# Patient Record
Sex: MALE | Race: WHITE | NOT HISPANIC OR LATINO | Employment: OTHER | URBAN - METROPOLITAN AREA
[De-identification: names, ages, dates, MRNs, and addresses within clinical notes are randomized per-mention and may not be internally consistent; named-entity substitution may affect disease eponyms.]

---

## 2017-01-03 ENCOUNTER — APPOINTMENT (OUTPATIENT)
Dept: OTHER | Facility: IMAGING CENTER | Age: 67
End: 2017-01-03

## 2017-01-03 PROCEDURE — 97530 THERAPEUTIC ACTIVITIES: CPT | Performed by: FAMILY MEDICINE

## 2017-01-06 ENCOUNTER — NON-PROVIDER VISIT (OUTPATIENT)
Dept: MEDICAL GROUP | Facility: MEDICAL CENTER | Age: 67
End: 2017-01-06
Payer: MEDICARE

## 2017-01-06 DIAGNOSIS — E53.8 VITAMIN B12 DEFICIENCY: ICD-10-CM

## 2017-01-06 PROCEDURE — 96372 THER/PROPH/DIAG INJ SC/IM: CPT | Performed by: FAMILY MEDICINE

## 2017-01-06 RX ADMIN — CYANOCOBALAMIN 1000 MCG: 1000 INJECTION, SOLUTION INTRAMUSCULAR; SUBCUTANEOUS at 14:09

## 2017-01-06 NOTE — PROGRESS NOTES
Prabhakar Sierra is a 66 y.o. male here for a non-provider visit for B-12 injection.    Reason for injection: B-12 deficiency  Order in MAR?: Yes  Patient supplied?:No  Minimum interval has been met for this injection (per MAR order): Yes    Patient tolerated injection and no adverse effects were observed or reported Yes.    # of Administrations remaining in MAR:1

## 2017-01-06 NOTE — MR AVS SNAPSHOT
Prabhakar Sierra   2017 2:00 PM   Non-Provider Visit   MRN: [id number]    Department:  [facility]   Dept Phone:  [phone]    Description:  Male : 1950   Provider:  [facility]           Reason for Visit     Injections B-12      Allergies as of 2017     No Known Allergies      You were diagnosed with     Vitamin B12 deficiency   [260738]         Vital Signs     Smoking Status                   Former Smoker           Basic Information     Date Of Birth Sex Race Ethnicity Preferred Language    1950 Male White Non- English      Your appointments     2017  1:00 PM   ACU GROUP with Jaylon Nobles D.O.   [facility] Acupuncture [facility] Integrative [facility] (--)    [address]  [place] [address] [phone]            2017  5:00 PM   MR [id number] with [facility] MRI 1   [facility]AG[facility] - MRI - [facility] ([place])    [address]  [place] [address] [phone]            2017  1:00 PM   Established Patient with Kelly Rivers M.D.   [facility] ([place]    [address]  [address]  [place] [address] [phone]           You will be receiving a confirmation call a few days before your appointment from our automated call confirmation system.            2017 11:20 AM   Follow Up Visit with Ady Triana M.D.   [facility] Neurology (--)    [address], [address]  [place] [address] [phone]           You will be receiving a confirmation call a few days before your appointment from our automated call confirmation system.              Problem List              ICD-10-CM Priority Class Noted - Resolved    BPPV (benign paroxysmal positional vertigo) H81.10   2016 - Present    Controlled type 2 diabetes mellitus with diabetic polyneuropathy, without long-term current use of insulin (HCC) E11.42   2016 - Present    GERD  (gastroesophageal reflux disease) K21.9   9/27/2016 - Present    Obesity (BMI 30-39.9) E66.9   9/27/2016 - Present    Essential hypertension I10   9/27/2016 - Present    BPH (benign prostatic hyperplasia) N40.0   9/27/2016 - Present    Pure hypercholesterolemia E78.00   9/27/2016 - Present    Tobacco use Z72.0   9/27/2016 - Present    Neck pain M54.2   9/27/2016 - Present    Health care maintenance Z00.00   9/27/2016 - Present    Alcohol consumption of more than four drinks per day Z78.9   9/27/2016 - Present    Tremor, coarse G25.2   10/25/2016 - Present    Post-traumatic stress F43.10   10/25/2016 - Present    B12 deficiency E53.8   11/1/2016 - Present    Moderate single current episode of major depressive disorder (HCC) F32.1   11/11/2016 - Present    Acute right hip pain M25.551   11/14/2016 - Present    Post traumatic stress disorder (PTSD) F43.10   12/9/2016 - Present      Health Maintenance        Date Due Completion Dates    RETINAL SCREENING 7/29/1967 ---    COLONOSCOPY 7/29/1999 ---    IMM ZOSTER VACCINE 7/29/2009 ---    A1C SCREENING 4/3/2017 10/3/2016, 1/18/2016, 5/18/2014    IMM PNEUMOCOCCAL 65+ (ADULT) LOW/MEDIUM RISK SERIES (2 of 2 - PPSV23) 9/27/2017 9/27/2016    FASTING LIPID PROFILE 10/3/2017 10/3/2016    URINE ACR / MICROALBUMIN 10/3/2017 10/3/2016    SERUM CREATININE 10/3/2017 10/3/2016, 1/25/2016, 1/18/2016, 5/18/2014    DIABETES MONOFILAMTENT / LE EXAM 12/9/2017 12/9/2016, 12/9/2016    IMM DTaP/Tdap/Td Vaccine (2 - Td) 9/27/2026 9/27/2016            Current Immunizations     13-VALENT PCV PREVNAR 9/27/2016  4:27 PM    Influenza TIV (IM) 10/26/2016    Tdap Vaccine 9/27/2016  4:25 PM      Below and/or attached are the medications your provider expects you to take. Review all of your home medications and newly ordered medications with your provider and/or pharmacist. Follow medication instructions as directed by your provider and/or pharmacist. Please keep your medication list with you and share  with your provider. Update the information when medications are discontinued, doses are changed, or new medications (including over-the-counter products) are added; and carry medication information at all times in the event of emergency situations     Allergies:  No Known Allergies          Medications  Valid as of: January 06, 2017 -  2:14 PM    Generic Name Brand Name Tablet Size Instructions for use    Aspirin (Chew Tab) ASA 81 MG Take 81 mg by mouth every day.        Atorvastatin Calcium (Tab) LIPITOR 20 MG Take 40 mg by mouth every evening.        Benazepril HCl (Tab) LOTENSIN 20 MG Take 2 Tabs by mouth every day.        Cholecalciferol (Tab) cholecalciferol 1000 UNIT Take 1,000 Units by mouth every day.        Cyanocobalamin (Tab) VITAMIN B12 1000 MCG Take 1 Tab by mouth every day.        Escitalopram Oxalate (Tab) LEXAPRO 10 MG Take 2 Tabs by mouth every day.        Gabapentin (Cap) NEURONTIN 300 MG Take 2 Caps by mouth 3 times a day.        GlipiZIDE (TABLET SR 24 HR) GLUCOTROL 5 MG Take 5 mg by mouth every day.        Ibuprofen (Tab) MOTRIN 800 MG Take 800 mg by mouth every 8 hours as needed.        MetFORMIN HCl (TABLET SR 24 HR) GLUCOPHAGE  MG Take 1,000 mg by mouth 2 times a day.        Multiple Vitamins-Minerals   Take  by mouth.        Omeprazole (CAPSULE DELAYED RELEASE) PRILOSEC 20 MG Take 20 mg by mouth every day.        OXcarbazepine (Tab) TRILEPTAL 300 MG Take 1 Tab by mouth 2 times a day.        Pravastatin Sodium (Tab) PRAVACHOL 20 MG Take 40 mg by mouth every evening.        Terazosin HCl (Cap) HYTRIN 5 MG Take 5 mg by mouth every evening.        .                 Medicines prescribed today were sent to:     [facility] #[id number] - [place], [place] - [address] [address]    [address] [facility] [place] [address]    Phone: [phone] Fax: [phone]    Open 24 Hours?: No      Medication refill instructions:       If your prescription bottle indicates you have medication refills left,  it is not necessary to call your provider’s office. Please contact your pharmacy and they will refill your medication.    If your prescription bottle indicates you do not have any refills left, you may request refills at any time through one of the following ways: The online ShowMe system (except Urgent Care), by calling your provider’s office, or by asking your pharmacy to contact your provider’s office with a refill request. Medication refills are processed only during regular business hours and may not be available until the next business day. Your provider may request additional information or to have a follow-up visit with you prior to refilling your medication.   *Please Note: Medication refills are assigned a new Rx number when refilled electronically. Your pharmacy may indicate that no refills were authorized even though a new prescription for the same medication is available at the pharmacy. Please request the medicine by name with the pharmacy before contacting your provider for a refill.           ShowMe Access Code: Activation code not generated  Current ShowMe Status: Active

## 2017-01-11 ENCOUNTER — NON-PROVIDER VISIT (OUTPATIENT)
Dept: MEDICAL GROUP | Facility: MEDICAL CENTER | Age: 67
End: 2017-01-11
Payer: MEDICARE

## 2017-01-11 PROCEDURE — 96372 THER/PROPH/DIAG INJ SC/IM: CPT | Performed by: FAMILY MEDICINE

## 2017-01-11 RX ORDER — CYANOCOBALAMIN 1000 UG/ML
1000 INJECTION, SOLUTION INTRAMUSCULAR; SUBCUTANEOUS ONCE
Status: COMPLETED | OUTPATIENT
Start: 2017-01-11 | End: 2017-01-11

## 2017-01-11 RX ADMIN — CYANOCOBALAMIN 1000 MCG: 1000 INJECTION, SOLUTION INTRAMUSCULAR; SUBCUTANEOUS at 12:07

## 2017-01-11 NOTE — PROGRESS NOTES
Prabhakar Sierra is a 66 y.o. male here for a non-provider visit for B-12 injection.    Reason for injection: Vitamin B 12 deficiency  Order in MAR?: Yes  Patient supplied?:No  Minimum interval has been met for this injection (per MAR order): Yes    Patient tolerated injection and no adverse effects were observed or reported.    # of Administrations remaining in MAR:

## 2017-01-13 ENCOUNTER — HOSPITAL ENCOUNTER (OUTPATIENT)
Dept: RADIOLOGY | Facility: MEDICAL CENTER | Age: 67
End: 2017-01-13
Attending: PSYCHIATRY & NEUROLOGY
Payer: MEDICARE

## 2017-01-13 DIAGNOSIS — G25.2 TREMOR, COARSE: ICD-10-CM

## 2017-01-13 DIAGNOSIS — R25.1 TREMOR: ICD-10-CM

## 2017-01-13 DIAGNOSIS — M54.2 NECK PAIN: ICD-10-CM

## 2017-01-13 DIAGNOSIS — F43.10 POST-TRAUMATIC STRESS: ICD-10-CM

## 2017-01-13 PROCEDURE — 70551 MRI BRAIN STEM W/O DYE: CPT

## 2017-01-19 ENCOUNTER — NON-PROVIDER VISIT (OUTPATIENT)
Dept: MEDICAL GROUP | Facility: MEDICAL CENTER | Age: 67
End: 2017-01-19
Payer: MEDICARE

## 2017-01-19 ENCOUNTER — TELEPHONE (OUTPATIENT)
Dept: NEUROLOGY | Facility: MEDICAL CENTER | Age: 67
End: 2017-01-19

## 2017-01-19 DIAGNOSIS — E53.8 B12 DEFICIENCY: ICD-10-CM

## 2017-01-19 PROCEDURE — 99999 PR NO CHARGE: CPT | Performed by: FAMILY MEDICINE

## 2017-01-19 RX ORDER — CYANOCOBALAMIN 1000 UG/ML
1000 INJECTION, SOLUTION INTRAMUSCULAR; SUBCUTANEOUS ONCE
Status: COMPLETED | OUTPATIENT
Start: 2017-01-19 | End: 2017-01-19

## 2017-01-19 RX ADMIN — CYANOCOBALAMIN 1000 MCG: 1000 INJECTION, SOLUTION INTRAMUSCULAR; SUBCUTANEOUS at 14:25

## 2017-01-19 NOTE — NON-PROVIDER
Prabhakar Sierra is a 66 y.o. male here for a non-provider visit for B12 injection.    Reason for injection: B12 deficiency   Order in MAR?: Yes  Patient supplied?:No  Minimum interval has been met for this injection (per MAR order): Yes    Patient tolerated injection and no adverse effects were observed or reported yes.    # of Administrations remaining in MAR: 0

## 2017-01-19 NOTE — TELEPHONE ENCOUNTER
Pt is calling and stating he forgot his appointment date and time. He needs a call back and clarification of this. Called and spoke with pt. Appointment date and time was given to the pt again. He is really concerned about his tremors. They are still not improving. He did have the MRI done and is looking forward to his appointment to go over the results with Dr. Triana. KA

## 2017-01-19 NOTE — MR AVS SNAPSHOT
Prabhakar Sierra   2017 1:30 PM   Non-Provider Visit   MRN: [id number]    Department:  [facility]   Dept Phone:  [phone]    Description:  Male : 1950   Provider:  [facility]           Reason for Visit     Immunizations B12      Allergies as of 2017     No Known Allergies      You were diagnosed with     B12 deficiency   [106603]         Vital Signs     Smoking Status                   Former Smoker           Basic Information     Date Of Birth Sex Race Ethnicity Preferred Language    1950 Male White Non- English      Your appointments     2017  1:00 PM   Established Patient with Kelly Rivers M.D.   [facility] ([place])    [address]  [address]  [place] [address] [phone]           You will be receiving a confirmation call a few days before your appointment from our automated call confirmation system.            2017 11:20 AM   Follow Up Visit with Ady Triana M.D.   [facility] Neurology (--)    [address], [address]  [place] [address] [phone]           You will be receiving a confirmation call a few days before your appointment from our automated call confirmation system.              Problem List              ICD-10-CM Priority Class Noted - Resolved    BPPV (benign paroxysmal positional vertigo) H81.10   2016 - Present    Controlled type 2 diabetes mellitus with diabetic polyneuropathy, without long-term current use of insulin (CMS-HCC) E11.42   2016 - Present    GERD (gastroesophageal reflux disease) K21.9   2016 - Present    Obesity (BMI 30-39.9) E66.9   2016 - Present    Essential hypertension I10   2016 - Present    BPH (benign prostatic hyperplasia) N40.0   2016 - Present    Pure hypercholesterolemia E78.00   2016 - Present    Tobacco use Z72.0   2016 - Present    Neck pain M54.2   2016 - Present    Health care maintenance Z00.00   9/27/2016 - Present    Alcohol consumption of more than four drinks per day Z78.9   9/27/2016 - Present    Tremor, coarse G25.2   10/25/2016 - Present    Post-traumatic stress F43.10   10/25/2016 - Present    B12 deficiency E53.8   11/1/2016 - Present    Moderate single current episode of major depressive disorder (CMS-HCC) F32.1   11/11/2016 - Present    Acute right hip pain M25.551   11/14/2016 - Present    Post traumatic stress disorder (PTSD) F43.10   12/9/2016 - Present      Health Maintenance        Date Due Completion Dates    RETINAL SCREENING 7/29/1967 ---    COLONOSCOPY 7/29/1999 ---    IMM ZOSTER VACCINE 7/29/2009 ---    A1C SCREENING 4/3/2017 10/3/2016, 1/18/2016, 5/18/2014    IMM PNEUMOCOCCAL 65+ (ADULT) LOW/MEDIUM RISK SERIES (2 of 2 - PPSV23) 9/27/2017 9/27/2016    FASTING LIPID PROFILE 10/3/2017 10/3/2016    URINE ACR / MICROALBUMIN 10/3/2017 10/3/2016    SERUM CREATININE 10/3/2017 10/3/2016, 1/25/2016, 1/18/2016, 5/18/2014    DIABETES MONOFILAMENT / LE EXAM 12/9/2017 12/9/2016, 12/9/2016    IMM DTaP/Tdap/Td Vaccine (2 - Td) 9/27/2026 9/27/2016            Current Immunizations     13-VALENT PCV PREVNAR 9/27/2016  4:27 PM    Influenza TIV (IM) 10/26/2016    Tdap Vaccine 9/27/2016  4:25 PM      Below and/or attached are the medications your provider expects you to take. Review all of your home medications and newly ordered medications with your provider and/or pharmacist. Follow medication instructions as directed by your provider and/or pharmacist. Please keep your medication list with you and share with your provider. Update the information when medications are discontinued, doses are changed, or new medications (including over-the-counter products) are added; and carry medication information at all times in the event of emergency situations     Allergies:  No Known Allergies          Medications  Valid as of: January 19, 2017 -  2:27 PM    Generic Name Brand Name  Tablet Size Instructions for use    Aspirin (Chew Tab) ASA 81 MG Take 81 mg by mouth every day.        Atorvastatin Calcium (Tab) LIPITOR 20 MG Take 40 mg by mouth every evening.        Benazepril HCl (Tab) LOTENSIN 20 MG Take 2 Tabs by mouth every day.        Cholecalciferol (Tab) cholecalciferol 1000 UNIT Take 1,000 Units by mouth every day.        Cyanocobalamin (Tab) VITAMIN B12 1000 MCG Take 1 Tab by mouth every day.        Escitalopram Oxalate (Tab) LEXAPRO 10 MG Take 2 Tabs by mouth every day.        Gabapentin (Cap) NEURONTIN 300 MG Take 2 Caps by mouth 3 times a day.        GlipiZIDE (TABLET SR 24 HR) GLUCOTROL 5 MG Take 5 mg by mouth every day.        Ibuprofen (Tab) MOTRIN 800 MG Take 800 mg by mouth every 8 hours as needed.        MetFORMIN HCl (TABLET SR 24 HR) GLUCOPHAGE  MG Take 1,000 mg by mouth 2 times a day.        Multiple Vitamins-Minerals   Take  by mouth.        Omeprazole (CAPSULE DELAYED RELEASE) PRILOSEC 20 MG Take 20 mg by mouth every day.        OXcarbazepine (Tab) TRILEPTAL 300 MG Take 1 Tab by mouth 2 times a day.        Pravastatin Sodium (Tab) PRAVACHOL 20 MG Take 40 mg by mouth every evening.        Terazosin HCl (Cap) HYTRIN 5 MG Take 5 mg by mouth every evening.        .                 Medicines prescribed today were sent to:     [facility] #[id number] [place], [place] - [address] [address]    Phone: [phone] Fax: [phone]    Open 24 Hours?: No      Medication refill instructions:       If your prescription bottle indicates you have medication refills left, it is not necessary to call your provider’s office. Please contact your pharmacy and they will refill your medication.    If your prescription bottle indicates you do not have any refills left, you may request refills at any time through one of the following ways: The online Logoworks system (except Urgent Care), by calling your provider’s office, or by asking your pharmacy  to contact your provider’s office with a refill request. Medication refills are processed only during regular business hours and may not be available until the next business day. Your provider may request additional information or to have a follow-up visit with you prior to refilling your medication.   *Please Note: Medication refills are assigned a new Rx number when refilled electronically. Your pharmacy may indicate that no refills were authorized even though a new prescription for the same medication is available at the pharmacy. Please request the medicine by name with the pharmacy before contacting your provider for a refill.           Pavlok Access Code: Activation code not generated  Current Pavlok Status: Active

## 2017-01-20 ENCOUNTER — APPOINTMENT (OUTPATIENT)
Dept: MEDICAL GROUP | Facility: MEDICAL CENTER | Age: 67
End: 2017-01-20
Payer: MEDICARE

## 2017-01-25 ENCOUNTER — OFFICE VISIT (OUTPATIENT)
Dept: MEDICAL GROUP | Facility: MEDICAL CENTER | Age: 67
End: 2017-01-25
Payer: MEDICARE

## 2017-01-25 VITALS
RESPIRATION RATE: 16 BRPM | HEART RATE: 93 BPM | HEIGHT: 70 IN | SYSTOLIC BLOOD PRESSURE: 136 MMHG | DIASTOLIC BLOOD PRESSURE: 70 MMHG | BODY MASS INDEX: 33.11 KG/M2 | OXYGEN SATURATION: 95 % | WEIGHT: 231.26 LBS | TEMPERATURE: 97.3 F

## 2017-01-25 DIAGNOSIS — G25.2 TREMOR, COARSE: ICD-10-CM

## 2017-01-25 DIAGNOSIS — E53.8 B12 DEFICIENCY: ICD-10-CM

## 2017-01-25 DIAGNOSIS — I10 ESSENTIAL HYPERTENSION: ICD-10-CM

## 2017-01-25 PROCEDURE — 99215 OFFICE O/P EST HI 40 MIN: CPT | Performed by: FAMILY MEDICINE

## 2017-01-25 RX ORDER — BENAZEPRIL HYDROCHLORIDE 20 MG/1
20 TABLET ORAL DAILY
Qty: 30 TAB | Refills: 3 | COMMUNITY
Start: 2017-01-25 | End: 2017-07-24 | Stop reason: SDUPTHER

## 2017-01-25 RX ORDER — CYANOCOBALAMIN 1000 UG/ML
1000 INJECTION, SOLUTION INTRAMUSCULAR; SUBCUTANEOUS ONCE
Status: COMPLETED | OUTPATIENT
Start: 2017-01-25 | End: 2017-01-25

## 2017-01-25 RX ADMIN — CYANOCOBALAMIN 1000 MCG: 1000 INJECTION, SOLUTION INTRAMUSCULAR; SUBCUTANEOUS at 17:03

## 2017-01-25 NOTE — MR AVS SNAPSHOT
"Prabhakar Sierra   2017 1:40 PM   Office Visit   MRN: [id number]    Department:  [place] [address]   Dept Phone:  [phone]    Description:  Male : 1950   Provider:  Kelly Rivers M.D.           Reason for Visit     Follow-Up     Injections B-12      Allergies as of 2017     No Known Allergies      You were diagnosed with     Essential hypertension   [0191877]         Vital Signs     Blood Pressure Pulse Temperature Respirations Height Weight    136/70 mmHg 93 36.3 °C (97.3 °F) 16 1.765 m (5' 9.5\") 104.9 kg (231 lb 4.2 oz)    Body Mass Index Oxygen Saturation Smoking Status             33.67 kg/m2 95% Former Smoker         Basic Information     Date Of Birth Sex Race Ethnicity Preferred Language    1950 Male White Non- English      Your appointments     2017 11:20 AM   Follow Up Visit with Ady Triana M.D.   [facility] Neurology (--)    [address], [address]  [facility] [address] [phone]           You will be receiving a confirmation call a few days before your appointment from our automated call confirmation system.            2017  3:30 PM   ACU GROUP with Jaylon Nobles D.O.   [facility]upuncture [facility] Integrative [facility] (--)    [address] [facility]  [place] [address] [phone]            2017  3:00 PM   Established Patient with Kelly Rivers M.D.   [facility] [place])    [address]  [address]  [facility] [address] [phone]           You will be receiving a confirmation call a few days before your appointment from our automated call confirmation system.              Problem List              ICD-10-CM Priority Class Noted - Resolved    BPPV (benign paroxysmal positional vertigo) H81.10   2016 - Present    Controlled type 2 diabetes mellitus with diabetic polyneuropathy, without long-term current use of insulin (CMS-HCC) E11.42   " 9/27/2016 - Present    GERD (gastroesophageal reflux disease) K21.9   9/27/2016 - Present    Obesity (BMI 30-39.9) E66.9   9/27/2016 - Present    Essential hypertension I10   9/27/2016 - Present    BPH (benign prostatic hyperplasia) N40.0   9/27/2016 - Present    Pure hypercholesterolemia E78.00   9/27/2016 - Present    Tobacco use Z72.0   9/27/2016 - Present    Neck pain M54.2   9/27/2016 - Present    Health care maintenance Z00.00   9/27/2016 - Present    Alcohol consumption of more than four drinks per day Z78.9   9/27/2016 - Present    Tremor, coarse G25.2   10/25/2016 - Present    Post-traumatic stress F43.10   10/25/2016 - Present    B12 deficiency E53.8   11/1/2016 - Present    Moderate single current episode of major depressive disorder (CMS-HCC) F32.1   11/11/2016 - Present    Acute right hip pain M25.551   11/14/2016 - Present    Post traumatic stress disorder (PTSD) F43.10   12/9/2016 - Present      Health Maintenance        Date Due Completion Dates    RETINAL SCREENING 7/29/1967 ---    COLONOSCOPY 7/29/1999 ---    IMM ZOSTER VACCINE 7/29/2009 ---    A1C SCREENING 4/3/2017 10/3/2016, 1/18/2016, 5/18/2014    IMM PNEUMOCOCCAL 65+ (ADULT) LOW/MEDIUM RISK SERIES (2 of 2 - PPSV23) 9/27/2017 9/27/2016    FASTING LIPID PROFILE 10/3/2017 10/3/2016    URINE ACR / MICROALBUMIN 10/3/2017 10/3/2016    SERUM CREATININE 10/3/2017 10/3/2016, 1/25/2016, 1/18/2016, 5/18/2014    DIABETES MONOFILAMENT / LE EXAM 12/9/2017 12/9/2016, 12/9/2016    IMM DTaP/Tdap/Td Vaccine (2 - Td) 9/27/2026 9/27/2016            Current Immunizations     13-VALENT PCV PREVNAR 9/27/2016  4:27 PM    Influenza TIV (IM) 10/26/2016    Tdap Vaccine 9/27/2016  4:25 PM      Below and/or attached are the medications your provider expects you to take. Review all of your home medications and newly ordered medications with your provider and/or pharmacist. Follow medication instructions as directed by your provider and/or pharmacist. Please keep your  medication list with you and share with your provider. Update the information when medications are discontinued, doses are changed, or new medications (including over-the-counter products) are added; and carry medication information at all times in the event of emergency situations     Allergies:  No Known Allergies          Medications  Valid as of: January 25, 2017 -  3:02 PM    Generic Name Brand Name Tablet Size Instructions for use    Aspirin (Chew Tab) ASA 81 MG Take 81 mg by mouth every day.        Atorvastatin Calcium (Tab) LIPITOR 20 MG Take 40 mg by mouth every evening.        Benazepril HCl (Tab) LOTENSIN 20 MG Take 1 Tab by mouth every day.        Cholecalciferol (Tab) cholecalciferol 1000 UNIT Take 1,000 Units by mouth every day.        Cyanocobalamin (Tab) VITAMIN B12 1000 MCG Take 1 Tab by mouth every day.        Escitalopram Oxalate (Tab) LEXAPRO 10 MG Take 2 Tabs by mouth every day.        Gabapentin (Cap) NEURONTIN 300 MG Take 2 Caps by mouth 3 times a day.        GlipiZIDE (TABLET SR 24 HR) GLUCOTROL 5 MG Take 5 mg by mouth every day.        Ibuprofen (Tab) MOTRIN 800 MG Take 800 mg by mouth every 8 hours as needed.        MetFORMIN HCl (Tab) GLUCOPHAGE 500 MG 2 tabs PO qAM, 1 tab in the afternoon, 2 tabs qPM        Multiple Vitamins-Minerals   Take  by mouth.        Omeprazole (CAPSULE DELAYED RELEASE) PRILOSEC 20 MG Take 20 mg by mouth every day.        Terazosin HCl (Cap) HYTRIN 5 MG Take 5 mg by mouth every evening.        .                 Medicines prescribed today were sent to:     [facility] #[id number] - [place], [place] - [address] [place] [address]    Phone: [phone] Fax: [phone]    Open 24 Hours?: No      Medication refill instructions:       If your prescription bottle indicates you have medication refills left, it is not necessary to call your provider’s office. Please contact your pharmacy and they will refill your medication.    If your  prescription bottle indicates you do not have any refills left, you may request refills at any time through one of the following ways: The online Meebler system (except Urgent Care), by calling your provider’s office, or by asking your pharmacy to contact your provider’s office with a refill request. Medication refills are processed only during regular business hours and may not be available until the next business day. Your provider may request additional information or to have a follow-up visit with you prior to refilling your medication.   *Please Note: Medication refills are assigned a new Rx number when refilled electronically. Your pharmacy may indicate that no refills were authorized even though a new prescription for the same medication is available at the pharmacy. Please request the medicine by name with the pharmacy before contacting your provider for a refill.           Meebler Access Code: Activation code not generated  Current Meebler Status: Active
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
## 2017-01-30 ENCOUNTER — APPOINTMENT (OUTPATIENT)
Dept: MEDICAL GROUP | Facility: MEDICAL CENTER | Age: 67
End: 2017-01-30
Payer: MEDICARE

## 2017-01-31 ENCOUNTER — OFFICE VISIT (OUTPATIENT)
Dept: NEUROLOGY | Facility: MEDICAL CENTER | Age: 67
End: 2017-01-31
Payer: MEDICARE

## 2017-01-31 VITALS
HEART RATE: 92 BPM | RESPIRATION RATE: 20 BRPM | BODY MASS INDEX: 34.56 KG/M2 | WEIGHT: 233.32 LBS | TEMPERATURE: 98.4 F | DIASTOLIC BLOOD PRESSURE: 84 MMHG | OXYGEN SATURATION: 96 % | HEIGHT: 69 IN | SYSTOLIC BLOOD PRESSURE: 150 MMHG

## 2017-01-31 DIAGNOSIS — E53.8 B12 DEFICIENCY: ICD-10-CM

## 2017-01-31 DIAGNOSIS — F43.10 POST-TRAUMATIC STRESS: ICD-10-CM

## 2017-01-31 DIAGNOSIS — M25.551 ACUTE RIGHT HIP PAIN: ICD-10-CM

## 2017-01-31 PROCEDURE — 4040F PNEUMOC VAC/ADMIN/RCVD: CPT | Performed by: PSYCHIATRY & NEUROLOGY

## 2017-01-31 PROCEDURE — G8432 DEP SCR NOT DOC, RNG: HCPCS | Performed by: PSYCHIATRY & NEUROLOGY

## 2017-01-31 PROCEDURE — G8419 CALC BMI OUT NRM PARAM NOF/U: HCPCS | Performed by: PSYCHIATRY & NEUROLOGY

## 2017-01-31 PROCEDURE — 99213 OFFICE O/P EST LOW 20 MIN: CPT | Performed by: PSYCHIATRY & NEUROLOGY

## 2017-01-31 PROCEDURE — G8482 FLU IMMUNIZE ORDER/ADMIN: HCPCS | Performed by: PSYCHIATRY & NEUROLOGY

## 2017-01-31 PROCEDURE — 1101F PT FALLS ASSESS-DOCD LE1/YR: CPT | Mod: 8P | Performed by: PSYCHIATRY & NEUROLOGY

## 2017-01-31 PROCEDURE — 1036F TOBACCO NON-USER: CPT | Performed by: PSYCHIATRY & NEUROLOGY

## 2017-01-31 PROCEDURE — 3017F COLORECTAL CA SCREEN DOC REV: CPT | Mod: 1P | Performed by: PSYCHIATRY & NEUROLOGY
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
## 2017-02-07 RX ORDER — ATORVASTATIN CALCIUM 40 MG/1
40 TABLET, FILM COATED ORAL DAILY
Qty: 90 TAB | Refills: 3 | Status: SHIPPED | OUTPATIENT
Start: 2017-02-07 | End: 2018-04-17 | Stop reason: SDUPTHER

## 2017-02-07 RX ORDER — TERAZOSIN 5 MG/1
5 CAPSULE ORAL DAILY
Qty: 90 CAP | Refills: 3 | Status: SHIPPED | OUTPATIENT
Start: 2017-02-07 | End: 2018-04-17 | Stop reason: SDUPTHER

## 2017-02-07 RX ORDER — GLIPIZIDE 5 MG/1
5 TABLET, FILM COATED, EXTENDED RELEASE ORAL DAILY
Qty: 90 TAB | Refills: 3 | Status: SHIPPED | OUTPATIENT
Start: 2017-02-07 | End: 2018-04-17 | Stop reason: SDUPTHER

## 2017-02-09 NOTE — TELEPHONE ENCOUNTER
Was the patient seen in the last year in this department? Yes     Does patient have an active prescription for medications requested? No     Received Request Via: Pharmacy     Last visit: 12/25/16    Last labs: 10/31/16

## 2017-02-10 ENCOUNTER — NON-PROVIDER VISIT (OUTPATIENT)
Dept: MEDICAL GROUP | Facility: MEDICAL CENTER | Age: 67
End: 2017-02-10
Payer: MEDICARE

## 2017-02-10 DIAGNOSIS — E53.8 B12 DEFICIENCY: ICD-10-CM

## 2017-02-10 PROCEDURE — 96372 THER/PROPH/DIAG INJ SC/IM: CPT | Performed by: FAMILY MEDICINE

## 2017-02-10 RX ORDER — GABAPENTIN 300 MG/1
600 CAPSULE ORAL 3 TIMES DAILY
Qty: 540 CAP | Refills: 3 | Status: SHIPPED | OUTPATIENT
Start: 2017-02-10 | End: 2018-04-11 | Stop reason: SDUPTHER

## 2017-02-10 RX ADMIN — CYANOCOBALAMIN 1000 MCG: 1000 INJECTION, SOLUTION INTRAMUSCULAR; SUBCUTANEOUS at 13:49

## 2017-02-10 NOTE — MR AVS SNAPSHOT
Prabhakar Sierra   2/10/2017 1:30 PM   Non-Provider Visit   MRN: [id number]    Department:  [facility]    Dept Phone:  [phone]    Description:  Male : 1950   Provider:  [facility]           Reason for Visit     Injections B-12      Allergies as of 2/10/2017     No Known Allergies      You were diagnosed with     B12 deficiency   [[id number]]         Vital Signs     Smoking Status                   Former Smoker           Basic Information     Date Of Birth Sex Race Ethnicity Preferred Language    1950 Male White Non- English      Your appointments     2017  3:30 PM   ACU GROUP with Jaylon Nobles D.O.   [facility] (--)    [address] [facility].  [place] [address] [phone]            2017  3:00 PM   Established Patient with Kelly Rivers M.D.   [facility] - [facility] (--)    [address] [facility] [address]  [place] [address] [phone]           You will be receiving a confirmation call a few days before your appointment from our automated call confirmation system.              Problem List              ICD-10-CM Priority Class Noted - Resolved    BPPV (benign paroxysmal positional vertigo) H81.10   2016 - Present    Controlled type 2 diabetes mellitus with diabetic polyneuropathy, without long-term current use of insulin (CMS-[facility]) E11.42   2016 - Present    GERD (gastroesophageal reflux disease) K21.9   2016 - Present    Obesity (BMI 30-39.9) E66.9   2016 - Present    Essential hypertension I10   2016 - Present    BPH (benign prostatic hyperplasia) N40.0   2016 - Present    Pure hypercholesterolemia E78.00   2016 - Present    Tobacco use Z72.0   2016 - Present    Neck pain M54.2   2016 - Present    Health care maintenance Z00.00   2016 - Present    Alcohol consumption of more than four drinks per day Z78.9   2016 - Present       Tremor, coarse G25.2   10/25/2016 - Present    Post-traumatic stress F43.10   10/25/2016 - Present    B12 deficiency E53.8   11/1/2016 - Present    Moderate single current episode of major depressive disorder (CMS-[facility]) F32.1   11/11/2016 - Present    Acute right hip pain M25.551   11/14/2016 - Present    Post traumatic stress disorder (PTSD) F43.10   12/9/2016 - Present      Health Maintenance        Date Due Completion Dates    RETINAL SCREENING 7/29/1967 ---    COLONOSCOPY 7/29/1999 ---    IMM ZOSTER VACCINE 7/29/2009 ---    A1C SCREENING 4/3/2017 10/3/2016, 1/18/2016, 5/18/2014    IMM PNEUMOCOCCAL 65+ (ADULT) LOW/MEDIUM RISK SERIES (2 of 2 - PPSV23) 9/27/2017 9/27/2016    FASTING LIPID PROFILE 10/3/2017 10/3/2016    URINE ACR / MICROALBUMIN 10/3/2017 10/3/2016    SERUM CREATININE 10/3/2017 10/3/2016, 1/25/2016, 1/18/2016, 5/18/2014    DIABETES MONOFILAMENT / LE EXAM 12/9/2017 12/9/2016, 12/9/2016    IMM DTaP/Tdap/Td Vaccine (2 - Td) 9/27/2026 9/27/2016            Current Immunizations     13-VALENT PCV PREVNAR 9/27/2016  4:27 PM    Influenza TIV (IM) 10/26/2016    Tdap Vaccine 9/27/2016  4:25 PM      Below and/or attached are the medications your provider expects you to take. Review all of your home medications and newly ordered medications with your provider and/or pharmacist. Follow medication instructions as directed by your provider and/or pharmacist. Please keep your medication list with you and share with your provider. Update the information when medications are discontinued, doses are changed, or new medications (including over-the-counter products) are added; and carry medication information at all times in the event of emergency situations     Allergies:  No Known Allergies          Medications  Valid as of: February 10, 2017 -  2:49 PM    Generic Name Brand Name Tablet Size Instructions for use    Aspirin (Chew Tab) ASA 81 MG Take 81 mg by mouth every day.        Atorvastatin Calcium (Tab) LIPITOR  40 MG Take 1 Tab by mouth every day.        Benazepril HCl (Tab) LOTENSIN 20 MG Take 1 Tab by mouth every day.        Cholecalciferol (Tab) cholecalciferol 1000 UNIT Take 1,000 Units by mouth every day.        Cyanocobalamin (Tab) VITAMIN B12 1000 MCG Take 1 Tab by mouth every day.        Gabapentin (Cap) NEURONTIN 300 MG Take 2 Caps by mouth 3 times a day.        GlipiZIDE (TABLET SR 24 HR) GLUCOTROL 5 MG Take 1 Tab by mouth every day.        Ibuprofen (Tab) MOTRIN 800 MG Take 800 mg by mouth every 8 hours as needed.        MetFORMIN HCl (Tab) GLUCOPHAGE 500 MG 2 tabs PO qAM, 1 tab in the afternoon, 2 tabs qPM        Multiple Vitamins-Minerals   Take  by mouth.        Omeprazole (CAPSULE DELAYED RELEASE) PRILOSEC 20 MG Take 20 mg by mouth every day.        Terazosin HCl (Cap) HYTRIN 5 MG Take 1 Cap by mouth every day.        .                 Medicines prescribed today were sent to:     [facility] #[id number] [facility] [address] [address]    Phone: [phone] Fax: [phone]    Open 24 Hours?: No      Medication refill instructions:       If your prescription bottle indicates you have medication refills left, it is not necessary to call your provider’s office. Please contact your pharmacy and they will refill your medication.    If your prescription bottle indicates you do not have any refills left, you may request refills at any time through one of the following ways: The online [employer] system (except Urgent Care), by calling your provider’s office, or by asking your pharmacy to contact your provider’s office with a refill request. Medication refills are processed only during regular business hours and may not be available until the next business day. Your provider may request additional information or to have a follow-up visit with you prior to refilling your medication.   *Please Note: Medication refills are assigned a new Rx number when refilled  electronically. Your pharmacy may indicate that no refills were authorized even though a new prescription for the same medication is available at the pharmacy. Please request the medicine by name with the pharmacy before contacting your provider for a refill.           Real Time Contentt Access Code: Activation code not generated  Current Mingleplay Status: Active

## 2017-02-10 NOTE — PROGRESS NOTES
Prabhakar Sierra is a 66 y.o. male here for a non-provider visit for B-12 injection.    Reason for injection: B-12 dEFICIENCY  Order in MAR?: Yes  Patient supplied?:No  Minimum interval has been met for this injection (per MAR order): Yes    Patient tolerated injection and no adverse effects were observed or reported YES.    # of Administrations remaining in MAR:UNKNOW

## 2017-02-14 ENCOUNTER — APPOINTMENT (OUTPATIENT)
Dept: OTHER | Facility: IMAGING CENTER | Age: 67
End: 2017-02-14

## 2017-02-14 PROCEDURE — 97530 THERAPEUTIC ACTIVITIES: CPT | Performed by: FAMILY MEDICINE

## 2017-02-16 ENCOUNTER — APPOINTMENT (OUTPATIENT)
Dept: MEDICAL GROUP | Facility: MEDICAL CENTER | Age: 67
End: 2017-02-16
Payer: MEDICARE

## 2017-02-23 ENCOUNTER — NON-PROVIDER VISIT (OUTPATIENT)
Dept: MEDICAL GROUP | Facility: MEDICAL CENTER | Age: 67
End: 2017-02-23
Payer: MEDICARE

## 2017-02-23 RX ADMIN — CYANOCOBALAMIN 1000 MCG: 1000 INJECTION, SOLUTION INTRAMUSCULAR; SUBCUTANEOUS at 14:27

## 2017-02-23 NOTE — MR AVS SNAPSHOT
Prabhakar Sierra   2017 1:40 PM   Non-Provider Visit   MRN: [id number]    Department:  [facility]    Dept Phone:  [phone]    Description:  Male : 1950   Provider:  [facility]           Reason for Visit     Injections B12      Allergies as of 2017     No Known Allergies      Vital Signs     Smoking Status                   Former Smoker           Basic Information     Date Of Birth Sex Race Ethnicity Preferred Language    1950 Male White Non- English      Your appointments     Mar 07, 2017  3:30 PM   ACU GROUP with Jaylon Nobles D.O.   [facility] (--)    [address] [facility].  [place] [address] [phone]            2017  3:00 PM   Established Patient with Kelly Rivers M.D.   [facility] [place] [facility] - [facility] (--)    [address] [facility] [address]  [place] [address] [phone]           You will be receiving a confirmation call a few days before your appointment from our automated call confirmation system.              Problem List              ICD-10-CM Priority Class Noted - Resolved    BPPV (benign paroxysmal positional vertigo) H81.10   2016 - Present    Controlled type 2 diabetes mellitus with diabetic polyneuropathy, without long-term current use of insulin (CMS-HCC) E11.42   2016 - Present    GERD (gastroesophageal reflux disease) K21.9   2016 - Present    Obesity (BMI 30-39.9) E66.9   2016 - Present    Essential hypertension I10   2016 - Present    BPH (benign prostatic hyperplasia) N40.0   2016 - Present    Pure hypercholesterolemia E78.00   2016 - Present    Tobacco use Z72.0   2016 - Present    Neck pain M54.2   2016 - Present    Health care maintenance Z00.00   2016 - Present    Alcohol consumption of more than four drinks per day Z78.9   2016 - Present    Tremor, coarse G25.2   10/25/2016 - Present    Post-traumatic  stress F43.10   10/25/2016 - Present    B12 deficiency E53.8   11/1/2016 - Present    Moderate single current episode of major depressive disorder (CMS-HCC) F32.1   11/11/2016 - Present    Acute right hip pain M25.551   11/14/2016 - Present    Post traumatic stress disorder (PTSD) F43.10   12/9/2016 - Present      Health Maintenance        Date Due Completion Dates    RETINAL SCREENING 7/29/1967 ---    COLONOSCOPY 7/29/1999 ---    IMM ZOSTER VACCINE 7/29/2009 ---    A1C SCREENING 4/3/2017 10/3/2016, 1/18/2016, 5/18/2014    IMM PNEUMOCOCCAL 65+ (ADULT) LOW/MEDIUM RISK SERIES (2 of 2 - PPSV23) 9/27/2017 9/27/2016    FASTING LIPID PROFILE 10/3/2017 10/3/2016    URINE ACR / MICROALBUMIN 10/3/2017 10/3/2016    SERUM CREATININE 10/3/2017 10/3/2016, 1/25/2016, 1/18/2016, 5/18/2014    DIABETES MONOFILAMENT / LE EXAM 12/9/2017 12/9/2016, 12/9/2016    IMM DTaP/Tdap/Td Vaccine (2 - Td) 9/27/2026 9/27/2016            Current Immunizations     13-VALENT PCV PREVNAR 9/27/2016  4:27 PM    Influenza TIV (IM) 10/26/2016    Tdap Vaccine 9/27/2016  4:25 PM      Below and/or attached are the medications your provider expects you to take. Review all of your home medications and newly ordered medications with your provider and/or pharmacist. Follow medication instructions as directed by your provider and/or pharmacist. Please keep your medication list with you and share with your provider. Update the information when medications are discontinued, doses are changed, or new medications (including over-the-counter products) are added; and carry medication information at all times in the event of emergency situations     Allergies:  No Known Allergies          Medications  Valid as of: February 23, 2017 -  3:00 PM    Generic Name Brand Name Tablet Size Instructions for use    Aspirin (Chew Tab) ASA 81 MG Take 81 mg by mouth every day.        Atorvastatin Calcium (Tab) LIPITOR 40 MG Take 1 Tab by mouth every day.        Benazepril HCl (Tab)  LOTENSIN 20 MG Take 1 Tab by mouth every day.        Cholecalciferol (Tab) cholecalciferol 1000 UNIT Take 1,000 Units by mouth every day.        Cyanocobalamin (Tab) VITAMIN B12 1000 MCG Take 1 Tab by mouth every day.        Gabapentin (Cap) NEURONTIN 300 MG Take 2 Caps by mouth 3 times a day.        GlipiZIDE (TABLET SR 24 HR) GLUCOTROL 5 MG Take 1 Tab by mouth every day.        Ibuprofen (Tab) MOTRIN 800 MG Take 800 mg by mouth every 8 hours as needed.        MetFORMIN HCl (Tab) GLUCOPHAGE 500 MG 2 tabs PO qAM, 1 tab in the afternoon, 2 tabs qPM        Multiple Vitamins-Minerals   Take  by mouth.        Omeprazole (CAPSULE DELAYED RELEASE) PRILOSEC 20 MG Take 20 mg by mouth every day.        Terazosin HCl (Cap) HYTRIN 5 MG Take 1 Cap by mouth every day.        .                 Medicines prescribed today were sent to:     \[facility]\"" PHARM[facility]Y #[id number] - [place], [place] - [address] [place] [address]    Phone: [phone] Fax: [phone]    Open 24 Hours?: No      Medication refill instructions:       If your prescription bottle indicates you have medication refills left, it is not necessary to call your provider’s office. Please contact your pharmacy and they will refill your medication.    If your prescription bottle indicates you do not have any refills left, you may request refills at any time through one of the following ways: The online [employer] system (except Urgent Care), by calling your provider’s office, or by asking your pharmacy to contact your provider’s office with a refill request. Medication refills are processed only during regular business hours and may not be available until the next business day. Your provider may request additional information or to have a follow-up visit with you prior to refilling your medication.   *Please Note: Medication refills are assigned a new Rx number when refilled electronically. Your pharmacy may indicate that no refills were authorized  even though a new prescription for the same medication is available at the pharmacy. Please request the medicine by name with the pharmacy before contacting your provider for a refill.           MyChart Access Code: Activation code not generated  Current Ge[employer]Got Status: Active

## 2017-03-01 ENCOUNTER — NON-PROVIDER VISIT (OUTPATIENT)
Dept: MEDICAL GROUP | Facility: LAB | Age: 67
End: 2017-03-01
Payer: MEDICARE

## 2017-03-01 PROCEDURE — 96372 THER/PROPH/DIAG INJ SC/IM: CPT | Performed by: FAMILY MEDICINE

## 2017-03-01 RX ORDER — CYANOCOBALAMIN 1000 UG/ML
1000 INJECTION, SOLUTION INTRAMUSCULAR; SUBCUTANEOUS ONCE
Status: COMPLETED | OUTPATIENT
Start: 2017-03-01 | End: 2017-03-01

## 2017-03-01 RX ADMIN — CYANOCOBALAMIN 1000 MCG: 1000 INJECTION, SOLUTION INTRAMUSCULAR; SUBCUTANEOUS at 16:51

## 2017-03-01 NOTE — PROGRESS NOTES
Prabhakar Sierra is a 66 y.o. male here for a non-provider visit for B-12 injection.    Reason for injection: B-12 DEFICIENCY  Order in MAR?: Yes  Patient supplied?:No  Minimum interval has been met for this injection (per MAR order): Yes    Patient tolerated injection and no adverse effects were observed or reported YES.    # of Administrations remaining in MAR:UNKNOWN

## 2017-03-01 NOTE — MR AVS SNAPSHOT
Prabhakar Jovel Mariela   3/1/2017 3:00 PM   Appointment   MRN: [id number]    Department:  [facility]   Dept Phone:  [phone]    Description:  Male : 1950   Provider:  [facility]           Allergies as of 3/1/2017     No Known Allergies      Vital Signs     Smoking Status                   Former Smoker           Basic Information     Date Of Birth Sex Race Ethnicity Preferred Language    1950 Male White Non- English      Your appointments     Mar 07, 2017  3:30 PM   ACU GROUP with Jaylon Nobles D.O.   [facility] (--)    [address] [facility].  [place] [address] [phone]            2017  3:00 PM   Established Patient with Kelly Rivers M.D.   [facility] - [facility] (--)    [address] [facility] [address]  [place] [address] [phone]           You will be receiving a confirmation call a few days before your appointment from our automated call confirmation system.              Problem List              ICD-10-CM Priority Class Noted - Resolved    BPPV (benign paroxysmal positional vertigo) H81.10   2016 - Present    Controlled type 2 diabetes mellitus with diabetic polyneuropathy, without long-term current use of insulin (CMS-HCC) E11.42   2016 - Present    GERD (gastroesophageal reflux disease) K21.9   2016 - Present    Obesity (BMI 30-39.9) E66.9   2016 - Present    Essential hypertension I10   2016 - Present    BPH (benign prostatic hyperplasia) N40.0   2016 - Present    Pure hypercholesterolemia E78.00   2016 - Present    Tobacco use Z72.0   2016 - Present    Neck pain M54.2   2016 - Present    Health care maintenance Z00.00   2016 - Present    Alcohol consumption of more than four drinks per day Z78.9   2016 - Present    Tremor, coarse G25.2   10/25/2016 - Present    Post-traumatic stress F43.10   10/25/2016 - Present    B12  deficiency E53.8   11/1/2016 - Present    Moderate single current episode of major depressive disorder (CMS-[facility]) F32.1   11/11/2016 - Present    Acute right hip pain M25.551   11/14/2016 - Present    Post traumatic stress disorder (PTSD) F43.10   12/9/2016 - Present      Health Maintenance        Date Due Completion Dates    RETINAL SCREENING 7/29/1967 ---    COLONOSCOPY 7/29/1999 ---    IMM ZOSTER VACCINE 7/29/2009 ---    A1C SCREENING 4/3/2017 10/3/2016, 1/18/2016, 5/18/2014    IMM PNEUMOCOCCAL 65+ (ADULT) LOW/MEDIUM RISK SERIES (2 of 2 - PPSV23) 9/27/2017 9/27/2016    FASTING LIPID PROFILE 10/3/2017 10/3/2016    URINE ACR / MICROALBUMIN 10/3/2017 10/3/2016    SERUM CREATININE 10/3/2017 10/3/2016, 1/25/2016, 1/18/2016, 5/18/2014    DIABETES MONOFILAMENT / LE EXAM 12/9/2017 12/9/2016, 12/9/2016    IMM DTaP/Tdap/Td Vaccine (2 - Td) 9/27/2026 9/27/2016            Current Immunizations     13-VALENT PCV PREVNAR 9/27/2016  4:27 PM    Influenza TIV (IM) 10/26/2016    Tdap Vaccine 9/27/2016  4:25 PM      Below and/or attached are the medications your provider expects you to take. Review all of your home medications and newly ordered medications with your provider and/or pharmacist. Follow medication instructions as directed by your provider and/or pharmacist. Please keep your medication list with you and share with your provider. Update the information when medications are discontinued, doses are changed, or new medications (including over-the-counter products) are added; and carry medication information at all times in the event of emergency situations     Allergies:  No Known Allergies          Medications  Valid as of: March 01, 2017 -  3:19 PM    Generic Name Brand Name Tablet Size Instructions for use    Aspirin (Chew Tab) ASA 81 MG Take 81 mg by mouth every day.        Atorvastatin Calcium (Tab) LIPITOR 40 MG Take 1 Tab by mouth every day.        Benazepril HCl (Tab) LOTENSIN 20 MG Take 1 Tab by mouth every day.         Cholecalciferol (Tab) cholecalciferol 1000 UNIT Take 1,000 Units by mouth every day.        Cyanocobalamin (Tab) VITAMIN B12 1000 MCG Take 1 Tab by mouth every day.        Gabapentin (Cap) NEURONTIN 300 MG Take 2 Caps by mouth 3 times a day.        GlipiZIDE (TABLET SR 24 HR) GLUCOTROL 5 MG Take 1 Tab by mouth every day.        Ibuprofen (Tab) MOTRIN 800 MG Take 800 mg by mouth every 8 hours as needed.        MetFORMIN HCl (Tab) GLUCOPHAGE 500 MG 2 tabs PO qAM, 1 tab in the afternoon, 2 tabs qPM        Multiple Vitamins-Minerals   Take  by mouth.        Omeprazole (CAPSULE DELAYED RELEASE) PRILOSEC 20 MG Take 20 mg by mouth every day.        Terazosin HCl (Cap) HYTRIN 5 MG Take 1 Cap by mouth every day.        .                 Medicines prescribed today were sent to:     [facility] #[id number] [facility] [place] - [address] [facility]    [address] [facility] [place] [address]    Phone: [phone] Fax: [phone]    Open 24 Hours?: No      Medication refill instructions:       If your prescription bottle indicates you have medication refills left, it is not necessary to call your provider’s office. Please contact your pharmacy and they will refill your medication.    If your prescription bottle indicates you do not have any refills left, you may request refills at any time through one of the following ways: The online [employer] system (except Urgent Care), by calling your provider’s office, or by asking your pharmacy to contact your provider’s office with a refill request. Medication refills are processed only during regular business hours and may not be available until the next business day. Your provider may request additional information or to have a follow-up visit with you prior to refilling your medication.   *Please Note: Medication refills are assigned a new Rx number when refilled electronically. Your pharmacy may indicate that no refills were authorized even though a new prescription for the same  medication is available at the pharmacy. Please request the medicine by name with the pharmacy before contacting your provider for a refill.           MyChart Access Code: Activation code not generated  Current MyChart Status: Active

## 2017-03-07 ENCOUNTER — APPOINTMENT (OUTPATIENT)
Dept: OTHER | Facility: IMAGING CENTER | Age: 67
End: 2017-03-07

## 2017-03-07 PROCEDURE — 97530 THERAPEUTIC ACTIVITIES: CPT | Performed by: FAMILY MEDICINE

## 2017-03-10 ENCOUNTER — NON-PROVIDER VISIT (OUTPATIENT)
Dept: MEDICAL GROUP | Facility: MEDICAL CENTER | Age: 67
End: 2017-03-10
Payer: MEDICARE

## 2017-03-10 DIAGNOSIS — E53.8 B12 DEFICIENCY: ICD-10-CM

## 2017-03-10 PROCEDURE — 96372 THER/PROPH/DIAG INJ SC/IM: CPT | Performed by: FAMILY MEDICINE

## 2017-03-10 RX ORDER — CYANOCOBALAMIN 1000 UG/ML
1000 INJECTION, SOLUTION INTRAMUSCULAR; SUBCUTANEOUS DAILY
Status: DISCONTINUED | OUTPATIENT
Start: 2017-03-10 | End: 2017-03-10 | Stop reason: HOSPADM

## 2017-03-10 RX ADMIN — CYANOCOBALAMIN 1000 MCG: 1000 INJECTION, SOLUTION INTRAMUSCULAR; SUBCUTANEOUS at 15:31

## 2017-03-10 NOTE — MR AVS SNAPSHOT
Prabhakar Sierra   3/10/2017 3:30 PM   Non-Provider Visit   MRN: [id number]    Department:  [facility]   Dept Phone:  [phone]    Description:  Male : 1950   Provider:  [facility]           Reason for Visit     Injections Vitamin B12      Allergies as of 3/10/2017     No Known Allergies      You were diagnosed with     B12 deficiency   [648832]         Vital Signs     Smoking Status                   Former Smoker           Basic Information     Date Of Birth Sex Race Ethnicity Preferred Language    1950 Male White Non- English      Your appointments     Mar 28, 2017  3:30 PM   ACU GROUP with Jaylon Nobles D.O.   [facility] (--)    [address] [facility].  [place] [address] [phone]            2017  3:00 PM   Established Patient with Kelly Rivers M.D.   [facility] (--)    [address] [facility] [address]  [place] [address] [phone]           You will be receiving a confirmation call a few days before your appointment from our automated call confirmation system.              Problem List              ICD-10-CM Priority Class Noted - Resolved    BPPV (benign paroxysmal positional vertigo) H81.10   2016 - Present    Controlled type 2 diabetes mellitus with diabetic polyneuropathy, without long-term current use of insulin (CMS-[facility]) E11.42   2016 - Present    GERD (gastroesophageal reflux disease) K21.9   2016 - Present    Obesity (BMI 30-39.9) E66.9   2016 - Present    Essential hypertension I10   2016 - Present    BPH (benign prostatic hyperplasia) N40.0   2016 - Present    Pure hypercholesterolemia E78.00   2016 - Present    Tobacco use Z72.0   2016 - Present    Neck pain M54.2   2016 - Present    Health care maintenance Z00.00   2016 - Present    Alcohol consumption of more than four drinks per day Z78.9   2016 -  Present    Tremor, coarse G25.2   10/25/2016 - Present    Post-traumatic stress F43.10   10/25/2016 - Present    B12 deficiency E53.8   11/1/2016 - Present    Moderate single current episode of major depressive disorder (CMS-[facility]) F32.1   11/11/2016 - Present    Acute right hip pain M25.551   11/14/2016 - Present    Post traumatic stress disorder (PTSD) F43.10   12/9/2016 - Present      Health Maintenance        Date Due Completion Dates    RETINAL SCREENING 7/29/1967 ---    COLONOSCOPY 7/29/1999 ---    IMM ZOSTER VACCINE 7/29/2009 ---    A1C SCREENING 4/3/2017 10/3/2016, 1/18/2016, 5/18/2014    IMM PNEUMOCOCCAL 65+ (ADULT) LOW/MEDIUM RISK SERIES (2 of 2 - PPSV23) 9/27/2017 9/27/2016    FASTING LIPID PROFILE 10/3/2017 10/3/2016    URINE ACR / MICROALBUMIN 10/3/2017 10/3/2016    SERUM CREATININE 10/3/2017 10/3/2016, 1/25/2016, 1/18/2016, 5/18/2014    DIABETES MONOFILAMENT / LE EXAM 12/9/2017 12/9/2016, 12/9/2016    IMM DTaP/Tdap/Td Vaccine (2 - Td) 9/27/2026 9/27/2016            Current Immunizations     13-VALENT PCV PREVNAR 9/27/2016  4:27 PM    Influenza TIV (IM) 10/26/2016    Tdap Vaccine 9/27/2016  4:25 PM      Below and/or attached are the medications your provider expects you to take. Review all of your home medications and newly ordered medications with your provider and/or pharmacist. Follow medication instructions as directed by your provider and/or pharmacist. Please keep your medication list with you and share with your provider. Update the information when medications are discontinued, doses are changed, or new medications (including over-the-counter products) are added; and carry medication information at all times in the event of emergency situations     Allergies:  No Known Allergies          Medications  Valid as of: March 10, 2017 -  3:44 PM    Generic Name Brand Name Tablet Size Instructions for use    Aspirin (Chew Tab) ASA 81 MG Take 81 mg by mouth every day.        Atorvastatin Calcium (Tab)  LIPITOR 40 MG Take 1 Tab by mouth every day.        Benazepril HCl (Tab) LOTENSIN 20 MG Take 1 Tab by mouth every day.        Cholecalciferol (Tab) cholecalciferol 1000 UNIT Take 1,000 Units by mouth every day.        Cyanocobalamin (Tab) VITAMIN B12 1000 MCG Take 1 Tab by mouth every day.        Gabapentin (Cap) NEURONTIN 300 MG Take 2 Caps by mouth 3 times a day.        GlipiZIDE (TABLET SR 24 HR) GLUCOTROL 5 MG Take 1 Tab by mouth every day.        Ibuprofen (Tab) MOTRIN 800 MG Take 800 mg by mouth every 8 hours as needed.        MetFORMIN HCl (Tab) GLUCOPHAGE 500 MG 2 tabs PO qAM, 1 tab in the afternoon, 2 tabs qPM        Multiple Vitamins-Minerals   Take  by mouth.        Omeprazole (CAPSULE DELAYED RELEASE) PRILOSEC 20 MG Take 20 mg by mouth every day.        Terazosin HCl (Cap) HYTRIN 5 MG Take 1 Cap by mouth every day.        .                 Medicines prescribed today were sent to:     [facility] #[id number] [facility] [address] [address]    Phone: [phone] Fax: [phone]    Open 24 Hours?: No      Medication refill instructions:       If your prescription bottle indicates you have medication refills left, it is not necessary to call your provider’s office. Please contact your pharmacy and they will refill your medication.    If your prescription bottle indicates you do not have any refills left, you may request refills at any time through one of the following ways: The online [employer] system (except Urgent Care), by calling your provider’s office, or by asking your pharmacy to contact your provider’s office with a refill request. Medication refills are processed only during regular business hours and may not be available until the next business day. Your provider may request additional information or to have a follow-up visit with you prior to refilling your medication.   *Please Note: Medication refills are assigned a new Rx number when refilled  electronically. Your pharmacy may indicate that no refills were authorized even though a new prescription for the same medication is available at the pharmacy. Please request the medicine by name with the pharmacy before contacting your provider for a refill.           [employer] Access Code: Activation code not generated  Current [employer] Status: Active

## 2017-03-14 ENCOUNTER — NON-PROVIDER VISIT (OUTPATIENT)
Dept: MEDICAL GROUP | Facility: MEDICAL CENTER | Age: 67
End: 2017-03-14
Payer: MEDICARE

## 2017-03-14 DIAGNOSIS — E53.8 B12 DEFICIENCY: ICD-10-CM

## 2017-03-14 RX ORDER — CYANOCOBALAMIN 1000 UG/ML
1000 INJECTION, SOLUTION INTRAMUSCULAR; SUBCUTANEOUS DAILY
Status: DISCONTINUED | OUTPATIENT
Start: 2017-03-14 | End: 2017-03-14 | Stop reason: HOSPADM

## 2017-03-15 RX ORDER — CYANOCOBALAMIN 1000 UG/ML
1000 INJECTION, SOLUTION INTRAMUSCULAR; SUBCUTANEOUS ONCE
Status: COMPLETED | OUTPATIENT
Start: 2017-03-15 | End: 2017-03-15

## 2017-03-15 RX ADMIN — CYANOCOBALAMIN 1000 MCG: 1000 INJECTION, SOLUTION INTRAMUSCULAR; SUBCUTANEOUS at 16:13

## 2017-03-16 RX ORDER — CYANOCOBALAMIN 1000 UG/ML
1000 INJECTION, SOLUTION INTRAMUSCULAR; SUBCUTANEOUS DAILY
Status: DISCONTINUED | OUTPATIENT
Start: 2017-03-16 | End: 2017-03-16 | Stop reason: HOSPADM

## 2017-03-17 ENCOUNTER — NON-PROVIDER VISIT (OUTPATIENT)
Dept: MEDICAL GROUP | Facility: LAB | Age: 67
End: 2017-03-17
Payer: MEDICARE

## 2017-03-17 ENCOUNTER — TELEPHONE (OUTPATIENT)
Dept: MEDICAL GROUP | Facility: LAB | Age: 67
End: 2017-03-17

## 2017-03-17 PROCEDURE — 96372 THER/PROPH/DIAG INJ SC/IM: CPT | Performed by: FAMILY MEDICINE

## 2017-03-17 RX ORDER — CYANOCOBALAMIN 1000 UG/ML
1000 INJECTION, SOLUTION INTRAMUSCULAR; SUBCUTANEOUS ONCE
Status: COMPLETED | OUTPATIENT
Start: 2017-03-17 | End: 2017-03-17

## 2017-03-17 RX ADMIN — CYANOCOBALAMIN 1000 MCG: 1000 INJECTION, SOLUTION INTRAMUSCULAR; SUBCUTANEOUS at 15:23

## 2017-03-17 NOTE — PROGRESS NOTES
Prabhakar Sierra is a 66 y.o. male here for a non-provider visit for B12 injection.    Reason for injection: B12 DEFICIENCY  Order in MAR?: Yes  Patient supplied?:No  Minimum interval has been met for this injection (per MAR order): Yes    Patient tolerated injection and no adverse effects were observed or reported YES.    # of Administrations remaining in MAR:UNKNOW

## 2017-03-17 NOTE — MR AVS SNAPSHOT
Prabhakar Sierra   3/17/2017 3:00 PM   Non-Provider Visit   MRN: [id number]    Department:  [facility]   Dept Phone:  [phone]    Description:  Male : 1950   Provider:  [facility]           Reason for Visit     Injections B12      Allergies as of 3/17/2017     No Known Allergies      Vital Signs     Smoking Status                   Former Smoker           Basic Information     Date Of Birth Sex Race Ethnicity Preferred Language    1950 Male White Non- English      Your appointments     Mar 28, 2017  3:30 PM   ACU GROUP with Jaylon Nobles D.O.   [facility] (--)    [address] [facility].  [place] [address] [phone]            2017  3:00 PM   Established Patient with Kelly Rivers M.D.   [facility] - [facility] (--)    [address] [facility] [facility] [address]  [place] [address] [phone]           You will be receiving a confirmation call a few days before your appointment from our automated call confirmation system.              Problem List              ICD-10-CM Priority Class Noted - Resolved    BPPV (benign paroxysmal positional vertigo) H81.10   2016 - Present    Controlled type 2 diabetes mellitus with diabetic polyneuropathy, without long-term current use of insulin (CMS-[facility]) E11.42   2016 - Present    GERD (gastroesophageal reflux disease) K21.9   2016 - Present    Obesity (BMI 30-39.9) E66.9   2016 - Present    Essential hypertension I10   2016 - Present    BPH (benign prostatic hyperplasia) N40.0   2016 - Present    Pure hypercholesterolemia E78.00   2016 - Present    Tobacco use Z72.0   2016 - Present    Neck pain M54.2   2016 - Present    Health care maintenance Z00.00   2016 - Present    Alcohol consumption of more than four drinks per day Z78.9   2016 - Present    Tremor, coarse G25.2   10/25/2016 - Present    Post-traumatic stress F43.10   10/25/2016 - Present    B12 deficiency E53.8   11/1/2016 - Present    Moderate single current episode of major depressive disorder (CMS-HCC) F32.1   11/11/2016 - Present    Acute right hip pain M25.551   11/14/2016 - Present    Post traumatic stress disorder (PTSD) F43.10   12/9/2016 - Present      Health Maintenance        Date Due Completion Dates    RETINAL SCREENING 7/29/1967 ---    COLONOSCOPY 7/29/1999 ---    IMM ZOSTER VACCINE 7/29/2009 ---    A1C SCREENING 4/3/2017 10/3/2016, 1/18/2016, 5/18/2014    IMM PNEUMOCOCCAL 65+ (ADULT) LOW/MEDIUM RISK SERIES (2 of 2 - PPSV23) 9/27/2017 9/27/2016    FASTING LIPID PROFILE 10/3/2017 10/3/2016    URINE ACR / MICROALBUMIN 10/3/2017 10/3/2016    SERUM CREATININE 10/3/2017 10/3/2016, 1/25/2016, 1/18/2016, 5/18/2014    DIABETES MONOFILAMENT / LE EXAM 12/9/2017 12/9/2016, 12/9/2016    IMM DTaP/Tdap/Td Vaccine (2 - Td) 9/27/2026 9/27/2016            Current Immunizations     13-VALENT PCV PREVNAR 9/27/2016  4:27 PM    Influenza TIV (IM) 10/26/2016    Tdap Vaccine 9/27/2016  4:25 PM      Below and/or attached are the medications your provider expects you to take. Review all of your home medications and newly ordered medications with your provider and/or pharmacist. Follow medication instructions as directed by your provider and/or pharmacist. Please keep your medication list with you and share with your provider. Update the information when medications are discontinued, doses are changed, or new medications (including over-the-counter products) are added; and carry medication information at all times in the event of emergency situations     Allergies:  No Known Allergies          Medications  Valid as of: March 17, 2017 -  3:16 PM    Generic Name Brand Name Tablet Size Instructions for use    Aspirin (Chew Tab) ASA 81 MG Take 81 mg by mouth every day.        Atorvastatin Calcium (Tab) LIPITOR 40 MG Take 1 Tab by mouth every day.        Benazepril  HCl (Tab) LOTENSIN 20 MG Take 1 Tab by mouth every day.        Cholecalciferol (Tab) cholecalciferol 1000 UNIT Take 1,000 Units by mouth every day.        Cyanocobalamin (Tab) VITAMIN B12 1000 MCG Take 1 Tab by mouth every day.        Gabapentin (Cap) NEURONTIN 300 MG Take 2 Caps by mouth 3 times a day.        GlipiZIDE (TABLET SR 24 HR) GLUCOTROL 5 MG Take 1 Tab by mouth every day.        Ibuprofen (Tab) MOTRIN 800 MG Take 800 mg by mouth every 8 hours as needed.        MetFORMIN HCl (Tab) GLUCOPHAGE 500 MG 2 tabs PO qAM, 1 tab in the afternoon, 2 tabs qPM        Multiple Vitamins-Minerals   Take  by mouth.        Omeprazole (CAPSULE DELAYED RELEASE) PRILOSEC 20 MG Take 20 mg by mouth every day.        Terazosin HCl (Cap) HYTRIN 5 MG Take 1 Cap by mouth every day.        .                 Medicines prescribed today were sent to:     [facility] #[id number] - [place], [place] - [address] [address]    Phone: [phone] Fax: [phone]    Open 24 Hours?: No      Medication refill instructions:       If your prescription bottle indicates you have medication refills left, it is not necessary to call your provider’s office. Please contact your pharmacy and they will refill your medication.    If your prescription bottle indicates you do not have any refills left, you may request refills at any time through one of the following ways: The online [employer] system (except Urgent Care), by calling your provider’s office, or by asking your pharmacy to contact your provider’s office with a refill request. Medication refills are processed only during regular business hours and may not be available until the next business day. Your provider may request additional information or to have a follow-up visit with you prior to refilling your medication.   *Please Note: Medication refills are assigned a new Rx number when refilled electronically. Your pharmacy may indicate that no refills were  authorized even though a new prescription for the same medication is available at the pharmacy. Please request the medicine by name with the pharmacy before contacting your provider for a refill.           [employer] Access Code: Activation code not generated  Current [employer] Status: Active

## 2017-03-17 NOTE — TELEPHONE ENCOUNTER
1. Caller Name: Don                                         Call Back Number: [phone]      Patient approves a detailed voicemail message: N\A    Patient says he has started smoking and does want this to become a habit.  He would like to know if there is a nicotine supplement that he can buy OTC or sent into his pharmacy.  Please advise.

## 2017-03-17 NOTE — TELEPHONE ENCOUNTER
Discussed with patient over the phone. Encouraged patient to chose a quit date and get Nicoderm CQ. He is in agreement.     Kelly Rivers M.D.

## 2017-03-21 ENCOUNTER — NON-PROVIDER VISIT (OUTPATIENT)
Dept: MEDICAL GROUP | Facility: LAB | Age: 67
End: 2017-03-21
Payer: MEDICARE

## 2017-03-21 DIAGNOSIS — E53.8 B12 DEFICIENCY: ICD-10-CM

## 2017-03-21 RX ORDER — CYANOCOBALAMIN 1000 UG/ML
1000 INJECTION, SOLUTION INTRAMUSCULAR; SUBCUTANEOUS ONCE
Status: COMPLETED | OUTPATIENT
Start: 2017-03-21 | End: 2017-03-21

## 2017-03-21 RX ADMIN — CYANOCOBALAMIN 1000 MCG: 1000 INJECTION, SOLUTION INTRAMUSCULAR; SUBCUTANEOUS at 16:41

## 2017-03-27 ENCOUNTER — NON-PROVIDER VISIT (OUTPATIENT)
Dept: MEDICAL GROUP | Facility: MEDICAL CENTER | Age: 67
End: 2017-03-27
Payer: MEDICARE

## 2017-03-27 DIAGNOSIS — E53.8 B12 DEFICIENCY: ICD-10-CM

## 2017-03-27 PROCEDURE — 96372 THER/PROPH/DIAG INJ SC/IM: CPT | Performed by: FAMILY MEDICINE

## 2017-03-27 RX ADMIN — CYANOCOBALAMIN 1000 MCG: 1000 INJECTION, SOLUTION INTRAMUSCULAR; SUBCUTANEOUS at 15:01

## 2017-03-27 NOTE — PROGRESS NOTES
Prabhakar Sierra is a 66 y.o. male here for a non-provider visit for B-12 injection.    Reason for injection: Vitamin B-12 deficiency  Order in MAR?: Yes  Patient supplied?:No  Minimum interval has been met for this injection (per MAR order): Yes    Patient tolerated injection and no adverse effects were observed or reported Yes.    # of Administrations remaining in MAR:2

## 2017-03-27 NOTE — MR AVS SNAPSHOT
Prabhakar Sierra   3/27/2017 3:30 PM   Non-Provider Visit   MRN: [id number]    Department:  [facility]   Dept Phone:  [phone]    Description:  Male : 1950   Provider:  [facility]           Reason for Visit     Injections Vitamin B12      Allergies as of 3/27/2017     No Known Allergies      You were diagnosed with     B12 deficiency   [867075]         Vital Signs     Smoking Status                   Former Smoker           Basic Information     Date Of Birth Sex Race Ethnicity Preferred Language    1950 Male White Non- English      Your appointments     Mar 28, 2017  3:30 PM   ACU GROUP with Jaylon Nobles D.O.   [facility] (--)    [address] [facility].  [place] [address] [phone]            2017  3:00 PM   Established Patient with Kelly Rivers M.D.   [facility] - [facility] (--)    [address] [facility] [address] [facility] [address]  [place] [address] [phone]           You will be receiving a confirmation call a few days before your appointment from our automated call confirmation system.              Problem List              ICD-10-CM Priority Class Noted - Resolved    BPPV (benign paroxysmal positional vertigo) H81.10   2016 - Present    Controlled type 2 diabetes mellitus with diabetic polyneuropathy, without long-term current use of insulin (CMS-[facility]) E11.42   2016 - Present    GERD (gastroesophageal reflux disease) K21.9   2016 - Present    Obesity (BMI 30-39.9) E66.9   2016 - Present    Essential hypertension I10   2016 - Present    BPH (benign prostatic hyperplasia) N40.0   2016 - Present    Pure hypercholesterolemia E78.00   2016 - Present    Tobacco use Z72.0   2016 - Present    Neck pain M54.2   2016 - Present    Health care maintenance Z00.00   2016 - Present    Alcohol consumption of more than four drinks per day Z78.9   2016 -  Present    Tremor, coarse G25.2   10/25/2016 - Present    Post-traumatic stress F43.10   10/25/2016 - Present    B12 deficiency E53.8   11/1/2016 - Present    Moderate single current episode of major depressive disorder (CMS-[facility]) F32.1   11/11/2016 - Present    Acute right hip pain M25.551   11/14/2016 - Present    Post traumatic stress disorder (PTSD) F43.10   12/9/2016 - Present      Health Maintenance        Date Due Completion Dates    RETINAL SCREENING 7/29/1967 ---    COLONOSCOPY 7/29/1999 ---    IMM ZOSTER VACCINE 7/29/2009 ---    A1C SCREENING 4/3/2017 10/3/2016, 1/18/2016, 5/18/2014    IMM PNEUMOCOCCAL 65+ (ADULT) LOW/MEDIUM RISK SERIES (2 of 2 - PPSV23) 9/27/2017 9/27/2016    FASTING LIPID PROFILE 10/3/2017 10/3/2016    URINE ACR / MICROALBUMIN 10/3/2017 10/3/2016    SERUM CREATININE 10/3/2017 10/3/2016, 1/25/2016, 1/18/2016, 5/18/2014    DIABETES MONOFILAMENT / LE EXAM 12/9/2017 12/9/2016, 12/9/2016    IMM DTaP/Tdap/Td Vaccine (2 - Td) 9/27/2026 9/27/2016            Current Immunizations     13-VALENT PCV PREVNAR 9/27/2016  4:27 PM    Influenza TIV (IM) 10/26/2016    Tdap Vaccine 9/27/2016  4:25 PM      Below and/or attached are the medications your provider expects you to take. Review all of your home medications and newly ordered medications with your provider and/or pharmacist. Follow medication instructions as directed by your provider and/or pharmacist. Please keep your medication list with you and share with your provider. Update the information when medications are discontinued, doses are changed, or new medications (including over-the-counter products) are added; and carry medication information at all times in the event of emergency situations     Allergies:  No Known Allergies          Medications  Valid as of: March 27, 2017 -  3:55 PM    Generic Name Brand Name Tablet Size Instructions for use    Aspirin (Chew Tab) ASA 81 MG Take 81 mg by mouth every day.        Atorvastatin Calcium (Tab)  LIPITOR 40 MG Take 1 Tab by mouth every day.        Benazepril HCl (Tab) LOTENSIN 20 MG Take 1 Tab by mouth every day.        Cholecalciferol (Tab) cholecalciferol 1000 UNIT Take 1,000 Units by mouth every day.        Cyanocobalamin (Tab) VITAMIN B12 1000 MCG Take 1 Tab by mouth every day.        Gabapentin (Cap) NEURONTIN 300 MG Take 2 Caps by mouth 3 times a day.        GlipiZIDE (TABLET SR 24 HR) GLUCOTROL 5 MG Take 1 Tab by mouth every day.        Ibuprofen (Tab) MOTRIN 800 MG Take 800 mg by mouth every 8 hours as needed.        MetFORMIN HCl (Tab) GLUCOPHAGE 500 MG 2 tabs PO qAM, 1 tab in the afternoon, 2 tabs qPM        Multiple Vitamins-Minerals   Take  by mouth.        Omeprazole (CAPSULE DELAYED RELEASE) PRILOSEC 20 MG Take 20 mg by mouth every day.        Terazosin HCl (Cap) HYTRIN 5 MG Take 1 Cap by mouth every day.        .                 Medicines prescribed today were sent to:     [facility] #[id number] [facility] [address] [address]    Phone: [phone] Fax: [phone]    Open 24 Hours?: No      Medication refill instructions:       If your prescription bottle indicates you have medication refills left, it is not necessary to call your provider’s office. Please contact your pharmacy and they will refill your medication.    If your prescription bottle indicates you do not have any refills left, you may request refills at any time through one of the following ways: The online [employer] system (except Urgent Care), by calling your provider’s office, or by asking your pharmacy to contact your provider’s office with a refill request. Medication refills are processed only during regular business hours and may not be available until the next business day. Your provider may request additional information or to have a follow-up visit with you prior to refilling your medication.   *Please Note: Medication refills are assigned a new Rx number when refilled  electronically. Your pharmacy may indicate that no refills were authorized even though a new prescription for the same medication is available at the pharmacy. Please request the medicine by name with the pharmacy before contacting your provider for a refill.           [employer] Access Code: Activation code not generated  Current [employer] Status: Active

## 2017-03-28 ENCOUNTER — APPOINTMENT (OUTPATIENT)
Dept: OTHER | Facility: IMAGING CENTER | Age: 67
End: 2017-03-28

## 2017-03-28 PROCEDURE — 97530 THERAPEUTIC ACTIVITIES: CPT | Performed by: FAMILY MEDICINE

## 2017-04-05 ENCOUNTER — NON-PROVIDER VISIT (OUTPATIENT)
Dept: MEDICAL GROUP | Facility: LAB | Age: 67
End: 2017-04-05
Payer: MEDICARE

## 2017-04-05 PROCEDURE — 99999 PR NO CHARGE: CPT | Performed by: FAMILY MEDICINE

## 2017-04-05 RX ADMIN — CYANOCOBALAMIN 1000 MCG: 1000 INJECTION, SOLUTION INTRAMUSCULAR; SUBCUTANEOUS at 09:37

## 2017-04-05 RX ADMIN — CYANOCOBALAMIN 1000 MCG: 1000 INJECTION, SOLUTION INTRAMUSCULAR; SUBCUTANEOUS at 15:11

## 2017-04-05 NOTE — PROGRESS NOTES
Prabhakar Sierra is a 66 y.o. male here for a non-provider visit for B-12 injection.    Reason for injection: B-12 DEFICIENCY  Order in MAR?: Yes  Patient supplied?:No  Minimum interval has been met for this injection (per MAR order): Yes    Order and dose verified by:  Patient tolerated injection and no adverse effects were observed or reported YES.    # of Administrations remaining in MAR:UNKNOW

## 2017-04-25 ENCOUNTER — APPOINTMENT (OUTPATIENT)
Dept: MEDICAL GROUP | Facility: LAB | Age: 67
End: 2017-04-25
Payer: MEDICARE

## 2017-05-05 ENCOUNTER — NON-PROVIDER VISIT (OUTPATIENT)
Dept: MEDICAL GROUP | Facility: LAB | Age: 67
End: 2017-05-05
Payer: MEDICARE

## 2017-05-05 DIAGNOSIS — E53.8 B12 DEFICIENCY: ICD-10-CM

## 2017-05-05 PROCEDURE — 96372 THER/PROPH/DIAG INJ SC/IM: CPT | Performed by: FAMILY MEDICINE

## 2017-05-05 RX ORDER — CYANOCOBALAMIN 1000 UG/ML
1000 INJECTION, SOLUTION INTRAMUSCULAR; SUBCUTANEOUS ONCE
Status: COMPLETED | OUTPATIENT
Start: 2017-05-05 | End: 2017-05-05

## 2017-05-05 RX ADMIN — CYANOCOBALAMIN 1000 MCG: 1000 INJECTION, SOLUTION INTRAMUSCULAR; SUBCUTANEOUS at 15:28

## 2017-05-05 NOTE — PROGRESS NOTES
Prabhakar Sierra is a 66 y.o. male here for a non-provider visit for B-12 injection.    Reason for injection: B12 deficiency  Order in MAR?: Yes  Patient supplied?:No  Minimum interval has been met for this injection (per MAR order): Yes    Patient tolerated injection and no adverse effects were observed or reported: Yes    # of Administrations remaining in MAR: 0

## 2017-05-12 ENCOUNTER — OFFICE VISIT (OUTPATIENT)
Dept: MEDICAL GROUP | Facility: LAB | Age: 67
End: 2017-05-12
Payer: MEDICARE

## 2017-05-12 VITALS
HEART RATE: 97 BPM | WEIGHT: 226.41 LBS | OXYGEN SATURATION: 97 % | TEMPERATURE: 97.1 F | HEIGHT: 69 IN | SYSTOLIC BLOOD PRESSURE: 140 MMHG | DIASTOLIC BLOOD PRESSURE: 70 MMHG | RESPIRATION RATE: 20 BRPM | BODY MASS INDEX: 33.53 KG/M2

## 2017-05-12 DIAGNOSIS — E53.8 B12 DEFICIENCY: ICD-10-CM

## 2017-05-12 DIAGNOSIS — E78.2 MIXED HYPERLIPIDEMIA: ICD-10-CM

## 2017-05-12 DIAGNOSIS — E66.9 OBESITY (BMI 30-39.9): ICD-10-CM

## 2017-05-12 DIAGNOSIS — E11.42 CONTROLLED TYPE 2 DIABETES MELLITUS WITH DIABETIC POLYNEUROPATHY, WITHOUT LONG-TERM CURRENT USE OF INSULIN (HCC): ICD-10-CM

## 2017-05-12 DIAGNOSIS — G25.2 TREMOR, COARSE: ICD-10-CM

## 2017-05-12 DIAGNOSIS — E55.9 VITAMIN D DEFICIENCY: ICD-10-CM
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
## 2017-05-12 DIAGNOSIS — F43.10 POST TRAUMATIC STRESS DISORDER (PTSD): ICD-10-CM

## 2017-05-12 LAB
HBA1C MFR BLD: NORMAL % (ref ?–5.8)
INT CON NEG: NEGATIVE
INT CON POS: POSITIVE

## 2017-05-12 PROCEDURE — G8417 CALC BMI ABV UP PARAM F/U: HCPCS | Performed by: FAMILY MEDICINE

## 2017-05-12 PROCEDURE — 4040F PNEUMOC VAC/ADMIN/RCVD: CPT | Performed by: FAMILY MEDICINE

## 2017-05-12 PROCEDURE — 83036 HEMOGLOBIN GLYCOSYLATED A1C: CPT | Performed by: FAMILY MEDICINE

## 2017-05-12 PROCEDURE — 1101F PT FALLS ASSESS-DOCD LE1/YR: CPT | Mod: 8P | Performed by: FAMILY MEDICINE

## 2017-05-12 PROCEDURE — 1036F TOBACCO NON-USER: CPT | Performed by: FAMILY MEDICINE

## 2017-05-12 PROCEDURE — G8432 DEP SCR NOT DOC, RNG: HCPCS | Performed by: FAMILY MEDICINE

## 2017-05-12 PROCEDURE — 3046F HEMOGLOBIN A1C LEVEL >9.0%: CPT | Mod: 8P | Performed by: FAMILY MEDICINE

## 2017-05-12 PROCEDURE — 99214 OFFICE O/P EST MOD 30 MIN: CPT | Performed by: FAMILY MEDICINE

## 2017-05-12 RX ORDER — CYANOCOBALAMIN 1000 UG/ML
1000 INJECTION, SOLUTION INTRAMUSCULAR; SUBCUTANEOUS ONCE
Status: COMPLETED | OUTPATIENT
Start: 2017-05-12 | End: 2017-05-12

## 2017-05-12 RX ADMIN — CYANOCOBALAMIN 1000 MCG: 1000 INJECTION, SOLUTION INTRAMUSCULAR; SUBCUTANEOUS at 15:23
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
## 2017-05-14 ENCOUNTER — HOSPITAL ENCOUNTER (OUTPATIENT)
Facility: MEDICAL CENTER | Age: 67
End: 2017-05-14
Attending: FAMILY MEDICINE
Payer: MEDICARE

## 2017-05-14 PROCEDURE — 82274 ASSAY TEST FOR BLOOD FECAL: CPT

## 2017-05-15 ENCOUNTER — TELEPHONE (OUTPATIENT)
Dept: MEDICAL GROUP | Facility: LAB | Age: 67
End: 2017-05-15
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
## 2017-05-16 ENCOUNTER — APPOINTMENT (OUTPATIENT)
Dept: PHYSICAL THERAPY | Facility: MEDICAL CENTER | Age: 67
End: 2017-05-16
Attending: FAMILY MEDICINE
Payer: MEDICARE

## 2017-05-17 ENCOUNTER — HOSPITAL ENCOUNTER (OUTPATIENT)
Dept: LAB | Facility: MEDICAL CENTER | Age: 67
End: 2017-05-17
Attending: FAMILY MEDICINE
Payer: MEDICARE

## 2017-05-17 ENCOUNTER — OFFICE VISIT (OUTPATIENT)
Dept: OTHER | Facility: IMAGING CENTER | Age: 67
End: 2017-05-17

## 2017-05-17 DIAGNOSIS — E78.2 MIXED HYPERLIPIDEMIA: ICD-10-CM

## 2017-05-17 DIAGNOSIS — E11.42 CONTROLLED TYPE 2 DIABETES MELLITUS WITH DIABETIC POLYNEUROPATHY, WITHOUT LONG-TERM CURRENT USE OF INSULIN (HCC): ICD-10-CM

## 2017-05-17 DIAGNOSIS — Z78.9 ALCOHOL CONSUMPTION OF MORE THAN FOUR DRINKS PER DAY: ICD-10-CM

## 2017-05-17 DIAGNOSIS — E55.9 VITAMIN D DEFICIENCY: ICD-10-CM

## 2017-05-17 DIAGNOSIS — M54.2 NECK PAIN: ICD-10-CM

## 2017-05-17 DIAGNOSIS — M25.551 RIGHT HIP PAIN: ICD-10-CM

## 2017-05-17 DIAGNOSIS — E53.8 B12 DEFICIENCY: ICD-10-CM

## 2017-05-17 LAB
25(OH)D3 SERPL-MCNC: 37 NG/ML (ref 30–100)
ALBUMIN SERPL BCP-MCNC: 4.7 G/DL (ref 3.2–4.9)
ALBUMIN/GLOB SERPL: 1.7 G/DL
ALP SERPL-CCNC: 28 U/L (ref 30–99)
ALT SERPL-CCNC: 13 U/L (ref 2–50)
ANION GAP SERPL CALC-SCNC: 10 MMOL/L (ref 0–11.9)
AST SERPL-CCNC: 15 U/L (ref 12–45)
BILIRUB SERPL-MCNC: 0.8 MG/DL (ref 0.1–1.5)
BUN SERPL-MCNC: 15 MG/DL (ref 8–22)
CALCIUM SERPL-MCNC: 9.7 MG/DL (ref 8.5–10.5)
CHLORIDE SERPL-SCNC: 95 MMOL/L (ref 96–112)
CHOLEST SERPL-MCNC: 85 MG/DL (ref 100–199)
CO2 SERPL-SCNC: 24 MMOL/L (ref 20–33)
CREAT SERPL-MCNC: 0.91 MG/DL (ref 0.5–1.4)
CREAT UR-MCNC: 101.2 MG/DL
EST. AVERAGE GLUCOSE BLD GHB EST-MCNC: 128 MG/DL
GFR SERPL CREATININE-BSD FRML MDRD: >60 ML/MIN/1.73 M 2
GLOBULIN SER CALC-MCNC: 2.8 G/DL (ref 1.9–3.5)
GLUCOSE SERPL-MCNC: 146 MG/DL (ref 65–99)
HBA1C MFR BLD: 6.1 % (ref 0–5.6)
HDLC SERPL-MCNC: 37 MG/DL
LDLC SERPL CALC-MCNC: 21 MG/DL
MICROALBUMIN UR-MCNC: 2.6 MG/DL
MICROALBUMIN/CREAT UR: 26 MG/G (ref 0–30)
POTASSIUM SERPL-SCNC: 5 MMOL/L (ref 3.6–5.5)
PROT SERPL-MCNC: 7.5 G/DL (ref 6–8.2)
SODIUM SERPL-SCNC: 129 MMOL/L (ref 135–145)
TRIGL SERPL-MCNC: 134 MG/DL (ref 0–149)
VIT B12 SERPL-MCNC: 1040 PG/ML (ref 211–911)

## 2017-05-17 PROCEDURE — 99213 OFFICE O/P EST LOW 20 MIN: CPT | Mod: 25 | Performed by: FAMILY MEDICINE

## 2017-05-17 PROCEDURE — 82607 VITAMIN B-12: CPT

## 2017-05-17 PROCEDURE — 36415 COLL VENOUS BLD VENIPUNCTURE: CPT

## 2017-05-17 PROCEDURE — 97811 ACUP 1/> W/O ESTIM EA ADD 15: CPT | Performed by: FAMILY MEDICINE

## 2017-05-17 PROCEDURE — 83036 HEMOGLOBIN GLYCOSYLATED A1C: CPT | Mod: GA

## 2017-05-17 PROCEDURE — 82306 VITAMIN D 25 HYDROXY: CPT

## 2017-05-17 PROCEDURE — 80053 COMPREHEN METABOLIC PANEL: CPT

## 2017-05-17 PROCEDURE — 82570 ASSAY OF URINE CREATININE: CPT

## 2017-05-17 PROCEDURE — 80061 LIPID PANEL: CPT

## 2017-05-17 PROCEDURE — 97813 ACUP 1/> W/ESTIM 1ST 15 MIN: CPT | Performed by: FAMILY MEDICINE

## 2017-05-17 PROCEDURE — 83930 ASSAY OF BLOOD OSMOLALITY: CPT

## 2017-05-17 PROCEDURE — 82043 UR ALBUMIN QUANTITATIVE: CPT
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
## 2017-05-17 NOTE — MR AVS SNAPSHOT
Prabhakar Sierra   2017 11:30 AM   Office Visit   MRN: [id number]    Department:  [facility]   Dept Phone:  [phone]    Description:  Male : 1950   Provider:  Jaylon Nobles D.O.           Allergies as of 2017     No Known Allergies      You were diagnosed with     Right hip pain   [591691]       Alcohol consumption of more than four drinks per day   [868666]       Neck pain   [055706]         Vital Signs     Smoking Status                   Former Smoker           Basic Information     Date Of Birth Sex Race Ethnicity Preferred Language    1950 Male White Non- English      Problem List              ICD-10-CM Priority Class Noted - Resolved    BPPV (benign paroxysmal positional vertigo) H81.10   2016 - Present    Controlled type 2 diabetes mellitus with diabetic polyneuropathy, without long-term current use of insulin (CMS-HCC) E11.42   2016 - Present    GERD (gastroesophageal reflux disease) K21.9   2016 - Present    Obesity (BMI 30-39.9) E66.9   2016 - Present    Essential hypertension I10   2016 - Present    BPH (benign prostatic hyperplasia) N40.0   2016 - Present    Mixed hyperlipidemia E78.2   2016 - Present    Tobacco use Z72.0   2016 - Present    Neck pain M54.2   2016 - Present    Health care maintenance Z00.00   2016 - Present    Alcohol consumption of more than four drinks per day Z78.9   2016 - Present    Tremor, coarse G25.2   10/25/2016 - Present    B12 deficiency E53.8   2016 - Present    Moderate single current episode of major depressive disorder (CMS-HCC) F32.1   2016 - Present    Right hip pain M25.551   2016 - Present    Post traumatic stress disorder (PTSD) F43.10   2016 - Present    Vitamin D deficiency E55.9   2017 - Present      Health Maintenance        Date Due Completion Dates    RETINAL SCREENING 1967 ---    COLONOSCOPY 1999 ---    IMM ZOSTER  VACCINE 7/29/2009 ---    IMM PNEUMOCOCCAL 65+ (ADULT) LOW/MEDIUM RISK SERIES (2 of 2 - PPSV23) 9/27/2017 9/27/2016    FASTING LIPID PROFILE 10/3/2017 10/3/2016    URINE ACR / MICROALBUMIN 10/3/2017 10/3/2016    SERUM CREATININE 10/3/2017 10/3/2016, 1/25/2016, 1/18/2016, 5/18/2014    A1C SCREENING 11/12/2017 5/12/2017, 10/3/2016, 1/18/2016, 5/18/2014    DIABETES MONOFILAMENT / LE EXAM 12/9/2017 12/9/2016, 12/9/2016    IMM DTaP/Tdap/Td Vaccine (2 - Td) 9/27/2026 9/27/2016            Current Immunizations     13-VALENT PCV PREVNAR 9/27/2016  4:27 PM    Influenza TIV (IM) 10/26/2016    Tdap Vaccine 9/27/2016  4:25 PM      Below and/or attached are the medications your provider expects you to take. Review all of your home medications and newly ordered medications with your provider and/or pharmacist. Follow medication instructions as directed by your provider and/or pharmacist. Please keep your medication list with you and share with your provider. Update the information when medications are discontinued, doses are changed, or new medications (including over-the-counter products) are added; and carry medication information at all times in the event of emergency situations     Allergies:  No Known Allergies          Medications  Valid as of: May 17, 2017 - 12:56 PM    Generic Name Brand Name Tablet Size Instructions for use    Aspirin (Chew Tab) ASA 81 MG Take 81 mg by mouth every day.        Atorvastatin Calcium (Tab) LIPITOR 40 MG Take 1 Tab by mouth every day.        Benazepril HCl (Tab) LOTENSIN 20 MG Take 1 Tab by mouth every day.        Cholecalciferol (Tab) cholecalciferol 1000 UNIT Take 1,000 Units by mouth every day.        Cyanocobalamin (Tab) VITAMIN B12 1000 MCG Take 1 Tab by mouth every day.        Gabapentin (Cap) NEURONTIN 300 MG Take 2 Caps by mouth 3 times a day.        GlipiZIDE (TABLET SR 24 HR) GLUCOTROL 5 MG Take 1 Tab by mouth every day.        Ibuprofen (Tab) MOTRIN 800 MG Take 800 mg by mouth every  8 hours as needed.        MetFORMIN HCl (Tab) GLUCOPHAGE 500 MG 2 tabs PO qAM, 1 tab in the afternoon, 2 tabs qPM        Multiple Vitamins-Minerals   Take  by mouth.        Omeprazole (CAPSULE DELAYED RELEASE) PRILOSEC 20 MG Take 20 mg by mouth every day.        Terazosin HCl (Cap) HYTRIN 5 MG Take 1 Cap by mouth every day.        .                 Medicines prescribed today were sent to:     [facility] #[id number] - [place], [place] - [address]    [address] [place] [address]    Phone: [phone] Fax: [phone]    Open 24 Hours?: No      Medication refill instructions:       If your prescription bottle indicates you have medication refills left, it is not necessary to call your provider’s office. Please contact your pharmacy and they will refill your medication.    If your prescription bottle indicates you do not have any refills left, you may request refills at any time through one of the following ways: The online [employer] system (except Urgent Care), by calling your provider’s office, or by asking your pharmacy to contact your provider’s office with a refill request. Medication refills are processed only during regular business hours and may not be available until the next business day. Your provider may request additional information or to have a follow-up visit with you prior to refilling your medication.   *Please Note: Medication refills are assigned a new Rx number when refilled electronically. Your pharmacy may indicate that no refills were authorized even though a new prescription for the same medication is available at the pharmacy. Please request the medicine by name with the pharmacy before contacting your provider for a refill.           [employer] Access Code: Activation code not generated  Current [employer] Status: Active

## 2017-05-17 NOTE — PATIENT INSTRUCTIONS
Have encouraged the patient to rest after the acupuncture session today - taking naps or going to sleep early as necessary.  Increase intake of water and refrain from strenuous activities.  Patient may expect to feel transient worsening of symptoms, but this should resolve to benefit in the next day or two after treatment.    The side effects of Acupuncture needle insertion include: minor bruising, bleeding, or pain at the site of needle insertion.  If more worrisome symptoms, such as continued bleeding, severe bruising, or continue pain or altered sensation persist, please contact [facility] office @ [phone]

## 2017-05-18 DIAGNOSIS — E87.1 HYPONATREMIA: ICD-10-CM

## 2017-05-18 LAB — OSMOLALITY SERPL: 281 MOSM/KG H2O (ref 278–298)

## 2017-05-19 ENCOUNTER — NON-PROVIDER VISIT (OUTPATIENT)
Dept: MEDICAL GROUP | Facility: LAB | Age: 67
End: 2017-05-19
Payer: MEDICARE

## 2017-05-19 DIAGNOSIS — Z12.11 SCREENING FOR MALIGNANT NEOPLASM OF COLON: ICD-10-CM

## 2017-05-19 LAB — HEMOCCULT STL QL IA: NEGATIVE

## 2017-05-19 RX ADMIN — CYANOCOBALAMIN 1000 MCG: 1000 INJECTION, SOLUTION INTRAMUSCULAR; SUBCUTANEOUS at 13:57

## 2017-05-19 NOTE — PROGRESS NOTES
Quick Note:    Key [employer] message sent to the patient    Kelly Rivers M.D.    -FIT negative    ______

## 2017-05-22 ENCOUNTER — TELEPHONE (OUTPATIENT)
Dept: MEDICAL GROUP | Facility: LAB | Age: 67
End: 2017-05-22

## 2017-05-22 NOTE — TELEPHONE ENCOUNTER
1. Caller Name: Dg Dion                                         Call Back Number: [phone]      Patient approves a detailed voicemail message: N\A    Dg Ford, Prabhakar's , was returning a phone call to Dr. Rivers.

## 2017-05-23 ENCOUNTER — APPOINTMENT (OUTPATIENT)
Dept: PHYSICAL THERAPY | Facility: MEDICAL CENTER | Age: 67
End: 2017-05-23
Payer: MEDICARE
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
## 2017-05-26 ENCOUNTER — NON-PROVIDER VISIT (OUTPATIENT)
Dept: MEDICAL GROUP | Facility: LAB | Age: 67
End: 2017-05-26
Payer: MEDICARE

## 2017-05-26 ENCOUNTER — HOSPITAL ENCOUNTER (OUTPATIENT)
Dept: LAB | Facility: MEDICAL CENTER | Age: 67
End: 2017-05-26
Attending: FAMILY MEDICINE
Payer: MEDICARE

## 2017-05-26 DIAGNOSIS — E53.8 B12 DEFICIENCY: ICD-10-CM

## 2017-05-26 DIAGNOSIS — E87.1 HYPONATREMIA: ICD-10-CM

## 2017-05-26 LAB — OSMOLALITY SERPL: 283 MOSM/KG H2O (ref 278–298)

## 2017-05-26 PROCEDURE — 96372 THER/PROPH/DIAG INJ SC/IM: CPT | Performed by: FAMILY MEDICINE

## 2017-05-26 PROCEDURE — 83930 ASSAY OF BLOOD OSMOLALITY: CPT

## 2017-05-26 PROCEDURE — 36415 COLL VENOUS BLD VENIPUNCTURE: CPT

## 2017-05-26 RX ADMIN — CYANOCOBALAMIN 1000 MCG: 1000 INJECTION, SOLUTION INTRAMUSCULAR; SUBCUTANEOUS at 15:29

## 2017-05-31 ENCOUNTER — APPOINTMENT (OUTPATIENT)
Dept: PHYSICAL THERAPY | Facility: MEDICAL CENTER | Age: 67
End: 2017-05-31
Payer: MEDICARE

## 2017-06-02 ENCOUNTER — HOSPITAL ENCOUNTER (OUTPATIENT)
Facility: MEDICAL CENTER | Age: 67
End: 2017-06-02
Attending: FAMILY MEDICINE
Payer: MEDICARE

## 2017-06-02 ENCOUNTER — NON-PROVIDER VISIT (OUTPATIENT)
Dept: MEDICAL GROUP | Facility: LAB | Age: 67
End: 2017-06-02
Payer: MEDICARE

## 2017-06-02 DIAGNOSIS — E87.1 HYPONATREMIA: ICD-10-CM

## 2017-06-02 DIAGNOSIS — E53.8 B12 DEFICIENCY: ICD-10-CM

## 2017-06-02 LAB — OSMOLALITY UR: 442 MOSM/KG H2O (ref 300–900)

## 2017-06-02 PROCEDURE — 99999 PR NO CHARGE: CPT | Performed by: FAMILY MEDICINE

## 2017-06-02 PROCEDURE — 83935 ASSAY OF URINE OSMOLALITY: CPT

## 2017-06-02 PROCEDURE — 96372 THER/PROPH/DIAG INJ SC/IM: CPT | Performed by: FAMILY MEDICINE

## 2017-06-02 RX ADMIN — CYANOCOBALAMIN 1000 MCG: 1000 INJECTION, SOLUTION INTRAMUSCULAR; SUBCUTANEOUS at 14:51

## 2017-06-02 NOTE — MR AVS SNAPSHOT
Prabhakar Birminghamkristen   2017 2:20 PM   Non-Provider Visit   MRN: [id number]    Department:  [facility]   Dept Phone:  [phone]    Description:  Male : 1950   Provider:  CLEMENT DSOUZA MA           Reason for Visit     Injections B12      Allergies as of 2017     No Known Allergies      You were diagnosed with     B12 deficiency   [[id number]]         Vital Signs     Smoking Status                   Former Smoker           Basic Information     Date Of Birth Sex Race Ethnicity Preferred Language    1950 Male White Non- English      Your appointments     2017  2:20 PM   Non Provider 1 with CLEMENT DSOUZA MA   [facility] (--)    [address] [address] [phone]           You will be receiving a confirmation call a few days before your appointment from our automated call confirmation system.              Problem List              ICD-10-CM Priority Class Noted - Resolved    BPPV (benign paroxysmal positional vertigo) H81.10   2016 - Present    Controlled type 2 diabetes mellitus with diabetic polyneuropathy, without long-term current use of insulin (CMS-HCC) E11.42   2016 - Present    GERD (gastroesophageal reflux disease) K21.9   2016 - Present    Obesity (BMI 30-39.9) E66.9   2016 - Present    Essential hypertension I10   2016 - Present    BPH (benign prostatic hyperplasia) N40.0   2016 - Present    Mixed hyperlipidemia E78.2   2016 - Present    Tobacco use Z72.0   2016 - Present    Neck pain M54.2   2016 - Present    Health care maintenance Z00.00   2016 - Present    Alcohol consumption of more than four drinks per day Z78.9   2016 - Present    Tremor, coarse G25.2   10/25/2016 - Present    B12 deficiency E53.8   2016 - Present    Moderate single current episode of major depressive disorder (CMS-HCC) F32.1   2016 - Present    Right hip pain  M25.551   11/14/2016 - Present    Post traumatic stress disorder (PTSD) F43.10   12/9/2016 - Present    Vitamin D deficiency E55.9   5/12/2017 - Present    Hyponatremia E87.1   5/18/2017 - Present      Health Maintenance        Date Due Completion Dates    RETINAL SCREENING 7/29/1967 ---    COLONOSCOPY 7/29/1999 ---    IMM ZOSTER VACCINE 7/29/2009 ---    IMM PNEUMOCOCCAL 65+ (ADULT) LOW/MEDIUM RISK SERIES (2 of 2 - PPSV23) 9/27/2017 9/27/2016    A1C SCREENING 11/17/2017 5/17/2017, 5/12/2017, 10/3/2016, 1/18/2016, 5/18/2014    DIABETES MONOFILAMENT / LE EXAM 12/9/2017 12/9/2016, 12/9/2016    FASTING LIPID PROFILE 5/17/2018 5/17/2017, 10/3/2016    URINE ACR / MICROALBUMIN 5/17/2018 5/17/2017, 10/3/2016    SERUM CREATININE 5/17/2018 5/17/2017, 10/3/2016, 1/25/2016, 1/18/2016, 5/18/2014    IMM DTaP/Tdap/Td Vaccine (2 - Td) 9/27/2026 9/27/2016            Current Immunizations     13-VALENT PCV PREVNAR 9/27/2016  4:27 PM    Influenza TIV (IM) 10/26/2016    Tdap Vaccine 9/27/2016  4:25 PM      Below and/or attached are the medications your provider expects you to take. Review all of your home medications and newly ordered medications with your provider and/or pharmacist. Follow medication instructions as directed by your provider and/or pharmacist. Please keep your medication list with you and share with your provider. Update the information when medications are discontinued, doses are changed, or new medications (including over-the-counter products) are added; and carry medication information at all times in the event of emergency situations     Allergies:  No Known Allergies          Medications  Valid as of: June 02, 2017 -  3:01 PM    Generic Name Brand Name Tablet Size Instructions for use    Aspirin (Chew Tab) ASA 81 MG Take 81 mg by mouth every day.        Atorvastatin Calcium (Tab) LIPITOR 40 MG Take 1 Tab by mouth every day.        Benazepril HCl (Tab) LOTENSIN 20 MG Take 1 Tab by mouth every day.         Cholecalciferol (Tab) cholecalciferol 1000 UNIT Take 1,000 Units by mouth every day.        Cyanocobalamin (Tab) VITAMIN B12 1000 MCG Take 1 Tab by mouth every day.        Gabapentin (Cap) NEURONTIN 300 MG Take 2 Caps by mouth 3 times a day.        GlipiZIDE (TABLET SR 24 HR) GLUCOTROL 5 MG Take 1 Tab by mouth every day.        Ibuprofen (Tab) MOTRIN 800 MG Take 800 mg by mouth every 8 hours as needed.        MetFORMIN HCl (Tab) GLUCOPHAGE 500 MG 2 tabs PO qAM, 1 tab in the afternoon, 2 tabs qPM        Multiple Vitamins-Minerals   Take  by mouth.        Omeprazole (CAPSULE DELAYED RELEASE) PRILOSEC 20 MG Take 20 mg by mouth every day.        Terazosin HCl (Cap) HYTRIN 5 MG Take 1 Cap by mouth every day.        .                 Medicines prescribed today were sent to:     [facility] #[id number] [facility], [place] - [address]    [address] [place] [address]    Phone: [phone] Fax: [phone]    Open 24 Hours?: No      Medication refill instructions:       If your prescription bottle indicates you have medication refills left, it is not necessary to call your provider’s office. Please contact your pharmacy and they will refill your medication.    If your prescription bottle indicates you do not have any refills left, you may request refills at any time through one of the following ways: The online [employer] system (except Urgent Care), by calling your provider’s office, or by asking your pharmacy to contact your provider’s office with a refill request. Medication refills are processed only during regular business hours and may not be available until the next business day. Your provider may request additional information or to have a follow-up visit with you prior to refilling your medication.   *Please Note: Medication refills are assigned a new Rx number when refilled electronically. Your pharmacy may indicate that no refills were authorized even though a new prescription for the same  medication is available at the pharmacy. Please request the medicine by name with the pharmacy before contacting your provider for a refill.           MyChart Access Code: Activation code not generated  Current MyChart Status: Active

## 2017-06-02 NOTE — PROGRESS NOTES
Prabhakar Sierra is a 66 y.o. male here for a non-provider visit for B12 injection.    Reason for injection: Vitamin B12 Deficiency   Order in MAR?: Yes  Patient supplied?:No  Minimum interval has been met for this injection (per MAR order): Yes    Order and dose verified by: TUCKER  Patient tolerated injection and no adverse effects were observed or reported: Yes    # of Administrations remaining in MAR: unknown

## 2017-06-05 ENCOUNTER — APPOINTMENT (OUTPATIENT)
Dept: MEDICAL GROUP | Facility: LAB | Age: 67
End: 2017-06-05
Payer: MEDICARE

## 2017-06-06 ENCOUNTER — APPOINTMENT (OUTPATIENT)
Dept: PHYSICAL THERAPY | Facility: MEDICAL CENTER | Age: 67
End: 2017-06-06
Attending: FAMILY MEDICINE
Payer: MEDICARE

## 2017-06-06 ENCOUNTER — TELEPHONE (OUTPATIENT)
Dept: MEDICAL GROUP | Facility: LAB | Age: 67
End: 2017-06-06

## 2017-06-06 RX ORDER — CYANOCOBALAMIN 1000 UG/ML
1000 INJECTION, SOLUTION INTRAMUSCULAR; SUBCUTANEOUS
Status: DISCONTINUED | OUTPATIENT
Start: 2017-06-06 | End: 2018-04-17

## 2017-06-06 NOTE — TELEPHONE ENCOUNTER
Called and left message regarding B12 injections. He should only be coming to this clinic and no more than one injection per week.     Kelly Rivers M.D.

## 2017-06-08 ENCOUNTER — APPOINTMENT (OUTPATIENT)
Dept: PHYSICAL THERAPY | Facility: MEDICAL CENTER | Age: 67
End: 2017-06-08
Payer: MEDICARE

## 2017-06-13 ENCOUNTER — NON-PROVIDER VISIT (OUTPATIENT)
Dept: MEDICAL GROUP | Facility: LAB | Age: 67
End: 2017-06-13
Payer: MEDICARE

## 2017-06-13 DIAGNOSIS — E53.8 B12 DEFICIENCY: ICD-10-CM

## 2017-06-13 PROCEDURE — 96372 THER/PROPH/DIAG INJ SC/IM: CPT | Performed by: FAMILY MEDICINE

## 2017-06-13 RX ADMIN — CYANOCOBALAMIN 1000 MCG: 1000 INJECTION, SOLUTION INTRAMUSCULAR; SUBCUTANEOUS at 14:50

## 2017-06-13 NOTE — MR AVS SNAPSHOT
Prabhakar Sierra   2017 2:40 PM   Non-Provider Visit   MRN: [id number]    Department:  [facility]   Dept Phone:  [phone]    Description:  Male : 1950   Provider:  CLEMENT DSOUZA MA           Reason for Visit     Injections B12 injection only      Allergies as of 2017     No Known Allergies      Vital Signs     Smoking Status                   Former Smoker           Basic Information     Date Of Birth Sex Race Ethnicity Preferred Language    1950 Male White Non- English      Problem List              ICD-10-CM Priority Class Noted - Resolved    BPPV (benign paroxysmal positional vertigo) H81.10   2016 - Present    Controlled type 2 diabetes mellitus with diabetic polyneuropathy, without long-term current use of insulin (CMS-HCC) E11.42   2016 - Present    GERD (gastroesophageal reflux disease) K21.9   2016 - Present    Obesity (BMI 30-39.9) E66.9   2016 - Present    Essential hypertension I10   2016 - Present    BPH (benign prostatic hyperplasia) N40.0   2016 - Present    Mixed hyperlipidemia E78.2   2016 - Present    Tobacco use Z72.0   2016 - Present    Neck pain M54.2   2016 - Present    Health care maintenance Z00.00   2016 - Present    Alcohol consumption of more than four drinks per day Z78.9   2016 - Present    Tremor, coarse G25.2   10/25/2016 - Present    B12 deficiency E53.8   2016 - Present    Moderate single current episode of major depressive disorder (CMS-HCC) F32.1   2016 - Present    Right hip pain M25.551   2016 - Present    Post traumatic stress disorder (PTSD) F43.10   2016 - Present    Vitamin D deficiency E55.9   2017 - Present    Hyponatremia E87.1   2017 - Present      Health Maintenance        Date Due Completion Dates    RETINAL SCREENING 1967 ---    COLONOSCOPY 1999 ---    IMM ZOSTER VACCINE 2009 ---    IMM PNEUMOCOCCAL 65+ (ADULT)  LOW/MEDIUM RISK SERIES (2 of 2 - PPSV23) 9/27/2017 9/27/2016    A1C SCREENING 11/17/2017 5/17/2017, 5/12/2017, 10/3/2016, 1/18/2016, 5/18/2014    DIABETES MONOFILAMENT / LE EXAM 12/9/2017 12/9/2016, 12/9/2016    FASTING LIPID PROFILE 5/17/2018 5/17/2017, 10/3/2016    URINE ACR / MICROALBUMIN 5/17/2018 5/17/2017, 10/3/2016    SERUM CREATININE 5/17/2018 5/17/2017, 10/3/2016, 1/25/2016, 1/18/2016, 5/18/2014    IMM DTaP/Tdap/Td Vaccine (2 - Td) 9/27/2026 9/27/2016            Current Immunizations     13-VALENT PCV PREVNAR 9/27/2016  4:27 PM    Influenza TIV (IM) 10/26/2016    Tdap Vaccine 9/27/2016  4:25 PM      Below and/or attached are the medications your provider expects you to take. Review all of your home medications and newly ordered medications with your provider and/or pharmacist. Follow medication instructions as directed by your provider and/or pharmacist. Please keep your medication list with you and share with your provider. Update the information when medications are discontinued, doses are changed, or new medications (including over-the-counter products) are added; and carry medication information at all times in the event of emergency situations     Allergies:  No Known Allergies          Medications  Valid as of: June 13, 2017 -  3:23 PM    Generic Name Brand Name Tablet Size Instructions for use    Aspirin (Chew Tab) ASA 81 MG Take 81 mg by mouth every day.        Atorvastatin Calcium (Tab) LIPITOR 40 MG Take 1 Tab by mouth every day.        Benazepril HCl (Tab) LOTENSIN 20 MG Take 1 Tab by mouth every day.        Cholecalciferol (Tab) cholecalciferol 1000 UNIT Take 1,000 Units by mouth every day.        Cyanocobalamin (Tab) VITAMIN B12 1000 MCG Take 1 Tab by mouth every day.        Gabapentin (Cap) NEURONTIN 300 MG Take 2 Caps by mouth 3 times a day.        GlipiZIDE (TABLET SR 24 HR) GLUCOTROL 5 MG Take 1 Tab by mouth every day.        Ibuprofen (Tab) MOTRIN 800 MG Take 800 mg by mouth every 8 hours  as needed.        MetFORMIN HCl (Tab) GLUCOPHAGE 500 MG 2 tabs PO qAM, 1 tab in the afternoon, 2 tabs qPM        Multiple Vitamins-Minerals   Take  by mouth.        Omeprazole (CAPSULE DELAYED RELEASE) PRILOSEC 20 MG Take 20 mg by mouth every day.        Terazosin HCl (Cap) HYTRIN 5 MG Take 1 Cap by mouth every day.        .                 Medicines prescribed today were sent to:     [facility] #[id number] - [place], [place] - [address] [place] [address]    Phone: [phone] Fax: [phone]    Open 24 Hours?: No      Medication refill instructions:       If your prescription bottle indicates you have medication refills left, it is not necessary to call your provider’s office. Please contact your pharmacy and they will refill your medication.    If your prescription bottle indicates you do not have any refills left, you may request refills at any time through one of the following ways: The online [employer] system (except Urgent Care), by calling your provider’s office, or by asking your pharmacy to contact your provider’s office with a refill request. Medication refills are processed only during regular business hours and may not be available until the next business day. Your provider may request additional information or to have a follow-up visit with you prior to refilling your medication.   *Please Note: Medication refills are assigned a new Rx number when refilled electronically. Your pharmacy may indicate that no refills were authorized even though a new prescription for the same medication is available at the pharmacy. Please request the medicine by name with the pharmacy before contacting your provider for a refill.           [employer] Access Code: Activation code not generated  Current [employer] Status: Active

## 2017-06-19 NOTE — NON-PROVIDER
Prabhakar Sierra is a 66 y.o. male here for a non-provider visit for B-12 injection.    Reason for injection: B-12 deficiancy  Order in MAR?: Yes  Patient supplied?:No  Minimum interval has been met for this injection (per MAR order): Yes    Order and dose verified by: LJ  Patient tolerated injection and no adverse effects were observed or reported: Yes    # of Administrations remaining in MAR: unknown (standing order)

## 2017-06-28 ENCOUNTER — NON-PROVIDER VISIT (OUTPATIENT)
Dept: MEDICAL GROUP | Facility: LAB | Age: 67
End: 2017-06-28
Payer: MEDICARE

## 2017-06-28 PROCEDURE — 96372 THER/PROPH/DIAG INJ SC/IM: CPT | Performed by: FAMILY MEDICINE

## 2017-06-28 RX ADMIN — CYANOCOBALAMIN 1000 MCG: 1000 INJECTION, SOLUTION INTRAMUSCULAR; SUBCUTANEOUS at 14:22

## 2017-06-30 ENCOUNTER — PATIENT OUTREACH (OUTPATIENT)
Dept: HEALTH INFORMATION MANAGEMENT | Facility: OTHER | Age: 67
End: 2017-06-30

## 2017-07-06 ENCOUNTER — NON-PROVIDER VISIT (OUTPATIENT)
Dept: MEDICAL GROUP | Facility: LAB | Age: 67
End: 2017-07-06
Payer: MEDICARE

## 2017-07-06 DIAGNOSIS — E53.8 B12 DEFICIENCY: ICD-10-CM

## 2017-07-06 PROCEDURE — 96372 THER/PROPH/DIAG INJ SC/IM: CPT | Performed by: FAMILY MEDICINE

## 2017-07-06 RX ORDER — CYANOCOBALAMIN 1000 UG/ML
1000 INJECTION, SOLUTION INTRAMUSCULAR; SUBCUTANEOUS ONCE
OUTPATIENT
Start: 2017-07-06 | End: 2017-07-07

## 2017-07-06 RX ADMIN — CYANOCOBALAMIN 1000 MCG: 1000 INJECTION, SOLUTION INTRAMUSCULAR; SUBCUTANEOUS at 10:16

## 2017-07-06 NOTE — MR AVS SNAPSHOT
Prabhakar Sierra   2017 10:20 AM   Non-Provider Visit   MRN: [id number]    Department:  [place] [facility]   Dept Phone:  [phone]    Description:  Male : 1950   Provider:  CLEMENT DSOUZA MA           Reason for Visit     Injections B-12 injection only. R deltoid      Allergies as of 2017     No Known Allergies      You were diagnosed with     B12 deficiency   [180407]         Vital Signs     Smoking Status                   Former Smoker           Basic Information     Date Of Birth Sex Race Ethnicity Preferred Language    1950 Male White Non- English      Problem List              ICD-10-CM Priority Class Noted - Resolved    BPPV (benign paroxysmal positional vertigo) H81.10   2016 - Present    Controlled type 2 diabetes mellitus with diabetic polyneuropathy, without long-term current use of insulin (CMS-HCC) E11.42   2016 - Present    GERD (gastroesophageal reflux disease) K21.9   2016 - Present    Obesity (BMI 30-39.9) E66.9   2016 - Present    Essential hypertension I10   2016 - Present    BPH (benign prostatic hyperplasia) N40.0   2016 - Present    Mixed hyperlipidemia E78.2   2016 - Present    Tobacco use Z72.0   2016 - Present    Neck pain M54.2   2016 - Present    Health care maintenance Z00.00   2016 - Present    Alcohol consumption of more than four drinks per day Z78.9   2016 - Present    Tremor, coarse G25.2   10/25/2016 - Present    B12 deficiency E53.8   2016 - Present    Moderate single current episode of major depressive disorder (CMS-HCC) F32.1   2016 - Present    Right hip pain M25.551   2016 - Present    Post traumatic stress disorder (PTSD) F43.10   2016 - Present    Vitamin D deficiency E55.9   2017 - Present    Hyponatremia E87.1   2017 - Present      Health Maintenance        Date Due Completion Dates    RETINAL SCREENING 1967 ---    COLONOSCOPY  7/29/1999 ---    IMM ZOSTER VACCINE 7/29/2009 ---    IMM INFLUENZA (1) 9/1/2017 10/26/2016    IMM PNEUMOCOCCAL 65+ (ADULT) LOW/MEDIUM RISK SERIES (2 of 2 - PPSV23) 9/27/2017 9/27/2016    A1C SCREENING 11/17/2017 5/17/2017, 5/12/2017, 10/3/2016, 1/18/2016, 5/18/2014    DIABETES MONOFILAMENT / LE EXAM 12/9/2017 12/9/2016, 12/9/2016    FASTING LIPID PROFILE 5/17/2018 5/17/2017, 10/3/2016    URINE ACR / MICROALBUMIN 5/17/2018 5/17/2017, 10/3/2016    SERUM CREATININE 5/17/2018 5/17/2017, 10/3/2016, 1/25/2016, 1/18/2016, 5/18/2014    IMM DTaP/Tdap/Td Vaccine (2 - Td) 9/27/2026 9/27/2016            Current Immunizations     13-VALENT PCV PREVNAR 9/27/2016  4:27 PM    Influenza TIV (IM) 10/26/2016    Tdap Vaccine 9/27/2016  4:25 PM      Below and/or attached are the medications your provider expects you to take. Review all of your home medications and newly ordered medications with your provider and/or pharmacist. Follow medication instructions as directed by your provider and/or pharmacist. Please keep your medication list with you and share with your provider. Update the information when medications are discontinued, doses are changed, or new medications (including over-the-counter products) are added; and carry medication information at all times in the event of emergency situations     Allergies:  No Known Allergies          Medications  Valid as of: July 06, 2017 - 11:36 AM    Generic Name Brand Name Tablet Size Instructions for use    Aspirin (Chew Tab) ASA 81 MG Take 81 mg by mouth every day.        Atorvastatin Calcium (Tab) LIPITOR 40 MG Take 1 Tab by mouth every day.        Benazepril HCl (Tab) LOTENSIN 20 MG Take 1 Tab by mouth every day.        Cholecalciferol (Tab) cholecalciferol 1000 UNIT Take 1,000 Units by mouth every day.        Cyanocobalamin (Tab) VITAMIN B12 1000 MCG Take 1 Tab by mouth every day.        Gabapentin (Cap) NEURONTIN 300 MG Take 2 Caps by mouth 3 times a day.        GlipiZIDE (TABLET SR 24  HR) GLUCOTROL 5 MG Take 1 Tab by mouth every day.        Ibuprofen (Tab) MOTRIN 800 MG Take 800 mg by mouth every 8 hours as needed.        MetFORMIN HCl (Tab) GLUCOPHAGE 500 MG 2 tabs PO qAM, 1 tab in the afternoon, 2 tabs qPM        Multiple Vitamins-Minerals   Take  by mouth.        Omeprazole (CAPSULE DELAYED RELEASE) PRILOSEC 20 MG Take 20 mg by mouth every day.        Terazosin HCl (Cap) HYTRIN 5 MG Take 1 Cap by mouth every day.        .                 Medicines prescribed today [facility] to:     [id number] [place], [place] - [address]    [address] [place] [address]    Phone: [phone] Fax: [phone]    Open 24 Hours?: No      Medication refill instructions:       If your prescription bottle indicates you have medication refills left, it is not necessary to call your provider’s office. Please contact your pharmacy and they will refill your medication.    If your prescription bottle indicates you do not have any refills left, you may request refills at any time through one of the following ways: The online [employer] system (except Urgent Care), by calling your provider’s office, or by asking your pharmacy to contact your provider’s office with a refill request. Medication refills are processed only during regular business hours and may not be available until the next business day. Your provider may request additional information or to have a follow-up visit with you prior to refilling your medication.   *Please Note: Medication refills are assigned a new Rx number when refilled electronically. Your pharmacy may indicate that no refills were authorized even though a new prescription for the same medication is available at the pharmacy. Please request the medicine by name with the pharmacy before contacting your provider for a refill.           Class Messenger Access Code: Activation code not generated  Current Class Messenger Status: Active

## 2017-07-17 RX ORDER — IBUPROFEN 800 MG/1
800 TABLET ORAL EVERY 8 HOURS PRN
Qty: 120 TAB | Refills: 2 | Status: SHIPPED | OUTPATIENT
Start: 2017-07-17 | End: 2018-11-12 | Stop reason: SDUPTHER

## 2017-07-19 ENCOUNTER — TELEPHONE (OUTPATIENT)
Dept: MEDICAL GROUP | Facility: LAB | Age: 67
End: 2017-07-19

## 2017-07-19 ENCOUNTER — NON-PROVIDER VISIT (OUTPATIENT)
Dept: MEDICAL GROUP | Facility: LAB | Age: 67
End: 2017-07-19
Payer: MEDICARE

## 2017-07-19 DIAGNOSIS — E53.8 B12 DEFICIENCY: ICD-10-CM

## 2017-07-19 RX ORDER — CYANOCOBALAMIN 1000 UG/ML
1000 INJECTION, SOLUTION INTRAMUSCULAR; SUBCUTANEOUS ONCE
Status: COMPLETED | OUTPATIENT
Start: 2017-07-19 | End: 2017-07-19

## 2017-07-19 RX ADMIN — CYANOCOBALAMIN 1000 MCG: 1000 INJECTION, SOLUTION INTRAMUSCULAR; SUBCUTANEOUS at 14:05

## 2017-07-19 NOTE — TELEPHONE ENCOUNTER
ABBYM concerning his question about his medication.  Ok'd to continue taking his gabapentin: 300mg in am, 600mg pm, and 900mg at hs.  Also informed him of appointment date and time..

## 2017-07-19 NOTE — TELEPHONE ENCOUNTER
Prabhakar was here today for his B-12.  Stating that he is taking his gabapentin with this schedule: 300 in the am, 600 in the pm, and 900 at hs.  He wanted to know if this is ok with you.  He thinks taking 600 in the am makes him have less energy.  Made appointment for Medicare Annual exam for September.

## 2017-07-21 NOTE — PROGRESS NOTES
Attempt #:3    WebIZ Checked & Epic Updated: yes  HealthConnect Verified: no  Verify PCP: yes    Communication Preference Obtained: yes     Review Care Team: yes    Annual Wellness Visit Scheduling  1. Scheduling Status:Scheduled        Care Gap Scheduling (Attempt to Schedule EACH Overdue Care Gap!)     Health Maintenance Due   Topic Date Due   • Annual Wellness Visit  scheduled   • RETINAL SCREENING  Pt will follow up   • COLONOSCOPY  Pt completed fit test   • IMM ZOSTER VACCINE  sched         MyChart Activation: already active  Yuepu Sifang Nohemy: no  Virtual Visits: no  Opt In to Text Messages: no

## 2017-07-24 DIAGNOSIS — I10 ESSENTIAL HYPERTENSION: ICD-10-CM

## 2017-07-24 RX ORDER — BENAZEPRIL HYDROCHLORIDE 20 MG/1
TABLET ORAL
Refills: 2 | OUTPATIENT
Start: 2017-07-24

## 2017-07-24 RX ORDER — BENAZEPRIL HYDROCHLORIDE 20 MG/1
20 TABLET ORAL DAILY
Qty: 30 TAB | Refills: 3 | Status: SHIPPED | OUTPATIENT
Start: 2017-07-24 | End: 2017-09-06 | Stop reason: SDUPTHER

## 2017-07-26 ENCOUNTER — NON-PROVIDER VISIT (OUTPATIENT)
Dept: MEDICAL GROUP | Facility: LAB | Age: 67
End: 2017-07-26
Payer: MEDICARE

## 2017-07-26 PROCEDURE — 96372 THER/PROPH/DIAG INJ SC/IM: CPT | Performed by: FAMILY MEDICINE

## 2017-07-26 RX ADMIN — CYANOCOBALAMIN 1000 MCG: 1000 INJECTION, SOLUTION INTRAMUSCULAR; SUBCUTANEOUS at 13:39

## 2017-08-03 ENCOUNTER — NON-PROVIDER VISIT (OUTPATIENT)
Dept: MEDICAL GROUP | Facility: LAB | Age: 67
End: 2017-08-03
Payer: MEDICARE

## 2017-08-03 PROCEDURE — 99999 PR NO CHARGE: CPT | Performed by: FAMILY MEDICINE

## 2017-08-03 RX ADMIN — CYANOCOBALAMIN 1000 MCG: 1000 INJECTION, SOLUTION INTRAMUSCULAR; SUBCUTANEOUS at 11:45

## 2017-08-09 ENCOUNTER — NON-PROVIDER VISIT (OUTPATIENT)
Dept: MEDICAL GROUP | Facility: LAB | Age: 67
End: 2017-08-09
Payer: MEDICARE

## 2017-08-09 DIAGNOSIS — E53.8 B12 DEFICIENCY: ICD-10-CM

## 2017-08-09 PROCEDURE — 99999 PR NO CHARGE: CPT | Performed by: FAMILY MEDICINE

## 2017-08-09 PROCEDURE — 96372 THER/PROPH/DIAG INJ SC/IM: CPT | Performed by: FAMILY MEDICINE

## 2017-08-09 RX ADMIN — CYANOCOBALAMIN 1000 MCG: 1000 INJECTION, SOLUTION INTRAMUSCULAR; SUBCUTANEOUS at 16:22

## 2017-08-09 NOTE — PROGRESS NOTES
Prabhakar Sierra is a 67 y.o. male here for a non-provider visit for B-12 injection.    Reason for injection: B-12 deficiency  Order in MAR?: Yes  Patient supplied?:No  Minimum interval has been met for this injection (per MAR order): Yes    Order and dose verified by: df  Patient tolerated injection and no adverse effects were observed or reported: Yes    # of Administrations remaining in MAR:

## 2017-08-16 ENCOUNTER — NON-PROVIDER VISIT (OUTPATIENT)
Dept: MEDICAL GROUP | Facility: LAB | Age: 67
End: 2017-08-16
Payer: MEDICARE

## 2017-08-16 RX ADMIN — CYANOCOBALAMIN 1000 MCG: 1000 INJECTION, SOLUTION INTRAMUSCULAR; SUBCUTANEOUS at 14:26

## 2017-08-16 NOTE — PROGRESS NOTES
Prabhakar Sierra is a 67 y.o. male here for a non-provider visit for B-12 injection.    Reason for injection: B-12 deficiency  Order in MAR?: Yes  Patient supplied?:No  Minimum interval has been met for this injection (per MAR order): Yes    Order and dose verified by: Tita  Patient tolerated injection and no adverse effects were observed or reported: Yes    # of Administrations remaining in MAR: 1 per week

## 2017-08-16 NOTE — MR AVS SNAPSHOT
Prabhakar Birminghamkristen   2017 2:20 PM   Non-Provider Visit   MRN: [id number]    Department:  [facility]   Dept Phone:  [phone]    Description:  Male : 1950   Provider:  CLEMENT DSOUZA MA           Reason for Visit     Other B-12 injection only      Allergies as of 2017     No Known Allergies      Vital Signs     Smoking Status                   Former Smoker           Basic Information     Date Of Birth Sex Race Ethnicity Preferred Language    1950 Male White Non- English      Your appointments     Aug 23, 2017  1:40 PM   ANNUAL EXAM PREVENTATIVE with SATYA Martinez   [facility] (--)    [address] [place] [address] [phone]            Sep 22, 2017  1:40 PM   ANNUAL EXAM PREVENTATIVE with Kelly Rivers M.D.   [facility] (--)    [address] [place] [id number] [phone]              Problem List              ICD-10-CM Priority Class Noted - Resolved    BPPV (benign paroxysmal positional vertigo) H81.10   2016 - Present    Controlled type 2 diabetes mellitus with diabetic polyneuropathy, without long-term current use of insulin (CMS-HCC) E11.42   2016 - Present    GERD (gastroesophageal reflux disease) K21.9   2016 - Present    Obesity (BMI 30-39.9) E66.9   2016 - Present    Essential hypertension I10   2016 - Present    BPH (benign prostatic hyperplasia) N40.0   2016 - Present    Mixed hyperlipidemia E78.2   2016 - Present    Tobacco use Z72.0   2016 - Present    Neck pain M54.2   2016 - Present    Health care maintenance Z00.00   2016 - Present    Alcohol consumption of more than four drinks per day Z78.9   2016 - Present    Tremor, coarse G25.2   10/25/2016 - Present    B12 deficiency E53.8   2016 - Present    Moderate single current episode of major depressive disorder (CMS-HCC) F32.1    11/11/2016 - Present    Right hip pain M25.551   11/14/2016 - Present    Post traumatic stress disorder (PTSD) F43.10   12/9/2016 - Present    Vitamin D deficiency E55.9   5/12/2017 - Present    Hyponatremia E87.1   5/18/2017 - Present      Health Maintenance        Date Due Completion Dates    RETINAL SCREENING 7/29/1967 ---    COLONOSCOPY 7/29/1999 ---    IMM ZOSTER VACCINE 7/29/2009 ---    IMM INFLUENZA (1) 9/1/2017 10/26/2016    IMM PNEUMOCOCCAL 65+ (ADULT) LOW/MEDIUM RISK SERIES (2 of 2 - PPSV23) 9/27/2017 9/27/2016    A1C SCREENING 11/17/2017 5/17/2017, 5/12/2017, 10/3/2016, 1/18/2016, 5/18/2014    DIABETES MONOFILAMENT / LE EXAM 12/9/2017 12/9/2016, 12/9/2016    FASTING LIPID PROFILE 5/17/2018 5/17/2017, 10/3/2016    URINE ACR / MICROALBUMIN 5/17/2018 5/17/2017, 10/3/2016    SERUM CREATININE 5/17/2018 5/17/2017, 10/3/2016, 1/25/2016, 1/18/2016, 5/18/2014    IMM DTaP/Tdap/Td Vaccine (2 - Td) 9/27/2026 9/27/2016            Current Immunizations     13-VALENT PCV PREVNAR 9/27/2016  4:27 PM    Influenza TIV (IM) 10/26/2016    Tdap Vaccine 9/27/2016  4:25 PM      Below and/or attached are the medications your provider expects you to take. Review all of your home medications and newly ordered medications with your provider and/or pharmacist. Follow medication instructions as directed by your provider and/or pharmacist. Please keep your medication list with you and share with your provider. Update the information when medications are discontinued, doses are changed, or new medications (including over-the-counter products) are added; and carry medication information at all times in the event of emergency situations     Allergies:  No Known Allergies          Medications  Valid as of: August 16, 2017 -  3:14 PM    Generic Name Brand Name Tablet Size Instructions for use    Aspirin (Chew Tab) ASA 81 MG Take 81 mg by mouth every day.        Atorvastatin Calcium (Tab) LIPITOR 40 MG Take 1 Tab by mouth every day.         Benazepril HCl (Tab) LOTENSIN 20 MG Take 1 Tab by mouth every day.        Cholecalciferol (Tab) cholecalciferol 1000 UNIT Take 1,000 Units by mouth every day.        Cyanocobalamin (Tab) VITAMIN B12 1000 MCG Take 1 Tab by mouth every day.        Gabapentin (Cap) NEURONTIN 300 MG Take 2 Caps by mouth 3 times a day.        GlipiZIDE (TABLET SR 24 HR) GLUCOTROL 5 MG Take 1 Tab by mouth every day.        Ibuprofen (Tab) MOTRIN 800 MG Take 1 Tab by mouth every 8 hours as needed.        MetFORMIN HCl (Tab) GLUCOPHAGE 500 MG 2 tabs PO qAM, 1 tab in the afternoon, 2 tabs qPM        Multiple Vitamins-Minerals   Take  by mouth.        Omeprazole (CAPSULE DELAYED RELEASE) PRILOSEC 20 MG Take 20 mg by mouth every day.        Terazosin HCl (Cap) HYTRIN 5 MG Take 1 Cap by mouth every day.        .                 Medicines prescribed today were sent to:     [id number] [place], [place] - [address] [address]    Phone: [phone] Fax: [phone]    Open 24 Hours?: No      Medication refill instructions:       If your prescription bottle indicates you have medication refills left, it is not necessary to call your provider’s office. Please contact your pharmacy and they will refill your medication.    If your prescription bottle indicates you do not have any refills left, you may request refills at any time through one of the following ways: The online [employer] system (except Urgent Care), by calling your provider’s office, or by asking your pharmacy to contact your provider’s office with a refill request. Medication refills are processed only during regular business hours and may not be available until the next business day. Your provider may request additional information or to have a follow-up visit with you prior to refilling your medication.   *Please Note: Medication refills are assigned a new Rx number when refilled electronically. Your pharmacy may indicate that no  refills were authorized even though a new prescription for the same medication is available at the pharmacy. Please request the medicine by name with the pharmacy before contacting your provider for a refill.           [email or web address] Access Code: Activation code not generated  Current D2[employer] Status: Active

## 2017-08-23 ENCOUNTER — NON-PROVIDER VISIT (OUTPATIENT)
Dept: MEDICAL GROUP | Facility: LAB | Age: 67
End: 2017-08-23
Payer: MEDICARE

## 2017-08-23 ENCOUNTER — APPOINTMENT (OUTPATIENT)
Dept: MEDICAL GROUP | Facility: LAB | Age: 67
End: 2017-08-23
Payer: MEDICARE

## 2017-08-23 DIAGNOSIS — E53.8 B12 DEFICIENCY: ICD-10-CM

## 2017-08-23 PROCEDURE — 99999 PR NO CHARGE: CPT | Performed by: FAMILY MEDICINE

## 2017-08-23 PROCEDURE — 96372 THER/PROPH/DIAG INJ SC/IM: CPT | Performed by: FAMILY MEDICINE

## 2017-08-23 RX ADMIN — CYANOCOBALAMIN 1000 MCG: 1000 INJECTION, SOLUTION INTRAMUSCULAR; SUBCUTANEOUS at 13:46

## 2017-08-23 NOTE — PROGRESS NOTES
Prabhakar Sierra is a 67 y.o. male here for a non-provider visit for B12 injection.    Reason for injection: Vit B Deficiency   Order in MAR?: Yes  Patient supplied?:No  Minimum interval has been met for this injection (per MAR order): Yes    Order and dose verified by: SR  Patient tolerated injection and no adverse effects were observed or reported: Yes    # of Administrations remaining in MAR: 2

## 2017-08-23 NOTE — MR AVS SNAPSHOT
Prabhakar Elizabethid   2017 1:40 PM   Non-Provider Visit   MRN: [id number]    Department:  [facility]   Dept Phone:  [phone]    Description:  Male : 1950   Provider:  CLEMENT DSOUZA MA           Reason for Visit     Injections B12      Allergies as of 2017     No Known Allergies      You were diagnosed with     B12 deficiency   [[id number]]         Vital Signs     Smoking Status                   Former Smoker           Basic Information     Date Of Birth Sex Race Ethnicity Preferred Language    1950 Male White Non- English      Your appointments     Sep 14, 2017  1:00 PM   ANNUAL EXAM PREVENTATIVE with SATYA Martinez   [facility] (--)    [address] [place] [id number] [phone]            Sep 22, 2017  1:40 PM   ANNUAL EXAM PREVENTATIVE with Kelly Rivers M.D.   [facility] (--)    [address] [place] [id number] [phone]              Problem List              ICD-10-CM Priority Class Noted - Resolved    BPPV (benign paroxysmal positional vertigo) H81.10   2016 - Present    Controlled type 2 diabetes mellitus with diabetic polyneuropathy, without long-term current use of insulin (CMS-[facility]) E11.42   2016 - Present    GERD (gastroesophageal reflux disease) K21.9   2016 - Present    Obesity (BMI 30-39.9) E66.9   2016 - Present    Essential hypertension I10   2016 - Present    BPH (benign prostatic hyperplasia) N40.0   2016 - Present    Mixed hyperlipidemia E78.2   2016 - Present    Tobacco use Z72.0   2016 - Present    Neck pain M54.2   2016 - Present    Health care maintenance Z00.00   2016 - Present    Alcohol consumption of more than four drinks per day Z78.9   2016 - Present    Tremor, coarse G25.2   10/25/2016 - Present    B12 deficiency E53.8   2016 - Present    Moderate single current  episode of major depressive disorder (CMS-HCC) F32.1   11/11/2016 - Present    Right hip pain M25.551   11/14/2016 - Present    Post traumatic stress disorder (PTSD) F43.10   12/9/2016 - Present    Vitamin D deficiency E55.9   5/12/2017 - Present    Hyponatremia E87.1   5/18/2017 - Present      Health Maintenance        Date Due Completion Dates    RETINAL SCREENING 7/29/1967 ---    COLONOSCOPY 7/29/1999 ---    IMM ZOSTER VACCINE 7/29/2009 ---    IMM INFLUENZA (1) 9/1/2017 10/26/2016    IMM PNEUMOCOCCAL 65+ (ADULT) LOW/MEDIUM RISK SERIES (2 of 2 - PPSV23) 9/27/2017 9/27/2016    A1C SCREENING 11/17/2017 5/17/2017, 5/12/2017, 10/3/2016, 1/18/2016, 5/18/2014    DIABETES MONOFILAMENT / LE EXAM 12/9/2017 12/9/2016, 12/9/2016    FASTING LIPID PROFILE 5/17/2018 5/17/2017, 10/3/2016    URINE ACR / MICROALBUMIN 5/17/2018 5/17/2017, 10/3/2016    SERUM CREATININE 5/17/2018 5/17/2017, 10/3/2016, 1/25/2016, 1/18/2016, 5/18/2014    IMM DTaP/Tdap/Td Vaccine (2 - Td) 9/27/2026 9/27/2016            Current Immunizations     13-VALENT PCV PREVNAR 9/27/2016  4:27 PM    Influenza TIV (IM) 10/26/2016    Tdap Vaccine 9/27/2016  4:25 PM      Below and/or attached are the medications your provider expects you to take. Review all of your home medications and newly ordered medications with your provider and/or pharmacist. Follow medication instructions as directed by your provider and/or pharmacist. Please keep your medication list with you and share with your provider. Update the information when medications are discontinued, doses are changed, or new medications (including over-the-counter products) are added; and carry medication information at all times in the event of emergency situations     Allergies:  No Known Allergies          Medications  Valid as of: August 23, 2017 -  1:50 PM    Generic Name Brand Name Tablet Size Instructions for use    Aspirin (Chew Tab) ASA 81 MG Take 81 mg by mouth every day.        Atorvastatin Calcium  (Tab) LIPITOR 40 MG Take 1 Tab by mouth every day.        Benazepril HCl (Tab) LOTENSIN 20 MG Take 1 Tab by mouth every day.        Cholecalciferol (Tab) cholecalciferol 1000 UNIT Take 1,000 Units by mouth every day.        Cyanocobalamin (Tab) VITAMIN B12 1000 MCG Take 1 Tab by mouth every day.        Gabapentin (Cap) NEURONTIN 300 MG Take 2 Caps by mouth 3 times a day.        GlipiZIDE (TABLET SR 24 HR) GLUCOTROL 5 MG Take 1 Tab by mouth every day.        Ibuprofen (Tab) MOTRIN 800 MG Take 1 Tab by mouth every 8 hours as needed.        MetFORMIN HCl (Tab) GLUCOPHAGE 500 MG 2 tabs PO qAM, 1 tab in the afternoon, 2 tabs qPM        Multiple Vitamins-Minerals   Take  by mouth.        Omeprazole (CAPSULE DELAYED RELEASE) PRILOSEC 20 MG Take 20 mg by mouth every day.        Terazosin HCl (Cap) HYTRIN 5 MG Take 1 Cap by mouth every day.        .                 Medicines prescribed today were sent to:     [facility] #[id number] [address] [address]    Phone: [phone] Fax: [phone]    Open 24 Hours?: No      Medication refill instructions:       If your prescription bottle indicates you have medication refills left, it is not necessary to call your provider’s office. Please contact your pharmacy and they will refill your medication.    If your prescription bottle indicates you do not have any refills left, you may request refills at any time through one of the following ways: The online [employer] system (except Urgent Care), by calling your provider’s office, or by asking your pharmacy to contact your provider’s office with a refill request. Medication refills are processed only during regular business hours and may not be available until the next business day. Your provider may request additional information or to have a follow-up visit with you prior to refilling your medication.   *Please Note: Medication refills are assigned a new Rx number when  refilled electronically. Your pharmacy may indicate that no refills were authorized even though a new prescription for the same medication is available at the pharmacy. Please request the medicine by name with the pharmacy before contacting your provider for a refill.           [employer] Access Code: Activation code not generated  Current [employer] Status: Active

## 2017-08-30 ENCOUNTER — NON-PROVIDER VISIT (OUTPATIENT)
Dept: MEDICAL GROUP | Facility: LAB | Age: 67
End: 2017-08-30
Payer: MEDICARE

## 2017-08-30 PROCEDURE — 99999 PR NO CHARGE: CPT | Performed by: FAMILY MEDICINE

## 2017-08-30 PROCEDURE — 96372 THER/PROPH/DIAG INJ SC/IM: CPT | Performed by: FAMILY MEDICINE

## 2017-08-30 RX ADMIN — CYANOCOBALAMIN 1000 MCG: 1000 INJECTION, SOLUTION INTRAMUSCULAR; SUBCUTANEOUS at 13:51

## 2017-08-30 NOTE — NON-PROVIDER
Prabhakar Sierra is a 67 y.o. male here for a non-provider visit for B12 injection.    Reason for injection: B12 deficiency  Order in MAR?: Yes  Patient supplied?:No  Minimum interval has been met for this injection (per MAR order): Yes    Order and dose verified by: SMD  Patient tolerated injection and no adverse effects were observed or reported: Yes    # of Administrations remaining in MAR: Standing order

## 2017-09-06 ENCOUNTER — NON-PROVIDER VISIT (OUTPATIENT)
Dept: MEDICAL GROUP | Facility: LAB | Age: 67
End: 2017-09-06
Payer: MEDICARE

## 2017-09-06 DIAGNOSIS — I10 ESSENTIAL HYPERTENSION: ICD-10-CM

## 2017-09-06 PROCEDURE — 96372 THER/PROPH/DIAG INJ SC/IM: CPT | Performed by: NURSE PRACTITIONER

## 2017-09-06 RX ORDER — BENAZEPRIL HYDROCHLORIDE 20 MG/1
TABLET ORAL
Qty: 90 TAB | Refills: 2 | Status: SHIPPED | OUTPATIENT
Start: 2017-09-06 | End: 2017-12-08 | Stop reason: SDUPTHER

## 2017-09-06 RX ADMIN — CYANOCOBALAMIN 1000 MCG: 1000 INJECTION, SOLUTION INTRAMUSCULAR; SUBCUTANEOUS at 14:19

## 2017-09-06 NOTE — NON-PROVIDER
Prabhakar Sierra is a 67 y.o. male here for a non-provider visit for B-12 injection.    Reason for injection: B12 deficiency [E53.8]  Order in MAR?: Yes  Patient supplied?:No  Minimum interval has been met for this injection (per MAR order): Yes    Order and dose verified by: STEFFANY  Patient tolerated injection and no adverse effects were observed or reported: Yes    # of Administrations remaining in MAR: unknown

## 2017-09-13 NOTE — ASSESSMENT & PLAN NOTE
Chronic condition managed with current medical regimen  Stable per review with intermit increase in tremor   Continue with surveillance  Followed by neuro

## 2017-09-13 NOTE — ASSESSMENT & PLAN NOTE
Chronic condition managed with current medical regimen  5/17/2017   25-Hydroxy   Vitamin D 25 37 30 - 100 ng/mL Final   Stable per review   Continue with current meds  Followed by Kelly Rivers M.D..

## 2017-09-13 NOTE — ASSESSMENT & PLAN NOTE
Chronic condition managed with current medical regimen  Stable per review with wakening variable due to intake   Continue with current meds  Followed by Kelly Rivers M.D..

## 2017-09-13 NOTE — ASSESSMENT & PLAN NOTE
Current infreq smoker, not interest in total smoking cessation at this time  Pt aware of adverse affects of smoking
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
## 2017-09-13 NOTE — ASSESSMENT & PLAN NOTE
Chronic condition managed with current medical regimen  Labs reviewed    Continue with current meds  Followed by Kelly Rivers M.D..

## 2017-09-13 NOTE — ASSESSMENT & PLAN NOTE
Chronic condition managed with current medical regimen  5/17/2017   Vitamin B12 -True Cobalamin 1,040  211 - 911 pg/mL Final    Continue with ?????????????????  Followed by Kelly Rivers M.D..

## 2017-09-13 NOTE — ASSESSMENT & PLAN NOTE
"Chronic condition managed with current medical regimen  States angry, \"mad\" unhappy   Continue with cymbalta  Followed by Kelly Rivers M.D..      "

## 2017-09-13 NOTE — ASSESSMENT & PLAN NOTE
Chronic condition managed with current medical regimen  Stable per review   Continue with current meds  Followed by Kelly Rivers M.D..

## 2017-09-13 NOTE — ASSESSMENT & PLAN NOTE
Patient aware of relationship between weight and health and working on diet  Followed by Kelly Rivers M.D..

## 2017-09-13 NOTE — ASSESSMENT & PLAN NOTE
5/17/2017   Sodium 129  135 - 145 mmol/L Final     6/2/2017   Osmolality Urine 442 300 - 900 mOsm/kg H2O Final     5/26/2017    Osmolality Serum 283 278 - 298 mOsm/kg H2O Final     Followed by Kelly Rivers M.D..

## 2017-09-13 NOTE — ASSESSMENT & PLAN NOTE
Chronic condition managed with current medical regimen  5/17/2017   Glycohemoglobin 6.1  0.0 - 5.6 % Final     Glucose 146  65 - 99 mg/dL Final    Continue with current meds, diet and exercise  Followed by Kelly Rivers M.D..

## 2017-09-13 NOTE — ASSESSMENT & PLAN NOTE
Chronic condition managed with current medical regimen  Stable per review   Continue with prn OTC med  Followed by Kelly Rivers M.D..

## 2017-09-14 ENCOUNTER — OFFICE VISIT (OUTPATIENT)
Dept: MEDICAL GROUP | Facility: LAB | Age: 67
End: 2017-09-14
Payer: MEDICARE

## 2017-09-14 ENCOUNTER — NON-PROVIDER VISIT (OUTPATIENT)
Dept: MEDICAL GROUP | Facility: LAB | Age: 67
End: 2017-09-14
Payer: MEDICARE

## 2017-09-14 VITALS
WEIGHT: 225 LBS | OXYGEN SATURATION: 94 % | SYSTOLIC BLOOD PRESSURE: 142 MMHG | HEART RATE: 101 BPM | BODY MASS INDEX: 32.21 KG/M2 | DIASTOLIC BLOOD PRESSURE: 80 MMHG | HEIGHT: 70 IN | TEMPERATURE: 98.1 F

## 2017-09-14 DIAGNOSIS — Z00.00 MEDICARE ANNUAL WELLNESS VISIT, INITIAL: Primary | ICD-10-CM

## 2017-09-14 DIAGNOSIS — E87.1 HYPONATREMIA: ICD-10-CM

## 2017-09-14 DIAGNOSIS — M25.551 RIGHT HIP PAIN: ICD-10-CM

## 2017-09-14 DIAGNOSIS — M54.2 NECK PAIN: ICD-10-CM

## 2017-09-14 DIAGNOSIS — E11.42 CONTROLLED TYPE 2 DIABETES MELLITUS WITH DIABETIC POLYNEUROPATHY, WITHOUT LONG-TERM CURRENT USE OF INSULIN (HCC): ICD-10-CM

## 2017-09-14 DIAGNOSIS — E55.9 VITAMIN D DEFICIENCY: ICD-10-CM

## 2017-09-14 DIAGNOSIS — G25.2 TREMOR, COARSE: ICD-10-CM

## 2017-09-14 DIAGNOSIS — I10 ESSENTIAL HYPERTENSION: ICD-10-CM

## 2017-09-14 DIAGNOSIS — Z72.0 TOBACCO USE: ICD-10-CM

## 2017-09-14 DIAGNOSIS — E53.8 B12 DEFICIENCY: ICD-10-CM

## 2017-09-14 DIAGNOSIS — Z00.00 HEALTH CARE MAINTENANCE: ICD-10-CM

## 2017-09-14 DIAGNOSIS — E66.9 OBESITY (BMI 30-39.9): ICD-10-CM

## 2017-09-14 DIAGNOSIS — K21.9 GASTROESOPHAGEAL REFLUX DISEASE, ESOPHAGITIS PRESENCE NOT SPECIFIED: ICD-10-CM

## 2017-09-14 DIAGNOSIS — F43.10 POST TRAUMATIC STRESS DISORDER (PTSD): ICD-10-CM

## 2017-09-14 DIAGNOSIS — Z78.9 ALCOHOL CONSUMPTION OF MORE THAN FOUR DRINKS PER DAY: ICD-10-CM

## 2017-09-14 DIAGNOSIS — E78.2 MIXED HYPERLIPIDEMIA: ICD-10-CM

## 2017-09-14 DIAGNOSIS — H81.10 BPPV (BENIGN PAROXYSMAL POSITIONAL VERTIGO), UNSPECIFIED LATERALITY: ICD-10-CM

## 2017-09-14 DIAGNOSIS — F32.1 MODERATE SINGLE CURRENT EPISODE OF MAJOR DEPRESSIVE DISORDER (HCC): ICD-10-CM

## 2017-09-14 DIAGNOSIS — N40.1 BENIGN PROSTATIC HYPERPLASIA WITH LOWER URINARY TRACT SYMPTOMS, UNSPECIFIED MORPHOLOGY: ICD-10-CM

## 2017-09-14 PROCEDURE — G0438 PPPS, INITIAL VISIT: HCPCS | Performed by: NURSE PRACTITIONER

## 2017-09-14 PROCEDURE — 99999 PR NO CHARGE: CPT | Performed by: NURSE PRACTITIONER

## 2017-09-14 PROCEDURE — 96372 THER/PROPH/DIAG INJ SC/IM: CPT | Performed by: NURSE PRACTITIONER

## 2017-09-14 RX ORDER — DULOXETIN HYDROCHLORIDE 30 MG/1
30 CAPSULE, DELAYED RELEASE ORAL DAILY
COMMUNITY
End: 2018-04-11 | Stop reason: SDUPTHER

## 2017-09-14 RX ADMIN — CYANOCOBALAMIN 1000 MCG: 1000 INJECTION, SOLUTION INTRAMUSCULAR; SUBCUTANEOUS at 14:00

## 2017-09-14 ASSESSMENT — PATIENT HEALTH QUESTIONNAIRE - PHQ9
CLINICAL INTERPRETATION OF PHQ2 SCORE: 2
SUM OF ALL RESPONSES TO PHQ QUESTIONS 1-9: 7
5. POOR APPETITE OR OVEREATING: 0 - NOT AT ALL

## 2017-09-14 NOTE — PATIENT INSTRUCTIONS
Continue with care through Kelly Rivers M.D..  Next Medicare Annual Wellness Visit is due in one year.    Continue care with specialists you are seeing for your chronic problems.    Attached is some preventative information for you to read.          Fall Prevention and Home Safety  Falls cause injuries and can affect all age groups. It is possible to prevent falls.   HOW TO PREVENT FALLS  · Wear shoes with rubber soles that do not have an opening for your toes.   · Keep the inside and outside of your house well lit.   · Use night lights throughout your home.   · Remove clutter from floors.   · Clean up floor spills.   · Remove throw rugs or fasten them to the floor with carpet tape.   · Do not place electrical cords across pathways.   · Put grab bars by your tub, shower, and toilet. Do not use towel bars as grab bars.   · Put handrails on both sides of the stairway. Fix loose handrails.   · Do not climb on stools or stepladders, if possible.   · Do not wax your floors.   · Repair uneven or unsafe sidewalks, walkways, or stairs.   · Keep items you use a lot within reach.   · Be aware of pets.   · Keep emergency numbers next to the telephone.   · Put smoke detectors in your home and near bedrooms.   Ask your doctor what other things you can do to prevent falls.  Document Released: 10/14/2010 Document Revised: 06/18/2013 Document Reviewed: 03/19/2013  ExitCare® Patient Information ©2013 [employer].    Exercise to Stay Healthy      Exercise helps you become and stay healthy.  EXERCISE IDEAS AND TIPS  Choose exercises that:  · You enjoy.   · Fit into your day.   You do not need to exercise really hard to be healthy. You can do exercises at a slow or medium level and stay healthy. You can:  · Stretch before and after working out.   · Try yoga, Pilates, or bonilla chi.   · Lift weights.   · Walk fast, swim, jog, run, climb stairs, bicycle, dance, or rollerskate.   · Take aerobic classes.   Exercises that burn  about 150 calories:  · Running 1 ½ miles in 15 minutes.   · Playing volleyball for 45 to 60 minutes.   · Washing and waxing a car for 45 to 60 minutes.   · Playing touch football for 45 minutes.   · Walking 1 ¾ miles in 35 minutes.   · Pushing a stroller 1 ½ miles in 30 minutes.   · Playing basketball for 30 minutes.   · Raking leaves for 30 minutes.   · Bicycling 5 miles in 30 minutes.   · Walking 2 miles in 30 minutes.   · Dancing for 30 minutes.   · Shoveling snow for 15 minutes.   · Swimming laps for 20 minutes.   · Walking up stairs for 15 minutes.   · Bicycling 4 miles in 15 minutes.   · Gardening for 30 to 45 minutes.   · Jumping rope for 15 minutes.   · Washing windows or floors for 45 to 60 minutes.     One suggestion is to start walking for 10 minutes after each meal.  This will help with digestion and help you to metabolize your meal.       For Weight Loss -   Recommend portion sizes with more fruits/vegetables/high fiber foods.  Stay away from white bread/pastas/white rice/white potatoes.  Increase Fluid intake to 6-8 glasses of water daily.   Eliminate soda's (diet and regular) from your fluid intake.      Document Released: 01/20/2012 Document Revised: 03/11/2013 Document Reviewed: 01/20/2012  ExitCare® Patient Information ©2013 [email or web address].    Fat and Cholesterol Control Diet  Cholesterol is a wax-like substance. It comes from your liver and is found in certain foods. There is good (HDL) and bad (LDL) cholesterol. Too much cholesterol in your blood can affect your heart. Certain foods can lower or raise your cholesterol. Eat foods that are low in cholesterol.  Saturated and trans fats are bad fats found in foods that will raise your cholesterol. Do not eat foods that are high in saturated and trans fats.  FOODS HIGHER IN SATURATED AND TRANS FATS  · Dairy products, such as whole milk, eggs, cheese, cream, and butter.   · Fatty meats, such as hot dogs, sausage, and salami.   · Fried foods.   · Trans  fats which are found in margarine and pre-made cookies, crackers, and baked goods.   · Tropical oils, such as coconut and palm oils.   Read package labels at the store. Do not buy products that use saturated or trans fats or hydrogenated oils. Find foods labeled:  · Low-fat.   · Low-saturated fat.   · Trans-fat-free.   · Low-cholesterol.   FOODS LOWER IN CHOLESTEROL  ·  Fruit.   · Vegetables.   · Beans, peas, and lentils.   · Fish.   · Lean meat, such as chicken (without skin) or ground turkey.   · Grains, such as barley, rice, couscous, bulgur wheat, and pasta.   · Heart-healthy tub margarine.   PREPARING YOUR FOOD  · Broil, bake, steam, or roast foods. Do not mena food.   · Use non-stick cooking sprays.   · Use lemon or herbs to flavor food instead of using butter or stick margarine.   · Use nonfat yogurt, salsa, or low-fat dressings for salads.   Document Released: 06/18/2013 Document Reviewed: 06/18/2013  ExitCare® Patient Information ©2013 [employer].    Recommend annual flu vaccine.  Next due in Fall, 2017.  If you decide not to have the flu vaccine, recommend good handwashing and staying out of crowds during flu season.       Basic Carbohydrate Counting for Diabetes Mellitus  Carbohydrate counting is a method for keeping track of the amount of carbohydrates you eat. Eating carbohydrates naturally increases the level of sugar (glucose) in your blood, so it is important for you to know the amount that is okay for you to have in every meal. Carbohydrate counting helps keep the level of glucose in your blood within normal limits. The amount of carbohydrates allowed is different for every person. A dietitian can help you calculate the amount that is right for you. Once you know the amount of carbohydrates you can have, you can count the carbohydrates in the foods you want to eat.  Carbohydrates are found in the following foods:  · Grains, such as breads and cereals.  · Dried beans and soy products.  · Starchy  "vegetables, such as potatoes, peas, and corn.  · Fruit and fruit juices.  · Milk and yogurt.  · Sweets and snack foods, such as cake, cookies, candy, chips, soft drinks, and fruit drinks.  CARBOHYDRATE COUNTING  There are two ways to count the carbohydrates in your food. You can use either of the methods or a combination of both.  Reading the \"Nutrition Facts\" on Packaged Food  The \"Nutrition Facts\" is an area that is included on the labels of almost all packaged food and beverages in the [place]. It includes the serving size of that food or beverage and information about the nutrients in each serving of the food, including the grams (g) of carbohydrate per serving.   Decide the number of servings of this food or beverage that you will be able to eat or drink. Multiply that number of servings by the number of grams of carbohydrate that is listed on the label for that serving. The total will be the amount of carbohydrates you will be having when you eat or drink this food or beverage.  Learning Standard Serving Sizes of Food  When you eat food that is not packaged or does not include \"Nutrition Facts\" on the label, you need to measure the servings in order to count the amount of carbohydrates. A serving of most carbohydrate-rich foods contains about 15 g of carbohydrates. The following list includes serving sizes of carbohydrate-rich foods that provide 15 g of carbohydrate per serving:    · 1 slice of bread (1 oz) or 1 six-inch tortilla.    · ½ of a hamburger bun or English muffin.  · 4-6 crackers.  · ¾ cup unsweetened dry cereal.    · ½ cup hot cereal.  ·  cup rice or pasta.    · ½ cup mashed potatoes or ¼ of a large baked potato.  · 1 cup fresh fruit or one small piece of fruit.    · ½ cup canned or frozen fruit or fruit juice.  · 1 cup milk.  ·  cup plain fat-free yogurt or yogurt sweetened with artificial sweeteners.  · ½ cup cooked dried beans or starchy vegetable, such as peas, corn, or potatoes. "    Decide the number of standard-size servings that you will eat. Multiply that number of servings by 15 (the grams of carbohydrates in that serving). For example, if you eat 2 cups of strawberries, you will have eaten 2 servings and 30 g of carbohydrates (2 servings x 15 g = 30 g). For foods such as soups and casseroles, in which more than one food is mixed in, you will need to count the carbohydrates in each food that is included.  EXAMPLE OF CARBOHYDRATE COUNTING  Sample Dinner   · 3 oz chicken breast.  ·  cup of brown rice.  · ½ cup of corn.  · 1 cup milk.    · 1 cup strawberries with sugar-free whipped topping.    Carbohydrate Calculation   Step 1: Identify the foods that contain carbohydrates:   · Rice.    · Corn.    · Milk.    · Strawberries.  Step 2: Calculate the number of servings eaten of each:   · 2 servings of rice.    · 1 serving of corn.    · 1 serving of milk.    · 1 serving of strawberries.  Step 3:  Multiply each of those number of servings by 15 g:   · 2 servings of rice x 15 g = 30 g.    · 1 serving of corn x 15 g = 15 g.    · 1 serving of milk x 15 g = 15 g.    · 1 serving of strawberries x 15 g = 15 g.  Step 4: Add together all of the amounts to find the total grams of carbohydrates eaten: 30 g + 15 g + 15 g + 15 g = 75 g.     This information is not intended to replace advice given to you by your health care provider. Make sure you discuss any questions you have with your health care provider.     Document Released: 12/18/2006 Document Revised: 01/08/2016 Document Reviewed: 11/14/2014  [employer] Patient Education ©2016 [employer]

## 2017-09-14 NOTE — PROGRESS NOTES
Prabhakar Sierra is a 67 y.o. male here for a non-provider visit for B-12 injection.     Reason for injection: Anemia  Order in MAR?: Yes  Patient supplied?:No  Minimum interval has been met for this injection (per MAR order): Yes     Order and dose verified by: [facility]  Patient tolerated injection and no adverse effects were observed or reported: Yes     # of Administrations remaining in MAR: Unknown

## 2017-09-14 NOTE — NON-PROVIDER
Prabhakar Sierra is a 67 y.o. male here for a non-provider visit for B-12 injection.    Reason for injection: Anemia  Order in MAR?: Yes  Patient supplied?:No  Minimum interval has been met for this injection (per MAR order): Yes    Order and dose verified by: [facility]  Patient tolerated injection and no adverse effects were observed or reported: Yes    # of Administrations remaining in MAR: Unknown
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
## 2017-09-18 ENCOUNTER — TELEPHONE (OUTPATIENT)
Dept: MEDICAL GROUP | Facility: LAB | Age: 67
End: 2017-09-18

## 2017-09-18 NOTE — TELEPHONE ENCOUNTER
Don wanted me to let you know that he went to his Annual Wellness visit she told him his sodium level was low and instead of him getting his B12 injection on Wednesday to have it done with his appointment on Friday.    I told him he can get his b12 injection during his appointment and I will let you know to talk about his sodium level.
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
## 2017-09-22 ENCOUNTER — OFFICE VISIT (OUTPATIENT)
Dept: MEDICAL GROUP | Facility: LAB | Age: 67
End: 2017-09-22
Payer: MEDICARE

## 2017-09-22 VITALS
RESPIRATION RATE: 16 BRPM | DIASTOLIC BLOOD PRESSURE: 84 MMHG | OXYGEN SATURATION: 94 % | BODY MASS INDEX: 32.5 KG/M2 | TEMPERATURE: 97.6 F | WEIGHT: 227 LBS | HEIGHT: 70 IN | HEART RATE: 92 BPM | SYSTOLIC BLOOD PRESSURE: 142 MMHG

## 2017-09-22 DIAGNOSIS — E66.9 OBESITY (BMI 30-39.9): ICD-10-CM

## 2017-09-22 DIAGNOSIS — E87.1 HYPONATREMIA: ICD-10-CM
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
## 2017-09-22 DIAGNOSIS — E11.42 CONTROLLED TYPE 2 DIABETES MELLITUS WITH DIABETIC POLYNEUROPATHY, WITHOUT LONG-TERM CURRENT USE OF INSULIN (HCC): ICD-10-CM

## 2017-09-22 DIAGNOSIS — E53.8 B12 DEFICIENCY: ICD-10-CM
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
## 2017-09-22 LAB
HBA1C MFR BLD: 5.6 % (ref ?–5.8)
INT CON NEG: NEGATIVE
INT CON POS: POSITIVE

## 2017-09-22 PROCEDURE — 99215 OFFICE O/P EST HI 40 MIN: CPT | Performed by: FAMILY MEDICINE

## 2017-09-22 PROCEDURE — 83036 HEMOGLOBIN GLYCOSYLATED A1C: CPT | Performed by: FAMILY MEDICINE

## 2017-09-22 RX ADMIN — CYANOCOBALAMIN 1000 MCG: 1000 INJECTION, SOLUTION INTRAMUSCULAR; SUBCUTANEOUS at 14:22

## 2017-09-26 ENCOUNTER — TELEPHONE (OUTPATIENT)
Dept: MEDICAL GROUP | Facility: LAB | Age: 67
End: 2017-09-26

## 2017-09-26 NOTE — TELEPHONE ENCOUNTER
Patient is notified that he can have his B12 injection tomorrow and next Tuesday 10/03/2017. A copy of his medical records from his last office is up front - he just needs to sign an LEONOR.

## 2017-09-26 NOTE — TELEPHONE ENCOUNTER
He can get his B12 injection anytime this week that works for him. His note is completed in his chart.     Thanks!  Kelly Rivers M.D.

## 2017-09-27 ENCOUNTER — HOSPITAL ENCOUNTER (OUTPATIENT)
Dept: LAB | Facility: MEDICAL CENTER | Age: 67
End: 2017-09-27
Attending: FAMILY MEDICINE
Payer: MEDICARE

## 2017-09-27 ENCOUNTER — NON-PROVIDER VISIT (OUTPATIENT)
Dept: MEDICAL GROUP | Facility: LAB | Age: 67
End: 2017-09-27
Payer: MEDICARE

## 2017-09-27 DIAGNOSIS — E53.8 B12 DEFICIENCY: ICD-10-CM

## 2017-09-27 DIAGNOSIS — E87.1 HYPONATREMIA: ICD-10-CM

## 2017-09-27 LAB
ANION GAP SERPL CALC-SCNC: 10 MMOL/L (ref 0–11.9)
BUN SERPL-MCNC: 13 MG/DL (ref 8–22)
CALCIUM SERPL-MCNC: 9.3 MG/DL (ref 8.5–10.5)
CHLORIDE SERPL-SCNC: 96 MMOL/L (ref 96–112)
CO2 SERPL-SCNC: 22 MMOL/L (ref 20–33)
CREAT SERPL-MCNC: 0.86 MG/DL (ref 0.5–1.4)
GFR SERPL CREATININE-BSD FRML MDRD: >60 ML/MIN/1.73 M 2
GLUCOSE SERPL-MCNC: 123 MG/DL (ref 65–99)
POTASSIUM SERPL-SCNC: 4.6 MMOL/L (ref 3.6–5.5)
SODIUM SERPL-SCNC: 128 MMOL/L (ref 135–145)

## 2017-09-27 PROCEDURE — 83935 ASSAY OF URINE OSMOLALITY: CPT

## 2017-09-27 PROCEDURE — 99999 PR NO CHARGE: CPT | Performed by: FAMILY MEDICINE

## 2017-09-27 PROCEDURE — 80048 BASIC METABOLIC PNL TOTAL CA: CPT

## 2017-09-27 PROCEDURE — 36415 COLL VENOUS BLD VENIPUNCTURE: CPT

## 2017-09-27 PROCEDURE — 84300 ASSAY OF URINE SODIUM: CPT

## 2017-09-27 PROCEDURE — 96372 THER/PROPH/DIAG INJ SC/IM: CPT | Performed by: FAMILY MEDICINE

## 2017-09-27 RX ADMIN — CYANOCOBALAMIN 1000 MCG: 1000 INJECTION, SOLUTION INTRAMUSCULAR; SUBCUTANEOUS at 14:23

## 2017-09-27 NOTE — PROGRESS NOTES
Prabhakar Sierra is a 67 y.o. male here for a non-provider visit for B12 injection.    Reason for injection: B12 DEFIENCY  Order in MAR?: Yes  Patient supplied?:No  Minimum interval has been met for this injection (per MAR order): Yes    Order and dose verified by: SR  Patient tolerated injection and no adverse effects were observed or reported: Yes    # of Administrations remaining in MAR:

## 2017-09-28 LAB
OSMOLALITY UR: 620 MOSM/KG H2O (ref 300–900)
SODIUM UR-SCNC: 56 MMOL/L

## 2017-09-29 ENCOUNTER — TELEPHONE (OUTPATIENT)
Dept: MEDICAL GROUP | Facility: LAB | Age: 67
End: 2017-09-29
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
## 2017-09-29 NOTE — TELEPHONE ENCOUNTER
Please let Don know that he may have an old record. We can print out the current note which does not mention anything regarding alcohol intake. This would be the note that his  has.     Thanks!  Kelly Rivers M.D.

## 2017-10-03 ENCOUNTER — NON-PROVIDER VISIT (OUTPATIENT)
Dept: MEDICAL GROUP | Facility: LAB | Age: 67
End: 2017-10-03
Payer: MEDICARE

## 2017-10-03 DIAGNOSIS — E53.8 B12 DEFICIENCY: ICD-10-CM

## 2017-10-03 PROCEDURE — 99999 PR NO CHARGE: CPT | Performed by: FAMILY MEDICINE

## 2017-10-03 PROCEDURE — 96372 THER/PROPH/DIAG INJ SC/IM: CPT | Performed by: FAMILY MEDICINE

## 2017-10-03 RX ADMIN — CYANOCOBALAMIN 1000 MCG: 1000 INJECTION, SOLUTION INTRAMUSCULAR; SUBCUTANEOUS at 13:04

## 2017-10-03 NOTE — PROGRESS NOTES
Prabhakar Sierra is a 67 y.o. male here for a non-provider visit for B12 injection.    Reason for injection:B12 Deficiency   Order in MAR?: Yes  Patient supplied?:No  Minimum interval has been met for this injection (per MAR order): Yes    Order and dose verified by: SMD  Patient tolerated injection and no adverse effects were observed or reported: Yes    # of Administrations remaining in MAR: unknown

## 2017-10-16 ENCOUNTER — TELEPHONE (OUTPATIENT)
Dept: MEDICAL GROUP | Facility: LAB | Age: 67
End: 2017-10-16

## 2017-10-17 RX ORDER — DULOXETIN HYDROCHLORIDE 20 MG/1
20 CAPSULE, DELAYED RELEASE ORAL DAILY
Qty: 30 CAP | Refills: 5 | Status: SHIPPED | OUTPATIENT
Start: 2017-10-17 | End: 2018-04-11

## 2017-10-18 ENCOUNTER — NON-PROVIDER VISIT (OUTPATIENT)
Dept: MEDICAL GROUP | Facility: LAB | Age: 67
End: 2017-10-18
Payer: MEDICARE

## 2017-10-18 DIAGNOSIS — E55.9 VITAMIN D DEFICIENCY: ICD-10-CM

## 2017-10-18 PROCEDURE — 96372 THER/PROPH/DIAG INJ SC/IM: CPT | Performed by: FAMILY MEDICINE

## 2017-10-18 RX ADMIN — CYANOCOBALAMIN 1000 MCG: 1000 INJECTION, SOLUTION INTRAMUSCULAR; SUBCUTANEOUS at 15:09

## 2017-10-25 ENCOUNTER — NON-PROVIDER VISIT (OUTPATIENT)
Dept: MEDICAL GROUP | Facility: LAB | Age: 67
End: 2017-10-25
Payer: MEDICARE

## 2017-10-25 DIAGNOSIS — E53.8 B12 DEFICIENCY: ICD-10-CM

## 2017-10-25 PROCEDURE — 96372 THER/PROPH/DIAG INJ SC/IM: CPT | Performed by: FAMILY MEDICINE

## 2017-10-25 RX ADMIN — CYANOCOBALAMIN 1000 MCG: 1000 INJECTION, SOLUTION INTRAMUSCULAR; SUBCUTANEOUS at 15:01

## 2017-10-25 NOTE — PROGRESS NOTES
Prabhakar Sierra is a 67 y.o. male here for a non-provider visit for B12 injection.    Reason for injection: B12 DEFIENCY  Order in MAR?: Yes  Patient supplied?:No  Minimum interval has been met for this injection (per MAR order): Yes    Order and dose verified by: SR  Patient tolerated injection and no adverse effects were observed or reported: Yes    # of Administrations remaining in MAR: 5

## 2017-10-31 ENCOUNTER — TELEPHONE (OUTPATIENT)
Dept: MEDICAL GROUP | Facility: LAB | Age: 67
End: 2017-10-31
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
## 2017-11-03 ENCOUNTER — NON-PROVIDER VISIT (OUTPATIENT)
Dept: MEDICAL GROUP | Facility: LAB | Age: 67
End: 2017-11-03
Payer: MEDICARE

## 2017-11-03 DIAGNOSIS — E53.8 B12 DEFICIENCY: ICD-10-CM

## 2017-11-03 PROCEDURE — 96372 THER/PROPH/DIAG INJ SC/IM: CPT | Performed by: FAMILY MEDICINE

## 2017-11-03 RX ADMIN — CYANOCOBALAMIN 1000 MCG: 1000 INJECTION, SOLUTION INTRAMUSCULAR; SUBCUTANEOUS at 16:13

## 2017-11-03 NOTE — PROGRESS NOTES
Prabhakar Sierra is a 67 y.o. male here for a non-provider visit for Vitamin B-12 injection.    Reason for injection: Vitamin b 12 deficiency  Order in MAR?: Yes  Patient supplied?:No  Minimum interval has been met for this injection (per MAR order): Yes    Order and dose verified by: alc  Patient tolerated injection and no adverse effects were observed or reported: No    # of Administrations remaining in MAR: 1

## 2017-11-08 ENCOUNTER — TELEPHONE (OUTPATIENT)
Dept: MEDICAL GROUP | Facility: LAB | Age: 67
End: 2017-11-08

## 2017-11-08 NOTE — TELEPHONE ENCOUNTER
1. Caller Name: Prabhakar Sierra                                         Call Back Number: [phone] (home)       Patient approves a detailed voicemail message: yes    Does patient still to be getting his Vitamin B12 shots? And does he need to still take the B complex.

## 2017-11-08 NOTE — TELEPHONE ENCOUNTER
Phone Number Called: [phone] (home)     Message: Relayed message to patient and was understood by patient.    Left Message for patient to call back: N\A
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
## 2017-11-08 NOTE — TELEPHONE ENCOUNTER
The B12 shots are merely for comfort. His B12 levels are now very high. If he is getting bruises from the B12 it is safe for him to stop his injections and just take the oral supplement for now.     Kelly Rivers M.D.

## 2017-11-09 ENCOUNTER — TELEPHONE (OUTPATIENT)
Dept: MEDICAL GROUP | Facility: LAB | Age: 67
End: 2017-11-09

## 2017-11-09 NOTE — TELEPHONE ENCOUNTER
1. Caller Name: Prabhakar Sierra                                         Call Back Number: [phone] (home)       Patient approves a detailed voicemail message: yes    Patient wanted a call back review his medication list.   Patient stated he is smoking again and would like know if there is something to help him to stop smoking.

## 2017-11-10 NOTE — TELEPHONE ENCOUNTER
Phone Number Called: [phone] (home)     Message: left message for patient .    Left Message for patient to call back: yes

## 2017-12-06 ENCOUNTER — TELEPHONE (OUTPATIENT)
Dept: MEDICAL GROUP | Facility: LAB | Age: 67
End: 2017-12-06

## 2017-12-06 NOTE — TELEPHONE ENCOUNTER
Phone Number Called: [phone] (home)     Message: Spoke with patient and he thought that Dr. Rivers had increased the dosage for medication. He will take medication however he need to take. He also needs to know what can he take for the congestion he has had for 10 days.    Left Message for patient to call back: yes

## 2017-12-06 NOTE — TELEPHONE ENCOUNTER
Phone Number Called: [phone] (home)     Message: Spoke with patient and relayed message to patient and was understood by patient.    Left Message for patient to call back: yes

## 2017-12-06 NOTE — TELEPHONE ENCOUNTER
Please let Don know that I have him taking 20mg daily. If he notices his blood pressure going up then we can increase, but for now continue with 20mg.     For congestion, I recommend over the counter mucinex. It is the best over the counter treatment.    Kelly Rivers M.D.

## 2017-12-08 DIAGNOSIS — I10 ESSENTIAL HYPERTENSION: ICD-10-CM

## 2017-12-08 RX ORDER — BENAZEPRIL HYDROCHLORIDE 20 MG/1
TABLET ORAL
Qty: 90 TAB | Refills: 0 | Status: SHIPPED | OUTPATIENT
Start: 2017-12-08 | End: 2018-03-15 | Stop reason: SDUPTHER

## 2017-12-09 NOTE — TELEPHONE ENCOUNTER
Phone Number Called: [phone] (home)     Message: called patient and informed rx was approved.    Left Message for patient to call back: yes

## 2017-12-11 ENCOUNTER — TELEPHONE (OUTPATIENT)
Dept: MEDICAL GROUP | Facility: LAB | Age: 67
End: 2017-12-11

## 2017-12-12 NOTE — TELEPHONE ENCOUNTER
Phone Number Called: [phone]     Message: called pharmacy and confirmed that patient script was written correctly    Left Message for patient to call back: yes

## 2018-02-21 ENCOUNTER — TELEPHONE (OUTPATIENT)
Dept: MEDICAL GROUP | Facility: LAB | Age: 68
End: 2018-02-21

## 2018-02-21 NOTE — TELEPHONE ENCOUNTER
1. Caller Name: Prabhakar Sierra                                         Call Back Number: [phone] (home)       Patient approves a detailed voicemail message: yes    Called and scheduled a appointment. And was asking questions about labs that may be need. patient requested that i call his pharmacy.   Spoke with patient's pharmacy and [facility] stated that he was not told by him that he needed labs.

## 2018-03-15 DIAGNOSIS — I10 ESSENTIAL HYPERTENSION: ICD-10-CM

## 2018-03-15 RX ORDER — BENAZEPRIL HYDROCHLORIDE 20 MG/1
TABLET ORAL
Qty: 90 TAB | Refills: 1 | Status: SHIPPED | OUTPATIENT
Start: 2018-03-15 | End: 2018-04-11 | Stop reason: SDUPTHER

## 2018-04-11 ENCOUNTER — OFFICE VISIT (OUTPATIENT)
Dept: MEDICAL GROUP | Facility: MEDICAL CENTER | Age: 68
End: 2018-04-11
Payer: MEDICARE

## 2018-04-11 VITALS
HEART RATE: 104 BPM | OXYGEN SATURATION: 94 % | DIASTOLIC BLOOD PRESSURE: 90 MMHG | WEIGHT: 225 LBS | HEIGHT: 69 IN | TEMPERATURE: 97.7 F | RESPIRATION RATE: 20 BRPM | SYSTOLIC BLOOD PRESSURE: 162 MMHG | BODY MASS INDEX: 33.33 KG/M2

## 2018-04-11 DIAGNOSIS — Z13.0 SCREENING FOR DEFICIENCY ANEMIA: ICD-10-CM

## 2018-04-11 DIAGNOSIS — F32.1 MODERATE SINGLE CURRENT EPISODE OF MAJOR DEPRESSIVE DISORDER (HCC): ICD-10-CM

## 2018-04-11 DIAGNOSIS — E11.42 CONTROLLED TYPE 2 DIABETES MELLITUS WITH DIABETIC POLYNEUROPATHY, WITHOUT LONG-TERM CURRENT USE OF INSULIN (HCC): ICD-10-CM

## 2018-04-11 DIAGNOSIS — Z87.828 HISTORY OF MOTOR VEHICLE ACCIDENT: ICD-10-CM

## 2018-04-11 DIAGNOSIS — E78.2 MIXED HYPERLIPIDEMIA: ICD-10-CM

## 2018-04-11 DIAGNOSIS — M54.2 NECK PAIN: ICD-10-CM

## 2018-04-11 DIAGNOSIS — I10 ESSENTIAL HYPERTENSION: ICD-10-CM

## 2018-04-11 DIAGNOSIS — F43.10 POST TRAUMATIC STRESS DISORDER (PTSD): ICD-10-CM

## 2018-04-11 DIAGNOSIS — Z13.29 SCREENING FOR THYROID DISORDER: ICD-10-CM

## 2018-04-11 DIAGNOSIS — M79.7 FIBROMYALGIA: ICD-10-CM

## 2018-04-11 DIAGNOSIS — N52.8 OTHER MALE ERECTILE DYSFUNCTION: ICD-10-CM

## 2018-04-11 PROCEDURE — 99214 OFFICE O/P EST MOD 30 MIN: CPT | Performed by: FAMILY MEDICINE

## 2018-04-11 RX ORDER — SILDENAFIL CITRATE 20 MG/1
TABLET ORAL
Qty: 20 TAB | Refills: 3 | Status: SHIPPED | OUTPATIENT
Start: 2018-04-11 | End: 2018-10-29

## 2018-04-11 RX ORDER — GABAPENTIN 300 MG/1
300 CAPSULE ORAL 2 TIMES DAILY
Qty: 60 CAP | Refills: 3 | Status: SHIPPED | OUTPATIENT
Start: 2018-04-11 | End: 2018-10-15 | Stop reason: SDUPTHER

## 2018-04-11 RX ORDER — DULOXETIN HYDROCHLORIDE 30 MG/1
30 CAPSULE, DELAYED RELEASE ORAL DAILY
Qty: 90 CAP | Refills: 1 | Status: SHIPPED | OUTPATIENT
Start: 2018-04-11 | End: 2018-10-29 | Stop reason: SDUPTHER

## 2018-04-11 RX ORDER — BENAZEPRIL HYDROCHLORIDE 20 MG/1
TABLET ORAL
Qty: 90 TAB | Refills: 3 | Status: SHIPPED | OUTPATIENT
Start: 2018-04-11 | End: 2018-11-12 | Stop reason: SDUPTHER

## 2018-04-11 RX ORDER — DULOXETIN HYDROCHLORIDE 30 MG/1
30 CAPSULE, DELAYED RELEASE ORAL DAILY
Qty: 30 CAP | Refills: 6 | Status: SHIPPED | OUTPATIENT
Start: 2018-04-11 | End: 2018-04-11 | Stop reason: SDUPTHER

## 2018-04-11 ASSESSMENT — PAIN SCALES - GENERAL: PAINLEVEL: 7=MODERATE-SEVERE PAIN
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
## 2018-04-11 NOTE — ASSESSMENT & PLAN NOTE
Stable. Currently taking Metformin 1000 mg BID as directed.  Denies side effects or hypoglycemic events.  He is not monitoring blood sugar regularly at home.  Reports diet is stable  He is not exercising regularly.  Last eye exam: in the last 3 months at [facility]  Denies polyuria, polydipsia, blurred vision, or neuropathies.

## 2018-04-11 NOTE — PATIENT INSTRUCTIONS
Cervical Strain and Sprain Rehab  Ask your health care provider which exercises are safe for you. Do exercises exactly as told by your health care provider and adjust them as directed. It is normal to feel mild stretching, pulling, tightness, or discomfort as you do these exercises, but you should stop right away if you feel sudden pain or your pain gets worse. Do not begin these exercises until told by your health care provider.  Stretching and range of motion exercises  These exercises warm up your muscles and joints and improve the movement and flexibility of your neck. These exercises also help to relieve pain, numbness, and tingling.  Exercise A: Cervical side bend  1. Using good posture, sit on a stable chair or stand up.  2. Without moving your shoulders, slowly tilt your left / right ear to your shoulder until you feel a stretch in your neck muscles. You should be looking straight ahead.  3. Hold for __________ seconds.  4. Repeat with the other side of your neck.  Repeat __________ times. Complete this exercise __________ times a day.  Exercise B: Cervical rotation  1. Using good posture, sit on a stable chair or stand up.  2. Slowly turn your head to the side as if you are looking over your left / right shoulder.  ¨ Keep your eyes level with the ground.  ¨ Stop when you feel a stretch along the side and the back of your neck.  3. Hold for __________ seconds.  4. Repeat this by turning to your other side.  Repeat __________ times. Complete this exercise __________ times a day.  Exercise C: Thoracic extension and pectoral stretch  1. Roll a towel or a small blanket so it is about 4 inches (10 cm) in diameter.  2. Lie down on your back on a firm surface.  3. Put the towel lengthwise, under your spine in the middle of your back. It should not be not under your shoulder blades. The towel should line up with your spine from your middle back to your lower back.  4. Put your hands behind your head and let your  elbows fall out to your sides.  5. Hold for __________ seconds.  Repeat __________ times. Complete this exercise __________ times a day.  Strengthening exercises  These exercises build strength and endurance in your neck. Endurance is the ability to use your muscles for a long time, even after your muscles get tired.  Exercise D: Upper cervical flexion, isometric  1. Lie on your back with a thin pillow behind your head and a small rolled-up towel under your neck.  2. Gently tuck your chin toward your chest and nod your head down to look toward your feet. Do not lift your head off the pillow.  3. Hold for __________ seconds.  4. Release the tension slowly. Relax your neck muscles completely before you repeat this exercise.  Repeat __________ times. Complete this exercise __________ times a day.  Exercise E: Cervical extension, isometric  1. Stand about 6 inches (15 cm) away from a wall, with your back facing the wall.  2. Place a soft object, about 6-8 inches (15-20 cm) in diameter, between the back of your head and the wall. A soft object could be a small pillow, a ball, or a folded towel.  3. Gently tilt your head back and press into the soft object. Keep your jaw and forehead relaxed.  4. Hold for __________ seconds.  5. Release the tension slowly. Relax your neck muscles completely before you repeat this exercise.  Repeat __________ times. Complete this exercise __________ times a day.  Posture and body mechanics     Body mechanics refers to the movements and positions of your body while you do your daily activities. Posture is part of body mechanics. Good posture and healthy body mechanics can help to relieve stress in your body's tissues and joints. Good posture means that your spine is in its natural S-curve position (your spine is neutral), your shoulders are pulled back slightly, and your head is not tipped forward. The following are general guidelines for applying improved posture and body mechanics to your  everyday activities.  Standing  · When standing, keep your spine neutral and keep your feet about hip-width apart. Keep a slight bend in your knees. Your ears, shoulders, and hips should line up.  · When you do a task in which you  one place for a long time, place one foot up on a stable object that is 2-4 inches (5-10 cm) high, such as a footstool. This helps keep your spine neutral.  Sitting  · When sitting, keep your spine neutral and your keep feet flat on the floor. Use a footrest, if necessary, and keep your thighs parallel to the floor. Avoid rounding your shoulders, and avoid tilting your head forward.  · When working at a desk or a computer, keep your desk at a height where your hands are slightly lower than your elbows. Slide your chair under your desk so you are close enough to maintain good posture.  · When working at a computer, place your monitor at a height where you are looking straight ahead and you do not have to tilt your head forward or downward to look at the screen.  Resting  When lying down and resting, avoid positions that are most painful for you. Try to support your neck in a neutral position. You can use a contour pillow or a small rolled-up towel. Your pillow should support your neck but not push on it.  This information is not intended to replace advice given to you by your health care provider. Make sure you discuss any questions you have with your health care provider.  Document Released: 12/18/2006 Document Revised: 08/24/2017 Document Reviewed: 11/23/2016  [employer] Interactive Patient Education © 2017 [employer]

## 2018-04-11 NOTE — ASSESSMENT & PLAN NOTE
Patient has been out of his meds for almost 2 weeks. Currently taking Benazapril as directed.   He is taking baby aspirin daily.   He is not monitoring BP at home.   Denies symptoms low BP: light-headed, tunnel-vision, unusual fatigue.   Denies symptoms high BP:pounding headache, visual changes, palpitations, flushed face.   Denies   medicine side effects: unusual fatigue, slow heartbeat, foot/leg swelling, cough.

## 2018-04-11 NOTE — ASSESSMENT & PLAN NOTE
Dyslipidemia Chronic condition. Current treatments: diet/exercise/medicines. He is taking atorvastatin 40 mg as directed. Current side effects: none.

## 2018-04-11 NOTE — ASSESSMENT & PLAN NOTE
Improved. Currently taking Cymbalta 30 mg as prescribed. That is helping - about 10% improvement overall but he doesn't what to go up on the dose.  Denies side effects and is tolerating well.  Mood is improved with current medication and therapy.    Patient denies SI/HI.  Depression Screen (PHQ-2/PHQ-9) 9/14/2017   PHQ-2 Total Score 2   PHQ-9 Total Score 7

## 2018-04-12 ENCOUNTER — HOSPITAL ENCOUNTER (OUTPATIENT)
Dept: LAB | Facility: MEDICAL CENTER | Age: 68
End: 2018-04-12
Attending: FAMILY MEDICINE
Payer: MEDICARE

## 2018-04-12 DIAGNOSIS — M79.7 FIBROMYALGIA: ICD-10-CM

## 2018-04-12 DIAGNOSIS — F43.10 POST TRAUMATIC STRESS DISORDER (PTSD): ICD-10-CM

## 2018-04-12 DIAGNOSIS — E11.42 CONTROLLED TYPE 2 DIABETES MELLITUS WITH DIABETIC POLYNEUROPATHY, WITHOUT LONG-TERM CURRENT USE OF INSULIN (HCC): ICD-10-CM

## 2018-04-12 DIAGNOSIS — I10 ESSENTIAL HYPERTENSION: ICD-10-CM

## 2018-04-12 DIAGNOSIS — Z13.0 SCREENING FOR DEFICIENCY ANEMIA: ICD-10-CM

## 2018-04-12 DIAGNOSIS — E78.2 MIXED HYPERLIPIDEMIA: ICD-10-CM

## 2018-04-12 DIAGNOSIS — Z13.29 SCREENING FOR THYROID DISORDER: ICD-10-CM

## 2018-04-12 DIAGNOSIS — F32.1 MODERATE SINGLE CURRENT EPISODE OF MAJOR DEPRESSIVE DISORDER (HCC): ICD-10-CM

## 2018-04-12 LAB
ALBUMIN SERPL BCP-MCNC: 4.4 G/DL (ref 3.2–4.9)
ALBUMIN/GLOB SERPL: 1.6 G/DL
ALP SERPL-CCNC: 27 U/L (ref 30–99)
ALT SERPL-CCNC: 15 U/L (ref 2–50)
ANION GAP SERPL CALC-SCNC: 10 MMOL/L (ref 0–11.9)
AST SERPL-CCNC: 17 U/L (ref 12–45)
BASOPHILS # BLD AUTO: 0.8 % (ref 0–1.8)
BASOPHILS # BLD: 0.06 K/UL (ref 0–0.12)
BILIRUB SERPL-MCNC: 0.7 MG/DL (ref 0.1–1.5)
BUN SERPL-MCNC: 13 MG/DL (ref 8–22)
CALCIUM SERPL-MCNC: 9.3 MG/DL (ref 8.5–10.5)
CHLORIDE SERPL-SCNC: 97 MMOL/L (ref 96–112)
CHOLEST SERPL-MCNC: 150 MG/DL (ref 100–199)
CO2 SERPL-SCNC: 24 MMOL/L (ref 20–33)
CREAT SERPL-MCNC: 0.87 MG/DL (ref 0.5–1.4)
CREAT UR-MCNC: 182.6 MG/DL
EOSINOPHIL # BLD AUTO: 0.11 K/UL (ref 0–0.51)
EOSINOPHIL NFR BLD: 1.5 % (ref 0–6.9)
ERYTHROCYTE [DISTWIDTH] IN BLOOD BY AUTOMATED COUNT: 41.4 FL (ref 35.9–50)
EST. AVERAGE GLUCOSE BLD GHB EST-MCNC: 134 MG/DL
GLOBULIN SER CALC-MCNC: 2.8 G/DL (ref 1.9–3.5)
GLUCOSE SERPL-MCNC: 197 MG/DL (ref 65–99)
HBA1C MFR BLD: 6.3 % (ref 0–5.6)
HCT VFR BLD AUTO: 42.7 % (ref 42–52)
HDLC SERPL-MCNC: 48 MG/DL
HGB BLD-MCNC: 14.6 G/DL (ref 14–18)
IMM GRANULOCYTES # BLD AUTO: 0.02 K/UL (ref 0–0.11)
IMM GRANULOCYTES NFR BLD AUTO: 0.3 % (ref 0–0.9)
LDLC SERPL CALC-MCNC: 78 MG/DL
LYMPHOCYTES # BLD AUTO: 1.53 K/UL (ref 1–4.8)
LYMPHOCYTES NFR BLD: 20.4 % (ref 22–41)
MCH RBC QN AUTO: 30.2 PG (ref 27–33)
MCHC RBC AUTO-ENTMCNC: 34.2 G/DL (ref 33.7–35.3)
MCV RBC AUTO: 88.2 FL (ref 81.4–97.8)
MICROALBUMIN UR-MCNC: 10.3 MG/DL
MICROALBUMIN/CREAT UR: 56 MG/G (ref 0–30)
MONOCYTES # BLD AUTO: 0.49 K/UL (ref 0–0.85)
MONOCYTES NFR BLD AUTO: 6.5 % (ref 0–13.4)
NEUTROPHILS # BLD AUTO: 5.3 K/UL (ref 1.82–7.42)
NEUTROPHILS NFR BLD: 70.5 % (ref 44–72)
NRBC # BLD AUTO: 0 K/UL
NRBC BLD-RTO: 0 /100 WBC
PLATELET # BLD AUTO: 176 K/UL (ref 164–446)
PMV BLD AUTO: 10.5 FL (ref 9–12.9)
POTASSIUM SERPL-SCNC: 4.6 MMOL/L (ref 3.6–5.5)
PROT SERPL-MCNC: 7.2 G/DL (ref 6–8.2)
RBC # BLD AUTO: 4.84 M/UL (ref 4.7–6.1)
SODIUM SERPL-SCNC: 131 MMOL/L (ref 135–145)
TRIGL SERPL-MCNC: 120 MG/DL (ref 0–149)
TSH SERPL DL<=0.005 MIU/L-ACNC: 0.61 UIU/ML (ref 0.38–5.33)
WBC # BLD AUTO: 7.5 K/UL (ref 4.8–10.8)

## 2018-04-12 PROCEDURE — 80061 LIPID PANEL: CPT

## 2018-04-12 PROCEDURE — 84443 ASSAY THYROID STIM HORMONE: CPT

## 2018-04-12 PROCEDURE — 85025 COMPLETE CBC W/AUTO DIFF WBC: CPT

## 2018-04-12 PROCEDURE — 82570 ASSAY OF URINE CREATININE: CPT

## 2018-04-12 PROCEDURE — 82043 UR ALBUMIN QUANTITATIVE: CPT

## 2018-04-12 PROCEDURE — 36415 COLL VENOUS BLD VENIPUNCTURE: CPT

## 2018-04-12 PROCEDURE — 83036 HEMOGLOBIN GLYCOSYLATED A1C: CPT

## 2018-04-12 PROCEDURE — 80053 COMPREHEN METABOLIC PANEL: CPT

## 2018-04-13 ENCOUNTER — TELEPHONE (OUTPATIENT)
Dept: MEDICAL GROUP | Facility: MEDICAL CENTER | Age: 68
End: 2018-04-13

## 2018-04-13 NOTE — TELEPHONE ENCOUNTER
VOICEMAIL  1. Caller Name: Prabhakar Sierra     Call Back Number: [phone] (home)     2. Message: PT left voicemail requesting results of lab work and also wants to know if his medication should be adjusted accordingly . Please advise.     3. Patient approves office to leave a detailed voicemail/MyChart message: yes
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
## 2018-04-17 ENCOUNTER — OFFICE VISIT (OUTPATIENT)
Dept: MEDICAL GROUP | Facility: MEDICAL CENTER | Age: 68
End: 2018-04-17
Payer: MEDICARE

## 2018-04-17 ENCOUNTER — HOSPITAL ENCOUNTER (OUTPATIENT)
Dept: LAB | Facility: MEDICAL CENTER | Age: 68
End: 2018-04-17
Attending: FAMILY MEDICINE
Payer: MEDICARE

## 2018-04-17 VITALS
OXYGEN SATURATION: 94 % | SYSTOLIC BLOOD PRESSURE: 144 MMHG | WEIGHT: 225 LBS | TEMPERATURE: 97.2 F | RESPIRATION RATE: 20 BRPM | BODY MASS INDEX: 33.33 KG/M2 | HEIGHT: 69 IN | HEART RATE: 107 BPM | DIASTOLIC BLOOD PRESSURE: 78 MMHG

## 2018-04-17 DIAGNOSIS — N40.1 BENIGN PROSTATIC HYPERPLASIA (BPH) WITH STRAINING ON URINATION: ICD-10-CM

## 2018-04-17 DIAGNOSIS — R39.16 BENIGN PROSTATIC HYPERPLASIA (BPH) WITH STRAINING ON URINATION: ICD-10-CM

## 2018-04-17 DIAGNOSIS — M79.7 FIBROMYALGIA: ICD-10-CM

## 2018-04-17 DIAGNOSIS — E53.8 B12 DEFICIENCY: ICD-10-CM

## 2018-04-17 DIAGNOSIS — Z12.5 SCREENING FOR PROSTATE CANCER: ICD-10-CM

## 2018-04-17 DIAGNOSIS — E78.2 MIXED HYPERLIPIDEMIA: ICD-10-CM

## 2018-04-17 DIAGNOSIS — F32.1 MODERATE SINGLE CURRENT EPISODE OF MAJOR DEPRESSIVE DISORDER (HCC): ICD-10-CM

## 2018-04-17 DIAGNOSIS — E11.42 CONTROLLED TYPE 2 DIABETES MELLITUS WITH DIABETIC POLYNEUROPATHY, WITHOUT LONG-TERM CURRENT USE OF INSULIN (HCC): ICD-10-CM

## 2018-04-17 LAB — PSA SERPL-MCNC: 2.48 NG/ML (ref 0–4)

## 2018-04-17 PROCEDURE — 99214 OFFICE O/P EST MOD 30 MIN: CPT | Performed by: FAMILY MEDICINE

## 2018-04-17 PROCEDURE — 84153 ASSAY OF PSA TOTAL: CPT

## 2018-04-17 PROCEDURE — 36415 COLL VENOUS BLD VENIPUNCTURE: CPT

## 2018-04-17 RX ORDER — GLIPIZIDE 5 MG/1
5 TABLET, FILM COATED, EXTENDED RELEASE ORAL DAILY
Qty: 90 TAB | Refills: 3 | Status: SHIPPED | OUTPATIENT
Start: 2018-04-17 | End: 2019-01-09

## 2018-04-17 RX ORDER — TERAZOSIN 5 MG/1
5 CAPSULE ORAL DAILY
Qty: 90 CAP | Refills: 3 | Status: SHIPPED | OUTPATIENT
Start: 2018-04-17 | End: 2019-06-12

## 2018-04-17 RX ORDER — ATORVASTATIN CALCIUM 40 MG/1
40 TABLET, FILM COATED ORAL DAILY
Qty: 90 TAB | Refills: 3 | Status: SHIPPED | OUTPATIENT
Start: 2018-04-17 | End: 2018-11-12 | Stop reason: SDUPTHER

## 2018-04-17 RX ORDER — CYANOCOBALAMIN 1000 UG/ML
1000 INJECTION, SOLUTION INTRAMUSCULAR; SUBCUTANEOUS ONCE
Status: DISCONTINUED | OUTPATIENT
Start: 2018-04-17 | End: 2018-04-22

## 2018-04-17 NOTE — ASSESSMENT & PLAN NOTE
Stable. Currently taking Cymbalta 30 mg as prescribed.   Denies side effects and is tolerating well.  Mood is improved with current medication and therapy.    Patient denies SI/HI.  Depression Screen (PHQ-2/PHQ-9) 9/14/2017   PHQ-2 Total Score 2   PHQ-9 Total Score 7

## 2018-04-17 NOTE — ASSESSMENT & PLAN NOTE
Stable. Currently taking Metformin 1000 mg BID as directed.  Denies side effects or hypoglycemic events.  He is monitoring blood sugar regularly at home.  Reports diet is stable  He is not exercising regularly.  Last eye exam: one year  Denies polyuria, polydipsia, blurred vision, or neuropathies.

## 2018-04-18 ENCOUNTER — TELEPHONE (OUTPATIENT)
Dept: MEDICAL GROUP | Facility: MEDICAL CENTER | Age: 68
End: 2018-04-18

## 2018-04-18 NOTE — TELEPHONE ENCOUNTER
Harpal, who was originally seeing Dr. Rivers for his B-12 injections and is now seeing you as his PCP, is wondering how often he should be getting his b-12 injections. He stated he missed quite a few back in the month of march and wants to catch up.

## 2018-04-19 ENCOUNTER — TELEPHONE (OUTPATIENT)
Dept: MEDICAL GROUP | Facility: MEDICAL CENTER | Age: 68
End: 2018-04-19

## 2018-04-19 NOTE — TELEPHONE ENCOUNTER
1. Caller Name: Prabhakar Sierra                                         Call Back Number: [phone]      Patient approves a detailed voicemail message: yes    2. Patient is requesting  PSA lab results dated: 04/17/18    Pt wanting to know if you should continue the medication after reviewing the PSA Results.
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
## 2018-04-22 NOTE — ASSESSMENT & PLAN NOTE
Stable. Currently taking terazosin as prescribed.  Denies side effects from medication.  Reports the following obstructive symptoms: weak stream  And irritative symptoms: nocturia  Denies perineal discomfort, dysuria, hematuria, abdominal pain, flank pain, testicular pain  Denies prior urinary retention requiring treatment.   He would like to check his PSA

## 2018-05-01 ENCOUNTER — PHYSICAL THERAPY (OUTPATIENT)
Dept: PHYSICAL THERAPY | Facility: MEDICAL CENTER | Age: 68
End: 2018-05-01
Attending: FAMILY MEDICINE
Payer: MEDICARE

## 2018-05-01 DIAGNOSIS — M79.7 FIBROMYALGIA: ICD-10-CM

## 2018-05-01 DIAGNOSIS — M54.2 NECK PAIN: ICD-10-CM

## 2018-05-01 DIAGNOSIS — Z87.828 HISTORY OF MOTOR VEHICLE ACCIDENT: ICD-10-CM

## 2018-05-01 PROCEDURE — 97162 PT EVAL MOD COMPLEX 30 MIN: CPT

## 2018-05-01 PROCEDURE — 97110 THERAPEUTIC EXERCISES: CPT

## 2018-05-01 ASSESSMENT — ACTIVITIES OF DAILY LIVING (ADL): POOR_BALANCE: 1
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
## 2018-05-23 ENCOUNTER — PHYSICAL THERAPY (OUTPATIENT)
Dept: PHYSICAL THERAPY | Facility: MEDICAL CENTER | Age: 68
End: 2018-05-23
Attending: FAMILY MEDICINE
Payer: MEDICARE

## 2018-05-23 DIAGNOSIS — M54.2 NECK PAIN: ICD-10-CM

## 2018-05-23 DIAGNOSIS — Z87.828 HISTORY OF MOTOR VEHICLE ACCIDENT: ICD-10-CM

## 2018-05-23 DIAGNOSIS — M79.7 FIBROMYALGIA: ICD-10-CM

## 2018-05-23 PROCEDURE — 97014 ELECTRIC STIMULATION THERAPY: CPT

## 2018-05-23 PROCEDURE — 97140 MANUAL THERAPY 1/> REGIONS: CPT

## 2018-05-23 PROCEDURE — 97110 THERAPEUTIC EXERCISES: CPT

## 2018-05-23 NOTE — OP THERAPY DAILY TREATMENT
Outpatient Physical Therapy  DAILY TREATMENT     [facility] Outpatient Physical Therapy  [address]  [place] [address]  Phone:  [phone]  Fax:  [phone]    Date: 05/23/2018    Patient: Prabhakar Sierra  YOB: 1950  MRN: [id number]     Time Calculation  Start time: 1146  Stop time: 1235 Time Calculation (min): 49 minutes     Chief Complaint: Neck Pain    Visit #: 2    SUBJECTIVE:  I was able to walk a little on flat ground in housing development last week.  I continue to have pain and limitations in endurance, but I think the supplements that I am taking are helpful.      OBJECTIVE:  Current objective measures: cervical ROM 45 degrees L and R          Therapeutic Exercises (CPT 53610):     1. Prone c/s retraction, 10    2. Prone scap retraction with shoulder ext, 10    3. Bridge , 10    4. Gait in clinic , 4 laps (240ft)    Therapeutic Treatments and Modalities:     1. Manual Therapy (CPT 79182), 15 min, suboccipital release, 1st rib inf gr II, UPA right C/S C7, T 1-3    2. E Stim Unattended (CPT 07339), 15 min, IFC and MHP to lower cervical and upper thoracic    Time-based treatments/modalities:  Manual therapy minutes (CPT 64997): 15 minutes  Therapeutic exercise minutes (CPT 65008): 15 minutes       Pain rating before treatment: 5  Pain rating after treatment: 5    ASSESSMENT:   Response to treatment: some improvement with right shoulder mobility after MT, cervical spine ROM with less restrictions    PLAN/RECOMMENDATIONS:   Plan for treatment: therapy treatment to continue next visit.  Planned interventions for next visit: E-stim unattended (CPT 83625), hot/cold pack therapy (CPT 97457), manual therapy (CPT 51052), neuromuscular re-education (CPT 11535) and therapeutic exercise (CPT 09132).

## 2018-05-30 ENCOUNTER — PHYSICAL THERAPY (OUTPATIENT)
Dept: PHYSICAL THERAPY | Facility: MEDICAL CENTER | Age: 68
End: 2018-05-30
Attending: FAMILY MEDICINE
Payer: MEDICARE

## 2018-05-30 DIAGNOSIS — Z87.828 HISTORY OF MOTOR VEHICLE ACCIDENT: ICD-10-CM

## 2018-05-30 DIAGNOSIS — M54.2 NECK PAIN: ICD-10-CM

## 2018-05-30 DIAGNOSIS — M79.7 FIBROMYALGIA: ICD-10-CM

## 2018-05-30 PROCEDURE — 97014 ELECTRIC STIMULATION THERAPY: CPT

## 2018-05-30 PROCEDURE — 97110 THERAPEUTIC EXERCISES: CPT

## 2018-05-30 PROCEDURE — 97140 MANUAL THERAPY 1/> REGIONS: CPT

## 2018-05-30 NOTE — OP THERAPY DAILY TREATMENT
Outpatient Physical Therapy  DAILY TREATMENT     [facility] Outpatient Physical Therapy  [address]  [place] [address]  Phone:  [phone]  Fax:  [phone]    Date: 05/30/2018    Patient: Prabhakar Sierra  YOB: 1950  MRN: [id number]     Time Calculation  Start time: 1317  Stop time: 1405 Time Calculation (min): 48 minutes     Chief Complaint: Neck Problem and Neck Pain    Visit #: 3    SUBJECTIVE:  I hurt after those new exercises so I stopped.  I am still wanting lots of exercises and I want to get better.      OBJECTIVE:  Current objective measures: UBE 2 min caused fatigue and soreness to mid back          Therapeutic Exercises (CPT 76110):     1. UBE, 1' fwd, 1' back    2. Rows, 10/2, L1    3. Lat pull, 10/2, L1    4. Quadriped cat camel, 10    5. Quadriped alt LE, 10    6. Quadriped bird dog, 10    Therapeutic Treatments and Modalities:     1. Manual Therapy (CPT 92116), 10 min, suboccipital release, 1st rib mobs inf gr IV-, UPA C7-T3 gr IV-    2. E Stim Unattended (CPT 26472), 15 min IFC and MHP to C/S and upper T/S    Time-based treatments/modalities:  Manual therapy minutes (CPT 23392): 10 minutes  Therapeutic exercise minutes (CPT 87252): 20 minutes       Pain rating before treatment: 5-6  Pain rating after treatment: 5    ASSESSMENT:   Response to treatment: pt tolerated standing and quadriped exercises better than prone cervical/thoracic exercises.  Pt benefits from redirection during treatment to focus on exercise and form/ speed of exercises.    PLAN/RECOMMENDATIONS:   Plan for treatment: therapy treatment to continue next visit.  Planned interventions for next visit: continue with current treatment.

## 2018-05-31 NOTE — PROGRESS NOTES
Outcome: Left Message    Please transfer to Patient Outreach Team at [phone] when patient returns call.      [facility] Verified: yes    Attempt # 1

## 2018-06-04 ENCOUNTER — PHYSICAL THERAPY (OUTPATIENT)
Dept: PHYSICAL THERAPY | Facility: MEDICAL CENTER | Age: 68
End: 2018-06-04
Attending: FAMILY MEDICINE
Payer: MEDICARE

## 2018-06-04 DIAGNOSIS — Z87.828 HISTORY OF MOTOR VEHICLE ACCIDENT: ICD-10-CM

## 2018-06-04 DIAGNOSIS — M79.7 FIBROMYALGIA: ICD-10-CM

## 2018-06-04 DIAGNOSIS — M54.2 NECK PAIN: ICD-10-CM

## 2018-06-04 PROCEDURE — 97110 THERAPEUTIC EXERCISES: CPT

## 2018-06-04 PROCEDURE — 97014 ELECTRIC STIMULATION THERAPY: CPT

## 2018-06-04 NOTE — OP THERAPY DAILY TREATMENT
Outpatient Physical Therapy  DAILY TREATMENT     [facility] Outpatient Physical Therapy  [address]  [place] [address]  Phone:  [phone]  Fax:  [phone]    Date: 06/04/2018    Patient: Prabhakar Sierra  YOB: 1950  MRN: [id number]     Time Calculation  Start time: 1350  Stop time: 1437 Time Calculation (min): 47 minutes     Chief Complaint: Back Problem; Neck Pain; and Neck Problem    Visit #: 4    SUBJECTIVE:  I am not doing my exercises too much at home.  I am working at my [employer]. I am feeling like my endurance is a little better, I think that is due to the [employer].    OBJECTIVE:  Current objective measures: pt with difficulty with TA recruitment, improved with treatment.          Therapeutic Exercises (CPT 69043):     1. Nu step , 5 min, S13 A12 R5    2. Bridge on ball, 10    3. Bridge on mat table, 10    4. TA with alt March, 10    5. TA with alt LE and alt UE, 10    6. Quad alt UE and LE, 10    7. Cat and camel, 10    Therapeutic Treatments and Modalities:     1. E Stim Unattended (CPT 64188), 15 min, IFC to C/S with P    Time-based treatments/modalities:  Therapeutic exercise minutes (CPT 87337): 28 minutes       Pain rating before treatment: 7  Pain rating after treatment: 4    ASSESSMENT:   Response to treatment: exercise in clinic helped reduce pain.    PLAN/RECOMMENDATIONS:   Plan for treatment: therapy treatment to continue next visit.  Planned interventions for next visit: continue with current treatment.

## 2018-06-07 ENCOUNTER — PHYSICAL THERAPY (OUTPATIENT)
Dept: PHYSICAL THERAPY | Facility: MEDICAL CENTER | Age: 68
End: 2018-06-07
Attending: FAMILY MEDICINE
Payer: MEDICARE

## 2018-06-07 DIAGNOSIS — M54.2 NECK PAIN: ICD-10-CM

## 2018-06-07 DIAGNOSIS — M79.7 FIBROMYALGIA: ICD-10-CM

## 2018-06-07 DIAGNOSIS — Z87.828 HISTORY OF MOTOR VEHICLE ACCIDENT: ICD-10-CM

## 2018-06-07 PROCEDURE — 97110 THERAPEUTIC EXERCISES: CPT

## 2018-06-07 PROCEDURE — 97014 ELECTRIC STIMULATION THERAPY: CPT

## 2018-06-07 NOTE — OP THERAPY DAILY TREATMENT
Outpatient Physical Therapy  DAILY TREATMENT     [facility] Outpatient Physical Therapy  [address]  [place] [address]  Phone:  [phone]  Fax:  [phone]    Date: 06/07/2018    Patient: Prabhakar Sierra  YOB: 1950  MRN: [id number]     Time Calculation  Start time: 1318  Stop time: 1408 Time Calculation (min): 50 minutes     Chief Complaint: Back Problem and Neck Problem    Visit #: 5    SUBJECTIVE:  I am doing ok.  My pain is 6-8/10.  I have trouble with focus/concentration with exercises.  Overall I have more energy, I think that is due to the supplement I am taking.  Pt reports not consistently performing HEP.      OBJECTIVE:  Current objective measures: Pt able to maintain a stable pelvis for much of hooklying exercises.            Therapeutic Exercises (CPT 81966):     1. Nu step , 5 min, S13 A12 R4    2. Bridge on ball, 10    3. Bridge on mat table, 10    4. TA with alt March, 10    5. TA with alt LE and alt UE, 10    6. Quad alt UE and LE, 10    7. Cat and camel, 10    8. Standing step forward with opp UE resistance push fwd (both sides), 10, L1    9. Standing step back with opp UE resist pull back (both sides), 10, L1    Therapeutic Treatments and Modalities:     1. E Stim Unattended (CPT 77095), 15 min, IFC to C/S with ice    Time-based treatments/modalities:  Therapeutic exercise minutes (CPT 24344): 30 minutes       Pain rating before treatment: 6-8  Pain rating after treatment: 6-8    ASSESSMENT:   Response to treatment: pt continues to build endurance and strength.  Continue to progress exercise and challenges.      PLAN/RECOMMENDATIONS:   Plan for treatment: therapy treatment to continue next visit.  Planned interventions for next visit: continue with current treatment.

## 2018-06-11 ENCOUNTER — PHYSICAL THERAPY (OUTPATIENT)
Dept: PHYSICAL THERAPY | Facility: MEDICAL CENTER | Age: 68
End: 2018-06-11
Attending: FAMILY MEDICINE
Payer: MEDICARE

## 2018-06-11 DIAGNOSIS — M79.7 FIBROMYALGIA: ICD-10-CM

## 2018-06-11 DIAGNOSIS — Z87.828 HISTORY OF MOTOR VEHICLE ACCIDENT: ICD-10-CM

## 2018-06-11 DIAGNOSIS — M54.2 NECK PAIN: ICD-10-CM

## 2018-06-11 PROCEDURE — 97110 THERAPEUTIC EXERCISES: CPT

## 2018-06-11 PROCEDURE — 97014 ELECTRIC STIMULATION THERAPY: CPT

## 2018-06-11 NOTE — OP THERAPY DAILY TREATMENT
Outpatient Physical Therapy  DAILY TREATMENT     [facility] Outpatient Physical Therapy  [address]  [place] [address]  Phone:  [phone]  Fax:  [phone]    Date: 06/11/2018    Patient: Prabhakar Sierra  YOB: 1950  MRN: [id number]     Time Calculation  Start time: 1346  Stop time: 1434 Time Calculation (min): 48 minutes     Chief Complaint: Neck Pain; Neck Problem; and Back Problem    Visit #: 6    SUBJECTIVE:  I am able to get up at 5:30 am pretty consistently now.  I think it is the supplements I am taking.  My energy feels better.  Overall my pain in my mid back is still present, I think that pain is worse than my neck.      OBJECTIVE:  Current objective measures: habitual forward lean          Therapeutic Exercises (CPT 47713):     1. Nu step , 7 min 30 sec, S13 A12 R4    2. TG leg press, 10/3, L8    3. Bridge on mat table, bridge on ball, 15/1 each    4. TA with alt March, 10    5. TA with alt LE and alt UE, 10    6. Quad alt UE and LE, 10    7. Cat and camel, 10    8. Doc and uni alt rows, 10 each, L3    9. Doc and uni chest press, 10 each, L3    Therapeutic Treatments and Modalities:     1. E Stim Unattended (CPT 69945), 15 min, IFC to T/S with ice    Time-based treatments/modalities:  Therapeutic exercise minutes (CPT 63042): 30 minutes       Pain rating before treatment: 3-4 energy, not pain  Pain rating after treatment: 5 for energy, not pain (energy increased)    ASSESSMENT:   Response to treatment: Pt responds well to exercise with energy feeling stronger.  Pt continues to benefit from skilled PT, he moves quickly and continues to benefit from cues for speed and sequencing for exercises, pt can get distracted and exercise form declines.    PLAN/RECOMMENDATIONS:   Plan for treatment: therapy treatment to continue next visit.  Planned interventions for next visit: continue with current treatment.

## 2018-06-14 ENCOUNTER — APPOINTMENT (OUTPATIENT)
Dept: PHYSICAL THERAPY | Facility: MEDICAL CENTER | Age: 68
End: 2018-06-14
Attending: FAMILY MEDICINE
Payer: MEDICARE

## 2018-06-26 ENCOUNTER — PHYSICAL THERAPY (OUTPATIENT)
Dept: PHYSICAL THERAPY | Facility: MEDICAL CENTER | Age: 68
End: 2018-06-26
Attending: FAMILY MEDICINE
Payer: MEDICARE

## 2018-06-26 DIAGNOSIS — Z87.828 HISTORY OF MOTOR VEHICLE ACCIDENT: ICD-10-CM

## 2018-06-26 DIAGNOSIS — M79.7 FIBROMYALGIA: ICD-10-CM

## 2018-06-26 DIAGNOSIS — M54.2 NECK PAIN: ICD-10-CM

## 2018-06-26 PROCEDURE — 97014 ELECTRIC STIMULATION THERAPY: CPT

## 2018-06-26 PROCEDURE — 97110 THERAPEUTIC EXERCISES: CPT

## 2018-06-26 NOTE — OP THERAPY DAILY TREATMENT
Outpatient Physical Therapy  DAILY TREATMENT     [facility] Outpatient Physical Therapy  [address]  [place] [address]  Phone:  [phone]  Fax:  [phone]    Date: 06/26/2018    Patient: Prabhakar Sierra  YOB: 1950  MRN: [id number]     Time Calculation  Start time: 0920  Stop time: 1014 Time Calculation (min): 54 minutes     Chief Complaint: Back Problem and Neck Pain    Visit #: 7    SUBJECTIVE:  My back pain is still there.  I am not sure it is better.  I have used a chiropractor in the past and this luis works me over.      OBJECTIVE:  Current objective measures: unable to coordinate getting on foam roll vertical head to tail          Therapeutic Exercises (CPT 33955):     1. Nu step , 7 min , S13 A12 R4    2. Leg press, 40# 10x3    3. Bridge on floor, 15/1 each    4. TA with alt March, 10    5. TA with alt LE and alt UE, 10    6. Quad alt UE and LE, 10    7. Cat and camel, 10    8. Mid back stretch, 20 sec    9. Piriformis stretch, 20 sec    Therapeutic Treatments and Modalities:     1. E Stim Unattended (CPT 64292), 15 min, IFC to T/S with ice    Time-based treatments/modalities:  Therapeutic exercise minutes (CPT 00144): 35 minutes       Pain rating before treatment: 4-5  Pain rating after treatment: 2    ASSESSMENT:   Response to treatment: pt had reduction of pain with exercise and stretching.  Recommended pt to hold off on the chiropractor til at least the end of the week so he can gain perspective on PT vs chiropractic treatment.    PLAN/RECOMMENDATIONS:   Plan for treatment: therapy treatment to continue next visit.  Planned interventions for next visit: continue with current treatment.

## 2018-06-28 ENCOUNTER — PHYSICAL THERAPY (OUTPATIENT)
Dept: PHYSICAL THERAPY | Facility: MEDICAL CENTER | Age: 68
End: 2018-06-28
Attending: FAMILY MEDICINE
Payer: MEDICARE

## 2018-06-28 DIAGNOSIS — M79.7 FIBROMYALGIA: ICD-10-CM

## 2018-06-28 DIAGNOSIS — Z87.828 HISTORY OF MOTOR VEHICLE ACCIDENT: ICD-10-CM

## 2018-06-28 DIAGNOSIS — M54.2 NECK PAIN: ICD-10-CM

## 2018-06-28 PROCEDURE — 97014 ELECTRIC STIMULATION THERAPY: CPT

## 2018-06-28 PROCEDURE — 97110 THERAPEUTIC EXERCISES: CPT
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
## 2018-06-28 NOTE — OP THERAPY DAILY TREATMENT
Outpatient Physical Therapy  DAILY TREATMENT     [facility]  [address]  [place] [address]  Phone:  [phone]  Fax:  [phone]    Date: 06/28/2018    Patient: Prabhakar Sierra  YOB: 1950  MRN: [id number]     Time Calculation  Start time: 0919  Stop time: 1005 Time Calculation (min): 46 minutes     Chief Complaint: Back Problem and Neck Problem    Visit #: 8    SUBJECTIVE:  I was very tired after last visit.  I am not walking as much as I can.  But I am walking much more.  I did get out of bed without pain today.  I am not doing my exercises daily.    OBJECTIVE:  Current objective measures:   LE strength 4/5  UE strength 4/5    C/S ROM rot 70 korin  Flex 45  Ext 35  SB 10 korin    L/S ROM   Flex touch top of ankles  Ext slight limitation  Rot 70% kroin  SB 70% korin    Neck Disability Total: 14       Therapeutic Exercises (CPT 66798):     1. Nu step , 7 min , S13 A12 R4    2. Leg press, 40# 15x3    3. Bridge on floor, 15/1 each    4. TA with alt March, 10    5. TA with alt LE and alt UE, 10    6. Quad alt UE and LE, 10    7. Cat and camel, 10    8. Mid back stretch, 20 sec    9. Crawling fwd and retro, 5    Therapeutic Treatments and Modalities:     1. E Stim Unattended (CPT 39775), 15 min, IFC to T/S with ice    Time-based treatments/modalities:  Therapeutic exercise minutes (CPT 35050): 26 minutes       Pain rating before treatment: 2  Pain rating after treatment: 2    ASSESSMENT:   Response to treatment: pt continues to be fatigued with PT, but ROM, strength, and endurance to exercise within PT is improved.  Continued talks with pt regarding HEP still occurring as pt not consistent with HEP at home.    PLAN/RECOMMENDATIONS:   Plan for treatment: therapy treatment to continue next visit.  Planned interventions for next visit: continue with current treatment.

## 2018-07-02 ENCOUNTER — PHYSICAL THERAPY (OUTPATIENT)
Dept: PHYSICAL THERAPY | Facility: MEDICAL CENTER | Age: 68
End: 2018-07-02
Attending: FAMILY MEDICINE
Payer: MEDICARE

## 2018-07-02 DIAGNOSIS — M79.7 FIBROMYALGIA: ICD-10-CM

## 2018-07-02 DIAGNOSIS — Z87.828 HISTORY OF MOTOR VEHICLE ACCIDENT: ICD-10-CM

## 2018-07-02 DIAGNOSIS — M54.2 NECK PAIN: ICD-10-CM

## 2018-07-02 PROCEDURE — 97014 ELECTRIC STIMULATION THERAPY: CPT

## 2018-07-02 PROCEDURE — 97110 THERAPEUTIC EXERCISES: CPT
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
## 2018-07-11 ENCOUNTER — PHYSICAL THERAPY (OUTPATIENT)
Dept: PHYSICAL THERAPY | Facility: MEDICAL CENTER | Age: 68
End: 2018-07-11
Attending: FAMILY MEDICINE
Payer: MEDICARE

## 2018-07-11 DIAGNOSIS — M54.2 NECK PAIN: ICD-10-CM

## 2018-07-11 DIAGNOSIS — M79.7 FIBROMYALGIA: ICD-10-CM

## 2018-07-11 DIAGNOSIS — Z87.828 HISTORY OF MOTOR VEHICLE ACCIDENT: ICD-10-CM

## 2018-07-11 PROCEDURE — 97014 ELECTRIC STIMULATION THERAPY: CPT

## 2018-07-11 PROCEDURE — 97110 THERAPEUTIC EXERCISES: CPT

## 2018-07-11 NOTE — OP THERAPY DAILY TREATMENT
Outpatient Physical Therapy  DAILY TREATMENT     [facility]  [address]  [place] [address]  Phone:  [phone]  Fax:  [phone]    Date: 07/11/2018    Patient: Prabhakar Sierra  YOB: 1950  MRN: [id number]     Time Calculation  Start time: 1500  Stop time: 1545 Time Calculation (min): 45 minutes     Chief Complaint: Neck Pain and Back Pain    Visit #: 10    SUBJECTIVE:  Pt reports that he is working very hard in therapy. He states that he did take his wife out to dinner and it was amazing.     OBJECTIVE:  Current objective measures: Knee crepitus/popping on the right          Therapeutic Exercises (CPT 04001):     1. Nu step , 6 min , S11 A12 R5    2. Leg press, 60# DL neutral/add/abd x20 each    3. Bridge on ball, 10/2    4. Wall squats, 2x10    5. Wall bridges, 2x10    6. Ball on wall, against T/S, GHJ flexion bilateral and ALT, horizontal abduction, T/S extension all x20 each    Therapeutic Treatments and Modalities:     1. E Stim Unattended (CPT 39964), 15 min, IFC to T/S with MHP    Time-based treatments/modalities:  Therapeutic exercise minutes (CPT 60461): 30 minutes       Pain rating before treatment: 2  Pain rating after treatment: 2    ASSESSMENT:   Response to treatment: Pt adequately challenged by all exercises and had no report of increased pain or symptoms with T/S mobility against wall. Pt will continue to benefit from exercises to improve overall mobility and endurance.     PLAN/RECOMMENDATIONS:   Plan for treatment: therapy treatment to continue next visit.  Planned interventions for next visit: continue with current treatment.

## 2018-07-23 ENCOUNTER — PHYSICAL THERAPY (OUTPATIENT)
Dept: PHYSICAL THERAPY | Facility: MEDICAL CENTER | Age: 68
End: 2018-07-23
Attending: FAMILY MEDICINE
Payer: MEDICARE

## 2018-07-23 DIAGNOSIS — M54.2 NECK PAIN: ICD-10-CM

## 2018-07-23 DIAGNOSIS — Z87.828 HISTORY OF MOTOR VEHICLE ACCIDENT: ICD-10-CM

## 2018-07-23 DIAGNOSIS — M79.7 FIBROMYALGIA: ICD-10-CM

## 2018-07-23 PROCEDURE — 97014 ELECTRIC STIMULATION THERAPY: CPT

## 2018-07-23 PROCEDURE — 97110 THERAPEUTIC EXERCISES: CPT

## 2018-07-23 NOTE — OP THERAPY DAILY TREATMENT
Outpatient Physical Therapy  DAILY TREATMENT     [facility]  [address]  [place] [address]  Phone:  [phone]  Fax:  [phone]    Date: 07/23/2018    Patient: Prabhakar Sierra  YOB: 1950  MRN: [id number]     Time Calculation  Start time: 1343  Stop time: 1430 Time Calculation (min): 47 minutes     Chief Complaint: Neck Problem and Back Problem    Visit #: 11    SUBJECTIVE:  I have been very tired.  The heat drains me and wipes me out.  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    OBJECTIVE:  Current objective measures: pt with kyphotic T/S with cervical forward head and sidebent to R head.          Therapeutic Exercises (CPT 48981):     1. Nu step , 6 min , S11 A12 R5    2. Leg press, 65# DL neutral/add/abd x20 each    3. Bridge on ball, 10/2    4. Wall squats, 2x10    5. Wall push ups, x10    6. Ball on wall, against T/S, GHJ flexion bilateral and ALT, horizontal abduction, T/S extension all x20 each    7. Rows, 2x10, L1 band    8. Shoulder ext korin, 2/10, L1 band    9. Cervical retraction, 10    10. Bike upright, 3 min    Therapeutic Treatments and Modalities:     1. E Stim Unattended (CPT 47470), 15 min, IFC to T/S with MHP    Time-based treatments/modalities:  Therapeutic exercise minutes (CPT 84420): 32 minutes       Pain rating before treatment: 1  Pain rating after treatment: 1    ASSESSMENT:   Response to treatment: pt with no increased pain with exercises, but with some overall fatigue.  Continue to build endurance and strength as well as continue to address cervical pain with exercise.    PLAN/RECOMMENDATIONS:   Plan for treatment: therapy treatment to continue next visit.  Planned interventions for next visit: continue with current treatment.

## 2018-07-25 ENCOUNTER — PHYSICAL THERAPY (OUTPATIENT)
Dept: PHYSICAL THERAPY | Facility: MEDICAL CENTER | Age: 68
End: 2018-07-25
Attending: FAMILY MEDICINE
Payer: MEDICARE

## 2018-07-25 DIAGNOSIS — Z87.828 HISTORY OF MOTOR VEHICLE ACCIDENT: ICD-10-CM

## 2018-07-25 DIAGNOSIS — M54.2 NECK PAIN: ICD-10-CM

## 2018-07-25 DIAGNOSIS — M79.7 FIBROMYALGIA: ICD-10-CM

## 2018-07-25 PROCEDURE — 97014 ELECTRIC STIMULATION THERAPY: CPT

## 2018-07-25 PROCEDURE — 97110 THERAPEUTIC EXERCISES: CPT
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
## 2018-07-25 NOTE — OP THERAPY DAILY TREATMENT
Outpatient Physical Therapy  DAILY TREATMENT     [facility] Out[facility]  [address]  [place] [address]  Phone:  [phone]  Fax:  [phone]    Date: 07/25/2018    Patient: Prabhakar Sierra  YOB: 1950  MRN: [id number]     Time Calculation  Start time: 1344  Stop time: 1448 Time Calculation (min): 64 minutes     Chief Complaint: Back Injury; Back Problem; and Neck Problem    Visit #: 12    SUBJECTIVE:  I am better.  I do have more energy.  Today I feel zapped of energy.  I wanted to call and cancel.      OBJECTIVE:  Current objective measures:   L/S flex fingers to top of feet  Ext 50%  SB to mid thigh korin    Strength  Hip flex 4/5 korin  Hip abd 4/5 korin  Hip add 4/5 korin  Knee ext 4/5 korin  Knee flex L 4-/5 R 4/5  Ankle PF 4/5 korin  Ankle DF 4/5 korin    Upper trap 4/5 korin  Ant deltoid L 4-/5 pain R 4/5  Middle deltoid L 4-/5 pain R 4/5  Bicep 4/5 korin  Tricep 4/5 korin  IR 4/5 korin  ER 4/5 korin    C/S  AROM all pain free  flex 35  Ext 50  SB L 25 R 25  Rot L 65 R 65    Shoulder   Flex L 160 with pain R 170  Abd L 160 with pain R 170            Therapeutic Exercises (CPT 26298):     1. Stationary bike, 5 min    2. Leg press, 65#  10 x3    3. Prone c/s retraction, x10    4. Prone scap retraction with sh ext, x10    5. Prone U, x10    6. BEP chest pull, 10  x 2, L    7. BEP canoe, 10 x2    8. Prone hip ext , x 5 each    9. Cat and camel, x10    Therapeutic Treatments and Modalities:     1. E Stim Unattended (CPT 90572), 15 min IFC at T/S with P    Time-based treatments/modalities:  Therapeutic exercise minutes (CPT 45798): 45 minutes       Pain rating before treatment: 2  Pain rating after treatment: 2    ASSESSMENT:   Response to treatment: pt continues to build endurance to activity and is improving strength and ROM.  Plan to continue with PT.  May consider in next few weeks to decrease frequency of PT apt to 1x per week.      PLAN/RECOMMENDATIONS:    Plan for treatment: therapy treatment to continue next visit.  Planned interventions for next visit: continue with current treatment.

## 2018-08-01 ENCOUNTER — PHYSICAL THERAPY (OUTPATIENT)
Dept: PHYSICAL THERAPY | Facility: MEDICAL CENTER | Age: 68
End: 2018-08-01
Attending: FAMILY MEDICINE
Payer: MEDICARE

## 2018-08-01 DIAGNOSIS — Z87.828 HISTORY OF MOTOR VEHICLE ACCIDENT: ICD-10-CM

## 2018-08-01 DIAGNOSIS — M54.2 NECK PAIN: ICD-10-CM

## 2018-08-01 DIAGNOSIS — M79.7 FIBROMYALGIA: ICD-10-CM

## 2018-08-01 PROCEDURE — 97110 THERAPEUTIC EXERCISES: CPT

## 2018-08-01 PROCEDURE — 97112 NEUROMUSCULAR REEDUCATION: CPT

## 2018-08-01 PROCEDURE — 97014 ELECTRIC STIMULATION THERAPY: CPT
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
## 2018-08-08 ENCOUNTER — PHYSICAL THERAPY (OUTPATIENT)
Dept: PHYSICAL THERAPY | Facility: MEDICAL CENTER | Age: 68
End: 2018-08-08
Attending: FAMILY MEDICINE
Payer: MEDICARE

## 2018-08-08 DIAGNOSIS — M54.2 NECK PAIN: ICD-10-CM

## 2018-08-08 DIAGNOSIS — M79.7 FIBROMYALGIA: ICD-10-CM

## 2018-08-08 DIAGNOSIS — Z87.828 HISTORY OF MOTOR VEHICLE ACCIDENT: ICD-10-CM

## 2018-08-08 PROCEDURE — 97014 ELECTRIC STIMULATION THERAPY: CPT

## 2018-08-08 PROCEDURE — 97110 THERAPEUTIC EXERCISES: CPT

## 2018-08-08 NOTE — OP THERAPY DAILY TREATMENT
Outpatient Physical Therapy  DAILY TREATMENT     [facility] Outpatient Physical Therapy  [address]  [place] [address]  Phone:  [phone]  Fax:  [phone]    Date: 08/08/2018    Patient: Prabhakar Sierra  YOB: 1950  MRN: [id number]     Time Calculation  Start time: 1242  Stop time: 1353 Time Calculation (min): 71 minutes     Chief Complaint: Back Problem    Visit #: 14    SUBJECTIVE:  Yesterday I was bending forward and felt pain in my low back.  I took some acetaminaphin and rested.  Its better today.    OBJECTIVE:  Current objective measures: L/S flex to knees (pain)  L/S ext 25% (pain)  L/S sidebend L to high thigh with pain R to midthigh with pain           Therapeutic Exercises (CPT 52656):     1. Prone press up, 10 reps x 7, with each session break with walking    2. Prone glute squeeze, 10 reps x 7    3. Calf stretches, slant board x 10 (2 sets) 30 sec    4. Piriformis stretches, 30 sec x 3 each    5. Single knee to stretch, 30 sec x 3 each    6. TA hooklying, 10 reps of 5 sec    7. Log rolling    8. Cat and camel, 10 reps x 3    Therapeutic Treatments and Modalities:     1. E Stim Unattended (CPT 03331), 15 min, IFC and ice to L/S     Time-based treatments/modalities:  Therapeutic exercise minutes (CPT 97083): 55 minutes       Pain rating before treatment: 8  Pain rating after treatment: 0    ASSESSMENT:   Response to treatment: Pt had a decrease in pain after exercise, able to walk with better stride.  Pt to ice 3x per day and perform exercises from this visit.      PLAN/RECOMMENDATIONS:   Plan for treatment: therapy treatment to continue next visit.  Planned interventions for next visit: continue with current treatment.

## 2018-08-13 ENCOUNTER — PHYSICAL THERAPY (OUTPATIENT)
Dept: PHYSICAL THERAPY | Facility: MEDICAL CENTER | Age: 68
End: 2018-08-13
Attending: FAMILY MEDICINE
Payer: MEDICARE

## 2018-08-13 DIAGNOSIS — Z87.828 HISTORY OF MOTOR VEHICLE ACCIDENT: ICD-10-CM

## 2018-08-13 DIAGNOSIS — M79.7 FIBROMYALGIA: ICD-10-CM

## 2018-08-13 DIAGNOSIS — M54.2 NECK PAIN: ICD-10-CM

## 2018-08-13 PROCEDURE — 97014 ELECTRIC STIMULATION THERAPY: CPT

## 2018-08-13 PROCEDURE — 97110 THERAPEUTIC EXERCISES: CPT

## 2018-08-13 NOTE — OP THERAPY DAILY TREATMENT
Outpatient Physical Therapy  DAILY TREATMENT     [facility] Outpatient Physical Therapy  [address]  [place] [address]  Phone:  [phone]  Fax:  [phone]    Date: 08/13/2018    Patient: Prabhakar Sierra  YOB: 1950  MRN: [id number]     Time Calculation  Start time: 1246  Stop time: 1333 Time Calculation (min): 47 minutes     Chief Complaint: Back Problem    Visit #: 15    SUBJECTIVE:  I am better from the other day.  Pain at 2/10, not 8/10 like the other day.  I went to see Dr Mead and he rolfed him.      OBJECTIVE:  Current objective measures: difficulty going from quadriped to prone position           Therapeutic Exercises (CPT 66119):     1. Prone press up, 10 reps x 3, with each session break with walking    2. Prone glute squeeze, 10 reps x 3    3. Calf stretches, slant board x 10 (2 sets) 30 sec    4. Piriformis stretches, 30 sec x 3 each    5. Single knee to stretch, 30 sec x 3 each    6. TA hooklying, 10 reps of 5 sec    7. Dying bug, 10 x 3     8. Cat and camel, 10 reps x 3    9. Nustep, 5 min, S11 A10 R3    10. Bridges, 20 x 2    11. BEP chest pull, 10    12. BEP canoe, 10    Therapeutic Treatments and Modalities:     1. E Stim Unattended (CPT 47619), 15 min, IFC and ice to L/S     Time-based treatments/modalities:  Therapeutic exercise minutes (CPT 28717): 31 minutes       Pain rating before treatment: 2  Pain rating after treatment: 2    ASSESSMENT:   Response to treatment: improving with core stabilization exercises and with ability to move better with less pain.    PLAN/RECOMMENDATIONS:   Plan for treatment: therapy treatment to continue next visit.  Planned interventions for next visit: continue with current treatment.

## 2018-08-15 ENCOUNTER — PHYSICAL THERAPY (OUTPATIENT)
Dept: PHYSICAL THERAPY | Facility: MEDICAL CENTER | Age: 68
End: 2018-08-15
Attending: FAMILY MEDICINE
Payer: MEDICARE

## 2018-08-15 DIAGNOSIS — Z87.828 HISTORY OF MOTOR VEHICLE ACCIDENT: ICD-10-CM

## 2018-08-15 DIAGNOSIS — M54.2 NECK PAIN: ICD-10-CM

## 2018-08-15 DIAGNOSIS — M79.7 FIBROMYALGIA: ICD-10-CM

## 2018-08-15 PROCEDURE — 97110 THERAPEUTIC EXERCISES: CPT

## 2018-08-15 PROCEDURE — 97014 ELECTRIC STIMULATION THERAPY: CPT

## 2018-08-15 NOTE — OP THERAPY DAILY TREATMENT
Outpatient Physical Therapy  DAILY TREATMENT     [facility] Outpatient Physical Therapy  [address]  [place] [address]  Phone:  [phone]  Fax:  [phone]    Date: 08/15/2018    Patient: Prabhakar Sierra  YOB: 1950  MRN: [id number]     Time Calculation  Start time: 1246  Stop time: 1335 Time Calculation (min): 49 minutes     Chief Complaint: Back Problem    Visit #: 16    SUBJECTIVE:  I don't know what happened.  I was so constipated, then I got diarhhea.  My low back is hurting, but I think it is from the constipated.    OBJECTIVE:  Current objective measures: difficulty with any lumbar flex and ext today due to low back pain  Pain with sit to/from stand          Therapeutic Exercises (CPT 00609):     1. Prone press up, 10 reps x 3, with each session break with walking    2. Prone glute squeeze, 10 reps x 3    3. Calf stretches, slant board x 10 (2 sets) 30 sec    4. Piriformis stretches, 30 sec x 2 each    5. Single knee to stretch, 30 sec  x1 each    6. TA hooklying, 10 reps of 5 sec    7. Dying bug, 10 x 3     8. Cat and camel, 10 reps x 3    9. Nustep, 5 min, S11 A10 R3    10. Bridges, 10 x 2    11. Hamstring stretches, 30 sec x 1 each    Therapeutic Treatments and Modalities:     1. E Stim Unattended (CPT 69074), 15 min, IFC and ice to L/S     Time-based treatments/modalities:  Therapeutic exercise minutes (CPT 61593): 30 minutes       Pain rating before treatment: 9   Pain rating after treatment: 5    ASSESSMENT:   Response to treatment: pain significantly reduced with exercise.  Pt with muscle tightness in L/S paraspinal muscles likely from feeling poorly yesterday.  Continue to address pain and dysfunction with exercise.      PLAN/RECOMMENDATIONS:   Plan for treatment: therapy treatment to continue next visit.  Planned interventions for next visit: continue with current treatment.

## 2018-08-20 ENCOUNTER — PHYSICAL THERAPY (OUTPATIENT)
Dept: PHYSICAL THERAPY | Facility: MEDICAL CENTER | Age: 68
End: 2018-08-20
Attending: FAMILY MEDICINE
Payer: MEDICARE

## 2018-08-20 DIAGNOSIS — M54.2 NECK PAIN: ICD-10-CM

## 2018-08-20 DIAGNOSIS — Z87.828 HISTORY OF MOTOR VEHICLE ACCIDENT: ICD-10-CM

## 2018-08-20 DIAGNOSIS — M79.7 FIBROMYALGIA: ICD-10-CM

## 2018-08-20 PROCEDURE — 97014 ELECTRIC STIMULATION THERAPY: CPT

## 2018-08-20 PROCEDURE — 97110 THERAPEUTIC EXERCISES: CPT
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
## 2018-08-20 NOTE — OP THERAPY DAILY TREATMENT
Outpatient Physical Therapy  DAILY TREATMENT     [facility] Outpatient Physical Therapy  [address]  [place] [address]  Phone:  [phone]  Fax:  [phone]    Date: 08/20/2018    Patient: Prabhakar Sierra  YOB: 1950  MRN: [id number]     Time Calculation  Start time: 1247  Stop time: 1337 Time Calculation (min): 50 minutes     Chief Complaint: Back Problem    Visit #: 17    SUBJECTIVE:  I am doing better since last week.  I sure had a set back, but it seems better lately.  I am working too hard around the town home so my pain is 3-4/10.    OBJECTIVE:  Current objective measures:   L/S flex to ankles  Ext 50%  SB mid thigh  Rot 50% korin    Strength LE strength 4+/5  Able to toe and heel walk    Cervical ROM  Flex 40  Ext 30  Rot 60 korin  SB 10 korin    Pedro Naseem Low Back Pain and Disability Score: 66.67       Therapeutic Exercises (CPT 28793):     1. Prone press up, 10 reps x 3, with each session break with walking    2. Prone glute squeeze, 10 reps x 3    3. Calf stretches, slant board x 10 (2 sets) 30 sec    4. Piriformis stretches, 30 sec x 2 each    5. Single knee to stretch, 30 sec  x1 each    6. TA hooklying, 10 reps of 5 sec    7. Dying bug, 10 x 3     8. Cat and camel, 10 reps x 3    9. Nustep, 5 min, S11 A10 R4    10. Bridges, 10 x 2    11. Hamstring stretches, 30 sec x 1 each    Therapeutic Treatments and Modalities:     1. E Stim Unattended (CPT 62459), 15 min, IFC and ice to L/S     Time-based treatments/modalities:  Therapeutic exercise minutes (CPT 34796): 30 minutes       Pain rating before treatment: 3-4  Pain rating after treatment: 3    ASSESSMENT:   Response to treatment: pt has back relief with exercise and is slowly improving in his tolerance to activities.    PLAN/RECOMMENDATIONS:   Plan for treatment: therapy treatment to continue next visit.  Planned interventions for next visit: continue with current treatment.

## 2018-08-22 ENCOUNTER — PHYSICAL THERAPY (OUTPATIENT)
Dept: PHYSICAL THERAPY | Facility: MEDICAL CENTER | Age: 68
End: 2018-08-22
Attending: FAMILY MEDICINE
Payer: MEDICARE

## 2018-08-22 DIAGNOSIS — Z87.828 HISTORY OF MOTOR VEHICLE ACCIDENT: ICD-10-CM

## 2018-08-22 DIAGNOSIS — M79.7 FIBROMYALGIA: ICD-10-CM

## 2018-08-22 DIAGNOSIS — M54.2 NECK PAIN: ICD-10-CM

## 2018-08-22 PROCEDURE — 97014 ELECTRIC STIMULATION THERAPY: CPT

## 2018-08-22 PROCEDURE — 97110 THERAPEUTIC EXERCISES: CPT

## 2018-08-22 NOTE — OP THERAPY DAILY TREATMENT
Outpatient Physical Therapy  DAILY TREATMENT     [facility] Outpatient Physical Therapy  [address]  [place] [address]  Phone:  [phone]  Fax:  [phone]    Date: 08/22/2018    Patient: Prabhakar Sierra  YOB: 1950  MRN: [id number]     Time Calculation  Start time: 1251  Stop time: 1332 Time Calculation (min): 41 minutes     Chief Complaint: Back Problem    Visit #: 18    SUBJECTIVE:  I felt tired after the other day.    OBJECTIVE:  Current objective measures: able to activate TA in sitting and standing for exercise          Therapeutic Exercises (CPT 38028):     1. Prone press up, 10 reps x 3    2. BEP chest pull, 10/2    3. BEP canoe, 10/2    4. Standing scap pinch with shoulder ext, 10/2    5. Standing uni pull with TA activation, 10/2    6. Nu step, 5 min, S11 A10 R5    7. Pull back step back with opp UE, 10/2, L1    8. Step fwd with opp UE push, 10/2, L1    9. Ham curls on ball, 10/2    10. Bridges, 10 x 2, ob ball    11. Hamstring stretches, 30 sec x 1 each    12. Piriformis stretches, 30 sec each    Therapeutic Treatments and Modalities:     1. E Stim Unattended (CPT 89481), 15 min, IFC and ice to L/S     Time-based treatments/modalities:  Therapeutic exercise minutes (CPT 66586): 25 minutes       Pain rating before treatment: 0  Pain rating after treatment: 0    ASSESSMENT:   Response to treatment: pt able to progress exercises today without an increase in pain or discomfort.    PLAN/RECOMMENDATIONS:   Plan for treatment: therapy treatment to continue next visit.  Planned interventions for next visit: continue with current treatment.

## 2018-08-27 ENCOUNTER — PHYSICAL THERAPY (OUTPATIENT)
Dept: PHYSICAL THERAPY | Facility: MEDICAL CENTER | Age: 68
End: 2018-08-27
Attending: FAMILY MEDICINE
Payer: MEDICARE

## 2018-08-27 DIAGNOSIS — M54.2 NECK PAIN: ICD-10-CM

## 2018-08-27 DIAGNOSIS — Z87.828 HISTORY OF MOTOR VEHICLE ACCIDENT: ICD-10-CM

## 2018-08-27 DIAGNOSIS — M79.7 FIBROMYALGIA: ICD-10-CM

## 2018-08-27 PROCEDURE — 97110 THERAPEUTIC EXERCISES: CPT

## 2018-08-27 PROCEDURE — 97014 ELECTRIC STIMULATION THERAPY: CPT

## 2018-08-27 NOTE — OP THERAPY DAILY TREATMENT
Outpatient Physical Therapy  DAILY TREATMENT     [facility] Outpatient Physical Therapy  [address]  [place] [address]  Phone:  [phone]  Fax:  [phone]    Date: 08/27/2018    Patient: Prabhakar Sierra  YOB: 1950  MRN: [id number]     Time Calculation  Start time: 1250  Stop time: 1335 Time Calculation (min): 45 minutes     Chief Complaint: Back Problem    Visit #: 19    SUBJECTIVE:  I continue to push myself hard with work and I feel the isogenics and PT have been the biggest help so far.  I want to continue with PT.  Have you heard if my MD approved more apts?    OBJECTIVE:  Current objective measures: Pt with posture of forward head and rounded shoulders when not focusing on posture.          Therapeutic Exercises (CPT 78955):     1. Prone press up, review    2. BEP chest pull, 10/2    3. BEP canoe, 10/2    4. Standing scap pinch with shoulder ext, 10/2    5. Standing uni pull with TA activation, 10/2    6. Nu step, 5 min, S11 A10 R5    7. Pull back step back with opp UE, review, L1    8. Step fwd with opp UE push, review, L1    9. Ham curls on ball, 10/1    10. Bridges, 10 x 1 on mat, 10 x1 on ball    11. Hamstring stretches, 30 sec x 1 each    12. Piriformis stretches, 30 sec each    13. Sk to chest, 30 sec each    Therapeutic Treatments and Modalities:     1. E Stim Unattended (CPT 99475), 15 min, IFC and ice to L/S     Time-based treatments/modalities:  Therapeutic exercise minutes (CPT 28460): 25 minutes       Pain rating before treatment: 5  Pain rating after treatment: 4    ASSESSMENT:   Response to treatment: exercise helps with pain reduction. Pt continues to benefit from PT to guide in exercise and help with exercise progression.      PLAN/RECOMMENDATIONS:   Plan for treatment: therapy treatment to continue next visit.  Planned interventions for next visit: continue with current treatment.

## 2018-08-28 ENCOUNTER — OFFICE VISIT (OUTPATIENT)
Dept: MEDICAL GROUP | Facility: MEDICAL CENTER | Age: 68
End: 2018-08-28
Payer: MEDICARE

## 2018-08-28 VITALS
WEIGHT: 211 LBS | BODY MASS INDEX: 31.25 KG/M2 | HEIGHT: 69 IN | DIASTOLIC BLOOD PRESSURE: 80 MMHG | OXYGEN SATURATION: 97 % | HEART RATE: 98 BPM | SYSTOLIC BLOOD PRESSURE: 142 MMHG | RESPIRATION RATE: 20 BRPM | TEMPERATURE: 97.2 F

## 2018-08-28 DIAGNOSIS — M79.7 FIBROMYALGIA: ICD-10-CM

## 2018-08-28 DIAGNOSIS — E87.1 HYPONATREMIA: ICD-10-CM

## 2018-08-28 DIAGNOSIS — E53.8 B12 DEFICIENCY: ICD-10-CM

## 2018-08-28 DIAGNOSIS — Z87.828 HISTORY OF MOTOR VEHICLE ACCIDENT: ICD-10-CM

## 2018-08-28 DIAGNOSIS — G89.29 CHRONIC NECK PAIN: ICD-10-CM

## 2018-08-28 DIAGNOSIS — M54.2 CHRONIC NECK PAIN: ICD-10-CM

## 2018-08-28 DIAGNOSIS — E11.42 CONTROLLED TYPE 2 DIABETES MELLITUS WITH DIABETIC POLYNEUROPATHY, WITHOUT LONG-TERM CURRENT USE OF INSULIN (HCC): ICD-10-CM

## 2018-08-28 PROCEDURE — 99214 OFFICE O/P EST MOD 30 MIN: CPT | Performed by: FAMILY MEDICINE

## 2018-08-28 RX ORDER — CYANOCOBALAMIN 1000 UG/ML
1000 INJECTION, SOLUTION INTRAMUSCULAR; SUBCUTANEOUS ONCE
OUTPATIENT
Start: 2018-08-28 | End: 2018-08-29

## 2018-08-28 ASSESSMENT — PATIENT HEALTH QUESTIONNAIRE - PHQ9: CLINICAL INTERPRETATION OF PHQ2 SCORE: 0

## 2018-08-28 NOTE — ASSESSMENT & PLAN NOTE
Patient reports that the physical therapy is helping with his pain.  He feels like he is about 50% improved but would like to continue PT.  He has been taking Isagenix products since May and he feels this is helping.
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
## 2018-08-29 ENCOUNTER — PHYSICAL THERAPY (OUTPATIENT)
Dept: PHYSICAL THERAPY | Facility: MEDICAL CENTER | Age: 68
End: 2018-08-29
Attending: FAMILY MEDICINE
Payer: MEDICARE

## 2018-08-29 DIAGNOSIS — Z87.828 HISTORY OF MOTOR VEHICLE ACCIDENT: ICD-10-CM

## 2018-08-29 DIAGNOSIS — M54.2 NECK PAIN: ICD-10-CM

## 2018-08-29 DIAGNOSIS — M79.7 FIBROMYALGIA: ICD-10-CM

## 2018-08-29 PROCEDURE — 97110 THERAPEUTIC EXERCISES: CPT

## 2018-08-29 PROCEDURE — 97014 ELECTRIC STIMULATION THERAPY: CPT

## 2018-08-29 NOTE — OP THERAPY DAILY TREATMENT
Outpatient Physical Therapy  DAILY TREATMENT     [facility] Outpatient Physical Therapy  [address]  [place] [address]  Phone:  [phone]  Fax:  [phone]    Date: 08/29/2018    Patient: Prabhakar Sierra  YOB: 1950  MRN: [id number]     Time Calculation  Start time: 1238  Stop time: 1335 Time Calculation (min): 57 minutes     Chief Complaint: Back Problem and Neck Problem    Visit #: 20    SUBJECTIVE:  My pain is 3-4/10, but I feel that I am tired.  I am working too hard.  I am able to do many chores in my home without increasing pain.  I am able to walk a mile on some days without increased pain and not needing to rest.  I am able to go on 2 errands without increased pain or fatigue.    OBJECTIVE:  Current objective measures:   L/S flex to toes  Ext 50%  SB to mid thigh   Rot 50%     Cervical flex 50 degrees  Ext 35   SB L 21  R21  Rot L 65 R 75    UE strength 4/5  LE strength 4/5  Neck Disability Total: 33  Pedro Naseem Low Back Pain and Disability Score: 79.17       Therapeutic Exercises (CPT 54779):     1. Prone press up, review    2. BEP chest pull, 10/2    3. BEP canoe, 10/2    4. Standing scap pinch with shoulder ext, 10/2    5. Standing uni pull with TA activation, 10/2    6. Nu step, 5 min, S11 A10 R5    7. Pull back step back with opp UE, review, L1    8. Step fwd with opp UE push, review, L1    9. Ham curls on ball, 10/1    10. Bridges, 10 x 1 on mat, 10 x1 on ball    11. Hamstring stretches, 30 sec x 1 each    12. Piriformis stretches, 30 sec each    13. Sk to chest, 30 sec each    Therapeutic Treatments and Modalities:     1. E Stim Unattended (CPT 06862), 15 min, IFC and ice to L/S     Time-based treatments/modalities:  Therapeutic exercise minutes (CPT 51421): 40 minutes       Pain rating before treatment: 3-4 and tired  Pain rating after treatment: 3    ASSESSMENT:   Response to treatment: pt continues to build endurance to activity and  is overall much less painful, recommend pt to be discharged due to met 20 visits.  Recommend pt to continue with PT and seeking an additional 12 visits to work toward MD referral dated 8/28/18.    PLAN/RECOMMENDATIONS:   Plan for treatment: discharge pt.  Planned interventions for next visit: discharge pt.
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
## 2018-10-08 ENCOUNTER — TELEPHONE (OUTPATIENT)
Dept: MEDICAL GROUP | Facility: MEDICAL CENTER | Age: 68
End: 2018-10-08
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
## 2018-10-08 NOTE — TELEPHONE ENCOUNTER
Patient just had full labs in April.  Please have him make an appointment so that I can order labs that may be more appropriate given his current symptoms.  Manjula Elmore M.D.

## 2018-10-15 DIAGNOSIS — E11.42 CONTROLLED TYPE 2 DIABETES MELLITUS WITH DIABETIC POLYNEUROPATHY, WITHOUT LONG-TERM CURRENT USE OF INSULIN (HCC): ICD-10-CM

## 2018-10-18 RX ORDER — GABAPENTIN 300 MG/1
CAPSULE ORAL
Qty: 60 CAP | Refills: 3 | Status: SHIPPED | OUTPATIENT
Start: 2018-10-18 | End: 2018-11-12 | Stop reason: SDUPTHER

## 2018-10-29 ENCOUNTER — OFFICE VISIT (OUTPATIENT)
Dept: MEDICAL GROUP | Facility: LAB | Age: 68
End: 2018-10-29
Payer: MEDICARE

## 2018-10-29 VITALS
HEIGHT: 69 IN | WEIGHT: 218 LBS | HEART RATE: 102 BPM | RESPIRATION RATE: 20 BRPM | DIASTOLIC BLOOD PRESSURE: 62 MMHG | OXYGEN SATURATION: 93 % | SYSTOLIC BLOOD PRESSURE: 112 MMHG | BODY MASS INDEX: 32.29 KG/M2 | TEMPERATURE: 98.3 F

## 2018-10-29 DIAGNOSIS — F32.1 MODERATE SINGLE CURRENT EPISODE OF MAJOR DEPRESSIVE DISORDER (HCC): ICD-10-CM

## 2018-10-29 DIAGNOSIS — E11.42 CONTROLLED TYPE 2 DIABETES MELLITUS WITH DIABETIC POLYNEUROPATHY, WITHOUT LONG-TERM CURRENT USE OF INSULIN (HCC): ICD-10-CM

## 2018-10-29 DIAGNOSIS — Z72.0 TOBACCO USE: ICD-10-CM

## 2018-10-29 DIAGNOSIS — Z87.828 HISTORY OF MOTOR VEHICLE ACCIDENT: ICD-10-CM

## 2018-10-29 DIAGNOSIS — E66.9 OBESITY (BMI 30-39.9): ICD-10-CM

## 2018-10-29 PROCEDURE — 99214 OFFICE O/P EST MOD 30 MIN: CPT | Performed by: FAMILY MEDICINE

## 2018-10-29 RX ORDER — DULOXETIN HYDROCHLORIDE 30 MG/1
60 CAPSULE, DELAYED RELEASE ORAL DAILY
Qty: 90 CAP | Refills: 1 | Status: SHIPPED | OUTPATIENT
Start: 2018-10-29 | End: 2018-10-29 | Stop reason: SDUPTHER

## 2018-10-29 RX ORDER — DULOXETIN HYDROCHLORIDE 30 MG/1
60 CAPSULE, DELAYED RELEASE ORAL DAILY
Qty: 90 CAP | Refills: 1 | Status: SHIPPED | OUTPATIENT
Start: 2018-10-29 | End: 2019-07-11 | Stop reason: SDUPTHER

## 2018-10-29 ASSESSMENT — PATIENT HEALTH QUESTIONNAIRE - PHQ9
CLINICAL INTERPRETATION OF PHQ2 SCORE: 5
5. POOR APPETITE OR OVEREATING: 0 - NOT AT ALL
SUM OF ALL RESPONSES TO PHQ QUESTIONS 1-9: 11
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
## 2018-11-01 ENCOUNTER — TELEPHONE (OUTPATIENT)
Dept: MEDICAL GROUP | Facility: LAB | Age: 68
End: 2018-11-01

## 2018-11-01 NOTE — TELEPHONE ENCOUNTER
Pt HARRY asking to have labs faxed [phone] since he misplaced his from his appt. I also informed him they are fasting.

## 2018-11-05 ENCOUNTER — HOSPITAL ENCOUNTER (OUTPATIENT)
Dept: LAB | Facility: MEDICAL CENTER | Age: 68
End: 2018-11-05
Attending: FAMILY MEDICINE
Payer: MEDICARE

## 2018-11-05 ENCOUNTER — TELEPHONE (OUTPATIENT)
Dept: MEDICAL GROUP | Facility: LAB | Age: 68
End: 2018-11-05

## 2018-11-05 DIAGNOSIS — E66.9 OBESITY (BMI 30-39.9): ICD-10-CM

## 2018-11-05 DIAGNOSIS — E11.42 CONTROLLED TYPE 2 DIABETES MELLITUS WITH DIABETIC POLYNEUROPATHY, WITHOUT LONG-TERM CURRENT USE OF INSULIN (HCC): ICD-10-CM

## 2018-11-05 LAB
BASOPHILS # BLD AUTO: 0.7 % (ref 0–1.8)
BASOPHILS # BLD: 0.05 K/UL (ref 0–0.12)
CREAT UR-MCNC: 112.8 MG/DL
EOSINOPHIL # BLD AUTO: 0.2 K/UL (ref 0–0.51)
EOSINOPHIL NFR BLD: 2.8 % (ref 0–6.9)
ERYTHROCYTE [DISTWIDTH] IN BLOOD BY AUTOMATED COUNT: 43.3 FL (ref 35.9–50)
EST. AVERAGE GLUCOSE BLD GHB EST-MCNC: 146 MG/DL
HBA1C MFR BLD: 6.7 % (ref 0–5.6)
HCT VFR BLD AUTO: 43.1 % (ref 42–52)
HGB BLD-MCNC: 14.3 G/DL (ref 14–18)
IMM GRANULOCYTES # BLD AUTO: 0.02 K/UL (ref 0–0.11)
IMM GRANULOCYTES NFR BLD AUTO: 0.3 % (ref 0–0.9)
LYMPHOCYTES # BLD AUTO: 1.65 K/UL (ref 1–4.8)
LYMPHOCYTES NFR BLD: 23.2 % (ref 22–41)
MCH RBC QN AUTO: 29.2 PG (ref 27–33)
MCHC RBC AUTO-ENTMCNC: 33.2 G/DL (ref 33.7–35.3)
MCV RBC AUTO: 88 FL (ref 81.4–97.8)
MICROALBUMIN UR-MCNC: 3.6 MG/DL
MICROALBUMIN/CREAT UR: 32 MG/G (ref 0–30)
MONOCYTES # BLD AUTO: 0.43 K/UL (ref 0–0.85)
MONOCYTES NFR BLD AUTO: 6.1 % (ref 0–13.4)
NEUTROPHILS # BLD AUTO: 4.75 K/UL (ref 1.82–7.42)
NEUTROPHILS NFR BLD: 66.9 % (ref 44–72)
NRBC # BLD AUTO: 0 K/UL
NRBC BLD-RTO: 0 /100 WBC
PLATELET # BLD AUTO: 177 K/UL (ref 164–446)
PMV BLD AUTO: 10.6 FL (ref 9–12.9)
RBC # BLD AUTO: 4.9 M/UL (ref 4.7–6.1)
TSH SERPL DL<=0.005 MIU/L-ACNC: 0.49 UIU/ML (ref 0.38–5.33)
WBC # BLD AUTO: 7.1 K/UL (ref 4.8–10.8)

## 2018-11-05 PROCEDURE — 80053 COMPREHEN METABOLIC PANEL: CPT

## 2018-11-05 PROCEDURE — 85025 COMPLETE CBC W/AUTO DIFF WBC: CPT

## 2018-11-05 PROCEDURE — 80061 LIPID PANEL: CPT

## 2018-11-05 PROCEDURE — 82043 UR ALBUMIN QUANTITATIVE: CPT

## 2018-11-05 PROCEDURE — 83036 HEMOGLOBIN GLYCOSYLATED A1C: CPT

## 2018-11-05 PROCEDURE — 82570 ASSAY OF URINE CREATININE: CPT

## 2018-11-05 PROCEDURE — 36415 COLL VENOUS BLD VENIPUNCTURE: CPT

## 2018-11-05 PROCEDURE — 84443 ASSAY THYROID STIM HORMONE: CPT

## 2018-11-06 LAB
ALBUMIN SERPL BCP-MCNC: 4.6 G/DL (ref 3.2–4.9)
ALBUMIN/GLOB SERPL: 1.8 G/DL
ALP SERPL-CCNC: 27 U/L (ref 30–99)
ALT SERPL-CCNC: 12 U/L (ref 2–50)
ANION GAP SERPL CALC-SCNC: 8 MMOL/L (ref 0–11.9)
AST SERPL-CCNC: 14 U/L (ref 12–45)
BILIRUB SERPL-MCNC: 0.8 MG/DL (ref 0.1–1.5)
BUN SERPL-MCNC: 13 MG/DL (ref 8–22)
CALCIUM SERPL-MCNC: 9.7 MG/DL (ref 8.5–10.5)
CHLORIDE SERPL-SCNC: 98 MMOL/L (ref 96–112)
CHOLEST SERPL-MCNC: 117 MG/DL (ref 100–199)
CO2 SERPL-SCNC: 27 MMOL/L (ref 20–33)
CREAT SERPL-MCNC: 0.88 MG/DL (ref 0.5–1.4)
FASTING STATUS PATIENT QL REPORTED: NORMAL
GLOBULIN SER CALC-MCNC: 2.6 G/DL (ref 1.9–3.5)
GLUCOSE SERPL-MCNC: 158 MG/DL (ref 65–99)
HDLC SERPL-MCNC: 41 MG/DL
LDLC SERPL CALC-MCNC: 34 MG/DL
POTASSIUM SERPL-SCNC: 4.8 MMOL/L (ref 3.6–5.5)
PROT SERPL-MCNC: 7.2 G/DL (ref 6–8.2)
SODIUM SERPL-SCNC: 133 MMOL/L (ref 135–145)
TRIGL SERPL-MCNC: 209 MG/DL (ref 0–149)

## 2018-11-06 NOTE — TELEPHONE ENCOUNTER
Pt called left voicemail that he did his blood work. He will be in next week to go over the results. Please call if there is anything to discuss before, if not he will see you at his appt.

## 2018-11-07 ENCOUNTER — TELEPHONE (OUTPATIENT)
Dept: MEDICAL GROUP | Facility: LAB | Age: 68
End: 2018-11-07

## 2018-11-09 ENCOUNTER — TELEPHONE (OUTPATIENT)
Dept: MEDICAL GROUP | Facility: LAB | Age: 68
End: 2018-11-09

## 2018-11-09 NOTE — TELEPHONE ENCOUNTER
ESTABLISHED PATIENT PRE-VISIT PLANNING     Patient was NOT contacted to complete PVP.     Note: Patient will not be contacted if there is no indication to call.     1.  Reviewed notes from the last few office visits within the medical group: Yes    2.  If any orders were placed at last visit or intended to be done for this visit (i.e. 6 mos follow-up), do we have Results/Consult Notes?        •  Labs - Labs ordered, completed on 11/05/18 and results are in chart.       •  Imaging - Imaging was not ordered at last office visit.       •  Referrals - No referrals were ordered at last office visit.    3. Is this appointment scheduled as a Hospital Follow-Up? No    4.  Immunizations were updated in Epic using WebIZ?: Epic matches WebIZ       •  Web Iz Recommendations: FLU, PNEUMOVAX (PPSV23) and ZOSTAVAX (Shingles)    5.  Patient is due for the following Health Maintenance Topics:   Health Maintenance Due   Topic Date Due   • RETINAL SCREENING  07/29/1968   • IMM HEP B VACCINE (1 of 3 - Risk 3-dose series) 07/29/1968   • IMM ZOSTER VACCINES (1 of 2) 07/29/1999   • IMM PNEUMOCOCCAL 65+ (ADULT) LOW/MEDIUM RISK SERIES (2 of 2 - PPSV23) 09/27/2017   • DIABETES MONOFILAMENT / LE EXAM  12/09/2017   • COLON CANCER SCREENING ANNUAL FIT  05/14/2018   • Annual Wellness Visit  09/15/2018       - Patient has completed FLU, PREVNAR (PCV13)  and TDAP Immunization(s) per WebIZ. Chart has been updated.    6.  MDX printed for Provider? YES/ MDX printed.    7.  Patient was NOT informed to arrive 15 min prior to their scheduled appointment and bring in their medication bottles.

## 2018-11-12 ENCOUNTER — OFFICE VISIT (OUTPATIENT)
Dept: MEDICAL GROUP | Facility: LAB | Age: 68
End: 2018-11-12
Payer: MEDICARE

## 2018-11-12 VITALS
DIASTOLIC BLOOD PRESSURE: 60 MMHG | SYSTOLIC BLOOD PRESSURE: 124 MMHG | RESPIRATION RATE: 20 BRPM | WEIGHT: 216 LBS | HEIGHT: 69 IN | TEMPERATURE: 98.1 F | OXYGEN SATURATION: 96 % | HEART RATE: 106 BPM | BODY MASS INDEX: 31.99 KG/M2

## 2018-11-12 DIAGNOSIS — M25.552 CHRONIC PAIN OF BOTH HIPS: ICD-10-CM

## 2018-11-12 DIAGNOSIS — I10 ESSENTIAL HYPERTENSION: ICD-10-CM

## 2018-11-12 DIAGNOSIS — E11.42 CONTROLLED TYPE 2 DIABETES MELLITUS WITH DIABETIC POLYNEUROPATHY, WITHOUT LONG-TERM CURRENT USE OF INSULIN (HCC): ICD-10-CM

## 2018-11-12 DIAGNOSIS — G89.29 CHRONIC PAIN OF BOTH HIPS: ICD-10-CM

## 2018-11-12 DIAGNOSIS — M25.551 CHRONIC PAIN OF BOTH HIPS: ICD-10-CM

## 2018-11-12 DIAGNOSIS — F32.1 MODERATE SINGLE CURRENT EPISODE OF MAJOR DEPRESSIVE DISORDER (HCC): ICD-10-CM

## 2018-11-12 PROCEDURE — 99214 OFFICE O/P EST MOD 30 MIN: CPT | Performed by: FAMILY MEDICINE

## 2018-11-12 RX ORDER — GABAPENTIN 300 MG/1
300 CAPSULE ORAL 3 TIMES DAILY
Qty: 60 CAP | Refills: 3 | Status: SHIPPED | OUTPATIENT
Start: 2018-11-12 | End: 2019-01-02

## 2018-11-12 RX ORDER — BENAZEPRIL HYDROCHLORIDE 20 MG/1
TABLET ORAL
Qty: 90 TAB | Refills: 3 | Status: SHIPPED | OUTPATIENT
Start: 2018-11-12 | End: 2019-06-12 | Stop reason: SDUPTHER

## 2018-11-12 RX ORDER — IBUPROFEN 800 MG/1
800 TABLET ORAL EVERY 8 HOURS PRN
Qty: 120 TAB | Refills: 2 | Status: SHIPPED | OUTPATIENT
Start: 2018-11-12 | End: 2019-01-02

## 2018-11-12 RX ORDER — ATORVASTATIN CALCIUM 40 MG/1
40 TABLET, FILM COATED ORAL DAILY
Qty: 90 TAB | Refills: 3 | Status: SHIPPED | OUTPATIENT
Start: 2018-11-12 | End: 2020-07-06 | Stop reason: SDUPTHER

## 2018-11-12 NOTE — PROGRESS NOTES
Subjective:     CC: F/u Depression and Diabetes     HPI:   Prabhakar presents today with follow-up depression medication.  He was started on 60 mg of Cymbalta approximately 2 weeks ago.  He understands full effectivity will not be until 6 weeks.  Today we are here to follow-up side effects, he states he is currently not feeling any side effects.  He denies any suicidal or hot homicidal ideations.  He feels as if there is no change in his depression at this point.  He declines seeking counseling as he does transcendental mediation and life escapes.  He understands the crisis hotline and denies having any anxiety.  He is also here to follow-up his diabetes.  His A1c today is 6.7 up from 6.4, still in the range for his age group.  His diet is questionable as as he is on an [employer]'s shake plan.  He states they are high in protein however they do have sugar.  We discussed maintaining a good diabetic diet however he does not want to waver from the [employer]'s plan and it is to note that he has lost weight from this.  His microalbumin creatinine ratio has improved however it is still abnormal we discussed sugar control in this aspect.  He is here today for his retinal screening and diabetic foot exam.  He denies any worsening of his numbness and tingling and finds good control with his gabapentin.      Past Medical History:   Diagnosis Date   • BPH (benign prostatic hyperplasia)    • GERD (gastroesophageal reflux disease)    • Hypertension    • Neuropathic pain, leg    • Type II or unspecified type diabetes mellitus without mention of complication, not stated as uncontrolled        Social History   Substance Use Topics   • Smoking status: Former Smoker     Packs/day: 0.50     Years: 50.00     Types: Cigarettes   • Smokeless tobacco: Never Used   • Alcohol use 4.8 - 6.0 oz/week     8 - 10 Glasses of wine per week      Comment: 1 bottle of wine 2-3 days a week       Current Outpatient Prescriptions Ordered in Epic   Medication  Sig Dispense Refill   • DULoxetine (CYMBALTA) 30 MG Cap DR Particles Take 2 Caps by mouth every day. 90 Cap 1   • gabapentin (NEURONTIN) 300 MG Cap TAKE ONE CAPSULE BY MOUTH TWICE A DAY 60 Cap 3   • metFORMIN (GLUCOPHAGE) 500 MG Tab TAKE TWO TABLETS BY MOUTH EVERY MORNING THEN TAKE ONE TABLET BY MOUTH EVERY AFTERNOON AND THEN TAKE TWO TABLETS BY MOUTH EVERY EVENING 450 Tab 1   • terazosin (HYTRIN) 5 MG Cap Take 1 Cap by mouth every day. 90 Cap 3   • glipiZIDE SR (GLUCOTROL) 5 MG TABLET SR 24 HR Take 1 Tab by mouth every day. 90 Tab 3   • atorvastatin (LIPITOR) 40 MG Tab Take 1 Tab by mouth every day. 90 Tab 3   • benazepril (LOTENSIN) 20 MG Tab TAKE ONE TABLET BY MOUTH DAILY 90 Tab 3   • MELATONIN PO Take 5 mg by mouth.     • B Complex Vitamins (VITAMIN B COMPLEX PO) Take  by mouth.     • cyanocobalamin (CVS VITAMIN B12) 1000 MCG Tab Take 1 Tab by mouth every day. 90 Tab 3   • Dextrose, Diabetic Use, (GLUCOSE) 1 g Chew Tab Take  by mouth.     • ibuprofen (MOTRIN) 800 MG Tab Take 1 Tab by mouth every 8 hours as needed. 120 Tab 2   • vitamin D (CHOLECALCIFEROL) 1000 UNIT Tab Take 1,000 Units by mouth every day.     • Multiple Vitamins-Minerals (HM COMPLETE 50+ MENS ULTIMATE PO) Take  by mouth.     • omeprazole (PRILOSEC) 20 MG delayed-release capsule Take 20 mg by mouth every day.     • aspirin (ASA) 81 MG Chew Tab chewable tablet Take 81 mg by mouth every day.       No current [facility]-ordered facility-administered medications on file.        Allergies:  Patient has no known allergies.    Health Maintenance: Postponed    ROS:  Gen: no fevers/chill, no changes in weight  Eyes: no changes in vision  ENT: no sore throat, no hearing loss, no bloody nose  Pulm: no sob, no cough  CV: no chest pain, no palpitations  GI: no nausea/vomiting, no diarrhea  : no dysuria  MSk: no myalgias  Skin: no rash  Neuro: no headaches, no numbness/tingling  Heme/Lymph: no easy bruising      Objective:       Exam:  /60   Pulse (!) 106   " Temp 36.7 °C (98.1 °F)   Resp 20   Ht 1.753 m (5' 9.02\")   Wt 98 kg (216 lb)   SpO2 96%   BMI 31.88 kg/m²  Body mass index is 31.88 kg/m².    Gen: Alert and oriented, No apparent distress.  Neck: Neck is supple without lymphadenopathy.  Lungs: Normal effort, CTA bilaterally, no wheezes, rhonchi, or rales  CV: Regular rate and rhythm. No murmurs, rubs, or gallops.               Ext: No clubbing, cyanosis, edema.  Diabetic foot exam: No lesions or calluses noted. 2+ pedal pulses. Sensation intact with 5 out of 10 on monofilament test.    Labs: 11/5/18    Assessment & Plan:     68 y.o. male with the following -     1. Controlled type 2 diabetes mellitus with diabetic polyneuropathy, without long-term current use of insulin (HCC)  Patient to continue current medication regimen. We did discuss how he is in the range of normal A1c for his age group however he did increase in the last 7 months.  We discussed his [employer] shake plan and I would like to look over the sugar and carbohydrate count and that as well as the diabetic counselor.  He denies any numbness or tingling while on the gabapentin however his diabetic foot exam was concerning for significant numbness.  He does have an abnormal microalbumin creatinine ratio, however this is improved from last.  Retinal exam done today.  I would like him to follow-up with a diabetic counselor on his next visit.  I would also like him to see the diabetic educator to discuss the contents of the shakes.  Continue surveillance    2. Moderate single current episode of major depressive disorder (HCC)  This is a chronic issue for this patient.  He is currently not experiencing any negative side effects from the increase on his Cymbalta dose.  He declines counseling at this time as he finds relief with transcendentalism, life escapes, and eastern medicine.  I feel this is appropriate as he finds relief however he is aware that counseling is available to him if he ever needs.  " No suicidal ideation today, does have crisis hotline number.  We will follow-up 4 weeks for medication management.    Please note that this dictation was created using voice recognition software. I have made every reasonable attempt to correct obvious errors, but I expect that there are errors of grammar and possibly content that I did not discover before finalizing the note.

## 2018-11-19 ENCOUNTER — TELEPHONE (OUTPATIENT)
Dept: MEDICAL GROUP | Facility: LAB | Age: 68
End: 2018-11-19

## 2018-11-19 NOTE — TELEPHONE ENCOUNTER
----- Message from Brenda Monroy M.D. sent at 11/19/2018  1:03 PM PST -----  Jitendra Gao  Please call him and let him know he is trying to be reached    Dr TORRES  ----- Message -----  From: Vinh Liu  Sent: 11/19/2018  12:53 PM  To: Brenda Monroy M.D.    Hi, we have reached out to schedule this patient on three different occasions for their IMAGING EXAM (Lung Cancer screening and Ultrasound) and were unable to make contact. Please follow up with the patient.    Thank you

## 2018-12-04 ENCOUNTER — TELEPHONE (OUTPATIENT)
Dept: MEDICAL GROUP | Facility: LAB | Age: 68
End: 2018-12-04

## 2018-12-04 NOTE — TELEPHONE ENCOUNTER
PVP WITH OUTREACH  Future Appointments       Provider Department Center    12/10/2018 10:20 AM Brenda Monroy M.D.; [facility]  [facility]     1/2/2019 1:45 PM Peggy Arndt, PT, MSPT [facility] Outpatient Physical Therapy [facility]          ANNUAL WELLNESS VISIT PRE-VISIT PLANNING     1.  Immunizations were updated in Epic using WebIZ?: Epic matches WebIZ       •  WebIZ Recommendations: PNEUMOVAX (PPSV23) and SHINGRIX (Shingles)       •  Is patient due for Tdap? NO       •  Is patient due for Shingles? NO /vaccine on back order    2.  Specialty Comments was updated with diagnosis information provided by [facility]: NO MDX completed on 11/12/18

## 2018-12-10 ENCOUNTER — OFFICE VISIT (OUTPATIENT)
Dept: MEDICAL GROUP | Facility: LAB | Age: 68
End: 2018-12-10
Payer: MEDICARE

## 2018-12-10 VITALS
BODY MASS INDEX: 32.11 KG/M2 | SYSTOLIC BLOOD PRESSURE: 128 MMHG | WEIGHT: 216.8 LBS | RESPIRATION RATE: 14 BRPM | OXYGEN SATURATION: 95 % | HEIGHT: 69 IN | DIASTOLIC BLOOD PRESSURE: 70 MMHG | TEMPERATURE: 97.8 F | HEART RATE: 95 BPM

## 2018-12-10 DIAGNOSIS — Z12.11 SCREENING FOR COLORECTAL CANCER: Primary | ICD-10-CM

## 2018-12-10 DIAGNOSIS — Z23 NEED FOR PNEUMOCOCCAL VACCINE: ICD-10-CM

## 2018-12-10 DIAGNOSIS — E11.42 CONTROLLED TYPE 2 DIABETES MELLITUS WITH DIABETIC POLYNEUROPATHY, WITHOUT LONG-TERM CURRENT USE OF INSULIN (HCC): ICD-10-CM

## 2018-12-10 DIAGNOSIS — R41.3 MEMORY CHANGE: ICD-10-CM

## 2018-12-10 DIAGNOSIS — K21.9 GASTROESOPHAGEAL REFLUX DISEASE, ESOPHAGITIS PRESENCE NOT SPECIFIED: ICD-10-CM

## 2018-12-10 DIAGNOSIS — F32.1 MODERATE SINGLE CURRENT EPISODE OF MAJOR DEPRESSIVE DISORDER (HCC): ICD-10-CM

## 2018-12-10 DIAGNOSIS — Z12.12 SCREENING FOR COLORECTAL CANCER: Primary | ICD-10-CM

## 2018-12-10 PROCEDURE — G0439 PPPS, SUBSEQ VISIT: HCPCS | Mod: 25 | Performed by: FAMILY MEDICINE

## 2018-12-10 PROCEDURE — 90732 PPSV23 VACC 2 YRS+ SUBQ/IM: CPT | Performed by: FAMILY MEDICINE

## 2018-12-10 PROCEDURE — G0009 ADMIN PNEUMOCOCCAL VACCINE: HCPCS | Performed by: FAMILY MEDICINE

## 2018-12-10 RX ORDER — POLYETHYLENE GLYCOL 3350 17 G/17G
17 POWDER, FOR SOLUTION ORAL DAILY
Qty: 30 EACH | Refills: 3 | Status: SHIPPED | OUTPATIENT
Start: 2018-12-10 | End: 2019-01-02

## 2018-12-10 ASSESSMENT — PATIENT HEALTH QUESTIONNAIRE - PHQ9
5. POOR APPETITE OR OVEREATING: 0 - NOT AT ALL
CLINICAL INTERPRETATION OF PHQ2 SCORE: 2
SUM OF ALL RESPONSES TO PHQ QUESTIONS 1-9: 10

## 2018-12-10 ASSESSMENT — ENCOUNTER SYMPTOMS: GENERAL WELL-BEING: FAIR

## 2018-12-10 ASSESSMENT — ACTIVITIES OF DAILY LIVING (ADL): BATHING_REQUIRES_ASSISTANCE: 0
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
## 2018-12-11 ENCOUNTER — TELEPHONE (OUTPATIENT)
Dept: MEDICAL GROUP | Facility: LAB | Age: 68
End: 2018-12-11

## 2018-12-11 NOTE — TELEPHONE ENCOUNTER
He does not have a follow up. Your appts are not open yet. He will not be seen til March or April and he is sluggish and has no energy. Pt is asking if he can take  supplement? Please advise

## 2018-12-11 NOTE — TELEPHONE ENCOUNTER
1. Caller Name:                                         Call Back Number: [phone]       Patient approves a detailed voicemail message: yes    Pt is asking to have labs for testosterone. He did not mention at his appt yesterday. Can you place lab order. Please call pt once placed.

## 2018-12-12 DIAGNOSIS — R53.83 FATIGUE, UNSPECIFIED TYPE: ICD-10-CM

## 2018-12-13 ENCOUNTER — HOSPITAL ENCOUNTER (OUTPATIENT)
Dept: LAB | Facility: MEDICAL CENTER | Age: 68
End: 2018-12-13
Attending: FAMILY MEDICINE
Payer: MEDICARE

## 2018-12-13 ENCOUNTER — TELEPHONE (OUTPATIENT)
Dept: MEDICAL GROUP | Facility: LAB | Age: 68
End: 2018-12-13

## 2018-12-13 DIAGNOSIS — R53.83 FATIGUE, UNSPECIFIED TYPE: ICD-10-CM

## 2018-12-13 PROCEDURE — 84403 ASSAY OF TOTAL TESTOSTERONE: CPT

## 2018-12-13 PROCEDURE — 36415 COLL VENOUS BLD VENIPUNCTURE: CPT

## 2018-12-13 NOTE — TELEPHONE ENCOUNTER
Patient called, states is lab results should be in by tomorrow and also would like a very inexpensive, generic brand testosterone med, but not in shot form

## 2018-12-14 LAB — TESTOST SERPL-MCNC: 325 NG/DL (ref 175–781)

## 2018-12-17 NOTE — TELEPHONE ENCOUNTER
Patient called today asking about his testosterone results. He would like an inexpensive, generic brand. He stated he is feeling very fatigued and would like to feel better before the holidays as he next appointment is not until 1/2/19. Please advise. He would like a call back as soon as possible from the doctor.

## 2018-12-19 ENCOUNTER — HOSPITAL ENCOUNTER (OUTPATIENT)
Facility: MEDICAL CENTER | Age: 68
End: 2018-12-19
Payer: MEDICARE

## 2018-12-19 PROCEDURE — 82274 ASSAY TEST FOR BLOOD FECAL: CPT

## 2018-12-21 LAB — AMBIGUOUS DTTM AMBI4: NORMAL

## 2018-12-24 LAB — HEMOCCULT STL QL IA: NEGATIVE

## 2018-12-31 ENCOUNTER — TELEPHONE (OUTPATIENT)
Dept: MEDICAL GROUP | Facility: LAB | Age: 68
End: 2018-12-31

## 2018-12-31 NOTE — TELEPHONE ENCOUNTER
ESTABLISHED PATIENT PRE-VISIT PLANNING     Patient was NOT contacted to complete PVP.     Note: Patient will not be contacted if there is no indication to call.     1.  Reviewed notes from the last few office visits within the medical group: Yes    2.  If any orders were placed at last visit or intended to be done for this visit (i.e. 6 mos follow-up), do we have Results/Consult Notes?        •  Labs - Labs were not ordered at last office visit.       •  Imaging - Imaging was not ordered at last office visit.       •  Referrals - Referral ordered, patient has NOT been seen.    3. Is this appointment scheduled as a Hospital Follow-Up? No    4.  Immunizations were updated in Epic using WebIZ?: Epic matches WebIZ       •  Web Iz Recommendations: HEPATITIS B and SHINGRIX (Shingles)    5.  Patient is due for the following Health Maintenance Topics:   Health Maintenance Due   Topic Date Due   • IMM HEP B VACCINE (1 of 3 - Risk 3-dose series) 07/29/1968   • IMM ZOSTER VACCINES (1 of 2) 07/29/1999       - Patient has completed FLU, PNEUMOVAX (PPSV23), PREVNAR (PCV13)  and TDAP Immunization(s) per WebIZ. Chart has been updated.    6.  MDX printed for Provider? YES    7.  Patient was NOT informed to arrive 15 min prior to their scheduled appointment and bring in their medication bottles.

## 2019-01-02 ENCOUNTER — HOSPITAL ENCOUNTER (OUTPATIENT)
Facility: MEDICAL CENTER | Age: 69
End: 2019-01-04
Attending: EMERGENCY MEDICINE | Admitting: HOSPITALIST
Payer: MEDICARE

## 2019-01-02 ENCOUNTER — APPOINTMENT (OUTPATIENT)
Dept: RADIOLOGY | Facility: MEDICAL CENTER | Age: 69
End: 2019-01-02
Attending: EMERGENCY MEDICINE
Payer: MEDICARE

## 2019-01-02 ENCOUNTER — APPOINTMENT (OUTPATIENT)
Dept: PHYSICAL THERAPY | Facility: MEDICAL CENTER | Age: 69
End: 2019-01-02
Payer: MEDICARE

## 2019-01-02 DIAGNOSIS — R55 SYNCOPE, UNSPECIFIED SYNCOPE TYPE: ICD-10-CM

## 2019-01-02 DIAGNOSIS — E11.42 CONTROLLED TYPE 2 DIABETES MELLITUS WITH DIABETIC POLYNEUROPATHY, WITHOUT LONG-TERM CURRENT USE OF INSULIN (HCC): Primary | ICD-10-CM

## 2019-01-02 DIAGNOSIS — E87.1 HYPONATREMIA: ICD-10-CM

## 2019-01-02 DIAGNOSIS — N52.8 OTHER MALE ERECTILE DYSFUNCTION: ICD-10-CM

## 2019-01-02 DIAGNOSIS — M25.551 RIGHT HIP PAIN: ICD-10-CM

## 2019-01-02 DIAGNOSIS — M79.7 FIBROMYALGIA: ICD-10-CM

## 2019-01-02 DIAGNOSIS — F43.10 POST TRAUMATIC STRESS DISORDER (PTSD): ICD-10-CM

## 2019-01-02 DIAGNOSIS — E55.9 VITAMIN D DEFICIENCY: ICD-10-CM

## 2019-01-02 DIAGNOSIS — Z87.828 HISTORY OF MOTOR VEHICLE ACCIDENT: ICD-10-CM

## 2019-01-02 LAB
ALBUMIN SERPL BCP-MCNC: 4.1 G/DL (ref 3.2–4.9)
ALBUMIN/GLOB SERPL: 1.6 G/DL
ALP SERPL-CCNC: 25 U/L (ref 30–99)
ALT SERPL-CCNC: 10 U/L (ref 2–50)
ANION GAP SERPL CALC-SCNC: 20 MMOL/L (ref 0–11.9)
APPEARANCE UR: CLEAR
APTT PPP: 25.5 SEC (ref 24.7–36)
AST SERPL-CCNC: 12 U/L (ref 12–45)
BASOPHILS # BLD AUTO: 0.4 % (ref 0–1.8)
BASOPHILS # BLD: 0.03 K/UL (ref 0–0.12)
BILIRUB SERPL-MCNC: 0.6 MG/DL (ref 0.1–1.5)
BILIRUB UR QL STRIP.AUTO: NEGATIVE
BNP SERPL-MCNC: 17 PG/ML (ref 0–100)
BUN SERPL-MCNC: 21 MG/DL (ref 8–22)
CALCIUM SERPL-MCNC: 9.7 MG/DL (ref 8.5–10.5)
CHLORIDE SERPL-SCNC: 99 MMOL/L (ref 96–112)
CO2 SERPL-SCNC: 16 MMOL/L (ref 20–33)
COLOR UR: YELLOW
CREAT SERPL-MCNC: 1.14 MG/DL (ref 0.5–1.4)
EKG IMPRESSION: NORMAL
EKG IMPRESSION: NORMAL
EOSINOPHIL # BLD AUTO: 0.22 K/UL (ref 0–0.51)
EOSINOPHIL NFR BLD: 2.6 % (ref 0–6.9)
ERYTHROCYTE [DISTWIDTH] IN BLOOD BY AUTOMATED COUNT: 44.6 FL (ref 35.9–50)
ETHANOL BLD-MCNC: 0.04 G/DL
GLOBULIN SER CALC-MCNC: 2.5 G/DL (ref 1.9–3.5)
GLUCOSE SERPL-MCNC: 147 MG/DL (ref 65–99)
GLUCOSE UR STRIP.AUTO-MCNC: NEGATIVE MG/DL
HCT VFR BLD AUTO: 39.7 % (ref 42–52)
HGB BLD-MCNC: 13.2 G/DL (ref 14–18)
IMM GRANULOCYTES # BLD AUTO: 0.03 K/UL (ref 0–0.11)
IMM GRANULOCYTES NFR BLD AUTO: 0.4 % (ref 0–0.9)
INR PPP: 1.11 (ref 0.87–1.13)
KETONES UR STRIP.AUTO-MCNC: 15 MG/DL
LEUKOCYTE ESTERASE UR QL STRIP.AUTO: NEGATIVE
LIPASE SERPL-CCNC: 10 U/L (ref 11–82)
LYMPHOCYTES # BLD AUTO: 1.98 K/UL (ref 1–4.8)
LYMPHOCYTES NFR BLD: 23.7 % (ref 22–41)
MCH RBC QN AUTO: 29.7 PG (ref 27–33)
MCHC RBC AUTO-ENTMCNC: 33.2 G/DL (ref 33.7–35.3)
MCV RBC AUTO: 89.2 FL (ref 81.4–97.8)
MICRO URNS: ABNORMAL
MONOCYTES # BLD AUTO: 0.74 K/UL (ref 0–0.85)
MONOCYTES NFR BLD AUTO: 8.9 % (ref 0–13.4)
NEUTROPHILS # BLD AUTO: 5.35 K/UL (ref 1.82–7.42)
NEUTROPHILS NFR BLD: 64 % (ref 44–72)
NITRITE UR QL STRIP.AUTO: NEGATIVE
NRBC # BLD AUTO: 0.04 K/UL
NRBC BLD-RTO: 0.5 /100 WBC
PH UR STRIP.AUTO: 5.5 [PH]
PLATELET # BLD AUTO: 158 K/UL (ref 164–446)
PMV BLD AUTO: 10.5 FL (ref 9–12.9)
POTASSIUM SERPL-SCNC: 3.9 MMOL/L (ref 3.6–5.5)
PROT SERPL-MCNC: 6.6 G/DL (ref 6–8.2)
PROT UR QL STRIP: NEGATIVE MG/DL
PROTHROMBIN TIME: 14.4 SEC (ref 12–14.6)
RBC # BLD AUTO: 4.45 M/UL (ref 4.7–6.1)
RBC UR QL AUTO: NEGATIVE
SODIUM SERPL-SCNC: 135 MMOL/L (ref 135–145)
SP GR UR STRIP.AUTO: 1.02
TROPONIN I SERPL-MCNC: <0.01 NG/ML (ref 0–0.04)
UROBILINOGEN UR STRIP.AUTO-MCNC: 0.2 MG/DL
WBC # BLD AUTO: 8.4 K/UL (ref 4.8–10.8)

## 2019-01-02 PROCEDURE — 700105 HCHG RX REV CODE 258: Performed by: EMERGENCY MEDICINE

## 2019-01-02 PROCEDURE — 83605 ASSAY OF LACTIC ACID: CPT

## 2019-01-02 PROCEDURE — 99285 EMERGENCY DEPT VISIT HI MDM: CPT

## 2019-01-02 PROCEDURE — 36415 COLL VENOUS BLD VENIPUNCTURE: CPT

## 2019-01-02 PROCEDURE — 83880 ASSAY OF NATRIURETIC PEPTIDE: CPT

## 2019-01-02 PROCEDURE — 85025 COMPLETE CBC W/AUTO DIFF WBC: CPT

## 2019-01-02 PROCEDURE — 80053 COMPREHEN METABOLIC PANEL: CPT

## 2019-01-02 PROCEDURE — 94760 N-INVAS EAR/PLS OXIMETRY 1: CPT

## 2019-01-02 PROCEDURE — 85610 PROTHROMBIN TIME: CPT

## 2019-01-02 PROCEDURE — 83690 ASSAY OF LIPASE: CPT

## 2019-01-02 PROCEDURE — 700102 HCHG RX REV CODE 250 W/ 637 OVERRIDE(OP): Performed by: EMERGENCY MEDICINE

## 2019-01-02 PROCEDURE — 80061 LIPID PANEL: CPT

## 2019-01-02 PROCEDURE — G0378 HOSPITAL OBSERVATION PER HR: HCPCS

## 2019-01-02 PROCEDURE — 93005 ELECTROCARDIOGRAM TRACING: CPT

## 2019-01-02 PROCEDURE — 99220 PR INITIAL OBSERVATION CARE,LEVL III: CPT | Performed by: HOSPITALIST

## 2019-01-02 PROCEDURE — 70450 CT HEAD/BRAIN W/O DYE: CPT

## 2019-01-02 PROCEDURE — 81003 URINALYSIS AUTO W/O SCOPE: CPT | Mod: XU

## 2019-01-02 PROCEDURE — 82533 TOTAL CORTISOL: CPT

## 2019-01-02 PROCEDURE — A9270 NON-COVERED ITEM OR SERVICE: HCPCS | Performed by: EMERGENCY MEDICINE

## 2019-01-02 PROCEDURE — 80307 DRUG TEST PRSMV CHEM ANLYZR: CPT

## 2019-01-02 PROCEDURE — 93005 ELECTROCARDIOGRAM TRACING: CPT | Performed by: EMERGENCY MEDICINE

## 2019-01-02 PROCEDURE — 83036 HEMOGLOBIN GLYCOSYLATED A1C: CPT

## 2019-01-02 PROCEDURE — 71045 X-RAY EXAM CHEST 1 VIEW: CPT

## 2019-01-02 PROCEDURE — 84484 ASSAY OF TROPONIN QUANT: CPT

## 2019-01-02 PROCEDURE — 84443 ASSAY THYROID STIM HORMONE: CPT

## 2019-01-02 PROCEDURE — 85730 THROMBOPLASTIN TIME PARTIAL: CPT

## 2019-01-02 RX ORDER — SODIUM CHLORIDE 9 MG/ML
INJECTION, SOLUTION INTRAVENOUS CONTINUOUS
Status: DISCONTINUED | OUTPATIENT
Start: 2019-01-02 | End: 2019-01-04

## 2019-01-02 RX ORDER — POLYETHYLENE GLYCOL 3350 17 G/17G
1 POWDER, FOR SOLUTION ORAL
Status: DISCONTINUED | OUTPATIENT
Start: 2019-01-02 | End: 2019-01-04 | Stop reason: HOSPADM

## 2019-01-02 RX ORDER — BISACODYL 10 MG
10 SUPPOSITORY, RECTAL RECTAL
Status: DISCONTINUED | OUTPATIENT
Start: 2019-01-02 | End: 2019-01-04 | Stop reason: HOSPADM

## 2019-01-02 RX ORDER — OMEPRAZOLE 20 MG/1
20 CAPSULE, DELAYED RELEASE ORAL DAILY
Status: DISCONTINUED | OUTPATIENT
Start: 2019-01-03 | End: 2019-01-04 | Stop reason: HOSPADM

## 2019-01-02 RX ORDER — ONDANSETRON 2 MG/ML
4 INJECTION INTRAMUSCULAR; INTRAVENOUS EVERY 4 HOURS PRN
Status: DISCONTINUED | OUTPATIENT
Start: 2019-01-02 | End: 2019-01-04 | Stop reason: HOSPADM

## 2019-01-02 RX ORDER — DULOXETIN HYDROCHLORIDE 60 MG/1
60 CAPSULE, DELAYED RELEASE ORAL DAILY
Status: DISCONTINUED | OUTPATIENT
Start: 2019-01-03 | End: 2019-01-04 | Stop reason: HOSPADM

## 2019-01-02 RX ORDER — SODIUM CHLORIDE 9 MG/ML
500 INJECTION, SOLUTION INTRAVENOUS ONCE
Status: COMPLETED | OUTPATIENT
Start: 2019-01-02 | End: 2019-01-02

## 2019-01-02 RX ORDER — ONDANSETRON 4 MG/1
4 TABLET, ORALLY DISINTEGRATING ORAL EVERY 4 HOURS PRN
Status: DISCONTINUED | OUTPATIENT
Start: 2019-01-02 | End: 2019-01-04 | Stop reason: HOSPADM

## 2019-01-02 RX ORDER — AMOXICILLIN 250 MG
2 CAPSULE ORAL 2 TIMES DAILY
Status: DISCONTINUED | OUTPATIENT
Start: 2019-01-03 | End: 2019-01-04 | Stop reason: HOSPADM

## 2019-01-02 RX ORDER — DEXTROSE MONOHYDRATE 25 G/50ML
25 INJECTION, SOLUTION INTRAVENOUS
Status: DISCONTINUED | OUTPATIENT
Start: 2019-01-02 | End: 2019-01-04 | Stop reason: HOSPADM

## 2019-01-02 RX ORDER — ALUMINA, MAGNESIA, AND SIMETHICONE 2400; 2400; 240 MG/30ML; MG/30ML; MG/30ML
10 SUSPENSION ORAL ONCE
Status: COMPLETED | OUTPATIENT
Start: 2019-01-02 | End: 2019-01-02

## 2019-01-02 RX ORDER — ATORVASTATIN CALCIUM 40 MG/1
40 TABLET, FILM COATED ORAL DAILY
Status: DISCONTINUED | OUTPATIENT
Start: 2019-01-03 | End: 2019-01-04 | Stop reason: HOSPADM

## 2019-01-02 RX ORDER — HEPARIN SODIUM 5000 [USP'U]/ML
5000 INJECTION, SOLUTION INTRAVENOUS; SUBCUTANEOUS EVERY 8 HOURS
Status: DISCONTINUED | OUTPATIENT
Start: 2019-01-02 | End: 2019-01-04 | Stop reason: HOSPADM

## 2019-01-02 RX ADMIN — SODIUM CHLORIDE 500 ML: 9 INJECTION, SOLUTION INTRAVENOUS at 19:30

## 2019-01-02 RX ADMIN — ALUMINUM HYDROXIDE, MAGNESIUM HYDROXIDE,SIMETHICONE 10 ML: 400; 400; 40 LIQUID ORAL at 21:19

## 2019-01-02 ASSESSMENT — LIFESTYLE VARIABLES
HOW MANY TIMES IN THE PAST YEAR HAVE YOU HAD 5 OR MORE DRINKS IN A DAY: 0
CONSUMPTION TOTAL: NEGATIVE
TOTAL SCORE: 0
ON A TYPICAL DAY WHEN YOU DRINK ALCOHOL HOW MANY DRINKS DO YOU HAVE: 2
EVER FELT BAD OR GUILTY ABOUT YOUR DRINKING: NO
TOTAL SCORE: 0
HAVE YOU EVER FELT YOU SHOULD CUT DOWN ON YOUR DRINKING: NO
AVERAGE NUMBER OF DAYS PER WEEK YOU HAVE A DRINK CONTAINING ALCOHOL: 3
DO YOU DRINK ALCOHOL: YES
EVER HAD A DRINK FIRST THING IN THE MORNING TO STEADY YOUR NERVES TO GET RID OF A HANGOVER: NO
HAVE PEOPLE ANNOYED YOU BY CRITICIZING YOUR DRINKING: NO
TOTAL SCORE: 0

## 2019-01-03 ENCOUNTER — APPOINTMENT (OUTPATIENT)
Dept: RADIOLOGY | Facility: MEDICAL CENTER | Age: 69
End: 2019-01-03
Attending: HOSPITALIST
Payer: MEDICARE

## 2019-01-03 PROBLEM — J34.9 SINUS DISEASE: Status: ACTIVE | Noted: 2019-01-03

## 2019-01-03 PROBLEM — E86.0 DEHYDRATION: Status: ACTIVE | Noted: 2019-01-03

## 2019-01-03 PROBLEM — E87.20 LACTIC ACIDOSIS: Status: ACTIVE | Noted: 2019-01-03

## 2019-01-03 PROBLEM — F10.10 ALCOHOL ABUSE: Status: ACTIVE | Noted: 2019-01-03

## 2019-01-03 PROBLEM — I95.9 HYPOTENSION: Status: ACTIVE | Noted: 2019-01-03

## 2019-01-03 PROBLEM — R55 SYNCOPE: Status: ACTIVE | Noted: 2019-01-03

## 2019-01-03 LAB
ALBUMIN SERPL BCP-MCNC: 3.6 G/DL (ref 3.2–4.9)
ALBUMIN SERPL BCP-MCNC: 4 G/DL (ref 3.2–4.9)
ALBUMIN/GLOB SERPL: 1.6 G/DL
ALBUMIN/GLOB SERPL: 1.8 G/DL
ALP SERPL-CCNC: 22 U/L (ref 30–99)
ALP SERPL-CCNC: 31 U/L (ref 30–99)
ALT SERPL-CCNC: 7 U/L (ref 2–50)
ALT SERPL-CCNC: 8 U/L (ref 2–50)
ANION GAP SERPL CALC-SCNC: 10 MMOL/L (ref 0–11.9)
ANION GAP SERPL CALC-SCNC: 10 MMOL/L (ref 0–11.9)
AST SERPL-CCNC: 8 U/L (ref 12–45)
AST SERPL-CCNC: 9 U/L (ref 12–45)
BASOPHILS # BLD AUTO: 0.5 % (ref 0–1.8)
BASOPHILS # BLD AUTO: 0.5 % (ref 0–1.8)
BASOPHILS # BLD: 0.04 K/UL (ref 0–0.12)
BASOPHILS # BLD: 0.04 K/UL (ref 0–0.12)
BILIRUB SERPL-MCNC: 0.5 MG/DL (ref 0.1–1.5)
BILIRUB SERPL-MCNC: 0.6 MG/DL (ref 0.1–1.5)
BUN SERPL-MCNC: 19 MG/DL (ref 8–22)
BUN SERPL-MCNC: 21 MG/DL (ref 8–22)
CALCIUM SERPL-MCNC: 8.6 MG/DL (ref 8.5–10.5)
CALCIUM SERPL-MCNC: 9.1 MG/DL (ref 8.5–10.5)
CHLORIDE SERPL-SCNC: 103 MMOL/L (ref 96–112)
CHLORIDE SERPL-SCNC: 106 MMOL/L (ref 96–112)
CHOLEST SERPL-MCNC: 160 MG/DL (ref 100–199)
CHOLEST SERPL-MCNC: 172 MG/DL (ref 100–199)
CO2 SERPL-SCNC: 20 MMOL/L (ref 20–33)
CO2 SERPL-SCNC: 25 MMOL/L (ref 20–33)
CORTIS SERPL-MCNC: 7.1 UG/DL (ref 0–23)
CREAT SERPL-MCNC: 0.84 MG/DL (ref 0.5–1.4)
CREAT SERPL-MCNC: 0.94 MG/DL (ref 0.5–1.4)
EOSINOPHIL # BLD AUTO: 0.06 K/UL (ref 0–0.51)
EOSINOPHIL # BLD AUTO: 0.17 K/UL (ref 0–0.51)
EOSINOPHIL NFR BLD: 0.7 % (ref 0–6.9)
EOSINOPHIL NFR BLD: 2 % (ref 0–6.9)
ERYTHROCYTE [DISTWIDTH] IN BLOOD BY AUTOMATED COUNT: 42.9 FL (ref 35.9–50)
ERYTHROCYTE [DISTWIDTH] IN BLOOD BY AUTOMATED COUNT: 43.8 FL (ref 35.9–50)
EST. AVERAGE GLUCOSE BLD GHB EST-MCNC: 137 MG/DL
GLOBULIN SER CALC-MCNC: 2.2 G/DL (ref 1.9–3.5)
GLOBULIN SER CALC-MCNC: 2.2 G/DL (ref 1.9–3.5)
GLUCOSE BLD-MCNC: 150 MG/DL (ref 65–99)
GLUCOSE BLD-MCNC: 165 MG/DL (ref 65–99)
GLUCOSE BLD-MCNC: 176 MG/DL (ref 65–99)
GLUCOSE BLD-MCNC: 178 MG/DL (ref 65–99)
GLUCOSE SERPL-MCNC: 160 MG/DL (ref 65–99)
GLUCOSE SERPL-MCNC: 177 MG/DL (ref 65–99)
HBA1C MFR BLD: 6.4 % (ref 0–5.6)
HCT VFR BLD AUTO: 36.7 % (ref 42–52)
HCT VFR BLD AUTO: 37.9 % (ref 42–52)
HDLC SERPL-MCNC: 37 MG/DL
HDLC SERPL-MCNC: 39 MG/DL
HGB BLD-MCNC: 12.5 G/DL (ref 14–18)
HGB BLD-MCNC: 12.8 G/DL (ref 14–18)
IMM GRANULOCYTES # BLD AUTO: 0.02 K/UL (ref 0–0.11)
IMM GRANULOCYTES # BLD AUTO: 0.04 K/UL (ref 0–0.11)
IMM GRANULOCYTES NFR BLD AUTO: 0.2 % (ref 0–0.9)
IMM GRANULOCYTES NFR BLD AUTO: 0.5 % (ref 0–0.9)
LACTATE BLD-SCNC: 2.1 MMOL/L (ref 0.5–2)
LDLC SERPL CALC-MCNC: 70 MG/DL
LDLC SERPL CALC-MCNC: 70 MG/DL
LYMPHOCYTES # BLD AUTO: 1.4 K/UL (ref 1–4.8)
LYMPHOCYTES # BLD AUTO: 2.08 K/UL (ref 1–4.8)
LYMPHOCYTES NFR BLD: 17.2 % (ref 22–41)
LYMPHOCYTES NFR BLD: 24 % (ref 22–41)
MCH RBC QN AUTO: 28.7 PG (ref 27–33)
MCH RBC QN AUTO: 30.2 PG (ref 27–33)
MCHC RBC AUTO-ENTMCNC: 33 G/DL (ref 33.7–35.3)
MCHC RBC AUTO-ENTMCNC: 34.9 G/DL (ref 33.7–35.3)
MCV RBC AUTO: 86.6 FL (ref 81.4–97.8)
MCV RBC AUTO: 87.1 FL (ref 81.4–97.8)
MONOCYTES # BLD AUTO: 0.52 K/UL (ref 0–0.85)
MONOCYTES # BLD AUTO: 0.67 K/UL (ref 0–0.85)
MONOCYTES NFR BLD AUTO: 6.4 % (ref 0–13.4)
MONOCYTES NFR BLD AUTO: 7.7 % (ref 0–13.4)
NEUTROPHILS # BLD AUTO: 5.65 K/UL (ref 1.82–7.42)
NEUTROPHILS # BLD AUTO: 6.1 K/UL (ref 1.82–7.42)
NEUTROPHILS NFR BLD: 65.3 % (ref 44–72)
NEUTROPHILS NFR BLD: 75 % (ref 44–72)
NRBC # BLD AUTO: 0 K/UL
NRBC # BLD AUTO: 0 K/UL
NRBC BLD-RTO: 0 /100 WBC
NRBC BLD-RTO: 0 /100 WBC
PLATELET # BLD AUTO: 165 K/UL (ref 164–446)
PLATELET # BLD AUTO: 174 K/UL (ref 164–446)
PMV BLD AUTO: 10.1 FL (ref 9–12.9)
PMV BLD AUTO: 10.7 FL (ref 9–12.9)
POTASSIUM SERPL-SCNC: 3.9 MMOL/L (ref 3.6–5.5)
POTASSIUM SERPL-SCNC: 3.9 MMOL/L (ref 3.6–5.5)
PROT SERPL-MCNC: 5.8 G/DL (ref 6–8.2)
PROT SERPL-MCNC: 6.2 G/DL (ref 6–8.2)
RBC # BLD AUTO: 4.24 M/UL (ref 4.7–6.1)
RBC # BLD AUTO: 4.35 M/UL (ref 4.7–6.1)
SODIUM SERPL-SCNC: 136 MMOL/L (ref 135–145)
SODIUM SERPL-SCNC: 138 MMOL/L (ref 135–145)
TRIGL SERPL-MCNC: 263 MG/DL (ref 0–149)
TRIGL SERPL-MCNC: 315 MG/DL (ref 0–149)
TROPONIN I SERPL-MCNC: <0.01 NG/ML (ref 0–0.04)
TROPONIN I SERPL-MCNC: <0.01 NG/ML (ref 0–0.04)
TSH SERPL DL<=0.005 MIU/L-ACNC: 0.66 UIU/ML (ref 0.38–5.33)
WBC # BLD AUTO: 8.1 K/UL (ref 4.8–10.8)
WBC # BLD AUTO: 8.7 K/UL (ref 4.8–10.8)

## 2019-01-03 PROCEDURE — 700105 HCHG RX REV CODE 258: Performed by: HOSPITALIST

## 2019-01-03 PROCEDURE — 700102 HCHG RX REV CODE 250 W/ 637 OVERRIDE(OP): Performed by: HOSPITALIST

## 2019-01-03 PROCEDURE — 84484 ASSAY OF TROPONIN QUANT: CPT

## 2019-01-03 PROCEDURE — 96372 THER/PROPH/DIAG INJ SC/IM: CPT | Mod: XU

## 2019-01-03 PROCEDURE — 80061 LIPID PANEL: CPT

## 2019-01-03 PROCEDURE — 700117 HCHG RX CONTRAST REV CODE 255: Performed by: HOSPITALIST

## 2019-01-03 PROCEDURE — 95951 EEG: CPT | Mod: 26,52 | Performed by: PSYCHIATRY & NEUROLOGY

## 2019-01-03 PROCEDURE — A9585 GADOBUTROL INJECTION: HCPCS | Performed by: HOSPITALIST

## 2019-01-03 PROCEDURE — A9270 NON-COVERED ITEM OR SERVICE: HCPCS | Performed by: HOSPITALIST

## 2019-01-03 PROCEDURE — 82962 GLUCOSE BLOOD TEST: CPT | Mod: 91

## 2019-01-03 PROCEDURE — 70553 MRI BRAIN STEM W/O & W/DYE: CPT

## 2019-01-03 PROCEDURE — 97165 OT EVAL LOW COMPLEX 30 MIN: CPT

## 2019-01-03 PROCEDURE — 700111 HCHG RX REV CODE 636 W/ 250 OVERRIDE (IP): Performed by: HOSPITALIST

## 2019-01-03 PROCEDURE — G0378 HOSPITAL OBSERVATION PER HR: HCPCS

## 2019-01-03 PROCEDURE — 93880 EXTRACRANIAL BILAT STUDY: CPT

## 2019-01-03 PROCEDURE — 93880 EXTRACRANIAL BILAT STUDY: CPT | Mod: 26 | Performed by: SURGERY

## 2019-01-03 PROCEDURE — 95951 EEG: CPT | Mod: 52 | Performed by: PSYCHIATRY & NEUROLOGY

## 2019-01-03 PROCEDURE — 85025 COMPLETE CBC W/AUTO DIFF WBC: CPT

## 2019-01-03 PROCEDURE — 80053 COMPREHEN METABOLIC PANEL: CPT

## 2019-01-03 PROCEDURE — 97161 PT EVAL LOW COMPLEX 20 MIN: CPT

## 2019-01-03 PROCEDURE — 36415 COLL VENOUS BLD VENIPUNCTURE: CPT

## 2019-01-03 RX ORDER — LORAZEPAM 2 MG/ML
1 INJECTION INTRAMUSCULAR ONCE
Status: ACTIVE | OUTPATIENT
Start: 2019-01-03 | End: 2019-01-04

## 2019-01-03 RX ORDER — GADOBUTROL 604.72 MG/ML
10 INJECTION INTRAVENOUS ONCE
Status: COMPLETED | OUTPATIENT
Start: 2019-01-03 | End: 2019-01-03

## 2019-01-03 RX ADMIN — OMEPRAZOLE 20 MG: 20 CAPSULE, DELAYED RELEASE ORAL at 04:54

## 2019-01-03 RX ADMIN — INSULIN HUMAN 1 UNITS: 100 INJECTION, SOLUTION PARENTERAL at 11:38

## 2019-01-03 RX ADMIN — INSULIN HUMAN 1 UNITS: 100 INJECTION, SOLUTION PARENTERAL at 17:16

## 2019-01-03 RX ADMIN — SODIUM CHLORIDE: 9 INJECTION, SOLUTION INTRAVENOUS at 01:25

## 2019-01-03 RX ADMIN — GADOBUTROL 10 ML: 604.72 INJECTION INTRAVENOUS at 20:00

## 2019-01-03 RX ADMIN — HEPARIN SODIUM 5000 UNITS: 5000 INJECTION, SOLUTION INTRAVENOUS; SUBCUTANEOUS at 04:53

## 2019-01-03 RX ADMIN — SODIUM CHLORIDE: 9 INJECTION, SOLUTION INTRAVENOUS at 17:04

## 2019-01-03 RX ADMIN — DULOXETINE HYDROCHLORIDE 60 MG: 60 CAPSULE, DELAYED RELEASE ORAL at 04:52

## 2019-01-03 RX ADMIN — HEPARIN SODIUM 5000 UNITS: 5000 INJECTION, SOLUTION INTRAVENOUS; SUBCUTANEOUS at 15:49

## 2019-01-03 RX ADMIN — ATORVASTATIN CALCIUM 40 MG: 40 TABLET, FILM COATED ORAL at 04:53

## 2019-01-03 RX ADMIN — INSULIN HUMAN 1 UNITS: 100 INJECTION, SOLUTION PARENTERAL at 05:04

## 2019-01-03 RX ADMIN — STANDARDIZED SENNA CONCENTRATE AND DOCUSATE SODIUM 2 TABLET: 8.6; 5 TABLET, FILM COATED ORAL at 06:00

## 2019-01-03 RX ADMIN — HEPARIN SODIUM 5000 UNITS: 5000 INJECTION, SOLUTION INTRAVENOUS; SUBCUTANEOUS at 20:42

## 2019-01-03 RX ADMIN — SODIUM CHLORIDE: 9 INJECTION, SOLUTION INTRAVENOUS at 11:24

## 2019-01-03 RX ADMIN — STANDARDIZED SENNA CONCENTRATE AND DOCUSATE SODIUM 2 TABLET: 8.6; 5 TABLET, FILM COATED ORAL at 17:04

## 2019-01-03 ASSESSMENT — COGNITIVE AND FUNCTIONAL STATUS - GENERAL
MOBILITY SCORE: 23
DAILY ACTIVITIY SCORE: 24
DAILY ACTIVITIY SCORE: 24
WALKING IN HOSPITAL ROOM: A LITTLE
SUGGESTED CMS G CODE MODIFIER MOBILITY: CI
CLIMB 3 TO 5 STEPS WITH RAILING: A LITTLE
SUGGESTED CMS G CODE MODIFIER MOBILITY: CJ
SUGGESTED CMS G CODE MODIFIER DAILY ACTIVITY: CH
SUGGESTED CMS G CODE MODIFIER DAILY ACTIVITY: CH
MOBILITY SCORE: 22
CLIMB 3 TO 5 STEPS WITH RAILING: A LITTLE

## 2019-01-03 ASSESSMENT — PATIENT HEALTH QUESTIONNAIRE - PHQ9
1. LITTLE INTEREST OR PLEASURE IN DOING THINGS: NOT AT ALL
2. FEELING DOWN, DEPRESSED, IRRITABLE, OR HOPELESS: NOT AT ALL
SUM OF ALL RESPONSES TO PHQ9 QUESTIONS 1 AND 2: 0

## 2019-01-03 ASSESSMENT — LIFESTYLE VARIABLES
TOTAL SCORE: 1
SUBSTANCE_ABUSE: 0
ON A TYPICAL DAY WHEN YOU DRINK ALCOHOL HOW MANY DRINKS DO YOU HAVE: 2
EVER_SMOKED: YES
CONSUMPTION TOTAL: NEGATIVE
HOW MANY TIMES IN THE PAST YEAR HAVE YOU HAD 5 OR MORE DRINKS IN A DAY: 0
ALCOHOL_USE: YES
AVERAGE NUMBER OF DAYS PER WEEK YOU HAVE A DRINK CONTAINING ALCOHOL: 2
EVER FELT BAD OR GUILTY ABOUT YOUR DRINKING: YES
EVER HAD A DRINK FIRST THING IN THE MORNING TO STEADY YOUR NERVES TO GET RID OF A HANGOVER: NO
TOTAL SCORE: 1
TOTAL SCORE: 1
HAVE PEOPLE ANNOYED YOU BY CRITICIZING YOUR DRINKING: NO
HAVE YOU EVER FELT YOU SHOULD CUT DOWN ON YOUR DRINKING: NO

## 2019-01-03 ASSESSMENT — GAIT ASSESSMENTS
DEVIATION: OTHER (COMMENT)
GAIT LEVEL OF ASSIST: STAND BY ASSIST
DISTANCE (FEET): 500

## 2019-01-03 ASSESSMENT — PAIN SCALES - GENERAL
PAINLEVEL_OUTOF10: 0

## 2019-01-03 ASSESSMENT — ENCOUNTER SYMPTOMS
DOUBLE VISION: 0
HALLUCINATIONS: 0
DIZZINESS: 0
WEAKNESS: 0
NAUSEA: 0
FLANK PAIN: 0
EYE DISCHARGE: 0
FOCAL WEAKNESS: 0
LOSS OF CONSCIOUSNESS: 1
SHORTNESS OF BREATH: 0
DEPRESSION: 0
SPEECH CHANGE: 0
VOMITING: 0
MYALGIAS: 0
BRUISES/BLEEDS EASILY: 0
HEARTBURN: 0
ABDOMINAL PAIN: 0
CHILLS: 0
HEMOPTYSIS: 0
BLURRED VISION: 0
FEVER: 0
PALPITATIONS: 0
COUGH: 0
SENSORY CHANGE: 0

## 2019-01-03 ASSESSMENT — ACTIVITIES OF DAILY LIVING (ADL): TOILETING: INDEPENDENT

## 2019-01-03 NOTE — ED NOTES
Bedside report to Tele RN, all questions answered, belongings with pt and his wife, chart with receiving RN. Pt transported off unit monitored by receiving RN.
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
## 2019-01-03 NOTE — PROGRESS NOTES
Received report from CARLITOS Burrell (ER). Pt is A&O x 4. Pt transported via [facility] with ACLS RN and zoll monitor. Pt arrived to unit, tele box on patient, confirmed with monitor room. [facility], tolerated well. Pt oriented to unit and call light, verbalized understanding. Fall precautions in place. Wife at bedside.Call light and bedside table within reach, bed locked in lowest position with controls on. Assessment completed. Will continue to monitor.

## 2019-01-03 NOTE — ED NOTES
Assumed care of patient. Pt assessed, AAO x 4 . Patient's concerns addressed.  Fall precautions in place.  Pt repositioned and comfortable.  Call light within reach. Will continue to monitor. Notified of tele bed.

## 2019-01-03 NOTE — THERAPY
"Occupational Therapy Evaluation completed.   Functional Status:  Mod I w/bed mobility, spv w/sit>Stand walking to room no AD no LOB, spv w/toileting, grooming standing at sink and LB dressing. No c/o fatigue or dizziness w/activity. RN at bedside. Spouse at bedside reports both pt and spouse report pt is at/near baseline concerned about change in BP meds. BTB   Plan of Care: Patient with no further skilled OT needs in the acute care setting at this time  Discharge Recommendations:  Equipment: No Equipment Needed. Post-acute therapy Anticipate that the patient will have no further occupational therapy needs after discharge from the hospital.       See \"Rehab Therapy-Acute\" Patient Summary Report for complete documentation.      68 yr old male admitted for syncopal episode, PMHx of HTN, DM and acid reflux. Pt does report alcohol consumption prior to events. At this time pt demonstrated ability to complete ADL's and had no c/o symptoms. Anticipate continued progress w/medical intervention and anticipate no OT needs.  "

## 2019-01-03 NOTE — ED PROVIDER NOTES
ED Provider Note    Scribed for Eliza Dean D.O. by Yamil Paulson. 1/2/2019, 7:36 PM.    Primary care provider: Brenda Monroy M.D.  Means of arrival: Ambulance  History obtained from: Patient  History limited by: None    CHIEF COMPLAINT  Chief Complaint   Patient presents with   • Syncope       HPI  Prabhakar Sierra is a 68 y.o. male who presents to the Emergency Department for evaluation following a witnessed syncopal event that occurred while he was sitting at dinner prior to arrival. The patient's wife states he lost consciousness for approximately 10 minutes total, but states he kept drifting in and out of consciousness. No alleviating or exacerbating factors are identified. The patient did not hit his head during the incident, but does not remember the event. Upon EMS arrival the patient was found to be hypotensive at 78 systolic. The patient's wife reports to have noticed the patient also had mild slurred speech en route to the ED tonight. Prior to the event, the patient had 3 alcoholic beverages. He had a similar syncopal event 8 days ago. The patient has no known history of cardiovascular disease, but he does have diabetes and hypertension. He denies associated chest pain, shortness of breath, abdomina pain, nausea, or vomiting. The patient additionally reports he has not had any recent infectious symptoms of congestion, runny nose, or dysuria.    REVIEW OF SYSTEMS  See HPI for further details. All other systems are negative.     PAST MEDICAL HISTORY   has a past medical history of BPH (benign prostatic hyperplasia); GERD (gastroesophageal reflux disease); Hypertension; Neuropathic pain, leg; and Type II or unspecified type diabetes mellitus without mention of complication, not stated as uncontrolled.    SURGICAL HISTORY  patient denies any surgical history    SOCIAL HISTORY  Social History   Substance Use Topics   • Smoking status: Former Smoker     Packs/day: 0.50     Years: 50.00     Types:  "Cigarettes     Quit date: 1/10/2007   • Smokeless tobacco: Never Used   • Alcohol use 4.8 - 6.0 oz/week     8 - 10 Glasses of wine per week      Comment: 1 bottle of wine 2-3 days a week      History   Drug Use No       FAMILY HISTORY  Family History   Problem Relation Age of Onset   • Heart Disease Mother    • Diabetes Father        CURRENT MEDICATIONS  Reviewed.  See Encounter Summary.     ALLERGIES  No Known Allergies    PHYSICAL EXAM  VITAL SIGNS: BP (!) 80/45   Pulse 95   Temp 36.1 °C (97 °F) (Temporal)   Resp 15   Ht 1.753 m (5' 9\")   Wt 98.4 kg (217 lb)   SpO2 95%   BMI 32.05 kg/m²   Constitutional: Alert and in no apparent distress.  HENT: Normocephalic atraumatic. Bilateral external ears normal. Nose normal. Mucous membranes are moist.  Eyes: Pupils are equal and reactive. Conjunctiva normal. Non-icteric sclera.   Neck: Normal range of motion without tenderness. Supple. No meningeal signs.  Cardiovascular: Regular rate and rhythm. No murmurs, gallops or rubs.  Thorax & Lungs: Breath sounds are clear to auscultation bilaterally. No wheezing, rhonchi or rales.  Abdomen: Soft, nontender and nondistended. No peritoneal signs noted.  Skin: Warm and dry. No rashes are noted.  Back: No bony tenderness, No CVA tenderness.   Extremities: 2+ peripheral pulses. Cap refill is less than 2 seconds. No edema, cyanosis, or clubbing.  Musculoskeletal: Good range of motion in all major joints. No tenderness to palpation or major deformities noted.   Neurologic: Alert and oriented ×3. The patient moves all 4 extremities and follows commands. CN II-XII grossly intact, sensation grossly intact. 5/5 muscle strength. No pronator drift. Negative Romberg's.   Psychiatric: Affect is normal. Judgment appears to be intact.    DIAGNOSTIC STUDIES / PROCEDURES     LABS  Results for orders placed or performed during the hospital encounter of 01/02/19   CBC WITH DIFFERENTIAL   Result Value Ref Range    WBC 8.4 4.8 - 10.8 K/uL    RBC " 4.45 (L) 4.70 - 6.10 M/uL    Hemoglobin 13.2 (L) 14.0 - 18.0 g/dL    Hematocrit 39.7 (L) 42.0 - 52.0 %    MCV 89.2 81.4 - 97.8 fL    MCH 29.7 27.0 - 33.0 pg    MCHC 33.2 (L) 33.7 - 35.3 g/dL    RDW 44.6 35.9 - 50.0 fL    Platelet Count 158 (L) 164 - 446 K/uL    MPV 10.5 9.0 - 12.9 fL    Neutrophils-Polys 64.00 44.00 - 72.00 %    Lymphocytes 23.70 22.00 - 41.00 %    Monocytes 8.90 0.00 - 13.40 %    Eosinophils 2.60 0.00 - 6.90 %    Basophils 0.40 0.00 - 1.80 %    Immature Granulocytes 0.40 0.00 - 0.90 %    Nucleated RBC 0.50 /100 WBC    Neutrophils (Absolute) 5.35 1.82 - 7.42 K/uL    Lymphs (Absolute) 1.98 1.00 - 4.80 K/uL    Monos (Absolute) 0.74 0.00 - 0.85 K/uL    Eos (Absolute) 0.22 0.00 - 0.51 K/uL    Baso (Absolute) 0.03 0.00 - 0.12 K/uL    Immature Granulocytes (abs) 0.03 0.00 - 0.11 K/uL    NRBC (Absolute) 0.04 K/uL   COMP METABOLIC PANEL   Result Value Ref Range    Sodium 135 135 - 145 mmol/L    Potassium 3.9 3.6 - 5.5 mmol/L    Chloride 99 96 - 112 mmol/L    Co2 16 (L) 20 - 33 mmol/L    Anion Gap 20.0 (H) 0.0 - 11.9    Glucose 147 (H) 65 - 99 mg/dL    Bun 21 8 - 22 mg/dL    Creatinine 1.14 0.50 - 1.40 mg/dL    Calcium 9.7 8.5 - 10.5 mg/dL    AST(SGOT) 12 12 - 45 U/L    ALT(SGPT) 10 2 - 50 U/L    Alkaline Phosphatase 25 (L) 30 - 99 U/L    Total Bilirubin 0.6 0.1 - 1.5 mg/dL    Albumin 4.1 3.2 - 4.9 g/dL    Total Protein 6.6 6.0 - 8.2 g/dL    Globulin 2.5 1.9 - 3.5 g/dL    A-G Ratio 1.6 g/dL   LIPASE   Result Value Ref Range    Lipase 10 (L) 11 - 82 U/L   TROPONIN   Result Value Ref Range    Troponin I <0.01 0.00 - 0.04 ng/mL   BTYPE NATRIURETIC PEPTIDE   Result Value Ref Range    B Natriuretic Peptide 17 0 - 100 pg/mL   URINALYSIS CULTURE, IF INDICATED   Result Value Ref Range    Color Yellow     Character Clear     Specific Gravity 1.023 <1.035    Ph 5.5 5.0 - 8.0    Glucose Negative Negative mg/dL    Ketones 15 (A) Negative mg/dL    Protein Negative Negative mg/dL    Bilirubin Negative Negative     Urobilinogen, Urine 0.2 Negative    Nitrite Negative Negative    Leukocyte Esterase Negative Negative    Occult Blood Negative Negative    Micro Urine Req see below    PROTHROMBIN TIME (INR)   Result Value Ref Range    PT 14.4 12.0 - 14.6 sec    INR 1.11 0.87 - 1.13   APTT   Result Value Ref Range    APTT 25.5 24.7 - 36.0 sec   DIAGNOSTIC ALCOHOL   Result Value Ref Range    Diagnostic Alcohol 0.04 (H) 0.00 g/dL   ESTIMATED GFR   Result Value Ref Range    GFR If African American >60 >60 mL/min/1.73 m 2    GFR If Non African American >60 >60 mL/min/1.73 m 2   EKG   Result Value Ref Range    Report       [facility] Emergency Dept.    Test Date:  2019  Pt Name:    ROLANDO GREEN          Department: ER  MRN:        [id number]                      Room:        03  Gender:     Male                         Technician: [id number]  :        1950                   Requested By:ER TRIAGE PROTOCOL  Order #:    [id number]                    Reading MD: Eliza Beltre    Measurements  Intervals                                Axis  Rate:       96                           P:          23  MN:         136                          QRS:        80  QRSD:       88                           T:          40  QT:         364  QTc:        460    Interpretive Statements  SINUS RHYTHM  Compared to ECG 2014 16:01:59  No significant changes    Electronically Signed On 2019 20:04:02 PST by Eliza Beltre     EKG   Result Value Ref Range    Report       [facility] Emergency Dept.    Test Date:  2019  Pt Name:    ROLANDO GREEN          Department: ER  MRN:        [id number]                      Room:        03  Gender:     Male                         Technician: [id number]  :        1950                   Requested By:ELIZA BELTRE  Order #:    [id number]                    Reading MD: Eliza Beltre    Measurements  Intervals                                Axis  Rate:       93                            P:          49  WI:         148                          QRS:        21  QRSD:       84                           T:          42  QT:         344  QTc:        428    Interpretive Statements  SINUS RHYTHM  Compared to ECG 01/02/2019 19:05:51  No significant changes    Electronically Signed On 1-2-2019 21:13:16 PST by Eliza Dean       All labs were reviewed by me.    EKG  EKG was performed at 19:05 shows normal sinus rhythm with heart rate of 96. WI interval is 136. QT/QTc are 364/460. Axis appears normal. No acute ST elevations or depressions are noted. Previous EKG from May 2014 reviewed that is unchanged. Impression: Unchanged EKG.     REPEAT EKG  EKG was performed at 21:08 after he began complaining of chest burning that shows normal sinus rhythm with heart rate of 93. WI interval is 148. QT/QTc are 344/428. Axis appears normal. No acute ST elevations or depressions are noted. Impression: Unchanged from prior EKG.     RADIOLOGY  CT-HEAD W/O   Final Result      No acute intracranial hemorrhage is identified.      Moderate atrophy      Mild white matter hypodensity is present.  This is a nonspecific finding which usually is found to represent chronic microvascular disease in patient's of this demographic.  Demyelination, age indeterminant ischemia and gliosis are also common    possibilities.      Nonobstructive maxillary sinus inflammatory disease      DX-CHEST-PORTABLE (1 VIEW)   Final Result      Hypoventilatory chest with retrocardiac atelectasis favored over consolidation        The radiologist's interpretation of all radiological studies have been reviewed by me.    COURSE & MEDICAL DECISION MAKING  Pertinent Labs & Imaging studies reviewed. (See chart for details)    7:36 PM - Patient seen and examined at bedside. Patient will be treated with 500 mL IV fluid for hypotension. Ordered CT-Head, DX-Chest, Urinalysis, PTT, APTT, Diagnostic Alcohol, CBC with differential, CMP, Lipase, Troponin, BNP,  and EKG to evaluate his symptoms.     9:05 PM - Patient reports to have developed burning sensation to chest. Repeat EKG ordered.     9:48 PM  - Paged Hospitalist.     10:44 PM - Patient reevaluated at bedside. He is resting comfortably in bed and hypotension has resolved after IV fluids. The patient and his wife were updated with lab and radiology results. He was informed he will be admitted for further cardiac work up. The patient is understanding and agreeable to admit.     10:51 PM - Repaged Hospitalist.     11:08 PM - Consult with [facility], who agrees to admit the patient. He is stable for transport.     Decision Making:  This is a 68 y.o. year old male who presents for evaluation after syncopal event.  On initial evaluation, the patient was noted to be hypotensive and placed in Trendelenburg.  His blood pressure did improve after this point.  The remainder of his vital signs were normal.  His physical exam was reassuring and his neuro exam was nonfocal.  Overall his blood work was reassuring.  His EKG and troponin were normal.  Chest x-ray did reveal some atelectasis but I have low clinical suspicion for pneumonia given his normal white blood cell count and lack of fever.  The patient was treated with a 500 cc bolus of normal saline and his blood pressures remained stable while in the emergency department thereafter.  However, I am concerned given his age, history of diabetes and hypertension, and his history of having 2 episodes of unexplained syncope over the last couple of weeks.  The plan was made to admit the patient for close observation and further evaluation.    DISPOSITION:  Patient will be admitted to [facility], in guarded condition.    FINAL IMPRESSION  1. Syncope, unspecified syncope type          I, Yamil Paulson (Sarbjit), am scribing for, and in the presence of, Eliza Dean D.O..    Electronically signed by: Yamil Caballero), 1/2/2019    Eliza CHAHAL D.O.  personally performed the services described in this documentation, as scribed by Yamil Paulson in my presence, and it is both accurate and complete.    C.    The note accurately reflects work and decisions made by me.  Eliza Dean  1/3/2019  4:00 AM

## 2019-01-03 NOTE — PROGRESS NOTES
2 RN skin check done with Jose RN    Bilateral ears pink and blanching.  Sacrum intact.   Bilateral feet dry & cracked. Heels being floated.

## 2019-01-03 NOTE — ASSESSMENT & PLAN NOTE
This patient presents with syncopal episode in the setting of alcoholism and likely dehydration  We will continue with IV fluids for now  Check orthostatic blood pressures  Hold home antihypertensives  Cardiac monitoring  Also check a cortisol level as well as serial troponin  Check echocardiogram and carotids for completeness

## 2019-01-03 NOTE — THERAPY
"Physical Therapy Evaluation completed.   Bed Mobility:  Supine to Sit: Modified Independent  Transfers: Sit to Stand: Modified Independent  Gait: Level Of Assist: Stand by Assist with no equipment used but would benefit from a cane.       Plan of Care: Will benefit from Physical Therapy 3 times per week  Discharge Recommendations: Equipment: Single Point Cane.     See \"Rehab Therapy-Acute\" Patient Summary Report for complete documentation.   Mr. Sierra presents with slight lateral instability especially with head turns and with manual perturbations. He was trained on usage of a cane, however is likely to require reinforcement to ensure proper usage. He would also benefit from stair training to improve independence and decrease fall risk on stairs. He has been working with an outpatient physical therapist for strengthening and would benefit from continuation of that therapy. He would benefit from a single point cane upon discharge.   "

## 2019-01-03 NOTE — H&P
Hospital Medicine History & Physical Note    Date of Service  1/2/2019    Primary Care Physician  Brenda Monroy M.D.    Consultants  none    Code Status  full    Chief Complaint  syncope    History of Presenting Illness  68 y.o. male who presented 1/2/2019 with syncopal episode.  He has a past medical history of hypertension diabetes and acid reflux.  This patient had a syncopal episode that was witnessed while he was sitting at dinner just prior to arrival.  Patient was drinking significant amount of alcohol over the holidays.  He was drinking earlier today as he was at dinner with his wife further anniversary.  He drank several glasses of vodka.  He does not remember the incident by his wife is at bedside who gives most of the history.  She states that he collapsed while sitting.  And his eyes rolled back into his head.  No shaking no head trauma.  He had no complaints prior to our after the event.  He was confused slightly after the event.  He was found to be hypotensive in the emergency department he responded to 500 mL bolus.  He has previously passed out similarly on [place].      Review of Systems  Review of Systems   Constitutional: Negative for chills and fever.   HENT: Negative for congestion, hearing loss and tinnitus.    Eyes: Negative for blurred vision, double vision and discharge.   Respiratory: Negative for cough, hemoptysis and shortness of breath.    Cardiovascular: Negative for chest pain, palpitations and leg swelling.   Gastrointestinal: Negative for abdominal pain, heartburn, nausea and vomiting.   Genitourinary: Negative for dysuria and flank pain.   Musculoskeletal: Negative for joint pain and myalgias.   Skin: Negative for rash.   Neurological: Positive for loss of consciousness. Negative for dizziness, sensory change, speech change, focal weakness and weakness.   Endo/Heme/Allergies: Negative for environmental allergies. Does not bruise/bleed easily.   Psychiatric/Behavioral: Negative  for depression, hallucinations and substance abuse.       Past Medical History   has a past medical history of BPH (benign prostatic hyperplasia); GERD (gastroesophageal reflux disease); Hypertension; Neuropathic pain, leg; and Type II or unspecified type diabetes mellitus without mention of complication, not stated as uncontrolled. He also has no past medical history of Encounter for long-term (current) use of other medications.    Surgical History  Reviewed and noncontributory     Family History  family history includes Diabetes in his father; Heart Disease in his mother.     Social History   reports that he quit smoking about 11 years ago. His smoking use included Cigarettes. He has a 25.00 pack-year smoking history. He has never used smokeless tobacco. He reports that he drinks about 4.8 - 6.0 oz of alcohol per week . He reports that he does not use drugs.    Allergies  No Known Allergies    Medications  Prior to Admission Medications   Prescriptions Last Dose Informant Patient Reported? Taking?   B Complex Vitamins (VITAMIN B COMPLEX PO) 1/2/2019 at 1530 Patient Yes No   Sig: Take 1 Tab by mouth every day.   DULoxetine (CYMBALTA) 30 MG Cap DR Particles 1/2/2019 at 1530 Patient No No   Sig: Take 2 Caps by mouth every day.   Multiple Vitamins-Minerals (HM COMPLETE 50+ MENS ULTIMATE PO) 1/2/2019 at 1530 Patient Yes No   Sig: Take 1 Tab by mouth every day.   aspirin (ASA) 81 MG Chew Tab chewable tablet 1/2/2019 at 1530 Patient Yes No   Sig: Take 81 mg by mouth every day.   atorvastatin (LIPITOR) 40 MG Tab 1/2/2019 at 1530 Patient No No   Sig: Take 1 Tab by mouth every day.   benazepril (LOTENSIN) 20 MG Tab 1/2/2019 at 1530 Patient No No   Sig: TAKE ONE TABLET BY MOUTH DAILY   glipiZIDE SR (GLUCOTROL) 5 MG TABLET SR 24 HR 1/2/2019 at 1530 Patient No No   Sig: Take 1 Tab by mouth every day.   metFORMIN (GLUCOPHAGE) 500 MG Tab 1/2/2019 at 1530 Patient Yes Yes   Sig: Take 1,000 mg by mouth 2 times a day.   omeprazole  (PRILOSEC) 20 MG delayed-release capsule 1/2/2019 at 1530 Patient Yes No   Sig: Take 20 mg by mouth every day.   terazosin (HYTRIN) 5 MG Cap 1/2/2019 at 1530 Patient No No   Sig: Take 1 Cap by mouth every day.   vitamin D (CHOLECALCIFEROL) 1000 UNIT Tab 1/2/2019 at 1530 Patient Yes No   Sig: Take 1,000 Units by mouth every day.      Facility-Administered Medications: None       Physical Exam  Temp:  [36.1 °C (97 °F)] 36.1 °C (97 °F)  Pulse:  [] 95  Resp:  [15-27] 20  BP: (80)/(45) 80/45    Physical Exam   Constitutional: He is oriented to person, place, and time. He appears well-developed and well-nourished.   HENT:   Head: Normocephalic and atraumatic.   Mid face erythema, dry oral mucosa   Eyes: Pupils are equal, round, and reactive to light. Conjunctivae and EOM are normal.   Neck: Normal range of motion. Neck supple. No JVD present.   Cardiovascular: Normal rate, regular rhythm, normal heart sounds and intact distal pulses.    No murmur heard.  Pulmonary/Chest: Effort normal and breath sounds normal. No respiratory distress. He exhibits no tenderness.   Abdominal: Soft. Bowel sounds are normal. He exhibits no distension. There is no tenderness.   Musculoskeletal: Normal range of motion. He exhibits no edema.   Neurological: He is alert and oriented to person, place, and time. No cranial nerve deficit. He exhibits normal muscle tone.   Skin: Skin is warm and dry. No erythema.   Psychiatric:   anxious   Nursing note and vitals reviewed.      Laboratory:  Recent Labs      01/02/19 1904   WBC  8.4   RBC  4.45*   HEMOGLOBIN  13.2*   HEMATOCRIT  39.7*   MCV  89.2   MCH  29.7   MCHC  33.2*   RDW  44.6   PLATELETCT  158*   MPV  10.5     Recent Labs      01/02/19 1904   SODIUM  135   POTASSIUM  3.9   CHLORIDE  99   CO2  16*   GLUCOSE  147*   BUN  21   CREATININE  1.14   CALCIUM  9.7     Recent Labs      01/02/19 1904   ALTSGPT  10   ASTSGOT  12   ALKPHOSPHAT  25*   TBILIRUBIN  0.6   LIPASE  10*   GLUCOSE   147*     Recent Labs      01/02/19   1904   APTT  25.5   INR  1.11     Recent Labs      01/02/19   1904   BNPBTYPENAT  17         Recent Labs      01/02/19   1904   TROPONINI  <0.01       Urinalysis:    Recent Labs      01/02/19   2219   SPECGRAVITY  1.023   GLUCOSEUR  Negative   KETONES  15*   NITRITE  Negative   LEUKESTERAS  Negative        Imaging:  CT-HEAD W/O   Final Result      No acute intracranial hemorrhage is identified.      Moderate atrophy      Mild white matter hypodensity is present.  This is a nonspecific finding which usually is found to represent chronic microvascular disease in patient's of this demographic.  Demyelination, age indeterminant ischemia and gliosis are also common    possibilities.      Nonobstructive maxillary sinus inflammatory disease      DX-CHEST-PORTABLE (1 VIEW)   Final Result      Hypoventilatory chest with retrocardiac atelectasis favored over consolidation      EC-ECHOCARDIOGRAM COMPLETE W/O CONT    (Results Pending)   US-CAROTID DOPPLER BILAT    (Results Pending)         Assessment/Plan:  I anticipate this patient is appropriate for observation status at this time.    * Syncope   Assessment & Plan    This patient presents with syncopal episode in the setting of alcoholism and likely dehydration  We will continue with IV fluids for now  Check orthostatic blood pressures  Hold home antihypertensives  Cardiac monitoring  Also check a cortisol level as well as serial troponin  Check echocardiogram and carotids for completeness     Sinus disease   Assessment & Plan    Noted on CT, however asymptomatic will no treat     Alcohol abuse   Assessment & Plan    Encouraged cessation   Monitor for alcohol withdrawals   no ciwa ordered at this time     Dehydration   Assessment & Plan    Continue with IVF for now      Hypotension   Assessment & Plan    Responded to IVF, hold home BP medications for now suspect dehydration      Lactic acidosis   Assessment & Plan    Mild, acidosis seem to  have improved with fluids continue to monitor     Mixed hyperlipidemia- (present on admission)   Assessment & Plan    Resume statin     Essential hypertension- (present on admission)   Assessment & Plan    Hold home BP medication as patient slightly hypotensive     Obesity (BMI 30-39.9)- (present on admission)   Assessment & Plan    Encouraged weight loss     GERD (gastroesophageal reflux disease)- (present on admission)   Assessment & Plan    Resume home PPI      Controlled type 2 diabetes mellitus with diabetic polyneuropathy, without long-term current use of insulin (HCC)- (present on admission)   Assessment & Plan    SSI ordered  Check a1c          VTE prophylaxis: heparin

## 2019-01-03 NOTE — PROCEDURES
EEG 01/03/19 6:46 PM    VIDEO ELECTROENCEPHALOGRAM / EPILEPSY MONITORING UNIT REPORT      Referring provider: Dr Arcos    DOS:   1/3/2019      INDICATION:  Prabhakar Sierra 68 y.o. male presenting with 69 yo male admitted for a syncopal event. Wife reports pt LOC x 10 minutes, but wife stated he kept drifing in and out of consciousness. pt does not remember event and had some slurred speech en route to ED. Prior to event pt had 3 alcoholic drinks. Pt had a similar event 8 days ago  pmh- bph, htn, dm, depression  eeg- syncope    CURRENT ANTIEPILEPTIC REGIMEN: NONE     DURATION: 25 minutes      TECHNIQUE: A 30-channel video electroencephalogram (VEEG) was performed in accordance with the international 10-20 system. This digital study was reviewed in bipolar and referential montages. The recording examined the patient during wakeful, drowsy and sleep states.         DESCRIPTION OF THE RECORD:  During the awake state, background shows symmetrical 10-11 Hz alpha activity posteriorly with amplitude of 70 mV.  There was reactivity with eye opening/closure.  Normal anterior-posterior gradient was noted with faster beta frequencies seen anteriorly.  During drowsiness, high-amplitude delta frequencies were seen.    During the sleep state, background shows diffuse high-amplitude 4-5 Hz delta activity.  Symmetrical high-amplitude sleep spindles and vertex sharp activities were seen in the central leads.    ACTIVATION PROCEDURES:   Hyperventilation was not performed by the patient.    Intermittent Photic stimulation was performed in a stepwise fashion from 1 to 30 Hz and elicited a normal response (photic driving), most noticeable in the posterior leads.      ICTAL AND/OR INTERICTAL FINDINGS:   No focal or generalized epileptiform activity was noted. No regional slowing was seen during this study.  No seizures were recorded during the study.     EVENT(S):  NONE    EKG: sampling review of EKG recording demonstrated regular  rhythm      INTERPRETATION:       ________________________________________________________________________    This is normal routine video EEG recording in the awake and drowsy/sleep state(s).    This scalp video EEG remains not remarkable    Of note, unremarkable EEG does not completely exclude the diagnosis  of seizures since seizure is an episodic phenomena and frontal lobe seizures could have normal scalp EEG. Clinical correlation may help     If clinical suspicion of seizure remains high.  Prolonged outpatient EEG   monitoring may be of help.    EEG 01/03/19 6:48 PM  ________________________________________________________________________

## 2019-01-03 NOTE — PROGRESS NOTES
Bedside report received from previous nurse regarding prior 12 hours.  Pt denies pain.  White board updated. Call light within reach.

## 2019-01-04 ENCOUNTER — PATIENT OUTREACH (OUTPATIENT)
Dept: HEALTH INFORMATION MANAGEMENT | Facility: OTHER | Age: 69
End: 2019-01-04

## 2019-01-04 ENCOUNTER — APPOINTMENT (OUTPATIENT)
Dept: CARDIOLOGY | Facility: MEDICAL CENTER | Age: 69
End: 2019-01-04
Attending: HOSPITALIST
Payer: MEDICARE

## 2019-01-04 VITALS
WEIGHT: 218.92 LBS | HEIGHT: 69 IN | OXYGEN SATURATION: 95 % | RESPIRATION RATE: 16 BRPM | BODY MASS INDEX: 32.42 KG/M2 | SYSTOLIC BLOOD PRESSURE: 157 MMHG | TEMPERATURE: 98.6 F | DIASTOLIC BLOOD PRESSURE: 80 MMHG | HEART RATE: 73 BPM

## 2019-01-04 LAB
GLUCOSE BLD-MCNC: 170 MG/DL (ref 65–99)
GLUCOSE BLD-MCNC: 186 MG/DL (ref 65–99)
GLUCOSE BLD-MCNC: 215 MG/DL (ref 65–99)
LV EJECT FRACT  99904: 55
LV EJECT FRACT MOD 2C 99903: 59.72
LV EJECT FRACT MOD 4C 99902: 47.82
LV EJECT FRACT MOD BP 99901: 53.1

## 2019-01-04 PROCEDURE — 93306 TTE W/DOPPLER COMPLETE: CPT | Mod: 26 | Performed by: INTERNAL MEDICINE

## 2019-01-04 PROCEDURE — 700102 HCHG RX REV CODE 250 W/ 637 OVERRIDE(OP): Performed by: HOSPITALIST

## 2019-01-04 PROCEDURE — G0378 HOSPITAL OBSERVATION PER HR: HCPCS

## 2019-01-04 PROCEDURE — 99217 PR OBSERVATION CARE DISCHARGE: CPT | Performed by: HOSPITALIST

## 2019-01-04 PROCEDURE — 700111 HCHG RX REV CODE 636 W/ 250 OVERRIDE (IP): Performed by: HOSPITALIST

## 2019-01-04 PROCEDURE — 96372 THER/PROPH/DIAG INJ SC/IM: CPT

## 2019-01-04 PROCEDURE — A9270 NON-COVERED ITEM OR SERVICE: HCPCS | Performed by: HOSPITALIST

## 2019-01-04 PROCEDURE — 93306 TTE W/DOPPLER COMPLETE: CPT

## 2019-01-04 PROCEDURE — 82962 GLUCOSE BLOOD TEST: CPT | Mod: 91

## 2019-01-04 RX ORDER — ACETAMINOPHEN 500 MG
500 TABLET ORAL EVERY 6 HOURS PRN
Status: DISCONTINUED | OUTPATIENT
Start: 2019-01-04 | End: 2019-01-04 | Stop reason: HOSPADM

## 2019-01-04 RX ORDER — BENAZEPRIL HYDROCHLORIDE 20 MG/1
20 TABLET ORAL
Status: DISCONTINUED | OUTPATIENT
Start: 2019-01-04 | End: 2019-01-04 | Stop reason: HOSPADM

## 2019-01-04 RX ADMIN — DULOXETINE HYDROCHLORIDE 60 MG: 60 CAPSULE, DELAYED RELEASE ORAL at 06:38

## 2019-01-04 RX ADMIN — STANDARDIZED SENNA CONCENTRATE AND DOCUSATE SODIUM 2 TABLET: 8.6; 5 TABLET, FILM COATED ORAL at 17:16

## 2019-01-04 RX ADMIN — BENAZEPRIL HYDROCHLORIDE 20 MG: 20 TABLET, COATED ORAL at 17:15

## 2019-01-04 RX ADMIN — OMEPRAZOLE 20 MG: 20 CAPSULE, DELAYED RELEASE ORAL at 06:38

## 2019-01-04 RX ADMIN — INSULIN HUMAN 1 UNITS: 100 INJECTION, SOLUTION PARENTERAL at 06:42

## 2019-01-04 RX ADMIN — BENAZEPRIL HYDROCHLORIDE 20 MG: 20 TABLET, COATED ORAL at 00:37

## 2019-01-04 RX ADMIN — STANDARDIZED SENNA CONCENTRATE AND DOCUSATE SODIUM 2 TABLET: 8.6; 5 TABLET, FILM COATED ORAL at 06:39

## 2019-01-04 RX ADMIN — ACETAMINOPHEN 500 MG: 500 TABLET ORAL at 09:09

## 2019-01-04 RX ADMIN — INSULIN HUMAN 2 UNITS: 100 INJECTION, SOLUTION PARENTERAL at 11:39

## 2019-01-04 RX ADMIN — HEPARIN SODIUM 5000 UNITS: 5000 INJECTION, SOLUTION INTRAVENOUS; SUBCUTANEOUS at 06:42

## 2019-01-04 RX ADMIN — ATORVASTATIN CALCIUM 40 MG: 40 TABLET, FILM COATED ORAL at 06:38

## 2019-01-04 ASSESSMENT — PAIN SCALES - GENERAL
PAINLEVEL_OUTOF10: 4
PAINLEVEL_OUTOF10: 0

## 2019-01-04 NOTE — FACE TO FACE
Face to Face Note  -  Durable Medical Equipment    Colin Arcos M.D. - NPI: [id number]  I certify that this patient is under my care and that they had a durable medical equipment(DME)face to face encounter by myself that meets the physician DME face-to-face encounter requirements with this patient on:    Date of encounter:   Patient:                    MRN:                       YOB: 2020  Prabhakar Elizabethid  [id number]  1950     The encounter with the patient was in whole, or in part, for the following medical condition, which is the primary reason for durable medical equipment:  Other - near syncope    I certify that, based on my findings, the following durable medical equipment is medically necessary:  Cane.    HOME O2 Saturation Measurements:(Values must be present for Home Oxygen orders)         ,     ,         My Clinical findings support the need for the above equipment due to:  Abnormal Gait    Supporting Symptoms: near syncope

## 2019-01-04 NOTE — PROGRESS NOTES
Bedside report received from CARLITOS Wynne. POC discussed with pt; all questions answered at this time. White board updated. Appropriate functioning of tele monitor confirmed. All needs met at this time.

## 2019-01-04 NOTE — DISCHARGE INSTRUCTIONS
Discharge Instructions    Discharged to home by car with relative. Discharged via wheelchair, hospital escort: Yes.  Special equipment needed: Cane    Be sure to schedule a follow-up appointment with your primary care doctor or any specialists as instructed.     Discharge Plan:   Diet Plan: Discussed  Activity Level: Discussed  Confirmed Follow up Appointment: Patient to Call and Schedule Appointment  Confirmed Symptoms Management: Discussed  Medication Reconciliation Updated: Yes  Influenza Vaccine Indication: Not indicated: Previously immunized this influenza season and > 8 years of age    I understand that a diet low in cholesterol, fat, and sodium is recommended for good health. Unless I have been given specific instructions below for another diet, I accept this instruction as my diet prescription.   Other diet:     Special Instructions: None    · Is patient discharged on Warfarin / Coumadin?   No     Depression / Suicide Risk    As you are discharged from this [facility] facility, it is important to learn how to keep safe from harming yourself.    Recognize the warning signs:  · Abrupt changes in personality, positive or negative- including increase in energy   · Giving away possessions  · Change in eating patterns- significant weight changes-  positive or negative  · Change in sleeping patterns- unable to sleep or sleeping all the time   · Unwillingness or inability to communicate  · Depression  · Unusual sadness, discouragement and loneliness  · Talk of wanting to die  · Neglect of personal appearance   · Rebelliousness- reckless behavior  · Withdrawal from people/activities they love  · Confusion- inability to concentrate     If you or a loved one observes any of these behaviors or has concerns about self-harm, here's what you can do:  · Talk about it- your feelings and reasons for harming yourself  · Remove any means that you might use to hurt yourself (examples: pills, rope, extension cords,  firearm)  · Get professional help from the community (Mental Health, Substance Abuse, psychological counseling)  · Do not be alone:Call your Safe Contact- someone whom you trust who will be there for you.  · Call your local CRISIS HOTLINE 968-0760 or 602-119-7982  · Call your local Children's Mobile Crisis Response Team [place] (863) 944-6907 or www.[employer]  · Call the toll free National Suicide Prevention Hotlines   · National Suicide Prevention Lifeline 751-150-RYXN (1794)  · [employer] Line Network 800-SUICIDE (935-3017)        Syncope  Introduction  Syncope is when you lose temporarily pass out (faint). Signs that you may be about to pass out include:  · Feeling dizzy or light-headed.  · Feeling sick to your stomach (nauseous).  · Seeing all white or all black.  · Having cold, clammy skin.  If you passed out, get help right away. Call your local emergency services (911 in the [place] Do not drive yourself to the hospital.  Follow these instructions at home:  Pay attention to any changes in your symptoms. Take these actions to help with your condition:  · Have someone stay with you until you feel stable.  · Do not drive, use machinery, or play sports until your doctor says it is okay.  · Keep all follow-up visits as told by your doctor. This is important.  · If you start to feel like you might pass out, lie down right away and raise (elevate) your feet above the level of your heart. Breathe deeply and steadily. Wait until all of the symptoms are gone.  · Drink enough fluid to keep your pee (urine) clear or pale yellow.  · If you are taking blood pressure or heart medicine, get up slowly and spend many minutes getting ready to sit and then stand. This can help with dizziness.  · Take over-the-counter and prescription medicines only as told by your doctor.  Get help right away if:  · You have a very bad headache.  · You have unusual pain in your chest, tummy, or back.  · You are bleeding from your  mouth or rectum.  · You have black or tarry poop (stool).  · You have a very fast or uneven heartbeat (palpitations).  · It hurts to breathe.  · You pass out once or more than once.  · You have jerky movements that you cannot control (seizure).  · You are confused.  · You have trouble walking.  · You are very weak.  · You have vision problems.  These symptoms may be an emergency. Do not wait to see if the symptoms will go away. Get medical help right away. Call your local emergency services (911 in the [place] Do not drive yourself to the hospital.   This information is not intended to replace advice given to you by your health care provider. Make sure you discuss any questions you have with your health care provider.  Document Released: 06/05/2009 Document Revised: 05/25/2017 Document Reviewed: 08/31/2016  © 2017 Elsevier        Hypotension  As your heart beats, it forces blood through your body. This force is called blood pressure. If you have hypotension, you have low blood pressure. When your blood pressure is too low, you may not get enough blood to your brain. You may feel weak, feel light-headed, have a fast heartbeat, or even pass out (faint).  Follow these instructions at home:  Eating and drinking  · Drink enough fluids to keep your pee (urine) clear or pale yellow.  · Eat a healthy diet, and follow instructions from your doctor about eating or drinking restrictions. A healthy diet includes:  ¨ Fresh fruits and vegetables.  ¨ Whole grains.  ¨ Low-fat (lean) meats.  ¨ Low-fat dairy products.  · Eat extra salt only as told. Do not add extra salt to your diet unless your doctor tells you to.  · Eat small meals often.  · Avoid standing up quickly after you eat.  Medicines  · Take over-the-counter and prescription medicines only as told by your doctor.  ¨ Follow instructions from your doctor about changing how much you take (the dosage) of your medicines, if this applies.  ¨ Do not stop or change your medicine  on your own.  General instructions  · Wear compression stockings as told by your doctor.  · Get up slowly from lying down or sitting.  · Avoid hot showers and a lot of heat as told by your doctor.  · Return to your normal activities as told by your doctor. Ask what activities are safe for you.  · Do not use any products that contain nicotine or tobacco, such as cigarettes and e-cigarettes. If you need help quitting, ask your doctor.  · Keep all follow-up visits as told by your doctor. This is important.  Contact a doctor if:  · You throw up (vomit).  · You have watery poop (diarrhea).  · You have a fever for more than 2-3 days.  · You feel more thirsty than normal.  · You feel weak and tired.  Get help right away if:  · You have chest pain.  · You have a fast or irregular heartbeat.  · You lose feeling (get numbness) in any part of your body.  · You cannot move your arms or your legs.  · You have trouble talking.  · You get sweaty or feel light-headed.  · You faint.  · You have trouble breathing.  · You have trouble staying awake.  · You feel confused.  This information is not intended to replace advice given to you by your health care provider. Make sure you discuss any questions you have with your health care provider.  Document Released: 03/14/2011 Document Revised: 09/05/2017 Document Reviewed: 09/05/2017  [employer] Interactive Patient Education © 2017 [employer]        Metabolic Acidosis  Metabolic acidosis is a condition in which there is too much acid in the blood. It happens because of a chemical imbalance in your cells. Metabolic acidosis can happen at any age, and there are many different causes. It may be a symptom of a sudden, short-lived (acute) condition, or of a lifelong (chronic) condition.  Metabolic acidosis can be mild, or it can be severe and life-threatening, depending on the cause. Metabolic acidosis can be corrected if the cause is identified and properly treated.  What are the  causes?  There are many possible causes of metabolic acidosis. The most common causes are:  · The body producing too much acid.  · Losing too much of a chemical that balances acid levels (bicarbonate). This can result from diarrhea or from certain surgeries on the stomach and intestines.  · Excessive buildup of acidic substances (ketone bodies) in the blood due to untreated type 1 diabetes.  · Excessive buildup of a chemical (lactic acid) that forms when the body is low on oxygen. Lactic acid buildup can result from:  ¨ Decreased flow of blood, oxygen, and nutrients to vital organs (shock).  ¨ Intense physical activity.  ¨ Disease.  ¨ Infection.  · Exposure to a poisonous substance (toxin), such as:  ¨ Carbon monoxide.  ¨ Cyanide.  ¨ Antifreeze (ethylene glycol).  ¨ Methanol.  · Certain medicines, such as acetazolamide or large doses of aspirin.  · Kidney disease or kidney infection.  · Too much iron in the body.  · Excessive alcohol use.  · Lack of healthy red blood cells (anemia).  What are the signs or symptoms?  Symptoms of this condition vary depending on the cause and severity. Common symptoms include:  · Rapid breathing.  · Difficulty breathing.  · Nausea or vomiting.  · Headache.  · Confusion.  · Weakness.  · Feeling restless, drowsy, and emotionless (lethargy).  Mild acidosis may not cause any symptoms. Very severe acidosis can result in shock, coma, or death.  How is this diagnosed?  This condition is diagnosed based on a physical exam and your medical history. You may have tests, such as:  · Blood tests. These may include:  ¨ An arterial blood gas (ABG) test or a venous blood gas (VBG) test. These tests measure the acidity levels (pH balance) of your blood.  ¨ A serum electrolytes test. This test measures the amounts of minerals in your blood.  · Urine tests.  How is this treated?  Treatment for metabolic acidosis depends on the underlying condition and the severity of symptoms. The goal of treatment is  to balance the amount of acid in the body and to treat the underlying cause of metabolic acidosis. Mild metabolic acidosis can be treated by fluids given through an IV tube. Severe forms of metabolic acidosis require hospitalization and medicines to help treat the underlying problem. Bicarbonates may be needed if your condition is very severe.  Follow these instructions at home:  · Take over-the-counter and prescription medicines only as told by your health care provider.  · If you were prescribed an antibiotic medicine, take it as told by your health care provider. Do not stop taking the antibiotic even if you start to feel better.  · Drink enough fluid to keep your urine clear or pale yellow.  · Follow instructions from your health care provider about eating or drinking restrictions.  · Pay attention to your symptoms and any changes in your symptoms.  · Keep all follow-up visits as told by your health care provider. This is important.  How is this prevented?  Take these actions to prevent metabolic acidosis:  · Manage any chronic conditions you have.  · Avoid exposure to toxins.  · Avoid drinking excessive amounts of alcohol.  · Understand your medicines and any supplements you take, and their possible side effects.  · Watch for any symptoms of metabolic acidosis to develop.  Contact a health care provider if:  · You have any new symptoms.  · You have side effects from medicines you are taking.  · You have nausea, vomiting, or diarrhea that does not get better with medicine or that gets worse.  · You have body aches or lethargy that gets worse.  · You have dark urine or you urinate less often than you usually do.  · You have a decreased appetite.  · You have a fever.  Get help right away if:  · You have shortness of breath, chest pain, or a fast heartbeat (palpitations).  · You feel like you are losing consciousness, or you faint.  · You feel drowsy or confused.  · You have severe pain in your joints and  limbs.  · You have severe abdominal pain.  These symptoms may represent a serious problem that is an emergency. Do not wait to see if the symptoms will go away. Get medical help right away. Call your local emergency services (911 in the [place] Do not drive yourself to the hospital.   This information is not intended to replace advice given to you by your health care provider. Make sure you discuss any questions you have with your health care provider.  Document Released: 04/03/2012 Document Revised: 05/22/2017 Document Reviewed: 05/04/2016  Elsevier Interactive Patient Education © 2017 Elsevier Inc.

## 2019-01-04 NOTE — PROGRESS NOTES
Admitted today for possible syncope workup  After discussion with the patient, it did not appear that he had true syncope but rather near syncope/dazed state  Carotid ultrasound and CT head negative  Awaiting echo  Also ordered MRI as well as EEG

## 2019-01-05 NOTE — DISCHARGE PLANNING
Anticipated Discharge Disposition: Home    Action: Met with pt at bedside to obtain choice for DME. Choice form completed and faxed to [facility]    Barriers to Discharge: None    Plan: D/c home

## 2019-01-05 NOTE — DISCHARGE SUMMARY
Hospital Medicine Discharge Note     Admit Date:  1/2/2019       Discharge Date:   1/4/2018    Attending Physician:  Colin Arcos     Diagnoses (includes active and resolved):   Principal Problem:    Near syncope POA: Unknown  Active Problems:    Controlled type 2 diabetes mellitus with diabetic polyneuropathy, without long-term current use of insulin (HCC) POA: Yes    GERD (gastroesophageal reflux disease) POA: Yes    Obesity (BMI 30-39.9) POA: Yes    Essential hypertension POA: Yes    Mixed hyperlipidemia POA: Yes    Lactic acidosis POA: Unknown    Hypotension POA: Unknown    Dehydration POA: Unknown    Alcohol abuse POA: Unknown    Sinus disease POA: Unknown  Resolved Problems:    * No resolved hospital problems. *      Hospital Summary (Brief Narrative):       68 y.o. male who presented 1/2/2019 with syncopal episode. He has a past medical history of hypertension diabetes and acid reflux. This patient had a nearsyncopal episode that was witnessed while he was sitting at dinner just prior to arrival.  He had about 3 drinks of alcohol.  He went into a dazed state and was not fully responsive but did not platelet lose consciousness or hit his head on the table.  He was admitted for workup.  He had negative testing including CT head, carotid ultrasound, echo, MRI brain, EEG.  He did have brain atrophy as well as subthreshold carotid stenosis.  It was discussed with him the need to stop drinking alcohol as well as improve his diet and exercise.  Patient is agreeable to all these.         Consultants:      None    Procedures:        This is normal routine video EEG recording in the awake and drowsy/sleep state(s).  This scalp video EEG remains not remarkable    Discharge Medications:           Medication List      CONTINUE taking these medications      Instructions   aspirin 81 MG Chew chewable tablet  Commonly known as:  ASA   Take 81 mg by mouth every day.  Dose:  81 mg     atorvastatin 40 MG Tabs  Commonly known as:   LIPITOR   Take 1 Tab by mouth every day.  Dose:  40 mg     benazepril 20 MG Tabs  Commonly known as:  LOTENSIN   TAKE ONE TABLET BY MOUTH DAILY     DULoxetine 30 MG Cpep  Commonly known as:  CYMBALTA   Take 2 Caps by mouth every day.  Dose:  60 mg     glipiZIDE SR 5 MG Tb24  Commonly known as:  GLUCOTROL   Take 1 Tab by mouth every day.  Dose:  5 mg     HM COMPLETE 50+ MENS ULTIMATE PO   Take 1 Tab by mouth every day.  Dose:  1 Tab     metFORMIN 500 MG Tabs  Commonly known as:  GLUCOPHAGE   Take 1,000 mg by mouth 2 times a day.  Dose:  1000 mg     omeprazole 20 MG delayed-release capsule  Commonly known as:  PRILOSEC   Take 20 mg by mouth every day.  Dose:  20 mg     terazosin 5 MG Caps  Commonly known as:  HYTRIN   Take 1 Cap by mouth every day.  Dose:  5 mg     VITAMIN B COMPLEX PO   Take 1 Tab by mouth every day.  Dose:  1 Tab     vitamin D 1000 UNIT Tabs  Commonly known as:  cholecalciferol   Take 1,000 Units by mouth every day.  Dose:  1000 Units          Disposition:   Discharge home    Activity:   As tolerated    Code status:   Full code    Primary Care Provider:    Brenda Monroy M.D.    Follow up appointment details :      Brenda Monroy M.D.  [address] [address]  [phone]    Schedule an appointment as soon as possible for a visit in 1 week      Future Appointments  Date Time Provider Department Center   1/8/2019 4:20 PM Brenda Monroy M.D. [facility] None       Pending Studies:        None    Time spent on discharge day patient visit: 42 minutes    #################################################    Interval history/exam for day of discharge:    Vitals:    01/04/19 0426 01/04/19 0800 01/04/19 1157 01/04/19 1600   BP: 151/78 154/87 151/80 157/80   Pulse: 77 78 88 73   Resp: 18 18 16 16   Temp: 37.3 °C (99.1 °F) 36.4 °C (97.6 °F) 36.4 °C (97.6 °F) 37 °C (98.6 °F)   TempSrc: Temporal Temporal Temporal Temporal   SpO2:   94% 95%   Weight:       Height:         Weight/BMI: Body mass  index is 32.33 kg/m².  Pulse Oximetry: 95 %, O2 (LPM): 0, O2 Delivery: None (Room Air)    General:  NAD  CVS:  RRR  PULM:  CTAB, no respiratory distress    Most Recent Labs:    Lab Results   Component Value Date/Time    WBC 8.7 01/03/2019 04:59 AM    RBC 4.35 (L) 01/03/2019 04:59 AM    HEMOGLOBIN 12.5 (L) 01/03/2019 04:59 AM    HEMATOCRIT 37.9 (L) 01/03/2019 04:59 AM    MCV 87.1 01/03/2019 04:59 AM    MCH 28.7 01/03/2019 04:59 AM    MCHC 33.0 (L) 01/03/2019 04:59 AM    MPV 10.1 01/03/2019 04:59 AM    NEUTSPOLYS 65.30 01/03/2019 04:59 AM    LYMPHOCYTES 24.00 01/03/2019 04:59 AM    MONOCYTES 7.70 01/03/2019 04:59 AM    EOSINOPHILS 2.00 01/03/2019 04:59 AM    BASOPHILS 0.50 01/03/2019 04:59 AM      Lab Results   Component Value Date/Time    SODIUM 138 01/03/2019 04:59 AM    POTASSIUM 3.9 01/03/2019 04:59 AM    CHLORIDE 103 01/03/2019 04:59 AM    CO2 25 01/03/2019 04:59 AM    GLUCOSE 177 (H) 01/03/2019 04:59 AM    BUN 21 01/03/2019 04:59 AM    CREATININE 0.94 01/03/2019 04:59 AM      Lab Results   Component Value Date/Time    ALTSGPT 8 01/03/2019 04:59 AM    ASTSGOT 9 (L) 01/03/2019 04:59 AM    ALKPHOSPHAT 31 01/03/2019 04:59 AM    TBILIRUBIN 0.5 01/03/2019 04:59 AM    LIPASE 10 (L) 01/02/2019 07:04 PM    ALBUMIN 4.0 01/03/2019 04:59 AM    GLOBULIN 2.2 01/03/2019 04:59 AM    INR 1.11 01/02/2019 07:04 PM     Lab Results   Component Value Date/Time    PROTHROMBTM 14.4 01/02/2019 07:04 PM    INR 1.11 01/02/2019 07:04 PM        Imaging/ Testing:      EC-ECHOCARDIOGRAM COMPLETE W/O CONT   Final Result      MR-BRAIN-WITH & W/O   Final Result      1.  Diffuse predominantly central atrophy.   2.  No acute stroke or evidence for hemorrhage.      US-CAROTID DOPPLER BILAT   Final Result      CT-HEAD W/O   Final Result      No acute intracranial hemorrhage is identified.      Moderate atrophy      Mild white matter hypodensity is present.  This is a nonspecific finding which usually is found to represent chronic microvascular  disease in patient's of this demographic.  Demyelination, age indeterminant ischemia and gliosis are also common    possibilities.      Nonobstructive maxillary sinus inflammatory disease      DX-CHEST-PORTABLE (1 VIEW)   Final Result      Hypoventilatory chest with retrocardiac atelectasis favored over consolidation          Instructions:      The patient was instructed to return to the ER in the event of worsening symptoms. I have counseled the patient on the importance of compliance and the patient has agreed to proceed with all medical recommendations and follow up plan indicated above.   The patient understands that all medications come with benefits and risks. Risks may include permanent injury or death and these risks can be minimized with close reassessment and monitoring.

## 2019-01-05 NOTE — DISCHARGE PLANNING
Received Choice form at 0820  Agency/Facility Name: [facility]   Referral sent per Choice form @ 0820

## 2019-01-07 ENCOUNTER — TELEPHONE (OUTPATIENT)
Dept: MEDICAL GROUP | Facility: LAB | Age: 69
End: 2019-01-07

## 2019-01-07 NOTE — TELEPHONE ENCOUNTER
ESTABLISHED PATIENT PRE-VISIT PLANNING     Patient was NOT contacted to complete PVP.     Note: Patient will not be contacted if there is no indication to call.     1.  Reviewed notes from the last few office visits within the medical group: Yes    2.  If any orders were placed at last visit or intended to be done for this visit (i.e. 6 mos follow-up), do we have Results/Consult Notes?        •  Labs - Labs were not ordered at last office visit.        •  Imaging - Imaging was not ordered at last office visit.       •  Referrals - Referral ordered, patient has NOT been seen.    3. Is this appointment scheduled as a Hospital Follow-Up? Yes, visit was at [facility].     4.  Immunizations were updated in Epic using WebIZ?: Epic matches WebIZ       •  Web Iz Recommendations: MMR  and SHINGRIX (Shingles)    5.  Patient is due for the following Health Maintenance Topics:   Health Maintenance Due   Topic Date Due   • IMM HEP B VACCINE (1 of 3 - Risk 3-dose series) 07/29/1968   • IMM ZOSTER VACCINES (1 of 2) 07/29/1999       - Patient has completed FLU, PNEUMOVAX (PPSV23), PREVNAR (PCV13)  and TDAP Immunization(s) per WebIZ. Chart has been updated.    6. Orders for overdue Health Maintenance topics pended in Pre-Charting? YES    7.  AHA (MDX) form printed for Provider? YES    8.  Patient was NOT informed to arrive 15 min prior to their scheduled appointment and bring in their medication bottles.

## 2019-01-08 ENCOUNTER — APPOINTMENT (OUTPATIENT)
Dept: MEDICAL GROUP | Facility: LAB | Age: 69
End: 2019-01-08
Payer: MEDICARE

## 2019-01-09 ENCOUNTER — OFFICE VISIT (OUTPATIENT)
Dept: MEDICAL GROUP | Facility: LAB | Age: 69
End: 2019-01-09
Payer: MEDICARE

## 2019-01-09 VITALS
BODY MASS INDEX: 32.44 KG/M2 | HEIGHT: 69 IN | RESPIRATION RATE: 14 BRPM | WEIGHT: 219 LBS | DIASTOLIC BLOOD PRESSURE: 78 MMHG | OXYGEN SATURATION: 94 % | HEART RATE: 104 BPM | SYSTOLIC BLOOD PRESSURE: 100 MMHG | TEMPERATURE: 98.5 F

## 2019-01-09 DIAGNOSIS — F10.10 ALCOHOL ABUSE: ICD-10-CM

## 2019-01-09 DIAGNOSIS — E11.42 CONTROLLED TYPE 2 DIABETES MELLITUS WITH DIABETIC POLYNEUROPATHY, WITHOUT LONG-TERM CURRENT USE OF INSULIN (HCC): ICD-10-CM

## 2019-01-09 DIAGNOSIS — I95.89 HYPOTENSION DUE TO HYPOVOLEMIA: ICD-10-CM

## 2019-01-09 DIAGNOSIS — E86.1 HYPOTENSION DUE TO HYPOVOLEMIA: ICD-10-CM

## 2019-01-09 PROCEDURE — 99214 OFFICE O/P EST MOD 30 MIN: CPT | Performed by: FAMILY MEDICINE

## 2019-01-09 NOTE — PROGRESS NOTES
Subjective:     CC: F/u hospital visit     HPI:   Prabhakar presents today with follow-up syncope.  He was recently hospitalized for a syncopal event secondary to possible dehydration and alcohol use.  He had a full white workup for his altered mental status and it was negative for stroke, MI, and seizure.  Today he denies any more events however he states that his blood pressure has been on the lower side and he feels fatigued.  He also states that when he has glucose tablets he feels like he has more energy.  He denies any fevers chills night sweats and has increased his water consumption dramatically.  He is here for further evaluation        Past Medical History:   Diagnosis Date   • BPH (benign prostatic hyperplasia)    • GERD (gastroesophageal reflux disease)    • Hypertension    • Neuropathic pain, leg    • Type II or unspecified type diabetes mellitus without mention of complication, not stated as uncontrolled        Social History   Substance Use Topics   • Smoking status: Former Smoker     Packs/day: 0.50     Years: 50.00     Types: Cigarettes     Quit date: 1/10/2007   • Smokeless tobacco: Never Used   • Alcohol use 4.8 - 6.0 oz/week     8 - 10 Glasses of wine per week      Comment: 1 bottle of wine 2-3 days a week       Current Outpatient Prescriptions Ordered in [facility]   Medication Sig Dispense Refill   • metFORMIN (GLUCOPHAGE) 500 MG Tab Take 1,000 mg by mouth 2 times a day.     • benazepril (LOTENSIN) 20 MG Tab TAKE ONE TABLET BY MOUTH DAILY 90 Tab 3   • atorvastatin (LIPITOR) 40 MG Tab Take 1 Tab by mouth every day. 90 Tab 3   • DULoxetine (CYMBALTA) 30 MG Cap DR Particles Take 2 Caps by mouth every day. 90 Cap 1   • terazosin (HYTRIN) 5 MG Cap Take 1 Cap by mouth every day. 90 Cap 3   • glipiZIDE SR (GLUCOTROL) 5 MG TABLET SR 24 HR Take 1 Tab by mouth every day. 90 Tab 3   • B Complex Vitamins (VITAMIN B COMPLEX PO) Take 1 Tab by mouth every day.     • vitamin D (CHOLECALCIFEROL) 1000 UNIT Tab Take 1,000  "Units by mouth every day.     • Multiple Vitamins-Minerals (HM COMPLETE 50+ MENS ULTIMATE PO) Take 1 Tab by mouth every day.     • omeprazole (PRILOSEC) 20 MG delayed-release capsule Take 20 mg by mouth every day.     • aspirin (ASA) 81 MG Chew Tab chewable tablet Take 81 mg by mouth every day.       No current Epic-ordered facility-administered medications on file.        Allergies:  Patient has no known allergies.    Health Maintenance: Completed    ROS:  Gen: no fevers/chill, no changes in weight, positive fatigue  Eyes: no changes in vision  ENT: no sore throat, no hearing loss, no bloody nose  Pulm: no sob, no cough  CV: no chest pain, no palpitations  GI: no nausea/vomiting, no diarrhea  : no dysuria  MSk: no myalgias  Skin: no rash  Neuro: no headaches, no numbness/tingling  Heme/Lymph: no easy bruising      Objective:       Exam:  /78 (BP Location: Left arm, Patient Position: Sitting)   Pulse (!) 104   Temp 36.9 °C (98.5 °F) (Temporal)   Resp 14   Ht 1.753 m (5' 9\")   Wt 99.3 kg (219 lb)   SpO2 94%   BMI 32.34 kg/m²  Body mass index is 32.34 kg/m².    Gen: Alert and oriented, No apparent distress.  Neck: Neck is supple without lymphadenopathy.  Lungs: Normal effort, CTA bilaterally, no wheezes, rhonchi, or rales  CV: Regular rate and rhythm. No murmurs, rubs, or gallops.               Ext: No clubbing, cyanosis, edema.    Assessment & Plan:     68 y.o. male with the following -     1. Hypotension due to hypovolemia  His blood pressure in office was below his baseline and he was hypotensive while inpatient.  We are going to hold his benazepril at this time.  He will log his blood pressures and continue to monitor for symptoms of hypotension.  Follow-up 3 months    2. Alcohol abuse  He has no longer using alcohol and has replaced his alcohol use with sparkling water and cranberry juice.  We discussed the dangers of alcohol and its short-term and long-term side effects.  We will continue to " monitor his success.    3. Controlled type 2 diabetes mellitus with diabetic polyneuropathy, without long-term current use of insulin (HCC)  Is a chronic issue for the patient.  His last A1c was 6.4 and he has very good control.  He is currently on metformin and glipizide and would like to cut back.  We discussed finger sticking when he feels as if he has a low blood sugar and then supplementing with either a glass of orange juice or glucose tablet.  Given his A1c, I think it is appropriate to trial off the glipizide and reduce his metformin to once daily.  We will continue to monitor his blood sugars.     Please note that this dictation was created using voice recognition software. I have made every reasonable attempt to correct obvious errors, but I expect that there are errors of grammar and possibly content that I did not discover before finalizing the note.

## 2019-01-10 ENCOUNTER — TELEPHONE (OUTPATIENT)
Dept: MEDICAL GROUP | Facility: LAB | Age: 69
End: 2019-01-10

## 2019-01-10 NOTE — TELEPHONE ENCOUNTER
1. Caller Name: Prabhakar Sierra                                      Call Back Number:[phone]      Patient approves a detailed voicemail message: yes    Pt states he does not see his gabapentin (NEURONTIN) 300 on his AVS he received yesterday. He states he takes one AM and one PM . Would like to know if he is taking this correctly.

## 2019-01-23 ENCOUNTER — PHYSICAL THERAPY (OUTPATIENT)
Dept: PHYSICAL THERAPY | Facility: MEDICAL CENTER | Age: 69
End: 2019-01-23
Attending: FAMILY MEDICINE
Payer: MEDICARE

## 2019-01-23 ENCOUNTER — TELEPHONE (OUTPATIENT)
Dept: MEDICAL GROUP | Facility: LAB | Age: 69
End: 2019-01-23

## 2019-01-23 DIAGNOSIS — M25.552 CHRONIC PAIN OF BOTH HIPS: ICD-10-CM

## 2019-01-23 DIAGNOSIS — G89.29 CHRONIC PAIN OF BOTH HIPS: ICD-10-CM

## 2019-01-23 DIAGNOSIS — M25.551 CHRONIC PAIN OF BOTH HIPS: ICD-10-CM

## 2019-01-23 PROCEDURE — 97110 THERAPEUTIC EXERCISES: CPT

## 2019-01-23 PROCEDURE — 97163 PT EVAL HIGH COMPLEX 45 MIN: CPT

## 2019-01-23 ASSESSMENT — BALANCE ASSESSMENTS
SITTING TO STANDING: 4
STANDING ON ONE LEG: 1
TRANSFERS: 4
PLACE ALTERNATE FOOT ON STEP OR STOOL WHILE STANDING UNSUPPORTED: 0
LOOK OVER LEFT AND RIGHT SHOULDERS WHILE STANDING: 3
PICK UP OBJECT FROM THE FLOOR FROM A STANDING POSITION: 3
TURN 360 DEGREES: 3
STANDING UNSUPPORTED: 4
STANDING UNSUPPORTED WITH EYES CLOSED: 2
STANDING TO SITTING: 4
LONG VERSION TOTAL SCORE (MAX 56): 37
SITTING UNSUPPORTED: 4
STANDING UNSUPPORTED ONE FOOT IN FRONT: 0
REACHING FORWARD WITH OUTSTRETCHED ARM WHILE STANDING: 3
LONG VERSION TOTAL SCORE (MAX 56): 37
STANDING UNSUPPORTED WITH FEET TOGETHER: 2

## 2019-01-23 ASSESSMENT — ACTIVITIES OF DAILY LIVING (ADL): POOR_BALANCE: 1

## 2019-01-23 NOTE — TELEPHONE ENCOUNTER
Ashanti from physical therapy calling asking for medical clearance for pt to start therapy since her reported he has had a TIA in the past. A verbal will be fine or fax clearance to [phone] to Peggy Govea

## 2019-01-23 NOTE — OP THERAPY EVALUATION
Outpatient Physical Therapy  INITIAL EVALUATION    [facility] Outpatient Physical Therapy  [address]  [place] [address]  Phone:  [phone]  Fax:  [phone]    Date of Evaluation: 01/23/2019    Patient: Prabhakar Sierra  YOB: 1950  MRN: [id number]     Referring Provider: Brenda Monroy M.D.  [address] [address]  [place] [address]   Referring Diagnosis Chronic pain of both hips [M25.551, M25.552, G89.29]     Time Calculation  Start time: 1350  Stop time: 1452 Time Calculation (min): 62 minutes     Physical Therapy Occurrence Codes    Date of onset of impairment:  11/12/18   Date physical therapy care plan established or reviewed:  1/23/19   Date physical therapy treatment started:  1/23/19          Chief Complaint: Hip Problem and Loss Of Balance    Visit Diagnoses     ICD-10-CM   1. Chronic pain of both hips M25.551    M25.552    G89.29         Adolfo Rowan is a 69 yo male with chief complaints of balance problems, weakness and hip pain bilaterally.  He reports he had 2 recent TIAs that took him to ER with hospital admit in early January 2019.  He ended up spending 2 nights in the hospital, was released with greater balance difficulties.  He started using a SPC since the hospitilization.  He is now using a SPC for balance and feels that does help his balance.  He stated the MDs did testing and did not find problems.  Pt reports he is wanting to get stronger, not be at risk for falls, have better endurance and be unlimited for activity.  He reports his pain 8/10, intermittently in korin hips and the pain seems worse since TIAs.  His pain can be aggravated with sitting and walking.  Pt reports he took a fall a few days ago in his home, landed funny on his left side and his shoulder is more painful.  He plans to follow up with the MD or chiropractor if the pain persists.  He states although the cane does help, he is still not comfortable with  it.  He states he will switch it side to side with walking in hopes he exercises both sides.  This was demonstrated by pt and PT recommended pt not switch sides while walking, he does not have declines in balance when he uses the cane on either side, but he is not to switch the cane from L to R UE during gait and keep it on one side while walking.  Pt reports he understands and was able to demonstrate in the PT evaluation.  He reports he has a home gym at [facility] and would like to use the gym regularly.  He has not been there in some time and stated he will wait to use the equipment until uses such equipment in PT.  Pt reports numbness from calves to toes bilaterally, this is not new and is related to DM and neuropathy, but does impact his balance.  Pt reports his DM is in line and he reported while he was in the hospital, the staff stated he was overmedicated his DM and the medications were changed.  At this time, he reports not taking his isogenesis supplements, but wants to go back to it when his MD states he can.  He is taking 2 gabapentins and 1 metformins per day.  He is taking a [place] vacation in Feb 2019 to go with his family and wants to be able to be active, have fun and be safe.   Spoke with PCP while pt was in clinic with MDs verbal clearance of pt for PT after hospitalization for TIAs in early January 2019.     Past Medical History:   Diagnosis Date   • BPH (benign prostatic hyperplasia)    • GERD (gastroesophageal reflux disease)    • Hypertension    • Neuropathic pain, leg    • Type II or unspecified type diabetes mellitus without mention of complication, not stated as uncontrolled      History reviewed. No pertinent surgical history.  Social History   Substance Use Topics   • Smoking status: Former Smoker     Packs/day: 0.50     Years: 50.00     Types: Cigarettes     Quit date: 1/10/2007   • Smokeless tobacco: Never Used   • Alcohol use 4.8 - 6.0 oz/week     8 - 10 Glasses of wine per week       Comment: 1 bottle of wine 2-3 days a week     Family and Occupational History     Social History   • Marital status:      Spouse name: N/A   • Number of children: N/A   • Years of education: N/A       Objective     Static Posture     Head  Forward.    Shoulders  Rounded.    Scapulae  Left protracted and right protracted.    Thoracic Spine  Hyperkyphosis.    Postural Observations  Seated posture: fair  Standing posture: fair  Correction of posture: has no consistent effect        Cervical Screen    Cervical range of motion within normal limits  Thoracic Screen    Thoracic range of motion within normal limits    Neurological Testing     Sensation     Hip   Left Hip   Intact: light touch    Right Hip   Intact: light touch    Dermatome testing   Lumbar (left)   All left lumbar dermatomes intact    Lumbar (right)   All right lumbar dermatomes intact    Active Range of Motion     Lumbar   Flexion: decreased  Extension: decreased  Left lateral flexion: decreased  Right lateral flexion: decreased  Left rotation: decreased  Right rotation: decreased  Left Hip   Normal active range of motion    Right Hip   Normal active range of motion    Additional Active Range of Motion Details  Pt with some pain with left shoulder movement.  Pt will have LOB with L/S AROM testing    Passive Range of Motion   Left Hip   Normal passive range of motion    Right Hip   Normal passive range of motion    Strength:      Left Hip   Planes of Motion   Flexion: 4  Extension: 4  Abduction: 4  Adduction: 4  External rotation: 3+  Internal rotation: 3+    Right Hip   Planes of Motion   Flexion: 4  Extension: 4  Abduction: 4  Adduction: 4  External rotation: 3+  Internal rotation: 3+    Left Knee   Flexion: 4  Extension: 4    Right Knee   Flexion: 4  Extension: 4    Left Ankle/Foot   Dorsiflexion: 4  Plantar flexion: 4  Inversion: 4  Eversion: 4    Right Ankle/Foot   Dorsiflexion: 4  Plantar flexion: 4  Inversion: 4  Eversion: 4    Tests      Left Hip   Negative TIMA.   SLR: Negative.     Right Hip   Negative TIMA.   SLR: Negative.   Ambulation     Quality of Movement During Gait     Additional Quality of Movement During Gait Details  Pt uses a cane that initially he would switch from L UE to R UE multiple times while walking, this was corrected and pt could demonstrate keeping cane on either the L or  R UE without switching and maintain better balance.    Functional Assessment     Comments  Able to sit to stand 12x in 30 sec with UE help          Therapeutic Exercises (CPT 80229):     1. Education and formulation of HEP    2. Sit to stand from chair 10x    3. In corner balance exercises: feet together, feet tandem and weight shifting fwd, retro and sideways      Time-based treatments/modalities:  Therapeutic exercise minutes (CPT 96027): 15 minutes       Assessment, Response and Plan:   Impairments: abnormal gait, activity intolerance, difficulty performing job, impaired balance, limited ADL's and pain with function    Assessment details:  Pt presents with balance and gait deficits that have worsened consistent with his recent TIAs, he presents with limitations in strength and endurance to activity and exercise as well as pain in his bilateral hips.  His Maria balance score is 37/56, he can have LOB with turning L or R or bending for a task.  Use of his SPC does improve his balance with gait.  Prognosis: good    Goals:   Short Term Goals:   1.  Independent HEP 5-6 days per week without increase in symptoms.  2.  Pt able to walk 6 min walk test in clinic without SPC.  Short term goal time span:  2-4 weeks      Long Term Goals:    1.  Little need for SPC for daily activities.  2.  Pain 4/10 or less intermittently.  3.  Maria score to increase by 8 points or more.  4.  Pt able to switch directions or turn L or R without LOB.  5.  Pt able to bend and  a light item without LOB.  6.  Pt able to sit to stand 14x in 30 sec without UE help.  7.   Strength 4+ to 5/5 korin LE  Long term goal time span:  6-8 weeks    Plan:   Therapy options:  Physical therapy treatment to continue  Planned therapy interventions:  Manual Therapy (CPT 36564), Neuromuscular Re-education (CPT 03165), Therapeutic Exercise (CPT 72880), Therapeutic Activities (CPT 39031), E Stim Unattended (CPT 28331) and Hot or Cold Pack Therapy (CPT 19757)  Frequency:  2x week  Duration in weeks:  6  Discussed with:  Patient  Plan details:  Pt to be seen in PT 2x per week x 6 weeks for ther ex, NMR, MT and ther act with estim for pain control as needed for improving balance, gait and safety as well as addressing weakness and hip pain.      Functional Limitations and Severity Modifiers  Maira Balance Total Score (0-56): 37     Referring provider co-signature:  I have reviewed this plan of care and my co-signature certifies the need for services.  Certification Dates:   From 1/23/19    To 3/10/19    Physician Signature: ________________________________ Date: ______________

## 2019-01-29 ENCOUNTER — PHYSICAL THERAPY (OUTPATIENT)
Dept: PHYSICAL THERAPY | Facility: MEDICAL CENTER | Age: 69
End: 2019-01-29
Attending: FAMILY MEDICINE
Payer: MEDICARE

## 2019-01-29 DIAGNOSIS — M25.551 CHRONIC PAIN OF BOTH HIPS: ICD-10-CM

## 2019-01-29 DIAGNOSIS — G89.29 CHRONIC PAIN OF BOTH HIPS: ICD-10-CM

## 2019-01-29 DIAGNOSIS — M25.552 CHRONIC PAIN OF BOTH HIPS: ICD-10-CM

## 2019-01-29 PROCEDURE — 97110 THERAPEUTIC EXERCISES: CPT

## 2019-01-29 PROCEDURE — 97014 ELECTRIC STIMULATION THERAPY: CPT

## 2019-01-29 NOTE — OP THERAPY DAILY TREATMENT
Outpatient Physical Therapy  DAILY TREATMENT     [facility] Outpatient Physical Therapy  [address]  [place] [address]  Phone:  [phone]  Fax:  [phone]    Date: 01/29/2019    Patient: Prabhakar Sierra  YOB: 1950  MRN: [id number]     Time Calculation  Start time: 1355  Stop time: 1440 Time Calculation (min): 45 minutes     Chief Complaint: Loss Of Balance and Hip Problem    Visit #: 2    SUBJECTIVE:  I am doing ok, I was tired with the exercises.    OBJECTIVE:  Current objective measures: able to walk with better balance          Therapeutic Exercises (CPT 40353):     1. Nu step , 5 min, S12 A 14 R 5    2. TG leg press L8, 10/3    3. Bridge , 10/3    4. TA in supine, 10    5. TA marches in supine, 10    Therapeutic Treatments and Modalities:     1. E Stim Unattended (CPT 71491), 15 min, IFC and MHP to L/S    Time-based treatments/modalities:  Therapeutic exercise minutes (CPT 97496): 30 minutes       Pain rating before treatment: 5-6  Pain rating after treatment: 5-6    ASSESSMENT:   Response to treatment: improvement noted    PLAN/RECOMMENDATIONS:   Plan for treatment: therapy treatment to continue next visit.  Planned interventions for next visit: continue with current treatment.

## 2019-01-31 ENCOUNTER — PHYSICAL THERAPY (OUTPATIENT)
Dept: PHYSICAL THERAPY | Facility: MEDICAL CENTER | Age: 69
End: 2019-01-31
Attending: FAMILY MEDICINE
Payer: MEDICARE

## 2019-01-31 DIAGNOSIS — M25.552 CHRONIC PAIN OF BOTH HIPS: ICD-10-CM

## 2019-01-31 DIAGNOSIS — G89.29 CHRONIC PAIN OF BOTH HIPS: ICD-10-CM

## 2019-01-31 DIAGNOSIS — M54.2 NECK PAIN: ICD-10-CM

## 2019-01-31 DIAGNOSIS — M25.551 CHRONIC PAIN OF BOTH HIPS: ICD-10-CM

## 2019-01-31 DIAGNOSIS — Z87.828 HISTORY OF MOTOR VEHICLE ACCIDENT: ICD-10-CM

## 2019-01-31 DIAGNOSIS — M79.7 FIBROMYALGIA: ICD-10-CM

## 2019-01-31 PROCEDURE — 97110 THERAPEUTIC EXERCISES: CPT

## 2019-01-31 PROCEDURE — 97014 ELECTRIC STIMULATION THERAPY: CPT

## 2019-01-31 PROCEDURE — 97112 NEUROMUSCULAR REEDUCATION: CPT

## 2019-01-31 NOTE — OP THERAPY DAILY TREATMENT
Outpatient Physical Therapy  DAILY TREATMENT     [facility] [facility] Outpatient Physical Therapy  [address]  [place] [address]  Phone:  [phone]  Fax:  [phone]    Date: 01/31/2019    Patient: Prabhakar Sierra  YOB: 1950  MRN: [id number]     Time Calculation  Start time: 1315  Stop time: 1405 Time Calculation (min): 50 minutes     Chief Complaint: Back Problem; Hip Problem; and Loss Of Balance    Visit #: 3    SUBJECTIVE:  I am doing well and felt really tired after last visit.    OBJECTIVE:  Current objective measures: continues to fatigue quickly          Therapeutic Exercises (CPT 94183):     1. Nu step , 5 min, S12 A 14 R 5    2. TG leg press L10, 15/3    3. Stool scooting double leg fwd and retro, 10ft x 2 each    Therapeutic Treatments and Modalities:     1. E Stim Unattended (CPT 70294), 15 min, IFC and MHP to L/S    2. Neuromuscular Re-education (CPT 39993), 18 min, tandem standing in II bars, alt tap on 6 inch step, in wong stepping over 1/2 foam roll consecutively, to side, standing on foam pad, stepping on/off foam pad    Time-based treatments/modalities:  Therapeutic exercise minutes (CPT 83323): 15 minutes  Neuromusc re-ed, balance, coor, post minutes (CPT 91411): 18 minutes       Pain rating before treatment: 0  Pain rating after treatment: 0    ASSESSMENT:   Response to treatment: improvement noted in session with endurance    PLAN/RECOMMENDATIONS:   Plan for treatment: therapy treatment to continue next visit.  Planned interventions for next visit: continue with current treatment.

## 2019-02-05 ENCOUNTER — APPOINTMENT (OUTPATIENT)
Dept: PHYSICAL THERAPY | Facility: MEDICAL CENTER | Age: 69
End: 2019-02-05
Attending: FAMILY MEDICINE
Payer: MEDICARE

## 2019-02-07 ENCOUNTER — PHYSICAL THERAPY (OUTPATIENT)
Dept: PHYSICAL THERAPY | Facility: MEDICAL CENTER | Age: 69
End: 2019-02-07
Attending: FAMILY MEDICINE
Payer: MEDICARE

## 2019-02-07 DIAGNOSIS — G89.29 CHRONIC PAIN OF BOTH HIPS: ICD-10-CM

## 2019-02-07 DIAGNOSIS — M25.551 CHRONIC PAIN OF BOTH HIPS: ICD-10-CM

## 2019-02-07 DIAGNOSIS — Z87.828 HISTORY OF MOTOR VEHICLE ACCIDENT: ICD-10-CM

## 2019-02-07 DIAGNOSIS — M54.2 NECK PAIN: ICD-10-CM

## 2019-02-07 DIAGNOSIS — M25.552 CHRONIC PAIN OF BOTH HIPS: ICD-10-CM

## 2019-02-07 DIAGNOSIS — M79.7 FIBROMYALGIA: ICD-10-CM

## 2019-02-07 PROCEDURE — 97014 ELECTRIC STIMULATION THERAPY: CPT

## 2019-02-07 PROCEDURE — 97112 NEUROMUSCULAR REEDUCATION: CPT

## 2019-02-07 PROCEDURE — 97110 THERAPEUTIC EXERCISES: CPT

## 2019-02-07 NOTE — OP THERAPY DAILY TREATMENT
Outpatient Physical Therapy  DAILY TREATMENT     [facility] Outpatient Physical Therapy  [address]  [place] [address]  Phone:  [phone]  Fax:  [phone]    Date: 02/07/2019    Patient: Prabhakar Sierra  YOB: 1950  MRN: [id number]     Time Calculation  Start time: 1017  Stop time: 1127 Time Calculation (min): 70 minutes     Chief Complaint: Loss Of Balance    Visit #: 4    SUBJECTIVE:  I am doing ok.      OBJECTIVE:  Current objective measures: able to alt tap on 8 inch step without LOB today          Therapeutic Exercises (CPT 12303):     1. Nu step , 5 min, S12 A 14 R 5    2. Leg press, 10/3, 50#    3. Stool scooting double leg fwd and retro, 10ft x 2 each    4. Bridge, 10 holding 5 sec    5. Dying bug, 10/2    6. Bridge on ball, 10/3    Therapeutic Treatments and Modalities:     1. E Stim Unattended (CPT 89360), 15 min, IFC and MHP to L/S    2. Neuromuscular Re-education (CPT 98378), 30 min, tandem standing in II bars, alt tap on 6 inch step, in wong stepping over 1/2 foam roll consecutively, to side, standing on foam pad, stepping on/off foam pad, push 30# ball with alt LEs with dowels,     Time-based treatments/modalities:  Therapeutic exercise minutes (CPT 68845): 25 minutes  Neuromusc re-ed, balance, coor, post minutes (CPT 01001): 30 minutes       Pain rating before treatment: 0  Pain rating after treatment: 0    ASSESSMENT:   Response to treatment: improving with balance    PLAN/RECOMMENDATIONS:   Plan for treatment: therapy treatment to continue next visit.  Planned interventions for next visit: continue with current treatment.

## 2019-02-11 ENCOUNTER — TELEPHONE (OUTPATIENT)
Dept: MEDICAL GROUP | Facility: LAB | Age: 69
End: 2019-02-11

## 2019-02-11 NOTE — TELEPHONE ENCOUNTER
Phone Number Called: [phone] (home)       Message: lvm for pt to give us a call back to encourage cologaurd.    Left Message for patient to call back: yes

## 2019-02-11 NOTE — TELEPHONE ENCOUNTER
PT states he did a FIT  Test test that JAYSON gave him. He was not sure what the box was (COLO GUARD) and took it to the lab they were not sure and through it out.

## 2019-02-11 NOTE — TELEPHONE ENCOUNTER
1. Caller Name: [employer]                                         Call Back Number: [phone]      Patient approves a detailed voicemail message: N\A    Recieved fax from [employer] pt has not completed Cologaurd test that was ordered.

## 2019-02-12 ENCOUNTER — APPOINTMENT (OUTPATIENT)
Dept: PHYSICAL THERAPY | Facility: MEDICAL CENTER | Age: 69
End: 2019-02-12
Attending: FAMILY MEDICINE
Payer: MEDICARE

## 2019-02-12 DIAGNOSIS — F32.1 MODERATE SINGLE CURRENT EPISODE OF MAJOR DEPRESSIVE DISORDER (HCC): ICD-10-CM

## 2019-02-12 DIAGNOSIS — M25.552 CHRONIC PAIN OF BOTH HIPS: ICD-10-CM

## 2019-02-12 DIAGNOSIS — G89.29 CHRONIC PAIN OF BOTH HIPS: ICD-10-CM

## 2019-02-12 DIAGNOSIS — M25.551 CHRONIC PAIN OF BOTH HIPS: ICD-10-CM

## 2019-02-12 PROCEDURE — 97112 NEUROMUSCULAR REEDUCATION: CPT

## 2019-02-12 PROCEDURE — 97110 THERAPEUTIC EXERCISES: CPT

## 2019-02-12 PROCEDURE — 97014 ELECTRIC STIMULATION THERAPY: CPT

## 2019-02-12 RX ORDER — DULOXETIN HYDROCHLORIDE 30 MG/1
CAPSULE, DELAYED RELEASE ORAL
Qty: 90 CAP | Refills: 4 | Status: SHIPPED | OUTPATIENT
Start: 2019-02-12 | End: 2019-06-12

## 2019-02-12 NOTE — TELEPHONE ENCOUNTER
Was the patient seen in the last year in this department? Yes 1-9-2019    Does patient have an active prescription for medications requested? No     Received Request Via: Pharmacy

## 2019-02-12 NOTE — OP THERAPY DAILY TREATMENT
Outpatient Physical Therapy  DAILY TREATMENT     [facility] Outpatient Physical Therapy  [address]  [place] [address]  Phone:  [phone]  Fax:  [phone]    Date: 02/12/2019    Patient: Prabhakar Sierra  YOB: 1950  MRN: [id number]     Time Calculation  Start time: 1316  Stop time: 1428 Time Calculation (min): 72 minutes     Chief Complaint: Hip Problem and Back Problem    Visit #: 5    SUBJECTIVE:  Very tired today, not sure why, I sleep well and eat well.      OBJECTIVE:  Current objective measures: narrow SANDRA will challenge balance          Therapeutic Exercises (CPT 51336):     1. Nu step , 5 min, S12 A 14 R 5    2. Leg press, 20/2, 50#    3. Stool scooting double leg fwd and retro, 10ft x 2 each    4. Bridge, 10 holding 5 sec    5. Dying bug, 10/2    6. Bridge on ball, 10/3    7. Resisted gait all directions x 6, 10 ft    8. Resisted gait fwd, 40ft x 6    Therapeutic Treatments and Modalities:     1. E Stim Unattended (CPT 38348), 15 min, IFC and MHP to L/S    2. Neuromuscular Re-education (CPT 61690), 25 min, alt tap on 8 inch step, stepping over a 1/2 foam roll with varied pause time, obstacle challenges, push 30# ball with alt LEs with dowels,     Time-based treatments/modalities:  Therapeutic exercise minutes (CPT 30081): 30 minutes  Neuromusc re-ed, balance, coor, post minutes (CPT 45662): 25 minutes       Pain rating before treatment: 0  Pain rating after treatment: 0    ASSESSMENT:   Response to treatment: continues to improve    PLAN/RECOMMENDATIONS:   Plan for treatment: therapy treatment to continue next visit.  Planned interventions for next visit: continue with current treatment.

## 2019-02-14 DIAGNOSIS — E11.42 CONTROLLED TYPE 2 DIABETES MELLITUS WITH DIABETIC POLYNEUROPATHY, WITHOUT LONG-TERM CURRENT USE OF INSULIN (HCC): ICD-10-CM

## 2019-02-14 RX ORDER — GABAPENTIN 300 MG/1
CAPSULE ORAL
Qty: 90 CAP | Refills: 2 | Status: SHIPPED | OUTPATIENT
Start: 2019-02-14 | End: 2019-06-12 | Stop reason: SDUPTHER

## 2019-02-14 NOTE — TELEPHONE ENCOUNTER
Was the patient seen in the last year in this department? Yes LOV 1/9/19    Does patient have an active prescription for medications requested? np    Received Request Via: Pharmacy

## 2019-02-19 ENCOUNTER — APPOINTMENT (OUTPATIENT)
Dept: PHYSICAL THERAPY | Facility: MEDICAL CENTER | Age: 69
End: 2019-02-19
Attending: FAMILY MEDICINE
Payer: MEDICARE

## 2019-03-04 ENCOUNTER — TELEPHONE (OUTPATIENT)
Dept: SCHEDULING | Facility: IMAGING CENTER | Age: 69
End: 2019-03-04

## 2019-03-04 ENCOUNTER — HOSPITAL ENCOUNTER (EMERGENCY)
Facility: MEDICAL CENTER | Age: 69
End: 2019-03-04
Attending: EMERGENCY MEDICINE
Payer: MEDICARE

## 2019-03-04 ENCOUNTER — TELEPHONE (OUTPATIENT)
Dept: MEDICAL GROUP | Facility: LAB | Age: 69
End: 2019-03-04

## 2019-03-04 VITALS
OXYGEN SATURATION: 94 % | RESPIRATION RATE: 18 BRPM | HEIGHT: 70 IN | BODY MASS INDEX: 32.41 KG/M2 | DIASTOLIC BLOOD PRESSURE: 81 MMHG | TEMPERATURE: 97.7 F | HEART RATE: 86 BPM | WEIGHT: 226.41 LBS | SYSTOLIC BLOOD PRESSURE: 185 MMHG

## 2019-03-04 DIAGNOSIS — R40.20 LOSS OF CONSCIOUSNESS (HCC): ICD-10-CM

## 2019-03-04 LAB
ALBUMIN SERPL BCP-MCNC: 4.1 G/DL (ref 3.2–4.9)
ALBUMIN/GLOB SERPL: 1.3 G/DL
ALP SERPL-CCNC: 46 U/L (ref 30–99)
ALT SERPL-CCNC: 24 U/L (ref 2–50)
ANION GAP SERPL CALC-SCNC: 9 MMOL/L (ref 0–11.9)
AST SERPL-CCNC: 25 U/L (ref 12–45)
BASOPHILS # BLD AUTO: 0.4 % (ref 0–1.8)
BASOPHILS # BLD: 0.03 K/UL (ref 0–0.12)
BILIRUB SERPL-MCNC: 0.5 MG/DL (ref 0.1–1.5)
BUN SERPL-MCNC: 15 MG/DL (ref 8–22)
CALCIUM SERPL-MCNC: 8.8 MG/DL (ref 8.4–10.2)
CHLORIDE SERPL-SCNC: 99 MMOL/L (ref 96–112)
CO2 SERPL-SCNC: 25 MMOL/L (ref 20–33)
CREAT SERPL-MCNC: 0.93 MG/DL (ref 0.5–1.4)
EKG IMPRESSION: NORMAL
EOSINOPHIL # BLD AUTO: 0.17 K/UL (ref 0–0.51)
EOSINOPHIL NFR BLD: 2.4 % (ref 0–6.9)
ERYTHROCYTE [DISTWIDTH] IN BLOOD BY AUTOMATED COUNT: 38.8 FL (ref 35.9–50)
GLOBULIN SER CALC-MCNC: 3.1 G/DL (ref 1.9–3.5)
GLUCOSE SERPL-MCNC: 293 MG/DL (ref 65–99)
HCT VFR BLD AUTO: 33.7 % (ref 42–52)
HGB BLD-MCNC: 11.5 G/DL (ref 14–18)
IMM GRANULOCYTES # BLD AUTO: 0.03 K/UL (ref 0–0.11)
IMM GRANULOCYTES NFR BLD AUTO: 0.4 % (ref 0–0.9)
LYMPHOCYTES # BLD AUTO: 2.07 K/UL (ref 1–4.8)
LYMPHOCYTES NFR BLD: 29.2 % (ref 22–41)
MCH RBC QN AUTO: 29.3 PG (ref 27–33)
MCHC RBC AUTO-ENTMCNC: 34.1 G/DL (ref 33.7–35.3)
MCV RBC AUTO: 85.8 FL (ref 81.4–97.8)
MONOCYTES # BLD AUTO: 0.57 K/UL (ref 0–0.85)
MONOCYTES NFR BLD AUTO: 8.1 % (ref 0–13.4)
NEUTROPHILS # BLD AUTO: 4.21 K/UL (ref 1.82–7.42)
NEUTROPHILS NFR BLD: 59.5 % (ref 44–72)
NRBC # BLD AUTO: 0 K/UL
NRBC BLD-RTO: 0 /100 WBC
PLATELET # BLD AUTO: 155 K/UL (ref 164–446)
PMV BLD AUTO: 10.1 FL (ref 9–12.9)
POTASSIUM SERPL-SCNC: 4.1 MMOL/L (ref 3.6–5.5)
PROT SERPL-MCNC: 7.2 G/DL (ref 6–8.2)
RBC # BLD AUTO: 3.93 M/UL (ref 4.7–6.1)
SODIUM SERPL-SCNC: 133 MMOL/L (ref 135–145)
TROPONIN I SERPL-MCNC: <0.02 NG/ML (ref 0–0.04)
TSH SERPL DL<=0.005 MIU/L-ACNC: 0.75 UIU/ML (ref 0.38–5.33)
WBC # BLD AUTO: 7.1 K/UL (ref 4.8–10.8)

## 2019-03-04 PROCEDURE — 85025 COMPLETE CBC W/AUTO DIFF WBC: CPT

## 2019-03-04 PROCEDURE — 99284 EMERGENCY DEPT VISIT MOD MDM: CPT

## 2019-03-04 PROCEDURE — 84484 ASSAY OF TROPONIN QUANT: CPT

## 2019-03-04 PROCEDURE — 84443 ASSAY THYROID STIM HORMONE: CPT

## 2019-03-04 PROCEDURE — 80053 COMPREHEN METABOLIC PANEL: CPT

## 2019-03-04 PROCEDURE — 36415 COLL VENOUS BLD VENIPUNCTURE: CPT

## 2019-03-04 PROCEDURE — 93005 ELECTROCARDIOGRAM TRACING: CPT | Performed by: EMERGENCY MEDICINE

## 2019-03-04 ASSESSMENT — ENCOUNTER SYMPTOMS
CHILLS: 0
NAUSEA: 0
LOSS OF CONSCIOUSNESS: 1
VOMITING: 0
FEVER: 0

## 2019-03-04 NOTE — TELEPHONE ENCOUNTER
Spoke to pt and he said TIA happened last week Thursday not today. He states he is feeling good right now and through the whole weekend was advised when this happens pt is to go to ER.

## 2019-03-04 NOTE — TELEPHONE ENCOUNTER
Phone Number Called: [phone] (home)     Message: LVM for pt and advised to go to ER     Left Message for patient to call back: yes

## 2019-03-05 ENCOUNTER — TELEPHONE (OUTPATIENT)
Dept: MEDICAL GROUP | Facility: LAB | Age: 69
End: 2019-03-05

## 2019-03-05 DIAGNOSIS — R55 NEAR SYNCOPE: ICD-10-CM

## 2019-03-05 NOTE — ED PROVIDER NOTES
ED Provider Note    CHIEF COMPLAINT  Chief Complaint   Patient presents with   • Sent by MD KIMBLE  Prabhakar Sierra is a 68 y.o. male who returns to the emergency department with a transient episode of loss of consciousness.  Patient reports he has had 2 other episodes in the past.  Each episode has been in the context of heavy drinking.  The first was over Christmas, the next was on his anniversary and this most recent was after a luau while in [place].  He reports that she had drank a considerable amount of my ties that night and at 4 AM woke up briefly and then per his wife he was told that he had a loss of consciousness, was incredibly difficult to arouse.  This resolved over the course of 5-10 minutes and patient returned to normal baseline.  He denies any current complaints.  He denies any associated chest pain or shortness of breath.  He denies any associated chest pain or palpitations.  Patient has had considerable workup into this including MRI, echo, and hospitalization with telemetry.  However he is never been symptomatic while in the emergency department or hospital.    REVIEW OF SYSTEMS  Review of Systems   Constitutional: Negative for chills and fever.   Cardiovascular: Negative for chest pain.   Gastrointestinal: Negative for nausea and vomiting.   Genitourinary: Negative for dysuria and urgency.   Neurological: Positive for loss of consciousness.   All other systems reviewed and are negative.      See HPI for further details. All other systems are negative.     PAST MEDICAL HISTORY   has a past medical history of BPH (benign prostatic hyperplasia); GERD (gastroesophageal reflux disease); Hypertension; Neuropathic pain, leg; and Type II or unspecified type diabetes mellitus without mention of complication, not stated as uncontrolled.    SOCIAL HISTORY  Social History     Social History Main Topics   • Smoking status: Former Smoker     Packs/day: 0.50     Years: 50.00     Types: Cigarettes      Quit date: 1/10/2007   • Smokeless tobacco: Never Used   • Alcohol use 4.8 - 6.0 oz/week     8 - 10 Glasses of wine per week      Comment: 1 bottle of wine 2-3 days a week   • Drug use: No   • Sexual activity: No       SURGICAL HISTORY  patient denies any surgical history    CURRENT MEDICATIONS  Home Medications    **Home medications have not yet been reviewed for this encounter**         ALLERGIES  No Known Allergies    PHYSICAL EXAM  Physical Exam   Constitutional: He is oriented to person, place, and time. He appears well-developed and well-nourished.   HENT:   Head: Normocephalic and atraumatic.   Eyes: Pupils are equal, round, and reactive to light. Conjunctivae are normal.   Neck: Normal range of motion. Neck supple.   Cardiovascular: Normal rate and regular rhythm.  Exam reveals no gallop and no friction rub.    No murmur heard.  Pulmonary/Chest: Effort normal and breath sounds normal. No respiratory distress. He has no wheezes.   Abdominal: Soft. Bowel sounds are normal. He exhibits no distension. There is no tenderness. There is no rebound.   Neurological: He is alert and oriented to person, place, and time.   Distal and proximal strength 5/5 in upper and lower extremities. Normal gait. No dysmetria. No sensation deficits. No visual field deficits. Cranial nerves intact.      Skin: Skin is warm and dry.   Psychiatric: He has a normal mood and affect. His behavior is normal.         DIAGNOSTIC STUDIES / PROCEDURES    EKG  EKG is sinus rhythm normal axis normal intervals no ST changes consistent  with  acute regional ischemia    LABS  Results for orders placed or performed during the hospital encounter of 03/04/19   CBC WITH DIFFERENTIAL   Result Value Ref Range    WBC 7.1 4.8 - 10.8 K/uL    RBC 3.93 (L) 4.70 - 6.10 M/uL    Hemoglobin 11.5 (L) 14.0 - 18.0 g/dL    Hematocrit 33.7 (L) 42.0 - 52.0 %    MCV 85.8 81.4 - 97.8 fL    MCH 29.3 27.0 - 33.0 pg    MCHC 34.1 33.7 - 35.3 g/dL    RDW 38.8 35.9 - 50.0 fL     Platelet Count 155 (L) 164 - 446 K/uL    MPV 10.1 9.0 - 12.9 fL    Neutrophils-Polys 59.50 44.00 - 72.00 %    Lymphocytes 29.20 22.00 - 41.00 %    Monocytes 8.10 0.00 - 13.40 %    Eosinophils 2.40 0.00 - 6.90 %    Basophils 0.40 0.00 - 1.80 %    Immature Granulocytes 0.40 0.00 - 0.90 %    Nucleated RBC 0.00 /100 WBC    Neutrophils (Absolute) 4.21 1.82 - 7.42 K/uL    Lymphs (Absolute) 2.07 1.00 - 4.80 K/uL    Monos (Absolute) 0.57 0.00 - 0.85 K/uL    Eos (Absolute) 0.17 0.00 - 0.51 K/uL    Baso (Absolute) 0.03 0.00 - 0.12 K/uL    Immature Granulocytes (abs) 0.03 0.00 - 0.11 K/uL    NRBC (Absolute) 0.00 K/uL   CMP   Result Value Ref Range    Sodium 133 (L) 135 - 145 mmol/L    Potassium 4.1 3.6 - 5.5 mmol/L    Chloride 99 96 - 112 mmol/L    Co2 25 20 - 33 mmol/L    Anion Gap 9.0 0.0 - 11.9    Glucose 293 (H) 65 - 99 mg/dL    Bun 15 8 - 22 mg/dL    Creatinine 0.93 0.50 - 1.40 mg/dL    Calcium 8.8 8.4 - 10.2 mg/dL    AST(SGOT) 25 12 - 45 U/L    ALT(SGPT) 24 2 - 50 U/L    Alkaline Phosphatase 46 30 - 99 U/L    Total Bilirubin 0.5 0.1 - 1.5 mg/dL    Albumin 4.1 3.2 - 4.9 g/dL    Total Protein 7.2 6.0 - 8.2 g/dL    Globulin 3.1 1.9 - 3.5 g/dL    A-G Ratio 1.3 g/dL   TROPONIN   Result Value Ref Range    Troponin I <0.02 0.00 - 0.04 ng/mL   TSH   Result Value Ref Range    TSH 0.750 0.380 - 5.330 uIU/mL   ESTIMATED GFR   Result Value Ref Range    GFR If African American >60 >60 mL/min/1.73 m 2    GFR If Non African American >60 >60 mL/min/1.73 m 2   EKG   Result Value Ref Range    Report       [facility] Emergency Dept.    Test Date:  2019  Pt Name:    ROLANDO GREEN          Department: [facility]  MRN:        [id number]                      [facility]  Gender:     Male                         Technician:   :        1950                   Requested By:KASANDRA PERERA  Order #:    [id number]                    Reading MD: Maureen Love MD    Measurements  Intervals                                 Axis  Rate:       86                           P:          10  RI:         143                          QRS:        7  QRSD:       89                           T:          47  QT:         364  QTc:        436    Interpretive Statements  EKG is sinus rhythm normal axis normal intervals no ST changes consistent  with  acute regional ischemia    Electronically Signed On 3-4-2019 23:23:49 PST by Maureen Love MD           COURSE & MEDICAL DECISION MAKING  Pertinent Labs & Imaging studies reviewed. (See chart for details)  Very well-appearing patient here with recurrent episodes of questionable loss of consciousness or altered level of consciousness that is always in the context of heavy drinking during a celebration.  Patient exam is unremarkable, his neurologic exam is unremarkable, patient has had incredibly thorough workup including MRI and echo which have all been normal.  Patient currently has no symptoms and has not had any symptoms for the last few days.  I believe that CVA or TIA is unlikely to be the cause of etiology of patient's symptoms as he has loss of consciousness, a posterior stroke is unlikely especially given patient's normal MRIs, more likely, in my opinion is that patient is experiencing holiday heart or transient arrhythmia otherwise.  Patient has been evaluated multiple times and admitted for telemetry monitoring, he has never had any symptoms while in the emergency department or hospital and therefore I believe this is been missed.  I have discussed the case with patient's primary care physician who will see patient tomorrow morning and set him up with a Holter monitor.  I have also scheduled patient for an outpatient cardiology visit, expedited in the next few days.  Of offered admission to patient but together especially given that multiple admissions have failed to yield any actionable results, and the patient only has symptoms after heavy drinking, we have decided to  defer this.  I have also asked patient to stop drinking or decrease his drinking considerably as this is likely causing or exacerbating his symptoms.   The patient will return for worsening symptoms and is stable at the time of discharge. The patient verbalizes understanding and will comply.    FINAL IMPRESSION  1. Loss of consciousness (HCC)               Electronically signed by: Ivan Reddy, 3/4/2019 10:28 PM

## 2019-03-05 NOTE — TELEPHONE ENCOUNTER
After hours phone call  Call on 3/5/2019 at 8:44 AM from Dr. Reddy, [facility] regarding patient Prabhakar Sierra [phone] (home)  who reports PCP: Brenda Monroy M.D..  Pt has been seen 3 times in the ER for palpitations and syncope (usually after heavy drinking).  Patient has had a negative including MRI, echo and admissions  Plan: Dr Reddy would like the patient seen in the next 2 days by his PCP and [facility]  Manjula Elmore M.D.

## 2019-03-05 NOTE — ED TRIAGE NOTES
"Patient BiB family after being sent by PCP for evaluation of \"TIA\" that happened last week while he was in [place] after drinking \"a ton of Viktoriya Ana\". Patient currently has no symptoms or complaints at this time. Patient has had 2 other episodes like this and has been evaluated at [facility] without any diagnoses.   "

## 2019-03-05 NOTE — ED NOTES
Discharge information provided. Pt verbalized understanding of discharge instructions to follow up with PCP and to return to ER if condition worsens. Pt expressed the awareness of not driving or operating heavy machinery, has ride home with Wife. Pt ambulated out of ER in a steady gait, no additional questions or concerns. Educated on f/u.

## 2019-03-11 ENCOUNTER — TELEPHONE (OUTPATIENT)
Dept: MEDICAL GROUP | Facility: LAB | Age: 69
End: 2019-03-11

## 2019-03-11 NOTE — TELEPHONE ENCOUNTER
ESTABLISHED PATIENT PRE-VISIT PLANNING     Patient was NOT contacted to complete PVP.     Note: Patient will not be contacted if there is no indication to call.     1.  Reviewed notes from the last few office visits within the medical group: Yes    2.  If any orders were placed at last visit or intended to be done for this visit (i.e. 6 mos follow-up), do we have Results/Consult Notes?        •  Labs - Labs were not ordered at last office visit.       •  Imaging - Imaging was not ordered at last office visit.       •  Referrals - No referrals were ordered at last office visit.    3. Is this appointment scheduled as a Hospital Follow-Up? No    4.  Immunizations were updated in Epic using WebIZ?: Epic matches WebIZ       •  Web Iz Recommendations: SHINGRIX (Shingles)    5.  Patient is due for the following Health Maintenance Topics:   Health Maintenance Due   Topic Date Due   • IMM HEP B VACCINE (1 of 3 - Risk 3-dose series) 07/29/1968   • IMM ZOSTER VACCINES (1 of 2) 07/29/1999       - Patient has completed FLU, PNEUMOVAX (PPSV23), PREVNAR (PCV13)  and TDAP Immunization(s) per WebIZ. Chart has been updated.    6. Orders for overdue Health Maintenance topics pended in Pre-Charting? N\A    7.  AHA (MDX) form printed for Provider? No, already completed    8.  Patient was NOT informed to arrive 15 min prior to their scheduled appointment and bring in their medication bottles.

## 2019-03-12 ENCOUNTER — APPOINTMENT (OUTPATIENT)
Dept: PHYSICAL THERAPY | Facility: MEDICAL CENTER | Age: 69
End: 2019-03-12
Attending: FAMILY MEDICINE
Payer: MEDICARE

## 2019-03-12 ENCOUNTER — TELEPHONE (OUTPATIENT)
Dept: MEDICAL GROUP | Facility: LAB | Age: 69
End: 2019-03-12

## 2019-03-12 ENCOUNTER — OFFICE VISIT (OUTPATIENT)
Dept: MEDICAL GROUP | Facility: LAB | Age: 69
End: 2019-03-12
Payer: MEDICARE

## 2019-03-12 VITALS
BODY MASS INDEX: 32.18 KG/M2 | HEART RATE: 96 BPM | DIASTOLIC BLOOD PRESSURE: 68 MMHG | HEIGHT: 70 IN | RESPIRATION RATE: 18 BRPM | WEIGHT: 224.8 LBS | TEMPERATURE: 97 F | SYSTOLIC BLOOD PRESSURE: 132 MMHG | OXYGEN SATURATION: 97 %

## 2019-03-12 DIAGNOSIS — I49.49 OTHER PREMATURE DEPOLARIZATION: ICD-10-CM

## 2019-03-12 DIAGNOSIS — M54.5 LOW BACK PAIN, UNSPECIFIED BACK PAIN LATERALITY, UNSPECIFIED CHRONICITY, WITH SCIATICA PRESENCE UNSPECIFIED: ICD-10-CM

## 2019-03-12 PROBLEM — I49.9 ARRHYTHMIA: Status: ACTIVE | Noted: 2019-03-12

## 2019-03-12 PROCEDURE — 99213 OFFICE O/P EST LOW 20 MIN: CPT | Performed by: FAMILY MEDICINE

## 2019-03-12 NOTE — OP THERAPY DAILY TREATMENT
Outpatient Physical Therapy  DAILY TREATMENT     [facility] Outpatient Physical Therapy  [address]  [place] [address]  Phone:  [phone]  Fax:  [phone]    Date: 03/12/2019    Patient: Prabhakar Sierra  YOB: 1950  MRN: [id number]     Time Calculation             Chief Complaint: No chief complaint on file.    Visit #: 6    SUBJECTIVE:  ***    OBJECTIVE:  Current objective measures: ***          Therapeutic Exercises (CPT 26525):     1. Nu step , 5 min, S12 A 14 R 5    2. Leg press, 20/2, 50#    3. Stool scooting double leg fwd and retro, 10ft x 2 each    4. Bridge, 10 holding 5 sec    5. Dying bug, 10/2    6. Bridge on ball, 10/3    7. Resisted gait all directions x 6, 10 ft    8. Resisted gait fwd, 40ft x 6    Therapeutic Treatments and Modalities:     1. E Stim Unattended (CPT 09482), 15 min, IFC and MHP to L/S    2. Neuromuscular Re-education (CPT 64838), 25 min, alt tap on 8 inch step, stepping over a 1/2 foam roll with varied pause time, obstacle challenges, push 30# ball with alt LEs with dowels,     Time-based treatments/modalities:          Pain rating before treatment: {PAIN NUMBERS_1-10:51819}  Pain rating after treatment: {PAIN NUMBERS_1-10:86785}    ASSESSMENT:   Response to treatment: ***    PLAN/RECOMMENDATIONS:   Plan for treatment: therapy treatment to continue next visit.  Planned interventions for next visit: continue with current treatment.

## 2019-03-12 NOTE — TELEPHONE ENCOUNTER
Phone Number Called: [phone] (home)     Message: patient was called about the Holter monitor. He stated he had not received it yet. Patient would still like to be seen.  Called patient and relayed message that he will need to call and schedule his appointment.    Left Message for patient to call back: yes   Called cardiology to see if patient goes there to  monitor.

## 2019-03-12 NOTE — OP THERAPY PROGRESS SUMMARY
Outpatient Physical Therapy  PROGRESS SUMMARY NOTE      [facility] Outpatient Physical Therapy  [address]  [place] [address]  Phone:  [phone]  Fax:  [phone]    Date of Visit: 03/12/2019    Patient: Prabhakar Sierra  YOB: 1950  MRN: [id number]     Referring Provider: Brenda Monroy M.D.  [address]  [place] [address]   Referring Diagnosis Pain in right hip [M25.551];Pain in left hip [M25.552];Other chronic pain [G89.29]     {No diagnosis found. (Refresh or delete this SmartLink)}    Rehab Potential: good    Physical Therapy Occurrence Codes    Date of onset of impairment:  11/12/18   Date physical therapy care plan established or reviewed:  1/23/19   Date physical therapy treatment started:  1/23/19          Cert Period: 3/12/19  to 4/30/19  Progress Report Period: 1/23/19-3/12/19    Functional Limitations and Severity Modifiers      Progress Summary Details      Goals:   Short Term Goals:   1.  Independent HEP 5-6 days per week without increase in symptoms.  2.  Pt able to walk 6 min walk test in clinic without SPC.  Short term goal time span:  2-4 weeks      Long Term Goals:    1.  Little need for SPC for daily activities.  2.  Pain 4/10 or less intermittently.  3.  Maria score to increase by 8 points or more.  4.  Pt able to switch directions or turn L or R without LOB.  5.  Pt able to bend and  a light item without LOB.  6.  Pt able to sit to stand 14x in 30 sec without UE help.  7.  Strength 4+ to 5/5 korin LE  Long term goal time span:  6-8 weeks    Recommend pt to continue with PT 2x per week x 4 weeks to continue to address improving balance, strength and endurance with ther ex, NMR, MT, estim and HEP.    Referring provider co-signature:  I have reviewed this plan of care and my co-signature certifies the need for services.    Physician Signature: ________________________________ Date: ______________
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
## 2019-03-12 NOTE — TELEPHONE ENCOUNTER
1. Caller Name: Prabhakar Birminghamkristen                                     Call Back Number: [phone] (home)   Pt is asking about his appt today. He does not have a Holter monitor and we do not carry them here. Please advise

## 2019-03-13 NOTE — PROGRESS NOTES
Subjective:     CC: FU    HPI:   Prabhakar presents today with follow-up ER visit.  Patient was recently seen in the ER for difficulty to arouse and loss of consciousness.  Earlier that night he had heavily drink 2-3 glasses of wine and 1-2 cans of beers.  He has been previously worked up at the MI, seizure, stroke, and telemetry monitoring and the workup was negative.  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  He was sent to cardiology and has an appointment pending and has Holter monitoring pending.  He denies any further symptoms except he states he has some mild tingling in his skin above his heart.  He denies a history of shingles and is adequately treated for his neuropathy.    Past Medical History:   Diagnosis Date   • BPH (benign prostatic hyperplasia)    • GERD (gastroesophageal reflux disease)    • Hypertension    • Neuropathic pain, leg    • Type II or unspecified type diabetes mellitus without mention of complication, not stated as uncontrolled        Social History   Substance Use Topics   • Smoking status: Former Smoker     Packs/day: 0.50     Years: 50.00     Types: Cigarettes     Quit date: 1/10/2007   • Smokeless tobacco: Never Used   • Alcohol use 4.8 - 6.0 oz/week     8 - 10 Glasses of wine per week      Comment: 1 bottle of wine 2-3 days a week       Current Outpatient Prescriptions Ordered in [facility]   Medication Sig Dispense Refill   • Acetaminophen 500 MG Cap Take  by mouth.     • gabapentin (NEURONTIN) 300 MG Cap TAKE ONE CAPSULE BY MOUTH THREE TIMES A DAY 90 Cap 2   • metFORMIN (GLUCOPHAGE) 500 MG Tab Take 1 Tab by mouth every day. 90 Tab 3   • atorvastatin (LIPITOR) 40 MG Tab Take 1 Tab by mouth every day. 90 Tab 3   • DULoxetine (CYMBALTA) 30 MG Cap DR Particles Take 2 Caps by mouth every day. 90 Cap 1   • terazosin (HYTRIN) 5 MG Cap Take 1 Cap by mouth every day. 90 Cap 3   • vitamin D (CHOLECALCIFEROL) 1000 UNIT Tab Take 1,000 Units  "by mouth every day.     • Multiple Vitamins-Minerals (HM COMPLETE 50+ MENS ULTIMATE PO) Take 1 Tab by mouth every day.     • omeprazole (PRILOSEC) 20 MG delayed-release capsule Take 20 mg by mouth every day.     • aspirin (ASA) 81 MG Chew Tab chewable tablet Take 81 mg by mouth every day.     • DULoxetine (CYMBALTA) 30 MG Cap DR Particles TAKE TWO CAPSULES BY MOUTH DAILY 90 Cap 4   • benazepril (LOTENSIN) 20 MG Tab TAKE ONE TABLET BY MOUTH DAILY 90 Tab 3   • B Complex Vitamins (VITAMIN B COMPLEX PO) Take 1 Tab by mouth every day.       No current [facility]-ordered facility-administered medications on file.        Allergies:  Patient has no active allergies.    Health Maintenance: Postponed  ROS:  Gen: no fevers/chill, no changes in weight  Eyes: no changes in vision  ENT: no sore throat, no hearing loss, no bloody nose  Pulm: no sob, no cough  CV: no chest pain, no palpitations  GI: no nausea/vomiting, no diarrhea  : no dysuria  MSk: no myalgias  Skin: no rash  Neuro: no headaches, no numbness/tingling  Heme/Lymph: no easy bruising      Objective:       Exam:  /68 (BP Location: Left arm, Patient Position: Sitting, BP Cuff Size: Adult)   Pulse 96   Temp 36.1 °C (97 °F) (Temporal)   Resp 18   Ht 1.778 m (5' 10\")   Wt 102 kg (224 lb 12.8 oz)   SpO2 97%   BMI 32.26 kg/m²  Body mass index is 32.26 kg/m².    Gen: Alert and oriented, No apparent distress.  Neck: Neck is supple without lymphadenopathy.  Lungs: Normal effort, CTA bilaterally, no wheezes, rhonchi, or rales  CV: Regular rate and rhythm. No murmurs, rubs, or gallops.               Ext: No clubbing, cyanosis, edema.    Assessment & Plan:     68 y.o. male with the following -     1. Other premature depolarization  Pending Holter and cardiology referral.  Did thorough counseling of restricting himself from alcohol products as he has had repeated loss of consciousness secondary to his alcohol.  Concern for arrhythmia/holiday heart and will continue to " workup.  - REFERRAL TO CARDIOLOGY        Please note that this dictation was created using voice recognition software. I have made every reasonable attempt to correct obvious errors, but I expect that there are errors of grammar and possibly content that I did not discover before finalizing the note.

## 2019-03-13 NOTE — TELEPHONE ENCOUNTER
1. Caller Name: Prabhakar                                         Call Back Number: [phone] (home)       Patient approves a detailed voicemail message: yes    patient came back in office asking if he was cleared to drive. He would like a call. Please advise

## 2019-03-14 ENCOUNTER — TELEPHONE (OUTPATIENT)
Dept: PHYSICAL THERAPY | Facility: MEDICAL CENTER | Age: 69
End: 2019-03-14

## 2019-03-14 NOTE — OP THERAPY DISCHARGE SUMMARY
Outpatient Physical Therapy  DISCHARGE SUMMARY NOTE      [facility] Outpatient Physical Therapy  [address]  [place] [address]  Phone:  [phone]  Fax:  [phone]    Date of Visit: 03/14/2019    Patient: Prabhakar Sierra  YOB: 1950  MRN: [id number]     Referring Provider: Brenda Monroy M.D.   Referring Diagnosis Chronic pain of both hips [M25.551, M25.552, G89.29]     Physical Therapy Occurrence Codes    Date of onset of impairment:  11/12/18   Date physical therapy care plan established or reviewed:  1/23/19   Date physical therapy treatment started:  1/23/19          Functional Limitations and Severity Modifiers      Your patient is being discharged from Physical Therapy with the following comments:   · Patient has failed to schedule or reschedule follow-up visits    Comments:  Pt's PT chart is being discharged at this time, it has been greater than 30 days since his last visit.  He reported he is working with his MD regarding a halter monitor and further testing prior to PT.     Recommendations:  Discharge pt's PT chart at this time.    Peggy Arndt, PT, MSPT    Date: 3/14/2019

## 2019-03-18 ENCOUNTER — TELEPHONE (OUTPATIENT)
Dept: MEDICAL GROUP | Facility: LAB | Age: 69
End: 2019-03-18

## 2019-03-18 NOTE — TELEPHONE ENCOUNTER
Phone Number Called: [phone] (home)       Message: recieved vm from pt asking if you think that he should go to PT after he gets his heart monitor to get his heart beat going.    Left Message for patient to call back: N\A

## 2019-03-20 ENCOUNTER — TELEPHONE (OUTPATIENT)
Dept: MEDICAL GROUP | Facility: LAB | Age: 69
End: 2019-03-20

## 2019-03-20 ENCOUNTER — NON-PROVIDER VISIT (OUTPATIENT)
Dept: CARDIOLOGY | Facility: MEDICAL CENTER | Age: 69
End: 2019-03-20
Payer: MEDICARE

## 2019-03-20 DIAGNOSIS — R55 NEAR SYNCOPE: ICD-10-CM

## 2019-03-20 PROCEDURE — 93224 XTRNL ECG REC UP TO 48 HRS: CPT | Performed by: INTERNAL MEDICINE

## 2019-03-20 NOTE — TELEPHONE ENCOUNTER
Phone Number Called: [phone] (home)        Message: pt left vm and is asking if you'd like heart rate to go up to X amount of beats per minute at his PT appointment tomorrow?    Left Message for patient to call back: no

## 2019-03-21 ENCOUNTER — PHYSICAL THERAPY (OUTPATIENT)
Dept: PHYSICAL THERAPY | Facility: MEDICAL CENTER | Age: 69
End: 2019-03-21
Attending: FAMILY MEDICINE
Payer: MEDICARE

## 2019-03-21 DIAGNOSIS — M54.5 LOW BACK PAIN, UNSPECIFIED BACK PAIN LATERALITY, UNSPECIFIED CHRONICITY, WITH SCIATICA PRESENCE UNSPECIFIED: ICD-10-CM

## 2019-03-21 PROCEDURE — 97163 PT EVAL HIGH COMPLEX 45 MIN: CPT

## 2019-03-21 PROCEDURE — 97110 THERAPEUTIC EXERCISES: CPT

## 2019-03-21 ASSESSMENT — BALANCE ASSESSMENTS
PICK UP OBJECT FROM THE FLOOR FROM A STANDING POSITION: 3
STANDING TO SITTING: 4
LONG VERSION TOTAL SCORE (MAX 56): 31
STANDING UNSUPPORTED: 4
STANDING ON ONE LEG: 0
SITTING TO STANDING: 4
STANDING UNSUPPORTED WITH FEET TOGETHER: 0
PLACE ALTERNATE FOOT ON STEP OR STOOL WHILE STANDING UNSUPPORTED: 1
STANDING UNSUPPORTED ONE FOOT IN FRONT: 0
LONG VERSION TOTAL SCORE (MAX 56): 31
REACHING FORWARD WITH OUTSTRETCHED ARM WHILE STANDING: 2
TRANSFERS: 3
LOOK OVER LEFT AND RIGHT SHOULDERS WHILE STANDING: 1
STANDING UNSUPPORTED WITH EYES CLOSED: 3
SITTING UNSUPPORTED: 4
TURN 360 DEGREES: 2

## 2019-03-21 ASSESSMENT — ACTIVITIES OF DAILY LIVING (ADL): POOR_BALANCE: 1
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
## 2019-03-26 ENCOUNTER — TELEPHONE (OUTPATIENT)
Dept: MEDICAL GROUP | Facility: LAB | Age: 69
End: 2019-03-26

## 2019-03-26 NOTE — TELEPHONE ENCOUNTER
1. Caller Name: Prabhakar Sierra                                         Call Back Number: [phone] (home)       Patient approves a detailed voicemail message: N\A    2. SPECIFIC Action To Be Taken: Pt requests referal    3. Diagnosis/Clinical Reason for Request:    4. Specialty & Provider Name/Lab/Imaging Location: Cardiologist, prefers [facility]     5. Is appointment scheduled for requested order/referral: yes - no    Patient was informed they will receive a return phone call from the office ONLY if there are any questions before processing their request. Advised to call back if they haven't received a call from the referral department in 5 days.    Pt would like a referral for a Cardiologist, preferably in [facility]. Pt has an appt for 27 Mar 19 @ 4:20 pm to go over Heart Monitor Results.

## 2019-03-27 ENCOUNTER — PHYSICAL THERAPY (OUTPATIENT)
Dept: PHYSICAL THERAPY | Facility: MEDICAL CENTER | Age: 69
End: 2019-03-27
Attending: FAMILY MEDICINE
Payer: MEDICARE

## 2019-03-27 ENCOUNTER — APPOINTMENT (OUTPATIENT)
Dept: MEDICAL GROUP | Facility: LAB | Age: 69
End: 2019-03-27

## 2019-03-27 DIAGNOSIS — M54.5 LOW BACK PAIN, UNSPECIFIED BACK PAIN LATERALITY, UNSPECIFIED CHRONICITY, WITH SCIATICA PRESENCE UNSPECIFIED: ICD-10-CM

## 2019-03-27 PROCEDURE — 97110 THERAPEUTIC EXERCISES: CPT

## 2019-03-27 PROCEDURE — 97112 NEUROMUSCULAR REEDUCATION: CPT

## 2019-03-27 PROCEDURE — 97014 ELECTRIC STIMULATION THERAPY: CPT

## 2019-03-27 NOTE — OP THERAPY DAILY TREATMENT
Outpatient Physical Therapy  DAILY TREATMENT     [facility] Outpatient Physical Therapy  [address]  [place] [address]  Phone:  [phone]  Fax:  [phone]    Date: 03/27/2019    Patient: Prabhakar Sierra  YOB: 1950  MRN: [id number]     Time Calculation  Start time: 1415  Stop time: 1504 Time Calculation (min): 49 minutes     Chief Complaint: Back Problem; Back Injury; and Loss Of Balance    Visit #: 2    SUBJECTIVE:  Some low back pain    OBJECTIVE:  Current objective measures: ambulates without a cane          Therapeutic Exercises (CPT 61372):     1. Nu step, 5 min, S13 A12 R2    2. Leg press, 40#, 3 sets of 10 reps    3. Bridge on ball, 10/3    4. Ham curls on ball, 10/3    5. Alt step ups on 4 inch step, 10    6. Alt side step ups on 4 inch step, 10    Therapeutic Treatments and Modalities:     1. Neuromuscular Re-education (CPT 26331), 10 min, balance work, 1/2 foam roll, tandem, alt tap on 4 inch step all with minimal UE help    2. E Stim Unattended (CPT 76591), 15 min, IFC and MHP to L/S    Time-based treatments/modalities:  Therapeutic exercise minutes (CPT 52369): 20 minutes  Neuromusc re-ed, balance, coor, post minutes (CPT 10210): 10 minutes       Pain rating before treatment: 4  Pain rating after treatment: 4    ASSESSMENT:   Response to treatment: improving in control and endurance    PLAN/RECOMMENDATIONS:   Plan for treatment: therapy treatment to continue next visit.  Planned interventions for next visit: continue with current treatment.

## 2019-03-29 LAB — EKG IMPRESSION: NORMAL

## 2019-04-01 ENCOUNTER — APPOINTMENT (OUTPATIENT)
Dept: PHYSICAL THERAPY | Facility: MEDICAL CENTER | Age: 69
End: 2019-04-01
Payer: MEDICARE

## 2019-04-15 DIAGNOSIS — F32.1 MODERATE SINGLE CURRENT EPISODE OF MAJOR DEPRESSIVE DISORDER (HCC): ICD-10-CM

## 2019-04-15 RX ORDER — DULOXETIN HYDROCHLORIDE 30 MG/1
CAPSULE, DELAYED RELEASE ORAL
Qty: 30 CAP | Refills: 5 | Status: SHIPPED | OUTPATIENT
Start: 2019-04-15 | End: 2019-06-12

## 2019-04-15 NOTE — TELEPHONE ENCOUNTER
Was the patient seen in the last year in this department? Yes lov 3/12/19    Does patient have an active prescription for medications requested? No     Received Request Via: Pharmacy

## 2019-04-16 ENCOUNTER — PHYSICAL THERAPY (OUTPATIENT)
Dept: PHYSICAL THERAPY | Facility: MEDICAL CENTER | Age: 69
End: 2019-04-16
Attending: FAMILY MEDICINE
Payer: MEDICARE

## 2019-04-16 DIAGNOSIS — M54.5 LOW BACK PAIN, UNSPECIFIED BACK PAIN LATERALITY, UNSPECIFIED CHRONICITY, WITH SCIATICA PRESENCE UNSPECIFIED: ICD-10-CM

## 2019-04-16 PROCEDURE — 97112 NEUROMUSCULAR REEDUCATION: CPT

## 2019-04-16 PROCEDURE — 97110 THERAPEUTIC EXERCISES: CPT

## 2019-04-16 PROCEDURE — 97014 ELECTRIC STIMULATION THERAPY: CPT

## 2019-04-16 NOTE — OP THERAPY DAILY TREATMENT
Outpatient Physical Therapy  DAILY TREATMENT     [facility] Outpatient Physical Therapy  [address]  [place] [address]  Phone:  [phone]  Fax:  [phone]    Date: 04/16/2019    Patient: Prabhakar Sierra  YOB: 1950  MRN: [id number]     Time Calculation  Start time: 1248  Stop time: 1333 Time Calculation (min): 45 minutes     Chief Complaint: Loss Of Balance and Weakness    Visit #: 3    SUBJECTIVE:  I did go to the gym at my place, I did some machines and was really tired after I went to the gym.    OBJECTIVE:  Current objective measures: using the SPC for gait in clinic          Therapeutic Exercises (CPT 41839):     1. Nu step, 5 min, S13 A12 R2    2. Leg press, 40#, 3 sets of 10 reps    3. Stool scooting, 20 ft x 2    4. Calf stretches, 10 reps, then 30 sec hold    Therapeutic Treatments and Modalities:     1. Neuromuscular Re-education (CPT 41617), 15 min, balance work, resisted gait all directions x 6 with CGA    2. E Stim Unattended (CPT 38543), 15 min, IFC and MHP to L/S    Time-based treatments/modalities:  Therapeutic exercise minutes (CPT 31955): 15 minutes  Neuromusc re-ed, balance, coor, post minutes (CPT 40214): 15 minutes     ASSESSMENT:   Response to treatment: some noted fatigue with exercise, but no increased pain    PLAN/RECOMMENDATIONS:   Plan for treatment: therapy treatment to continue next visit.  Planned interventions for next visit: continue with current treatment.

## 2019-04-22 ENCOUNTER — APPOINTMENT (OUTPATIENT)
Dept: PHYSICAL THERAPY | Facility: MEDICAL CENTER | Age: 69
End: 2019-04-22
Attending: FAMILY MEDICINE
Payer: MEDICARE

## 2019-04-24 ENCOUNTER — APPOINTMENT (OUTPATIENT)
Dept: PHYSICAL THERAPY | Facility: MEDICAL CENTER | Age: 69
End: 2019-04-24
Attending: FAMILY MEDICINE
Payer: MEDICARE

## 2019-04-25 ENCOUNTER — OFFICE VISIT (OUTPATIENT)
Dept: CARDIOLOGY | Facility: MEDICAL CENTER | Age: 69
End: 2019-04-25
Payer: MEDICARE

## 2019-04-25 VITALS
OXYGEN SATURATION: 97 % | DIASTOLIC BLOOD PRESSURE: 80 MMHG | BODY MASS INDEX: 31.21 KG/M2 | WEIGHT: 218 LBS | SYSTOLIC BLOOD PRESSURE: 136 MMHG | HEIGHT: 70 IN | HEART RATE: 118 BPM

## 2019-04-25 DIAGNOSIS — I10 ESSENTIAL HYPERTENSION: ICD-10-CM

## 2019-04-25 DIAGNOSIS — R55 NEAR SYNCOPE: ICD-10-CM

## 2019-04-25 DIAGNOSIS — F10.10 ALCOHOL ABUSE: ICD-10-CM

## 2019-04-25 DIAGNOSIS — I95.1 ORTHOSTATIC HYPOTENSION: ICD-10-CM

## 2019-04-25 DIAGNOSIS — Z78.9 ALCOHOL CONSUMPTION OF MORE THAN FOUR DRINKS PER DAY: ICD-10-CM

## 2019-04-25 DIAGNOSIS — R55 SYNCOPE AND COLLAPSE: ICD-10-CM

## 2019-04-25 PROCEDURE — 99204 OFFICE O/P NEW MOD 45 MIN: CPT | Performed by: INTERNAL MEDICINE

## 2019-04-25 ASSESSMENT — ENCOUNTER SYMPTOMS
PALPITATIONS: 0
MUSCULOSKELETAL NEGATIVE: 1
WEIGHT LOSS: 0
RESPIRATORY NEGATIVE: 1
GASTROINTESTINAL NEGATIVE: 1
SEIZURES: 0
SPUTUM PRODUCTION: 0
LOSS OF CONSCIOUSNESS: 1
CONSTITUTIONAL NEGATIVE: 1
CARDIOVASCULAR NEGATIVE: 1

## 2019-04-25 NOTE — PROGRESS NOTES
"Chief Complaint   Patient presents with   • Syncope       Subjective:   Prabhakar Sierra is a 68 y.o. who was seen in consultation at the request of Dr. Carreon for evaluation of episodes of altered consciousness.  Patient recalls 3 distinct episodes: , , and early March.  He recalls that each time he had been having a few drinks and suddenly became unresponsive.  The first episode happened at [facility] he was having dinner and had a few drinks and then became poorly responsive the next thing he knew paramedics were in attendance.  He was evaluated in the ER after the  episode in the episode in March.  There is a question of TIA.  There is no history of known myocardial infarction, exertional chest pain or dyspnea.  The patient has a history of diabetes mellitus with peripheral neuropathy.  He had a Holter monitor placed which ran for 48 hours in late March.  This revealed only occasional PACs and PVCs as well as a brief episode of nonsustained SVT that occurred during sleep.  The actual images have been personally reviewed.    Family history: Both parents  of \"old age in their 80s.    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  Past Medical History:   Diagnosis Date   • BPH (benign prostatic hyperplasia)    • GERD (gastroesophageal reflux disease)    • Hypertension    • Neuropathic pain, leg    • Type II or unspecified type diabetes mellitus without mention of complication, not stated as uncontrolled      History reviewed. No pertinent surgical history.  Family History   Problem Relation Age of Onset   • Heart Disease Mother    • Diabetes Father      Social History     Social History   • Marital status:      Spouse name: N/A   • Number of children: N/A   • Years of education: N/A     Occupational History   • Not on file.     Social History Main Topics   • Smoking status: Former Smoker     " Packs/day: 0.50     Years: 50.00     Types: Cigarettes     Quit date: 1/10/2007   • Smokeless tobacco: Never Used   • Alcohol use 4.8 - 6.0 oz/week     8 - 10 Glasses of wine per week      Comment: 1 bottle of wine 2-3 days a week   • Drug use: No   • Sexual activity: No     Other Topics Concern   • Not on file     Social History Narrative   • No narrative on file     No Known Allergies  Outpatient Encounter Prescriptions as of 4/25/2019   Medication Sig Dispense Refill   • Acetaminophen 500 MG Cap Take  by mouth.     • gabapentin (NEURONTIN) 300 MG Cap TAKE ONE CAPSULE BY MOUTH THREE TIMES A DAY 90 Cap 2   • metFORMIN (GLUCOPHAGE) 500 MG Tab Take 1 Tab by mouth every day. 90 Tab 3   • benazepril (LOTENSIN) 20 MG Tab TAKE ONE TABLET BY MOUTH DAILY 90 Tab 3   • atorvastatin (LIPITOR) 40 MG Tab Take 1 Tab by mouth every day. 90 Tab 3   • DULoxetine (CYMBALTA) 30 MG Cap DR Particles Take 2 Caps by mouth every day. 90 Cap 1   • terazosin (HYTRIN) 5 MG Cap Take 1 Cap by mouth every day. 90 Cap 3   • B Complex Vitamins (VITAMIN B COMPLEX PO) Take 1 Tab by mouth every day.     • vitamin D (CHOLECALCIFEROL) 1000 UNIT Tab Take 1,000 Units by mouth every day.     • Multiple Vitamins-Minerals (HM COMPLETE 50+ MENS ULTIMATE PO) Take 1 Tab by mouth every day.     • omeprazole (PRILOSEC) 20 MG delayed-release capsule Take 20 mg by mouth every day.     • aspirin (ASA) 81 MG Chew Tab chewable tablet Take 81 mg by mouth every day.     • DULoxetine (CYMBALTA) 30 MG Cap DR Particles TAKE ONE CAPSULE BY MOUTH DAILY (Patient not taking: Reported on 4/25/2019) 30 Cap 5   • DULoxetine (CYMBALTA) 30 MG Cap DR Particles TAKE TWO CAPSULES BY MOUTH DAILY (Patient not taking: Reported on 4/25/2019) 90 Cap 4     No facility-administered encounter medications on file as of 4/25/2019.      Review of Systems   Constitutional: Negative.  Negative for malaise/fatigue and weight loss.        Orthostatic blood pressure was performed in the office left  "arm supine blood pressure 118 systolic with standing blood pressure 88 systolic with heart rate over 100/min and associate with feeling of dizziness.   HENT: Negative.    Respiratory: Negative.  Negative for sputum production.    Cardiovascular: Negative.  Negative for chest pain, palpitations and leg swelling.   Gastrointestinal: Negative.    Musculoskeletal: Negative.    Skin: Negative.    Neurological: Positive for loss of consciousness. Negative for seizures.   Endo/Heme/Allergies: Negative.    Psychiatric/Behavioral:        Garrulous        Objective:   /80 (BP Location: Left arm, Patient Position: Sitting, BP Cuff Size: Adult)   Pulse (!) 118   Ht 1.778 m (5' 10\")   Wt 98.9 kg (218 lb)   SpO2 97%   BMI 31.28 kg/m²     Physical Exam   Constitutional: He is oriented to person, place, and time. He appears well-nourished. No distress.   HENT:   Head: Normocephalic and atraumatic.   Eyes: Pupils are equal, round, and reactive to light. No scleral icterus.   Neck: No JVD present. No tracheal deviation present.   Cardiovascular: Normal rate and regular rhythm.    No murmur heard.  Pulmonary/Chest: Breath sounds normal. No respiratory distress. He has no wheezes.   Abdominal: Soft. Bowel sounds are normal. He exhibits no distension. There is no tenderness.   Musculoskeletal: He exhibits no edema.   Neurological: He is alert and oriented to person, place, and time.   Skin: Skin is warm and dry.   Psychiatric: He has a normal mood and affect. He is hyperactive.       Assessment:     1. Syncope and collapse     2. Orthostatic hypotension     3. Near syncope     4. Essential hypertension     5. Alcohol abuse         Medical Decision Making:  Today's Assessment / Status / Plan:   Episodes of near syncope/syncope: The patient had 3 distinct episodes that by his description were not heralded by any warning of dizziness or palpitations.  He has no recall for the event and he states his wife describes him as " suddenly losing consciousness.  On exam today the patient is striking orthostatic hypotension with blood pressure drop as above in the physical exam.  I will stand his pressure dropped to 88 and he became lightheaded.    I suspect his episodes of reduced consciousness  are secondary to orthostatic hypotension, and this is due to a combination of excess alcohol intake, antihypertensive medication, terazosin, and diabetic autonomic neuropathy.  I explained my suspicions to the patient and he was given written instructions to discontinue the Hytrin and reduce his benazepril to 10 mg a day.  He should also avoid alcohol completely.    The patient should have follow-up orthostatic blood pressures performed.  If he has further bouts of syncope, we will consider placing a implantable loop recorder to ensure he is not having episodes of heart block or asymptomatic SVT.

## 2019-04-25 NOTE — LETTER
"     [facility]   [address]   [address]  [place] [address]  Phone: [phone]  Fax: [phone]              Prabhakar Sierra  1950    Encounter Date: 2019    Freedom Yanes M.D.          PROGRESS NOTE:  Chief Complaint   Patient presents with   • Syncope       Subjective:   Prabhakar Sierra is a 68 y.o. who was seen in consultation at the request of Dr. Carreon for evaluation of episodes of altered consciousness.  Patient recalls 3 distinct episodes: , , and early March.  He recalls that each time he had been having a few drinks and suddenly became unresponsive.  The first episode happened at [facility] he was having dinner and had a few drinks and then became poorly responsive the next thing he knew paramedics were in attendance.  He was evaluated in the ER after the  episode in the episode in March.  There is a question of TIA.  There is no history of known myocardial infarction, exertional chest pain or dyspnea.  The patient has a history of diabetes mellitus with peripheral neuropathy.  He had a Holter monitor placed which ran for 48 hours in late March.  This revealed only occasional PACs and PVCs as well as a brief episode of nonsustained SVT that occurred during sleep.  The actual images have been personally reviewed.    Family history: Both parents  of \"old age in their 80s.    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  Past Medical History:   Diagnosis Date   • BPH (benign prostatic hyperplasia)    • GERD (gastroesophageal reflux disease)    • Hypertension    • Neuropathic pain, leg    • Type II or unspecified type diabetes mellitus without mention of complication, not stated as uncontrolled      History reviewed. No pertinent surgical history.  Family History   Problem Relation Age of Onset   • Heart " Disease Mother    • Diabetes Father      Social History     Social History   • Marital status:      Spouse name: N/A   • Number of children: N/A   • Years of education: N/A     Occupational History   • Not on file.     Social History Main Topics   • Smoking status: Former Smoker     Packs/day: 0.50     Years: 50.00     Types: Cigarettes     Quit date: 1/10/2007   • Smokeless tobacco: Never Used   • Alcohol use 4.8 - 6.0 oz/week     8 - 10 Glasses of wine per week      Comment: 1 bottle of wine 2-3 days a week   • Drug use: No   • Sexual activity: No     Other Topics Concern   • Not on file     Social History Narrative   • No narrative on file     No Known Allergies  Outpatient Encounter Prescriptions as of 4/25/2019   Medication Sig Dispense Refill   • Acetaminophen 500 MG Cap Take  by mouth.     • gabapentin (NEURONTIN) 300 MG Cap TAKE ONE CAPSULE BY MOUTH THREE TIMES A DAY 90 Cap 2   • metFORMIN (GLUCOPHAGE) 500 MG Tab Take 1 Tab by mouth every day. 90 Tab 3   • benazepril (LOTENSIN) 20 MG Tab TAKE ONE TABLET BY MOUTH DAILY 90 Tab 3   • atorvastatin (LIPITOR) 40 MG Tab Take 1 Tab by mouth every day. 90 Tab 3   • DULoxetine (CYMBALTA) 30 MG Cap DR Particles Take 2 Caps by mouth every day. 90 Cap 1   • terazosin (HYTRIN) 5 MG Cap Take 1 Cap by mouth every day. 90 Cap 3   • B Complex Vitamins (VITAMIN B COMPLEX PO) Take 1 Tab by mouth every day.     • vitamin D (CHOLECALCIFEROL) 1000 UNIT Tab Take 1,000 Units by mouth every day.     • Multiple Vitamins-Minerals (HM COMPLETE 50+ MENS ULTIMATE PO) Take 1 Tab by mouth every day.     • omeprazole (PRILOSEC) 20 MG delayed-release capsule Take 20 mg by mouth every day.     • aspirin (ASA) 81 MG Chew Tab chewable tablet Take 81 mg by mouth every day.     • DULoxetine (CYMBALTA) 30 MG Cap DR Particles TAKE ONE CAPSULE BY MOUTH DAILY (Patient not taking: Reported on 4/25/2019) 30 Cap 5   • DULoxetine (CYMBALTA) 30 MG Cap DR Particles TAKE TWO CAPSULES BY MOUTH DAILY  "(Patient not taking: Reported on 4/25/2019) 90 Cap 4     No facility-administered encounter medications on file as of 4/25/2019.      Review of Systems   Constitutional: Negative.  Negative for malaise/fatigue and weight loss.        Orthostatic blood pressure was performed in the office left arm supine blood pressure 118 systolic with standing blood pressure 88 systolic with heart rate over 100/min and associate with feeling of dizziness.   HENT: Negative.    Respiratory: Negative.  Negative for sputum production.    Cardiovascular: Negative.  Negative for chest pain, palpitations and leg swelling.   Gastrointestinal: Negative.    Musculoskeletal: Negative.    Skin: Negative.    Neurological: Positive for loss of consciousness. Negative for seizures.   Endo/Heme/Allergies: Negative.    Psychiatric/Behavioral:        Garrulous        Objective:   /80 (BP Location: Left arm, Patient Position: Sitting, BP Cuff Size: Adult)   Pulse (!) 118   Ht 1.778 m (5' 10\")   Wt 98.9 kg (218 lb)   SpO2 97%   BMI 31.28 kg/m²      Physical Exam   Constitutional: He is oriented to person, place, and time. He appears well-nourished. No distress.   HENT:   Head: Normocephalic and atraumatic.   Eyes: Pupils are equal, round, and reactive to light. No scleral icterus.   Neck: No JVD present. No tracheal deviation present.   Cardiovascular: Normal rate and regular rhythm.    No murmur heard.  Pulmonary/Chest: Breath sounds normal. No respiratory distress. He has no wheezes.   Abdominal: Soft. Bowel sounds are normal. He exhibits no distension. There is no tenderness.   Musculoskeletal: He exhibits no edema.   Neurological: He is alert and oriented to person, place, and time.   Skin: Skin is warm and dry.   Psychiatric: He has a normal mood and affect. He is hyperactive.       Assessment:     1. Syncope and collapse     2. Orthostatic hypotension     3. Near syncope     4. Essential hypertension     5. Alcohol abuse         "       Medical Decision Making:  Today's Assessment / Status / Plan:   Episodes of near syncope/syncope: The patient had 3 distinct episodes that by his description were not heralded by any warning of dizziness or palpitations.  He has no recall for the event and he states his wife describes him as suddenly losing consciousness.  On exam today the patient is striking orthostatic hypotension with blood pressure drop as above in the physical exam.  I will stand his pressure dropped to 88 and he became lightheaded.    I suspect his episodes of reduced consciousness  are secondary to orthostatic hypotension, and this is due to a combination of excess alcohol intake, antihypertensive medication, terazosin, and diabetic autonomic neuropathy.  I explained my suspicions to the patient and he was given written instructions to discontinue the Hytrin and reduce his benazepril to 10 mg a day.  He should also avoid alcohol completely.    The patient should have follow-up orthostatic blood pressures performed.  If he has further bouts of syncope, we will consider placing a implantable loop recorder to ensure he is not having episodes of heart block or asymptomatic SVT.      Brenda Monroy M.D.  [address] [address]  VIA In Basket

## 2019-04-29 ENCOUNTER — PHYSICAL THERAPY (OUTPATIENT)
Dept: PHYSICAL THERAPY | Facility: MEDICAL CENTER | Age: 69
End: 2019-04-29
Attending: FAMILY MEDICINE
Payer: MEDICARE

## 2019-04-29 DIAGNOSIS — G89.29 CHRONIC PAIN OF BOTH HIPS: ICD-10-CM

## 2019-04-29 DIAGNOSIS — M25.552 CHRONIC PAIN OF BOTH HIPS: ICD-10-CM

## 2019-04-29 DIAGNOSIS — M54.5 LOW BACK PAIN, UNSPECIFIED BACK PAIN LATERALITY, UNSPECIFIED CHRONICITY, WITH SCIATICA PRESENCE UNSPECIFIED: ICD-10-CM

## 2019-04-29 DIAGNOSIS — M25.551 CHRONIC PAIN OF BOTH HIPS: ICD-10-CM

## 2019-04-29 PROCEDURE — 97110 THERAPEUTIC EXERCISES: CPT

## 2019-04-29 PROCEDURE — 97014 ELECTRIC STIMULATION THERAPY: CPT

## 2019-04-29 NOTE — OP THERAPY DAILY TREATMENT
Outpatient Physical Therapy  DAILY TREATMENT     [facility] Outpatient Physical Therapy  [address]  [place] [address]  Phone:  [phone]  Fax:  [phone]    Date: 04/29/2019    Patient: Prabhakar Sierra  YOB: 1950  MRN: [id number]     Time Calculation  Start time: 1321  Stop time: 1402 Time Calculation (min): 41 minutes     Chief Complaint: Back Problem and Loss Of Balance    Visit #: 4    SUBJECTIVE:  Doing well.  Cardiologist gave him good bill of health, wants him to walk on treadmill.    OBJECTIVE:  Current objective measures: slight SOB with cardio, recovered within 1 min of cool down, brought walking sticks, got them sized for pt          Therapeutic Exercises (CPT 25246):     1. Nu step, 5 min, S13 A12 R5    2. Leg press, 50#, 3 sets of 10 reps    3. Stool scooting, 20 ft x 2    4. Calf stretches, 10 reps, then 30 sec hold    5. TM walking, 5 min at 2.0 mph    6. Wall push ups, 10/2    7. Bridges on ball, 10/2    Therapeutic Treatments and Modalities:     1. E Stim Unattended (CPT 56591), 15 min, IFC and MHP    Time-based treatments/modalities:  Therapeutic exercise minutes (CPT 01187): 25 minutes       Pain rating before treatment: 5  Pain rating after treatment: 5    ASSESSMENT:   Response to treatment: building endurance    PLAN/RECOMMENDATIONS:   Plan for treatment: therapy treatment to continue next visit.  Planned interventions for next visit: continue with current treatment.

## 2019-05-01 ENCOUNTER — PHYSICAL THERAPY (OUTPATIENT)
Dept: PHYSICAL THERAPY | Facility: MEDICAL CENTER | Age: 69
End: 2019-05-01
Attending: FAMILY MEDICINE
Payer: MEDICARE

## 2019-05-01 DIAGNOSIS — M25.552 CHRONIC PAIN OF BOTH HIPS: ICD-10-CM

## 2019-05-01 DIAGNOSIS — M25.551 CHRONIC PAIN OF BOTH HIPS: ICD-10-CM

## 2019-05-01 DIAGNOSIS — G89.29 CHRONIC PAIN OF BOTH HIPS: ICD-10-CM

## 2019-05-01 DIAGNOSIS — M54.5 LOW BACK PAIN, UNSPECIFIED BACK PAIN LATERALITY, UNSPECIFIED CHRONICITY, WITH SCIATICA PRESENCE UNSPECIFIED: ICD-10-CM

## 2019-05-01 PROCEDURE — 97014 ELECTRIC STIMULATION THERAPY: CPT

## 2019-05-01 PROCEDURE — 97110 THERAPEUTIC EXERCISES: CPT

## 2019-05-01 NOTE — OP THERAPY DAILY TREATMENT
Outpatient Physical Therapy  DAILY TREATMENT     [facility] Outpatient Physical Therapy  [address]  [place] [address]  Phone:  [phone]  Fax:  [phone]    Date: 05/01/2019    Patient: Prabhakar Sierra  YOB: 1950  MRN: [id number]     Time Calculation  Start time: 1305  Stop time: 1405 Time Calculation (min): 60 minutes     Chief Complaint: Loss Of Balance and Back Problem    Visit #: 5    SUBJECTIVE:  Doing well.    OBJECTIVE:          Therapeutic Exercises (CPT 47669):     1. Nu step, 5 min/3 min, S13 A12 R5/ R7    2. Leg press, 55#, 3 sets of 10 reps    3. Stool scooting, 20 ft x 2    4. Calf stretches, 10 reps, then 30 sec hold    5. Pushing a box, 30 ft x 2    6. Wall push ups, 10/2    7. Bridges on ball, 10/2    8. Resisted gait, all directions x 7    Therapeutic Treatments and Modalities:     1. E Stim Unattended (CPT 57648), 15 min, IFC and P    Time-based treatments/modalities:  Therapeutic exercise minutes (CPT 38408): 40 minutes       ASSESSMENT:   Response to treatment: fatigue, but not increased pain.    PLAN/RECOMMENDATIONS:   Plan for treatment: therapy treatment to continue next visit.  Planned interventions for next visit: continue with current treatment.

## 2019-05-06 ENCOUNTER — PHYSICAL THERAPY (OUTPATIENT)
Dept: PHYSICAL THERAPY | Facility: MEDICAL CENTER | Age: 69
End: 2019-05-06
Attending: FAMILY MEDICINE
Payer: MEDICARE

## 2019-05-06 DIAGNOSIS — G89.29 CHRONIC PAIN OF BOTH HIPS: ICD-10-CM

## 2019-05-06 DIAGNOSIS — M25.551 CHRONIC PAIN OF BOTH HIPS: ICD-10-CM

## 2019-05-06 DIAGNOSIS — M54.5 LOW BACK PAIN, UNSPECIFIED BACK PAIN LATERALITY, UNSPECIFIED CHRONICITY, WITH SCIATICA PRESENCE UNSPECIFIED: ICD-10-CM

## 2019-05-06 DIAGNOSIS — M25.552 CHRONIC PAIN OF BOTH HIPS: ICD-10-CM

## 2019-05-06 PROCEDURE — 97110 THERAPEUTIC EXERCISES: CPT

## 2019-05-06 PROCEDURE — 97014 ELECTRIC STIMULATION THERAPY: CPT

## 2019-05-06 ASSESSMENT — BALANCE ASSESSMENTS
STANDING TO SITTING: 4
REACHING FORWARD WITH OUTSTRETCHED ARM WHILE STANDING: 3
LONG VERSION TOTAL SCORE (MAX 56): 46
STANDING ON ONE LEG: 0
STANDING UNSUPPORTED WITH EYES CLOSED: 4
TURN 360 DEGREES: 4
PICK UP OBJECT FROM THE FLOOR FROM A STANDING POSITION: 4
SITTING TO STANDING: 4
SITTING UNSUPPORTED: 4
LONG VERSION TOTAL SCORE (MAX 56): 46
LOOK OVER LEFT AND RIGHT SHOULDERS WHILE STANDING: 3
STANDING UNSUPPORTED: 4
TRANSFERS: 4
PLACE ALTERNATE FOOT ON STEP OR STOOL WHILE STANDING UNSUPPORTED: 4
STANDING UNSUPPORTED ONE FOOT IN FRONT: 0
STANDING UNSUPPORTED WITH FEET TOGETHER: 4
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
## 2019-05-09 ENCOUNTER — APPOINTMENT (OUTPATIENT)
Dept: PHYSICAL THERAPY | Facility: MEDICAL CENTER | Age: 69
End: 2019-05-09
Attending: FAMILY MEDICINE
Payer: MEDICARE

## 2019-05-14 ENCOUNTER — PHYSICAL THERAPY (OUTPATIENT)
Dept: PHYSICAL THERAPY | Facility: MEDICAL CENTER | Age: 69
End: 2019-05-14
Attending: FAMILY MEDICINE
Payer: MEDICARE

## 2019-05-14 DIAGNOSIS — M25.552 CHRONIC PAIN OF BOTH HIPS: ICD-10-CM

## 2019-05-14 DIAGNOSIS — M25.551 CHRONIC PAIN OF BOTH HIPS: ICD-10-CM

## 2019-05-14 DIAGNOSIS — M54.5 LOW BACK PAIN, UNSPECIFIED BACK PAIN LATERALITY, UNSPECIFIED CHRONICITY, WITH SCIATICA PRESENCE UNSPECIFIED: ICD-10-CM

## 2019-05-14 DIAGNOSIS — G89.29 CHRONIC PAIN OF BOTH HIPS: ICD-10-CM

## 2019-05-14 PROCEDURE — 97014 ELECTRIC STIMULATION THERAPY: CPT

## 2019-05-14 PROCEDURE — 97112 NEUROMUSCULAR REEDUCATION: CPT

## 2019-05-14 PROCEDURE — 97110 THERAPEUTIC EXERCISES: CPT

## 2019-05-14 NOTE — OP THERAPY DAILY TREATMENT
Outpatient Physical Therapy  DAILY TREATMENT     [facility] Outpatient Physical Therapy  [address]  [place] [address]  Phone:  [phone]  Fax:  [phone]    Date: 05/14/2019    Patient: Prabhakar Sierra  YOB: 1950  MRN: [id number]     Time Calculation  Start time: 1417  Stop time: 1504 Time Calculation (min): 47 minutes     Chief Complaint: Loss Of Balance    Visit #: 7    SUBJECTIVE:  Doing well.      OBJECTIVE:          Therapeutic Exercises (CPT 97424):     1. Nu step, 5 min/3 min, S13 A12 R5/ R7    2. Leg press, 60#, 2 sets of 20 reps    3. Stool scooting, 50 ft x 2    4. Calf stretches, 10 reps, then 30 sec hold    5. Bridges on ball, 10/2    Therapeutic Treatments and Modalities:     1. E Stim Unattended (CPT 70383), 15 min, IFC and MHP    2. Neuromuscular Re-education (CPT 11794), balance work:stepping over objects, around objects, gait with head turning, step with pause into single leg standing drills    Time-based treatments/modalities:  Therapeutic exercise minutes (CPT 99205): 15 minutes  Neuromusc re-ed, balance, coor, post minutes (CPT 45651): 15 minutes       Pain rating before treatment: 4  Pain rating after treatment: 4    ASSESSMENT:   Response to treatment: fatigues quickly, balance work improving, one LOB with exercise, but pt self corrected     PLAN/RECOMMENDATIONS:   Plan for treatment: therapy treatment to continue next visit.  Planned interventions for next visit: continue with current treatment.

## 2019-05-16 ENCOUNTER — APPOINTMENT (OUTPATIENT)
Dept: PHYSICAL THERAPY | Facility: MEDICAL CENTER | Age: 69
End: 2019-05-16
Attending: FAMILY MEDICINE
Payer: MEDICARE

## 2019-05-16 NOTE — OP THERAPY DAILY TREATMENT
Outpatient Physical Therapy  DAILY TREATMENT     [facility] Outpatient Physical Therapy  [address]  [place] [address]  Phone:  [phone]  Fax:  [phone]    Date: 05/16/2019    Patient: Prabhakar Sierra  YOB: 1950  MRN: [id number]     Time Calculation             Chief Complaint: No chief complaint on file.    Visit #: 8    SUBJECTIVE:  ***    OBJECTIVE:  Current objective measures: ***          Therapeutic Exercises (CPT 78085):     1. Nu step, 5 min/3 min, S13 A12 R5/ R7    2. Leg press, 60#, 2 sets of 20 reps    3. Stool scooting, 50 ft x 2    4. Calf stretches, 10 reps, then 30 sec hold    5. Bridges on ball, 10/2    Therapeutic Treatments and Modalities:     1. E Stim Unattended (CPT 15919), 15 min, IFC and MHP    2. Neuromuscular Re-education (CPT 73121), balance work:stepping over objects, around objects, gait with head turning, step with pause into single leg standing drills    Time-based treatments/modalities:          Pain rating before treatment: {PAIN NUMBERS_1-10:38008}  Pain rating after treatment: {PAIN NUMBERS_1-10:26657}    ASSESSMENT:   Response to treatment: ***    PLAN/RECOMMENDATIONS:   Plan for treatment: therapy treatment to continue next visit.  Planned interventions for next visit: continue with current treatment.

## 2019-06-05 ENCOUNTER — TELEPHONE (OUTPATIENT)
Dept: MEDICAL GROUP | Facility: LAB | Age: 69
End: 2019-06-05
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
## 2019-06-05 NOTE — TELEPHONE ENCOUNTER
Pt called back and will be coming in to sign a release since he does not have the information to where he would like this sent.

## 2019-06-06 ENCOUNTER — TELEPHONE (OUTPATIENT)
Dept: MEDICAL GROUP | Facility: LAB | Age: 69
End: 2019-06-06

## 2019-06-06 DIAGNOSIS — M54.9 BACK PAIN, UNSPECIFIED BACK LOCATION, UNSPECIFIED BACK PAIN LATERALITY, UNSPECIFIED CHRONICITY: ICD-10-CM

## 2019-06-06 NOTE — TELEPHONE ENCOUNTER
1. Caller Name: Prabhakar                                           Call Back Number: [phone] (home)         Patient approves a detailed voicemail message: yes    2. SPECIFIC Action To Be Taken: Orders pending, please sign.    3. Diagnosis/Clinical Reason for Request: back and neck pain     4. Specialty & Provider Name/Lab/Imaging Location: PT     5. Is appointment scheduled for requested order/referral: no    Patient was informed they will receive a return phone call from the office ONLY if there are any questions before processing their request. Advised to call back if they haven't received a call from the referral department in 5 days.

## 2019-06-10 ENCOUNTER — TELEPHONE (OUTPATIENT)
Dept: MEDICAL GROUP | Facility: LAB | Age: 69
End: 2019-06-10

## 2019-06-10 NOTE — TELEPHONE ENCOUNTER
Pt LVM and would like you to review medications. He does not have an appt until September. Please review and advise

## 2019-06-12 ENCOUNTER — OFFICE VISIT (OUTPATIENT)
Dept: MEDICAL GROUP | Facility: LAB | Age: 69
End: 2019-06-12
Payer: MEDICARE

## 2019-06-12 ENCOUNTER — TELEPHONE (OUTPATIENT)
Dept: MEDICAL GROUP | Facility: LAB | Age: 69
End: 2019-06-12

## 2019-06-12 ENCOUNTER — HOSPITAL ENCOUNTER (OUTPATIENT)
Dept: LAB | Facility: MEDICAL CENTER | Age: 69
End: 2019-06-12
Attending: FAMILY MEDICINE
Payer: MEDICARE

## 2019-06-12 VITALS
WEIGHT: 215.8 LBS | OXYGEN SATURATION: 96 % | TEMPERATURE: 97.8 F | HEIGHT: 70 IN | DIASTOLIC BLOOD PRESSURE: 82 MMHG | HEART RATE: 91 BPM | SYSTOLIC BLOOD PRESSURE: 136 MMHG | BODY MASS INDEX: 30.9 KG/M2

## 2019-06-12 DIAGNOSIS — R06.02 SOB (SHORTNESS OF BREATH): ICD-10-CM

## 2019-06-12 DIAGNOSIS — E11.42 CONTROLLED TYPE 2 DIABETES MELLITUS WITH DIABETIC POLYNEUROPATHY, WITHOUT LONG-TERM CURRENT USE OF INSULIN (HCC): ICD-10-CM

## 2019-06-12 DIAGNOSIS — I10 ESSENTIAL HYPERTENSION: ICD-10-CM

## 2019-06-12 DIAGNOSIS — Z72.0 TOBACCO USE: ICD-10-CM

## 2019-06-12 DIAGNOSIS — R35.0 URINARY FREQUENCY: ICD-10-CM

## 2019-06-12 DIAGNOSIS — Z23 NEED FOR VACCINATION: ICD-10-CM

## 2019-06-12 PROBLEM — R55 SYNCOPE AND COLLAPSE: Status: RESOLVED | Noted: 2019-04-25 | Resolved: 2019-06-12

## 2019-06-12 LAB — PSA SERPL-MCNC: 5.07 NG/ML (ref 0–4)

## 2019-06-12 PROCEDURE — 90746 HEPB VACCINE 3 DOSE ADULT IM: CPT | Performed by: FAMILY MEDICINE

## 2019-06-12 PROCEDURE — 36415 COLL VENOUS BLD VENIPUNCTURE: CPT

## 2019-06-12 PROCEDURE — G0010 ADMIN HEPATITIS B VACCINE: HCPCS | Performed by: FAMILY MEDICINE

## 2019-06-12 PROCEDURE — 84153 ASSAY OF PSA TOTAL: CPT

## 2019-06-12 PROCEDURE — 99214 OFFICE O/P EST MOD 30 MIN: CPT | Mod: 25 | Performed by: FAMILY MEDICINE

## 2019-06-12 RX ORDER — ALBUTEROL SULFATE 90 UG/1
2 AEROSOL, METERED RESPIRATORY (INHALATION) EVERY 6 HOURS PRN
Qty: 8.5 G | Refills: 3 | Status: SHIPPED | OUTPATIENT
Start: 2019-06-12 | End: 2020-09-21 | Stop reason: SDUPTHER

## 2019-06-12 RX ORDER — GABAPENTIN 300 MG/1
300 CAPSULE ORAL DAILY
Qty: 30 CAP | Refills: 3 | Status: SHIPPED | OUTPATIENT
Start: 2019-06-12 | End: 2019-11-23 | Stop reason: SDUPTHER

## 2019-06-12 RX ORDER — BENAZEPRIL HYDROCHLORIDE 20 MG/1
10 TABLET ORAL DAILY
Qty: 90 TAB | Refills: 3 | Status: SHIPPED
Start: 2019-06-12 | End: 2020-02-27

## 2019-06-12 RX ORDER — BUPROPION HYDROCHLORIDE 150 MG/1
150 TABLET ORAL EVERY MORNING
Qty: 30 TAB | Refills: 3 | Status: SHIPPED | OUTPATIENT
Start: 2019-06-12 | End: 2019-07-02

## 2019-06-12 NOTE — TELEPHONE ENCOUNTER
2. SPECIFIC Action To Be Taken: Orders pending, please sign.    3. Diagnosis/Clinical Reason for Request: immunization    4. Specialty & Provider Name/Lab/Imaging Location: [facility]    5. Is appointment scheduled for requested order/referral: yes - 6-    Patient was informed they will receive a return phone call from the office ONLY if there are any questions before processing their request. Advised to call back if they haven't received a call from the referral department in 5 days.

## 2019-06-12 NOTE — PROGRESS NOTES
Subjective:     CC:      HPI:   Prabhakar presents today with     Shortness of breath:  This is a new issue.  Patient has shortness of breath continuously and finds that he is holding his breath after completing long sentences and then having to take a deep breath.  He does have a history of smoking.  He often feels like he is breathing through a straw.  This has been going on for over a year now and has been happening daily.    Urinary frequency:  This is a chronic stable issue.  Patient has a history of urinary frequency as well as a history of BPH.  He was on the terra Zosyn for his BPH however does not have a recent PSA.  He states that his urinary frequency is not an issue and that being off the terazosin from the cardiologist has been okay.  He states that his stream is fine he just has frequency at night.    Tobacco use:  This is a chronic issue.  Patient picked up smoking again as he is having trouble with dealing with his age and stressors.  He felt as if he needed to help with his anxiety.  He is currently on the Cymbalta 60 mg to deal with his depression which he states has helped but he is ready to quit smoking again.    Past Medical History:   Diagnosis Date   • BPH (benign prostatic hyperplasia)    • GERD (gastroesophageal reflux disease)    • Hypertension    • Neuropathic pain, leg    • Type II or unspecified type diabetes mellitus without mention of complication, not stated as uncontrolled        Social History   Substance Use Topics   • Smoking status: Former Smoker     Packs/day: 0.50     Years: 50.00     Types: Cigarettes     Quit date: 1/10/2007   • Smokeless tobacco: Never Used   • Alcohol use 4.8 - 6.0 oz/week     8 - 10 Glasses of wine per week      Comment: 1 bottle of wine 2-3 days a week       Current Outpatient Prescriptions Ordered in [facility]   Medication Sig Dispense Refill   • gabapentin (NEURONTIN) 300 MG Cap Take 1 Cap by mouth every day. 30 Cap 3   • benazepril (LOTENSIN) 20 MG Tab Take  "0.5 Tabs by mouth every day. TAKE ONE TABLET BY MOUTH DAILY 90 Tab 3   • buPROPion (WELLBUTRIN XL) 150 MG XL tablet Take 1 Tab by mouth every morning. 30 Tab 3   • albuterol 108 (90 Base) MCG/ACT Aero Soln inhalation aerosol Inhale 2 Puffs by mouth every 6 hours as needed for Shortness of Breath. 8.5 g 3   • Acetaminophen 500 MG Cap Take  by mouth.     • metFORMIN (GLUCOPHAGE) 500 MG Tab Take 1 Tab by mouth every day. 90 Tab 3   • atorvastatin (LIPITOR) 40 MG Tab Take 1 Tab by mouth every day. 90 Tab 3   • DULoxetine (CYMBALTA) 30 MG Cap DR Particles Take 2 Caps by mouth every day. 90 Cap 1   • B Complex Vitamins (VITAMIN B COMPLEX PO) Take 1 Tab by mouth every day.     • vitamin D (CHOLECALCIFEROL) 1000 UNIT Tab Take 1,000 Units by mouth every day.     • Multiple Vitamins-Minerals (HM COMPLETE 50+ MENS ULTIMATE PO) Take 1 Tab by mouth every day.     • omeprazole (PRILOSEC) 20 MG delayed-release capsule Take 20 mg by mouth every day.     • aspirin (ASA) 81 MG Chew Tab chewable tablet Take 81 mg by mouth every day.       No current [facility]-ordered facility-administered medications on file.        Allergies:  Patient has no known allergies.    ROS:  Gen: no fevers/chill, no changes in weight  Eyes: no changes in vision  ENT: no sore throat, no hearing loss, no bloody nose  Pulm: +sob, no cough  CV: no chest pain, no palpitations  GI: no nausea/vomiting, no diarrhea  : no dysuria  MSk: no myalgias  Skin: no rash  Neuro: no headaches, no numbness/tingling  Heme/Lymph: no easy bruising      Objective:       Exam:  /82 (BP Location: Left arm, Patient Position: Sitting)   Pulse 91   Temp 36.6 °C (97.8 °F) (Temporal)   Ht 1.778 m (5' 10\")   Wt 97.9 kg (215 lb 12.8 oz)   SpO2 96%   BMI 30.96 kg/m²  Body mass index is 30.96 kg/m².    Gen: Alert and oriented, No apparent distress.  Neck: Neck is supple without lymphadenopathy.  Lungs: Normal effort, CTA bilaterally, no wheezes, rhonchi, or rales, typical " emphysematous stance, quiet breath sounds  CV: Regular rate and rhythm. No murmurs, rubs, or gallops.               Ext: No clubbing, cyanosis, edema.    Assessment & Plan:     68 y.o. male with the following -     1. Urinary frequency  Patient denies requiring medication at this time for urinary frequency.  Patient recently has come off of Hytrin attention by cardiology.  Follow-up PSA.    2. SOB (shortness of breath)  CT chest at this time to evaluate for inflammation and diaphragm appearance.  Will trial albuterol to try to mitigate the shortness of breath at this time.  Patient also work on smoking cessation.  May need formal PFTs in the future.  Follow-up.  - CT-CHEST (THORAX) WITH; Future    5. Tobacco use  Patient has trialed Wellbutrin in the past and would like to retry this.  Patient currently on duloxetine and this may also improve on his chronic depression.  Discussed the benefits, risks, and contraindications of this.  Hopefully he will be successful in tobacco cessation.  Continue monitor.        Please note that this dictation was created using voice recognition software. I have made every reasonable attempt to correct obvious errors, but I expect that there are errors of grammar and possibly content that I did not discover before finalizing the note.

## 2019-06-17 ENCOUNTER — TELEPHONE (OUTPATIENT)
Dept: MEDICAL GROUP | Facility: LAB | Age: 69
End: 2019-06-17

## 2019-06-17 DIAGNOSIS — M25.551 RIGHT HIP PAIN: ICD-10-CM

## 2019-06-17 NOTE — TELEPHONE ENCOUNTER
Pt informed   He stated he already is going to PT. He will contact his insurance to locate a place that is excepted and call back

## 2019-06-19 ENCOUNTER — TELEPHONE (OUTPATIENT)
Dept: MEDICAL GROUP | Facility: LAB | Age: 69
End: 2019-06-19

## 2019-06-19 ENCOUNTER — HOSPITAL ENCOUNTER (OUTPATIENT)
Dept: LAB | Facility: MEDICAL CENTER | Age: 69
End: 2019-06-19
Attending: FAMILY MEDICINE
Payer: MEDICARE

## 2019-06-19 DIAGNOSIS — M54.40 LOW BACK PAIN WITH SCIATICA, SCIATICA LATERALITY UNSPECIFIED, UNSPECIFIED BACK PAIN LATERALITY, UNSPECIFIED CHRONICITY: ICD-10-CM

## 2019-06-19 DIAGNOSIS — R06.02 SOB (SHORTNESS OF BREATH): ICD-10-CM

## 2019-06-19 LAB
ALBUMIN SERPL BCP-MCNC: 4 G/DL (ref 3.2–4.9)
ALBUMIN/GLOB SERPL: 1.6 G/DL
ALP SERPL-CCNC: 44 U/L (ref 30–99)
ALT SERPL-CCNC: 13 U/L (ref 2–50)
ANION GAP SERPL CALC-SCNC: 10 MMOL/L (ref 0–11.9)
AST SERPL-CCNC: 12 U/L (ref 12–45)
BASOPHILS # BLD AUTO: 0.8 % (ref 0–1.8)
BASOPHILS # BLD: 0.05 K/UL (ref 0–0.12)
BILIRUB SERPL-MCNC: 0.4 MG/DL (ref 0.1–1.5)
BUN SERPL-MCNC: 17 MG/DL (ref 8–22)
CALCIUM SERPL-MCNC: 9.5 MG/DL (ref 8.5–10.5)
CHLORIDE SERPL-SCNC: 98 MMOL/L (ref 96–112)
CO2 SERPL-SCNC: 26 MMOL/L (ref 20–33)
CREAT SERPL-MCNC: 0.95 MG/DL (ref 0.5–1.4)
EOSINOPHIL # BLD AUTO: 0.14 K/UL (ref 0–0.51)
EOSINOPHIL NFR BLD: 2.2 % (ref 0–6.9)
ERYTHROCYTE [DISTWIDTH] IN BLOOD BY AUTOMATED COUNT: 44.9 FL (ref 35.9–50)
FASTING STATUS PATIENT QL REPORTED: NORMAL
GLOBULIN SER CALC-MCNC: 2.5 G/DL (ref 1.9–3.5)
GLUCOSE SERPL-MCNC: 304 MG/DL (ref 65–99)
HCT VFR BLD AUTO: 38.7 % (ref 42–52)
HGB BLD-MCNC: 11.8 G/DL (ref 14–18)
IMM GRANULOCYTES # BLD AUTO: 0.01 K/UL (ref 0–0.11)
IMM GRANULOCYTES NFR BLD AUTO: 0.2 % (ref 0–0.9)
LYMPHOCYTES # BLD AUTO: 2.01 K/UL (ref 1–4.8)
LYMPHOCYTES NFR BLD: 31.6 % (ref 22–41)
MCH RBC QN AUTO: 24.3 PG (ref 27–33)
MCHC RBC AUTO-ENTMCNC: 30.5 G/DL (ref 33.7–35.3)
MCV RBC AUTO: 79.8 FL (ref 81.4–97.8)
MONOCYTES # BLD AUTO: 0.52 K/UL (ref 0–0.85)
MONOCYTES NFR BLD AUTO: 8.2 % (ref 0–13.4)
NEUTROPHILS # BLD AUTO: 3.64 K/UL (ref 1.82–7.42)
NEUTROPHILS NFR BLD: 57 % (ref 44–72)
NRBC # BLD AUTO: 0 K/UL
NRBC BLD-RTO: 0 /100 WBC
PLATELET # BLD AUTO: 154 K/UL (ref 164–446)
PMV BLD AUTO: 12 FL (ref 9–12.9)
POTASSIUM SERPL-SCNC: 4.2 MMOL/L (ref 3.6–5.5)
PROT SERPL-MCNC: 6.5 G/DL (ref 6–8.2)
RBC # BLD AUTO: 4.85 M/UL (ref 4.7–6.1)
SODIUM SERPL-SCNC: 134 MMOL/L (ref 135–145)
WBC # BLD AUTO: 6.4 K/UL (ref 4.8–10.8)

## 2019-06-19 PROCEDURE — 36415 COLL VENOUS BLD VENIPUNCTURE: CPT

## 2019-06-19 PROCEDURE — 85025 COMPLETE CBC W/AUTO DIFF WBC: CPT

## 2019-06-19 PROCEDURE — 80053 COMPREHEN METABOLIC PANEL: CPT
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
## 2019-06-20 ENCOUNTER — TELEPHONE (OUTPATIENT)
Dept: MEDICAL GROUP | Facility: LAB | Age: 69
End: 2019-06-20
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
## 2019-06-23 ENCOUNTER — HOSPITAL ENCOUNTER (OUTPATIENT)
Dept: RADIOLOGY | Facility: MEDICAL CENTER | Age: 69
End: 2019-06-23
Attending: FAMILY MEDICINE
Payer: MEDICARE

## 2019-06-23 DIAGNOSIS — R06.02 SOB (SHORTNESS OF BREATH): ICD-10-CM

## 2019-06-23 PROCEDURE — 71260 CT THORAX DX C+: CPT

## 2019-06-23 PROCEDURE — 700117 HCHG RX CONTRAST REV CODE 255: Performed by: FAMILY MEDICINE

## 2019-06-23 RX ADMIN — IOHEXOL 75 ML: 350 INJECTION, SOLUTION INTRAVENOUS at 14:43
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
## 2019-06-25 ENCOUNTER — TELEPHONE (OUTPATIENT)
Dept: MEDICAL GROUP | Facility: LAB | Age: 69
End: 2019-06-25

## 2019-06-25 NOTE — TELEPHONE ENCOUNTER
Pt called and is asking for his CT results he has on 6/23/19. I did state you would go over them at his up coming appt. He stated how hard is it to just tell me. Also stated how hard is it to write a letter. I explained there were several referrals placed and the office will be contacting him.   We were disconnected.

## 2019-06-26 ENCOUNTER — TELEPHONE (OUTPATIENT)
Dept: MEDICAL GROUP | Facility: LAB | Age: 69
End: 2019-06-26

## 2019-06-26 NOTE — TELEPHONE ENCOUNTER
ESTABLISHED PATIENT PRE-VISIT PLANNING     Patient was NOT contacted to complete PVP.     Note: Patient will not be contacted if there is no indication to call.     1.  Reviewed notes from the last few office visits within the medical group: Yes    2.  If any orders were placed at last visit or intended to be done for this visit (i.e. 6 mos follow-up), do we have Results/Consult Notes?        •  Labs - Labs ordered, completed on 6/19/19 and results are in chart.   Note: If patient appointment is for lab review and patient did not complete labs, check with provider if OK to reschedule patient until labs completed.       •  Imaging - Imaging ordered, completed and results are in chart.       •  Referrals - Referral ordered, patient has NOT been seen.    3. Is this appointment scheduled as a Hospital Follow-Up? No    4.  Immunizations were updated in Epic using WebIZ?: Epic matches WebIZ       •  Web Iz Recommendations: HEPATITIS A , HEPATITIS B, MMR  and SHINGRIX (Shingles)    5.  Patient is due for the following Health Maintenance Topics:   Health Maintenance Due   Topic Date Due   • HEPATITIS C SCREENING  1950   • IMM ZOSTER VACCINES (1 of 2) 07/29/1999       - Patient has completed FLU, HEPATITIS B, PNEUMOVAX (PPSV23), PREVNAR (PCV13)  and TDAP Immunization(s) per WebIZ. Chart has been updated.    6. Orders for overdue Health Maintenance topics pended in Pre-Charting? N\A    7.  AHA (MDX) form printed for Provider? No, already completed    8.  Patient was NOT informed to arrive 15 min prior to their scheduled appointment and bring in their medication bottles.

## 2019-07-02 ENCOUNTER — OFFICE VISIT (OUTPATIENT)
Dept: MEDICAL GROUP | Facility: LAB | Age: 69
End: 2019-07-02
Payer: MEDICARE

## 2019-07-02 VITALS
DIASTOLIC BLOOD PRESSURE: 82 MMHG | WEIGHT: 222 LBS | TEMPERATURE: 98.3 F | OXYGEN SATURATION: 95 % | BODY MASS INDEX: 31.78 KG/M2 | SYSTOLIC BLOOD PRESSURE: 132 MMHG | HEART RATE: 111 BPM | HEIGHT: 70 IN

## 2019-07-02 DIAGNOSIS — Z72.0 TOBACCO USE: ICD-10-CM

## 2019-07-02 DIAGNOSIS — R97.20 ELEVATED PSA: ICD-10-CM

## 2019-07-02 PROCEDURE — 99214 OFFICE O/P EST MOD 30 MIN: CPT | Performed by: FAMILY MEDICINE

## 2019-07-02 RX ORDER — TERAZOSIN 5 MG/1
5 CAPSULE ORAL NIGHTLY
COMMUNITY
End: 2020-02-27 | Stop reason: SDUPTHER

## 2019-07-02 NOTE — PROGRESS NOTES
Refill encounter per protocol for invokana   Last office visit  8/1/17   Last refill 10/26/17   Last lab 11/8/17    Subjective:     CC: Follow-up    HPI:   Prabhakar presents today with     Elevated PSA:  This is a chronic unstable issue, patient has a history of BPH.  He was formerly on Kim and however he was having significant hypotension and was taken off of this by cardiology.  He has increasing nocturia.  He also has some issues with stream and frequency.  His PSA has increased from 2.48 to 5.07.  He has never been seen by urology.    Tobacco cessation:  This is a chronic unstable issue.  Patient has a history of tobacco use and we have trialed Wellbutrin as well as Chantix in the past.  He states he could not tolerate the Wellbutrin and it made him very aggressive and angry.  He stopped the medication and he normalized.  Would like to try the patch at this time.    Past Medical History:   Diagnosis Date   • BPH (benign prostatic hyperplasia)    • GERD (gastroesophageal reflux disease)    • Hypertension    • Neuropathic pain, leg    • Type II or unspecified type diabetes mellitus without mention of complication, not stated as uncontrolled        Social History   Substance Use Topics   • Smoking status: Former Smoker     Packs/day: 0.50     Years: 50.00     Types: Cigarettes     Quit date: 1/10/2007   • Smokeless tobacco: Never Used   • Alcohol use 4.8 - 6.0 oz/week     8 - 10 Glasses of wine per week      Comment: 1 bottle of wine 2-3 days a week       Current Outpatient Prescriptions Ordered in [facility]   Medication Sig Dispense Refill   • terazosin (HYTRIN) 5 MG Cap Take 5 mg by mouth every evening.     • nicotine (NICODERM) 7 MG/24HR PATCH 24 HR Apply 1 Patch to skin as directed every 24 hours. 30 Patch 3   • gabapentin (NEURONTIN) 300 MG Cap Take 1 Cap by mouth every day. 30 Cap 3   • benazepril (LOTENSIN) 20 MG Tab Take 0.5 Tabs by mouth every day. TAKE ONE TABLET BY MOUTH DAILY 90 Tab 3   • Acetaminophen 500 MG Cap Take  by mouth.     • metFORMIN (GLUCOPHAGE) 500 MG Tab Take 1 Tab by mouth every day. 90 Tab 3   •  "atorvastatin (LIPITOR) 40 MG Tab Take 1 Tab by mouth every day. 90 Tab 3   • omeprazole (PRILOSEC) 20 MG delayed-release capsule Take 20 mg by mouth every day.     • aspirin (ASA) 81 MG Chew Tab chewable tablet Take 81 mg by mouth every day.     • albuterol 108 (90 Base) MCG/ACT Aero Soln inhalation aerosol Inhale 2 Puffs by mouth every 6 hours as needed for Shortness of Breath. (Patient not taking: Reported on 7/2/2019) 8.5 g 3   • DULoxetine (CYMBALTA) 30 MG Cap DR Particles Take 2 Caps by mouth every day. (Patient not taking: Reported on 7/2/2019) 90 Cap 1   • B Complex Vitamins (VITAMIN B COMPLEX PO) Take 1 Tab by mouth every day.     • vitamin D (CHOLECALCIFEROL) 1000 UNIT Tab Take 1,000 Units by mouth every day.     • Multiple Vitamins-Minerals (HM COMPLETE 50+ MENS ULTIMATE PO) Take 1 Tab by mouth every day.       No current [facility]-ordered facility-administered medications on file.        Allergies:  Patient has no known allergies.    ROS:  Gen: no fevers/chill, no changes in weight  Eyes: no changes in vision  ENT: no sore throat, no hearing loss, no bloody nose  Pulm: no sob, no cough  CV: no chest pain, no palpitations  GI: no nausea/vomiting, no diarrhea  : no dysuria  MSk: no myalgias  Skin: no rash  Neuro: no headaches, no numbness/tingling  Heme/Lymph: no easy bruising      Objective:       Exam:  /82 (BP Location: Left arm, Patient Position: Sitting, BP Cuff Size: Adult)   Pulse (!) 111   Temp 36.8 °C (98.3 °F) (Temporal)   Ht 1.778 m (5' 10\")   Wt 100.7 kg (222 lb)   SpO2 95%   BMI 31.85 kg/m²  Body mass index is 31.85 kg/m².    Gen: Alert and oriented, No apparent distress.  Neck: Neck is supple without lymphadenopathy.  Lungs: Normal effort, CTA bilaterally, no wheezes, rhonchi, or rales  CV: Regular rate and rhythm. No murmurs, rubs, or gallops.               Ext: No clubbing, cyanosis, edema.      Assessment & Plan:     68 y.o. male with the following -     1. Elevated PSA  Referral " to urology placed.  Urology to continue surveillance.  - REFERRAL TO UROLOGY    2.  Tobacco cessation  We will try a nicotine patch at this time.  Continue monitor.    Please note that this dictation was created using voice recognition software. I have made every reasonable attempt to correct obvious errors, but I expect that there are errors of grammar and possibly content that I did not discover before finalizing the note.
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
## 2019-07-07 NOTE — TELEPHONE ENCOUNTER
Was the patient seen in the last year in this department? Yes  lov 7/2/19  Does patient have an active prescription for medications requested? No     Received Request Via: Pharmacy

## 2019-07-08 RX ORDER — ATORVASTATIN CALCIUM 40 MG/1
TABLET, FILM COATED ORAL
Qty: 90 TAB | Refills: 2 | Status: SHIPPED | OUTPATIENT
Start: 2019-07-08 | End: 2020-01-28 | Stop reason: SDUPTHER

## 2019-07-09 ENCOUNTER — PHYSICAL THERAPY (OUTPATIENT)
Dept: PHYSICAL THERAPY | Facility: MEDICAL CENTER | Age: 69
End: 2019-07-09
Attending: FAMILY MEDICINE
Payer: MEDICARE

## 2019-07-09 DIAGNOSIS — M25.551 RIGHT HIP PAIN: ICD-10-CM

## 2019-07-09 PROCEDURE — 97110 THERAPEUTIC EXERCISES: CPT

## 2019-07-09 PROCEDURE — 97161 PT EVAL LOW COMPLEX 20 MIN: CPT

## 2019-07-09 PROCEDURE — 97014 ELECTRIC STIMULATION THERAPY: CPT

## 2019-07-09 ASSESSMENT — ENCOUNTER SYMPTOMS
QUALITY: ACHING
PAIN SCALE AT HIGHEST: 10
EXACERBATED BY: ACTIVITY
PAIN TIMING: CONSTANT
PAIN SCALE: 8
PAIN SCALE AT LOWEST: 2

## 2019-07-09 ASSESSMENT — ACTIVITIES OF DAILY LIVING (ADL): POOR_BALANCE: 1
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
## 2019-07-11 DIAGNOSIS — F32.1 MODERATE SINGLE CURRENT EPISODE OF MAJOR DEPRESSIVE DISORDER (HCC): ICD-10-CM

## 2019-07-11 RX ORDER — DULOXETIN HYDROCHLORIDE 30 MG/1
60 CAPSULE, DELAYED RELEASE ORAL DAILY
Qty: 160 CAP | Refills: 0 | Status: SHIPPED | OUTPATIENT
Start: 2019-07-11 | End: 2019-07-11 | Stop reason: SDUPTHER

## 2019-07-11 RX ORDER — DULOXETIN HYDROCHLORIDE 60 MG/1
60 CAPSULE, DELAYED RELEASE ORAL DAILY
Qty: 160 CAP | Refills: 1 | Status: SHIPPED | OUTPATIENT
Start: 2019-07-11 | End: 2020-02-27 | Stop reason: SDUPTHER

## 2019-07-11 NOTE — TELEPHONE ENCOUNTER
Was the patient seen in the last year in this department? Yes LOV 7/2/19    Does patient have an active prescription for medications requested? No     Received Request Via: Patient

## 2019-07-11 NOTE — TELEPHONE ENCOUNTER
Previous request was wrong, patient wanted to take one 60 mg capsule a day rather than two 30 mg capsules.

## 2019-07-16 ENCOUNTER — PHYSICAL THERAPY (OUTPATIENT)
Dept: PHYSICAL THERAPY | Facility: MEDICAL CENTER | Age: 69
End: 2019-07-16
Attending: FAMILY MEDICINE
Payer: MEDICARE

## 2019-07-16 DIAGNOSIS — M25.551 RIGHT HIP PAIN: ICD-10-CM

## 2019-07-16 PROCEDURE — 97112 NEUROMUSCULAR REEDUCATION: CPT

## 2019-07-16 NOTE — OP THERAPY DAILY TREATMENT
"  Outpatient Physical Therapy  DAILY TREATMENT     [facility] [facility] Outpatient Physical Therapy  [address]  [place] [address]  Phone:  [phone]  Fax:  [phone]    Date: 07/16/2019    Patient: Prabhakar Sierra  YOB: 1950  MRN: [id number]     Time Calculation  Start time: 1300  Stop time: 1330 Time Calculation (min): 30 minutes     Chief Complaint: Hip Problem and Loss Of Balance    Visit #: 8    SUBJECTIVE:  Pt 15 minutes late to appointment. \"I haven't done any of the exercises you gave me.\"    OBJECTIVE:  PT Functional Assessment Tool Used: DGI  PT Functional Assessment Score: 21/24       Therapeutic Treatments and Modalities:     1. Neuromuscular Re-education (CPT 94294), DGI completion. In ll bars: feet apart: EO, EC. EO with head turns (up/down, R/L). HR/TR with UE assist. Romberg: EO and EC. Amb in hallway: tandem walking x2, sidestepping x2, cone tap with amb x3. , SBA to CGA for balance exercises.     Time-based treatments/modalities:  Neuromusc re-ed, balance, coor, post minutes (CPT 10574): 30 minutes       Pain rating before treatment: 6  Pain rating after treatment: 6    ASSESSMENT:   Response to treatment: Pt at mild risk for falls per DGI score today. Difficulty with R SLS for L cone taping demo'd today- pt with significant R trunk lean and CGA occasionally required. Working on increasing pt compliance with HEP to help with pain control and improved flexibility.      PLAN/RECOMMENDATIONS:   Plan for treatment: therapy treatment to continue next visit.  Planned interventions for next visit: continue with current treatment. Continue with flexibility, strength, balance activities to improve function. Try Nu step next session.       "

## 2019-07-18 ENCOUNTER — PHYSICAL THERAPY (OUTPATIENT)
Dept: PHYSICAL THERAPY | Facility: MEDICAL CENTER | Age: 69
End: 2019-07-18
Attending: FAMILY MEDICINE
Payer: MEDICARE

## 2019-07-18 DIAGNOSIS — M25.551 RIGHT HIP PAIN: ICD-10-CM

## 2019-07-18 PROCEDURE — 97110 THERAPEUTIC EXERCISES: CPT

## 2019-07-18 PROCEDURE — 97014 ELECTRIC STIMULATION THERAPY: CPT

## 2019-07-18 NOTE — OP THERAPY DAILY TREATMENT
"  Outpatient Physical Therapy  DAILY TREATMENT     [facility] Outpatient Physical Therapy  [address]  [place] [address]  Phone:  [phone]  Fax:  [phone]    Date: 07/18/2019    Patient: Prabhakar Sierra  YOB: 1950  MRN: [id number]     Time Calculation  Start time: 1515  Stop time: 1600 Time Calculation (min): 45 minutes     Chief Complaint: Hip Problem and Loss Of Balance    Visit #: 9    SUBJECTIVE:  Have just a little pain in R hip/back today, more feel tired. Was a little tired after last session, but not bad.     OBJECTIVE:          Therapeutic Exercises (CPT 22251):     1. Nu step , Seat 12, Arm 12, Resistance 3 x 8 min     2. SKTC, 15\"4 B    3. Piriformis , 30\" x2 B    4. LTR, 10x    5. TrA, 5\" x5    6. TrA + march, x10 B    7. TrA + SAQ, x10 B    Therapeutic Treatments and Modalities:     1. E Stim Unattended (CPT 32061), IFC to L/S with MHP x15 min     Time-based treatments/modalities:  Therapeutic exercise minutes (CPT 11203): 30 minutes       Pain rating before treatment: 2  Pain rating after treatment: 2    ASSESSMENT:   Response to treatment: Dizziness again reported with supine to sit- pt rested in sitting until dizziness subsided. Pt reports he is compliant with BP medication use. No increase in pain reported with increased exercise today, though pt will repeatedly state that he is tired throughout session.    PLAN/RECOMMENDATIONS:   Plan for treatment: therapy treatment to continue next visit.  Planned interventions for next visit: continue with current treatment. Pt to improve ROM, flexibility, strength, balance and function.       "

## 2019-07-23 ENCOUNTER — APPOINTMENT (OUTPATIENT)
Dept: PHYSICAL THERAPY | Facility: MEDICAL CENTER | Age: 69
End: 2019-07-23
Attending: FAMILY MEDICINE
Payer: MEDICARE

## 2019-07-23 NOTE — OP THERAPY DAILY TREATMENT
"  Outpatient Physical Therapy  DAILY TREATMENT     [facility] [facility] Outpatient Physical Therapy  [address]  [place] [address]  Phone:  [phone]  Fax:  [phone]    Date: 07/23/2019    Patient: Prabhakar Sierra  YOB: 1950  MRN: [id number]     Time Calculation             Chief Complaint: No chief complaint on file.    Visit #: 10    SUBJECTIVE:  ***    OBJECTIVE:  Current objective measures: ***          Therapeutic Exercises (CPT 85772):     1. Nu step , Seat 12, Arm 12, Resistance 3 x 8 min     2. SKTC, 15\"4 B    3. Piriformis , 30\" x2 B    4. LTR, 10x    5. TrA, 5\" x5    6. TrA + march, x10 B    7. TrA + SAQ, x10 B    Therapeutic Treatments and Modalities:     1. E Stim Unattended (CPT 16437), IFC to L/S with MHP x15 min     Time-based treatments/modalities:          Pain rating before treatment: {PAIN NUMBERS_1-10:54017}  Pain rating after treatment: {PAIN NUMBERS_1-10:92261}    ASSESSMENT:   Response to treatment: ***    PLAN/RECOMMENDATIONS:   Plan for treatment: {AMB OP THERAPY - THERAPY PLAN:[id number]::\"therapy treatment to continue next visit\"}.  Planned interventions for next visit: {PT PLANNED THERAPY INTERVENTIONS:[id number]}.      "

## 2019-07-25 ENCOUNTER — APPOINTMENT (OUTPATIENT)
Dept: PHYSICAL THERAPY | Facility: MEDICAL CENTER | Age: 69
End: 2019-07-25
Attending: FAMILY MEDICINE
Payer: MEDICARE

## 2019-07-30 ENCOUNTER — PHYSICAL THERAPY (OUTPATIENT)
Dept: PHYSICAL THERAPY | Facility: MEDICAL CENTER | Age: 69
End: 2019-07-30
Attending: FAMILY MEDICINE
Payer: MEDICARE

## 2019-07-30 DIAGNOSIS — M54.5 LOW BACK PAIN, UNSPECIFIED BACK PAIN LATERALITY, UNSPECIFIED CHRONICITY, WITH SCIATICA PRESENCE UNSPECIFIED: ICD-10-CM

## 2019-07-30 DIAGNOSIS — M25.551 RIGHT HIP PAIN: ICD-10-CM

## 2019-07-30 PROCEDURE — 97014 ELECTRIC STIMULATION THERAPY: CPT

## 2019-07-30 PROCEDURE — 97112 NEUROMUSCULAR REEDUCATION: CPT

## 2019-07-30 PROCEDURE — 97110 THERAPEUTIC EXERCISES: CPT

## 2019-07-30 NOTE — OP THERAPY DAILY TREATMENT
"  Outpatient Physical Therapy  DAILY TREATMENT     [facility] Outpatient Physical Therapy  [address]  [place] [address]  Phone:  [phone]  Fax:  [phone]    Date: 07/30/2019    Patient: Prabhakar Sierra  YOB: 1950  MRN: [id number]     Time Calculation  Start time: 1318  Stop time: 1402 Time Calculation (min): 44 minutes     Chief Complaint: Back Problem    Visit #: 4    SUBJECTIVE:  Have not had too much luck with HEP, balance really bad, trying to be better    OBJECTIVE:      Therapeutic Exercises (CPT 30098):     1. Nu step , Seat 12, Arm 12, Resistance 4 x 9 min     2. SKTC, 15\"4 B    3. Piriformis , 30\" x2 B    4. LTR, 10x    5. TrA, 5\" x5    6. TrA + march, x10 B    7. TrA + SAQ, x10 B    8. TG leg press L10, 10/3    9. Bridge, 10/2    Therapeutic Treatments and Modalities:     1. E Stim Unattended (CPT 75443), IFC to L/S with MHP x15 min     2. Neuromuscular Re-education (CPT 25702), balance exercises, feet narrow, staggered, tandem, alt tap on 8 inch step    Time-based treatments/modalities:  Therapeutic exercise minutes (CPT 97174): 20 minutes  Neuromusc re-ed, balance, coor, post minutes (CPT 24848): 10 minutes       Pain rating before treatment: 1  Pain rating after treatment: 1    ASSESSMENT:   Response to treatment: balance deficits with narrow SANDRA     PLAN/RECOMMENDATIONS:   Plan for treatment: therapy treatment to continue next visit.  Planned interventions for next visit: continue with current treatment.      "

## 2019-08-01 ENCOUNTER — PHYSICAL THERAPY (OUTPATIENT)
Dept: PHYSICAL THERAPY | Facility: MEDICAL CENTER | Age: 69
End: 2019-08-01
Attending: FAMILY MEDICINE
Payer: MEDICARE

## 2019-08-01 DIAGNOSIS — M25.551 RIGHT HIP PAIN: ICD-10-CM

## 2019-08-01 DIAGNOSIS — M25.551 CHRONIC PAIN OF BOTH HIPS: ICD-10-CM

## 2019-08-01 DIAGNOSIS — M54.5 LOW BACK PAIN, UNSPECIFIED BACK PAIN LATERALITY, UNSPECIFIED CHRONICITY, WITH SCIATICA PRESENCE UNSPECIFIED: ICD-10-CM

## 2019-08-01 DIAGNOSIS — M25.552 CHRONIC PAIN OF BOTH HIPS: ICD-10-CM

## 2019-08-01 DIAGNOSIS — G89.29 CHRONIC PAIN OF BOTH HIPS: ICD-10-CM

## 2019-08-01 PROCEDURE — 97014 ELECTRIC STIMULATION THERAPY: CPT

## 2019-08-01 PROCEDURE — 97110 THERAPEUTIC EXERCISES: CPT

## 2019-08-01 NOTE — OP THERAPY DAILY TREATMENT
"  Outpatient Physical Therapy  DAILY TREATMENT     [facility] Outpatient Physical Therapy  [address]  [place] [address]  Phone:  [phone]  Fax:  [phone]    Date: 08/01/2019    Patient: Harpal Sierra  YOB: 1950  MRN: [id number]     Time Calculation  Start time: 1247  Stop time: 1335 Time Calculation (min): 48 minutes       Chief Complaint: Back Problem and Loss Of Balance    Visit #: 5    SUBJECTIVE:  I was so sore after the last visit.  Pain at 10/10 last time in the calf muscles, did get in the pool for exercise and liked that.    OBJECTIVE:          Therapeutic Exercises (CPT 30322):     1. Nu step , Seat 12, Arm 12, Resistance 4 x 9 min     2. SKTC, 15\"4 B    3. Piriformis , 30\" x2 B    4. Hamstring stretch, 30 sec x 2 B    5. TrA, 5\" x5    6. TrA + march, x10 B    7. TrA + SAQ, x10 B    8. TG leg press L8, 10/3    9. Bridge on ball, 10/2    10. Pushing box, 20ft x 6    11. Ham curls on ball , 10/2    Therapeutic Treatments and Modalities:     1. E Stim Unattended (CPT 69765), IFC to L/S with MHP x15 min     2. Neuromuscular Re-education (CPT 15025), balance exercises, feet narrow, staggered, tandem, alt tap on 8 inch step    Time-based treatments/modalities:  Therapeutic exercise minutes (CPT 95311): 30 minutes       Pain rating before treatment: 10 muscle soreness  Pain rating after treatment: 9    ASSESSMENT:   Response to treatment: pt fatigues easily, reduced intensity of exercise to mitigate the soreness of exercise.  Recommended pt to continue with water exercise.    PLAN/RECOMMENDATIONS:   Plan for treatment: therapy treatment to continue next visit.  Planned interventions for next visit: continue with current treatment.       "

## 2019-08-06 ENCOUNTER — PHYSICAL THERAPY (OUTPATIENT)
Dept: PHYSICAL THERAPY | Facility: MEDICAL CENTER | Age: 69
End: 2019-08-06
Attending: FAMILY MEDICINE
Payer: MEDICARE

## 2019-08-06 DIAGNOSIS — M25.551 RIGHT HIP PAIN: ICD-10-CM

## 2019-08-06 PROCEDURE — 97014 ELECTRIC STIMULATION THERAPY: CPT

## 2019-08-06 PROCEDURE — 97110 THERAPEUTIC EXERCISES: CPT

## 2019-08-06 NOTE — OP THERAPY DAILY TREATMENT
Outpatient Physical Therapy  DAILY TREATMENT     [facility] Outpatient Physical Therapy  [address]  [place] [address]  Phone:  [phone]  Fax:  [phone]    Date: 08/06/2019    Patient: Harpal Sierra  YOB: 1950  MRN: [id number]     Time Calculation  Start time: 1347  Stop time: 1430 Time Calculation (min): 43 minutes       Chief Complaint: Loss Of Balance and Hip Problem    Visit #: 6    SUBJECTIVE:  Not as sore since the last visit.    OBJECTIVE:          Therapeutic Exercises (CPT 68484):     1. Nu step , Seat 12, Arm 12, Resistance 4 x 5 min     2. TG leg press L8, 10/3    3. Stool scooting, 30 ft x 2    4. Bridge on ball, 10/2, 5 sec hold    5. Side step L and R, 6 ft x 2 each , L1    6. TrA + march, x10 B    7. TrA + SAQ, x10 B    Therapeutic Treatments and Modalities:     1. E Stim Unattended (CPT 67634), IFC to L/S with MHP x15 min     Time-based treatments/modalities:  Therapeutic exercise minutes (CPT 17973): 28 minutes         ASSESSMENT:   Response to treatment: some fatigue with new exercises, no additional soreness    PLAN/RECOMMENDATIONS:   Plan for treatment: therapy treatment to continue next visit.  Planned interventions for next visit: continue with current treatment.

## 2019-08-08 ENCOUNTER — PHYSICAL THERAPY (OUTPATIENT)
Dept: PHYSICAL THERAPY | Facility: MEDICAL CENTER | Age: 69
End: 2019-08-08
Attending: FAMILY MEDICINE
Payer: MEDICARE

## 2019-08-08 DIAGNOSIS — M25.551 RIGHT HIP PAIN: ICD-10-CM

## 2019-08-08 PROCEDURE — 97110 THERAPEUTIC EXERCISES: CPT

## 2019-08-08 PROCEDURE — 97014 ELECTRIC STIMULATION THERAPY: CPT

## 2019-08-08 NOTE — OP THERAPY DAILY TREATMENT
Outpatient Physical Therapy  DAILY TREATMENT     [facility] Outpatient Physical Therapy  [address]  [place] [address]  Phone:  [phone]  Fax:  [phone]    Date: 08/08/2019    Patient: Harpal Sierra  YOB: 1950  MRN: [id number]     Time Calculation  Start time: 1049  Stop time: 1136 Time Calculation (min): 47 minutes       Chief Complaint: Hip Problem; Back Problem; and Loss Of Balance    Visit #: 7    SUBJECTIVE:  Ankle still hurts, thinking will take a break for a few weeks to rest ankle and do HEP    OBJECTIVE:  Current objective measures: bruising to L ankle still present  Pain and weakness with ankle eversion          Therapeutic Exercises (CPT 68052):     1. Nu step , Seat 12, Arm 12, Resistance 4 x 5 min     2. Wall squats with ball, 10/2    3. Lunges with UE hold on bars, 10 each    4. Hip abd standing holding on, 10 each    5. Heel raises, 10    6. Side step L and R, 6 ft x 3    7. Rocker board AP and lateral, 60 sec    8. Ankle tubing 4 directions, 10 each, L1    9. Lat pull down, 10/2, L2    Therapeutic Treatments and Modalities:     1. E Stim Unattended (CPT 76380), IFC to L/S with MHP x15 min     Time-based treatments/modalities:  Ther ex 25 min  Estim 15 min            ASSESSMENT:   Response to treatment: pt going to hold on PT x 2-3 weeks, then return for follow up visit.  Pts ankle continues to hurt and be bruised, discussed with pt the thoughts he may have rolled his ankle.  Pt to ice and trial ankle tubing exercises pain free.    PLAN/RECOMMENDATIONS:   Plan for treatment: therapy treatment to continue next visit.  Planned interventions for next visit: continue with current treatment.

## 2019-08-12 ENCOUNTER — APPOINTMENT (OUTPATIENT)
Dept: PHYSICAL THERAPY | Facility: MEDICAL CENTER | Age: 69
End: 2019-08-12
Attending: FAMILY MEDICINE
Payer: MEDICARE

## 2019-08-27 DIAGNOSIS — N40.1 BENIGN PROSTATIC HYPERPLASIA (BPH) WITH STRAINING ON URINATION: ICD-10-CM

## 2019-08-27 DIAGNOSIS — R39.16 BENIGN PROSTATIC HYPERPLASIA (BPH) WITH STRAINING ON URINATION: ICD-10-CM

## 2019-08-27 RX ORDER — TERAZOSIN 5 MG/1
CAPSULE ORAL
Qty: 90 CAP | Refills: 2 | Status: SHIPPED
Start: 2019-08-27 | End: 2020-02-27

## 2019-08-27 NOTE — TELEPHONE ENCOUNTER
Was the patient seen in the last year in this department? Yes  7/2/19  Does patient have an active prescription for medications requested? No     Received Request Via: Pharmacy

## 2019-09-17 ENCOUNTER — TELEPHONE (OUTPATIENT)
Dept: MEDICAL GROUP | Facility: LAB | Age: 69
End: 2019-09-17

## 2019-09-17 NOTE — TELEPHONE ENCOUNTER
Pt was asked to reschedule his appt for 9/18. He did not need to come in tomorrow anyway. He is asking when you would like him back in the office and also his wife.

## 2019-09-27 DIAGNOSIS — E11.42 CONTROLLED TYPE 2 DIABETES MELLITUS WITH DIABETIC POLYNEUROPATHY, WITHOUT LONG-TERM CURRENT USE OF INSULIN (HCC): ICD-10-CM

## 2019-09-27 RX ORDER — GLIPIZIDE 5 MG/1
TABLET, FILM COATED, EXTENDED RELEASE ORAL
Qty: 90 TAB | Refills: 2 | Status: SHIPPED | OUTPATIENT
Start: 2019-09-27 | End: 2020-02-27 | Stop reason: SDUPTHER

## 2019-11-18 ENCOUNTER — HOSPITAL ENCOUNTER (OUTPATIENT)
Dept: RADIOLOGY | Facility: MEDICAL CENTER | Age: 69
End: 2019-11-18
Attending: PHYSICIAN ASSISTANT
Payer: MEDICARE

## 2019-11-18 DIAGNOSIS — S92.321A CLOSED DISPLACED FRACTURE OF SECOND METATARSAL BONE OF RIGHT FOOT, INITIAL ENCOUNTER: ICD-10-CM

## 2019-11-18 PROCEDURE — 73700 CT LOWER EXTREMITY W/O DYE: CPT | Mod: RT

## 2019-11-21 ENCOUNTER — HOSPITAL ENCOUNTER (OUTPATIENT)
Facility: MEDICAL CENTER | Age: 69
End: 2019-11-21
Attending: ORTHOPAEDIC SURGERY | Admitting: ORTHOPAEDIC SURGERY
Payer: MEDICARE

## 2019-11-22 DIAGNOSIS — I10 ESSENTIAL HYPERTENSION: ICD-10-CM

## 2019-11-22 NOTE — TELEPHONE ENCOUNTER
Was the patient seen in the last year in this department? Yes7/2/19    Does patient have an active prescription for medications requested? No     Received Request Via: Pharmacy

## 2019-11-23 DIAGNOSIS — E11.42 CONTROLLED TYPE 2 DIABETES MELLITUS WITH DIABETIC POLYNEUROPATHY, WITHOUT LONG-TERM CURRENT USE OF INSULIN (HCC): ICD-10-CM

## 2019-11-25 ENCOUNTER — TELEPHONE (OUTPATIENT)
Dept: MEDICAL GROUP | Facility: LAB | Age: 69
End: 2019-11-25

## 2019-11-25 RX ORDER — BENAZEPRIL HYDROCHLORIDE 20 MG/1
TABLET ORAL
Qty: 90 TAB | Refills: 2 | Status: SHIPPED | OUTPATIENT
Start: 2019-11-25 | End: 2020-02-27 | Stop reason: SDUPTHER

## 2019-11-25 RX ORDER — GABAPENTIN 300 MG/1
CAPSULE ORAL
Qty: 30 CAP | Refills: 2 | Status: SHIPPED | OUTPATIENT
Start: 2019-11-25 | End: 2020-02-27 | Stop reason: SDUPTHER

## 2019-11-25 NOTE — TELEPHONE ENCOUNTER
I called Don to verify exactly what he needs, he wants physical therapy to come to his home , he asked if you know any places that do that?

## 2019-11-25 NOTE — TELEPHONE ENCOUNTER
Pt requesting OT,PT and a nurse to go out to the home. Due to his broken foot. He stated his insurance will approve this.

## 2019-12-17 ENCOUNTER — TELEPHONE (OUTPATIENT)
Dept: PHYSICAL THERAPY | Facility: MEDICAL CENTER | Age: 69
End: 2019-12-17

## 2019-12-17 NOTE — OP THERAPY DISCHARGE SUMMARY
Outpatient Physical Therapy  DISCHARGE SUMMARY NOTE      [facility] Outpatient Physical Therapy  [address]  [place] [address]  Phone:  [phone]  Fax:  [phone]    Date of Visit: 12/17/2019    Patient: Harpal Sierra  YOB: 1950  MRN: [id number]     Referring Provider: Brenda Monroy M.D.   Referring Diagnosis Back pain, unspecified back location, unspecified back pain laterality, unspecified chronicity [M54.9]     Physical Therapy Occurrence Codes    Date physical therapy care plan established or reviewed:  7/9/19   Date physical therapy treatment started:  7/9/19          Functional Assessment Used        Your patient is being discharged from Physical Therapy with the following comments:   · Patient has failed to schedule or reschedule follow-up visits    Comments:  Late PT discharge.  Pt is being discharged from PT after not being seen longer than 30 days since his last PT visit.       Recommendations:  Discharge PT chart.    Peggy Arndt PT, MSPT    Date: 12/17/2019

## 2019-12-18 ENCOUNTER — OFFICE VISIT (OUTPATIENT)
Dept: MEDICAL GROUP | Facility: LAB | Age: 69
End: 2019-12-18
Payer: MEDICARE

## 2019-12-18 VITALS
TEMPERATURE: 97.3 F | RESPIRATION RATE: 16 BRPM | BODY MASS INDEX: 33.5 KG/M2 | OXYGEN SATURATION: 99 % | SYSTOLIC BLOOD PRESSURE: 132 MMHG | HEIGHT: 70 IN | HEART RATE: 100 BPM | WEIGHT: 234 LBS | DIASTOLIC BLOOD PRESSURE: 68 MMHG

## 2019-12-18 DIAGNOSIS — S92.901A CLOSED FRACTURE OF RIGHT FOOT, INITIAL ENCOUNTER: ICD-10-CM

## 2019-12-18 DIAGNOSIS — Z23 ENCOUNTER FOR IMMUNIZATION: ICD-10-CM

## 2019-12-18 DIAGNOSIS — E11.42 CONTROLLED TYPE 2 DIABETES MELLITUS WITH DIABETIC POLYNEUROPATHY, WITHOUT LONG-TERM CURRENT USE OF INSULIN (HCC): ICD-10-CM

## 2019-12-18 PROCEDURE — G0010 ADMIN HEPATITIS B VACCINE: HCPCS | Performed by: FAMILY MEDICINE

## 2019-12-18 PROCEDURE — 90746 HEPB VACCINE 3 DOSE ADULT IM: CPT | Performed by: FAMILY MEDICINE

## 2019-12-18 PROCEDURE — 99214 OFFICE O/P EST MOD 30 MIN: CPT | Mod: 25 | Performed by: FAMILY MEDICINE

## 2019-12-18 NOTE — PROGRESS NOTES
Subjective:     CC: Foot Pain     HPI:   Prabhakar presents today with     Foot Pain:  This is an acute issue, new to me.  Patient had a fall recently and fractured his right foot.  He was seen by [facility] and then changed over to Dr. Guevara of orthopedics.  He was told that either they could have surgical intervention had or allow for complete healing of the foot with some minor anatomical changes.  He is doing physical therapy however he was told by his orthopedic doctor that he cannot drive so he is finding it very difficult to have physical therapy outside of his home.  He is homebound at this point and would require home health.  Since being unable to drive has had a decline in his condition.    Past Medical History:   Diagnosis Date   • BPH (benign prostatic hyperplasia)    • GERD (gastroesophageal reflux disease)    • Hypertension    • Neuropathic pain, leg    • Type II or unspecified type diabetes mellitus without mention of complication, not stated as uncontrolled        Social History     Tobacco Use   • Smoking status: Former Smoker     Packs/day: 0.50     Years: 50.00     Pack years: 25.00     Types: Cigarettes     Last attempt to quit: 1/10/2007     Years since quittin.9   • Smokeless tobacco: Never Used   Substance Use Topics   • Alcohol use: Yes     Alcohol/week: 4.8 - 6.0 oz     Types: 8 - 10 Glasses of wine per week     Comment: 1 bottle of wine 2-3 days a week   • Drug use: No       Current Outpatient Medications Ordered in Epic   Medication Sig Dispense Refill   • benazepril (LOTENSIN) 20 MG Tab TAKE ONE TABLET BY MOUTH DAILY 90 Tab 2   • gabapentin (NEURONTIN) 300 MG Cap TAKE ONE CAPSULE BY MOUTH DAILY 30 Cap 2   • glipiZIDE SR (GLUCOTROL) 5 MG TABLET SR 24 HR TAKE ONE TABLET BY MOUTH DAILY 90 Tab 2   • terazosin (HYTRIN) 5 MG Cap TAKE ONE CAPSULE BY MOUTH DAILY 90 Cap 2   • DULoxetine (CYMBALTA) 60 MG Cap DR Particles delayed-release capsule Take 1 Cap by mouth every day. 160 Cap 1   •  "atorvastatin (LIPITOR) 40 MG Tab TAKE ONE TABLET BY MOUTH DAILY 90 Tab 2   • terazosin (HYTRIN) 5 MG Cap Take 5 mg by mouth every evening.     • nicotine (NICODERM) 7 MG/24HR PATCH 24 HR Apply 1 Patch to skin as directed every 24 hours. 30 Patch 3   • benazepril (LOTENSIN) 20 MG Tab Take 0.5 Tabs by mouth every day. TAKE ONE TABLET BY MOUTH DAILY 90 Tab 3   • albuterol 108 (90 Base) MCG/ACT Aero Soln inhalation aerosol Inhale 2 Puffs by mouth every 6 hours as needed for Shortness of Breath. (Patient not taking: Reported on 7/2/2019) 8.5 g 3   • Acetaminophen 500 MG Cap Take  by mouth.     • metFORMIN (GLUCOPHAGE) 500 MG Tab Take 1 Tab by mouth every day. 90 Tab 3   • atorvastatin (LIPITOR) 40 MG Tab Take 1 Tab by mouth every day. 90 Tab 3   • B Complex Vitamins (VITAMIN B COMPLEX PO) Take 1 Tab by mouth every day.     • vitamin D (CHOLECALCIFEROL) 1000 UNIT Tab Take 1,000 Units by mouth every day.     • Multiple Vitamins-Minerals (HM COMPLETE 50+ MENS ULTIMATE PO) Take 1 Tab by mouth every day.     • omeprazole (PRILOSEC) 20 MG delayed-release capsule Take 20 mg by mouth every day.     • aspirin (ASA) 81 MG Chew Tab chewable tablet Take 81 mg by mouth every day.       No current [facility]-ordered facility-administered medications on file.        Allergies:  Patient has no known allergies.    Health Maintenance: Completed    ROS:  Gen: no fevers/chill, no changes in weight  Eyes: no changes in vision  ENT: no sore throat, no hearing loss, no bloody nose  Pulm: no sob, no cough  CV: no chest pain, no palpitations  GI: no nausea/vomiting, no diarrhea  : no dysuria  MSk: no myalgias foot pain  Skin: no rash  Neuro: no headaches, no numbness/tingling  Heme/Lymph: no easy bruising      Objective:       Exam:  /68   Pulse 100   Temp 36.3 °C (97.3 °F)   Resp 16   Ht 1.778 m (5' 10\")   Wt 106.1 kg (234 lb)   SpO2 99%   BMI 33.58 kg/m²  Body mass index is 33.58 kg/m².    Gen: Alert and oriented, No apparent " distress.  Neck: Neck is supple without lymphadenopathy.  Lungs: Normal effort, CTA bilaterally, no wheezes, rhonchi, or rales  CV: Regular rate and rhythm. No murmurs, rubs, or gallops.               Ext: No clubbing, cyanosis, right foot edema present, unable to assess range of motion    Assessment & Plan:     69 y.o. male with the following -     1.  Foot fracture, closed  Will have patient apply for home health at this point given he is unable to drive and have physical therapy on the outpatient side.  We will continue to monitor with orthopedics.      Face to Face Supporting Documentation - Home Health    The encounter with this patient was in whole or in part the primary reason for home health admission.    Date of encounter:   Patient:                    MRN:                       YOB: 2020  Prabhakar Sierra  [id number]  1950     Home health to see patient for:  Skilled Nursing care for assessment, interventions & education, Physical Therapy evaluation and treatment and Occupational therapy evaluation and treatment    Skilled need for:  Deterioration in status    Skilled nursing interventions to include:  Wound Care    Homebound status evidenced by:  Need the aid of supportive devices such as crutches, canes, wheelchairs or walkers. Leaving home requires a considerable and taxing effort. There is a normal inability to leave the home.    Community Physician to provide follow up care: Brenda Monroy M.D.     Optional Interventions? No      I certify the face to face encounter for this home health care referral meets the CMS requirements and the encounter/clinical assessment with the patient was, in whole, or in part, for the medical condition(s) listed above, which is the primary reason for home health care. Based on my clinical findings: the service(s) are medically necessary, support the need for home health care, and the homebound criteria are met.  I certify that this  patient has had a face to face encounter by myself.  Brenda Monroy M.D. - NPI: [id number]      Please note that this dictation was created using voice recognition software. I have made every reasonable attempt to correct obvious errors, but I expect that there are errors of grammar and possibly content that I did not discover before finalizing the note.

## 2019-12-23 ENCOUNTER — TELEPHONE (OUTPATIENT)
Dept: MEDICAL GROUP | Facility: LAB | Age: 69
End: 2019-12-23

## 2019-12-26 ENCOUNTER — TELEPHONE (OUTPATIENT)
Dept: MEDICAL GROUP | Facility: LAB | Age: 69
End: 2019-12-26

## 2019-12-26 NOTE — TELEPHONE ENCOUNTER
"· Home Health paperwork received from [facility] requiring provider signature.     · All appropriate fields completed by Medical Assistant: No    · Paperwork placed in \"MA to Provider\" folder/basket. Awaiting provider completion/signature.  "

## 2019-12-27 NOTE — TELEPHONE ENCOUNTER
1. Caller Name: Prabhakar Sierra    Call Back Number: [phone] (home)         Patient approves a detailed voicemail message: N\A    Patient called and LVM asking if his shower chair has been sent, patient would like a call back.

## 2019-12-27 NOTE — TELEPHONE ENCOUNTER
1. Caller Name: Prabhakar Sierra    Call Back Number: [phone] (home)         Patient approves a detailed voicemail message: N\A    Called and spoke to patient informed him it has already been sent, the DME company will reach out in regards to delivery.

## 2020-01-07 ENCOUNTER — TELEPHONE (OUTPATIENT)
Dept: MEDICAL GROUP | Facility: LAB | Age: 70
End: 2020-01-07

## 2020-01-07 NOTE — TELEPHONE ENCOUNTER
Brittney from [facility] was requesting an order for a bar in pts shower.

## 2020-01-09 ENCOUNTER — TELEPHONE (OUTPATIENT)
Dept: MEDICAL GROUP | Facility: LAB | Age: 70
End: 2020-01-09

## 2020-01-21 ENCOUNTER — TELEPHONE (OUTPATIENT)
Dept: MEDICAL GROUP | Facility: LAB | Age: 70
End: 2020-01-21

## 2020-01-28 ENCOUNTER — TELEPHONE (OUTPATIENT)
Dept: MEDICAL GROUP | Facility: LAB | Age: 70
End: 2020-01-28

## 2020-01-28 RX ORDER — ATORVASTATIN CALCIUM 40 MG/1
40 TABLET, FILM COATED ORAL DAILY
Qty: 100 TAB | Refills: 2 | Status: SHIPPED | OUTPATIENT
Start: 2020-01-28 | End: 2020-02-27 | Stop reason: SDUPTHER

## 2020-01-28 NOTE — TELEPHONE ENCOUNTER
Was the patient seen in the last year in this department? Yes  12/18/19  Does patient have an active prescription for medications requested? No     Received Request Via: Pharmacy 100 day

## 2020-01-28 NOTE — TELEPHONE ENCOUNTER
"· Home Health paperwork received from [facility] requiring provider signature.     · All appropriate fields completed by Medical Assistant: No    · Paperwork placed in \"MA to Provider\" folder/basket. Awaiting provider completion/signature.  "

## 2020-02-03 ENCOUNTER — TELEPHONE (OUTPATIENT)
Dept: MEDICAL GROUP | Facility: LAB | Age: 70
End: 2020-02-03

## 2020-02-03 NOTE — TELEPHONE ENCOUNTER
"· Home Health paperwork received from [facility] requiring provider signature.     · All appropriate fields completed by Medical Assistant: No    · Paperwork placed in \"MA to Provider\" folder/basket. Awaiting provider completion/signature.  "

## 2020-02-10 ENCOUNTER — TELEPHONE (OUTPATIENT)
Dept: MEDICAL GROUP | Facility: LAB | Age: 70
End: 2020-02-10

## 2020-02-11 NOTE — TELEPHONE ENCOUNTER
1. Caller Name: ALISHA   PT                     Call Back Number:[phone]      How would the patient prefer to be contacted with a response: Phone call do NOT leave a detailed message    Pt has been discharged from Home care. They feel he is independent. Pt still wearing a boot. Pt stated he feels like he still needs Home care. Alisha pt stated pt had poor follow through.

## 2020-02-14 ENCOUNTER — PATIENT OUTREACH (OUTPATIENT)
Dept: HEALTH INFORMATION MANAGEMENT | Facility: OTHER | Age: 70
End: 2020-02-14

## 2020-02-14 ENCOUNTER — TELEPHONE (OUTPATIENT)
Dept: MEDICAL GROUP | Facility: LAB | Age: 70
End: 2020-02-14

## 2020-02-14 NOTE — PROGRESS NOTES
I reached out to the patient to introduce myself as his SCP .   He addressed several concerns that he has. Once being that he needs shower rails.  He is also requesting PT but to be done in his home gym.    I also sent him up a mail order pharmacy account so that when  gives him his refills he can get them through the mail.

## 2020-02-14 NOTE — PROGRESS NOTES
After reading his notes between 's medical assistant and  patient has finished physical therapy and should have also gotten some shower equipment from when he had Home Health. I am a little confused about why he is asking me about PT if he has been released and that home health states he has met his goals and can be on his own.    I called the MA today to see if she can talk with Dr. TORRES about how we should handle Don's requests.

## 2020-02-14 NOTE — PROGRESS NOTES
I called over to [facility] and placed a referral for someone to reach out to him about some different grants that may be able to help him with his social determinates.  I also had [employer] send him an application so he can get some shower equipment.

## 2020-02-14 NOTE — TELEPHONE ENCOUNTER
1. Caller Name: Manjula [facility]                            Call Back Number: [phone]      How would the patient prefer to be contacted with a response: Phone call OK to leave a detailed message     Manjula from [facility] called in regard to patient care, she is confused from the note as he was safely discharged to be independent, but was requesting help from Manjula on the order to re-continue, If you can please advise with Manjula and  on what is best/needed for Home health and PT.

## 2020-02-17 ENCOUNTER — PATIENT OUTREACH (OUTPATIENT)
Dept: HEALTH INFORMATION MANAGEMENT | Facility: OTHER | Age: 70
End: 2020-02-17

## 2020-02-18 NOTE — PROGRESS NOTES
Patient called to see if he had lab orders on file. I let him know he does. I set up a lab appointment for the patient and I also set up a an UBER to the lab from [address].     We also discussed his shower rails and he said he will call [facility].

## 2020-02-21 ENCOUNTER — HOSPITAL ENCOUNTER (OUTPATIENT)
Dept: LAB | Facility: MEDICAL CENTER | Age: 70
End: 2020-02-21
Attending: FAMILY MEDICINE
Payer: MEDICARE

## 2020-02-21 DIAGNOSIS — E11.42 CONTROLLED TYPE 2 DIABETES MELLITUS WITH DIABETIC POLYNEUROPATHY, WITHOUT LONG-TERM CURRENT USE OF INSULIN (HCC): ICD-10-CM

## 2020-02-21 LAB
ALBUMIN SERPL BCP-MCNC: 4.4 G/DL (ref 3.2–4.9)
ALBUMIN/GLOB SERPL: 1.4 G/DL
ALP SERPL-CCNC: 39 U/L (ref 30–99)
ALT SERPL-CCNC: 12 U/L (ref 2–50)
ANION GAP SERPL CALC-SCNC: 13 MMOL/L (ref 0–11.9)
AST SERPL-CCNC: 14 U/L (ref 12–45)
BASOPHILS # BLD AUTO: 0.4 % (ref 0–1.8)
BASOPHILS # BLD: 0.03 K/UL (ref 0–0.12)
BILIRUB SERPL-MCNC: 0.7 MG/DL (ref 0.1–1.5)
BUN SERPL-MCNC: 18 MG/DL (ref 8–22)
CALCIUM SERPL-MCNC: 9.8 MG/DL (ref 8.5–10.5)
CHLORIDE SERPL-SCNC: 96 MMOL/L (ref 96–112)
CHOLEST SERPL-MCNC: 158 MG/DL (ref 100–199)
CO2 SERPL-SCNC: 24 MMOL/L (ref 20–33)
CREAT SERPL-MCNC: 1.29 MG/DL (ref 0.5–1.4)
CREAT UR-MCNC: 218.7 MG/DL
EOSINOPHIL # BLD AUTO: 0.09 K/UL (ref 0–0.51)
EOSINOPHIL NFR BLD: 1.3 % (ref 0–6.9)
ERYTHROCYTE [DISTWIDTH] IN BLOOD BY AUTOMATED COUNT: 46 FL (ref 35.9–50)
EST. AVERAGE GLUCOSE BLD GHB EST-MCNC: 232 MG/DL
FASTING STATUS PATIENT QL REPORTED: NORMAL
GLOBULIN SER CALC-MCNC: 3.2 G/DL (ref 1.9–3.5)
GLUCOSE SERPL-MCNC: 307 MG/DL (ref 65–99)
HBA1C MFR BLD: 9.7 % (ref 0–5.6)
HCT VFR BLD AUTO: 39.6 % (ref 42–52)
HCV AB SER QL: NEGATIVE
HDLC SERPL-MCNC: 34 MG/DL
HGB BLD-MCNC: 12.8 G/DL (ref 14–18)
IMM GRANULOCYTES # BLD AUTO: 0.02 K/UL (ref 0–0.11)
IMM GRANULOCYTES NFR BLD AUTO: 0.3 % (ref 0–0.9)
LDLC SERPL CALC-MCNC: 53 MG/DL
LYMPHOCYTES # BLD AUTO: 1.63 K/UL (ref 1–4.8)
LYMPHOCYTES NFR BLD: 23.3 % (ref 22–41)
MCH RBC QN AUTO: 25.8 PG (ref 27–33)
MCHC RBC AUTO-ENTMCNC: 32.3 G/DL (ref 33.7–35.3)
MCV RBC AUTO: 79.8 FL (ref 81.4–97.8)
MICROALBUMIN UR-MCNC: 2.4 MG/DL
MICROALBUMIN/CREAT UR: 11 MG/G (ref 0–30)
MONOCYTES # BLD AUTO: 0.52 K/UL (ref 0–0.85)
MONOCYTES NFR BLD AUTO: 7.4 % (ref 0–13.4)
NEUTROPHILS # BLD AUTO: 4.72 K/UL (ref 1.82–7.42)
NEUTROPHILS NFR BLD: 67.3 % (ref 44–72)
NRBC # BLD AUTO: 0 K/UL
NRBC BLD-RTO: 0 /100 WBC
PLATELET # BLD AUTO: 173 K/UL (ref 164–446)
PMV BLD AUTO: 11.4 FL (ref 9–12.9)
POTASSIUM SERPL-SCNC: 4.4 MMOL/L (ref 3.6–5.5)
PROT SERPL-MCNC: 7.6 G/DL (ref 6–8.2)
RBC # BLD AUTO: 4.96 M/UL (ref 4.7–6.1)
SODIUM SERPL-SCNC: 133 MMOL/L (ref 135–145)
TRIGL SERPL-MCNC: 357 MG/DL (ref 0–149)
TSH SERPL DL<=0.005 MIU/L-ACNC: 0.6 UIU/ML (ref 0.38–5.33)
WBC # BLD AUTO: 7 K/UL (ref 4.8–10.8)

## 2020-02-21 PROCEDURE — 86803 HEPATITIS C AB TEST: CPT

## 2020-02-21 PROCEDURE — 80053 COMPREHEN METABOLIC PANEL: CPT

## 2020-02-21 PROCEDURE — 83036 HEMOGLOBIN GLYCOSYLATED A1C: CPT

## 2020-02-21 PROCEDURE — 84443 ASSAY THYROID STIM HORMONE: CPT

## 2020-02-21 PROCEDURE — 82043 UR ALBUMIN QUANTITATIVE: CPT

## 2020-02-21 PROCEDURE — 85025 COMPLETE CBC W/AUTO DIFF WBC: CPT

## 2020-02-21 PROCEDURE — 36415 COLL VENOUS BLD VENIPUNCTURE: CPT

## 2020-02-21 PROCEDURE — 82570 ASSAY OF URINE CREATININE: CPT

## 2020-02-21 PROCEDURE — 80061 LIPID PANEL: CPT

## 2020-02-24 ENCOUNTER — PATIENT OUTREACH (OUTPATIENT)
Dept: HEALTH INFORMATION MANAGEMENT | Facility: OTHER | Age: 70
End: 2020-02-24

## 2020-02-24 ENCOUNTER — PATIENT MESSAGE (OUTPATIENT)
Dept: HEALTH INFORMATION MANAGEMENT | Facility: OTHER | Age: 70
End: 2020-02-24

## 2020-02-24 NOTE — PROGRESS NOTES
Patient called to request an UBER from his home to [facility] orthopedic [address] [facility] today @1pm.

## 2020-02-25 NOTE — PROGRESS NOTES
Patient called and is wanting to pay his premium with me. I let him know that he needs to wait for a call from Ileana or Cympel at [facility] to call. I can not take payments over the phone. He was understanding. We also talked about setting up auto pay for his $45 premium. I also asked if he is aware of his GYM benefit and he said he is.

## 2020-02-26 ENCOUNTER — PATIENT OUTREACH (OUTPATIENT)
Dept: HEALTH INFORMATION MANAGEMENT | Facility: OTHER | Age: 70
End: 2020-02-26

## 2020-02-26 NOTE — PROGRESS NOTES
Called patient and we went through his medications and figured out which ones need to be 100 day supply VS 90 days. I added that to his appt notes. I would like them sent to the mail order pharmacy [facility].     I also transferred him over to [facility] to make his $90 premium payment.

## 2020-02-27 ENCOUNTER — OFFICE VISIT (OUTPATIENT)
Dept: MEDICAL GROUP | Facility: LAB | Age: 70
End: 2020-02-27
Payer: MEDICARE

## 2020-02-27 VITALS
TEMPERATURE: 97.6 F | WEIGHT: 230.6 LBS | BODY MASS INDEX: 34.16 KG/M2 | HEIGHT: 69 IN | HEART RATE: 95 BPM | RESPIRATION RATE: 14 BRPM | OXYGEN SATURATION: 97 % | DIASTOLIC BLOOD PRESSURE: 70 MMHG | SYSTOLIC BLOOD PRESSURE: 144 MMHG

## 2020-02-27 DIAGNOSIS — Z87.828 HISTORY OF MOTOR VEHICLE ACCIDENT: ICD-10-CM

## 2020-02-27 DIAGNOSIS — M79.7 FIBROMYALGIA: ICD-10-CM

## 2020-02-27 DIAGNOSIS — I10 ESSENTIAL HYPERTENSION: ICD-10-CM

## 2020-02-27 DIAGNOSIS — R39.16 BENIGN PROSTATIC HYPERPLASIA (BPH) WITH STRAINING ON URINATION: ICD-10-CM

## 2020-02-27 DIAGNOSIS — E66.9 OBESITY (BMI 30-39.9): ICD-10-CM

## 2020-02-27 DIAGNOSIS — F10.10 ALCOHOL ABUSE: ICD-10-CM

## 2020-02-27 DIAGNOSIS — N52.8 OTHER MALE ERECTILE DYSFUNCTION: ICD-10-CM

## 2020-02-27 DIAGNOSIS — D50.9 IRON DEFICIENCY ANEMIA, UNSPECIFIED IRON DEFICIENCY ANEMIA TYPE: ICD-10-CM

## 2020-02-27 DIAGNOSIS — Z72.0 TOBACCO USE: ICD-10-CM

## 2020-02-27 DIAGNOSIS — E53.8 B12 DEFICIENCY: ICD-10-CM

## 2020-02-27 DIAGNOSIS — Z78.9 ALCOHOL CONSUMPTION OF MORE THAN FOUR DRINKS PER DAY: ICD-10-CM

## 2020-02-27 DIAGNOSIS — E11.42 CONTROLLED TYPE 2 DIABETES MELLITUS WITH DIABETIC POLYNEUROPATHY, WITHOUT LONG-TERM CURRENT USE OF INSULIN (HCC): ICD-10-CM

## 2020-02-27 DIAGNOSIS — N40.1 BENIGN PROSTATIC HYPERPLASIA (BPH) WITH STRAINING ON URINATION: ICD-10-CM

## 2020-02-27 DIAGNOSIS — K21.9 GASTROESOPHAGEAL REFLUX DISEASE, ESOPHAGITIS PRESENCE NOT SPECIFIED: ICD-10-CM

## 2020-02-27 DIAGNOSIS — E55.9 VITAMIN D DEFICIENCY: ICD-10-CM

## 2020-02-27 DIAGNOSIS — F32.1 MODERATE SINGLE CURRENT EPISODE OF MAJOR DEPRESSIVE DISORDER (HCC): ICD-10-CM

## 2020-02-27 DIAGNOSIS — Z00.00 HEALTH CARE MAINTENANCE: ICD-10-CM

## 2020-02-27 DIAGNOSIS — E78.2 MIXED HYPERLIPIDEMIA: ICD-10-CM

## 2020-02-27 DIAGNOSIS — Z12.11 COLON CANCER SCREENING: ICD-10-CM

## 2020-02-27 DIAGNOSIS — Z23 NEED FOR VACCINATION: ICD-10-CM

## 2020-02-27 DIAGNOSIS — G25.2 TREMOR, COARSE: ICD-10-CM

## 2020-02-27 DIAGNOSIS — E87.1 HYPONATREMIA: ICD-10-CM

## 2020-02-27 PROBLEM — E86.0 DEHYDRATION: Status: RESOLVED | Noted: 2019-01-03 | Resolved: 2020-02-27

## 2020-02-27 PROBLEM — I49.9 ARRHYTHMIA: Status: RESOLVED | Noted: 2019-03-12 | Resolved: 2020-02-27

## 2020-02-27 PROBLEM — R55 NEAR SYNCOPE: Status: RESOLVED | Noted: 2019-01-03 | Resolved: 2020-02-27

## 2020-02-27 PROBLEM — J34.9 SINUS DISEASE: Status: RESOLVED | Noted: 2019-01-03 | Resolved: 2020-02-27

## 2020-02-27 PROBLEM — E87.20 LACTIC ACIDOSIS: Status: RESOLVED | Noted: 2019-01-03 | Resolved: 2020-02-27

## 2020-02-27 PROBLEM — I95.1 ORTHOSTATIC HYPOTENSION: Status: RESOLVED | Noted: 2019-01-03 | Resolved: 2020-02-27

## 2020-02-27 PROCEDURE — G0439 PPPS, SUBSEQ VISIT: HCPCS | Performed by: FAMILY MEDICINE

## 2020-02-27 PROCEDURE — 90750 HZV VACC RECOMBINANT IM: CPT | Performed by: FAMILY MEDICINE

## 2020-02-27 PROCEDURE — 8041 PR SCP AHA: Performed by: FAMILY MEDICINE

## 2020-02-27 PROCEDURE — 90471 IMMUNIZATION ADMIN: CPT | Performed by: FAMILY MEDICINE

## 2020-02-27 RX ORDER — GABAPENTIN 300 MG/1
300 CAPSULE ORAL DAILY
Qty: 90 CAP | Refills: 0 | Status: SHIPPED | OUTPATIENT
Start: 2020-02-27 | End: 2020-06-01 | Stop reason: SDUPTHER

## 2020-02-27 RX ORDER — GLIPIZIDE 10 MG/1
10 TABLET, FILM COATED, EXTENDED RELEASE ORAL DAILY
Qty: 100 TAB | Refills: 3 | Status: SHIPPED | OUTPATIENT
Start: 2020-02-27 | End: 2020-03-02 | Stop reason: SDUPTHER

## 2020-02-27 RX ORDER — BENAZEPRIL HYDROCHLORIDE 20 MG/1
TABLET ORAL
Qty: 100 TAB | Refills: 0 | Status: SHIPPED
Start: 2020-02-27 | End: 2020-02-27

## 2020-02-27 RX ORDER — TERAZOSIN 5 MG/1
5 CAPSULE ORAL DAILY
Qty: 90 CAP | Refills: 0 | Status: SHIPPED | OUTPATIENT
Start: 2020-02-27 | End: 2020-06-01 | Stop reason: SDUPTHER

## 2020-02-27 RX ORDER — DULOXETIN HYDROCHLORIDE 60 MG/1
60 CAPSULE, DELAYED RELEASE ORAL DAILY
Qty: 90 CAP | Refills: 0 | Status: SHIPPED | OUTPATIENT
Start: 2020-02-27 | End: 2020-06-01 | Stop reason: SDUPTHER

## 2020-02-27 RX ORDER — ATORVASTATIN CALCIUM 40 MG/1
40 TABLET, FILM COATED ORAL DAILY
Qty: 100 TAB | Refills: 0 | Status: SHIPPED
Start: 2020-02-27 | End: 2020-02-27

## 2020-02-27 RX ORDER — GLIPIZIDE 5 MG/1
TABLET, FILM COATED, EXTENDED RELEASE ORAL
Qty: 100 TAB | Refills: 0 | Status: SHIPPED
Start: 2020-02-27 | End: 2020-02-27

## 2020-02-27 RX ORDER — BENAZEPRIL HYDROCHLORIDE 40 MG/1
40 TABLET ORAL DAILY
Qty: 100 TAB | Refills: 3 | Status: SHIPPED | OUTPATIENT
Start: 2020-02-27 | End: 2020-02-28

## 2020-02-27 ASSESSMENT — ACTIVITIES OF DAILY LIVING (ADL): BATHING_REQUIRES_ASSISTANCE: 0

## 2020-02-27 ASSESSMENT — PATIENT HEALTH QUESTIONNAIRE - PHQ9: CLINICAL INTERPRETATION OF PHQ2 SCORE: 0

## 2020-02-27 ASSESSMENT — ENCOUNTER SYMPTOMS: GENERAL WELL-BEING: GOOD

## 2020-02-27 NOTE — TELEPHONE ENCOUNTER
Received request via: Patient    Was the patient seen in the last year in this department? Yes LOV 2/27/2020    Does the patient have an active prescription (recently filled or refills available) for medication(s) requested? No
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
## 2020-02-28 NOTE — PROGRESS NOTES
Patient called me back and is wanting me to go over his medications.   I will send a note to Eliana to have her send his new prescription doses to [facility].

## 2020-02-28 NOTE — PROGRESS NOTES
Called to check in with patient about his AWV. I see that all of his refills were done exactly how I requested them. Patient should be calling me back sometime today.

## 2020-02-28 NOTE — TELEPHONE ENCOUNTER
Heath Monroy,  I see that you sent his prescriptions into [facility]. Can you please send these to the mail order postal prescription services?   He just needs these two sent there. All others are at [facility] already.    Thank you!  Leona

## 2020-03-02 ENCOUNTER — HOSPITAL ENCOUNTER (OUTPATIENT)
Dept: LAB | Facility: MEDICAL CENTER | Age: 70
End: 2020-03-02
Attending: FAMILY MEDICINE
Payer: MEDICARE

## 2020-03-02 DIAGNOSIS — D50.9 IRON DEFICIENCY ANEMIA, UNSPECIFIED IRON DEFICIENCY ANEMIA TYPE: ICD-10-CM

## 2020-03-02 DIAGNOSIS — N40.1 BENIGN PROSTATIC HYPERPLASIA (BPH) WITH STRAINING ON URINATION: ICD-10-CM

## 2020-03-02 DIAGNOSIS — R39.16 BENIGN PROSTATIC HYPERPLASIA (BPH) WITH STRAINING ON URINATION: ICD-10-CM

## 2020-03-02 LAB
FERRITIN SERPL-MCNC: 7.2 NG/ML (ref 22–322)
IRON SATN MFR SERPL: 13 % (ref 15–55)
IRON SERPL-MCNC: 63 UG/DL (ref 50–180)
PSA SERPL-MCNC: 6.68 NG/ML (ref 0–4)
TIBC SERPL-MCNC: 493 UG/DL (ref 250–450)
TRANSFERRIN SERPL-MCNC: 352 MG/DL (ref 200–370)

## 2020-03-02 PROCEDURE — 36415 COLL VENOUS BLD VENIPUNCTURE: CPT

## 2020-03-02 PROCEDURE — 84153 ASSAY OF PSA TOTAL: CPT

## 2020-03-02 PROCEDURE — 82728 ASSAY OF FERRITIN: CPT

## 2020-03-02 PROCEDURE — 83540 ASSAY OF IRON: CPT

## 2020-03-02 PROCEDURE — 84466 ASSAY OF TRANSFERRIN: CPT

## 2020-03-02 PROCEDURE — 83550 IRON BINDING TEST: CPT

## 2020-03-02 RX ORDER — BENAZEPRIL HYDROCHLORIDE 40 MG/1
40 TABLET ORAL DAILY
Qty: 100 TAB | Refills: 3 | Status: SHIPPED | OUTPATIENT
Start: 2020-03-02 | End: 2020-09-21 | Stop reason: SDUPTHER

## 2020-03-02 RX ORDER — GLIPIZIDE 10 MG/1
10 TABLET, FILM COATED, EXTENDED RELEASE ORAL DAILY
Qty: 100 TAB | Refills: 3 | Status: SHIPPED | OUTPATIENT
Start: 2020-03-02 | End: 2020-09-21 | Stop reason: SDUPTHER

## 2020-03-03 ENCOUNTER — TELEPHONE (OUTPATIENT)
Dept: MEDICAL GROUP | Facility: LAB | Age: 70
End: 2020-03-03

## 2020-03-04 ENCOUNTER — TELEPHONE (OUTPATIENT)
Dept: MEDICAL GROUP | Facility: LAB | Age: 70
End: 2020-03-04

## 2020-03-04 RX ORDER — LANOLIN ALCOHOL/MO/W.PET/CERES
325 CREAM (GRAM) TOPICAL
Qty: 90 TAB | Refills: 3 | Status: SHIPPED | OUTPATIENT
Start: 2020-03-04 | End: 2020-03-24 | Stop reason: SDUPTHER

## 2020-03-04 NOTE — TELEPHONE ENCOUNTER
1. Caller Name:   DON                        Call Back Number:   Prabhakar Jovel Claraid    How would the patient prefer to be contacted with a response: Phone call OK to leave a detailed message    Pt asking if he is taking enough iron?

## 2020-03-05 ENCOUNTER — TELEPHONE (OUTPATIENT)
Dept: HEALTH INFORMATION MANAGEMENT | Facility: OTHER | Age: 70
End: 2020-03-05

## 2020-03-05 NOTE — TELEPHONE ENCOUNTER
Jitendra Monroy,  Can you please confirm if Don should be taking Atorvastatin?  You did re-order on 2/27/20 but it was canceled at his appt on 3/3/2020.

## 2020-03-09 ENCOUNTER — PATIENT OUTREACH (OUTPATIENT)
Dept: HEALTH INFORMATION MANAGEMENT | Facility: OTHER | Age: 70
End: 2020-03-09

## 2020-03-09 ENCOUNTER — TELEPHONE (OUTPATIENT)
Dept: MEDICAL GROUP | Facility: LAB | Age: 70
End: 2020-03-09

## 2020-03-09 NOTE — PROGRESS NOTES
See Notes - DULoxetine (CYMBALTA) 60 MG  Pt wants to know if he should be taking the medication twice a day like his wife or just once a day. I did advise him to contuinte to take the correct amount that is on the lable

## 2020-03-09 NOTE — TELEPHONE ENCOUNTER
Good Afternoon,   Don called in and stated his wife is taking DULoxetine (CYMBALTA) 60 MG as well the same milligrams but she takes her twice a day and she wants her  to take it twice a day he called in and asked should he do that ? Please advise thank  You       Fany

## 2020-03-24 RX ORDER — LANOLIN ALCOHOL/MO/W.PET/CERES
325 CREAM (GRAM) TOPICAL
Qty: 270 TAB | Refills: 1 | Status: SHIPPED | OUTPATIENT
Start: 2020-03-24 | End: 2020-09-21 | Stop reason: SDUPTHER

## 2020-03-24 RX ORDER — OMEPRAZOLE 20 MG/1
20 CAPSULE, DELAYED RELEASE ORAL DAILY
Qty: 90 CAP | Refills: 2 | Status: SHIPPED | OUTPATIENT
Start: 2020-03-24 | End: 2020-09-21 | Stop reason: SDUPTHER

## 2020-03-24 NOTE — TELEPHONE ENCOUNTER
Received request via: Patient    Was the patient seen in the last year in this department? Yes    Does the patient have an active prescription (recently filled or refills available) for medication(s) requested? No     Patient called and would like to get this through mail order because its much less than buying it over the counter.     Thank you!  Leona

## 2020-03-24 NOTE — TELEPHONE ENCOUNTER
I let Don know about the Omeprazole and now he wants the Ferrous Sulfate through the mail.  Sorry about the multiple requests =)    He is also reporting that since he started this medication he is having some constipation and thinks its a side effect from the Ferrous Sulfate. Can you please suggested something for him to take?    Thanks Again!

## 2020-03-26 NOTE — TELEPHONE ENCOUNTER
Called Don and let him know what Dr. TORRES said. He agrees to continue to take this dose of iron. I let him know that [facility] does have refills for him and when he is ready he can call and order it. He agrees.

## 2020-03-26 NOTE — TELEPHONE ENCOUNTER
Don is wondering how long the Iron will take to be in his system? He states that he is still experiencing fatigue. We discussed eating a balanced diet,  getting at least 30 min's of exercise l and at least 8 hours of rest. Do you have any other suggestions for him?

## 2020-05-12 ENCOUNTER — OFFICE VISIT (OUTPATIENT)
Dept: MEDICAL GROUP | Facility: LAB | Age: 70
End: 2020-05-12
Payer: MEDICARE

## 2020-05-12 DIAGNOSIS — N52.9 ERECTILE DYSFUNCTION, UNSPECIFIED ERECTILE DYSFUNCTION TYPE: ICD-10-CM

## 2020-05-12 DIAGNOSIS — D50.9 IRON DEFICIENCY ANEMIA, UNSPECIFIED IRON DEFICIENCY ANEMIA TYPE: ICD-10-CM

## 2020-05-12 PROCEDURE — 99441 PR PHYSICIAN TELEPHONE EVALUATION 5-10 MIN: CPT | Mod: CR | Performed by: FAMILY MEDICINE

## 2020-05-12 RX ORDER — SILDENAFIL 25 MG/1
25 TABLET, FILM COATED ORAL PRN
Qty: 10 TAB | Refills: 3 | Status: SHIPPED
Start: 2020-05-12 | End: 2020-06-18

## 2020-05-12 NOTE — PROGRESS NOTES
Telephone Appointment Visit   As a means of avoiding spread of COVID-19, this visit is being conducted by telephone. This telephone visit was initiated by the patient and they verbally consented.    Time at start of call: 2:20    Reason for Call:  ED    Patient Comments / History:   Patient called today to discuss erectile dysfunction.  He is recently status post prostate biopsy for increasing prostate enzyme.  Biopsy was negative per urology.  Patient has always been on Kim and for urinary issues and this is helped him significantly for the last few years.  He does complain of erectile dysfunction likely related to prostate in etiology.  He was told Viagra was a good for choice for him and he would like to trial this.  Of note he is on antihypertensives.    Labs / Images Reviewed        Assessment and Plan:     1. Erectile dysfunction, unspecified erectile dysfunction type  Discussed with patient the benefits and risks of being on sildenafil.  He is not currently on any vasodilators however he is on antihypertensives.  Discussed strict precautions and follow-up with medication.  Patient to trial Viagra and we will continue to monitor.    Follow-up: No follow-ups on file.    Time at end of call: 2:30  Total Time Spent: 5-10 minutes    Brenda Monroy M.D.

## 2020-06-01 DIAGNOSIS — F32.1 MODERATE SINGLE CURRENT EPISODE OF MAJOR DEPRESSIVE DISORDER (HCC): ICD-10-CM

## 2020-06-01 DIAGNOSIS — E11.42 CONTROLLED TYPE 2 DIABETES MELLITUS WITH DIABETIC POLYNEUROPATHY, WITHOUT LONG-TERM CURRENT USE OF INSULIN (HCC): ICD-10-CM

## 2020-06-01 RX ORDER — TERAZOSIN 5 MG/1
5 CAPSULE ORAL DAILY
Qty: 90 CAP | Refills: 1 | Status: SHIPPED | OUTPATIENT
Start: 2020-06-01 | End: 2020-09-21 | Stop reason: SDUPTHER

## 2020-06-01 RX ORDER — GABAPENTIN 300 MG/1
300 CAPSULE ORAL DAILY
Qty: 90 CAP | Refills: 1 | Status: SHIPPED | OUTPATIENT
Start: 2020-06-01 | End: 2020-09-21 | Stop reason: SDUPTHER

## 2020-06-01 RX ORDER — DULOXETIN HYDROCHLORIDE 60 MG/1
60 CAPSULE, DELAYED RELEASE ORAL DAILY
Qty: 90 CAP | Refills: 1 | Status: SHIPPED | OUTPATIENT
Start: 2020-06-01 | End: 2020-09-21 | Stop reason: SDUPTHER

## 2020-06-11 NOTE — TELEPHONE ENCOUNTER
Pt requesting refill, but the refill protocol failed, If ok please send.    Received vm from Harpal he is wanting me to confirm his dose of iron that you sent is correct he believes it is too much for him to take.

## 2020-06-18 ENCOUNTER — TELEPHONE (OUTPATIENT)
Dept: MEDICAL GROUP | Facility: LAB | Age: 70
End: 2020-06-18

## 2020-06-18 RX ORDER — VARDENAFIL HYDROCHLORIDE 2.5 MG/1
2.5 TABLET ORAL PRN
Qty: 10 TAB | Refills: 3 | Status: SHIPPED
Start: 2020-06-18 | End: 2020-06-19

## 2020-06-18 RX ORDER — TADALAFIL 10 MG/1
10 TABLET ORAL PRN
Qty: 10 TAB | Refills: 3 | Status: SHIPPED | OUTPATIENT
Start: 2020-06-18 | End: 2020-06-19

## 2020-06-18 NOTE — TELEPHONE ENCOUNTER
1. Caller Name:Prabhakar Sierra   Call Back Number:[phone] (home)     How would the patient prefer to be contacted with a response:     Pt asking for a new RX would like to ret daily Levitra instead of his Viagra.   [facility]   Thanks

## 2020-06-18 NOTE — PROGRESS NOTES
The levitra is over $100 for #10. He is wondering if you can prescribe the generic Cialis?  My price quote is around $6.

## 2020-06-19 RX ORDER — TADALAFIL 10 MG/1
10 TABLET ORAL PRN
Qty: 10 TAB | Refills: 3 | Status: SHIPPED
Start: 2020-06-19 | End: 2020-06-29

## 2020-06-19 NOTE — PROGRESS NOTES
The pharmacy needs to have the max daily dose included in the Sig.  Could you please edit the sig and resend?    Thank you!  Leona

## 2020-06-24 ENCOUNTER — PATIENT OUTREACH (OUTPATIENT)
Dept: HEALTH INFORMATION MANAGEMENT | Facility: OTHER | Age: 70
End: 2020-06-24

## 2020-06-24 RX ORDER — SILDENAFIL 25 MG/1
25 TABLET, FILM COATED ORAL PRN
Qty: 90 TAB | Refills: 0 | Status: SHIPPED
Start: 2020-06-24 | End: 2020-06-29

## 2020-06-24 NOTE — PROGRESS NOTES
Patient called to find out co pay of Sildenafil 100 mg for 90 days. I called med [employer] to verify the co pay amount. I advised that the co pay would be $43.47 per med [employer]. He had no further questions.

## 2020-06-24 NOTE — TELEPHONE ENCOUNTER
Received request via: Patient    Was the patient seen in the last year in this department? Yes  5/12/2020  Does the patient have an active prescription (recently filled or refills available) for medication(s) requested? No

## 2020-06-29 ENCOUNTER — TELEPHONE (OUTPATIENT)
Dept: HEALTH INFORMATION MANAGEMENT | Facility: OTHER | Age: 70
End: 2020-06-29

## 2020-06-29 RX ORDER — SILDENAFIL 100 MG/1
100 TABLET, FILM COATED ORAL PRN
Qty: 90 TAB | Refills: 3 | Status: SHIPPED | OUTPATIENT
Start: 2020-06-29 | End: 2020-06-29 | Stop reason: SDUPTHER

## 2020-06-29 RX ORDER — SILDENAFIL 100 MG/1
100 TABLET, FILM COATED ORAL PRN
Qty: 90 TAB | Refills: 3 | Status: SHIPPED | OUTPATIENT
Start: 2020-06-29 | End: 2020-10-13 | Stop reason: SDUPTHER

## 2020-06-29 NOTE — TELEPHONE ENCOUNTER
I am so sorry to bother you for this again! On Don's prescription you ordered today, [facility] needs you to place a max daily dose on the prescription sig.   Thank you!

## 2020-06-29 NOTE — TELEPHONE ENCOUNTER
Don's pharmacist at [facility] advised him that the appropriate dose of Viagra (SILDENAFIL CITRATE)  is the 100 mg. He is wondering if you are ok with this dose and if you can send in a 90/90 ds of this new MG.

## 2020-06-29 NOTE — PROGRESS NOTES
Member called as he went to  his medication refill for Sildenafil and was told the cost was almost $600. Advised member we would give him a call back to follow up.

## 2020-07-02 ENCOUNTER — TELEPHONE (OUTPATIENT)
Dept: MEDICAL GROUP | Facility: LAB | Age: 70
End: 2020-07-02

## 2020-07-02 DIAGNOSIS — M25.519 SHOULDER PAIN, UNSPECIFIED CHRONICITY, UNSPECIFIED LATERALITY: ICD-10-CM

## 2020-07-06 ENCOUNTER — TELEPHONE (OUTPATIENT)
Dept: MEDICAL GROUP | Facility: LAB | Age: 70
End: 2020-07-06

## 2020-07-06 DIAGNOSIS — S99.921S INJURY OF RIGHT FOOT, SEQUELA: ICD-10-CM

## 2020-07-06 RX ORDER — ATORVASTATIN CALCIUM 40 MG/1
40 TABLET, FILM COATED ORAL DAILY
Qty: 100 TAB | Refills: 3 | Status: SHIPPED | OUTPATIENT
Start: 2020-07-06 | End: 2020-09-21 | Stop reason: SDUPTHER

## 2020-07-12 NOTE — ASSESSMENT & PLAN NOTE
"Chronic condition managed with current medical regimen  States angry, \"mad\" unhappy   Continue with cymbalta  Followed by Kelly Rivers M.D..        " symmetrical

## 2020-07-27 ENCOUNTER — TELEPHONE (OUTPATIENT)
Dept: HEALTH INFORMATION MANAGEMENT | Facility: OTHER | Age: 70
End: 2020-07-27

## 2020-07-27 NOTE — TELEPHONE ENCOUNTER
Don is having terrible acid reflux and says he has been eating bad. He knows he needs to eat better but is wondering if the omeprazole is not working anymore and if you can prescribe an alternative?

## 2020-07-29 ENCOUNTER — PHYSICAL THERAPY (OUTPATIENT)
Dept: PHYSICAL THERAPY | Facility: MEDICAL CENTER | Age: 70
End: 2020-07-29
Attending: FAMILY MEDICINE
Payer: MEDICARE

## 2020-07-29 DIAGNOSIS — S97.81XS CRUSH INJURY OF RIGHT FOOT, SEQUELA: ICD-10-CM

## 2020-07-29 PROCEDURE — 97162 PT EVAL MOD COMPLEX 30 MIN: CPT

## 2020-07-29 ASSESSMENT — BALANCE ASSESSMENTS
STANDING UNSUPPORTED WITH FEET TOGETHER: 3
STANDING UNSUPPORTED WITH EYES CLOSED: 3
STANDING TO SITTING: 4
STANDING UNSUPPORTED ONE FOOT IN FRONT: 3
TURN 360 DEGREES: 4
LONG VERSION TOTAL SCORE (MAX 56): 50
SITTING UNSUPPORTED: 4
STANDING ON ONE LEG: 2
LOOK OVER LEFT AND RIGHT SHOULDERS WHILE STANDING: 4
LONG VERSION TOTAL SCORE (MAX 56): 50
STANDING UNSUPPORTED: 4
TRANSFERS: 4
REACHING FORWARD WITH OUTSTRETCHED ARM WHILE STANDING: 4
PLACE ALTERNATE FOOT ON STEP OR STOOL WHILE STANDING UNSUPPORTED: 3
SITTING TO STANDING: 4
PICK UP OBJECT FROM THE FLOOR FROM A STANDING POSITION: 4

## 2020-07-29 NOTE — OP THERAPY DAILY TREATMENT
"  Outpatient Physical Therapy  DAILY TREATMENT     [facility]  [address]  [place] [address]  Phone:  [phone]  Fax:  [phone]    Date: 07/29/2020    Patient: Harpal Sierra  YOB: 1950  MRN: [id number]     Time Calculation    Start time: 0300  Stop time: 0400 Time Calculation (min): 60 minutes         Chief Complaint: Difficulty Walking    Visit #: 1    SUBJECTIVE:  ***    OBJECTIVE:  Current objective measures: ***        Exercises/Treatment  Time-based treatments/modalities:           Pain rating (1-10) before treatment:  {PAIN NUMBERS_1-10:59925}  Pain rating (1-10) after treatment:  {PAIN NUMBERS_1-10:82592}    ASSESSMENT:   Response to treatment: ***    PLAN/RECOMMENDATIONS:   Plan for treatment: {AMB OP THERAPY - THERAPY PLAN:958153807::\"therapy treatment to continue next visit\"}.  Planned interventions for next visit: {PT PLANNED THERAPY INTERVENTIONS:726025052}.       "

## 2020-07-29 NOTE — OP THERAPY EVALUATION
Outpatient Physical Therapy  INITIAL EVALUATION    [facility] Outpatient Physical Therapy  [address]  [place] [address]  Phone:  [phone]  Fax:  [phone]    Date of Evaluation: 2020    Patient: Harpal Sierra  YOB: 1950  MRN: [id number]     Referring Provider: Brenda Monroy M.D.  [address] [facility] [address]  [place] [address]   Referring Diagnosis Injury of right foot, sequela [S99.921S]     Time Calculation    Start time: 0300  Stop time: 0400 Time Calculation (min): 60 minutes         Chief Complaint: Difficulty Walking    Visit Diagnoses     ICD-10-CM   1. Crush injury of right foot, sequela  S97.81XS         Subjective  70 year old male s/p R foot Fx back in 2019 Pt has a walking boot for 2 months  He denies pain in his foot but feels like he is not walking normal and his balance and exc tolerance is poor  Pt lives at home with wife has 14 stairs in the apartment  Pt reports 2 falls over the last year  He has a history of diabetes and neuropathy bi lat feet  Pt does not have and exc program and describes himself as lazy  Pts goals to prevent falls to be able to ambulate 1 mile and to ride his bike  Past Medical History:   Diagnosis Date   • BPH (benign prostatic hyperplasia)    • GERD (gastroesophageal reflux disease)    • Hypertension    • Neuropathic pain, leg    • Type II or unspecified type diabetes mellitus without mention of complication, not stated as uncontrolled      No past surgical history on file.  Social History     Tobacco Use   • Smoking status: Former Smoker     Packs/day: 0.50     Years: 50.00     Pack years: 25.00     Types: Cigarettes     Last attempt to quit: 1/10/2007     Years since quittin.5   • Smokeless tobacco: Never Used   Substance Use Topics   • Alcohol use: Yes     Alcohol/week: 4.8 - 6.0 oz     Types: 8 - 10 Glasses of wine per week     Comment: 1 bottle of wine 2-3 days a week     Family and  Occupational History     Socioeconomic History   • Marital status:      Spouse name: Not on file   • Number of children: Not on file   • Years of education: Not on file   • Highest education level: Not on file   Occupational History   • Not on file       Objective  Increased BMI Pt ambulates in with 1 hiking cane, normal gait pattern  He has normal flexability and ROM bi lat lower extremeties  5/5  strength in all muscle groups  Sensation is decreased bi lat lower extremities   with ambulation 02 sat 95%  Pt felt a little dizzy with lying to sitting    Exercises/Treatment  Time-based treatments/modalities:  Gait training on TM 5 mins 1.4 mph    Physical Therapy Timed Treatment Charges  Therapeutic exercise minutes (CPT 28036): 15 minutes      Assessment, Response and Plan:   Prognosis: fair    Goals:   Short Term Goals:   Pt to be I with HEP and able to demonstrate  Pt to be able comply and participate with HEP  Short term goal time span:  1-2 weeks      Long Term Goals:    Pt to be able to ambulate 1 mile  Pt to have no falls  Pt to be able to ride bike    Plan:   Planned therapy interventions:  Therapeutic Activities (CPT 63460), Therapeutic Exercise (CPT 03348), Gait Training (CPT 15337) and Neuromuscular Re-education (CPT 21112)  Frequency:  2x week  Duration in weeks:  6  Duration in visits:  12      Functional Assessment Used  Maria Balance Total Score (0-56): 50 Maria     Referring provider co-signature:  I have reviewed this plan of care and my co-signature certifies the need for services.    Certification Period: 07/29/2020 to 09/30/2020    Physician Signature: ________________________________ Date: ______________

## 2020-07-31 ENCOUNTER — PHYSICAL THERAPY (OUTPATIENT)
Dept: PHYSICAL THERAPY | Facility: MEDICAL CENTER | Age: 70
End: 2020-07-31
Attending: FAMILY MEDICINE
Payer: MEDICARE

## 2020-07-31 DIAGNOSIS — S97.81XS CRUSH INJURY OF RIGHT FOOT, SEQUELA: ICD-10-CM

## 2020-07-31 PROCEDURE — 97116 GAIT TRAINING THERAPY: CPT

## 2020-07-31 PROCEDURE — 97110 THERAPEUTIC EXERCISES: CPT

## 2020-07-31 NOTE — OP THERAPY DAILY TREATMENT
Outpatient Physical Therapy  DAILY TREATMENT     [facility] Outpatient Physical Therapy  [address]  [place] [address]  Phone:  [phone]  Fax:  [phone]    Date: 07/31/2020    Patient: Harpal Sierra  YOB: 1950  MRN: [id number]     Time Calculation    Start time: 0300  Stop time: 0330 Time Calculation (min): 30 minutes         Chief Complaint: No chief complaint on file.    Visit #: 2    SUBJECTIVE:  No new c/o    OBJECTIVE:  Current objective measures:         Exercises/Treatment  Time-based treatments/modalities:  Gait training with hiking pole  Dynamic balance on board  Total gym #8 x 20  Transfers sit to stand    Physical Therapy Timed Treatment Charges  Gait training minutes (CPT 82630): 15 minutes  Therapeutic exercise minutes (CPT 09095): 15 minutes        ASSESSMENT:   Response to treatment:   Pt tolerated excs better today    PLAN/RECOMMENDATIONS:   Plan for treatment: therapy treatment to continue next visit.  Planned interventions for next visit: continue with current treatment.

## 2020-08-05 ENCOUNTER — PHYSICAL THERAPY (OUTPATIENT)
Dept: PHYSICAL THERAPY | Facility: MEDICAL CENTER | Age: 70
End: 2020-08-05
Attending: FAMILY MEDICINE
Payer: MEDICARE

## 2020-08-05 DIAGNOSIS — S97.81XS CRUSH INJURY OF RIGHT FOOT, SEQUELA: ICD-10-CM

## 2020-08-05 PROCEDURE — 97110 THERAPEUTIC EXERCISES: CPT

## 2020-08-05 PROCEDURE — 97112 NEUROMUSCULAR REEDUCATION: CPT

## 2020-08-05 NOTE — OP THERAPY DAILY TREATMENT
Outpatient Physical Therapy  DAILY TREATMENT     [facility] Outpatient Physical Therapy  [address]  [place] [address]  Phone:  [phone]  Fax:  [phone]    Date: 08/05/2020    Patient: Harpal Sierra  YOB: 1950  MRN: [id number]     Time Calculation    Start time: 0130  Stop time: 0200 Time Calculation (min): 30 minutes         Chief Complaint: No chief complaint on file.    Visit #: 3    SUBJECTIVE:  No new C/O    OBJECTIVE:  Current objective measures:         Exercises/Treatment  Time-based treatments/modalities:  Gait training with resistance  Dynamic balance and balance excs,  Sit to stand transfers    Physical Therapy Timed Treatment Charges  Neuromusc re-ed, balance, coor, post minutes (CPT 93537): 15 minutes  Therapeutic exercise minutes (CPT 77812): 15 minutes    ASSESSMENT:   Response to treatment:   Exercise tolerance improving good gait pattern with hiking cane  PLAN/RECOMMENDATIONS:   Plan for treatment: therapy treatment to continue next visit.  Planned interventions for next visit: continue with current treatment.

## 2020-08-07 ENCOUNTER — PHYSICAL THERAPY (OUTPATIENT)
Dept: PHYSICAL THERAPY | Facility: MEDICAL CENTER | Age: 70
End: 2020-08-07
Attending: FAMILY MEDICINE
Payer: MEDICARE

## 2020-08-07 DIAGNOSIS — S97.81XS CRUSH INJURY OF RIGHT FOOT, SEQUELA: ICD-10-CM

## 2020-08-07 PROCEDURE — 97112 NEUROMUSCULAR REEDUCATION: CPT

## 2020-08-07 PROCEDURE — 97110 THERAPEUTIC EXERCISES: CPT

## 2020-08-07 NOTE — OP THERAPY DAILY TREATMENT
Outpatient Physical Therapy  DAILY TREATMENT     [facility] Outpatient Physical Therapy  [address]  [place] [address]  Phone:  [phone]  Fax:  [phone]    Date: 08/07/2020    Patient: Harpal Sierra  YOB: 1950  MRN: [id number]     Time Calculation    Start time: 0200  Stop time: 0230 Time Calculation (min): 30 minutes         Chief Complaint: No chief complaint on file.    Visit #: 4    SUBJECTIVE:  No new c/o    OBJECTIVE:  Current objective measures:         Exercises/Treatment  Time-based treatments/modalities:  Sit to stand transfers  Balance on trampoline  Gait training  Dynamic balance excs  Nustep level 5 x 5       ASSESSMENT:   Response to treatment:   Dynamic balance improving 02 sat 95  with exc    PLAN/RECOMMENDATIONS:   Plan for treatment: therapy treatment to continue next visit.  Planned interventions for next visit: continue with current treatment.

## 2020-08-12 ENCOUNTER — PHYSICAL THERAPY (OUTPATIENT)
Dept: PHYSICAL THERAPY | Facility: MEDICAL CENTER | Age: 70
End: 2020-08-12
Attending: FAMILY MEDICINE
Payer: MEDICARE

## 2020-08-12 DIAGNOSIS — S97.81XS CRUSH INJURY OF RIGHT FOOT, SEQUELA: ICD-10-CM

## 2020-08-12 PROCEDURE — 97110 THERAPEUTIC EXERCISES: CPT

## 2020-08-12 PROCEDURE — 97112 NEUROMUSCULAR REEDUCATION: CPT

## 2020-08-12 NOTE — OP THERAPY DAILY TREATMENT
Outpatient Physical Therapy  DAILY TREATMENT     [facility] Outpatient Physical Therapy  [address]  [place] [address]  Phone:  [phone]  Fax:  [phone]    Date: 08/12/2020    Patient: Harpal Sierra  YOB: 1950  MRN: [id number]     Time Calculation    Start time: 0130  Stop time: 0200 Time Calculation (min): 30 minutes         Chief Complaint: No chief complaint on file.    Visit #: 5    SUBJECTIVE:  Feels tired today    OBJECTIVE:  Current objective measures:         Exercises/Treatment  Time-based treatments/modalities:  Gait training with resistance band  Dynamic balance on board  Transfers sit to stand  nustep 5 mins level5    Physical Therapy Timed Treatment Charges  Neuromusc re-ed, balance, coor, post minutes (CPT 43881): 15 minutes  Therapeutic exercise minutes (CPT 76907): 15 minutes      ASSESSMENT:   Response to treatment:   Tolerated treatment well    PLAN/RECOMMENDATIONS:   Plan for treatment: therapy treatment to continue next visit.  Planned interventions for next visit: continue with current treatment.

## 2020-08-19 ENCOUNTER — APPOINTMENT (OUTPATIENT)
Dept: PHYSICAL THERAPY | Facility: MEDICAL CENTER | Age: 70
End: 2020-08-19
Attending: FAMILY MEDICINE
Payer: MEDICARE

## 2020-08-21 ENCOUNTER — APPOINTMENT (OUTPATIENT)
Dept: PHYSICAL THERAPY | Facility: MEDICAL CENTER | Age: 70
End: 2020-08-21
Attending: FAMILY MEDICINE
Payer: MEDICARE

## 2020-08-26 ENCOUNTER — APPOINTMENT (OUTPATIENT)
Dept: PHYSICAL THERAPY | Facility: MEDICAL CENTER | Age: 70
End: 2020-08-26
Attending: FAMILY MEDICINE
Payer: MEDICARE

## 2020-09-21 DIAGNOSIS — E11.42 CONTROLLED TYPE 2 DIABETES MELLITUS WITH DIABETIC POLYNEUROPATHY, WITHOUT LONG-TERM CURRENT USE OF INSULIN (HCC): ICD-10-CM

## 2020-09-21 DIAGNOSIS — F32.1 MODERATE SINGLE CURRENT EPISODE OF MAJOR DEPRESSIVE DISORDER (HCC): ICD-10-CM

## 2020-09-22 RX ORDER — GLIPIZIDE 10 MG/1
10 TABLET, FILM COATED, EXTENDED RELEASE ORAL DAILY
Qty: 100 TAB | Refills: 1 | Status: SHIPPED | OUTPATIENT
Start: 2020-09-22 | End: 2021-05-26 | Stop reason: SDUPTHER

## 2020-09-22 RX ORDER — DULOXETIN HYDROCHLORIDE 60 MG/1
60 CAPSULE, DELAYED RELEASE ORAL DAILY
Qty: 90 CAP | Refills: 1 | Status: SHIPPED | OUTPATIENT
Start: 2020-09-22 | End: 2021-05-26 | Stop reason: SDUPTHER

## 2020-09-22 RX ORDER — ATORVASTATIN CALCIUM 40 MG/1
40 TABLET, FILM COATED ORAL DAILY
Qty: 100 TAB | Refills: 1 | Status: SHIPPED | OUTPATIENT
Start: 2020-09-22 | End: 2021-05-26 | Stop reason: SDUPTHER

## 2020-09-22 RX ORDER — LANOLIN ALCOHOL/MO/W.PET/CERES
325 CREAM (GRAM) TOPICAL
Qty: 270 TAB | Refills: 1 | Status: ON HOLD | OUTPATIENT
Start: 2020-09-22 | End: 2023-01-05

## 2020-09-22 RX ORDER — BENAZEPRIL HYDROCHLORIDE 40 MG/1
40 TABLET ORAL DAILY
Qty: 100 TAB | Refills: 1 | Status: SHIPPED | OUTPATIENT
Start: 2020-09-22 | End: 2021-05-26 | Stop reason: SDUPTHER

## 2020-09-22 RX ORDER — OMEPRAZOLE 20 MG/1
20 CAPSULE, DELAYED RELEASE ORAL DAILY
Qty: 90 CAP | Refills: 1 | Status: SHIPPED | OUTPATIENT
Start: 2020-09-22 | End: 2021-05-26 | Stop reason: SDUPTHER

## 2020-09-22 RX ORDER — TERAZOSIN 5 MG/1
5 CAPSULE ORAL DAILY
Qty: 90 CAP | Refills: 1 | Status: SHIPPED | OUTPATIENT
Start: 2020-09-22 | End: 2021-05-26 | Stop reason: SDUPTHER

## 2020-09-22 RX ORDER — GABAPENTIN 300 MG/1
300 CAPSULE ORAL DAILY
Qty: 90 CAP | Refills: 1 | Status: SHIPPED | OUTPATIENT
Start: 2020-09-22 | End: 2021-05-26 | Stop reason: SDUPTHER

## 2020-09-22 RX ORDER — ALBUTEROL SULFATE 90 UG/1
2 AEROSOL, METERED RESPIRATORY (INHALATION) EVERY 6 HOURS PRN
Qty: 8.5 G | Refills: 3 | Status: SHIPPED | OUTPATIENT
Start: 2020-09-22 | End: 2022-01-21 | Stop reason: SDUPTHER

## 2020-10-13 NOTE — TELEPHONE ENCOUNTER
Received request via: Patient    Was the patient seen in the last year in this department? Yes    Does the patient have an active prescription (recently filled or refills available) for medication(s) requested? No       Patient is also requesting Metformin 500 mg BID. I do not see this on his current med list. Please see PCP office visit about increased dose 2/27/20.

## 2020-10-15 RX ORDER — SILDENAFIL 100 MG/1
100 TABLET, FILM COATED ORAL PRN
Qty: 90 TAB | Refills: 0 | Status: SHIPPED | OUTPATIENT
Start: 2020-10-15 | End: 2021-01-19

## 2020-10-15 NOTE — TELEPHONE ENCOUNTER
Jitendra Gallardo,  I am so sorry, I should have told you this on the first request. Can you please edit the metformin to a 100 day supply?  [facility] has certain medications that require a 100 day supply.    Thank you,  Leona

## 2021-01-16 DIAGNOSIS — Z23 NEED FOR VACCINATION: ICD-10-CM

## 2021-01-19 RX ORDER — SILDENAFIL 100 MG/1
TABLET, FILM COATED ORAL
Qty: 90 TAB | Refills: 0 | Status: SHIPPED | OUTPATIENT
Start: 2021-01-19 | End: 2021-05-26 | Stop reason: SDUPTHER

## 2021-04-14 PROBLEM — Z87.828 HISTORY OF MOTOR VEHICLE ACCIDENT: Status: RESOLVED | Noted: 2017-09-22 | Resolved: 2021-04-14

## 2021-04-14 ASSESSMENT — ENCOUNTER SYMPTOMS
BLURRED VISION: 0
PALPITATIONS: 0
CHILLS: 0
DIARRHEA: 0
VOMITING: 0
FEVER: 0
WEAKNESS: 0
SHORTNESS OF BREATH: 0
CONSTIPATION: 0
DEPRESSION: 0
NAUSEA: 0

## 2021-04-14 NOTE — PROGRESS NOTES
History of Present Illness  70 year old male presents to clinic to establish care.  He has a history of hypertension for which he is taking benazepril.  He does not check his blood pressures regularly at home.  He is using atorvastatin for dyslipidemia.  He denies any side effects, no myalgias.    He has diabetes type 2 for which he is using Metformin 500 mg 2 times daily, and glipizide.  He does not check his glucose.  He denies any signs or symptoms of hypo or hyperglycemia. He has pain and neuropathy in his feet bilaterally with for which he is using Cymbalta and gabapentin.    He has a history of depression, which is currently in remission.  He is not using any medication to treat this.  Mood, motivation, and concentration are all doing well.  No panic attacks. He denies any thoughts of self-harm, suicide, or harm to others.    He is using Terazosin for BPH.  He does also follow with urology.  He has increased urgency and frequency.  Stream is strong and he does feel as though he can fully empty his bladder.  He wakes an average of 2-3 times a night to urinate.  He denies any hematuria or dysuria.    He denies any other questions or concerns at this time.    ROS  Review of Systems   Constitutional: Negative for chills and fever.   HENT: Negative for hearing loss.    Eyes: Negative for blurred vision.   Respiratory: Negative for shortness of breath.    Cardiovascular: Negative for chest pain and palpitations.   Gastrointestinal: Negative for constipation, diarrhea, nausea and vomiting.   Genitourinary: Negative for dysuria and hematuria.   Skin: Negative for rash.   Neurological: Negative for weakness.   Psychiatric/Behavioral: Negative for depression.     Medications  Current Outpatient Medications   Medication Sig Dispense Refill   • metFORMIN (GLUCOPHAGE) 1000 MG tablet Take 1 tablet by mouth 2 times a day with meals. 90 tablet 1   • sildenafil citrate (VIAGRA) 100 MG tablet TAKE ONE TABLET BY MOUTH AS NEEDED  FOR ERECTILE DYSFUNCTION (MAX ONE TABLET DAILY) 90 Tab 0   • atorvastatin (LIPITOR) 40 MG Tab Take 1 Tab by mouth every day. 100 Tab 1   • gabapentin (NEURONTIN) 300 MG Cap Take 1 Cap by mouth every day. 90 Cap 1   • DULoxetine (CYMBALTA) 60 MG Cap DR Particles delayed-release capsule Take 1 Cap by mouth every day. 90 Cap 1   • terazosin (HYTRIN) 5 MG Cap Take 1 Cap by mouth every day. 90 Cap 1   • omeprazole (PRILOSEC) 20 MG delayed-release capsule Take 1 Cap by mouth every day. 90 Cap 1   • ferrous sulfate 325 (65 Fe) MG EC tablet Take 1 Tab by mouth 3 times a day, with meals. 270 Tab 1   • benazepril (LOTENSIN) 40 MG tablet Take 1 Tab by mouth every day. 100 Tab 1   • glipiZIDE SR (GLIPIZIDE XL) 10 MG TABLET SR 24 HR Take 1 Tab by mouth every day. 100 Tab 1   • albuterol 108 (90 Base) MCG/ACT Aero Soln inhalation aerosol Inhale 2 Puffs by mouth every 6 hours as needed for Shortness of Breath. 8.5 g 3   • Acetaminophen 500 MG Cap Take  by mouth.     • B Complex Vitamins (VITAMIN B COMPLEX PO) Take 1 Tab by mouth every day.     • vitamin D (CHOLECALCIFEROL) 1000 UNIT Tab Take 1,000 Units by mouth every day.     • Multiple Vitamins-Minerals (HM COMPLETE 50+ MENS ULTIMATE PO) Take 1 Tab by mouth every day.     • aspirin (ASA) 81 MG Chew Tab chewable tablet Take 81 mg by mouth every day.       No current facility-administered medications for this visit.     Allergies  No Known Allergies    Problem List  Patient Active Problem List   Diagnosis   • Controlled type 2 diabetes mellitus with diabetic polyneuropathy, without long-term current use of insulin ([facility])   • GERD (gastroesophageal reflux disease)   • Obesity (BMI 30-39.9)   • Essential hypertension   • BPH (benign prostatic hyperplasia)   • Mixed hyperlipidemia   • Tremor, coarse   • B12 deficiency   • Moderate single current episode of major depressive disorder ([facility])   • Right hip pain   • Vitamin D deficiency   • Hyponatremia   • Other male erectile dysfunction  "  • Alcohol abuse     Past Medical History  Past Medical History:   Diagnosis Date   • BPH (benign prostatic hyperplasia)    • GERD (gastroesophageal reflux disease)    • Hypertension    • Neuropathic pain, leg    • Type II or unspecified type diabetes mellitus without mention of complication, not stated as uncontrolled      Past Surgical History  History reviewed. No pertinent surgical history.     Past Family History  Family History   Problem Relation Age of Onset   • Heart Disease Mother    • Diabetes Father    • Other Sister         Mental retardation   • Heart Disease Brother    • Cancer Brother         Prostate   • No Known Problems Daughter    • No Known Problems Daughter      Social History  He reports eating a healthy and balanced diet, but does not get regular exercise. 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 He drinks an average of 14-21 alcoholic beverages per week. He smokes an average of 5 cigarettes per day, he has been smoking intermittently since the age of 12. He denies illicit drug use. He is sexually active with one, female partner and they do not use any form of contraception.  He does have difficulties obtaining erections, but states he can ejaculate.  He has tried sildenafil, but it provides only minimal relief.    Physical Exam  /58 (BP Location: Left arm, Patient Position: Sitting, BP Cuff Size: Adult)   Pulse (!) 110   Temp 36.9 °C (98.4 °F) (Temporal)   Resp 18   Ht 1.778 m (5' 10\")   Wt 107 kg (236 lb 12.4 oz)   SpO2 94%   BMI 33.97 kg/m²   Physical Exam   Constitutional: He is well-developed, well-nourished, and in no distress. No distress.   HENT:   Head: Normocephalic and atraumatic.   Right Ear: Tympanic membrane, external ear and ear canal normal.   Left Ear: Tympanic membrane, external ear and ear canal normal.   Eyes: Pupils are equal, round, and reactive to light. Right eye exhibits no discharge. Left eye exhibits no discharge. No scleral " icterus.   Neck: No thyromegaly present.   Cardiovascular: Normal rate, regular rhythm and normal heart sounds.   Pulmonary/Chest: Effort normal and breath sounds normal. No respiratory distress.   Abdominal: Soft. Bowel sounds are normal. He exhibits no distension. There is no abdominal tenderness.   Musculoskeletal:         General: No edema.   Neurological: He is alert.   Skin: Skin is warm and dry. He is not diaphoretic.   Psychiatric: Affect and judgment normal.   Monofilament testing with a 10 gram force: sensation intact: decreased bilaterally  Visual Inspection: Feet without maceration, ulcers, fissures.  Fungal growth to medial aspect of right great toe.  Pedal pulses: intact bilaterally    Assessment & Plan  1. Essential hypertension  Chronic and stable.  Continue with benazepril.  - Comp Metabolic Panel; Future    2. Mixed hyperlipidemia  Chronic and stable.  Continue with atorvastatin.  - Lipid Profile; Future    3. Controlled type 2 diabetes mellitus with diabetic polyneuropathy, without long-term current use of insulin (HCC)  Chronic and worsening problem.  I have increased Metformin from 500 mg twice daily to 1000 mg twice daily.  We will continue with glipizide 10 mg daily.  - HEMOGLOBIN A1C; Future  - MICROALBUMIN CREAT RATIO URINE; Future  - REFERRAL TO OPHTHALMOLOGY  - Diabetic Monofilament Lower Extremity Exam    4. Moderate single current episode of major depressive disorder (HCC)  Chronic and stable.  Well controlled without medications.    5. Benign prostatic hyperplasia (BPH) with straining on urination  Chronic and stable.  Continue with Terazosin.    6. Other male erectile dysfunction  Chronic and worsening problem.  We discussed that there are other options such as injections and pumps.  The patient was encouraged to check with his urologist    7. Obesity (BMI 30-39.9)  We reviewed the recommended amount of weekly exercise, as well as a healthy and balanced diet.    Return in about 3  months (around 7/15/2021) for Chronic disease management.    Sue Willis M.D.

## 2021-04-15 ENCOUNTER — OFFICE VISIT (OUTPATIENT)
Dept: MEDICAL GROUP | Facility: MEDICAL CENTER | Age: 71
End: 2021-04-15
Payer: MEDICARE

## 2021-04-15 VITALS
BODY MASS INDEX: 33.9 KG/M2 | HEIGHT: 70 IN | TEMPERATURE: 98.4 F | SYSTOLIC BLOOD PRESSURE: 118 MMHG | RESPIRATION RATE: 18 BRPM | WEIGHT: 236.77 LBS | DIASTOLIC BLOOD PRESSURE: 58 MMHG | HEART RATE: 110 BPM | OXYGEN SATURATION: 94 %

## 2021-04-15 DIAGNOSIS — E11.42 CONTROLLED TYPE 2 DIABETES MELLITUS WITH DIABETIC POLYNEUROPATHY, WITHOUT LONG-TERM CURRENT USE OF INSULIN (HCC): ICD-10-CM

## 2021-04-15 DIAGNOSIS — R39.16 BENIGN PROSTATIC HYPERPLASIA (BPH) WITH STRAINING ON URINATION: ICD-10-CM

## 2021-04-15 DIAGNOSIS — F32.1 MODERATE SINGLE CURRENT EPISODE OF MAJOR DEPRESSIVE DISORDER (HCC): ICD-10-CM

## 2021-04-15 DIAGNOSIS — N40.1 BENIGN PROSTATIC HYPERPLASIA (BPH) WITH STRAINING ON URINATION: ICD-10-CM

## 2021-04-15 DIAGNOSIS — I10 ESSENTIAL HYPERTENSION: ICD-10-CM

## 2021-04-15 DIAGNOSIS — E66.9 OBESITY (BMI 30-39.9): ICD-10-CM

## 2021-04-15 DIAGNOSIS — N52.8 OTHER MALE ERECTILE DYSFUNCTION: ICD-10-CM

## 2021-04-15 DIAGNOSIS — E78.2 MIXED HYPERLIPIDEMIA: ICD-10-CM

## 2021-04-15 PROCEDURE — 99214 OFFICE O/P EST MOD 30 MIN: CPT | Performed by: FAMILY MEDICINE

## 2021-04-15 ASSESSMENT — ANXIETY QUESTIONNAIRES
4. TROUBLE RELAXING: SEVERAL DAYS
6. BECOMING EASILY ANNOYED OR IRRITABLE: NOT AT ALL
5. BEING SO RESTLESS THAT IT IS HARD TO SIT STILL: NOT AT ALL
1. FEELING NERVOUS, ANXIOUS, OR ON EDGE: SEVERAL DAYS
7. FEELING AFRAID AS IF SOMETHING AWFUL MIGHT HAPPEN: NOT AT ALL
2. NOT BEING ABLE TO STOP OR CONTROL WORRYING: NOT AT ALL
3. WORRYING TOO MUCH ABOUT DIFFERENT THINGS: NOT AT ALL
GAD7 TOTAL SCORE: 2

## 2021-04-15 ASSESSMENT — PATIENT HEALTH QUESTIONNAIRE - PHQ9
5. POOR APPETITE OR OVEREATING: 0 - NOT AT ALL
CLINICAL INTERPRETATION OF PHQ2 SCORE: 2
SUM OF ALL RESPONSES TO PHQ QUESTIONS 1-9: 5

## 2021-04-15 ASSESSMENT — FIBROSIS 4 INDEX: FIB4 SCORE: 1.66

## 2021-04-15 NOTE — PROGRESS NOTES
Annual Health Assessment Questions:    1.  Are you currently engaging in any exercise or physical activity? No    2.  How would you describe your mood or emotional well-being today? good    3.  Have you had any falls in the last year? No    4.  Have you noticed any problems with your balance or had difficulty walking? Yes    5.  In the last six months have you experienced any leakage of urine? Yes    6. DPA/Advanced Directive: Patient has Living Will on file.

## 2021-04-16 ENCOUNTER — HOSPITAL ENCOUNTER (OUTPATIENT)
Dept: LAB | Facility: MEDICAL CENTER | Age: 71
End: 2021-04-16
Attending: FAMILY MEDICINE
Payer: MEDICARE

## 2021-04-16 DIAGNOSIS — I10 ESSENTIAL HYPERTENSION: ICD-10-CM

## 2021-04-16 DIAGNOSIS — E78.2 MIXED HYPERLIPIDEMIA: ICD-10-CM

## 2021-04-16 DIAGNOSIS — E11.42 CONTROLLED TYPE 2 DIABETES MELLITUS WITH DIABETIC POLYNEUROPATHY, WITHOUT LONG-TERM CURRENT USE OF INSULIN (HCC): ICD-10-CM

## 2021-04-16 LAB
ALBUMIN SERPL BCP-MCNC: 4.6 G/DL (ref 3.2–4.9)
ALBUMIN/GLOB SERPL: 1.7 G/DL
ALP SERPL-CCNC: 48 U/L (ref 30–99)
ALT SERPL-CCNC: 19 U/L (ref 2–50)
ANION GAP SERPL CALC-SCNC: 8 MMOL/L (ref 7–16)
AST SERPL-CCNC: 21 U/L (ref 12–45)
BILIRUB SERPL-MCNC: 0.6 MG/DL (ref 0.1–1.5)
BUN SERPL-MCNC: 16 MG/DL (ref 8–22)
CALCIUM SERPL-MCNC: 9.3 MG/DL (ref 8.5–10.5)
CHLORIDE SERPL-SCNC: 99 MMOL/L (ref 96–112)
CHOLEST SERPL-MCNC: 141 MG/DL (ref 100–199)
CO2 SERPL-SCNC: 26 MMOL/L (ref 20–33)
CREAT SERPL-MCNC: 0.85 MG/DL (ref 0.5–1.4)
FASTING STATUS PATIENT QL REPORTED: NORMAL
GLOBULIN SER CALC-MCNC: 2.7 G/DL (ref 1.9–3.5)
GLUCOSE SERPL-MCNC: 295 MG/DL (ref 65–99)
HDLC SERPL-MCNC: 36 MG/DL
LDLC SERPL CALC-MCNC: 70 MG/DL
POTASSIUM SERPL-SCNC: 4.4 MMOL/L (ref 3.6–5.5)
PROT SERPL-MCNC: 7.3 G/DL (ref 6–8.2)
SODIUM SERPL-SCNC: 133 MMOL/L (ref 135–145)
TRIGL SERPL-MCNC: 177 MG/DL (ref 0–149)

## 2021-04-16 PROCEDURE — 80053 COMPREHEN METABOLIC PANEL: CPT

## 2021-04-16 PROCEDURE — 80061 LIPID PANEL: CPT

## 2021-04-16 PROCEDURE — 83036 HEMOGLOBIN GLYCOSYLATED A1C: CPT

## 2021-04-16 PROCEDURE — 36415 COLL VENOUS BLD VENIPUNCTURE: CPT

## 2021-04-17 ENCOUNTER — HOSPITAL ENCOUNTER (OUTPATIENT)
Facility: MEDICAL CENTER | Age: 71
End: 2021-04-17
Attending: FAMILY MEDICINE
Payer: MEDICARE

## 2021-04-17 LAB
CREAT UR-MCNC: 44.03 MG/DL
EST. AVERAGE GLUCOSE BLD GHB EST-MCNC: 223 MG/DL
HBA1C MFR BLD: 9.4 % (ref 4–5.6)
MICROALBUMIN UR-MCNC: 1.5 MG/DL
MICROALBUMIN/CREAT UR: 34 MG/G (ref 0–30)

## 2021-04-17 PROCEDURE — 82043 UR ALBUMIN QUANTITATIVE: CPT

## 2021-04-17 PROCEDURE — 82570 ASSAY OF URINE CREATININE: CPT

## 2021-04-18 DIAGNOSIS — E11.65 UNCONTROLLED TYPE 2 DIABETES MELLITUS WITH HYPERGLYCEMIA (HCC): ICD-10-CM

## 2021-04-19 ENCOUNTER — TELEPHONE (OUTPATIENT)
Dept: MEDICAL GROUP | Facility: MEDICAL CENTER | Age: 71
End: 2021-04-19

## 2021-04-19 DIAGNOSIS — N40.1 BENIGN PROSTATIC HYPERPLASIA WITH INCOMPLETE BLADDER EMPTYING: ICD-10-CM

## 2021-04-19 DIAGNOSIS — R39.14 BENIGN PROSTATIC HYPERPLASIA WITH INCOMPLETE BLADDER EMPTYING: ICD-10-CM

## 2021-04-19 RX ORDER — TAMSULOSIN HYDROCHLORIDE 0.4 MG/1
0.4 CAPSULE ORAL
Qty: 90 CAPSULE | Refills: 1 | Status: SHIPPED | OUTPATIENT
Start: 2021-04-19 | End: 2021-05-26 | Stop reason: SDUPTHER

## 2021-04-19 NOTE — TELEPHONE ENCOUNTER
I called the patient to go over his recent labs. We discussed his elevated A1C, and subsequent elevated microalbuminuria creatinine ratio.  I have placed a referral to endocrinology, which he is in agreements with.  Otherwise, electrolytes, kidney, and liver function are all within normal limits.

## 2021-05-25 ENCOUNTER — TELEPHONE (OUTPATIENT)
Dept: MEDICAL GROUP | Age: 71
End: 2021-05-25

## 2021-05-25 NOTE — TELEPHONE ENCOUNTER
ESTABLISHED PATIENT PRE-VISIT PLANNING     Patient was NOT contacted to complete PVP.     Note: Patient will not be contacted if there is no indication to call.     1.  Reviewed notes from the last few office visits within the medical group: Yes    2.  If any orders were placed at last visit or intended to be done for this visit (i.e. 6 mos follow-up), do we have Results/Consult Notes?         •  Labs - Labs ordered, completed on 4/17/2021 and results are in chart.  Note: If patient appointment is for lab review and patient did not complete labs, check with provider if OK to reschedule patient until labs completed.       •  Imaging - Imaging was not ordered at last office visit.       •  Referrals - Referral ordered, patient has NOT been seen.    3. Is this appointment scheduled as a Hospital Follow-Up? No    4.  Immunizations were updated in Epic using Reconcile Outside Information activity? Yes    5.  Patient is due for the following Health Maintenance Topics:   Health Maintenance Due   Topic Date Due   • COVID-19 Vaccine (1) Never done   • IMM ZOSTER VACCINES (2 of 2) 04/23/2020   • RETINAL SCREENING  02/27/2021   • Annual Wellness Visit  02/27/2021       - Patient plans to schedule appointment for Annual Wellness Visit (AWV), Immunizations: SHINGRIX (Shingles) and COVID-19 and Retinal Eye Exam.    6.  AHA (Pulse8) form printed for Provider? No, already completed

## 2021-05-26 ENCOUNTER — OFFICE VISIT (OUTPATIENT)
Dept: MEDICAL GROUP | Age: 71
End: 2021-05-26
Payer: MEDICARE

## 2021-05-26 VITALS
HEART RATE: 89 BPM | TEMPERATURE: 99 F | SYSTOLIC BLOOD PRESSURE: 102 MMHG | DIASTOLIC BLOOD PRESSURE: 66 MMHG | WEIGHT: 235 LBS | HEIGHT: 70 IN | BODY MASS INDEX: 33.64 KG/M2

## 2021-05-26 DIAGNOSIS — Z12.11 SCREEN FOR COLON CANCER: ICD-10-CM

## 2021-05-26 DIAGNOSIS — E78.2 MIXED HYPERLIPIDEMIA: ICD-10-CM

## 2021-05-26 DIAGNOSIS — D50.8 IRON DEFICIENCY ANEMIA SECONDARY TO INADEQUATE DIETARY IRON INTAKE: ICD-10-CM

## 2021-05-26 DIAGNOSIS — K21.00 GASTROESOPHAGEAL REFLUX DISEASE WITH ESOPHAGITIS WITHOUT HEMORRHAGE: ICD-10-CM

## 2021-05-26 DIAGNOSIS — E66.8 HYPOGONADAL OBESITY: ICD-10-CM

## 2021-05-26 DIAGNOSIS — E11.65 UNCONTROLLED TYPE 2 DIABETES MELLITUS WITH HYPERGLYCEMIA (HCC): ICD-10-CM

## 2021-05-26 DIAGNOSIS — R39.16 BENIGN PROSTATIC HYPERPLASIA (BPH) WITH STRAINING ON URINATION: ICD-10-CM

## 2021-05-26 DIAGNOSIS — N40.1 BENIGN PROSTATIC HYPERPLASIA (BPH) WITH STRAINING ON URINATION: ICD-10-CM

## 2021-05-26 DIAGNOSIS — I10 ESSENTIAL HYPERTENSION: ICD-10-CM

## 2021-05-26 DIAGNOSIS — E55.9 VITAMIN D DEFICIENCY: ICD-10-CM

## 2021-05-26 DIAGNOSIS — N40.1 BENIGN PROSTATIC HYPERPLASIA WITH INCOMPLETE BLADDER EMPTYING: ICD-10-CM

## 2021-05-26 DIAGNOSIS — F32.1 MODERATE SINGLE CURRENT EPISODE OF MAJOR DEPRESSIVE DISORDER (HCC): ICD-10-CM

## 2021-05-26 DIAGNOSIS — E11.42 CONTROLLED TYPE 2 DIABETES MELLITUS WITH DIABETIC POLYNEUROPATHY, WITHOUT LONG-TERM CURRENT USE OF INSULIN (HCC): ICD-10-CM

## 2021-05-26 DIAGNOSIS — N52.1 ERECTILE DYSFUNCTION DUE TO DISEASES CLASSIFIED ELSEWHERE: ICD-10-CM

## 2021-05-26 DIAGNOSIS — E66.9 OBESITY (BMI 35.0-39.9 WITHOUT COMORBIDITY): ICD-10-CM

## 2021-05-26 DIAGNOSIS — R39.14 BENIGN PROSTATIC HYPERPLASIA WITH INCOMPLETE BLADDER EMPTYING: ICD-10-CM

## 2021-05-26 PROBLEM — E87.1 HYPONATREMIA: Status: RESOLVED | Noted: 2017-05-18 | Resolved: 2021-05-26

## 2021-05-26 PROCEDURE — 99214 OFFICE O/P EST MOD 30 MIN: CPT | Performed by: INTERNAL MEDICINE

## 2021-05-26 PROCEDURE — RXMED WILLOW AMBULATORY MEDICATION CHARGE: Performed by: INTERNAL MEDICINE

## 2021-05-26 RX ORDER — DULOXETIN HYDROCHLORIDE 60 MG/1
60 CAPSULE, DELAYED RELEASE ORAL DAILY
Qty: 90 CAPSULE | Refills: 4 | Status: SHIPPED | OUTPATIENT
Start: 2021-05-26 | End: 2021-09-08 | Stop reason: SDUPTHER

## 2021-05-26 RX ORDER — GABAPENTIN 300 MG/1
300 CAPSULE ORAL DAILY
Qty: 90 CAPSULE | Refills: 1 | Status: SHIPPED | OUTPATIENT
Start: 2021-05-26 | End: 2021-12-20 | Stop reason: SDUPTHER

## 2021-05-26 RX ORDER — BENAZEPRIL HYDROCHLORIDE 40 MG/1
40 TABLET ORAL DAILY
Qty: 90 TABLET | Refills: 4 | Status: SHIPPED | OUTPATIENT
Start: 2021-05-26 | End: 2021-12-10

## 2021-05-26 RX ORDER — SILDENAFIL 100 MG/1
100 TABLET, FILM COATED ORAL
Qty: 90 TABLET | Refills: 4 | Status: SHIPPED | OUTPATIENT
Start: 2021-05-26 | End: 2021-11-01

## 2021-05-26 RX ORDER — TAMSULOSIN HYDROCHLORIDE 0.4 MG/1
0.4 CAPSULE ORAL
Qty: 90 CAPSULE | Refills: 1 | Status: SHIPPED | OUTPATIENT
Start: 2021-05-26 | End: 2022-01-21 | Stop reason: SDUPTHER

## 2021-05-26 RX ORDER — OMEPRAZOLE 20 MG/1
20 CAPSULE, DELAYED RELEASE ORAL DAILY
Qty: 90 CAPSULE | Refills: 4 | Status: SHIPPED | OUTPATIENT
Start: 2021-05-26 | End: 2022-01-11

## 2021-05-26 RX ORDER — GLIPIZIDE 10 MG/1
10 TABLET, FILM COATED, EXTENDED RELEASE ORAL DAILY
Qty: 100 TABLET | Refills: 1 | Status: SHIPPED | OUTPATIENT
Start: 2021-05-26 | End: 2022-01-21 | Stop reason: SDUPTHER

## 2021-05-26 RX ORDER — ATORVASTATIN CALCIUM 40 MG/1
40 TABLET, FILM COATED ORAL DAILY
Qty: 100 TABLET | Refills: 1 | Status: SHIPPED | OUTPATIENT
Start: 2021-05-26 | End: 2021-08-02

## 2021-05-26 RX ORDER — EMPAGLIFLOZIN 10 MG/1
10 TABLET, FILM COATED ORAL DAILY
Qty: 90 TABLET | Refills: 4 | Status: SHIPPED | OUTPATIENT
Start: 2021-05-26 | End: 2021-09-16

## 2021-05-26 RX ORDER — TERAZOSIN 5 MG/1
5 CAPSULE ORAL DAILY
Qty: 90 CAPSULE | Refills: 4 | Status: SHIPPED | OUTPATIENT
Start: 2021-05-26 | End: 2021-11-01

## 2021-05-26 ASSESSMENT — ENCOUNTER SYMPTOMS
PSYCHIATRIC NEGATIVE: 1
EYES NEGATIVE: 1
MUSCULOSKELETAL NEGATIVE: 1
CONSTITUTIONAL NEGATIVE: 1
NEUROLOGICAL NEGATIVE: 1
GASTROINTESTINAL NEGATIVE: 1
CARDIOVASCULAR NEGATIVE: 1
RESPIRATORY NEGATIVE: 1

## 2021-05-26 ASSESSMENT — FIBROSIS 4 INDEX: FIB4 SCORE: 1.98

## 2021-05-26 NOTE — PROGRESS NOTES
Subjective:      Harpal Sierra is a 70 y.o. male who presents with Establish Care  This is a new patient here to establish.  Transferring care from another provider, family practitioner, within the [facility].  The patient is here for followup of chronic medical problems listed below. The patient is compliant with medications and having no side effects from them. Denies chest pain, abdominal pain, dyspnea, myalgias, or cough.   Patient Active Problem List    Diagnosis Date Noted   • Iron deficiency anemia secondary to inadequate dietary iron intake 05/26/2021   • Obesity (BMI 35.0-39.9 without comorbidity) ([facility]) 05/26/2021   • Alcohol abuse 01/03/2019   • Other male erectile dysfunction 04/11/2018   • Vitamin D deficiency 05/12/2017   • Right hip pain 11/14/2016   • Moderate single current episode of major depressive disorder ([facility]) 11/11/2016   • B12 deficiency 11/01/2016   • Tremor, coarse 10/25/2016   • Diabetes type 2, uncontrolled ([facility]) 09/27/2016   • GERD (gastroesophageal reflux disease) 09/27/2016   • Obesity (BMI 30-39.9) 09/27/2016   • Essential hypertension 09/27/2016   • BPH (benign prostatic hyperplasia) 09/27/2016   • Mixed hyperlipidemia 09/27/2016       Outpatient Medications Prior to Visit   Medication Sig Dispense Refill   • ferrous sulfate 325 (65 Fe) MG EC tablet Take 1 Tab by mouth 3 times a day, with meals. 270 Tab 1   • albuterol 108 (90 Base) MCG/ACT Aero Soln inhalation aerosol Inhale 2 Puffs by mouth every 6 hours as needed for Shortness of Breath. 8.5 g 3   • Acetaminophen 500 MG Cap Take  by mouth.     • B Complex Vitamins (VITAMIN B COMPLEX PO) Take 1 Tab by mouth every day.     • vitamin D (CHOLECALCIFEROL) 1000 UNIT Tab Take 1,000 Units by mouth every day.     • Multiple Vitamins-Minerals (HM COMPLETE 50+ MENS ULTIMATE PO) Take 1 Tab by mouth every day.     • aspirin (ASA) 81 MG Chew Tab chewable tablet Take 81 mg by mouth every day.     • tamsulosin (FLOMAX) 0.4 MG  "capsule Take 1 capsule by mouth 1/2 hour after breakfast. 90 capsule 1   • metFORMIN (GLUCOPHAGE) 1000 MG tablet Take 1 tablet by mouth 2 times a day with meals. 90 tablet 1   • sildenafil citrate (VIAGRA) 100 MG tablet TAKE ONE TABLET BY MOUTH AS NEEDED FOR ERECTILE DYSFUNCTION (MAX ONE TABLET DAILY) 90 Tab 0   • atorvastatin (LIPITOR) 40 MG Tab Take 1 Tab by mouth every day. 100 Tab 1   • gabapentin (NEURONTIN) 300 MG Cap Take 1 Cap by mouth every day. 90 Cap 1   • DULoxetine (CYMBALTA) 60 MG Cap DR Particles delayed-release capsule Take 1 Cap by mouth every day. 90 Cap 1   • terazosin (HYTRIN) 5 MG Cap Take 1 Cap by mouth every day. 90 Cap 1   • omeprazole (PRILOSEC) 20 MG delayed-release capsule Take 1 Cap by mouth every day. 90 Cap 1   • benazepril (LOTENSIN) 40 MG tablet Take 1 Tab by mouth every day. 100 Tab 1   • glipiZIDE SR (GLIPIZIDE XL) 10 MG TABLET SR 24 HR Take 1 Tab by mouth every day. 100 Tab 1     No facility-administered medications prior to visit.     No Known Allergies             HPI    Review of Systems   Constitutional: Negative.    HENT: Negative.    Eyes: Negative.    Respiratory: Negative.    Cardiovascular: Negative.    Gastrointestinal: Negative.    Genitourinary: Negative.    Musculoskeletal: Negative.    Skin: Negative.    Neurological: Negative.    Endo/Heme/Allergies: Negative.    Psychiatric/Behavioral: Negative.           Objective:     /66 (BP Location: Left arm, Patient Position: Sitting, BP Cuff Size: Adult)   Pulse 89   Temp 37.2 °C (99 °F) (Temporal)   Ht 1.778 m (5' 10\")   Wt 107 kg (235 lb)   BMI 33.72 kg/m²      Physical Exam  Constitutional:       General: He is not in acute distress.     Appearance: He is well-developed. He is not diaphoretic.   HENT:      Head: Normocephalic and atraumatic.      Right Ear: External ear normal.      Left Ear: External ear normal.      Nose: Nose normal.      Mouth/Throat:      Pharynx: No oropharyngeal exudate.   Eyes:      " General: No scleral icterus.        Right eye: No discharge.         Left eye: No discharge.      Conjunctiva/sclera: Conjunctivae normal.      Pupils: Pupils are equal, round, and reactive to light.   Neck:      Thyroid: No thyromegaly.      Vascular: No JVD.      Trachea: No tracheal deviation.   Cardiovascular:      Rate and Rhythm: Normal rate and regular rhythm.      Heart sounds: Normal heart sounds. No murmur heard.   No friction rub. No gallop.    Pulmonary:      Effort: Pulmonary effort is normal. No respiratory distress.      Breath sounds: Normal breath sounds. No stridor. No wheezing or rales.   Chest:      Chest wall: No tenderness.   Abdominal:      General: Bowel sounds are normal. There is no distension.      Palpations: Abdomen is soft. There is no mass.      Tenderness: There is no abdominal tenderness. There is no guarding or rebound.   Musculoskeletal:         General: No tenderness. Normal range of motion.      Cervical back: Normal range of motion and neck supple.   Lymphadenopathy:      Cervical: No cervical adenopathy.   Skin:     General: Skin is warm and dry.      Coloration: Skin is not pale.      Findings: No erythema or rash.   Neurological:      Mental Status: He is alert and oriented to person, place, and time.      Motor: No abnormal muscle tone.      Coordination: Coordination normal.      Deep Tendon Reflexes: Reflexes are normal and symmetric. Reflexes normal.   Psychiatric:         Behavior: Behavior normal.         Thought Content: Thought content normal.         Judgment: Judgment normal.                 No visits with results within 1 Month(s) from this visit.   Latest known visit with results is:   Hospital Outpatient Visit on 04/17/2021   Component Date Value   • Creatinine, Urine 04/17/2021 44.03    • Microalbumin, Urine Rand* 04/17/2021 1.5    • Micro Alb Creat Ratio 04/17/2021 34*      ]  Lab Results   Component Value Date/Time    HBA1C 9.4 (H) 04/16/2021 11:05 AM    HBA1C  9.7 (H) 02/21/2020 10:15 AM     Lab Results   Component Value Date/Time    SODIUM 133 (L) 04/16/2021 11:05 AM    POTASSIUM 4.4 04/16/2021 11:05 AM    CHLORIDE 99 04/16/2021 11:05 AM    CO2 26 04/16/2021 11:05 AM    GLUCOSE 295 (H) 04/16/2021 11:05 AM    BUN 16 04/16/2021 11:05 AM    CREATININE 0.85 04/16/2021 11:05 AM    ALKPHOSPHAT 48 04/16/2021 11:05 AM    ASTSGOT 21 04/16/2021 11:05 AM    ALTSGPT 19 04/16/2021 11:05 AM    TBILIRUBIN 0.6 04/16/2021 11:05 AM     Lab Results   Component Value Date/Time    INR 1.11 01/02/2019 07:04 PM    INR 0.95 01/18/2016 12:00 AM     Lab Results   Component Value Date/Time    CHOLSTRLTOT 141 04/16/2021 11:05 AM    LDL 70 04/16/2021 11:05 AM    HDL 36 (A) 04/16/2021 11:05 AM    TRIGLYCERIDE 177 (H) 04/16/2021 11:05 AM       Lab Results   Component Value Date/Time    TESTOSTERONE 325 12/13/2018 02:19 PM     No results found for: TSH  No results found for: FREET4  No results found for: URICACID  No components found for: VITB12  Lab Results   Component Value Date/Time    25HYDROXY 37 05/17/2017 09:16 AM    25HYDROXY 25 (L) 10/03/2016 09:29 AM          Assessment/Plan:        1. Uncontrolled type 2 diabetes mellitus with hyperglycemia (HCC)-this is not at goal.  His A1c is increased from 6-9 over the last year probably due to weight gain of 15 pounds or so.  He has been compliant with his medication.    Start Jardiance.  Patient declines Trulicity because he is afraid of injections shots.  I explained the Trulicity might be a better option for him since he is markedly overweight.  He will think about discuss it on follow-up visit.     - Empagliflozin (JARDIANCE) 10 MG Tab; Take 1 tablet by mouth every day.  Dispense: 90 tablet; Refill: 4  - metformin (GLUCOPHAGE) 1000 MG tablet; Take 1 tablet by mouth 2 times a day with meals.  Dispense: 90 tablet; Refill: 1  - glipiZIDE SR (GLIPIZIDE XL) 10 MG TABLET SR 24 HR; Take 1 tablet by mouth every day.  Dispense: 100 tablet; Refill: 1  - Comp  Metabolic Panel; Future  - Lipid Profile; Future  - CBC WITH DIFFERENTIAL; Future  - HEMOGLOBIN A1C; Future  - MICROALBUMIN CREAT RATIO URINE; Future    2. Controlled type 2 diabetes mellitus with diabetic polyneuropathy, without long-term current use of insulin (HCC)  Good control neuropathy on current regimen.  Continue unchanged.  - gabapentin (NEURONTIN) 300 MG Cap; Take 1 capsule by mouth every day.  Dispense: 90 capsule; Refill: 1    3. Moderate single current episode of major depressive disorder (HCC)  Good control continue same regimen  - DULoxetine (CYMBALTA) 60 MG Cap DR Particles delayed-release capsule; Take 1 capsule by mouth every day.  Dispense: 90 capsule; Refill: 4    4. Benign prostatic hyperplasia (BPH) with straining on urination  Good control continue same regimen  - terazosin (HYTRIN) 5 MG Cap; Take 1 capsule by mouth every day.  Dispense: 90 capsule; Refill: 4  - sildenafil citrate (VIAGRA) 100 MG tablet; Take 1 tablet by mouth 1 time a day as needed for Erectile Dysfunction.  Dispense: 90 tablet; Refill: 4  - PROSTATE SPECIFIC AG DIAGNOSTIC; Future    5. Gastroesophageal reflux disease with esophagitis without hemorrhage  Good control continue same regimen  - omeprazole (PRILOSEC) 20 MG delayed-release capsule; Take 1 capsule by mouth every day.  Dispense: 90 capsule; Refill: 4    6. Benign prostatic hyperplasia with incomplete bladder emptying        Good control continue same regimen  - tamsulosin (FLOMAX) 0.4 MG capsule; Take 1 capsule by mouth half an hour after breakfast.  Dispense: 90 capsule; Refill: 1    7. Essential hypertension   Good control continue same regimen  - benazepril (LOTENSIN) 40 MG tablet; Take 1 tablet by mouth every day.  Dispense: 90 tablet; Refill: 4    8. Erectile dysfunction due to diseases classified elsewhere    Good control continue same regimen- sildenafil citrate (VIAGRA) 100 MG tablet; Take 1 tablet by mouth 1 time a day as needed for Erectile Dysfunction.   Dispense: 90 tablet; Refill: 4    9. Iron deficiency anemia secondary to inadequate dietary iron intake  -Needs follow-up.  Still taking iron.  May need GI work-up if continues to show iron deficiency.  Has yet to do Cologuard or colonoscopy.  - IRON/TOTAL IRON BIND; Future  - FERRITIN; Future    10. 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 Vitamin D deficiency   Good control continue same regimen  - VITAMIN D,25 HYDROXY; Future    13. Obesity (BMI 35.0-39.9 without comorbidity)     - Patient identified as having weight management issue.  Appropriate orders and counseling given.    14. Screen for colon cancer  Declines colonoscopy.  - COLOGUARD (FIT DNA)

## 2021-06-01 ENCOUNTER — PHARMACY VISIT (OUTPATIENT)
Dept: PHARMACY | Facility: MEDICAL CENTER | Age: 71
End: 2021-06-01
Payer: COMMERCIAL

## 2021-06-21 PROCEDURE — RXMED WILLOW AMBULATORY MEDICATION CHARGE: Performed by: INTERNAL MEDICINE

## 2021-06-25 PROCEDURE — RXMED WILLOW AMBULATORY MEDICATION CHARGE: Performed by: INTERNAL MEDICINE

## 2021-07-01 ENCOUNTER — PATIENT OUTREACH (OUTPATIENT)
Dept: HEALTH INFORMATION MANAGEMENT | Facility: OTHER | Age: 71
End: 2021-07-01

## 2021-07-06 PROCEDURE — RXMED WILLOW AMBULATORY MEDICATION CHARGE: Performed by: INTERNAL MEDICINE

## 2021-07-24 PROCEDURE — RXMED WILLOW AMBULATORY MEDICATION CHARGE: Performed by: INTERNAL MEDICINE

## 2021-07-25 PROCEDURE — RXMED WILLOW AMBULATORY MEDICATION CHARGE: Performed by: INTERNAL MEDICINE

## 2021-07-26 ENCOUNTER — HOSPITAL ENCOUNTER (OUTPATIENT)
Dept: LAB | Facility: MEDICAL CENTER | Age: 71
End: 2021-07-26
Attending: INTERNAL MEDICINE
Payer: MEDICARE

## 2021-07-26 ENCOUNTER — PHARMACY VISIT (OUTPATIENT)
Dept: PHARMACY | Facility: MEDICAL CENTER | Age: 71
End: 2021-07-26
Payer: COMMERCIAL

## 2021-07-26 DIAGNOSIS — E78.2 MIXED HYPERLIPIDEMIA: ICD-10-CM

## 2021-07-26 DIAGNOSIS — E55.9 VITAMIN D DEFICIENCY: ICD-10-CM

## 2021-07-26 DIAGNOSIS — N40.1 BENIGN PROSTATIC HYPERPLASIA (BPH) WITH STRAINING ON URINATION: ICD-10-CM

## 2021-07-26 DIAGNOSIS — R39.16 BENIGN PROSTATIC HYPERPLASIA (BPH) WITH STRAINING ON URINATION: ICD-10-CM

## 2021-07-26 DIAGNOSIS — E66.8 HYPOGONADAL OBESITY: ICD-10-CM

## 2021-07-26 DIAGNOSIS — E11.65 UNCONTROLLED TYPE 2 DIABETES MELLITUS WITH HYPERGLYCEMIA (HCC): ICD-10-CM

## 2021-07-26 DIAGNOSIS — D50.8 IRON DEFICIENCY ANEMIA SECONDARY TO INADEQUATE DIETARY IRON INTAKE: ICD-10-CM

## 2021-07-26 LAB
25(OH)D3 SERPL-MCNC: 34 NG/ML (ref 30–100)
ALBUMIN SERPL BCP-MCNC: 4.7 G/DL (ref 3.2–4.9)
ALBUMIN/GLOB SERPL: 1.6 G/DL
ALP SERPL-CCNC: 53 U/L (ref 30–99)
ALT SERPL-CCNC: 13 U/L (ref 2–50)
ANION GAP SERPL CALC-SCNC: 12 MMOL/L (ref 7–16)
AST SERPL-CCNC: 10 U/L (ref 12–45)
BASOPHILS # BLD AUTO: 0.7 % (ref 0–1.8)
BASOPHILS # BLD: 0.05 K/UL (ref 0–0.12)
BILIRUB SERPL-MCNC: 0.8 MG/DL (ref 0.1–1.5)
BUN SERPL-MCNC: 18 MG/DL (ref 8–22)
CALCIUM SERPL-MCNC: 10.3 MG/DL (ref 8.5–10.5)
CHLORIDE SERPL-SCNC: 98 MMOL/L (ref 96–112)
CHOLEST SERPL-MCNC: 219 MG/DL (ref 100–199)
CO2 SERPL-SCNC: 25 MMOL/L (ref 20–33)
CREAT SERPL-MCNC: 0.94 MG/DL (ref 0.5–1.4)
CREAT UR-MCNC: 115.82 MG/DL
EOSINOPHIL # BLD AUTO: 0.1 K/UL (ref 0–0.51)
EOSINOPHIL NFR BLD: 1.4 % (ref 0–6.9)
ERYTHROCYTE [DISTWIDTH] IN BLOOD BY AUTOMATED COUNT: 43.1 FL (ref 35.9–50)
EST. AVERAGE GLUCOSE BLD GHB EST-MCNC: 200 MG/DL
FASTING STATUS PATIENT QL REPORTED: NORMAL
FERRITIN SERPL-MCNC: 42.7 NG/ML (ref 22–322)
GLOBULIN SER CALC-MCNC: 3 G/DL (ref 1.9–3.5)
GLUCOSE SERPL-MCNC: 323 MG/DL (ref 65–99)
HBA1C MFR BLD: 8.6 % (ref 4–5.6)
HCT VFR BLD AUTO: 51.6 % (ref 42–52)
HDLC SERPL-MCNC: 33 MG/DL
HGB BLD-MCNC: 17.4 G/DL (ref 14–18)
IMM GRANULOCYTES # BLD AUTO: 0.02 K/UL (ref 0–0.11)
IMM GRANULOCYTES NFR BLD AUTO: 0.3 % (ref 0–0.9)
IRON SATN MFR SERPL: 43 % (ref 15–55)
IRON SERPL-MCNC: 138 UG/DL (ref 50–180)
LDLC SERPL CALC-MCNC: 118 MG/DL
LYMPHOCYTES # BLD AUTO: 1.88 K/UL (ref 1–4.8)
LYMPHOCYTES NFR BLD: 26.6 % (ref 22–41)
MCH RBC QN AUTO: 30.3 PG (ref 27–33)
MCHC RBC AUTO-ENTMCNC: 33.7 G/DL (ref 33.7–35.3)
MCV RBC AUTO: 89.7 FL (ref 81.4–97.8)
MICROALBUMIN UR-MCNC: 7 MG/DL
MICROALBUMIN/CREAT UR: 60 MG/G (ref 0–30)
MONOCYTES # BLD AUTO: 0.53 K/UL (ref 0–0.85)
MONOCYTES NFR BLD AUTO: 7.5 % (ref 0–13.4)
NEUTROPHILS # BLD AUTO: 4.49 K/UL (ref 1.82–7.42)
NEUTROPHILS NFR BLD: 63.5 % (ref 44–72)
NRBC # BLD AUTO: 0 K/UL
NRBC BLD-RTO: 0 /100 WBC
PLATELET # BLD AUTO: 156 K/UL (ref 164–446)
PMV BLD AUTO: 11.4 FL (ref 9–12.9)
POTASSIUM SERPL-SCNC: 4.3 MMOL/L (ref 3.6–5.5)
PROT SERPL-MCNC: 7.7 G/DL (ref 6–8.2)
PSA SERPL-MCNC: 10.5 NG/ML (ref 0–4)
RBC # BLD AUTO: 5.75 M/UL (ref 4.7–6.1)
SODIUM SERPL-SCNC: 135 MMOL/L (ref 135–145)
TIBC SERPL-MCNC: 321 UG/DL (ref 250–450)
TRIGL SERPL-MCNC: 342 MG/DL (ref 0–149)
TSH SERPL DL<=0.005 MIU/L-ACNC: 0.84 UIU/ML (ref 0.38–5.33)
UIBC SERPL-MCNC: 183 UG/DL (ref 110–370)
WBC # BLD AUTO: 7.1 K/UL (ref 4.8–10.8)

## 2021-07-26 PROCEDURE — 84270 ASSAY OF SEX HORMONE GLOBUL: CPT

## 2021-07-26 PROCEDURE — 83036 HEMOGLOBIN GLYCOSYLATED A1C: CPT

## 2021-07-26 PROCEDURE — 80053 COMPREHEN METABOLIC PANEL: CPT

## 2021-07-26 PROCEDURE — 83540 ASSAY OF IRON: CPT

## 2021-07-26 PROCEDURE — 84443 ASSAY THYROID STIM HORMONE: CPT

## 2021-07-26 PROCEDURE — 82728 ASSAY OF FERRITIN: CPT

## 2021-07-26 PROCEDURE — 82043 UR ALBUMIN QUANTITATIVE: CPT

## 2021-07-26 PROCEDURE — 85025 COMPLETE CBC W/AUTO DIFF WBC: CPT

## 2021-07-26 PROCEDURE — 84153 ASSAY OF PSA TOTAL: CPT

## 2021-07-26 PROCEDURE — 84403 ASSAY OF TOTAL TESTOSTERONE: CPT

## 2021-07-26 PROCEDURE — 83550 IRON BINDING TEST: CPT

## 2021-07-26 PROCEDURE — 36415 COLL VENOUS BLD VENIPUNCTURE: CPT

## 2021-07-26 PROCEDURE — 82570 ASSAY OF URINE CREATININE: CPT

## 2021-07-26 PROCEDURE — 80061 LIPID PANEL: CPT

## 2021-07-26 PROCEDURE — 82306 VITAMIN D 25 HYDROXY: CPT

## 2021-07-26 PROCEDURE — 84402 ASSAY OF FREE TESTOSTERONE: CPT

## 2021-07-27 ENCOUNTER — TELEPHONE (OUTPATIENT)
Dept: MEDICAL GROUP | Age: 71
End: 2021-07-27

## 2021-07-27 LAB
SHBG SERPL-SCNC: 25 NMOL/L (ref 11–80)
TESTOST FREE MFR SERPL: 2.1 % (ref 1.6–2.9)
TESTOST FREE SERPL-MCNC: 66 PG/ML (ref 47–244)
TESTOST SERPL-MCNC: 319 NG/DL (ref 300–720)

## 2021-07-27 NOTE — TELEPHONE ENCOUNTER
ESTABLISHED PATIENT PRE-VISIT PLANNING     Patient was NOT contacted to complete PVP.     Note: Patient will not be contacted if there is no indication to call.     1.  Reviewed notes from the last few office visits within the medical group: Yes    2.  If any orders were placed at last visit or intended to be done for this visit (i.e. 6 mos follow-up), do we have Results/Consult Notes?         •  Labs - Labs ordered, completed on 7/26/2021 and results are in chart.  Note: If patient appointment is for lab review and patient did not complete labs, check with provider if OK to reschedule patient until labs completed.       •  Imaging - Imaging was not ordered at last office visit.       •  Referrals - Referral ordered, patient was seen and consult notes are in chart. Care Teams updated  YES.    3. Is this appointment scheduled as a Hospital Follow-Up? No    4.  Immunizations were updated in Epic using Reconcile Outside Information activity? Yes    5.  Patient is due for the following Health Maintenance Topics:   Health Maintenance Due   Topic Date Due   • COVID-19 Vaccine (1) Never done   • IMM ZOSTER VACCINES (2 of 2) 04/23/2020   • RETINAL SCREENING  02/27/2021       - Patient plans to schedule appointment for Immunizations: SHINGRIX (Shingles) and COVID 19.    6.  AHA (Pulse8) form printed for Provider? No, already completed

## 2021-07-28 ENCOUNTER — OFFICE VISIT (OUTPATIENT)
Dept: MEDICAL GROUP | Age: 71
End: 2021-07-28
Payer: MEDICARE

## 2021-07-28 VITALS
OXYGEN SATURATION: 93 % | HEIGHT: 70 IN | WEIGHT: 223.6 LBS | TEMPERATURE: 97.3 F | BODY MASS INDEX: 32.01 KG/M2 | HEART RATE: 104 BPM | DIASTOLIC BLOOD PRESSURE: 54 MMHG | SYSTOLIC BLOOD PRESSURE: 100 MMHG

## 2021-07-28 DIAGNOSIS — E61.1 IRON DEFICIENCY: ICD-10-CM

## 2021-07-28 DIAGNOSIS — Z23 NEED FOR VACCINATION: ICD-10-CM

## 2021-07-28 DIAGNOSIS — E66.01 CLASS 2 SEVERE OBESITY DUE TO EXCESS CALORIES WITH SERIOUS COMORBIDITY AND BODY MASS INDEX (BMI) OF 36.0 TO 36.9 IN ADULT (HCC): ICD-10-CM

## 2021-07-28 DIAGNOSIS — R97.20 ELEVATED PSA: ICD-10-CM

## 2021-07-28 DIAGNOSIS — I10 ESSENTIAL HYPERTENSION: ICD-10-CM

## 2021-07-28 DIAGNOSIS — R53.82 CHRONIC FATIGUE: ICD-10-CM

## 2021-07-28 DIAGNOSIS — E78.2 MIXED HYPERLIPIDEMIA: ICD-10-CM

## 2021-07-28 DIAGNOSIS — E11.65 UNCONTROLLED TYPE 2 DIABETES MELLITUS WITH HYPERGLYCEMIA (HCC): ICD-10-CM

## 2021-07-28 DIAGNOSIS — R53.81 PHYSICAL DECONDITIONING: ICD-10-CM

## 2021-07-28 DIAGNOSIS — E55.9 VITAMIN D DEFICIENCY: ICD-10-CM

## 2021-07-28 DIAGNOSIS — R97.20 ELEVATED PSA, BETWEEN 10 AND LESS THAN 20 NG/ML: ICD-10-CM

## 2021-07-28 DIAGNOSIS — E66.9 OBESITY (BMI 30-39.9): ICD-10-CM

## 2021-07-28 DIAGNOSIS — E29.1 HYPOGONADISM MALE: ICD-10-CM

## 2021-07-28 PROCEDURE — 90750 HZV VACC RECOMBINANT IM: CPT | Performed by: INTERNAL MEDICINE

## 2021-07-28 PROCEDURE — 99214 OFFICE O/P EST MOD 30 MIN: CPT | Mod: 25 | Performed by: INTERNAL MEDICINE

## 2021-07-28 PROCEDURE — 90471 IMMUNIZATION ADMIN: CPT | Performed by: INTERNAL MEDICINE

## 2021-07-28 RX ORDER — ATORVASTATIN CALCIUM 80 MG/1
80 TABLET, FILM COATED ORAL EVERY EVENING
Qty: 90 TABLET | Refills: 4 | Status: SHIPPED | OUTPATIENT
Start: 2021-07-28 | End: 2022-01-21 | Stop reason: SDUPTHER

## 2021-07-28 RX ORDER — PHENTERMINE HYDROCHLORIDE 30 MG/1
30 CAPSULE ORAL EVERY MORNING
Qty: 90 CAPSULE | Refills: 0 | Status: SHIPPED | OUTPATIENT
Start: 2021-07-28 | End: 2021-11-23 | Stop reason: SDUPTHER

## 2021-07-28 RX ORDER — OXYMETAZOLINE HCL 0.05 %
2 SPRAY, NON-AEROSOL (ML) NASAL 2 TIMES DAILY
Qty: 15 ML | Refills: 0 | Status: SHIPPED
Start: 2021-07-28 | End: 2021-07-28

## 2021-07-28 ASSESSMENT — ENCOUNTER SYMPTOMS
PSYCHIATRIC NEGATIVE: 1
MUSCULOSKELETAL NEGATIVE: 1
NEUROLOGICAL NEGATIVE: 1
RESPIRATORY NEGATIVE: 1
CARDIOVASCULAR NEGATIVE: 1
EYES NEGATIVE: 1
GASTROINTESTINAL NEGATIVE: 1
CONSTITUTIONAL NEGATIVE: 1

## 2021-07-28 ASSESSMENT — FIBROSIS 4 INDEX: FIB4 SCORE: 1.26

## 2021-07-29 NOTE — PROGRESS NOTES
Subjective:      Harpal Sierra is a 70 y.o. male who presents with Urinary Frequency (since may ) and GI Problem  The patient is here for followup of chronic medical problems listed below. The patient is compliant with medications and having no side effects from them. Denies chest pain, abdominal pain, dyspnea, myalgias, or cough.   Patient Active Problem List    Diagnosis Date Noted   • Elevated PSA 07/28/2021   • Iron deficiency anemia secondary to inadequate dietary iron intake 05/26/2021   • Obesity (BMI 35.0-39.9 without comorbidity) ([facility]) 05/26/2021   • Alcohol abuse 01/03/2019   • Other male erectile dysfunction 04/11/2018   • Vitamin D deficiency 05/12/2017   • Right hip pain 11/14/2016   • Moderate single current episode of major depressive disorder ([facility]) 11/11/2016   • B12 deficiency 11/01/2016   • Tremor, coarse 10/25/2016   • Diabetes type 2, uncontrolled ([facility]) 09/27/2016   • GERD (gastroesophageal reflux disease) 09/27/2016   • Obesity (BMI 30-39.9) 09/27/2016   • Essential hypertension 09/27/2016   • BPH (benign prostatic hyperplasia) 09/27/2016   • Mixed hyperlipidemia 09/27/2016     Patient has no known allergies.  Outpatient Medications Prior to Visit   Medication Sig Dispense Refill   • Empagliflozin (JARDIANCE) 10 MG Tab Take 1 tablet by mouth every day. 90 tablet 4   • atorvastatin (LIPITOR) 40 MG Tab Take 1 tablet by mouth every day. 100 tablet 1   • gabapentin (NEURONTIN) 300 MG Cap Take 1 capsule by mouth every day. 90 capsule 1   • DULoxetine (CYMBALTA) 60 MG Cap DR Particles delayed-release capsule Take 1 capsule by mouth every day. 90 capsule 4   • terazosin (HYTRIN) 5 MG Cap Take 1 capsule by mouth every day. 90 capsule 4   • omeprazole (PRILOSEC) 20 MG delayed-release capsule Take 1 capsule by mouth every day. 90 capsule 4   • tamsulosin (FLOMAX) 0.4 MG capsule Take 1 capsule by mouth half an hour after breakfast. 90 capsule 1   • metformin (GLUCOPHAGE) 1000 MG tablet Take 1  tablet by mouth 2 times a day with meals. 90 tablet 1   • benazepril (LOTENSIN) 40 MG tablet Take 1 tablet by mouth every day. 90 tablet 4   • glipiZIDE SR (GLIPIZIDE XL) 10 MG TABLET SR 24 HR Take 1 tablet by mouth every day. 100 tablet 1   • sildenafil citrate (VIAGRA) 100 MG tablet Take 1 tablet by mouth 1 time a day as needed for Erectile Dysfunction. 90 tablet 4   • ferrous sulfate 325 (65 Fe) MG EC tablet Take 1 Tab by mouth 3 times a day, with meals. 270 Tab 1   • Acetaminophen 500 MG Cap Take  by mouth.     • vitamin D (CHOLECALCIFEROL) 1000 UNIT Tab Take 1,000 Units by mouth every day.     • aspirin (ASA) 81 MG Chew Tab chewable tablet Take 81 mg by mouth every day.     • albuterol 108 (90 Base) MCG/ACT Aero Soln inhalation aerosol Inhale 2 Puffs by mouth every 6 hours as needed for Shortness of Breath. 8.5 g 3   • B Complex Vitamins (VITAMIN B COMPLEX PO) Take 1 Tab by mouth every day.     • Multiple Vitamins-Minerals (HM COMPLETE 50+ MENS ULTIMATE PO) Take 1 Tab by mouth every day.       No facility-administered medications prior to visit.     Hospital Outpatient Visit on 07/26/2021   Component Date Value   • Creatinine, Urine 07/26/2021 115.82    • Microalbumin, Urine Rand* 07/26/2021 7.0    • Micro Alb Creat Ratio 07/26/2021 60*   • 25-Hydroxy   Vitamin D 25 07/26/2021 34    • Ferritin 07/26/2021 42.7    • Iron 07/26/2021 138    • Total Iron Binding 07/26/2021 321    • Unsat Iron Binding 07/26/2021 183    • % Saturation 07/26/2021 43    • Prostatic Specific Antig* 07/26/2021 10.50*   • Glycohemoglobin 07/26/2021 8.6*   • Est Avg Glucose 07/26/2021 200    • WBC 07/26/2021 7.1    • RBC 07/26/2021 5.75    • Hemoglobin 07/26/2021 17.4    • Hematocrit 07/26/2021 51.6    • MCV 07/26/2021 89.7    • MCH 07/26/2021 30.3    • MCHC 07/26/2021 33.7    • RDW 07/26/2021 43.1    • Platelet Count 07/26/2021 156*   • MPV 07/26/2021 11.4    • Neutrophils-Polys 07/26/2021 63.50    • Lymphocytes 07/26/2021 26.60    •  Monocytes 07/26/2021 7.50    • Eosinophils 07/26/2021 1.40    • Basophils 07/26/2021 0.70    • Immature Granulocytes 07/26/2021 0.30    • Nucleated RBC 07/26/2021 0.00    • Neutrophils (Absolute) 07/26/2021 4.49    • Lymphs (Absolute) 07/26/2021 1.88    • Monos (Absolute) 07/26/2021 0.53    • Eos (Absolute) 07/26/2021 0.10    • Baso (Absolute) 07/26/2021 0.05    • Immature Granulocytes (a* 07/26/2021 0.02    • NRBC (Absolute) 07/26/2021 0.00    • Sodium 07/26/2021 135    • Potassium 07/26/2021 4.3    • Chloride 07/26/2021 98    • Co2 07/26/2021 25    • Anion Gap 07/26/2021 12.0    • Glucose 07/26/2021 323*   • Bun 07/26/2021 18    • Creatinine 07/26/2021 0.94    • Calcium 07/26/2021 10.3    • AST(SGOT) 07/26/2021 10*   • ALT(SGPT) 07/26/2021 13    • Alkaline Phosphatase 07/26/2021 53    • Total Bilirubin 07/26/2021 0.8    • Albumin 07/26/2021 4.7    • Total Protein 07/26/2021 7.7    • Globulin 07/26/2021 3.0    • A-G Ratio 07/26/2021 1.6    • TSH 07/26/2021 0.840    • Cholesterol,Tot 07/26/2021 219*   • Triglycerides 07/26/2021 342*   • HDL 07/26/2021 33*   • LDL 07/26/2021 118*   • Testosterone,Total 07/26/2021 319    • Sex Hormone Bind Globulin 07/26/2021 25    • Free Testosterone 07/26/2021 66    • Testosterone % Free 07/26/2021 2.1    • Fasting Status 07/26/2021 Fasting    • GFR If  07/26/2021 >60    • GFR If Non  Ameri* 07/26/2021 >60       Lab Results   Component Value Date/Time    HBA1C 8.6 (H) 07/26/2021 08:32 AM    HBA1C 9.4 (H) 04/16/2021 11:05 AM     Lab Results   Component Value Date/Time    SODIUM 135 07/26/2021 08:32 AM    POTASSIUM 4.3 07/26/2021 08:32 AM    CHLORIDE 98 07/26/2021 08:32 AM    CO2 25 07/26/2021 08:32 AM    GLUCOSE 323 (H) 07/26/2021 08:32 AM    BUN 18 07/26/2021 08:32 AM    CREATININE 0.94 07/26/2021 08:32 AM    ALKPHOSPHAT 53 07/26/2021 08:32 AM    ASTSGOT 10 (L) 07/26/2021 08:32 AM    ALTSGPT 13 07/26/2021 08:32 AM    TBILIRUBIN 0.8 07/26/2021 08:32 AM     Lab  "Results   Component Value Date/Time    INR 1.11 01/02/2019 07:04 PM    INR 0.95 01/18/2016 12:00 AM     Lab Results   Component Value Date/Time    CHOLSTRLTOT 219 (H) 07/26/2021 08:32 AM     (H) 07/26/2021 08:32 AM    HDL 33 (A) 07/26/2021 08:32 AM    TRIGLYCERIDE 342 (H) 07/26/2021 08:32 AM       Lab Results   Component Value Date/Time    TESTOSTERONE 319 07/26/2021 08:32 AM     No results found for: TSH  No results found for: FREET4  No results found for: URICACID  No components found for: VITB12  Lab Results   Component Value Date/Time    25HYDROXY 34 07/26/2021 08:32 AM    25HYDROXY 37 05/17/2017 09:16 AM               HPI    Review of Systems   Constitutional: Negative.    HENT: Negative.    Eyes: Negative.    Respiratory: Negative.    Cardiovascular: Negative.    Gastrointestinal: Negative.    Genitourinary: Negative.    Musculoskeletal: Negative.    Skin: Negative.    Neurological: Negative.    Endo/Heme/Allergies: Negative.    Psychiatric/Behavioral: Negative.           Objective:     /54 (BP Location: Left arm, Patient Position: Sitting, BP Cuff Size: Adult)   Pulse (!) 104   Temp 36.3 °C (97.3 °F) (Temporal)   Ht 1.778 m (5' 10\")   Wt 101 kg (223 lb 9.6 oz)   SpO2 93%   BMI 32.08 kg/m²      Physical Exam  Constitutional:       General: He is not in acute distress.     Appearance: He is well-developed. He is not diaphoretic.   HENT:      Head: Normocephalic and atraumatic.      Right Ear: External ear normal.      Left Ear: External ear normal.      Nose: Nose normal.      Mouth/Throat:      Pharynx: No oropharyngeal exudate.   Eyes:      General: No scleral icterus.        Right eye: No discharge.         Left eye: No discharge.      Conjunctiva/sclera: Conjunctivae normal.      Pupils: Pupils are equal, round, and reactive to light.   Neck:      Thyroid: No thyromegaly.      Vascular: No JVD.      Trachea: No tracheal deviation.   Cardiovascular:      Rate and Rhythm: Normal rate and " regular rhythm.      Heart sounds: Normal heart sounds. No murmur heard.   No friction rub. No gallop.    Pulmonary:      Effort: Pulmonary effort is normal. No respiratory distress.      Breath sounds: Normal breath sounds. No stridor. No wheezing or rales.   Chest:      Chest wall: No tenderness.   Abdominal:      General: Bowel sounds are normal. There is no distension.      Palpations: Abdomen is soft. There is no mass.      Tenderness: There is no abdominal tenderness. There is no guarding or rebound.   Musculoskeletal:         General: No tenderness. Normal range of motion.      Cervical back: Normal range of motion and neck supple.   Lymphadenopathy:      Cervical: No cervical adenopathy.   Skin:     General: Skin is warm and dry.      Coloration: Skin is not pale.      Findings: No erythema or rash.   Neurological:      Mental Status: He is alert and oriented to person, place, and time.      Motor: No abnormal muscle tone.      Coordination: Coordination normal.      Deep Tendon Reflexes: Reflexes are normal and symmetric. Reflexes normal.   Psychiatric:         Behavior: Behavior normal.         Thought Content: Thought content normal.         Judgment: Judgment normal.                        Assessment/Plan:        1. Elevated PSA       2. Uncontrolled type 2 diabetes mellitus with hyperglycemia (HCC)  Not well controlled.  A1c proved from 9.4 -8.6.  Continue with current regimen but follow-up in 1 week with diabetic nurse Kamla to discuss either increasing Jardiance or perhaps adding Trulicity.  Or perhaps adding basal insulin.  Patient reluctant to injections however.  - Diabetic Monofilament Lower Extremity Exam  - COLOGUARD (FIT DNA)  - HEMOGLOBIN A1C; Future    3. Vitamin D deficiency       4. Mixed hyperlipidemia     - atorvastatin (LIPITOR) 80 MG tablet; Take 1 tablet by mouth every evening.  Dispense: 90 tablet; Refill: 4  - Comp Metabolic Panel; Future  - Lipid Profile; Future  - CBC WITH  DIFFERENTIAL; Future    5. Essential hypertension  Good control continue same regimen    6. Obesity (BMI 30-39.9)  High-protein low-carb diet exercise 15 pound weight reduction over the next 3 months    7. Class 2 severe obesity due to excess calories with serious comorbidity and body mass index (BMI) of 36.0 to 36.9 in adult (HCC)  Patient wants to consider appetite suppressant and also some that might help give him more energy.  Suggested short trial of phentermine.  Will review in 3 months to see how he does with this.  - phentermine 30 MG capsule; Take 1 capsule by mouth every morning for 90 days.  Dispense: 90 capsule; Refill: 0    8. Chronic fatigue  As above  - phentermine 30 MG capsule; Take 1 capsule by mouth every morning for 90 days.  Dispense: 90 capsule; Refill: 0    9. Elevated PSA, between 10 and less than 20 ng/ml  Needs urology referral.  Further evaluation by them.  - REFERRAL TO UROLOGY  - PROSTATE SPECIFIC AG DIAGNOSTIC; Future  - TESTOSTERONE, FREE AND TOTAL; Future    10. Physical deconditioning  Referral to physical therapy for possible reconditioning  - REFERRAL TO PHYSICAL THERAPY    11. Iron deficiency  Needs recheck.  Ferritin was normal but did not check iron TIBC.  - IRON/TOTAL IRON BIND; Future    12. 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    13. Need for vaccination     - Shingrix Vaccine

## 2021-08-01 PROCEDURE — RXMED WILLOW AMBULATORY MEDICATION CHARGE: Performed by: INTERNAL MEDICINE

## 2021-08-02 ENCOUNTER — NON-PROVIDER VISIT (OUTPATIENT)
Dept: MEDICAL GROUP | Age: 71
End: 2021-08-02
Payer: MEDICARE

## 2021-08-02 VITALS
SYSTOLIC BLOOD PRESSURE: 128 MMHG | HEIGHT: 70 IN | OXYGEN SATURATION: 95 % | DIASTOLIC BLOOD PRESSURE: 66 MMHG | BODY MASS INDEX: 32.35 KG/M2 | HEART RATE: 113 BPM | WEIGHT: 226 LBS

## 2021-08-02 DIAGNOSIS — E11.65 UNCONTROLLED TYPE 2 DIABETES MELLITUS WITH HYPERGLYCEMIA (HCC): ICD-10-CM

## 2021-08-02 PROCEDURE — 99211 OFF/OP EST MAY X REQ PHY/QHP: CPT | Performed by: INTERNAL MEDICINE

## 2021-08-02 RX ORDER — EMPAGLIFLOZIN 25 MG/1
1 TABLET, FILM COATED ORAL DAILY
Qty: 100 TABLET | Refills: 1 | Status: SHIPPED | OUTPATIENT
Start: 2021-08-02 | End: 2021-09-16 | Stop reason: SDUPTHER

## 2021-08-02 RX ORDER — LANCETS 30 GAUGE
EACH MISCELLANEOUS
Qty: 100 EACH | Refills: 2 | Status: SHIPPED | OUTPATIENT
Start: 2021-08-02 | End: 2023-05-11

## 2021-08-02 ASSESSMENT — FIBROSIS 4 INDEX: FIB4 SCORE: 1.280077375904374897

## 2021-08-02 NOTE — PROGRESS NOTES
"RN-CDE Note    Subjective:     HPI:  Prabhakar Sierra is a 71 y.o. old patient who is seen today for review of his uncontrolled type 2 diabetes.  This is the first time I have seen this patient    Changes in Health: complains of decrease energy for several months.      Diabetes Medications:   Metformin 1000 mg bid  (has only been taking one time per day)  Glipizide 10 mg daily  Jardiance 10 mg daily (started in May)  Taking above medications as prescribed: yes  Taking daily ASA: Yes    Exercise: not exercising at this time, but admits he knows he needs to start.   Diet: \"healthy\" diet  in general  Does admit to having a donut 2-3 times a week, eats 6 small meals/snacks per day  Patient's body mass index is 32.43 kg/m². Exercise and nutrition counseling were performed at this visit.      Health Maintenance:   Health Maintenance Due   Topic Date Due   • RETINAL SCREENING  02/27/2021         DM:   Last A1c:   Lab Results   Component Value Date/Time    HBA1C 8.6 (H) 07/26/2021 08:32 AM      Previous A1c was 9.4 on 4/16/2021  A1C GOAL: < 7    Glucose monitoring frequency: not testing blood sugars.     Hypoglycemic episodes: no    Last Retinal Exam: [facility] completed [facility] [place] [phone]  will attempt to obtain records  Daily Foot Exam: Yes     Lab Results   Component Value Date/Time    MALBCRT 60 (H) 07/26/2021 08:32 AM    MICROALBUR 7.0 07/26/2021 08:32 AM        ACR Albumin/Creatinine Ratio goal <30     HTN:   Blood pressure goal <140/<80 at toal.   Currently Rx ACE/ARB: Yes  Benazepril 40 mg daily    Dyslipidemia:    Lab Results   Component Value Date/Time    CHOLSTRLTOT 219 (H) 07/26/2021 08:32 AM     (H) 07/26/2021 08:32 AM    HDL 33 (A) 07/26/2021 08:32 AM    TRIGLYCERIDE 342 (H) 07/26/2021 08:32 AM         Currently Rx Statin: Yes  Atorvastatin 80 mg daily    He  reports that he quit smoking about 14 years ago. His smoking use included cigarettes. He has a 25.00 pack-year smoking history. He has " never used smokeless tobacco.    Objective:     Exam:  Monofilament: not done    Plan:     Discussed and educated on:   - All medications, side effects and compliance (discussed carefully)  - Annual eye examinations at Ophthalmology  - Diabetic Meal Plan: appropriate CHO value for meals, foods that contain carbs, looking up CHO values and reading food labels  - Foot Care: what to look for when checking feet every day and when to contact HCP  - Home glucose monitoring emphasized  - Weight control and daily exercise    Recommended medication changes:  Start taking Metformin twice a day as prescribed, increase Jardiance to 25 mg daily (when 10 mg runs out) and start testing blood sugars one time per day at alternating meals, record and bring results with you to next appointment.

## 2021-08-03 ENCOUNTER — PHARMACY VISIT (OUTPATIENT)
Dept: PHARMACY | Facility: MEDICAL CENTER | Age: 71
End: 2021-08-03
Payer: COMMERCIAL

## 2021-09-08 ENCOUNTER — TELEPHONE (OUTPATIENT)
Dept: MEDICAL GROUP | Age: 71
End: 2021-09-08

## 2021-09-08 DIAGNOSIS — F32.1 MODERATE SINGLE CURRENT EPISODE OF MAJOR DEPRESSIVE DISORDER (HCC): ICD-10-CM

## 2021-09-08 RX ORDER — DULOXETIN HYDROCHLORIDE 60 MG/1
60 CAPSULE, DELAYED RELEASE ORAL 2 TIMES DAILY
Qty: 180 CAPSULE | Refills: 3 | Status: SHIPPED | OUTPATIENT
Start: 2021-09-08 | End: 2021-11-11 | Stop reason: SDUPTHER

## 2021-09-08 NOTE — TELEPHONE ENCOUNTER
Phone Number Called: [phone] (home)       Call outcome: Spoke to patient regarding message below.     Message: Pt. Would like to take 60mg of the DULoxetine (CYMBALTA) in the morning and one at night. Pt. Stated because it calms him down.

## 2021-09-09 NOTE — TELEPHONE ENCOUNTER
Phone Number Called: [phone] (home)       Call outcome: Did not leave a detailed message. Requested patient to call back.    Message: 1st attempt

## 2021-09-15 ENCOUNTER — TELEPHONE (OUTPATIENT)
Dept: MEDICAL GROUP | Age: 71
End: 2021-09-15

## 2021-09-15 NOTE — TELEPHONE ENCOUNTER
ESTABLISHED PATIENT PRE-VISIT PLANNING     Patient was NOT contacted to complete PVP.     Note: Patient will not be contacted if there is no indication to call.     1.  Reviewed notes from the last few office visits within the medical group: Yes    2.  If any orders were placed at last visit or intended to be done for this visit (i.e. 6 mos follow-up), do we have Results/Consult Notes?         •  Labs - Labs ordered, but not to be completed until 1/2022.  Note: If patient appointment is for lab review and patient did not complete labs, check with provider if OK to reschedule patient until labs completed.       •  Imaging - Imaging was not ordered at last office visit.       •  Referrals - Referral ordered, patient was seen and consult notes are in chart. Care Teams updated  YES.    3. Is this appointment scheduled as a Hospital Follow-Up? No    4.  Immunizations were updated in Epic using Reconcile Outside Information activity? Yes    5.  Patient is due for the following Health Maintenance Topics:   Health Maintenance Due   Topic Date Due   • COLORECTAL CANCER SCREENING  12/19/2019   • RETINAL SCREENING  02/27/2021   • IMM INFLUENZA (1) 09/01/2021       - Patient plans to schedule appointment for Colonoscopy/FIT and Immunizations: FLU.  - Patient already has appointment scheduled for Retinal Eye Exam.    6.  AHA (Pulse8) form printed for Provider? No, already completed

## 2021-09-16 ENCOUNTER — OFFICE VISIT (OUTPATIENT)
Dept: MEDICAL GROUP | Age: 71
End: 2021-09-16
Payer: MEDICARE

## 2021-09-16 VITALS
TEMPERATURE: 97.6 F | SYSTOLIC BLOOD PRESSURE: 110 MMHG | OXYGEN SATURATION: 94 % | DIASTOLIC BLOOD PRESSURE: 56 MMHG | HEIGHT: 70 IN | BODY MASS INDEX: 31.38 KG/M2 | WEIGHT: 219.2 LBS | HEART RATE: 104 BPM

## 2021-09-16 DIAGNOSIS — R97.20 ELEVATED PSA: ICD-10-CM

## 2021-09-16 DIAGNOSIS — E66.9 OBESITY (BMI 30.0-34.9): ICD-10-CM

## 2021-09-16 DIAGNOSIS — I10 ESSENTIAL HYPERTENSION: ICD-10-CM

## 2021-09-16 DIAGNOSIS — E11.65 UNCONTROLLED TYPE 2 DIABETES MELLITUS WITH HYPERGLYCEMIA (HCC): ICD-10-CM

## 2021-09-16 DIAGNOSIS — E78.2 MIXED HYPERLIPIDEMIA: ICD-10-CM

## 2021-09-16 DIAGNOSIS — Z78.9 ALCOHOL USE: ICD-10-CM

## 2021-09-16 DIAGNOSIS — E55.9 VITAMIN D DEFICIENCY: ICD-10-CM

## 2021-09-16 PROBLEM — D50.8 IRON DEFICIENCY ANEMIA SECONDARY TO INADEQUATE DIETARY IRON INTAKE: Status: RESOLVED | Noted: 2021-05-26 | Resolved: 2021-09-16

## 2021-09-16 LAB — GLUCOSE BLD-MCNC: 253 MG/DL (ref 70–100)

## 2021-09-16 PROCEDURE — 82962 GLUCOSE BLOOD TEST: CPT | Performed by: INTERNAL MEDICINE

## 2021-09-16 PROCEDURE — 99214 OFFICE O/P EST MOD 30 MIN: CPT | Performed by: INTERNAL MEDICINE

## 2021-09-16 RX ORDER — EMPAGLIFLOZIN 25 MG/1
1 TABLET, FILM COATED ORAL DAILY
Qty: 100 TABLET | Refills: 1 | Status: SHIPPED | OUTPATIENT
Start: 2021-09-16 | End: 2022-01-21 | Stop reason: SDUPTHER

## 2021-09-16 ASSESSMENT — ENCOUNTER SYMPTOMS
MUSCULOSKELETAL NEGATIVE: 1
EYES NEGATIVE: 1
RESPIRATORY NEGATIVE: 1
PSYCHIATRIC NEGATIVE: 1
CARDIOVASCULAR NEGATIVE: 1
CONSTITUTIONAL NEGATIVE: 1
NEUROLOGICAL NEGATIVE: 1
GASTROINTESTINAL NEGATIVE: 1

## 2021-09-16 ASSESSMENT — FIBROSIS 4 INDEX: FIB4 SCORE: 1.280077375904374897

## 2021-09-16 NOTE — PROGRESS NOTES
Subjective     Harpal Sierra is a 71 y.o. male who presents with Diabetes (pt stopped taking medications and has been back on them in 8 days ), Sweats, and Loss Of Balance (pt has fallen two times in the last week )  The patient is here for followup of chronic medical problems listed below. The patient is compliant with medications and having no side effects from them. Denies chest pain, abdominal pain, dyspnea, myalgias, or cough.   Patient Active Problem List    Diagnosis Date Noted   • Obesity (BMI 30.0-34.9) 09/16/2021   • Elevated PSA 07/28/2021   • Alcohol use 01/03/2019   • Other male erectile dysfunction 04/11/2018   • Vitamin D deficiency 05/12/2017   • Right hip pain 11/14/2016   • Current moderate episode of major depressive disorder without prior episode ([facility]) 11/11/2016   • B12 deficiency 11/01/2016   • Tremor, coarse 10/25/2016   • Diabetes type 2, uncontrolled ([facility]) 09/27/2016   • GERD (gastroesophageal reflux disease) 09/27/2016   • Essential hypertension 09/27/2016   • BPH (benign prostatic hyperplasia) 09/27/2016   • Mixed hyperlipidemia 09/27/2016     No Known Allergies  Outpatient Encounter Medications as of 9/16/2021   Medication Sig Dispense Refill   • Empagliflozin (JARDIANCE) 25 MG Tab Take 1 Each by mouth every day. 100 Tablet 1   • metformin (GLUCOPHAGE) 1000 MG tablet Take 1 Tablet by mouth 2 times a day with meals. 90 Tablet 1   • DULoxetine (CYMBALTA) 60 MG Cap DR Particles delayed-release capsule Take 1 Capsule by mouth 2 times a day. 180 Capsule 3   • Blood Glucose Monitoring Suppl Device Meter: Dispense Device of Insurance Preference. Sig. Use as directed for blood sugar monitoring. #1. NR. 1 Each 0   • Blood Glucose Test Strips Test strips for meter dispensed, testing one time per day E11.65 100 Strip 2   • Lancets Lancets for meter dispensed, to test one time per day E11.65 100 Each 2   • atorvastatin (LIPITOR) 80 MG tablet Take 1 tablet by mouth every evening. 90 tablet 4    • phentermine 30 MG capsule Take 1 capsule by mouth every morning for 90 days. 90 capsule 0   • gabapentin (NEURONTIN) 300 MG Cap Take 1 capsule by mouth every day. 90 capsule 1   • terazosin (HYTRIN) 5 MG Cap Take 1 capsule by mouth every day. 90 capsule 4   • omeprazole (PRILOSEC) 20 MG delayed-release capsule Take 1 capsule by mouth every day. 90 capsule 4   • tamsulosin (FLOMAX) 0.4 MG capsule Take 1 capsule by mouth half an hour after breakfast. 90 capsule 1   • benazepril (LOTENSIN) 40 MG tablet Take 1 tablet by mouth every day. 90 tablet 4   • glipiZIDE SR (GLIPIZIDE XL) 10 MG TABLET SR 24 HR Take 1 tablet by mouth every day. 100 tablet 1   • sildenafil citrate (VIAGRA) 100 MG tablet Take 1 tablet by mouth 1 time a day as needed for Erectile Dysfunction. 90 tablet 4   • ferrous sulfate 325 (65 Fe) MG EC tablet Take 1 Tab by mouth 3 times a day, with meals. 270 Tab 1   • albuterol 108 (90 Base) MCG/ACT Aero Soln inhalation aerosol Inhale 2 Puffs by mouth every 6 hours as needed for Shortness of Breath. 8.5 g 3   • Acetaminophen 500 MG Cap Take  by mouth.     • vitamin D (CHOLECALCIFEROL) 1000 UNIT Tab Take 1,000 Units by mouth every day.     • Multiple Vitamins-Minerals (HM COMPLETE 50+ MENS ULTIMATE PO) Take 1 Tab by mouth every day.     • aspirin (ASA) 81 MG Chew Tab chewable tablet Take 81 mg by mouth every day.     • [DISCONTINUED] Empagliflozin (JARDIANCE) 25 MG Tab Take 1 Each by mouth every day. 100 tablet 1   • [DISCONTINUED] Empagliflozin (JARDIANCE) 10 MG Tab Take 1 tablet by mouth every day. 90 tablet 4   • [DISCONTINUED] metformin (GLUCOPHAGE) 1000 MG tablet Take 1 tablet by mouth 2 times a day with meals. (Patient taking differently: Take 1,000 mg by mouth every day.) 90 tablet 1   • [DISCONTINUED] B Complex Vitamins (VITAMIN B COMPLEX PO) Take 1 Tab by mouth every day.       No facility-administered encounter medications on file as of 9/16/2021.               HPI    Review of Systems  "  Constitutional: Negative.    HENT: Negative.    Eyes: Negative.    Respiratory: Negative.    Cardiovascular: Negative.    Gastrointestinal: Negative.    Genitourinary: Negative.    Musculoskeletal: Negative.    Skin: Negative.    Neurological: Negative.    Endo/Heme/Allergies: Negative.    Psychiatric/Behavioral: Negative.               Objective     /56 (BP Location: Left arm, Patient Position: Sitting, BP Cuff Size: Adult)   Pulse (!) 104   Temp 36.4 °C (97.6 °F) (Temporal)   Ht 1.778 m (5' 10\")   Wt 99.4 kg (219 lb 3.2 oz)   SpO2 94%   BMI 31.45 kg/m²      Physical Exam  Constitutional:       General: He is not in acute distress.     Appearance: He is well-developed. He is not diaphoretic.   HENT:      Head: Normocephalic and atraumatic.      Right Ear: External ear normal.      Left Ear: External ear normal.      Nose: Nose normal.      Mouth/Throat:      Pharynx: No oropharyngeal exudate.   Eyes:      General: No scleral icterus.        Right eye: No discharge.         Left eye: No discharge.      Conjunctiva/sclera: Conjunctivae normal.      Pupils: Pupils are equal, round, and reactive to light.   Neck:      Thyroid: No thyromegaly.      Vascular: No JVD.      Trachea: No tracheal deviation.   Cardiovascular:      Rate and Rhythm: Normal rate and regular rhythm.      Heart sounds: Normal heart sounds. No murmur heard.   No friction rub. No gallop.    Pulmonary:      Effort: Pulmonary effort is normal. No respiratory distress.      Breath sounds: Normal breath sounds. No stridor. No wheezing or rales.   Chest:      Chest wall: No tenderness.   Abdominal:      General: Bowel sounds are normal. There is no distension.      Palpations: Abdomen is soft. There is no mass.      Tenderness: There is no abdominal tenderness. There is no guarding or rebound.   Musculoskeletal:         General: No tenderness. Normal range of motion.      Cervical back: Normal range of motion and neck supple. "   Lymphadenopathy:      Cervical: No cervical adenopathy.   Skin:     General: Skin is warm and dry.      Coloration: Skin is not pale.      Findings: No erythema or rash.   Neurological:      Mental Status: He is alert and oriented to person, place, and time.      Motor: No abnormal muscle tone.      Coordination: Coordination normal.      Deep Tendon Reflexes: Reflexes are normal and symmetric. Reflexes normal.   Psychiatric:         Behavior: Behavior normal.         Thought Content: Thought content normal.         Judgment: Judgment normal.       No visits with results within 1 Month(s) from this visit.   Latest known visit with results is:   Hospital Outpatient Visit on 07/26/2021   Component Date Value   • Creatinine, Urine 07/26/2021 115.82    • Microalbumin, Urine Rand* 07/26/2021 7.0    • Micro Alb Creat Ratio 07/26/2021 60*   • 25-Hydroxy   Vitamin D 25 07/26/2021 34    • Ferritin 07/26/2021 42.7    • Iron 07/26/2021 138    • Total Iron Binding 07/26/2021 321    • Unsat Iron Binding 07/26/2021 183    • % Saturation 07/26/2021 43    • Prostatic Specific Antig* 07/26/2021 10.50*   • Glycohemoglobin 07/26/2021 8.6*   • Est Avg Glucose 07/26/2021 200    • WBC 07/26/2021 7.1    • RBC 07/26/2021 5.75    • Hemoglobin 07/26/2021 17.4    • Hematocrit 07/26/2021 51.6    • MCV 07/26/2021 89.7    • MCH 07/26/2021 30.3    • MCHC 07/26/2021 33.7    • RDW 07/26/2021 43.1    • Platelet Count 07/26/2021 156*   • MPV 07/26/2021 11.4    • Neutrophils-Polys 07/26/2021 63.50    • Lymphocytes 07/26/2021 26.60    • Monocytes 07/26/2021 7.50    • Eosinophils 07/26/2021 1.40    • Basophils 07/26/2021 0.70    • Immature Granulocytes 07/26/2021 0.30    • Nucleated RBC 07/26/2021 0.00    • Neutrophils (Absolute) 07/26/2021 4.49    • Lymphs (Absolute) 07/26/2021 1.88    • Monos (Absolute) 07/26/2021 0.53    • Eos (Absolute) 07/26/2021 0.10    • Baso (Absolute) 07/26/2021 0.05    • Immature Granulocytes (a* 07/26/2021 0.02    • NRBC  (Absolute) 07/26/2021 0.00    • Sodium 07/26/2021 135    • Potassium 07/26/2021 4.3    • Chloride 07/26/2021 98    • Co2 07/26/2021 25    • Anion Gap 07/26/2021 12.0    • Glucose 07/26/2021 323*   • Bun 07/26/2021 18    • Creatinine 07/26/2021 0.94    • Calcium 07/26/2021 10.3    • AST(SGOT) 07/26/2021 10*   • ALT(SGPT) 07/26/2021 13    • Alkaline Phosphatase 07/26/2021 53    • Total Bilirubin 07/26/2021 0.8    • Albumin 07/26/2021 4.7    • Total Protein 07/26/2021 7.7    • Globulin 07/26/2021 3.0    • A-G Ratio 07/26/2021 1.6    • TSH 07/26/2021 0.840    • Cholesterol,Tot 07/26/2021 219*   • Triglycerides 07/26/2021 342*   • HDL 07/26/2021 33*   • LDL 07/26/2021 118*   • Testosterone,Total 07/26/2021 319    • Sex Hormone Bind Globulin 07/26/2021 25    • Free Testosterone 07/26/2021 66    • Testosterone % Free 07/26/2021 2.1    • Fasting Status 07/26/2021 Fasting    • GFR If  07/26/2021 >60    • GFR If Non  Ameri* 07/26/2021 >60       Lab Results   Component Value Date/Time    HBA1C 8.6 (H) 07/26/2021 08:32 AM    HBA1C 9.4 (H) 04/16/2021 11:05 AM     Lab Results   Component Value Date/Time    SODIUM 135 07/26/2021 08:32 AM    POTASSIUM 4.3 07/26/2021 08:32 AM    CHLORIDE 98 07/26/2021 08:32 AM    CO2 25 07/26/2021 08:32 AM    GLUCOSE 323 (H) 07/26/2021 08:32 AM    BUN 18 07/26/2021 08:32 AM    CREATININE 0.94 07/26/2021 08:32 AM    ALKPHOSPHAT 53 07/26/2021 08:32 AM    ASTSGOT 10 (L) 07/26/2021 08:32 AM    ALTSGPT 13 07/26/2021 08:32 AM    TBILIRUBIN 0.8 07/26/2021 08:32 AM     Lab Results   Component Value Date/Time    INR 1.11 01/02/2019 07:04 PM    INR 0.95 01/18/2016 12:00 AM     Lab Results   Component Value Date/Time    CHOLSTRLTOT 219 (H) 07/26/2021 08:32 AM     (H) 07/26/2021 08:32 AM    HDL 33 (A) 07/26/2021 08:32 AM    TRIGLYCERIDE 342 (H) 07/26/2021 08:32 AM       Lab Results   Component Value Date/Time    TESTOSTERONE 319 07/26/2021 08:32 AM     No results found for: TSH  No  results found for: FREET4  No results found for: URICACID  No components found for: VITB12  Lab Results   Component Value Date/Time    25HYDROXY 34 07/26/2021 08:32 AM    25HYDROXY 37 05/17/2017 09:16 AM   '                      Assessment & Plan        1. Uncontrolled type 2 diabetes mellitus with hyperglycemia (HCC)  Not at goal. Random glucose 257 in office today.  Patient instructed on how to do fingerstick for glucose which I recommend doing 3 times a week.    Increase Jardiance from 10 to 25 mg daily.  He will use up his 10 mg pills by taking 2 a day of these until he gets his new prescription for 25 mg.  He feels that he did not get that new prescription filled.    Increase Metformin to 1000 mg twice daily with food.    Continue glipizide 10 mg daily.    If still at not at goal on this regimen, consider adding Lantus insulin or Trulicity.  Will discuss with myself and DM RN and follow-up appointment 11/1/2021.        - Empagliflozin (JARDIANCE) 25 MG Tab; Take 1 Each by mouth every day.  Dispense: 100 Tablet; Refill: 1  - metformin (GLUCOPHAGE) 1000 MG tablet; Take 1 Tablet by mouth 2 times a day with meals.  Dispense: 90 Tablet; Refill: 1  - POCT glucose    2. Elevated PSA     Referral to urology.    3. Vitamin D deficiency  Continue replacement therapy with 2000 vitamin D3 daily.    4. Mixed hyperlipidemia  Not at goal.  Recheck prior to next visit and continue on atorvastatin 80 mg daily.    5. Essential hypertension  Good control continue same regimen    6. Obesity (BMI 30.0-34.9)    diet/exercise/lose 15 lbs.; patient counseled      7. Alcohol use      Limit alcohol consumption to no more than 1 standard drink per day.

## 2021-09-20 ENCOUNTER — TELEPHONE (OUTPATIENT)
Dept: MEDICAL GROUP | Age: 71
End: 2021-09-20

## 2021-09-21 DIAGNOSIS — E11.65 UNCONTROLLED TYPE 2 DIABETES MELLITUS WITH HYPERGLYCEMIA (HCC): ICD-10-CM

## 2021-09-22 NOTE — TELEPHONE ENCOUNTER
Phone Number Called: [phone] (home)       Call outcome: Did not leave a detailed message. Requested patient to call back.    Message: Attempt #1 to discuss insurance guidelines regarding request for continuous glucose monitoring

## 2021-09-23 DIAGNOSIS — E11.65 UNCONTROLLED TYPE 2 DIABETES MELLITUS WITH HYPERGLYCEMIA (HCC): ICD-10-CM

## 2021-09-23 RX ORDER — PROCHLORPERAZINE 25 MG/1
1 SUPPOSITORY RECTAL
Qty: 3 EACH | Refills: 6 | Status: SHIPPED | OUTPATIENT
Start: 2021-09-23 | End: 2021-11-01

## 2021-09-23 RX ORDER — PROCHLORPERAZINE 25 MG/1
1 SUPPOSITORY RECTAL CONTINUOUS
Qty: 1 EACH | Refills: 0 | Status: SHIPPED | OUTPATIENT
Start: 2021-09-23 | End: 2021-11-01

## 2021-09-23 NOTE — TELEPHONE ENCOUNTER
Called and spoke to pt and his wife, Heidy on speaker phone. Let them know the continuous glucose monitoring devices is only covered by insurance if pt is taking insulin, which pt is not. Pt stated he has talked to [facility] and was told they would cover 80% of the cost and pt would be responsible for 20%. Pt stated he is agreeable to pay 20%.  Pt's wife stated she talked with the pharmacist, Yoni, at [facility] on [address] and they can provide this to the pt with a prescription. Order for transmitter, sensor, and  will be sent to [facility]. Once received, can set up appt with diabetic RN to help set up and provide education.     Pt stated he is feeling fatigued. Encouraged pt to continue working to get blood sugar under control with medication management, diet changes and exercise. Pt stated he has been compliant with his meds, his wife is cooking healthy meals and he is also walking for exercise. Let pt know this will be a work in progress and not to get discouraged if it take a couple months to notice a consistent decline in his blood sugar and an increase in his energy. Pt has not checked his fingerstick since being shown in PCP office, but wife stated they will check it today and feel comfortable doing it. Pt and his wife verbalized understanding to all.

## 2021-10-26 ENCOUNTER — HOSPITAL ENCOUNTER (OUTPATIENT)
Dept: LAB | Facility: MEDICAL CENTER | Age: 71
End: 2021-10-26
Attending: INTERNAL MEDICINE
Payer: MEDICARE

## 2021-10-26 DIAGNOSIS — E61.1 IRON DEFICIENCY: ICD-10-CM

## 2021-10-26 DIAGNOSIS — E11.65 UNCONTROLLED TYPE 2 DIABETES MELLITUS WITH HYPERGLYCEMIA (HCC): ICD-10-CM

## 2021-10-26 DIAGNOSIS — R97.20 ELEVATED PSA, BETWEEN 10 AND LESS THAN 20 NG/ML: ICD-10-CM

## 2021-10-26 DIAGNOSIS — E78.2 MIXED HYPERLIPIDEMIA: ICD-10-CM

## 2021-10-26 LAB
ALBUMIN SERPL BCP-MCNC: 4.9 G/DL (ref 3.2–4.9)
ALBUMIN/GLOB SERPL: 1.8 G/DL
ALP SERPL-CCNC: 47 U/L (ref 30–99)
ALT SERPL-CCNC: 15 U/L (ref 2–50)
ANION GAP SERPL CALC-SCNC: 15 MMOL/L (ref 7–16)
AST SERPL-CCNC: 16 U/L (ref 12–45)
BASOPHILS # BLD AUTO: 0.6 % (ref 0–1.8)
BASOPHILS # BLD: 0.04 K/UL (ref 0–0.12)
BILIRUB SERPL-MCNC: 0.8 MG/DL (ref 0.1–1.5)
BUN SERPL-MCNC: 17 MG/DL (ref 8–22)
CALCIUM SERPL-MCNC: 9.5 MG/DL (ref 8.5–10.5)
CHLORIDE SERPL-SCNC: 96 MMOL/L (ref 96–112)
CHOLEST SERPL-MCNC: 121 MG/DL (ref 100–199)
CO2 SERPL-SCNC: 22 MMOL/L (ref 20–33)
CREAT SERPL-MCNC: 0.84 MG/DL (ref 0.5–1.4)
EOSINOPHIL # BLD AUTO: 0.07 K/UL (ref 0–0.51)
EOSINOPHIL NFR BLD: 1 % (ref 0–6.9)
ERYTHROCYTE [DISTWIDTH] IN BLOOD BY AUTOMATED COUNT: 45.5 FL (ref 35.9–50)
EST. AVERAGE GLUCOSE BLD GHB EST-MCNC: 174 MG/DL
GLOBULIN SER CALC-MCNC: 2.8 G/DL (ref 1.9–3.5)
GLUCOSE SERPL-MCNC: 159 MG/DL (ref 65–99)
HBA1C MFR BLD: 7.7 % (ref 4–5.6)
HCT VFR BLD AUTO: 49.6 % (ref 42–52)
HDLC SERPL-MCNC: 40 MG/DL
HGB BLD-MCNC: 16.6 G/DL (ref 14–18)
IMM GRANULOCYTES # BLD AUTO: 0.02 K/UL (ref 0–0.11)
IMM GRANULOCYTES NFR BLD AUTO: 0.3 % (ref 0–0.9)
IRON SATN MFR SERPL: 26 % (ref 15–55)
IRON SERPL-MCNC: 82 UG/DL (ref 50–180)
LDLC SERPL CALC-MCNC: 53 MG/DL
LYMPHOCYTES # BLD AUTO: 1.79 K/UL (ref 1–4.8)
LYMPHOCYTES NFR BLD: 24.6 % (ref 22–41)
MCH RBC QN AUTO: 29.6 PG (ref 27–33)
MCHC RBC AUTO-ENTMCNC: 33.5 G/DL (ref 33.7–35.3)
MCV RBC AUTO: 88.4 FL (ref 81.4–97.8)
MONOCYTES # BLD AUTO: 0.42 K/UL (ref 0–0.85)
MONOCYTES NFR BLD AUTO: 5.8 % (ref 0–13.4)
NEUTROPHILS # BLD AUTO: 4.93 K/UL (ref 1.82–7.42)
NEUTROPHILS NFR BLD: 67.7 % (ref 44–72)
NRBC # BLD AUTO: 0 K/UL
NRBC BLD-RTO: 0 /100 WBC
PLATELET # BLD AUTO: 143 K/UL (ref 164–446)
PMV BLD AUTO: 11.6 FL (ref 9–12.9)
POTASSIUM SERPL-SCNC: 4.8 MMOL/L (ref 3.6–5.5)
PROT SERPL-MCNC: 7.7 G/DL (ref 6–8.2)
RBC # BLD AUTO: 5.61 M/UL (ref 4.7–6.1)
SODIUM SERPL-SCNC: 133 MMOL/L (ref 135–145)
TIBC SERPL-MCNC: 310 UG/DL (ref 250–450)
TRIGL SERPL-MCNC: 142 MG/DL (ref 0–149)
UIBC SERPL-MCNC: 228 UG/DL (ref 110–370)
WBC # BLD AUTO: 7.3 K/UL (ref 4.8–10.8)

## 2021-10-26 PROCEDURE — 80061 LIPID PANEL: CPT

## 2021-10-26 PROCEDURE — 83550 IRON BINDING TEST: CPT

## 2021-10-26 PROCEDURE — 85025 COMPLETE CBC W/AUTO DIFF WBC: CPT

## 2021-10-26 PROCEDURE — 84270 ASSAY OF SEX HORMONE GLOBUL: CPT

## 2021-10-26 PROCEDURE — 84153 ASSAY OF PSA TOTAL: CPT

## 2021-10-26 PROCEDURE — 84403 ASSAY OF TOTAL TESTOSTERONE: CPT

## 2021-10-26 PROCEDURE — 80053 COMPREHEN METABOLIC PANEL: CPT

## 2021-10-26 PROCEDURE — 84402 ASSAY OF FREE TESTOSTERONE: CPT

## 2021-10-26 PROCEDURE — 83036 HEMOGLOBIN GLYCOSYLATED A1C: CPT

## 2021-10-26 PROCEDURE — 83540 ASSAY OF IRON: CPT

## 2021-10-26 PROCEDURE — 36415 COLL VENOUS BLD VENIPUNCTURE: CPT

## 2021-10-27 ENCOUNTER — TELEPHONE (OUTPATIENT)
Dept: MEDICAL GROUP | Age: 71
End: 2021-10-27

## 2021-10-27 LAB — PSA SERPL-MCNC: 13.2 NG/ML (ref 0–4)

## 2021-10-27 NOTE — TELEPHONE ENCOUNTER
ESTABLISHED PATIENT PRE-VISIT PLANNING     Patient was NOT contacted to complete PVP.     Note: Patient will not be contacted if there is no indication to call.     1.  Reviewed notes from the last few office visits within the medical group: Yes    2.  If any orders were placed at last visit or intended to be done for this visit (i.e. 6 mos follow-up), do we have Results/Consult Notes?         •  Labs - Labs ordered, completed on 10/26/2021 and results are in chart.  Note: If patient appointment is for lab review and patient did not complete labs, check with provider if OK to reschedule patient until labs completed.       •  Imaging - Imaging was not ordered at last office visit.       •  Referrals - Referral ordered, patient has NOT been seen.    3. Is this appointment scheduled as a Hospital Follow-Up? No    4.  Immunizations were updated in Epic using Reconcile Outside Information activity? Yes    5.  Patient is due for the following Health Maintenance Topics:   Health Maintenance Due   Topic Date Due   • COLORECTAL CANCER SCREENING  12/19/2019   • IMM INFLUENZA (1) 09/01/2021       - Patient plans to schedule appointment for Colonoscopy/FIT and Immunizations: FLU.    6.  AHA (Pulse8) form printed for Provider? No, already completed

## 2021-10-28 ENCOUNTER — HOSPITAL ENCOUNTER (OUTPATIENT)
Facility: MEDICAL CENTER | Age: 71
End: 2021-10-28
Attending: UROLOGY
Payer: MEDICARE

## 2021-10-28 LAB
SHBG SERPL-SCNC: 40 NMOL/L (ref 11–80)
TESTOST FREE MFR SERPL: 1.6 % (ref 1.6–2.9)
TESTOST FREE SERPL-MCNC: 58 PG/ML (ref 47–244)
TESTOST SERPL-MCNC: 357 NG/DL (ref 300–720)

## 2021-10-28 PROCEDURE — 87086 URINE CULTURE/COLONY COUNT: CPT

## 2021-10-29 ENCOUNTER — TELEPHONE (OUTPATIENT)
Dept: HEALTH INFORMATION MANAGEMENT | Facility: OTHER | Age: 71
End: 2021-10-29

## 2021-10-29 NOTE — TELEPHONE ENCOUNTER
Outcome: Left Message to schedule a preventative screening.    Please transfer to Patient Outreach Team at [phone] when patient returns call.      Attempt # 1

## 2021-10-31 LAB
BACTERIA UR CULT: NORMAL
SIGNIFICANT IND 70042: NORMAL
SITE SITE: NORMAL
SOURCE SOURCE: NORMAL

## 2021-11-01 ENCOUNTER — OFFICE VISIT (OUTPATIENT)
Dept: MEDICAL GROUP | Age: 71
End: 2021-11-01
Payer: MEDICARE

## 2021-11-01 VITALS
HEIGHT: 70 IN | BODY MASS INDEX: 30.35 KG/M2 | SYSTOLIC BLOOD PRESSURE: 122 MMHG | WEIGHT: 212 LBS | DIASTOLIC BLOOD PRESSURE: 76 MMHG

## 2021-11-01 DIAGNOSIS — F32.1 CURRENT MODERATE EPISODE OF MAJOR DEPRESSIVE DISORDER WITHOUT PRIOR EPISODE (HCC): ICD-10-CM

## 2021-11-01 DIAGNOSIS — E11.65 UNCONTROLLED TYPE 2 DIABETES MELLITUS WITH HYPERGLYCEMIA (HCC): ICD-10-CM

## 2021-11-01 DIAGNOSIS — E78.2 MIXED HYPERLIPIDEMIA: ICD-10-CM

## 2021-11-01 DIAGNOSIS — K21.9 GASTROESOPHAGEAL REFLUX DISEASE, UNSPECIFIED WHETHER ESOPHAGITIS PRESENT: ICD-10-CM

## 2021-11-01 DIAGNOSIS — R97.20 ELEVATED PSA, BETWEEN 10 AND LESS THAN 20 NG/ML: ICD-10-CM

## 2021-11-01 DIAGNOSIS — E66.9 OBESITY (BMI 30.0-34.9): ICD-10-CM

## 2021-11-01 DIAGNOSIS — N40.0 BENIGN PROSTATIC HYPERPLASIA WITHOUT LOWER URINARY TRACT SYMPTOMS: ICD-10-CM

## 2021-11-01 DIAGNOSIS — I10 ESSENTIAL HYPERTENSION: ICD-10-CM

## 2021-11-01 PROCEDURE — 99204 OFFICE O/P NEW MOD 45 MIN: CPT | Performed by: INTERNAL MEDICINE

## 2021-11-01 RX ORDER — PHENTERMINE HYDROCHLORIDE 30 MG/1
30 CAPSULE ORAL EVERY MORNING
COMMUNITY
End: 2022-05-16 | Stop reason: SINTOL

## 2021-11-01 ASSESSMENT — ENCOUNTER SYMPTOMS
GASTROINTESTINAL NEGATIVE: 1
MUSCULOSKELETAL NEGATIVE: 1
NEUROLOGICAL NEGATIVE: 1
EYES NEGATIVE: 1
CONSTITUTIONAL NEGATIVE: 1
RESPIRATORY NEGATIVE: 1
CARDIOVASCULAR NEGATIVE: 1
PSYCHIATRIC NEGATIVE: 1

## 2021-11-01 ASSESSMENT — FIBROSIS 4 INDEX: FIB4 SCORE: 2.08

## 2021-11-01 NOTE — PROGRESS NOTES
"RN-CDE Note    Subjective:     HPI:  Prabhakar Sierra is a 71 y.o. old patient who is seen by the Diabetes Nurse Secialist today for review of his type 2 diabetes.    Changes in Health: denies any changes.     Diabetes Medications:   Metformin 1000 mg bid  Glipizide 10 mg daily  Jardiance 25 mg bid   Taking above medications as prescribed: yes  Taking daily ASA: Yes    Exercise: very little exercise.   Diet: \"healthy\" diet  in general  Patient's body mass index is 30.42 kg/m². Exercise and nutrition counseling were performed at this visit.      Health Maintenance:   Health Maintenance Due   Topic Date Due   • COLORECTAL CANCER SCREENING  12/19/2019   • IMM INFLUENZA (1) 09/01/2021         DM:   Last A1c:   Lab Results   Component Value Date/Time    HBA1C 7.7 (H) 10/26/2021 10:59 AM      Previous A1c was 8.6 on 7/26/21  A1C GOAL: < 7    Glucose monitoring frequency: not testing.     Hypoglycemic episodes: no    Last Retinal Exam: on file and up-to-date      Lab Results   Component Value Date/Time    MALBCRT 60 (H) 07/26/2021 08:32 AM    MICROALBUR 7.0 07/26/2021 08:32 AM        ACR Albumin/Creatinine Ratio goal <30     HTN:   Blood pressure goal <140/<80 .   Currently Rx ACE/ARB: Yes     Dyslipidemia:    Lab Results   Component Value Date/Time    CHOLSTRLTOT 121 10/26/2021 10:59 AM    LDL 53 10/26/2021 10:59 AM    HDL 40 10/26/2021 10:59 AM    TRIGLYCERIDE 142 10/26/2021 10:59 AM         Currently Rx Statin: Yes     He  reports that he quit smoking about 14 years ago. His smoking use included cigarettes. He has a 25.00 pack-year smoking history. He has never used smokeless tobacco.      Plan:     Discussed and educated on:   - All medications, side effects and compliance (discussed carefully)  - Annual eye examinations at Ophthalmology  - Foot Care: what to look for when checking feet every day  - HbA1C: target  - Home glucose monitoring emphasized  - Weight control and daily exercise    Recommended " medication changes: no change in medications, encouraged to exercise more.  10 minutes 3 times a day.

## 2021-11-02 NOTE — PROGRESS NOTES
"Subjective     Harpal Sierra is a 71 y.o. male who presents with No chief complaint on file.  The patient is here for followup of chronic medical problems listed below. The patient is compliant with medications and having no side effects from them. Denies chest pain, abdominal pain, dyspnea, myalgias, or cough.     Patient is also here for follow-up of his diabetes was under good control followed by DM RN and no changes were made.    Patient also here for follow-up of his elevated PSA and it has risen doubling nearly every year for the last 4 years.  He was seen in consultation by urology who apparently recorded information not wholly accurate with the patient's history.  For example it was stated that he had a previous prostate   biopsy which was negative in April 2020 but patient has never had a prostate biopsy.  The patient had previously been placed on alpha-blocker by physician prior to my seeing visit new patient last year for unclear reasons since the patient denies any obstructive urological symptoms.  No history of kidney stones.  I explained the patient I will call the patient's urologist and explained these discrepancies and ask them to reevaluate the patient and correct the medical record.        HPI    Review of Systems   Constitutional: Negative.    HENT: Negative.    Eyes: Negative.    Respiratory: Negative.    Cardiovascular: Negative.    Gastrointestinal: Negative.    Genitourinary: Negative.    Musculoskeletal: Negative.    Skin: Negative.    Neurological: Negative.    Endo/Heme/Allergies: Negative.    Psychiatric/Behavioral: Negative.               Objective     /76 (BP Location: Left arm, Patient Position: Sitting, BP Cuff Size: Adult)   Ht 1.778 m (5' 10\")   Wt 96.2 kg (212 lb)   BMI 30.42 kg/m²      Physical Exam  Constitutional:       General: He is not in acute distress.     Appearance: He is well-developed. He is not diaphoretic.   HENT:      Head: Normocephalic and " atraumatic.      Right Ear: External ear normal.      Left Ear: External ear normal.      Nose: Nose normal.      Mouth/Throat:      Pharynx: No oropharyngeal exudate.   Eyes:      General: No scleral icterus.        Right eye: No discharge.         Left eye: No discharge.      Conjunctiva/sclera: Conjunctivae normal.      Pupils: Pupils are equal, round, and reactive to light.   Neck:      Thyroid: No thyromegaly.      Vascular: No JVD.      Trachea: No tracheal deviation.   Cardiovascular:      Rate and Rhythm: Normal rate and regular rhythm.      Heart sounds: Normal heart sounds. No murmur heard.   No friction rub. No gallop.    Pulmonary:      Effort: Pulmonary effort is normal. No respiratory distress.      Breath sounds: Normal breath sounds. No stridor. No wheezing or rales.   Chest:      Chest wall: No tenderness.   Abdominal:      General: Bowel sounds are normal. There is no distension.      Palpations: Abdomen is soft. There is no mass.      Tenderness: There is no abdominal tenderness. There is no guarding or rebound.   Musculoskeletal:         General: No tenderness. Normal range of motion.      Cervical back: Normal range of motion and neck supple.   Lymphadenopathy:      Cervical: No cervical adenopathy.   Skin:     General: Skin is warm and dry.      Coloration: Skin is not pale.      Findings: No erythema or rash.   Neurological:      Mental Status: He is alert and oriented to person, place, and time.      Motor: No abnormal muscle tone.      Coordination: Coordination normal.      Deep Tendon Reflexes: Reflexes are normal and symmetric. Reflexes normal.   Psychiatric:         Behavior: Behavior normal.         Thought Content: Thought content normal.         Judgment: Judgment normal.                              Assessment & Plan        1. Elevated PSA, between 10 and less than 20 ng/ml          Referral back to urology.  Recheck the PSA.  Call the urologist and explained that the patient has  never had a prostate biopsy, and that his PSA has doubled nearly every year over the last 4 years and is now up to 13 from a previous to 4 years ago.  We will check MRI of the prostate as well.  But patient probably will need a prostate biopsy.  - MR-PELVIS W/O; Future  - PROSTATE SPECIFIC AG DIAGNOSTIC; Future    2. Uncontrolled type 2 diabetes mellitus with hyperglycemia (HCC)  Good control continue current regimen  - Comp Metabolic Panel; Future  - Lipid Profile; Future  - CBC WITH DIFFERENTIAL; Future  - HEMOGLOBIN A1C; Future    3. Mixed hyperlipidemia  Good control continue current regimen- Comp Metabolic Panel; Future  - Lipid Profile; Future  - CBC WITH DIFFERENTIAL; Future    4. Obesity (BMI 30.0-34.9)     diet/exercise/lose 15 lbs.; patient counseled      5. Essential hypertension     Under good control. Continue same regimen.  6. Current moderate episode of major depressive disorder without prior episode (HCC)    Under good control. Continue same regimen.    7. Benign prostatic hyperplasia without lower urinary tract symptoms  Patient is currently asymptomatic.  But he is on 2 alpha blockers.  He will discontinue the Terazosin and continue the tamsulosin only.  But I instructed him to finish off his current supply of Terazosin for cost reasons.  But not to take both alpha blockers at the same time.    8. Gastroesophageal reflux disease, unspecified whether esophagitis present     Good control continue same regimen

## 2021-11-11 DIAGNOSIS — F32.1 MODERATE SINGLE CURRENT EPISODE OF MAJOR DEPRESSIVE DISORDER (HCC): ICD-10-CM

## 2021-11-11 RX ORDER — DULOXETIN HYDROCHLORIDE 60 MG/1
60 CAPSULE, DELAYED RELEASE ORAL 2 TIMES DAILY
Qty: 180 CAPSULE | Refills: 3 | Status: SHIPPED | OUTPATIENT
Start: 2021-11-11 | End: 2022-01-21 | Stop reason: SDUPTHER

## 2021-11-11 RX ORDER — DULOXETIN HYDROCHLORIDE 60 MG/1
60 CAPSULE, DELAYED RELEASE ORAL 2 TIMES DAILY
Qty: 180 CAPSULE | Refills: 3 | Status: SHIPPED | OUTPATIENT
Start: 2021-11-11 | End: 2021-11-11 | Stop reason: SDUPTHER

## 2021-11-11 NOTE — TELEPHONE ENCOUNTER
"Received request via: Patient    Was the patient seen in the last year in this department? Yes    Does the patient have an active prescription (recently filled or refills available) for medication(s) requested? No       Patient is out of Duloxetine, and needs a new prescription sent over to \"[facility] - [place], [place] - [address]\" because he is no longer using the [facility].     Please Advise?   "

## 2021-11-23 DIAGNOSIS — R53.82 CHRONIC FATIGUE: ICD-10-CM

## 2021-11-23 DIAGNOSIS — E66.01 CLASS 2 SEVERE OBESITY DUE TO EXCESS CALORIES WITH SERIOUS COMORBIDITY AND BODY MASS INDEX (BMI) OF 36.0 TO 36.9 IN ADULT (HCC): ICD-10-CM

## 2021-11-23 RX ORDER — PHENTERMINE HYDROCHLORIDE 30 MG/1
30 CAPSULE ORAL EVERY MORNING
Qty: 90 CAPSULE | Refills: 0 | Status: SHIPPED | OUTPATIENT
Start: 2022-03-23 | End: 2022-03-07

## 2021-11-23 RX ORDER — PHENTERMINE HYDROCHLORIDE 30 MG/1
30 CAPSULE ORAL EVERY MORNING
Qty: 90 CAPSULE | Refills: 0 | Status: SHIPPED | OUTPATIENT
Start: 2022-01-23 | End: 2022-02-22

## 2021-11-23 RX ORDER — PHENTERMINE HYDROCHLORIDE 30 MG/1
30 CAPSULE ORAL EVERY MORNING
Qty: 90 CAPSULE | Refills: 0 | Status: SHIPPED | OUTPATIENT
Start: 2021-12-23 | End: 2022-01-22

## 2021-11-23 RX ORDER — PHENTERMINE HYDROCHLORIDE 30 MG/1
30 CAPSULE ORAL EVERY MORNING
Qty: 90 CAPSULE | Refills: 0 | Status: SHIPPED | OUTPATIENT
Start: 2022-02-23 | End: 2022-03-07

## 2021-11-23 RX ORDER — PHENTERMINE HYDROCHLORIDE 30 MG/1
30 CAPSULE ORAL EVERY MORNING
Qty: 90 CAPSULE | Refills: 0 | Status: SHIPPED | OUTPATIENT
Start: 2021-11-23 | End: 2021-11-24 | Stop reason: SDUPTHER

## 2021-11-24 DIAGNOSIS — R53.82 CHRONIC FATIGUE: ICD-10-CM

## 2021-11-24 DIAGNOSIS — E66.01 CLASS 2 SEVERE OBESITY DUE TO EXCESS CALORIES WITH SERIOUS COMORBIDITY AND BODY MASS INDEX (BMI) OF 36.0 TO 36.9 IN ADULT (HCC): ICD-10-CM

## 2021-11-24 RX ORDER — PHENTERMINE HYDROCHLORIDE 30 MG/1
30 CAPSULE ORAL EVERY MORNING
Qty: 90 CAPSULE | Refills: 0 | Status: SHIPPED | OUTPATIENT
Start: 2021-11-24 | End: 2022-02-22

## 2021-12-10 DIAGNOSIS — I10 ESSENTIAL HYPERTENSION: ICD-10-CM

## 2021-12-13 RX ORDER — BENAZEPRIL HYDROCHLORIDE 40 MG/1
TABLET ORAL
Qty: 100 TABLET | Refills: 1 | Status: SHIPPED | OUTPATIENT
Start: 2021-12-13 | End: 2021-12-20 | Stop reason: SDUPTHER

## 2021-12-20 ENCOUNTER — TELEPHONE (OUTPATIENT)
Dept: HEALTH INFORMATION MANAGEMENT | Facility: OTHER | Age: 71
End: 2021-12-20

## 2021-12-20 DIAGNOSIS — I10 ESSENTIAL HYPERTENSION: ICD-10-CM

## 2021-12-20 DIAGNOSIS — E11.42 CONTROLLED TYPE 2 DIABETES MELLITUS WITH DIABETIC POLYNEUROPATHY, WITHOUT LONG-TERM CURRENT USE OF INSULIN (HCC): ICD-10-CM

## 2021-12-20 RX ORDER — BENAZEPRIL HYDROCHLORIDE 40 MG/1
40 TABLET ORAL DAILY
Qty: 100 TABLET | Refills: 4 | Status: SHIPPED | OUTPATIENT
Start: 2021-12-20 | End: 2022-07-26 | Stop reason: SDUPTHER

## 2021-12-20 RX ORDER — GABAPENTIN 300 MG/1
300 CAPSULE ORAL DAILY
Qty: 90 CAPSULE | Refills: 4 | Status: SHIPPED | OUTPATIENT
Start: 2021-12-20 | End: 2022-01-21 | Stop reason: SDUPTHER

## 2021-12-20 NOTE — TELEPHONE ENCOUNTER
Outcome: Outbound Call HIPAA verified. Called mbr back as requested mbr was stating that he wanted to go over all his medications that he is taking. Mbr notified that in my position what I am able to do is Request medication refills from his PCP if he if he is running low on medications or check copays for medications but, if he has specific medication questions about his medications it would be best to talk to the provider who prescribed the medication for specific medication questions. Mbr asked if I can text or email him my number and information and mbr notified that I will send him a letter in the mail with my information on it so that he has my number and a description of things that I can do as his PA. All was understood.     Please transfer to Patient Outreach Team at [phone] when patient returns call.    Attempt # 2

## 2021-12-20 NOTE — TELEPHONE ENCOUNTER
"Warm transfer from Ivan at [facility], HIPAA verified mbr for medications refill request. Mbr states his pharmacy [facility] stated they have been trying to reach PCP for medications Gabapentin and Benazepril refills. Provided mbr with [facility]  contact information and informed I will send her a message to call and introduce. Mbr states he wants to have his medications \"in line\". Encouraged mbr to have medications nearby for when Kelly contacts. Send PCP refill request. No further needs at this time.     "

## 2021-12-20 NOTE — TELEPHONE ENCOUNTER
Outcome: Called patient to follow up with him SHILOH Segundo stated that he requested to speak with me since I am his . Patient requested a call back in about 30 minutes to go over medication.     Please transfer to Patient Outreach Team at [phone] when patient returns call.    Attempt # 1

## 2021-12-20 NOTE — TELEPHONE ENCOUNTER
Received request via: Patient    Was the patient seen in the last year in this department? Yes    Does the patient have an active prescription (recently filled or refills available) for medication(s) requested? Guera Eid,       This patient called in today stating his pharmacy [facility] stated they have been trying to reach you for medications for Gabapentin and Benazepril refills. Please contact patient if further questions.    Thank you,  Ratna [facility] Coordinator

## 2022-01-10 DIAGNOSIS — K21.00 GASTROESOPHAGEAL REFLUX DISEASE WITH ESOPHAGITIS WITHOUT HEMORRHAGE: ICD-10-CM

## 2022-01-10 DIAGNOSIS — E11.65 UNCONTROLLED TYPE 2 DIABETES MELLITUS WITH HYPERGLYCEMIA (HCC): ICD-10-CM

## 2022-01-10 NOTE — TELEPHONE ENCOUNTER
Received request via: Pharmacy    Was the patient seen in the last year in this department? Yes    Does the patient have an active prescription (recently filled or refills available) for medication(s) requested? Yes insurance requesting 100 day supply

## 2022-01-11 DIAGNOSIS — K21.00 GASTROESOPHAGEAL REFLUX DISEASE WITH ESOPHAGITIS WITHOUT HEMORRHAGE: ICD-10-CM

## 2022-01-11 RX ORDER — OMEPRAZOLE 20 MG/1
CAPSULE, DELAYED RELEASE ORAL
Qty: 100 CAPSULE | Refills: 4 | Status: SHIPPED | OUTPATIENT
Start: 2022-01-11 | End: 2022-01-21 | Stop reason: SDUPTHER

## 2022-01-11 RX ORDER — OMEPRAZOLE 20 MG/1
CAPSULE, DELAYED RELEASE ORAL
Qty: 90 CAPSULE | Refills: 4 | Status: SHIPPED | OUTPATIENT
Start: 2022-01-11 | End: 2022-01-11 | Stop reason: SDUPTHER

## 2022-01-11 NOTE — TELEPHONE ENCOUNTER
Received request via: Pharmacy    Was the patient seen in the last year in this department? Yes  LOV : 11/1/2021     Does the patient have an active prescription (recently filled or refills available) for medication(s) requested? No

## 2022-01-11 NOTE — TELEPHONE ENCOUNTER
Received request via: Pharmacy    Was the patient seen in the last year in this department? Yes    Does the patient have an active prescription (recently filled or refills available) for medication(s) requested? Yes [facility]

## 2022-01-13 ENCOUNTER — HOSPITAL ENCOUNTER (OUTPATIENT)
Dept: RADIOLOGY | Facility: MEDICAL CENTER | Age: 72
End: 2022-01-13
Attending: INTERNAL MEDICINE
Payer: MEDICARE

## 2022-01-13 DIAGNOSIS — R97.20 ELEVATED PSA, BETWEEN 10 AND LESS THAN 20 NG/ML: ICD-10-CM

## 2022-01-13 PROCEDURE — 700111 HCHG RX REV CODE 636 W/ 250 OVERRIDE (IP): Performed by: RADIOLOGY

## 2022-01-13 PROCEDURE — 700117 HCHG RX CONTRAST REV CODE 255: Performed by: INTERNAL MEDICINE

## 2022-01-13 PROCEDURE — A9576 INJ PROHANCE MULTIPACK: HCPCS | Performed by: INTERNAL MEDICINE

## 2022-01-13 PROCEDURE — 72197 MRI PELVIS W/O & W/DYE: CPT | Mod: MF

## 2022-01-13 RX ADMIN — GADOTERIDOL 20 ML: 279.3 INJECTION, SOLUTION INTRAVENOUS at 16:30

## 2022-01-13 RX ADMIN — GLUCAGON 1 MG: 1 INJECTION, POWDER, LYOPHILIZED, FOR SOLUTION INTRAMUSCULAR; INTRAVENOUS at 15:50

## 2022-01-14 ENCOUNTER — TELEPHONE (OUTPATIENT)
Dept: MEDICAL GROUP | Age: 72
End: 2022-01-14

## 2022-01-14 NOTE — TELEPHONE ENCOUNTER
1. Caller Name: Prabhakar Sierra                        Call Back Number: [phone] (home)       How would the patient prefer to be contacted with a response: Phone call do NOT leave a detailed message    2. Patient is requesting imaging - MRI results dated: 01/13/2022    3. Confirmed results are in chart. Patient advised they will be contacted once interpreted by provider.

## 2022-01-17 ENCOUNTER — TELEPHONE (OUTPATIENT)
Dept: MEDICAL GROUP | Age: 72
End: 2022-01-17

## 2022-01-17 NOTE — TELEPHONE ENCOUNTER
Phone Number Called: [phone] (home)      Call outcome: Spoke to patient regarding message below.    Message: Patient notified

## 2022-01-17 NOTE — TELEPHONE ENCOUNTER
DOCUMENTATION OF PAR STATUS:    1. Name of Medication & Dose: Dexcom 6     2. Name of Prescription Coverage Company & phone #: [employer]     3. Date Prior Auth Submitted: 1/17/22    4. What information was given to obtain insurance decision? A1C and ICD 10    5. Prior Auth Status? Pending    6. Patient Notified: no

## 2022-01-21 ENCOUNTER — TELEPHONE (OUTPATIENT)
Dept: MEDICAL GROUP | Age: 72
End: 2022-01-21

## 2022-01-21 DIAGNOSIS — E78.2 MIXED HYPERLIPIDEMIA: ICD-10-CM

## 2022-01-21 DIAGNOSIS — R39.16 BENIGN PROSTATIC HYPERPLASIA (BPH) WITH STRAINING ON URINATION: ICD-10-CM

## 2022-01-21 DIAGNOSIS — N40.1 BENIGN PROSTATIC HYPERPLASIA WITH INCOMPLETE BLADDER EMPTYING: ICD-10-CM

## 2022-01-21 DIAGNOSIS — K21.00 GASTROESOPHAGEAL REFLUX DISEASE WITH ESOPHAGITIS WITHOUT HEMORRHAGE: ICD-10-CM

## 2022-01-21 DIAGNOSIS — E11.65 UNCONTROLLED TYPE 2 DIABETES MELLITUS WITH HYPERGLYCEMIA (HCC): ICD-10-CM

## 2022-01-21 DIAGNOSIS — N41.2 PROSTATE ABSCESS: ICD-10-CM

## 2022-01-21 DIAGNOSIS — E11.42 CONTROLLED TYPE 2 DIABETES MELLITUS WITH DIABETIC POLYNEUROPATHY, WITHOUT LONG-TERM CURRENT USE OF INSULIN (HCC): ICD-10-CM

## 2022-01-21 DIAGNOSIS — F32.1 MODERATE SINGLE CURRENT EPISODE OF MAJOR DEPRESSIVE DISORDER (HCC): ICD-10-CM

## 2022-01-21 DIAGNOSIS — N40.1 BENIGN PROSTATIC HYPERPLASIA (BPH) WITH STRAINING ON URINATION: ICD-10-CM

## 2022-01-21 DIAGNOSIS — R39.14 BENIGN PROSTATIC HYPERPLASIA WITH INCOMPLETE BLADDER EMPTYING: ICD-10-CM

## 2022-01-21 RX ORDER — GLIPIZIDE 10 MG/1
10 TABLET, FILM COATED, EXTENDED RELEASE ORAL DAILY
Qty: 100 TABLET | Refills: 1 | Status: SHIPPED | OUTPATIENT
Start: 2022-01-21 | End: 2022-07-26 | Stop reason: SDUPTHER

## 2022-01-21 RX ORDER — ALBUTEROL SULFATE 90 UG/1
2 AEROSOL, METERED RESPIRATORY (INHALATION) EVERY 6 HOURS PRN
Qty: 8.5 G | Refills: 3 | Status: ON HOLD | OUTPATIENT
Start: 2022-01-21 | End: 2023-01-05

## 2022-01-21 RX ORDER — EMPAGLIFLOZIN 25 MG/1
1 TABLET, FILM COATED ORAL DAILY
Qty: 100 TABLET | Refills: 1 | Status: ON HOLD | OUTPATIENT
Start: 2022-01-21 | End: 2022-12-28 | Stop reason: SDUPTHER

## 2022-01-21 RX ORDER — TERAZOSIN 5 MG/1
5 CAPSULE ORAL DAILY
Qty: 90 CAPSULE | Refills: 4 | Status: SHIPPED | OUTPATIENT
Start: 2022-01-21 | End: 2022-07-26 | Stop reason: SDUPTHER

## 2022-01-21 RX ORDER — ATORVASTATIN CALCIUM 80 MG/1
80 TABLET, FILM COATED ORAL EVERY EVENING
Qty: 90 TABLET | Refills: 4 | Status: ON HOLD | OUTPATIENT
Start: 2022-01-21 | End: 2023-01-05 | Stop reason: SDUPTHER

## 2022-01-21 RX ORDER — DULOXETIN HYDROCHLORIDE 60 MG/1
60 CAPSULE, DELAYED RELEASE ORAL 2 TIMES DAILY
Qty: 180 CAPSULE | Refills: 3 | Status: ON HOLD | OUTPATIENT
Start: 2022-01-21 | End: 2023-01-05

## 2022-01-21 RX ORDER — TAMSULOSIN HYDROCHLORIDE 0.4 MG/1
0.4 CAPSULE ORAL
Qty: 90 CAPSULE | Refills: 1 | Status: SHIPPED | OUTPATIENT
Start: 2022-01-21 | End: 2022-07-26 | Stop reason: SDUPTHER

## 2022-01-21 RX ORDER — OMEPRAZOLE 20 MG/1
CAPSULE, DELAYED RELEASE ORAL
Qty: 100 CAPSULE | Refills: 4 | Status: ON HOLD | OUTPATIENT
Start: 2022-01-21 | End: 2023-01-05

## 2022-01-21 RX ORDER — GABAPENTIN 300 MG/1
300 CAPSULE ORAL DAILY
Qty: 90 CAPSULE | Refills: 4 | Status: ON HOLD | OUTPATIENT
Start: 2022-01-21 | End: 2023-01-05

## 2022-01-21 NOTE — TELEPHONE ENCOUNTER
1. Caller Name: Prabhakar Sierra                        Call Back Number: [phone] (home)       How would the patient prefer to be contacted with a response: Phone call do NOT leave a detailed message    2. SPECIFIC Action To Be Taken: Referral pending, please sign.    3. Diagnosis/Clinical Reason for Request: Prostate lesion     4. Specialty & Provider Name/Lab/Imaging Location: Urology     5. Is appointment scheduled for requested order/referral: no    Patient was not informed they will receive a return phone call from the office ONLY if there are any questions before processing their request. Advised to call back if they haven't received a call from the referral department in 5 days.

## 2022-01-21 NOTE — TELEPHONE ENCOUNTER
Received request via: Patient    Was the patient seen in the last year in this department? Yes    Does the patient have an active prescription (recently filled or refills available) for medication(s) requested? yes, paient needs medications to new pharmacy

## 2022-01-24 NOTE — TELEPHONE ENCOUNTER
FINAL PRIOR AUTHORIZATION STATUS:    1.  Name of Medication & Dose: Dexcom 6     2. Prior Auth Status: Denied.  Reason: insurance asking for alternative that is covered     3. Action Taken: Pharmacy Notified: no Patient Notified: no

## 2022-01-26 ENCOUNTER — TELEPHONE (OUTPATIENT)
Dept: HEALTH INFORMATION MANAGEMENT | Facility: OTHER | Age: 72
End: 2022-01-26
Payer: MEDICARE

## 2022-02-17 ENCOUNTER — TELEPHONE (OUTPATIENT)
Dept: HEALTH INFORMATION MANAGEMENT | Facility: OTHER | Age: 72
End: 2022-02-17
Payer: MEDICARE

## 2022-02-17 DIAGNOSIS — Z13.30 ENCOUNTER FOR SCREENING EXAMINATION FOR MENTAL HEALTH AND BEHAVIORAL DISORDERS: ICD-10-CM

## 2022-02-17 NOTE — TELEPHONE ENCOUNTER
1. Caller Name: Prabhakar Sierra                          Call Back Number: [phone] (home)       How would the patient prefer to be contacted with a response: Phone call do NOT leave a detailed message    2. SPECIFIC Action To Be Taken: Referral pending, please sign.    3. Diagnosis/Clinical Reason for Request: Addictions    4. Specialty & Provider Name/Lab/Imaging Location: Behavioral Health    5. Is appointment scheduled for requested order/referral: no    Patient was not informed they will receive a return phone call from the office ONLY if there are any questions before processing their request. Advised to call back if they haven't received a call from the referral department in 5 days.

## 2022-02-23 ENCOUNTER — TELEPHONE (OUTPATIENT)
Dept: MEDICAL GROUP | Age: 72
End: 2022-02-23
Payer: MEDICARE

## 2022-02-23 NOTE — TELEPHONE ENCOUNTER
FINAL PRIOR AUTHORIZATION STATUS:    1.  Name of Medication & Dose: Dexcom     2. Prior Auth Status: Denied.  Reason: not covered theough medicare plan     3. Action Taken: Pharmacy Notified: no Patient Notified: no

## 2022-03-07 ENCOUNTER — OFFICE VISIT (OUTPATIENT)
Dept: MEDICAL GROUP | Age: 72
End: 2022-03-07
Payer: MEDICARE

## 2022-03-07 VITALS
WEIGHT: 219 LBS | BODY MASS INDEX: 32.44 KG/M2 | SYSTOLIC BLOOD PRESSURE: 126 MMHG | HEIGHT: 69 IN | DIASTOLIC BLOOD PRESSURE: 68 MMHG

## 2022-03-07 DIAGNOSIS — C61 PROSTATE CA (HCC): Chronic | ICD-10-CM

## 2022-03-07 DIAGNOSIS — I10 ESSENTIAL HYPERTENSION: ICD-10-CM

## 2022-03-07 DIAGNOSIS — E78.2 MIXED HYPERLIPIDEMIA: ICD-10-CM

## 2022-03-07 DIAGNOSIS — E11.65 UNCONTROLLED TYPE 2 DIABETES MELLITUS WITH HYPERGLYCEMIA (HCC): ICD-10-CM

## 2022-03-07 DIAGNOSIS — F32.1 CURRENT MODERATE EPISODE OF MAJOR DEPRESSIVE DISORDER WITHOUT PRIOR EPISODE (HCC): ICD-10-CM

## 2022-03-07 PROBLEM — R97.20 ELEVATED PSA: Status: RESOLVED | Noted: 2021-07-28 | Resolved: 2022-03-07

## 2022-03-07 LAB
HBA1C MFR BLD: 7.6 % (ref 0–5.6)
INT CON NEG: ABNORMAL
INT CON POS: ABNORMAL

## 2022-03-07 PROCEDURE — 99214 OFFICE O/P EST MOD 30 MIN: CPT | Performed by: INTERNAL MEDICINE

## 2022-03-07 PROCEDURE — 83036 HEMOGLOBIN GLYCOSYLATED A1C: CPT | Performed by: INTERNAL MEDICINE

## 2022-03-07 RX ORDER — PIOGLITAZONEHYDROCHLORIDE 30 MG/1
30 TABLET ORAL DAILY
Qty: 90 TABLET | Refills: 2 | Status: SHIPPED | OUTPATIENT
Start: 2022-03-07 | End: 2022-10-10 | Stop reason: SDUPTHER

## 2022-03-07 RX ORDER — SILDENAFIL 100 MG/1
100 TABLET, FILM COATED ORAL PRN
Status: ON HOLD | COMMUNITY
Start: 2022-02-23 | End: 2023-01-05

## 2022-03-07 ASSESSMENT — ENCOUNTER SYMPTOMS
EYES NEGATIVE: 1
MUSCULOSKELETAL NEGATIVE: 1
CARDIOVASCULAR NEGATIVE: 1
GASTROINTESTINAL NEGATIVE: 1
RESPIRATORY NEGATIVE: 1
CONSTITUTIONAL NEGATIVE: 1
NEUROLOGICAL NEGATIVE: 1
PSYCHIATRIC NEGATIVE: 1

## 2022-03-07 ASSESSMENT — FIBROSIS 4 INDEX: FIB4 SCORE: 2.08

## 2022-03-07 NOTE — PROGRESS NOTES
Subjective     Prabhakar Sierra is a 71 y.o. male who presents with Follow-Up and Diabetes Mellitus  and  The patient is here for followup of chronic medical problems listed below. The patient is compliant with medications and having no side effects from them. Denies chest pain, abdominal pain, dyspnea, myalgias, or cough.   Patient Active Problem List    Diagnosis Date Noted   • Obesity (BMI 30.0-34.9) 09/16/2021   • Alcohol use 01/03/2019   • Other male erectile dysfunction 04/11/2018   • Vitamin D deficiency 05/12/2017   • Right hip pain 11/14/2016   • Current moderate episode of major depressive disorder without prior episode ([facility]) 11/11/2016   • B12 deficiency 11/01/2016   • Tremor, coarse 10/25/2016   • Diabetes type 2, uncontrolled ([facility]) 09/27/2016   • GERD (gastroesophageal reflux disease) 09/27/2016   • Essential hypertension 09/27/2016   • Prostate CA ([facility])- feb 2022; RT tbd x 8 wks, mar- may 2022 09/27/2016   • Mixed hyperlipidemia 09/27/2016     No Known Allergies  Outpatient Medications Prior to Visit   Medication Sig Dispense Refill   • atorvastatin (LIPITOR) 80 MG tablet Take 1 Tablet by mouth every evening. 90 Tablet 4   • DULoxetine (CYMBALTA) 60 MG Cap DR Particles delayed-release capsule Take 1 Capsule by mouth 2 times a day. 180 Capsule 3   • Empagliflozin (JARDIANCE) 25 MG Tab Take 1 Each by mouth every day. 100 Tablet 1   • gabapentin (NEURONTIN) 300 MG Cap Take 1 Capsule by mouth every day. 90 Capsule 4   • glipiZIDE SR (GLIPIZIDE XL) 10 MG TABLET SR 24 HR Take 1 Tablet by mouth every day. 100 Tablet 1   • metformin (GLUCOPHAGE) 1000 MG tablet Take 1 Tablet by mouth 2 times a day with meals. 100 Tablet 1   • omeprazole (PRILOSEC) 20 MG delayed-release capsule TAKE ONE CAPSULE BY MOUTH DAILY (PRILOSEC) 100 Capsule 4   • tamsulosin (FLOMAX) 0.4 MG capsule Take 1 capsule by mouth half an hour after breakfast. 90 Capsule 1   • terazosin (HYTRIN) 5 MG Cap Take 1 Capsule by mouth every day.  90 Capsule 4   • benazepril (LOTENSIN) 40 MG tablet Take 1 Tablet by mouth every day. 100 Tablet 4   • phentermine 30 MG capsule Take 30 mg by mouth every morning.     • non-formulary med isagenix     • ferrous sulfate 325 (65 Fe) MG EC tablet Take 1 Tab by mouth 3 times a day, with meals. 270 Tab 1   • Acetaminophen 500 MG Cap Take  by mouth.     • vitamin D (CHOLECALCIFEROL) 1000 UNIT Tab Take 1,000 Units by mouth every day.     • Multiple Vitamins-Minerals (HM COMPLETE 50+ MENS ULTIMATE PO) Take 1 Tablet by mouth every day.     • aspirin (ASA) 81 MG Chew Tab chewable tablet Take 81 mg by mouth every day.     • sildenafil citrate (VIAGRA) 100 MG tablet      • albuterol 108 (90 Base) MCG/ACT Aero Soln inhalation aerosol Inhale 2 Puffs every 6 hours as needed for Shortness of Breath. 8.5 g 3   • phentermine 30 MG capsule Take 1 Capsule by mouth every morning for 30 days. 90 Capsule 0   • [START ON 3/23/2022] phentermine 30 MG capsule Take 1 Capsule by mouth every morning for 30 days. 90 Capsule 0   • Blood Glucose Monitoring Suppl Device Freestyle carlo glucose monitoring device with all necessary accessories including patch and transmitter  for type II noninsulin treated diabetes.  To check blood sugar once or twice daily and per any symptoms of high or low blood sugar. 1 Each 1   • Blood Glucose Monitoring Suppl Device Meter: Dispense Device of Insurance Preference. Sig. Use as directed for blood sugar monitoring. #1. NR. 1 Each 0   • Blood Glucose Test Strips Test strips for meter dispensed, testing one time per day E11.65 100 Strip 2   • Lancets Lancets for meter dispensed, to test one time per day E11.65 100 Each 2     No facility-administered medications prior to visit.       No visits with results within 1 Month(s) from this visit.   Latest known visit with results is:   Hospital Outpatient Visit on 10/28/2021   Component Date Value   • Significant Indicator 10/28/2021 NEG    • Source 10/28/2021 UR   "  • Site 10/28/2021 -    • Culture Result 10/28/2021 Usual skin kiel 10-50,000 cfu/mL       Lab Results   Component Value Date/Time    HBA1C 7.6 (A) 03/07/2022 01:27 PM    HBA1C 7.7 (H) 10/26/2021 10:59 AM     Lab Results   Component Value Date/Time    SODIUM 133 (L) 10/26/2021 10:59 AM    POTASSIUM 4.8 10/26/2021 10:59 AM    CHLORIDE 96 10/26/2021 10:59 AM    CO2 22 10/26/2021 10:59 AM    GLUCOSE 159 (H) 10/26/2021 10:59 AM    BUN 17 10/26/2021 10:59 AM    CREATININE 0.84 10/26/2021 10:59 AM    ALKPHOSPHAT 47 10/26/2021 10:59 AM    ASTSGOT 16 10/26/2021 10:59 AM    ALTSGPT 15 10/26/2021 10:59 AM    TBILIRUBIN 0.8 10/26/2021 10:59 AM     Lab Results   Component Value Date/Time    INR 1.11 01/02/2019 07:04 PM    INR 0.95 01/18/2016 12:00 AM     Lab Results   Component Value Date/Time    CHOLSTRLTOT 121 10/26/2021 10:59 AM    LDL 53 10/26/2021 10:59 AM    HDL 40 10/26/2021 10:59 AM    TRIGLYCERIDE 142 10/26/2021 10:59 AM       Lab Results   Component Value Date/Time    TESTOSTERONE 357 10/26/2021 10:59 AM     No results found for: TSH  No results found for: FREET4  No results found for: URICACID  No components found for: VITB12  Lab Results   Component Value Date/Time    25HYDROXY 34 07/26/2021 08:32 AM    25HYDROXY 37 05/17/2017 09:16 AM               HPI    Review of Systems   Constitutional: Negative.    HENT: Negative.    Eyes: Negative.    Respiratory: Negative.    Cardiovascular: Negative.    Gastrointestinal: Negative.    Genitourinary: Negative.    Musculoskeletal: Negative.    Skin: Negative.    Neurological: Negative.    Endo/Heme/Allergies: Negative.    Psychiatric/Behavioral: Negative.               Objective     /68   Ht 1.753 m (5' 9\")   Wt 99.3 kg (219 lb)   BMI 32.34 kg/m²      Physical Exam  Constitutional:       General: He is not in acute distress.     Appearance: He is well-developed. He is not diaphoretic.   HENT:      Head: Normocephalic and atraumatic.      Right Ear: External ear " normal.      Left Ear: External ear normal.      Nose: Nose normal.      Mouth/Throat:      Pharynx: No oropharyngeal exudate.   Eyes:      General: No scleral icterus.        Right eye: No discharge.         Left eye: No discharge.      Conjunctiva/sclera: Conjunctivae normal.      Pupils: Pupils are equal, round, and reactive to light.   Neck:      Thyroid: No thyromegaly.      Vascular: No JVD.      Trachea: No tracheal deviation.   Cardiovascular:      Rate and Rhythm: Normal rate and regular rhythm.      Heart sounds: Normal heart sounds. No murmur heard.    No friction rub. No gallop.   Pulmonary:      Effort: Pulmonary effort is normal. No respiratory distress.      Breath sounds: Normal breath sounds. No stridor. No wheezing or rales.   Chest:      Chest wall: No tenderness.   Abdominal:      General: Bowel sounds are normal. There is no distension.      Palpations: Abdomen is soft. There is no mass.      Tenderness: There is no abdominal tenderness. There is no guarding or rebound.   Musculoskeletal:         General: No tenderness. Normal range of motion.      Cervical back: Normal range of motion and neck supple.   Lymphadenopathy:      Cervical: No cervical adenopathy.   Skin:     General: Skin is warm and dry.      Coloration: Skin is not pale.      Findings: No erythema or rash.   Neurological:      Mental Status: He is alert and oriented to person, place, and time.      Motor: No abnormal muscle tone.      Coordination: Coordination normal.      Deep Tendon Reflexes: Reflexes are normal and symmetric. Reflexes normal.   Psychiatric:         Behavior: Behavior normal.         Thought Content: Thought content normal.         Judgment: Judgment normal.                              Assessment & Plan        1. Uncontrolled type 2 diabetes mellitus with hyperglycemia (HCC)  Not at goal.  Add Actos to his current regimen.  Recheck in 4 months with DM RN  - POCT Hemoglobin A1C  - Comp Metabolic Panel;  Future  - Lipid Profile; Future  - VITAMIN D,25 HYDROXY; Future  - MICROALBUMIN CREAT RATIO URINE; Future  - pioglitazone (ACTOS) 30 MG Tab; Take 1 Tablet by mouth every day.  Dispense: 90 Tablet; Refill: 2    2. Essential hypertension   Under good control. Continue same regimen.]    3. Mixed hyperlipidemia   Under good control. Continue same regimen.      4. Current moderate episode of major depressive disorder without prior episode (HCC)  Under good control. Continue same regimen.       5. Prostate CA (HCC)- feb 2022; RT tbd x 8 wks, mar- may 2022  This new problem recently diagnosed on biopsy last month,  After PSA found to be 13 on routine screening. continue follow-up with Dr. Early of urology and with radiation  With whom he will soon start.

## 2022-03-07 NOTE — PROGRESS NOTES
RN-CDE Note    Subjective:     HPI:  Prabhakar Sierra is a 71 y.o. old patient who is seen by the Diabetes Nurse Specialist today for review of his type 2 diabetes.    Changes in Health:  Complaining of fatigue.  Recently diagnosed with prostate cancer and will start radiation therapy    Diabetes Medications:   Metformin 1000 mg bid  Glipizide 10 mg daily  Jardiance 25 mg daily  Taking above medications as prescribed: yes  Taking daily ASA: Yes    Exercise: very little  Diet: eating 3-6 small meals per day  Patient's body mass index is 32.34 kg/m². Exercise and nutrition counseling were performed at this visit.      Health Maintenance:   Health Maintenance Due   Topic Date Due   • COLORECTAL CANCER SCREENING  12/19/2019   • COVID-19 Vaccine (3 - Booster for Moderna series) 07/18/2021   • IMM INFLUENZA (1) 09/01/2021         DM:   Last A1c:   Lab Results   Component Value Date/Time    HBA1C 7.6 (A) 03/07/2022 01:27 PM      Previous A1c was 7.7 on 10/26/2021  A1C GOAL: < 7    Glucose monitoring frequency: not testing.     Hypoglycemic episodes: no    Last Retinal Exam: on file and up-to-date    Exam:  Monofilament: current, due 7/28/22    Lab Results   Component Value Date/Time    MALBCRT 60 (H) 07/26/2021 08:32 AM    MICROALBUR 7.0 07/26/2021 08:32 AM        ACR Albumin/Creatinine Ratio goal <30     HTN:   Blood pressure goal <140/<80 at goal.   Currently Rx ACE/ARB: Yes     Dyslipidemia:    Lab Results   Component Value Date/Time    CHOLSTRLTOT 121 10/26/2021 10:59 AM    LDL 53 10/26/2021 10:59 AM    HDL 40 10/26/2021 10:59 AM    TRIGLYCERIDE 142 10/26/2021 10:59 AM         Currently Rx Statin: Yes     He  reports that he quit smoking about 15 years ago. His smoking use included cigarettes. He has a 25.00 pack-year smoking history. He has never used smokeless tobacco.      Plan:     Discussed and educated on:   - All medications, side effects and compliance (discussed carefully)  - Annual eye examinations  at Ophthalmology  - Foot Care: what to look for when checking feet every day  - HbA1C: target  - Home glucose monitoring emphasized  - Weight control and daily exercise    Recommended medication changes: add Pioglitazone 30 mg daily

## 2022-04-21 DIAGNOSIS — E11.42 CONTROLLED TYPE 2 DIABETES MELLITUS WITH DIABETIC POLYNEUROPATHY, WITHOUT LONG-TERM CURRENT USE OF INSULIN (HCC): ICD-10-CM

## 2022-04-21 DIAGNOSIS — F32.0 CURRENT MILD EPISODE OF MAJOR DEPRESSIVE DISORDER, UNSPECIFIED WHETHER RECURRENT (HCC): ICD-10-CM

## 2022-04-21 DIAGNOSIS — F91.9 BEHAVIOR DISTURBANCE: ICD-10-CM

## 2022-04-21 DIAGNOSIS — E78.2 MIXED HYPERLIPIDEMIA: ICD-10-CM

## 2022-04-21 DIAGNOSIS — E61.1 IRON DEFICIENCY: ICD-10-CM

## 2022-04-21 DIAGNOSIS — E53.8 VITAMIN B 12 DEFICIENCY: ICD-10-CM

## 2022-04-21 DIAGNOSIS — E51.9 THIAMINE DEFICIENCY: ICD-10-CM

## 2022-04-21 DIAGNOSIS — E55.9 VITAMIN D DEFICIENCY: ICD-10-CM

## 2022-04-21 DIAGNOSIS — R41.3 ORGANIC MEMORY IMPAIRMENT: ICD-10-CM

## 2022-04-21 DIAGNOSIS — F10.29 ALCOHOL DEPENDENCE WITH UNSPECIFIED ALCOHOL-INDUCED DISORDER (HCC): ICD-10-CM

## 2022-05-10 ENCOUNTER — HOSPITAL ENCOUNTER (OUTPATIENT)
Dept: LAB | Facility: MEDICAL CENTER | Age: 72
End: 2022-05-10
Attending: INTERNAL MEDICINE
Payer: MEDICARE

## 2022-05-10 DIAGNOSIS — E61.1 IRON DEFICIENCY: ICD-10-CM

## 2022-05-10 DIAGNOSIS — E55.9 VITAMIN D DEFICIENCY: ICD-10-CM

## 2022-05-10 DIAGNOSIS — E53.8 VITAMIN B 12 DEFICIENCY: ICD-10-CM

## 2022-05-10 DIAGNOSIS — E78.2 MIXED HYPERLIPIDEMIA: ICD-10-CM

## 2022-05-10 DIAGNOSIS — R41.3 ORGANIC MEMORY IMPAIRMENT: ICD-10-CM

## 2022-05-10 DIAGNOSIS — F91.9 BEHAVIOR DISTURBANCE: ICD-10-CM

## 2022-05-10 LAB
BASOPHILS # BLD AUTO: 0.5 % (ref 0–1.8)
BASOPHILS # BLD: 0.05 K/UL (ref 0–0.12)
EOSINOPHIL # BLD AUTO: 0.18 K/UL (ref 0–0.51)
EOSINOPHIL NFR BLD: 1.9 % (ref 0–6.9)
ERYTHROCYTE [DISTWIDTH] IN BLOOD BY AUTOMATED COUNT: 48.3 FL (ref 35.9–50)
ERYTHROCYTE [SEDIMENTATION RATE] IN BLOOD BY WESTERGREN METHOD: 4 MM/HOUR (ref 0–20)
HCT VFR BLD AUTO: 49.2 % (ref 42–52)
HGB BLD-MCNC: 16.5 G/DL (ref 14–18)
IMM GRANULOCYTES # BLD AUTO: 0.04 K/UL (ref 0–0.11)
IMM GRANULOCYTES NFR BLD AUTO: 0.4 % (ref 0–0.9)
LYMPHOCYTES # BLD AUTO: 1.77 K/UL (ref 1–4.8)
LYMPHOCYTES NFR BLD: 18.4 % (ref 22–41)
MCH RBC QN AUTO: 30.5 PG (ref 27–33)
MCHC RBC AUTO-ENTMCNC: 33.5 G/DL (ref 33.7–35.3)
MCV RBC AUTO: 90.9 FL (ref 81.4–97.8)
MONOCYTES # BLD AUTO: 0.71 K/UL (ref 0–0.85)
MONOCYTES NFR BLD AUTO: 7.4 % (ref 0–13.4)
NEUTROPHILS # BLD AUTO: 6.87 K/UL (ref 1.82–7.42)
NEUTROPHILS NFR BLD: 71.4 % (ref 44–72)
NRBC # BLD AUTO: 0 K/UL
NRBC BLD-RTO: 0 /100 WBC
PLATELET # BLD AUTO: 139 K/UL (ref 164–446)
PMV BLD AUTO: 11.8 FL (ref 9–12.9)
RBC # BLD AUTO: 5.41 M/UL (ref 4.7–6.1)
WBC # BLD AUTO: 9.6 K/UL (ref 4.8–10.8)

## 2022-05-10 PROCEDURE — 80061 LIPID PANEL: CPT

## 2022-05-10 PROCEDURE — 82607 VITAMIN B-12: CPT

## 2022-05-10 PROCEDURE — 83550 IRON BINDING TEST: CPT

## 2022-05-10 PROCEDURE — 82306 VITAMIN D 25 HYDROXY: CPT

## 2022-05-10 PROCEDURE — 82746 ASSAY OF FOLIC ACID SERUM: CPT

## 2022-05-10 PROCEDURE — 84425 ASSAY OF VITAMIN B-1: CPT

## 2022-05-10 PROCEDURE — 84443 ASSAY THYROID STIM HORMONE: CPT

## 2022-05-10 PROCEDURE — 84591 ASSAY OF NOS VITAMIN: CPT

## 2022-05-10 PROCEDURE — 82728 ASSAY OF FERRITIN: CPT

## 2022-05-10 PROCEDURE — 36415 COLL VENOUS BLD VENIPUNCTURE: CPT

## 2022-05-10 PROCEDURE — 83540 ASSAY OF IRON: CPT

## 2022-05-10 PROCEDURE — 84207 ASSAY OF VITAMIN B-6: CPT

## 2022-05-10 PROCEDURE — 85652 RBC SED RATE AUTOMATED: CPT

## 2022-05-10 PROCEDURE — 85025 COMPLETE CBC W/AUTO DIFF WBC: CPT

## 2022-05-10 PROCEDURE — 80053 COMPREHEN METABOLIC PANEL: CPT

## 2022-05-11 ENCOUNTER — TELEPHONE (OUTPATIENT)
Dept: MEDICAL GROUP | Age: 72
End: 2022-05-11
Payer: MEDICARE

## 2022-05-11 LAB
25(OH)D3 SERPL-MCNC: 30 NG/ML (ref 30–100)
ALBUMIN SERPL BCP-MCNC: 4.6 G/DL (ref 3.2–4.9)
ALBUMIN/GLOB SERPL: 1.6 G/DL
ALP SERPL-CCNC: 53 U/L (ref 30–99)
ALT SERPL-CCNC: 17 U/L (ref 2–50)
ANION GAP SERPL CALC-SCNC: 14 MMOL/L (ref 7–16)
AST SERPL-CCNC: 17 U/L (ref 12–45)
BILIRUB SERPL-MCNC: 0.9 MG/DL (ref 0.1–1.5)
BUN SERPL-MCNC: 15 MG/DL (ref 8–22)
CALCIUM SERPL-MCNC: 9.6 MG/DL (ref 8.5–10.5)
CHLORIDE SERPL-SCNC: 99 MMOL/L (ref 96–112)
CHOLEST SERPL-MCNC: 151 MG/DL (ref 100–199)
CO2 SERPL-SCNC: 26 MMOL/L (ref 20–33)
CREAT SERPL-MCNC: 0.95 MG/DL (ref 0.5–1.4)
FERRITIN SERPL-MCNC: 34.1 NG/ML (ref 22–322)
FOLATE SERPL-MCNC: 9.7 NG/ML
GFR SERPLBLD CREATININE-BSD FMLA CKD-EPI: 85 ML/MIN/1.73 M 2
GLOBULIN SER CALC-MCNC: 2.9 G/DL (ref 1.9–3.5)
GLUCOSE SERPL-MCNC: 190 MG/DL (ref 65–99)
HDLC SERPL-MCNC: 52 MG/DL
IRON SATN MFR SERPL: 17 % (ref 15–55)
IRON SERPL-MCNC: 58 UG/DL (ref 50–180)
LDLC SERPL CALC-MCNC: 75 MG/DL
POTASSIUM SERPL-SCNC: 4.5 MMOL/L (ref 3.6–5.5)
PROT SERPL-MCNC: 7.5 G/DL (ref 6–8.2)
SODIUM SERPL-SCNC: 139 MMOL/L (ref 135–145)
TIBC SERPL-MCNC: 334 UG/DL (ref 250–450)
TRIGL SERPL-MCNC: 118 MG/DL (ref 0–149)
TSH SERPL DL<=0.005 MIU/L-ACNC: 0.75 UIU/ML (ref 0.38–5.33)
UIBC SERPL-MCNC: 276 UG/DL (ref 110–370)
VIT B12 SERPL-MCNC: 511 PG/ML (ref 211–911)

## 2022-05-11 NOTE — TELEPHONE ENCOUNTER
Caller Name:Prabhakar Sierra      Call Back Number: [phone] (home)       How would the patient prefer to be contacted with a response: Phone call OK to leave a detailed message    spoke with patient and he would like the results to his blood work he had done please advise

## 2022-05-12 ENCOUNTER — TELEPHONE (OUTPATIENT)
Dept: MEDICAL GROUP | Age: 72
End: 2022-05-12

## 2022-05-12 ENCOUNTER — APPOINTMENT (OUTPATIENT)
Dept: RADIOLOGY | Facility: MEDICAL CENTER | Age: 72
End: 2022-05-12
Attending: INTERNAL MEDICINE
Payer: MEDICARE

## 2022-05-12 DIAGNOSIS — R41.3 ORGANIC MEMORY IMPAIRMENT: ICD-10-CM

## 2022-05-12 DIAGNOSIS — G91.2 NORMAL PRESSURE HYDROCEPHALUS (HCC): ICD-10-CM

## 2022-05-12 PROCEDURE — 700117 HCHG RX CONTRAST REV CODE 255: Performed by: INTERNAL MEDICINE

## 2022-05-12 PROCEDURE — A9576 INJ PROHANCE MULTIPACK: HCPCS | Performed by: INTERNAL MEDICINE

## 2022-05-12 PROCEDURE — 70553 MRI BRAIN STEM W/O & W/DYE: CPT | Mod: MG

## 2022-05-12 RX ADMIN — GADOTERIDOL 20 ML: 279.3 INJECTION, SOLUTION INTRAVENOUS at 09:05

## 2022-05-12 NOTE — TELEPHONE ENCOUNTER
Caller Name: Prabhakar Sierra    Call Back Number: [phone] (home)       How would the patient prefer to be contacted with a response: Luis Fernando barber    spoke with jonn let him know of his lab results and i aslo scheduled him this monday with you and lily , and he also did his MRI this morning

## 2022-05-14 LAB — VIT B1 BLD-MCNC: 145 NMOL/L (ref 70–180)

## 2022-05-15 LAB — VIT B6 SERPL-MCNC: 447.4 NMOL/L (ref 20–125)

## 2022-05-16 ENCOUNTER — OFFICE VISIT (OUTPATIENT)
Dept: MEDICAL GROUP | Age: 72
End: 2022-05-16
Payer: MEDICARE

## 2022-05-16 VITALS
HEIGHT: 69 IN | DIASTOLIC BLOOD PRESSURE: 64 MMHG | WEIGHT: 223 LBS | BODY MASS INDEX: 33.03 KG/M2 | SYSTOLIC BLOOD PRESSURE: 116 MMHG

## 2022-05-16 DIAGNOSIS — Z78.9 ALCOHOL USE: ICD-10-CM

## 2022-05-16 DIAGNOSIS — C61 PROSTATE CA (HCC): ICD-10-CM

## 2022-05-16 DIAGNOSIS — E66.01 CLASS 2 SEVERE OBESITY DUE TO EXCESS CALORIES WITH SERIOUS COMORBIDITY AND BODY MASS INDEX (BMI) OF 36.0 TO 36.9 IN ADULT (HCC): ICD-10-CM

## 2022-05-16 DIAGNOSIS — E11.65 UNCONTROLLED TYPE 2 DIABETES MELLITUS WITH HYPERGLYCEMIA (HCC): ICD-10-CM

## 2022-05-16 DIAGNOSIS — G47.33 OSA (OBSTRUCTIVE SLEEP APNEA): ICD-10-CM

## 2022-05-16 DIAGNOSIS — F41.9 ANXIETY DISORDER, UNSPECIFIED TYPE: ICD-10-CM

## 2022-05-16 DIAGNOSIS — E78.2 MIXED HYPERLIPIDEMIA: ICD-10-CM

## 2022-05-16 DIAGNOSIS — F32.1 CURRENT MODERATE EPISODE OF MAJOR DEPRESSIVE DISORDER WITHOUT PRIOR EPISODE (HCC): ICD-10-CM

## 2022-05-16 DIAGNOSIS — G47.19 EXCESSIVE DAYTIME SLEEPINESS: ICD-10-CM

## 2022-05-16 PROCEDURE — 99214 OFFICE O/P EST MOD 30 MIN: CPT | Performed by: INTERNAL MEDICINE

## 2022-05-16 RX ORDER — NALTREXONE HYDROCHLORIDE 50 MG/1
50 TABLET, FILM COATED ORAL DAILY
Qty: 30 TABLET | Refills: 2 | Status: SHIPPED | OUTPATIENT
Start: 2022-05-16 | End: 2022-07-26 | Stop reason: SDUPTHER

## 2022-05-16 ASSESSMENT — FIBROSIS 4 INDEX: FIB4 SCORE: 2.14

## 2022-05-16 NOTE — PROGRESS NOTES
RN-CDE Note    Subjective:     HPI:  Prabhakar Sierra is a 71 y.o. old patient who is seen by the Diabetes Nurse Specialist today for review of his type 2 diabetes     Changes in Health: recently finished with radiation treatment for his prostate cancer.     Diabetes Medications:   Pioglitazone 30 mg daily  Metformin 1000 mg bid  Glipizide 10 mg bid  Jardiance 25 mg daily  Taking above medications as prescribed: yes  Taking daily ASA: Yes    Exercise: no  Diet: no alcohol for the past 5 days, diet better.   Patient's body mass index is 32.93 kg/m². Exercise and nutrition counseling were performed at this visit.      Health Maintenance:   Health Maintenance Due   Topic Date Due   • COLORECTAL CANCER SCREENING  12/19/2019   • COVID-19 Vaccine (3 - Booster for Moderna series) 07/18/2021         DM:   Last A1c:   Lab Results   Component Value Date/Time    HBA1C 7.6 (A) 03/07/2022 01:27 PM      A1C GOAL: < 7    Glucose monitoring frequency: not testing.     Hypoglycemic episodes: no    Last Retinal Exam: on file and up-to-date    Exam:  Monofilament: current, due 7/28/22    Lab Results   Component Value Date/Time    MALBCRT 60 (H) 07/26/2021 08:32 AM    MICROALBUR 7.0 07/26/2021 08:32 AM        ACR Albumin/Creatinine Ratio goal <30     HTN:   Blood pressure goal <140/<80 .   Currently Rx ACE/ARB: Yes     Dyslipidemia:    Lab Results   Component Value Date/Time    CHOLSTRLTOT 151 05/10/2022 09:17 AM    LDL 75 05/10/2022 09:17 AM    HDL 52 05/10/2022 09:17 AM    TRIGLYCERIDE 118 05/10/2022 09:17 AM         Currently Rx Statin: Yes     He  reports that he quit smoking about 15 years ago. His smoking use included cigarettes. He has a 25.00 pack-year smoking history. He has never used smokeless tobacco.

## 2022-05-17 LAB — NIACIN SERPL-MCNC: 2.75 UG/ML (ref 0.5–8.45)

## 2022-05-17 ASSESSMENT — ENCOUNTER SYMPTOMS
CARDIOVASCULAR NEGATIVE: 1
CONSTITUTIONAL NEGATIVE: 1
EYES NEGATIVE: 1
RESPIRATORY NEGATIVE: 1
MUSCULOSKELETAL NEGATIVE: 1
GASTROINTESTINAL NEGATIVE: 1
NEUROLOGICAL NEGATIVE: 1
PSYCHIATRIC NEGATIVE: 1

## 2022-05-18 NOTE — PROGRESS NOTES
Subjective     Prabhakar Sierra is a 71 y.o. male who presents with Follow-Up (Review of MRI)  The patient is here for followup of chronic medical problems listed below. The patient is compliant with medications and having no side effects from them. Denies chest pain, abdominal pain, dyspnea, myalgias, or cough.   Patient Active Problem List    Diagnosis Date Noted   • Obesity (BMI 30.0-34.9) 09/16/2021   • Alcohol use 01/03/2019   • Other male erectile dysfunction 04/11/2018   • Vitamin D deficiency 05/12/2017   • Right hip pain 11/14/2016   • Current moderate episode of major depressive disorder without prior episode ([facility]) 11/11/2016   • B12 deficiency 11/01/2016   • Tremor, coarse 10/25/2016   • Diabetes type 2, uncontrolled ([facility]) 09/27/2016   • GERD (gastroesophageal reflux disease) 09/27/2016   • Essential hypertension 09/27/2016   • Prostate CA ([facility])- feb 2022; RT tbd x 8 wks, mar- may 2022 09/27/2016   • Mixed hyperlipidemia 09/27/2016     No Known Allergies  Outpatient Medications Prior to Visit   Medication Sig Dispense Refill   • sildenafil citrate (VIAGRA) 100 MG tablet      • pioglitazone (ACTOS) 30 MG Tab Take 1 Tablet by mouth every day. 90 Tablet 2   • atorvastatin (LIPITOR) 80 MG tablet Take 1 Tablet by mouth every evening. 90 Tablet 4   • DULoxetine (CYMBALTA) 60 MG Cap DR Particles delayed-release capsule Take 1 Capsule by mouth 2 times a day. 180 Capsule 3   • Empagliflozin (JARDIANCE) 25 MG Tab Take 1 Each by mouth every day. 100 Tablet 1   • gabapentin (NEURONTIN) 300 MG Cap Take 1 Capsule by mouth every day. 90 Capsule 4   • glipiZIDE SR (GLIPIZIDE XL) 10 MG TABLET SR 24 HR Take 1 Tablet by mouth every day. 100 Tablet 1   • metformin (GLUCOPHAGE) 1000 MG tablet Take 1 Tablet by mouth 2 times a day with meals. 100 Tablet 1   • omeprazole (PRILOSEC) 20 MG delayed-release capsule TAKE ONE CAPSULE BY MOUTH DAILY (PRILOSEC) 100 Capsule 4   • tamsulosin (FLOMAX) 0.4 MG capsule Take 1 capsule  by mouth half an hour after breakfast. 90 Capsule 1   • terazosin (HYTRIN) 5 MG Cap Take 1 Capsule by mouth every day. 90 Capsule 4   • benazepril (LOTENSIN) 40 MG tablet Take 1 Tablet by mouth every day. 100 Tablet 4   • Acetaminophen 500 MG Cap Take  by mouth.     • vitamin D (CHOLECALCIFEROL) 1000 UNIT Tab Take 1,000 Units by mouth every day.     • Multiple Vitamins-Minerals (HM COMPLETE 50+ MENS ULTIMATE PO) Take 1 Tablet by mouth every day.     • aspirin (ASA) 81 MG Chew Tab chewable tablet Take 81 mg by mouth every day.     • albuterol 108 (90 Base) MCG/ACT Aero Soln inhalation aerosol Inhale 2 Puffs every 6 hours as needed for Shortness of Breath. 8.5 g 3   • non-formulary med isagenix     • phentermine 30 MG capsule Take 30 mg by mouth every morning.     • Blood Glucose Monitoring Suppl Device Freestyle carlo glucose monitoring device with all necessary accessories including patch and transmitter  for type II noninsulin treated diabetes.  To check blood sugar once or twice daily and per any symptoms of high or low blood sugar. 1 Each 1   • Blood Glucose Monitoring Suppl Device Meter: Dispense Device of Insurance Preference. Sig. Use as directed for blood sugar monitoring. #1. NR. 1 Each 0   • Blood Glucose Test Strips Test strips for meter dispensed, testing one time per day E11.65 100 Strip 2   • Lancets Lancets for meter dispensed, to test one time per day E11.65 100 Each 2   • ferrous sulfate 325 (65 Fe) MG EC tablet Take 1 Tab by mouth 3 times a day, with meals. 270 Tab 1     No facility-administered medications prior to visit.     Hospital Outpatient Visit on 05/10/2022   Component Date Value   • Ferritin 05/10/2022 34.1    • Iron 05/10/2022 58    • Total Iron Binding 05/10/2022 334    • Unsat Iron Binding 05/10/2022 276    • % Saturation 05/10/2022 17    • Folate -Folic Acid 05/10/2022 9.7    • Vitamin B12 -True Cobala* 05/10/2022 511    • 25-Hydroxy   Vitamin D 25 05/10/2022 30    • Vitamin B3  - Niacin 05/10/2022 2.75    • Vitamin B6 05/10/2022 447.4 (A)   • Vitamin B1 05/10/2022 145    • Sed Rate Westergren 05/10/2022 4    • WBC 05/10/2022 9.6    • RBC 05/10/2022 5.41    • Hemoglobin 05/10/2022 16.5    • Hematocrit 05/10/2022 49.2    • MCV 05/10/2022 90.9    • MCH 05/10/2022 30.5    • MCHC 05/10/2022 33.5 (A)   • RDW 05/10/2022 48.3    • Platelet Count 05/10/2022 139 (A)   • MPV 05/10/2022 11.8    • Neutrophils-Polys 05/10/2022 71.40    • Lymphocytes 05/10/2022 18.40 (A)   • Monocytes 05/10/2022 7.40    • Eosinophils 05/10/2022 1.90    • Basophils 05/10/2022 0.50    • Immature Granulocytes 05/10/2022 0.40    • Nucleated RBC 05/10/2022 0.00    • Neutrophils (Absolute) 05/10/2022 6.87    • Lymphs (Absolute) 05/10/2022 1.77    • Monos (Absolute) 05/10/2022 0.71    • Eos (Absolute) 05/10/2022 0.18    • Baso (Absolute) 05/10/2022 0.05    • Immature Granulocytes (a* 05/10/2022 0.04    • NRBC (Absolute) 05/10/2022 0.00    • Cholesterol,Tot 05/10/2022 151    • Triglycerides 05/10/2022 118    • HDL 05/10/2022 52    • LDL 05/10/2022 75    • Sodium 05/10/2022 139    • Potassium 05/10/2022 4.5    • Chloride 05/10/2022 99    • Co2 05/10/2022 26    • Anion Gap 05/10/2022 14.0    • Glucose 05/10/2022 190 (A)   • Bun 05/10/2022 15    • Creatinine 05/10/2022 0.95    • Calcium 05/10/2022 9.6    • AST(SGOT) 05/10/2022 17    • ALT(SGPT) 05/10/2022 17    • Alkaline Phosphatase 05/10/2022 53    • Total Bilirubin 05/10/2022 0.9    • Albumin 05/10/2022 4.6    • Total Protein 05/10/2022 7.5    • Globulin 05/10/2022 2.9    • A-G Ratio 05/10/2022 1.6    • TSH 05/10/2022 0.750    • GFR (CKD-EPI) 05/10/2022 85       Lab Results   Component Value Date/Time    HBA1C 7.6 (A) 03/07/2022 01:27 PM    HBA1C 7.7 (H) 10/26/2021 10:59 AM     Lab Results   Component Value Date/Time    SODIUM 139 05/10/2022 09:17 AM    POTASSIUM 4.5 05/10/2022 09:17 AM    CHLORIDE 99 05/10/2022 09:17 AM    CO2 26 05/10/2022 09:17 AM    GLUCOSE 190 (H)  "05/10/2022 09:17 AM    BUN 15 05/10/2022 09:17 AM    CREATININE 0.95 05/10/2022 09:17 AM    ALKPHOSPHAT 53 05/10/2022 09:17 AM    ASTSGOT 17 05/10/2022 09:17 AM    ALTSGPT 17 05/10/2022 09:17 AM    TBILIRUBIN 0.9 05/10/2022 09:17 AM     Lab Results   Component Value Date/Time    INR 1.11 01/02/2019 07:04 PM    INR 0.95 01/18/2016 12:00 AM     Lab Results   Component Value Date/Time    CHOLSTRLTOT 151 05/10/2022 09:17 AM    LDL 75 05/10/2022 09:17 AM    HDL 52 05/10/2022 09:17 AM    TRIGLYCERIDE 118 05/10/2022 09:17 AM       Lab Results   Component Value Date/Time    TESTOSTERONE 357 10/26/2021 10:59 AM     No results found for: TSH  No results found for: FREET4  No results found for: URICACID  No components found for: VITB12  Lab Results   Component Value Date/Time    25HYDROXY 30 05/10/2022 09:17 AM    25HYDROXY 34 07/26/2021 08:32 AM               HPI    Review of Systems   Constitutional: Negative.    HENT: Negative.    Eyes: Negative.    Respiratory: Negative.    Cardiovascular: Negative.    Gastrointestinal: Negative.    Genitourinary: Negative.    Musculoskeletal: Negative.    Skin: Negative.    Neurological: Negative.    Endo/Heme/Allergies: Negative.    Psychiatric/Behavioral: Negative.               Objective     /64   Ht 1.753 m (5' 9\")   Wt 101 kg (223 lb)   BMI 32.93 kg/m²      Physical Exam  Constitutional:       General: He is not in acute distress.     Appearance: He is well-developed. He is not diaphoretic.   HENT:      Head: Normocephalic and atraumatic.      Right Ear: External ear normal.      Left Ear: External ear normal.      Nose: Nose normal.      Mouth/Throat:      Pharynx: No oropharyngeal exudate.   Eyes:      General: No scleral icterus.        Right eye: No discharge.         Left eye: No discharge.      Conjunctiva/sclera: Conjunctivae normal.      Pupils: Pupils are equal, round, and reactive to light.   Neck:      Thyroid: No thyromegaly.      Vascular: No JVD.      Trachea: " No tracheal deviation.   Cardiovascular:      Rate and Rhythm: Normal rate and regular rhythm.      Heart sounds: Normal heart sounds. No murmur heard.    No friction rub. No gallop.   Pulmonary:      Effort: Pulmonary effort is normal. No respiratory distress.      Breath sounds: Normal breath sounds. No stridor. No wheezing or rales.   Chest:      Chest wall: No tenderness.   Abdominal:      General: Bowel sounds are normal. There is no distension.      Palpations: Abdomen is soft. There is no mass.      Tenderness: There is no abdominal tenderness. There is no guarding or rebound.   Musculoskeletal:         General: No tenderness. Normal range of motion.      Cervical back: Normal range of motion and neck supple.   Lymphadenopathy:      Cervical: No cervical adenopathy.   Skin:     General: Skin is warm and dry.      Coloration: Skin is not pale.      Findings: No erythema or rash.   Neurological:      Mental Status: He is alert and oriented to person, place, and time.      Motor: No abnormal muscle tone.      Coordination: Coordination normal.      Deep Tendon Reflexes: Reflexes are normal and symmetric. Reflexes normal.   Psychiatric:         Behavior: Behavior normal.         Thought Content: Thought content normal.         Judgment: Judgment normal.               Hospital Outpatient Visit on 05/10/2022   Component Date Value   • Ferritin 05/10/2022 34.1    • Iron 05/10/2022 58    • Total Iron Binding 05/10/2022 334    • Unsat Iron Binding 05/10/2022 276    • % Saturation 05/10/2022 17    • Folate -Folic Acid 05/10/2022 9.7    • Vitamin B12 -True Cobala* 05/10/2022 511    • 25-Hydroxy   Vitamin D 25 05/10/2022 30    • Vitamin B3 - Niacin 05/10/2022 2.75    • Vitamin B6 05/10/2022 447.4 (A)   • Vitamin B1 05/10/2022 145    • Sed Rate Westergren 05/10/2022 4    • WBC 05/10/2022 9.6    • RBC 05/10/2022 5.41    • Hemoglobin 05/10/2022 16.5    • Hematocrit 05/10/2022 49.2    • MCV 05/10/2022 90.9    • MCH  05/10/2022 30.5    • MCHC 05/10/2022 33.5 (A)   • RDW 05/10/2022 48.3    • Platelet Count 05/10/2022 139 (A)   • MPV 05/10/2022 11.8    • Neutrophils-Polys 05/10/2022 71.40    • Lymphocytes 05/10/2022 18.40 (A)   • Monocytes 05/10/2022 7.40    • Eosinophils 05/10/2022 1.90    • Basophils 05/10/2022 0.50    • Immature Granulocytes 05/10/2022 0.40    • Nucleated RBC 05/10/2022 0.00    • Neutrophils (Absolute) 05/10/2022 6.87    • Lymphs (Absolute) 05/10/2022 1.77    • Monos (Absolute) 05/10/2022 0.71    • Eos (Absolute) 05/10/2022 0.18    • Baso (Absolute) 05/10/2022 0.05    • Immature Granulocytes (a* 05/10/2022 0.04    • NRBC (Absolute) 05/10/2022 0.00    • Cholesterol,Tot 05/10/2022 151    • Triglycerides 05/10/2022 118    • HDL 05/10/2022 52    • LDL 05/10/2022 75    • Sodium 05/10/2022 139    • Potassium 05/10/2022 4.5    • Chloride 05/10/2022 99    • Co2 05/10/2022 26    • Anion Gap 05/10/2022 14.0    • Glucose 05/10/2022 190 (A)   • Bun 05/10/2022 15    • Creatinine 05/10/2022 0.95    • Calcium 05/10/2022 9.6    • AST(SGOT) 05/10/2022 17    • ALT(SGPT) 05/10/2022 17    • Alkaline Phosphatase 05/10/2022 53    • Total Bilirubin 05/10/2022 0.9    • Albumin 05/10/2022 4.6    • Total Protein 05/10/2022 7.5    • Globulin 05/10/2022 2.9    • A-G Ratio 05/10/2022 1.6    • TSH 05/10/2022 0.750    • GFR (CKD-EPI) 05/10/2022 85       '  Lab Results   Component Value Date/Time    HBA1C 7.6 (A) 03/07/2022 01:27 PM    HBA1C 7.7 (H) 10/26/2021 10:59 AM     Lab Results   Component Value Date/Time    SODIUM 139 05/10/2022 09:17 AM    POTASSIUM 4.5 05/10/2022 09:17 AM    CHLORIDE 99 05/10/2022 09:17 AM    CO2 26 05/10/2022 09:17 AM    GLUCOSE 190 (H) 05/10/2022 09:17 AM    BUN 15 05/10/2022 09:17 AM    CREATININE 0.95 05/10/2022 09:17 AM    ALKPHOSPHAT 53 05/10/2022 09:17 AM    ASTSGOT 17 05/10/2022 09:17 AM    ALTSGPT 17 05/10/2022 09:17 AM    TBILIRUBIN 0.9 05/10/2022 09:17 AM     Lab Results   Component Value Date/Time    INR  1.11 01/02/2019 07:04 PM    INR 0.95 01/18/2016 12:00 AM     Lab Results   Component Value Date/Time    CHOLSTRLTOT 151 05/10/2022 09:17 AM    LDL 75 05/10/2022 09:17 AM    HDL 52 05/10/2022 09:17 AM    TRIGLYCERIDE 118 05/10/2022 09:17 AM       Lab Results   Component Value Date/Time    TESTOSTERONE 357 10/26/2021 10:59 AM     No results found for: TSH  No results found for: FREET4  No results found for: URICACID  No components found for: VITB12  Lab Results   Component Value Date/Time    25HYDROXY 30 05/10/2022 09:17 AM    25HYDROXY 34 07/26/2021 08:32 AM                      Assessment & Plan        1. Current moderate episode of major depressive disorder without prior episode ([facility])  See below.  - Referral to Psychology    2. Anxiety disorder, unspecified type  Discussed counseled and referred to psychology for further management as well as psychiatry  - Referral to Psychology    3. Alcohol use  New problem.  Start naltrexone to help with alcohol abstinence.  - Referral to Psychology  - naltrexone (DEPADE) 50 MG Tab; Take 1 Tablet by mouth every day.  Dispense: 30 Tablet; Refill: 2    4. Prostate CA ([facility])  Continue follow-up with radiation oncology.  Started radiation treatment just this week.    5. Uncontrolled type 2 diabetes mellitus with hyperglycemia ([facility])  See DM RN note.  Continue current regimen  - TSH; Future  - Comp Metabolic Panel; Future  - Lipid Profile; Future  - CBC WITH DIFFERENTIAL; Future  - HEMOGLOBIN A1C; Future  - MICROALBUMIN CREAT RATIO URINE; Future    6. Class 2 severe obesity due to excess calories with serious comorbidity and body mass index (BMI) of 36.0 to 36.9 in adult ([facility])        diet/exercise/lose 15 lbs.; patient counseled      7. JOSH (obstructive sleep apnea)      - Referral to Pulmonary and Sleep Medicine    8. Excessive daytime sleepiness  Rule out sleep apnea.  Refer to pulmonary  9. Mixed hyperlipidemia     - TSH; Future  - Comp Metabolic Panel; Future  - Lipid Profile;  Future  - CBC WITH DIFFERENTIAL; Future  - HEMOGLOBIN A1C; Future  - MICROALBUMIN CREAT RATIO URINE; Future

## 2022-06-16 ENCOUNTER — OFFICE VISIT (OUTPATIENT)
Dept: BEHAVIORAL HEALTH | Facility: CLINIC | Age: 72
End: 2022-06-16
Payer: MEDICARE

## 2022-06-16 DIAGNOSIS — F33.1 MAJOR DEPRESSIVE DISORDER, RECURRENT EPISODE, MODERATE (HCC): ICD-10-CM

## 2022-06-16 DIAGNOSIS — F43.10 POSTTRAUMATIC STRESS DISORDER: ICD-10-CM

## 2022-06-16 PROCEDURE — 99204 OFFICE O/P NEW MOD 45 MIN: CPT | Performed by: PSYCHIATRY & NEUROLOGY

## 2022-06-16 NOTE — PROGRESS NOTES
[facility]   Therapy Progress Note      This provider informed the patient their medical records are totally confidential except for the use by other providers involved in their care, or if the patient signs a release, or to report instances of child or elder abuse, or if it is determined they are an immediate risk to harm themselves or others.    Name: Prabhakar Sierra  MRN: [id number]  : 1950  Age: 71 y.o.  Date of assessment: 2022  PCP: Stanton Miller M.D.      Present Illness:   Chart reviewed prior to seeing him and his wife in my office.  He is 71,  50 years, and has 2 daughters 47 and 49.  He is referred for evaluation of anxiety and depression.  He has had a neuropsychological evaluation by  because of problems with memory impairment and behavioral changes over the last 1 or 2 years.  He is scheduled to see a neurologist on .  He is not sure why he was scheduled to see a psychiatrist.  His primary care provider has been providing Cymbalta 60 mg twice daily for the past 5 years.  He indicates that that medication is helping his depression and he prefers to not make any changes.  His wife's concern is about his ongoing alcohol abuse.  He drinks at least 4 large glasses of wine per day and has difficulty recalling how much beer he is also drinking.  He has had an MRI on May 12 and it shows mild cerebral volume loss and a chronic lacunar infarct in the left cerebellum.   He is a poor historian and his wife provided much of the information.    Past Psych History:   No previous psychiatrist or psychiatric hospitalization.  He previously attended  and had a sponsor.    Substance Abuse History:  See above.    Family History:   His parents, his sister, and his 2 brothers are .    Medical History:  He has completed approximately 20 out of the 45 radiation treatments for prostate cancer.  Type 2 diabetes, hypertension, GERD    Psych  Medications:  Cymbalta 60 mg twice daily for 5 years.    Allergies:   None known    Mental Status:   He is alert, oriented, and cooperative.  Poor historian.  Relatedness is fairly good.  Grooming is good.  Speech is normal rate.  Anxious.  Memory is fair.  Insight and judgment are limited.  No indication of psychotic thinking.    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  His wife is monitoring his medications.  A follow-up appointment is not requested at this time.      Freedom Dover M.D.     This note was created using voice recognition software (Dragon). The accuracy of the dictation is limited by the abilities of the software. I have reviewed the note prior to signing, however some errors in grammar and context are still possible. If you have any questions related to this note please do not hesitate to contact our office.

## 2022-06-27 ENCOUNTER — HOSPITAL ENCOUNTER (OUTPATIENT)
Dept: LAB | Facility: MEDICAL CENTER | Age: 72
End: 2022-06-27
Attending: PHYSICIAN ASSISTANT
Payer: MEDICARE

## 2022-06-27 LAB
PSA SERPL-MCNC: 0.03 NG/ML (ref 0–4)
TESTOST SERPL-MCNC: <12 NG/DL (ref 175–781)

## 2022-06-27 PROCEDURE — 84403 ASSAY OF TOTAL TESTOSTERONE: CPT

## 2022-06-27 PROCEDURE — 36415 COLL VENOUS BLD VENIPUNCTURE: CPT

## 2022-06-27 PROCEDURE — 84153 ASSAY OF PSA TOTAL: CPT

## 2022-07-25 ENCOUNTER — OFFICE VISIT (OUTPATIENT)
Dept: NEUROLOGY | Facility: MEDICAL CENTER | Age: 72
End: 2022-07-25
Attending: PSYCHIATRY & NEUROLOGY
Payer: MEDICARE

## 2022-07-25 VITALS
DIASTOLIC BLOOD PRESSURE: 60 MMHG | OXYGEN SATURATION: 98 % | SYSTOLIC BLOOD PRESSURE: 110 MMHG | WEIGHT: 221 LBS | HEART RATE: 70 BPM | BODY MASS INDEX: 32.64 KG/M2

## 2022-07-25 DIAGNOSIS — I63.81 LACUNAR INFARCTION (HCC): ICD-10-CM

## 2022-07-25 DIAGNOSIS — F10.26 ALCOHOL DEPENDENCE WITH ALCOHOL-INDUCED PERSISTING AMNESTIC DISORDER (HCC): ICD-10-CM

## 2022-07-25 DIAGNOSIS — E11.42 DIABETIC POLYNEUROPATHY ASSOCIATED WITH TYPE 2 DIABETES MELLITUS (HCC): ICD-10-CM

## 2022-07-25 DIAGNOSIS — F02.80 DEMENTIA ASSOCIATED WITH OTHER UNDERLYING DISEASE WITHOUT BEHAVIORAL DISTURBANCE (HCC): Primary | ICD-10-CM

## 2022-07-25 DIAGNOSIS — G91.1 ACQUIRED CEREBRAL VENTRICULOMEGALY (HCC): ICD-10-CM

## 2022-07-25 PROCEDURE — 99205 OFFICE O/P NEW HI 60 MIN: CPT | Performed by: PSYCHIATRY & NEUROLOGY

## 2022-07-25 PROCEDURE — G2212 PROLONG OUTPT/OFFICE VIS: HCPCS | Performed by: PSYCHIATRY & NEUROLOGY

## 2022-07-25 PROCEDURE — 99212 OFFICE O/P EST SF 10 MIN: CPT | Performed by: PSYCHIATRY & NEUROLOGY

## 2022-07-25 ASSESSMENT — MONTREAL COGNITIVE ASSESSMENT (MOCA)
11. FOR EACH PAIR OF WORDS, WHAT CATEGORY DO THEY BELONG TO (OUT OF 2): 2/2
4. NAME EACH OF THE THREE ANIMALS SHOWN: 2/3
5. MEMORY TRIALS: SECOND TRIAL
2. COPY DRAWING: 0/1
1. ALTERNATING TRAIL MAKING: 1/1
ORIENTATION SUBSCORE: 5/6
10. [FLUENCY] NAME WORDS STARTING WITH DESIGNATED LETTER: 0/1
CATEGORY CUE (IF APPLICABLE): 2
WHAT IS THE VERSION OF MOCA ADMINISTERED: 8.3
6. READ LIST OF DIGITS [FORWARD/BACKWARD]: 1/2
DELAYED RECALL SUBSCORE: 3/5
7. [VIGILENCE] TAP WHEN HEARING DESIGNATED LETTER: 1/1
9. REPEAT EACH SENTENCE: 2/2
WHAT IS THE TOTAL SCORE (OUT OF 30): 21
3. DRAW A CLOCK: CONTOUR, NUMBERS, HANDS: 1/3
8. SERIAL SUBTRACTION OF 7S: 4 OR 5/5

## 2022-07-25 ASSESSMENT — PATIENT HEALTH QUESTIONNAIRE - PHQ9: CLINICAL INTERPRETATION OF PHQ2 SCORE: 0

## 2022-07-25 ASSESSMENT — FIBROSIS 4 INDEX: FIB4 SCORE: 2.14

## 2022-07-25 NOTE — PROGRESS NOTES
"Reason for Neurology Consult:  Dementia.    History of present illness:    Prabhakar Sierra 71 y.o. right handed gentleman who has been on Disability (Mental) since age 47-48 when at that time he was having very low level cognitive issues (\"little stuff\" per his wife here today).     The changes has been very gradual over the last 20 years or so.    Had a formal Neuropsychological evaluation ([facility]) in May 2022 due to \"behabvioral changes and memory impairment over several years\"  Objective examination reveals an individual of average range premorbid ability with currently intact language, attention/concentration,visual spatial ability and visual memory.  Findings consistent with a Major Neurocognitive Disorder. Factors suggested to impair cognition:  Poorly controlled diabetes- \"patient reports noncompliance with daily blood sugar checks> he will eat an entire bag of oreos in one day, psychiatric symptoms of mild range depression and mild range anxiety    Evaluation by Saurabh Styles PhD.    The DSM5 Diagnoses:     1. Major Unspecified Neurocognitive Disorder(with behavioral disturbance).  2. Alcohol Use Disorder, moderate to severe  3. Major Depression Disorder, with anxious distress       History of drinking of a bottle of wine (Cheap white wine) a day or 3-4 cans of beer and this has been an issue since around age 29.   Early in  His 30's he was drinking hard liquor.     Smoking- Camels- 2 packs per week (on and off since age 12)    Wife has not noticed any delusions,paranoid,behavioral changes (sundowning behaviors) in the last 3-6 months.    No dysarthria,dysphagia, headache(s), orthostatic dizziness/faintness episodes,double vision,vertiginous events In the recent months.    Denies any persistent neurological deficits to suggest a clinical ischemic stroke event(s) nor any history of concussion(s), seizure(s)- described or documented in the problem list.    He has continued to drive with any accident " "or incidents known to his wife or admitted to today in the last 6-12 months.    No RLS features or REM Sleep Behavioral symptoms in the recent months.Averages 7-8 hours of sleep most nights in the recent months.  Denies nodding off routinely or daily in the last several weeks nor any tendency to need or want to take naps daily in the recent weeks.    Wife describes they used to walk the entire neighbor as of 4-5 years ago.  His walking has become slower and more unsteady and he has tended to become more \"duck walking\" in the last 12 months. The wife has noticed that when getting up from a recliner that he tends to lean backwards.    No consistent,persistent or evolving involuntary movements of his head-face,trunk or limbs in the last 3 months.    No hypophonia, evolving postural instability, micrographia in the last 6-12 months.    He has had neuropathy (peripheral) with numbness and has reduced sensation of both feet (from ankle to his toes)- bilaterally and the degree and location of his numbness has to him been the same in the last 2 plus years. He has not noticed any burning,stabbing,aching pains of the toes or bottoms of the feet in the last 6 months.   Hands-fingers have been stable in the last 6-12 months.      Mother,Brother and Sister (chronic sensory deprivation)- Dementia  Father: Long standing Diabetes with  Alcoholism>  at age 83  Brother:  (MI) at age 72.       Patient Active Problem List    Diagnosis Date Noted   • Obesity (BMI 30.0-34.9) 2021   • Alcohol use 2019   • Other male erectile dysfunction 2018   • Vitamin D deficiency 2017   • Right hip pain 2016   • Current moderate episode of major depressive disorder without prior episode ([facility]) 2016   • B12 deficiency 2016   • Tremor, coarse 10/25/2016   • Diabetes type 2, uncontrolled ([facility]) 2016   • GERD (gastroesophageal reflux disease) 2016   • Essential hypertension 2016   • Prostate " CA (HCC)- feb 2022; RT tbd x 8 wks, mar- may 2022 09/27/2016   • Mixed hyperlipidemia 09/27/2016       Past medical history:   Past Medical History:   Diagnosis Date   • BPH (benign prostatic hyperplasia)    • GERD (gastroesophageal reflux disease)    • Hypertension    • Hyponatremia 5/18/2017   • Iron deficiency anemia secondary to inadequate dietary iron intake 5/26/2021   • Neuropathic pain, leg    • Type II or unspecified type diabetes mellitus without mention of complication, not stated as uncontrolled        Past surgical history:   No past surgical history on file.      Social history:   Social History     Socioeconomic History   • Marital status:      Spouse name: Not on file   • Number of children: Not on file   • Years of education: Not on file   • Highest education level: Not on file   Occupational History   • Not on file   Tobacco Use   • Smoking status: Former Smoker     Packs/day: 0.50     Years: 50.00     Pack years: 25.00     Types: Cigarettes     Quit date: 1/10/2007     Years since quitting: 15.5   • Smokeless tobacco: Never Used   Vaping Use   • Vaping Use: Never used   Substance and Sexual Activity   • Alcohol use: Yes     Alcohol/week: 4.8 - 6.0 oz     Types: 8 - 10 Glasses of wine per week     Comment: 1 bottle of wine 2-3 days a week   • Drug use: No   • Sexual activity: Never     Partners: Female   Other Topics Concern   • Not on file   Social History Narrative   • Not on file     Social Determinants of Health     Financial Resource Strain: Not on file   Food Insecurity: Not on file   Transportation Needs: Not on file   Physical Activity: Not on file   Stress: Not on file   Social Connections: Not on file   Intimate Partner Violence: Not on file   Housing Stability: Not on file       Family history:   Family History   Problem Relation Age of Onset   • Heart Disease Mother    • Diabetes Father    • Other Sister         Mental retardation   • Heart Disease Brother    • Cancer Brother          Prostate   • No Known Problems Daughter    • No Known Problems Daughter          Current medications:   Current Outpatient Medications   Medication   • naltrexone (DEPADE) 50 MG Tab   • sildenafil citrate (VIAGRA) 100 MG tablet   • pioglitazone (ACTOS) 30 MG Tab   • albuterol 108 (90 Base) MCG/ACT Aero Soln inhalation aerosol   • atorvastatin (LIPITOR) 80 MG tablet   • DULoxetine (CYMBALTA) 60 MG Cap DR Particles delayed-release capsule   • Empagliflozin (JARDIANCE) 25 MG Tab   • gabapentin (NEURONTIN) 300 MG Cap   • glipiZIDE SR (GLIPIZIDE XL) 10 MG TABLET SR 24 HR   • metformin (GLUCOPHAGE) 1000 MG tablet   • omeprazole (PRILOSEC) 20 MG delayed-release capsule   • tamsulosin (FLOMAX) 0.4 MG capsule   • terazosin (HYTRIN) 5 MG Cap   • benazepril (LOTENSIN) 40 MG tablet   • Blood Glucose Monitoring Suppl Device   • Blood Glucose Monitoring Suppl Device   • Blood Glucose Test Strips   • Lancets   • ferrous sulfate 325 (65 Fe) MG EC tablet   • Acetaminophen 500 MG Cap   • vitamin D (CHOLECALCIFEROL) 1000 UNIT Tab   • Multiple Vitamins-Minerals (HM COMPLETE 50+ MENS ULTIMATE PO)   • aspirin (ASA) 81 MG Chew Tab chewable tablet   • non-formulary med     No current facility-administered medications for this visit.       Medication Allergy:  No Known Allergies        Physical examination:   Vitals:    07/25/22 0950   BP: 110/60   BP Location: Right arm   Patient Position: Sitting   BP Cuff Size: Adult   Pulse: 70   SpO2: 98%   Weight: 100 kg (221 lb)       Normal cephalic atraumatic.  There is full range of movement around the neck in all directions without restrictions or discrete pain evoked triggers.  No lower extremity edema.      Neurological  Exam:      Mark Cognitive Assessment (MOCA) Version 7.1    Years of Education: BA Degree    TOTAL SCORE: 21/30  (to be scanned into the MEDIA section in the E.M.R.)          Mental status: Awake, alert and fully oriented to person, place, time and situation. Normal  attention and concentration.  Did not appear/act combative,irritable,anxious,paranoid/delusional or aggressive to or with me.    Speech and language: Speech is fluent without errors, clear, intact to repetition and intact to naming.     Follows 3 step motor commands in sequence without significant delay and correctly.    Cranial nerve exam:  II: Pupils are equally round and reactive to light. Visual fields are intact by confrontation.  III, IV, VI: EOMI, no diplopia, no ptosis.  V: Sensation to light touch is normal over V1-3 distributions bilaterally.  .  VII: Facial movements are symmetrical. There is no facial droop. .  VIII: Hearing intact to soft speech and finger rub bilaterally  IX: Palate elevates symmetrically, uvula is midline. Dysarthria is not present.  XI: Shoulder shrug are symmetrical and strong.   XII: Tongue protrudes midline.      Motor exam:  Muscle tone is normal in all 4 limbs. and No abnormal movements appreciated.    Muscle strength:    Neck Flexors/Extensors: 5/5       Right  Left  Deltoid   5/5  5/5      Biceps   5/5  5/5  Triceps  5/5  5/5   Wrist extensors 5/5  5/5  Wrist flexors  5/5  5/5     5/5  5/5  Interossei  5/5  5/5  Thenar (APB)  5/5  5/5   Hip flexors  5/5  5/5  Quadriceps  5/5  5/5    Hamstrings  5/5  5/5  Dorsiflexors  5/5  5/5  Plantarflexors  5/5  5/5  Toe extension  5/5  5/5      Sensory exam:      Vibratory: absent at great toes, reduced to 4 seconds at both ankles, present for 10 seconds at both knees, 20-22 seconds at both index and 5th fingers.    Proprioception: very mildly reduced in the last 6 months or so.    Reduced pin prick to mid forefeet bilaterally.      Reflexes:       Right  Left  Biceps   2/4  2/4  Triceps  2/4  2/4  Brachioradialis 2/4  2/4  Knee jerk  2/4  2/4  Ankle jerk  0/4  0/4     Frontal release signs are absent    bilaterally toes are downgoing to plantar stimulation..    Coordination (finger-to-nose, heel/knee/shin, rapid alternating movements)  was normal.     There was no ataxia, no tremors, and no dysmetria.     Station and gait were normal. Easily stands up from exam chair without retropulsion,veering,leaning,swaying (to either side).       Labs and Tests:     Ref Range & Units 2 mo ago   (5/10/22) 9 mo ago   (10/26/21) 12 mo ago   (7/26/21) 1 yr ago   (4/16/21) 2 yr ago   (2/21/20) 3 yr ago   (1/3/19) 3 yr ago   (1/2/19)   Cholesterol,Tot 100 - 199 mg/dL 151  121  219 High   141  158  172  160    Triglycerides 0 - 149 mg/dL 118  142  342 High   177 High   357 High   315 High   263 High     HDL >=40 mg/dL 52  40  33 Abnormal   36 Abnormal   34 Abnormal   39 Abnormal   37 Abnormal     LDL <100 mg/dL 75  53  118 High   70  53  70        Ref Range & Units 12 mo ago   (7/26/21) 1 yr ago   (4/16/21) 2 yr ago   (2/21/20) 3 yr ago   (1/2/19) 3 yr ago   (11/5/18) 4 yr ago   (4/12/18) 4 yr ago   (9/22/17)   Glycohemoglobin 4.0 - 5.6 % 8.6 High   9.4 High  CM  9.7 High          Component Ref Range & Units 12 mo ago   (7/26/21) 2 yr ago   (2/21/20) 3 yr ago   (3/4/19) 3 yr ago   (1/2/19) 3 yr ago   (11/5/18) 4 yr ago   (4/12/18) 5 yr ago   (10/3/16)   TSH 0.380 - 5.330 uIU/mL 0.840  0.600 CM  0.750 CM               NEUROIMAGING:     Brain MRI reviewed from the last 6 months- moderate ventriculomegaly; no ischemic stroke(s).    Blood work reviewed from May 2022- B6 level over 400.  B12: 511  B1- over 100 (within normal range).    Impression/Plans/Recommendations:    1.  Early Stage Dementia- in setting of significant alcohol use and long term consumption.    Continues to drink daily significantly.    MOCA Score of 21/30.    FAQ score of 9 per wife.    Global Deterioration Score in the 4 range.    New behavioral change of humming when eating over the last 6 months> can be seen with neurodegenerative disease such as Alzheimer's.     Moderate Hydrocephalus by MRI (Brain) with slight  increase in size slightly since 2017 and 2019.    He could have NPH given the triad  of urinary urgency, slow gait decline in the last 3-5 years though without anjel apraxia or persistent magnetic quality to his gait. (he is not diffusely parkinsonian though he does semi shuffle).    We reviewed the Brain MRI(s) from 2017,2019 and 2022 today and I answered questions posed by wife.    2. Acquired Ventriculomegaly (chronic) and dating back atleast to [id number] period of time.    3. 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 Length dependent and chronic (for over 3-5 years).    Plans:    A. Driving re evaluation to be done given type of cognitive issues- form to be filled out.    B. Have strongly advocated for Prabhakar to stop drinking and then re evaluation with me in about 4-5 months and if his walking is no better will at this time consider large volume CSF study.    C. I asked him to talk to his primary care provider about his vitamin B6 level which was over 400 when last checked.    D. I encouraged he strive to get his A1C% under 7.0    E. Recommend High Intensity statin and baby ASA use long term for future vascular protection.    I have performed  a history and physical exam and a directed /focused  ROS today.    Total time spent today or this patient's care was 100   minutes   and included reviewing  the diagnostic workup to date (such as labs and imaging as well as interpreting such tests relevant to this patient's neurological condition),  reviewing/obtaining separately obtained history (from patient and/or accompanying family member)  for today's neurological problem(s) ,counseling and educating the patient and family member on issues related to cognition/memory and cognitive health factors and documenting  the clinical information in the EMR.    Follow up in 4-5 months or so        Parminder Moran MD  [facility].

## 2022-07-26 ENCOUNTER — OFFICE VISIT (OUTPATIENT)
Dept: MEDICAL GROUP | Age: 72
End: 2022-07-26
Payer: MEDICARE

## 2022-07-26 VITALS
TEMPERATURE: 97.5 F | RESPIRATION RATE: 16 BRPM | HEART RATE: 91 BPM | OXYGEN SATURATION: 97 % | BODY MASS INDEX: 32.88 KG/M2 | SYSTOLIC BLOOD PRESSURE: 118 MMHG | WEIGHT: 222 LBS | HEIGHT: 69 IN | DIASTOLIC BLOOD PRESSURE: 60 MMHG

## 2022-07-26 DIAGNOSIS — F10.27 DEMENTIA ASSOCIATED WITH ALCOHOLISM WITH BEHAVIORAL DISTURBANCE (HCC): ICD-10-CM

## 2022-07-26 DIAGNOSIS — E53.8 VITAMIN B 12 DEFICIENCY: ICD-10-CM

## 2022-07-26 DIAGNOSIS — E78.2 MIXED HYPERLIPIDEMIA: ICD-10-CM

## 2022-07-26 DIAGNOSIS — G31.84 MILD COGNITIVE IMPAIRMENT: ICD-10-CM

## 2022-07-26 DIAGNOSIS — E55.9 VITAMIN D DEFICIENCY: ICD-10-CM

## 2022-07-26 DIAGNOSIS — G25.2 TREMOR, COARSE: ICD-10-CM

## 2022-07-26 DIAGNOSIS — F10.20 ALCOHOL USE DISORDER, MODERATE, DEPENDENCE (HCC): ICD-10-CM

## 2022-07-26 DIAGNOSIS — I10 ESSENTIAL HYPERTENSION: ICD-10-CM

## 2022-07-26 DIAGNOSIS — E11.65 UNCONTROLLED TYPE 2 DIABETES MELLITUS WITH HYPERGLYCEMIA (HCC): ICD-10-CM

## 2022-07-26 DIAGNOSIS — F32.1 CURRENT MODERATE EPISODE OF MAJOR DEPRESSIVE DISORDER WITHOUT PRIOR EPISODE (HCC): ICD-10-CM

## 2022-07-26 DIAGNOSIS — R39.14 BENIGN PROSTATIC HYPERPLASIA WITH INCOMPLETE BLADDER EMPTYING: ICD-10-CM

## 2022-07-26 DIAGNOSIS — N40.1 BENIGN PROSTATIC HYPERPLASIA WITH INCOMPLETE BLADDER EMPTYING: ICD-10-CM

## 2022-07-26 DIAGNOSIS — C61 PROSTATE CA (HCC): Chronic | ICD-10-CM

## 2022-07-26 PROCEDURE — 99215 OFFICE O/P EST HI 40 MIN: CPT | Performed by: INTERNAL MEDICINE

## 2022-07-26 RX ORDER — BENAZEPRIL HYDROCHLORIDE 40 MG/1
40 TABLET ORAL DAILY
Qty: 100 TABLET | Refills: 4 | Status: ON HOLD | OUTPATIENT
Start: 2022-07-26 | End: 2022-12-28 | Stop reason: SDUPTHER

## 2022-07-26 RX ORDER — TAMSULOSIN HYDROCHLORIDE 0.4 MG/1
0.4 CAPSULE ORAL
Qty: 90 CAPSULE | Refills: 4 | Status: SHIPPED | OUTPATIENT
Start: 2022-07-26 | End: 2022-07-26

## 2022-07-26 RX ORDER — GLIPIZIDE 10 MG/1
10 TABLET, FILM COATED, EXTENDED RELEASE ORAL DAILY
Qty: 90 TABLET | Refills: 4 | Status: ON HOLD | OUTPATIENT
Start: 2022-07-26 | End: 2023-01-05

## 2022-07-26 RX ORDER — TERAZOSIN 5 MG/1
5 CAPSULE ORAL DAILY
Qty: 90 CAPSULE | Refills: 4 | Status: ON HOLD | OUTPATIENT
Start: 2022-07-26 | End: 2022-12-28

## 2022-07-26 RX ORDER — NALTREXONE HYDROCHLORIDE 50 MG/1
50 TABLET, FILM COATED ORAL DAILY
Qty: 90 TABLET | Refills: 3 | Status: ON HOLD | OUTPATIENT
Start: 2022-07-26 | End: 2022-12-28

## 2022-07-26 ASSESSMENT — FIBROSIS 4 INDEX: FIB4 SCORE: 2.14

## 2022-07-26 NOTE — PROGRESS NOTES
Subjective:     Prabhakar Sierra is a 71 y.o. male here today for      and Annual Health Assessment.    And  The patient is here for followup of chronic medical problems listed below. The patient is compliant with medications and having no side effects from them. Denies chest pain, abdominal pain, dyspnea, myalgias, or cough.     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  As well as follow-up of his depression and recent diagnosis of prostate CA status post radiation therapy for the last 2 months.  Lab was reviewed and detailed.  We reviewed patient's neurological assessment by Dr. Moran which confirmed mild dementia with a MoCA score of 21/30 and behavioral disturbance of vomiting while eating.  All attributed to alcoholism.  Patient has decided he will quit effective today all alcohol.  Patient does have other structural neurological damage seen on MRI presumed from alcohol showing ventriculomegaly and prior history of cerebellar strokes.  Patient and wife were appreciative of Dr. Brown's input and is excellent description of the patient's condition and the need for alcohol abstinence completely.    Health Maintenance Summary          Overdue - ABDOMINAL AORTIC ANEURYSM (AAA) SCREEN (Once) Overdue - never done    No completion history exists for this topic.          Ordered - COLORECTAL CANCER SCREENING (COLON CANCER SCREENING ANNUAL FIT - Yearly) Ordered on 7/28/2021 12/19/2018  OCCULT BLOOD FECES IMMUNOASSAY    05/14/2017  OCCULT BLOOD FECES IMMUNOASSAY          Overdue - COVID-19 Vaccine (3 - Booster for Moderna series) Overdue since 7/18/2021 02/18/2021  Imm Admin: Moderna SARS-CoV-2 Vaccine    01/21/2021  Imm Admin: Moderna SARS-CoV-2 Vaccine          Ordered - URINE ACR /  MICROALBUMIN (Yearly) Ordered on 5/16/2022 07/26/2021  MICROALBUMIN CREAT RATIO URINE    04/17/2021  MICROALBUMIN CREAT RATIO URINE    02/21/2020  MICROALBUMIN CREAT RATIO URINE    11/05/2018  MICROALBUMIN CREAT RATIO URINE    04/12/2018  MICROALBUMIN CREAT RATIO URINE    Only the first 5 history entries have been loaded, but more history exists.          DIABETES MONOFILAMENT / LE EXAM (Yearly) Due soon on 7/28/2022 07/28/2021  Diabetic Monofilament Lower Extremity Exam    04/15/2021  [employer]: WORKFLOW - DIABETES - DIABETIC FOOT EXAM PERFORMED    04/15/2021  Diabetic Monofilament Lower Extremity Exam    02/27/2020  Diabetic Monofilament Lower Extremity Exam    11/12/2018  Diabetic Monofilament Lower Extremity Exam    Only the first 5 history entries have been loaded, but more history exists.          IMM INFLUENZA (1) Next due on 9/1/2022 09/04/2020  Imm Admin: Influenza Vaccine Adult HD    10/10/2019  Imm Admin: Influenza Vaccine Adult HD    10/22/2018  Imm Admin: Influenza Vaccine Adult HD    10/01/2018  Imm Admin: Influenza Vaccine Adult HD    10/16/2017  Imm Admin: Influenza Vaccine Adult HD    Only the first 5 history entries have been loaded, but more history exists.          Ordered - A1C SCREENING (Every 6 Months) Ordered on 5/16/2022 03/07/2022  POCT Hemoglobin A1C    10/26/2021  HEMOGLOBIN A1C    07/26/2021  HEMOGLOBIN A1C    04/16/2021  HEMOGLOBIN A1C    02/21/2020  HEMOGLOBIN A1C    Only the first 5 history entries have been loaded, but more history exists.          RETINAL SCREENING (Yearly) Next due on 9/28/2022 09/28/2021  REFERRAL FOR RETINAL SCREENING EXAM    05/14/2021  Done    02/27/2020  Done          Ordered - FASTING LIPID PROFILE (Yearly) Ordered on 5/16/2022    05/10/2022  Lipid Profile    10/26/2021  Lipid Profile    07/26/2021  Lipid Profile    04/16/2021  Lipid Profile    02/21/2020  Lipid Profile    Only the first 5 history entries have been loaded, but more history  exists.          Ordered - SERUM CREATININE (Yearly) Ordered on 5/16/2022    05/10/2022  Comp Metabolic Panel    10/26/2021  Comp Metabolic Panel    07/26/2021  Comp Metabolic Panel    04/16/2021  Comp Metabolic Panel    02/21/2020  Comp Metabolic Panel    Only the first 5 history entries have been loaded, but more history exists.          IMM DTaP/Tdap/Td Vaccine (3 - Td or Tdap) Next due on 9/27/2026 09/27/2016  Imm Admin: Tdap Vaccine    10/18/2012  Imm Admin: Tdap Vaccine          IMM PNEUMOCOCCAL VACCINE: 65+ Years (Series Information) Completed    12/10/2018  Imm Admin: Pneumococcal polysaccharide vaccine (PPSV-23)    09/27/2016  Imm Admin: Pneumococcal Conjugate Vaccine (Prevnar/PCV-13)    12/31/2015  Imm Admin: Pneumococcal Conjugate Vaccine (Prevnar/PCV-13)          HEPATITIS C SCREENING  Completed    02/21/2020  HEP C VIRUS ANTIBODY          IMM ZOSTER VACCINES (Series Information) Completed    07/28/2021  Imm Admin: Zoster Vaccine Recombinant (RZV) (SHINGRIX)    02/27/2020  Imm Admin: Zoster Vaccine Recombinant (RZV) (SHINGRIX)          IMM MENINGOCOCCAL ACWY VACCINE (Series Information) Aged Out    No completion history exists for this topic.          Discontinued - IMM HEP B VACCINE  Discontinued    12/18/2019  Imm Admin: Hepatitis B Vaccine (Adol/Adult)    06/12/2019  Imm Admin: Hepatitis B Vaccine (Adol/Adult)                  Annual Health Assessment Questions:      1.  Are you currently engaging in any exercise or physical activity? No    2.  How would you describe your mood or emotional well-being today? fair    3.  Have you had any falls in the last year? Yes    4.  Have you noticed any problems with your balance or had difficulty walking? Yes    5.  In the last six months have you experienced any leakage of urine? Yes    6. DPA/Advanced Directive: Patient does not have an Advanced Directive.  A packet and workshop information was given on Advanced Directives.    Current medicines (including  changes today)  Current Outpatient Medications   Medication Sig Dispense Refill   • naltrexone (DEPADE) 50 MG Tab Take 1 Tablet by mouth every day. 30 Tablet 2   • sildenafil citrate (VIAGRA) 100 MG tablet      • pioglitazone (ACTOS) 30 MG Tab Take 1 Tablet by mouth every day. 90 Tablet 2   • albuterol 108 (90 Base) MCG/ACT Aero Soln inhalation aerosol Inhale 2 Puffs every 6 hours as needed for Shortness of Breath. 8.5 g 3   • atorvastatin (LIPITOR) 80 MG tablet Take 1 Tablet by mouth every evening. 90 Tablet 4   • DULoxetine (CYMBALTA) 60 MG Cap DR Particles delayed-release capsule Take 1 Capsule by mouth 2 times a day. 180 Capsule 3   • Empagliflozin (JARDIANCE) 25 MG Tab Take 1 Each by mouth every day. 100 Tablet 1   • gabapentin (NEURONTIN) 300 MG Cap Take 1 Capsule by mouth every day. 90 Capsule 4   • glipiZIDE SR (GLIPIZIDE XL) 10 MG TABLET SR 24 HR Take 1 Tablet by mouth every day. 100 Tablet 1   • metformin (GLUCOPHAGE) 1000 MG tablet Take 1 Tablet by mouth 2 times a day with meals. 100 Tablet 1   • omeprazole (PRILOSEC) 20 MG delayed-release capsule TAKE ONE CAPSULE BY MOUTH DAILY (PRILOSEC) 100 Capsule 4   • tamsulosin (FLOMAX) 0.4 MG capsule Take 1 capsule by mouth half an hour after breakfast. 90 Capsule 1   • terazosin (HYTRIN) 5 MG Cap Take 1 Capsule by mouth every day. 90 Capsule 4   • benazepril (LOTENSIN) 40 MG tablet Take 1 Tablet by mouth every day. 100 Tablet 4   • non-formulary med isagenix     • Blood Glucose Monitoring Suppl Device Freestyle carlo glucose monitoring device with all necessary accessories including patch and transmitter  for type II noninsulin treated diabetes.  To check blood sugar once or twice daily and per any symptoms of high or low blood sugar. 1 Each 1   • Blood Glucose Monitoring Suppl Device Meter: Dispense Device of Insurance Preference. Sig. Use as directed for blood sugar monitoring. #1. NR. 1 Each 0   • Blood Glucose Test Strips Test strips for meter  dispensed, testing one time per day E11.65 100 Strip 2   • Lancets Lancets for meter dispensed, to test one time per day E11.65 100 Each 2   • ferrous sulfate 325 (65 Fe) MG EC tablet Take 1 Tab by mouth 3 times a day, with meals. 270 Tab 1   • Acetaminophen 500 MG Cap Take  by mouth.     • vitamin D (CHOLECALCIFEROL) 1000 UNIT Tab Take 1,000 Units by mouth every day.     • Multiple Vitamins-Minerals (HM COMPLETE 50+ MENS ULTIMATE PO) Take 1 Tablet by mouth every day.     • aspirin (ASA) 81 MG Chew Tab chewable tablet Take 81 mg by mouth every day.       No current facility-administered medications for this visit.       He  has a past medical history of BPH (benign prostatic hyperplasia), GERD (gastroesophageal reflux disease), Hypertension, Hyponatremia (5/18/2017), Iron deficiency anemia secondary to inadequate dietary iron intake (5/26/2021), Neuropathic pain, leg, and Type II or unspecified type diabetes mellitus without mention of complication, not stated as uncontrolled.    He has no past medical history of Encounter for long-term (current) use of other medications.    Patient has no known allergies.    He  reports that he quit smoking about 15 years ago. His smoking use included cigarettes. He has a 25.00 pack-year smoking history. He has never used smokeless tobacco. He reports current alcohol use of about 4.8 - 6.0 oz of alcohol per week. He reports that he does not use drugs.  Counseling given: Not Answered      ROS    No chest pain, no shortness of breath, no abdominal pain.     Objective:     Physical Exam:  There were no vitals taken for this visit. There is no height or weight on file to calculate BMI.    Constitutional: Alert, no distress.  Skin: Warm, dry, good turgor, no rashes in visible areas.  Eye: Equal, round and reactive, conjunctiva clear, lids normal.  ENMT: Lips without lesions, good dentition, oropharynx clear.  Neck: Trachea midline, no masses, no thyromegaly. No cervical or  supraclavicular lymphadenopathy.  Respiratory: Unlabored respiratory effort, lungs clear to auscultation, no wheezes, no rhonchi.  Cardiovascular: Normal S1, S2, no murmur, no edema.  Abdomen: Soft, non-tender, no masses, no hepatosplenomegaly.  Psych: Alert and oriented x3, normal affect and mood.    No Known Allergies  Outpatient Medications Prior to Visit   Medication Sig Dispense Refill   • sildenafil citrate (VIAGRA) 100 MG tablet      • pioglitazone (ACTOS) 30 MG Tab Take 1 Tablet by mouth every day. 90 Tablet 2   • albuterol 108 (90 Base) MCG/ACT Aero Soln inhalation aerosol Inhale 2 Puffs every 6 hours as needed for Shortness of Breath. 8.5 g 3   • atorvastatin (LIPITOR) 80 MG tablet Take 1 Tablet by mouth every evening. 90 Tablet 4   • DULoxetine (CYMBALTA) 60 MG Cap DR Particles delayed-release capsule Take 1 Capsule by mouth 2 times a day. 180 Capsule 3   • Empagliflozin (JARDIANCE) 25 MG Tab Take 1 Each by mouth every day. 100 Tablet 1   • gabapentin (NEURONTIN) 300 MG Cap Take 1 Capsule by mouth every day. 90 Capsule 4   • omeprazole (PRILOSEC) 20 MG delayed-release capsule TAKE ONE CAPSULE BY MOUTH DAILY (PRILOSEC) 100 Capsule 4   • non-formulary med isagenix     • Blood Glucose Monitoring Suppl Device Freestyle carlo glucose monitoring device with all necessary accessories including patch and transmitter  for type II noninsulin treated diabetes.  To check blood sugar once or twice daily and per any symptoms of high or low blood sugar. 1 Each 1   • Blood Glucose Monitoring Suppl Device Meter: Dispense Device of Insurance Preference. Sig. Use as directed for blood sugar monitoring. #1. NR. 1 Each 0   • Blood Glucose Test Strips Test strips for meter dispensed, testing one time per day E11.65 100 Strip 2   • Lancets Lancets for meter dispensed, to test one time per day E11.65 100 Each 2   • ferrous sulfate 325 (65 Fe) MG EC tablet Take 1 Tab by mouth 3 times a day, with meals. 270 Tab 1   •  Acetaminophen 500 MG Cap Take  by mouth.     • vitamin D (CHOLECALCIFEROL) 1000 UNIT Tab Take 1,000 Units by mouth every day.     • Multiple Vitamins-Minerals (HM COMPLETE 50+ MENS ULTIMATE PO) Take 1 Tablet by mouth every day.     • aspirin (ASA) 81 MG Chew Tab chewable tablet Take 81 mg by mouth every day.     • naltrexone (DEPADE) 50 MG Tab Take 1 Tablet by mouth every day. 30 Tablet 2   • glipiZIDE SR (GLIPIZIDE XL) 10 MG TABLET SR 24 HR Take 1 Tablet by mouth every day. 100 Tablet 1   • metformin (GLUCOPHAGE) 1000 MG tablet Take 1 Tablet by mouth 2 times a day with meals. 100 Tablet 1   • tamsulosin (FLOMAX) 0.4 MG capsule Take 1 capsule by mouth half an hour after breakfast. 90 Capsule 1   • terazosin (HYTRIN) 5 MG Cap Take 1 Capsule by mouth every day. 90 Capsule 4   • benazepril (LOTENSIN) 40 MG tablet Take 1 Tablet by mouth every day. 100 Tablet 4     No facility-administered medications prior to visit.     Hospital Outpatient Visit on 06/27/2022   Component Date Value   • Testosterone,Total 06/27/2022 <12 (A)   • Prostatic Specific Antig* 06/27/2022 0.03       Lab Results   Component Value Date/Time    HBA1C 7.6 (A) 03/07/2022 01:27 PM    HBA1C 7.7 (H) 10/26/2021 10:59 AM     Lab Results   Component Value Date/Time    SODIUM 139 05/10/2022 09:17 AM    POTASSIUM 4.5 05/10/2022 09:17 AM    CHLORIDE 99 05/10/2022 09:17 AM    CO2 26 05/10/2022 09:17 AM    GLUCOSE 190 (H) 05/10/2022 09:17 AM    BUN 15 05/10/2022 09:17 AM    CREATININE 0.95 05/10/2022 09:17 AM    ALKPHOSPHAT 53 05/10/2022 09:17 AM    ASTSGOT 17 05/10/2022 09:17 AM    ALTSGPT 17 05/10/2022 09:17 AM    TBILIRUBIN 0.9 05/10/2022 09:17 AM     Lab Results   Component Value Date/Time    INR 1.11 01/02/2019 07:04 PM    INR 0.95 01/18/2016 12:00 AM     Lab Results   Component Value Date/Time    CHOLSTRLTOT 151 05/10/2022 09:17 AM    LDL 75 05/10/2022 09:17 AM    HDL 52 05/10/2022 09:17 AM    TRIGLYCERIDE 118 05/10/2022 09:17 AM       Lab Results    Component Value Date/Time    TESTOSTERONE <12 (L) 06/27/2022 10:33 AM     No results found for: TSH  No results found for: FREET4  No results found for: URICACID  No components found for: VITB12  Lab Results   Component Value Date/Time    25HYDROXY 30 05/10/2022 09:17 AM    25HYDROXY 34 07/26/2021 08:32 AM               Assessment and Plan:     1. Uncontrolled type 2 diabetes mellitus with hyperglycemia (HCC)  Good control with A1c at 7.6 but could be potentially better.  Should improve following completion of radiation therapy.  Follow-up in 3 months with diabetes nurse and myself.  Continue on current regimen for now as outlined below.  - metformin (GLUCOPHAGE) 1000 MG tablet; Take 1 Tablet by mouth 2 times a day with meals.  Dispense: 180 Tablet; Refill: 3  - glipiZIDE SR (GLIPIZIDE XL) 10 MG TABLET SR 24 HR; Take 1 Tablet by mouth every day.  Dispense: 90 Tablet; Refill: 4  - TSH; Future  - Comp Metabolic Panel; Future  - Lipid Profile; Future  - CBC WITH DIFFERENTIAL; Future  - HEMOGLOBIN A1C; Future    2. 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  Long talk regarding importance of this and patient agrees to stop effective today.  No medication such as Aricept or Namenda prescribed at this point but will defer to his neurologist Dr. Moran on this.    3. Essential hypertension  Good control continue current regimen  - benazepril (LOTENSIN) 40 MG tablet; Take 1 Tablet by mouth every day.  Dispense: 100 Tablet; Refill: 4    4. Benign prostatic hyperplasia with incomplete bladder emptying  Good control continue current regimen.  Flomax discontinued as duplicate therapy  - terazosin (HYTRIN) 5 MG Cap; Take 1 Capsule by mouth every day.  Dispense: 90 Capsule; Refill: 4    5. Vitamin D deficiency  Good control continue vitamin D supplements.  - VITAMIN D,25 HYDROXY; Future    6. Vitamin B 12 deficiency  Good control continue B12 supplements.  Avoid B6.  Per  neurologist recommendations  - VITAMIN B12; Future    7. Mixed hyperlipidemia  Good control continue atorvastatin.  - TSH; Future  - Comp Metabolic Panel; Future  - Lipid Profile; Future  - CBC WITH DIFFERENTIAL; Future  - HEMOGLOBIN A1C; Future    8. Mild cognitive impairment  This diagnosis been replaced with mild dementia attributed to alcohol.    9. Alcohol use disorder, moderate, dependence (HCC)   as above  - naltrexone (DEPADE) 50 MG Tab; Take 1 Tablet by mouth every day.  Dispense: 90 Tablet; Refill: 3    10. Prostate CA (HCC)- feb 2022; RT tbd x 8 wks, mar- may 2022   cont f/u with RT and urology    11. Current moderate episode of major depressive disorder without prior episode (HCC)       Under good control. Continue same regimen.  Cymbalta      12. Tremor, coarse   due to etoh- no celsakinson- dr gottlieb      Discussion today about general wellness and lifestyle habits:    · Engage in regular physical activity and social activities.  · Prevent falls and reduce trip hazards; using ambulatory aides, hearing and vision testing if appropriate.  · Steps to improve urinary incontinence.  · Advanced care planning.    Follow-Up: No follow-ups on file.         PLEASE NOTE: This dictation was created using voice recognition software. I have made every reasonable attempt to correct obvious errors, but I expect that there are errors of grammar and possibly content that I did not discover before finalizing the note.

## 2022-08-18 ENCOUNTER — TELEPHONE (OUTPATIENT)
Dept: MEDICAL GROUP | Age: 72
End: 2022-08-18
Payer: MEDICARE

## 2022-08-18 NOTE — TELEPHONE ENCOUNTER
VOICEMAIL  1. Caller Name: Prabhakar Sierra                        Call Back Number: [phone] (home)       2. Message: Patient called in and states that if you can send a script for him to stop smoking , he states he started smoking again please advise     3. Patient approves office to leave a detailed voicemail/MyChart message: yes

## 2022-08-22 DIAGNOSIS — F17.200 TOBACCO DEPENDENCY: ICD-10-CM

## 2022-08-22 RX ORDER — BUPROPION HYDROCHLORIDE 150 MG/1
150 TABLET, EXTENDED RELEASE ORAL 2 TIMES DAILY
Qty: 60 TABLET | Refills: 3 | Status: ON HOLD | OUTPATIENT
Start: 2022-08-22 | End: 2023-01-05

## 2022-09-16 ENCOUNTER — HOSPITAL ENCOUNTER (OUTPATIENT)
Dept: LAB | Facility: MEDICAL CENTER | Age: 72
End: 2022-09-16
Attending: PHYSICIAN ASSISTANT
Payer: MEDICARE

## 2022-09-16 LAB
PSA SERPL-MCNC: <0.02 NG/ML (ref 0–4)
TESTOST SERPL-MCNC: <12 NG/DL (ref 175–781)

## 2022-09-16 PROCEDURE — 84153 ASSAY OF PSA TOTAL: CPT

## 2022-09-16 PROCEDURE — 84403 ASSAY OF TOTAL TESTOSTERONE: CPT

## 2022-09-16 PROCEDURE — 36415 COLL VENOUS BLD VENIPUNCTURE: CPT

## 2022-10-10 DIAGNOSIS — E11.65 UNCONTROLLED TYPE 2 DIABETES MELLITUS WITH HYPERGLYCEMIA (HCC): ICD-10-CM

## 2022-10-10 RX ORDER — PIOGLITAZONEHYDROCHLORIDE 30 MG/1
30 TABLET ORAL DAILY
Qty: 100 TABLET | Refills: 4 | Status: ON HOLD | OUTPATIENT
Start: 2022-10-10 | End: 2023-01-05 | Stop reason: SDUPTHER

## 2022-10-10 NOTE — TELEPHONE ENCOUNTER
Received request via: Patient    Was the patient seen in the last year in this department? Yes    Does the patient have an active prescription (recently filled or refills available) for medication(s) requested?  Insurance requires patient to have a 100 day supply. Medication pended.

## 2022-10-18 ENCOUNTER — HOSPITAL ENCOUNTER (OUTPATIENT)
Dept: LAB | Facility: MEDICAL CENTER | Age: 72
End: 2022-10-18
Attending: INTERNAL MEDICINE
Payer: MEDICARE

## 2022-10-18 DIAGNOSIS — E78.2 MIXED HYPERLIPIDEMIA: ICD-10-CM

## 2022-10-18 DIAGNOSIS — E11.65 UNCONTROLLED TYPE 2 DIABETES MELLITUS WITH HYPERGLYCEMIA (HCC): ICD-10-CM

## 2022-10-18 DIAGNOSIS — E55.9 VITAMIN D DEFICIENCY: ICD-10-CM

## 2022-10-18 DIAGNOSIS — E53.8 VITAMIN B 12 DEFICIENCY: ICD-10-CM

## 2022-10-18 LAB
25(OH)D3 SERPL-MCNC: 23 NG/ML (ref 30–100)
ALBUMIN SERPL BCP-MCNC: 4.6 G/DL (ref 3.2–4.9)
ALBUMIN/GLOB SERPL: 1.6 G/DL
ALP SERPL-CCNC: 59 U/L (ref 30–99)
ALT SERPL-CCNC: 16 U/L (ref 2–50)
ANION GAP SERPL CALC-SCNC: 14 MMOL/L (ref 7–16)
AST SERPL-CCNC: 12 U/L (ref 12–45)
BASOPHILS # BLD AUTO: 0.3 % (ref 0–1.8)
BASOPHILS # BLD: 0.02 K/UL (ref 0–0.12)
BILIRUB SERPL-MCNC: 0.6 MG/DL (ref 0.1–1.5)
BUN SERPL-MCNC: 14 MG/DL (ref 8–22)
CALCIUM SERPL-MCNC: 9.5 MG/DL (ref 8.5–10.5)
CHLORIDE SERPL-SCNC: 96 MMOL/L (ref 96–112)
CHOLEST SERPL-MCNC: 135 MG/DL (ref 100–199)
CO2 SERPL-SCNC: 23 MMOL/L (ref 20–33)
CREAT SERPL-MCNC: 0.85 MG/DL (ref 0.5–1.4)
EOSINOPHIL # BLD AUTO: 0.04 K/UL (ref 0–0.51)
EOSINOPHIL NFR BLD: 0.6 % (ref 0–6.9)
ERYTHROCYTE [DISTWIDTH] IN BLOOD BY AUTOMATED COUNT: 46 FL (ref 35.9–50)
FASTING STATUS PATIENT QL REPORTED: NORMAL
GFR SERPLBLD CREATININE-BSD FMLA CKD-EPI: 92 ML/MIN/1.73 M 2
GLOBULIN SER CALC-MCNC: 2.9 G/DL (ref 1.9–3.5)
GLUCOSE SERPL-MCNC: 253 MG/DL (ref 65–99)
HCT VFR BLD AUTO: 43.9 % (ref 42–52)
HDLC SERPL-MCNC: 44 MG/DL
HGB BLD-MCNC: 14.8 G/DL (ref 14–18)
IMM GRANULOCYTES # BLD AUTO: 0.03 K/UL (ref 0–0.11)
IMM GRANULOCYTES NFR BLD AUTO: 0.4 % (ref 0–0.9)
LDLC SERPL CALC-MCNC: 48 MG/DL
LYMPHOCYTES # BLD AUTO: 0.96 K/UL (ref 1–4.8)
LYMPHOCYTES NFR BLD: 13.8 % (ref 22–41)
MCH RBC QN AUTO: 30.6 PG (ref 27–33)
MCHC RBC AUTO-ENTMCNC: 33.7 G/DL (ref 33.7–35.3)
MCV RBC AUTO: 90.9 FL (ref 81.4–97.8)
MONOCYTES # BLD AUTO: 0.52 K/UL (ref 0–0.85)
MONOCYTES NFR BLD AUTO: 7.5 % (ref 0–13.4)
NEUTROPHILS # BLD AUTO: 5.37 K/UL (ref 1.82–7.42)
NEUTROPHILS NFR BLD: 77.4 % (ref 44–72)
NRBC # BLD AUTO: 0 K/UL
NRBC BLD-RTO: 0 /100 WBC
PLATELET # BLD AUTO: 175 K/UL (ref 164–446)
PMV BLD AUTO: 10.9 FL (ref 9–12.9)
POTASSIUM SERPL-SCNC: 5.2 MMOL/L (ref 3.6–5.5)
PROT SERPL-MCNC: 7.5 G/DL (ref 6–8.2)
RBC # BLD AUTO: 4.83 M/UL (ref 4.7–6.1)
SODIUM SERPL-SCNC: 133 MMOL/L (ref 135–145)
TRIGL SERPL-MCNC: 214 MG/DL (ref 0–149)
TSH SERPL DL<=0.005 MIU/L-ACNC: 0.74 UIU/ML (ref 0.38–5.33)
VIT B12 SERPL-MCNC: 917 PG/ML (ref 211–911)
WBC # BLD AUTO: 6.9 K/UL (ref 4.8–10.8)

## 2022-10-18 PROCEDURE — 82306 VITAMIN D 25 HYDROXY: CPT

## 2022-10-18 PROCEDURE — 83036 HEMOGLOBIN GLYCOSYLATED A1C: CPT

## 2022-10-18 PROCEDURE — 80053 COMPREHEN METABOLIC PANEL: CPT

## 2022-10-18 PROCEDURE — 82607 VITAMIN B-12: CPT

## 2022-10-18 PROCEDURE — 80061 LIPID PANEL: CPT

## 2022-10-18 PROCEDURE — 84443 ASSAY THYROID STIM HORMONE: CPT

## 2022-10-18 PROCEDURE — 85025 COMPLETE CBC W/AUTO DIFF WBC: CPT

## 2022-10-18 PROCEDURE — 36415 COLL VENOUS BLD VENIPUNCTURE: CPT

## 2022-10-19 LAB
EST. AVERAGE GLUCOSE BLD GHB EST-MCNC: 186 MG/DL
HBA1C MFR BLD: 8.1 % (ref 4–5.6)

## 2022-10-27 ENCOUNTER — HOSPITAL ENCOUNTER (OUTPATIENT)
Dept: LAB | Facility: MEDICAL CENTER | Age: 72
End: 2022-10-27
Attending: PHYSICIAN ASSISTANT
Payer: MEDICARE

## 2022-10-27 LAB — TESTOST SERPL-MCNC: <12 NG/DL (ref 175–781)

## 2022-10-27 PROCEDURE — 84403 ASSAY OF TOTAL TESTOSTERONE: CPT

## 2022-10-27 PROCEDURE — 36415 COLL VENOUS BLD VENIPUNCTURE: CPT

## 2022-11-04 ENCOUNTER — HOSPITAL ENCOUNTER (OUTPATIENT)
Dept: LAB | Facility: MEDICAL CENTER | Age: 72
End: 2022-11-04
Attending: INTERNAL MEDICINE
Payer: MEDICARE

## 2022-11-04 DIAGNOSIS — E78.2 MIXED HYPERLIPIDEMIA: ICD-10-CM

## 2022-11-04 DIAGNOSIS — E11.65 UNCONTROLLED TYPE 2 DIABETES MELLITUS WITH HYPERGLYCEMIA (HCC): ICD-10-CM

## 2022-11-04 LAB
ALBUMIN SERPL BCP-MCNC: 4.7 G/DL (ref 3.2–4.9)
ALBUMIN/GLOB SERPL: 1.4 G/DL
ALP SERPL-CCNC: 56 U/L (ref 30–99)
ALT SERPL-CCNC: 19 U/L (ref 2–50)
ANION GAP SERPL CALC-SCNC: 15 MMOL/L (ref 7–16)
AST SERPL-CCNC: 15 U/L (ref 12–45)
BASOPHILS # BLD AUTO: 0.8 % (ref 0–1.8)
BASOPHILS # BLD: 0.05 K/UL (ref 0–0.12)
BILIRUB SERPL-MCNC: 0.4 MG/DL (ref 0.1–1.5)
BUN SERPL-MCNC: 17 MG/DL (ref 8–22)
CALCIUM SERPL-MCNC: 10.2 MG/DL (ref 8.5–10.5)
CHLORIDE SERPL-SCNC: 100 MMOL/L (ref 96–112)
CHOLEST SERPL-MCNC: 181 MG/DL (ref 100–199)
CO2 SERPL-SCNC: 24 MMOL/L (ref 20–33)
CREAT SERPL-MCNC: 0.88 MG/DL (ref 0.5–1.4)
CREAT UR-MCNC: 64.84 MG/DL
EOSINOPHIL # BLD AUTO: 0.16 K/UL (ref 0–0.51)
EOSINOPHIL NFR BLD: 2.4 % (ref 0–6.9)
ERYTHROCYTE [DISTWIDTH] IN BLOOD BY AUTOMATED COUNT: 45.7 FL (ref 35.9–50)
EST. AVERAGE GLUCOSE BLD GHB EST-MCNC: 194 MG/DL
FASTING STATUS PATIENT QL REPORTED: NORMAL
GFR SERPLBLD CREATININE-BSD FMLA CKD-EPI: 91 ML/MIN/1.73 M 2
GLOBULIN SER CALC-MCNC: 3.4 G/DL (ref 1.9–3.5)
GLUCOSE SERPL-MCNC: 227 MG/DL (ref 65–99)
HBA1C MFR BLD: 8.4 % (ref 4–5.6)
HCT VFR BLD AUTO: 48.2 % (ref 42–52)
HDLC SERPL-MCNC: 44 MG/DL
HGB BLD-MCNC: 15.9 G/DL (ref 14–18)
IMM GRANULOCYTES # BLD AUTO: 0.04 K/UL (ref 0–0.11)
IMM GRANULOCYTES NFR BLD AUTO: 0.6 % (ref 0–0.9)
LDLC SERPL CALC-MCNC: 87 MG/DL
LYMPHOCYTES # BLD AUTO: 0.93 K/UL (ref 1–4.8)
LYMPHOCYTES NFR BLD: 14 % (ref 22–41)
MCH RBC QN AUTO: 29.8 PG (ref 27–33)
MCHC RBC AUTO-ENTMCNC: 33 G/DL (ref 33.7–35.3)
MCV RBC AUTO: 90.3 FL (ref 81.4–97.8)
MICROALBUMIN UR-MCNC: 2 MG/DL
MICROALBUMIN/CREAT UR: 31 MG/G (ref 0–30)
MONOCYTES # BLD AUTO: 0.47 K/UL (ref 0–0.85)
MONOCYTES NFR BLD AUTO: 7.1 % (ref 0–13.4)
NEUTROPHILS # BLD AUTO: 4.99 K/UL (ref 1.82–7.42)
NEUTROPHILS NFR BLD: 75.1 % (ref 44–72)
NRBC # BLD AUTO: 0 K/UL
NRBC BLD-RTO: 0 /100 WBC
PLATELET # BLD AUTO: 206 K/UL (ref 164–446)
PMV BLD AUTO: 10.5 FL (ref 9–12.9)
POTASSIUM SERPL-SCNC: 4.8 MMOL/L (ref 3.6–5.5)
PROT SERPL-MCNC: 8.1 G/DL (ref 6–8.2)
RBC # BLD AUTO: 5.34 M/UL (ref 4.7–6.1)
SODIUM SERPL-SCNC: 139 MMOL/L (ref 135–145)
TRIGL SERPL-MCNC: 252 MG/DL (ref 0–149)
TSH SERPL DL<=0.005 MIU/L-ACNC: 1.08 UIU/ML (ref 0.38–5.33)
WBC # BLD AUTO: 6.6 K/UL (ref 4.8–10.8)

## 2022-11-04 PROCEDURE — 36415 COLL VENOUS BLD VENIPUNCTURE: CPT

## 2022-11-04 PROCEDURE — 83036 HEMOGLOBIN GLYCOSYLATED A1C: CPT

## 2022-11-04 PROCEDURE — 82043 UR ALBUMIN QUANTITATIVE: CPT

## 2022-11-04 PROCEDURE — 85025 COMPLETE CBC W/AUTO DIFF WBC: CPT

## 2022-11-04 PROCEDURE — 80061 LIPID PANEL: CPT

## 2022-11-04 PROCEDURE — 84443 ASSAY THYROID STIM HORMONE: CPT

## 2022-11-04 PROCEDURE — 82570 ASSAY OF URINE CREATININE: CPT

## 2022-11-04 PROCEDURE — 80053 COMPREHEN METABOLIC PANEL: CPT

## 2022-11-07 ENCOUNTER — OFFICE VISIT (OUTPATIENT)
Dept: MEDICAL GROUP | Age: 72
End: 2022-11-07
Payer: MEDICARE

## 2022-11-07 VITALS
WEIGHT: 215 LBS | HEIGHT: 69 IN | BODY MASS INDEX: 31.84 KG/M2 | SYSTOLIC BLOOD PRESSURE: 128 MMHG | DIASTOLIC BLOOD PRESSURE: 82 MMHG

## 2022-11-07 DIAGNOSIS — F17.200 TOBACCO DEPENDENCY: ICD-10-CM

## 2022-11-07 DIAGNOSIS — E66.9 OBESITY (BMI 30.0-34.9): ICD-10-CM

## 2022-11-07 DIAGNOSIS — F10.27 DEMENTIA ASSOCIATED WITH ALCOHOLISM WITH BEHAVIORAL DISTURBANCE (HCC): ICD-10-CM

## 2022-11-07 DIAGNOSIS — E78.2 MIXED HYPERLIPIDEMIA: ICD-10-CM

## 2022-11-07 DIAGNOSIS — F32.1 CURRENT MODERATE EPISODE OF MAJOR DEPRESSIVE DISORDER WITHOUT PRIOR EPISODE (HCC): ICD-10-CM

## 2022-11-07 DIAGNOSIS — F10.20 ALCOHOL USE DISORDER, MODERATE, DEPENDENCE (HCC): ICD-10-CM

## 2022-11-07 DIAGNOSIS — E53.8 B12 DEFICIENCY: ICD-10-CM

## 2022-11-07 DIAGNOSIS — I10 ESSENTIAL HYPERTENSION: ICD-10-CM

## 2022-11-07 DIAGNOSIS — G91.2 NORMAL PRESSURE HYDROCEPHALUS (HCC): ICD-10-CM

## 2022-11-07 DIAGNOSIS — C61 PROSTATE CA (HCC): Chronic | ICD-10-CM

## 2022-11-07 DIAGNOSIS — D69.6 THROMBOCYTOPENIA, UNSPECIFIED (HCC): ICD-10-CM

## 2022-11-07 DIAGNOSIS — Z23 NEED FOR VACCINATION: ICD-10-CM

## 2022-11-07 DIAGNOSIS — N52.8 OTHER MALE ERECTILE DYSFUNCTION: ICD-10-CM

## 2022-11-07 DIAGNOSIS — K21.9 GASTROESOPHAGEAL REFLUX DISEASE, UNSPECIFIED WHETHER ESOPHAGITIS PRESENT: ICD-10-CM

## 2022-11-07 DIAGNOSIS — G25.2 TREMOR, COARSE: ICD-10-CM

## 2022-11-07 DIAGNOSIS — E11.65 UNCONTROLLED TYPE 2 DIABETES MELLITUS WITH HYPERGLYCEMIA (HCC): ICD-10-CM

## 2022-11-07 DIAGNOSIS — E55.9 VITAMIN D DEFICIENCY: ICD-10-CM

## 2022-11-07 PROCEDURE — 90662 IIV NO PRSV INCREASED AG IM: CPT | Performed by: FAMILY MEDICINE

## 2022-11-07 PROCEDURE — 99214 OFFICE O/P EST MOD 30 MIN: CPT | Performed by: INTERNAL MEDICINE

## 2022-11-07 PROCEDURE — G0008 ADMIN INFLUENZA VIRUS VAC: HCPCS | Performed by: FAMILY MEDICINE

## 2022-11-07 ASSESSMENT — ENCOUNTER SYMPTOMS
GASTROINTESTINAL NEGATIVE: 1
RESPIRATORY NEGATIVE: 1
NEUROLOGICAL NEGATIVE: 1
CARDIOVASCULAR NEGATIVE: 1
MUSCULOSKELETAL NEGATIVE: 1
EYES NEGATIVE: 1
PSYCHIATRIC NEGATIVE: 1
CONSTITUTIONAL NEGATIVE: 1

## 2022-11-07 ASSESSMENT — FIBROSIS 4 INDEX: FIB4 SCORE: 1.22

## 2022-11-07 NOTE — PROGRESS NOTES
"RN-CDE Note    Subjective:     HPI:  Prabhakar Sierra is a 72 y.o. old patient who is seen by the Diabetes Nurse Specialist today for review of his uncontrolled type 2 diabetes.    Changes in Health:  having some issues with balance.  States he has fallen 3 times within the past month.     Diabetes Medications:   Pioglitazone 30 mg daily  Glipizide 10 mg daily  Jardiance 25 mg daily  Metformin 1000 mg bid  Taking above medications as prescribed: yes  Taking daily ASA: Yes    Exercise: unable to exercise unless lying down.    Diet: \"healthy\" diet  in general  Patient's body mass index is unknown because there is no height or weight on file. Exercise and nutrition counseling were performed at this visit.      Health Maintenance:   Health Maintenance Due   Topic Date Due    ABDOMINAL AORTIC ANEURYSM (AAA) SCREEN  Never done    COLORECTAL CANCER SCREENING  12/19/2019    Annual Wellness Visit  02/27/2021    COVID-19 Vaccine (3 - Booster for Moderna series) 04/15/2021    DIABETES MONOFILAMENT / LE EXAM  07/28/2022    IMM INFLUENZA (1) 09/01/2022    RETINAL SCREENING  09/28/2022         DM:   Last A1c:   Lab Results   Component Value Date/Time    HBA1C 8.4 (H) 11/04/2022 09:56 AM      Previous A1c was 7.6 on 3/7/22  A1C GOAL: < 7    Glucose monitoring frequency: not testing.     Hypoglycemic episodes: unknown, doesn't test.     Last Retinal Exam:  has appointment this month.   Daily Foot Exam: Yes states he does have some neuropathy.     Exam:  Monofilament testing with a 10 gram force: sensation intact: decreased bilaterally on first and 5th toes   Visual Inspection: Feet without maceration, ulcers, fissures.  Pedal pulses: intact bilaterally    Lab Results   Component Value Date/Time    MALBCRT 31 (H) 11/04/2022 09:56 AM    MICROALBUR 2.0 11/04/2022 09:56 AM        ACR Albumin/Creatinine Ratio goal <30     HTN:   Blood pressure goal <140/<80 at goal.   Currently Rx ACE/ARB: Yes     Dyslipidemia:    Lab Results "   Component Value Date/Time    CHOLRLTOT 181 11/04/2022 09:56 AM    LDL 87 11/04/2022 09:56 AM    HDL 44 11/04/2022 09:56 AM    TRIGLYCERIDE 252 (H) 11/04/2022 09:56 AM         Currently Rx Statin: Yes       He  reports that he quit smoking about 15 years ago. His smoking use included cigarettes. He has a 25.00 pack-year smoking history. He has never used smokeless tobacco.      Plan:     Discussed and educated on:   - All medications, side effects and compliance (discussed carefully)  - Annual eye examinations at Ophthalmology  - Foot Care: what to look for when checking feet every day and when to contact HCP  - HbA1C: target  - Home glucose monitoring emphasized  - Weight control and daily exercise    Recommended medication changes: none at this time.

## 2022-11-10 ENCOUNTER — PATIENT MESSAGE (OUTPATIENT)
Dept: HEALTH INFORMATION MANAGEMENT | Facility: OTHER | Age: 72
End: 2022-11-10

## 2022-11-14 ENCOUNTER — TELEPHONE (OUTPATIENT)
Dept: NEUROLOGY | Facility: MEDICAL CENTER | Age: 72
End: 2022-11-14
Payer: MEDICARE

## 2022-11-14 NOTE — TELEPHONE ENCOUNTER
Established Patient     [employer] Patient is checked in Patient Demographics? Yes    Is visit type and length correct?  Yes    Is referral attached to visit? Yes    Were records received from referring provider? Yes    Patient was contacted to have someone accompany them to visit.    Is this appointment scheduled as a Hospital Follow-Up?  No    Does the patient require any pre procedure or post procedure follow up? No    If any orders were placed at last visit or intended to be done for this visit do we have Results/Consult Notes? No  Labs - Labs were not ordered at last office visit.  Note: If patient appointment is for lab review and patient did not complete labs, check with provider if OK to reschedule patient until labs completed.  Imaging - Imaging was not ordered at last office visit.  Referrals - Referral ordered, patient has NOT been seen.        10.  If patient appointment is for Botox - is order pended for provider? No, not needed.

## 2022-11-15 ENCOUNTER — TELEPHONE (OUTPATIENT)
Dept: MEDICAL GROUP | Age: 72
End: 2022-11-15
Payer: MEDICARE

## 2022-11-15 ENCOUNTER — TELEPHONE (OUTPATIENT)
Dept: NEUROLOGY | Facility: MEDICAL CENTER | Age: 72
End: 2022-11-15

## 2022-11-15 NOTE — TELEPHONE ENCOUNTER
VOICEMAIL  1. Caller Name: Prabhakar Sierra                        Call Back Number: [phone] (home)     2. Message: Patients wife called in and states her  has fallen 3 times , states his balance is totally off . She was wondering if there was a way to have him see neurology quicker since he at a high risk please advise     3. Patient approves office to leave a detailed voicemail/MyChart message: no

## 2022-11-16 ENCOUNTER — TELEPHONE (OUTPATIENT)
Dept: MEDICAL GROUP | Age: 72
End: 2022-11-16
Payer: MEDICARE

## 2022-11-16 NOTE — TELEPHONE ENCOUNTER
Spoke with patient wife and she states that she just wanted Dr Miller to put a note in for Dr chatman for an MRI but she states that the neurology office called her and everything has been taken care of

## 2022-11-22 ENCOUNTER — OFFICE VISIT (OUTPATIENT)
Dept: NEUROLOGY | Facility: MEDICAL CENTER | Age: 72
End: 2022-11-22
Attending: PSYCHIATRY & NEUROLOGY
Payer: MEDICARE

## 2022-11-22 VITALS
RESPIRATION RATE: 18 BRPM | SYSTOLIC BLOOD PRESSURE: 118 MMHG | HEIGHT: 70 IN | HEART RATE: 104 BPM | OXYGEN SATURATION: 96 % | BODY MASS INDEX: 29.98 KG/M2 | WEIGHT: 209.44 LBS | DIASTOLIC BLOOD PRESSURE: 92 MMHG | TEMPERATURE: 98.9 F

## 2022-11-22 DIAGNOSIS — G91.1 ACQUIRED CEREBRAL VENTRICULOMEGALY (HCC): ICD-10-CM

## 2022-11-22 DIAGNOSIS — G30.9 ALZHEIMER'S DISEASE, UNSPECIFIED (CODE) (HCC): ICD-10-CM

## 2022-11-22 DIAGNOSIS — E67.8 EXCESSIVE VITAMIN B6 INTAKE: ICD-10-CM

## 2022-11-22 DIAGNOSIS — E11.42 DIABETIC POLYNEUROPATHY ASSOCIATED WITH TYPE 2 DIABETES MELLITUS (HCC): ICD-10-CM

## 2022-11-22 DIAGNOSIS — G31.9 DEGENERATIVE DISEASE OF NERVOUS SYSTEM, UNSPECIFIED (HCC): ICD-10-CM

## 2022-11-22 DIAGNOSIS — F02.80 DEMENTIA ASSOCIATED WITH OTHER UNDERLYING DISEASE WITHOUT BEHAVIORAL DISTURBANCE (HCC): Primary | ICD-10-CM

## 2022-11-22 DIAGNOSIS — R26.81 UNSTEADY GAIT WHEN WALKING: ICD-10-CM

## 2022-11-22 PROCEDURE — 99212 OFFICE O/P EST SF 10 MIN: CPT | Performed by: PSYCHIATRY & NEUROLOGY

## 2022-11-22 PROCEDURE — 99214 OFFICE O/P EST MOD 30 MIN: CPT | Performed by: PSYCHIATRY & NEUROLOGY

## 2022-11-22 ASSESSMENT — FIBROSIS 4 INDEX: FIB4 SCORE: 1.22

## 2022-11-22 ASSESSMENT — PATIENT HEALTH QUESTIONNAIRE - PHQ9: CLINICAL INTERPRETATION OF PHQ2 SCORE: 0

## 2022-11-22 NOTE — PROGRESS NOTES
"Neuro follow up:    Reason: Dementia    Last seen by me in May 2022.    Prabhakar Sierra 72 y.o. right handed gentleman who has been on Disability (Mental) since age 47-48 when at that time he was having very low level cognitive issues (\"little stuff\" per his wife here today).      The changes has been very gradual over the last 20 years or so.     Prabhakar had a formal Neuropsychological evaluation ([facility]) in May 2022 due to \"behabvioral changes and memory impairment over several years\"  Objective examination reveals an individual of average range premorbid ability with currently intact language, attention/concentration,visual spatial ability and visual memory.  Findings consistent with a Major Neurocognitive Disorder. Factors suggested to impair cognition:  Poorly controlled diabetes- \"patient reports noncompliance with daily blood sugar checks> he will eat an entire bag of oreos in one day, psychiatric symptoms of mild range depression and mild range anxiety     Evaluation by Saurabh Styles PhD.     The DSM5 Diagnoses:      1. Major Unspecified Neurocognitive Disorder(with behavioral disturbance).  2. Alcohol Use Disorder, moderate to severe  3. Major Depression Disorder, with anxious distress         History of drinking of a bottle of wine (Cheap white wine) a day or 3-4 cans of beer and this has been an issue since around age 29.   Early in  His 30's he was drinking hard liquor.      Smoking- Camels- 2 packs per week (on and off since age 12)    Wife has not noticed any delusions,paranoid,behavioral changes (sundowning behaviors) in the last 3-6 months.     No dysarthria,dysphagia, headache(s), orthostatic dizziness/faintness episodes,double vision,vertiginous events In the recent months.     Denies any persistent neurological deficits to suggest a clinical ischemic stroke event(s) nor any history of concussion(s), seizure(s)- described or documented in the problem list.     Prabhakar has continued to drive " "with any accident or incidents known to his wife or admitted to today in the last 6-12 months. He passed his  evaluation at [facility] in the last 6 months.     No RLS features or REM Sleep Behavioral symptoms in the recent months.Averages 7-8 hours of sleep most nights in the recent months.  Denies nodding off routinely or daily in the last several weeks nor any tendency to need or want to take naps daily in the recent weeks.    Wife describes they used to walk the entire neighbor as of 4-5 years ago.  His walking has become slower and more unsteady and he has tended to become more \"duck walking\" in the last 12 months. The wife has noticed that when getting up from a recliner that he tends to lean backwards.     No consistent,persistent or evolving involuntary movements of his head-face,trunk or limbs in the last 3 months.     No hypophonia, evolving postural instability, micrographia in the last 6-12 months.     He has had neuropathy (peripheral) with numbness and has reduced sensation of both feet (from ankle to his toes)- bilaterally and the degree and location of his numbness has to him been the same in the last 2 plus years. He has not noticed any burning,stabbing,aching pains of the toes or bottoms of the feet in the last 6 months.   Hands-fingers have been stable in the last 6-12 months.        Mother,Brother and Sister (chronic sensory deprivation)- Dementia  Father: Long standing Diabetes with  Alcoholism>  at age 83  Brother:  (MI) at age 72.         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 Moran 2022    Obesity (BMI 30.0-34.9) 2021    Alcohol use disorder, moderate, dependence (HCC) " 01/03/2019    Other male erectile dysfunction 04/11/2018    Vitamin D deficiency 05/12/2017    Current moderate episode of major depressive disorder without prior episode (HCC) 11/11/2016    B12 deficiency 11/01/2016    Tremor, coarse 10/25/2016    GERD (gastroesophageal reflux disease) 09/27/2016    Essential hypertension 09/27/2016    Prostate CA (HCC)- feb 2022; RT tbd x 8 wks, mar- may 2022 09/27/2016    Mixed hyperlipidemia 09/27/2016       Past medical history:   Past Medical History:   Diagnosis Date    BPH (benign prostatic hyperplasia)     GERD (gastroesophageal reflux disease)     Hypertension     Hyponatremia 5/18/2017    Iron deficiency anemia secondary to inadequate dietary iron intake 5/26/2021    Mild cognitive impairment 7/26/2022    Neuropathic pain, leg     Right hip pain 11/14/2016    Type II or unspecified type diabetes mellitus without mention of complication, not stated as uncontrolled        Past surgical history:   No past surgical history on file.      Social history:   Social History     Socioeconomic History    Marital status:      Spouse name: Not on file    Number of children: Not on file    Years of education: Not on file    Highest education level: Not on file   Occupational History    Not on file   Tobacco Use    Smoking status: Former     Packs/day: 0.50     Years: 50.00     Pack years: 25.00     Types: Cigarettes     Quit date: 1/10/2007     Years since quitting: 15.8    Smokeless tobacco: Never   Vaping Use    Vaping Use: Never used   Substance and Sexual Activity    Alcohol use: Yes     Alcohol/week: 4.8 - 6.0 oz     Types: 8 - 10 Glasses of wine per week     Comment: 1 bottle of wine 2-3 days a week    Drug use: No    Sexual activity: Never     Partners: Female   Other Topics Concern    Not on file   Social History Narrative    Not on file     Social Determinants of Health     Financial Resource Strain: Not on file   Food Insecurity: Not on file   Transportation Needs: Not  on file   Physical Activity: Not on file   Stress: Not on file   Social Connections: Not on file   Intimate Partner Violence: Not on file   Housing Stability: Not on file       Family history:   Family History   Problem Relation Age of Onset    Heart Disease Mother     Diabetes Father     Other Sister         Mental retardation    Heart Disease Brother     Cancer Brother         Prostate    No Known Problems Daughter     No Known Problems Daughter          Current medications:   Current Outpatient Medications   Medication    pioglitazone (ACTOS) 30 MG Tab    buPROPion SR (WELLBUTRIN-SR) 150 MG TABLET SR 12 HR sustained-release tablet    naltrexone (DEPADE) 50 MG Tab    benazepril (LOTENSIN) 40 MG tablet    metformin (GLUCOPHAGE) 1000 MG tablet    glipiZIDE SR (GLIPIZIDE XL) 10 MG TABLET SR 24 HR    terazosin (HYTRIN) 5 MG Cap    sildenafil citrate (VIAGRA) 100 MG tablet    albuterol 108 (90 Base) MCG/ACT Aero Soln inhalation aerosol    atorvastatin (LIPITOR) 80 MG tablet    DULoxetine (CYMBALTA) 60 MG Cap DR Particles delayed-release capsule    Empagliflozin (JARDIANCE) 25 MG Tab    gabapentin (NEURONTIN) 300 MG Cap    omeprazole (PRILOSEC) 20 MG delayed-release capsule    non-formulary med    ferrous sulfate 325 (65 Fe) MG EC tablet    vitamin D (CHOLECALCIFEROL) 1000 UNIT Tab    Multiple Vitamins-Minerals (HM COMPLETE 50+ MENS ULTIMATE PO)    aspirin (ASA) 81 MG Chew Tab chewable tablet    Blood Glucose Monitoring Suppl Device    Blood Glucose Monitoring Suppl Device    Blood Glucose Test Strips    Lancets    Acetaminophen 500 MG Cap     No current facility-administered medications for this visit.       Medication Allergy:  No Known Allergies        Physical examination:   Vitals:    11/22/22 1451   BP: (!) 118/92   BP Location: Right arm   Patient Position: Sitting   BP Cuff Size: Adult   Pulse: (!) 104   Resp: 18   Temp: 37.2 °C (98.9 °F)   TempSrc: Temporal   SpO2: 96%   Weight: 95 kg (209 lb 7 oz)   Height:  "1.778 m (5' 10\")       Normal cephalic atraumatic.  There is full range of movement around the neck in all directions without restrictions or discrete pain evoked triggers.  No lower extremity edema.  No vasomotor or sudomotor changes of the lower extremities.  No ulcers of feet or toes.      Neurological  Exam:      [place] Cognitive Assessment (MOCA) Version 7.1    Years of Education: 4 years college    TOTAL SCORE: 21/30  (to be scanned into the MEDIA section in the E.M.R.)        Mental status: Awake, alert and fully oriented to person, place, and situation. Normal attention and concentration.  Did not appear/act combative,irritable,anxious,paranoid/delusional or aggressive to or with me.    Speech and language: Speech is fluent without errors, clear, intact to repetition, and intact to naming.     Follows 3 step motor commands in sequence without significant delay and correctly.    Cranial nerve exam:  II: Pupils are equally round and reactive to light. Visual fields are intact by confrontation.  III, IV, VI: EOMI, no diplopia, no ptosis.  V: Sensation to light touch is normal over V1-3 distributions bilaterally.  .  VII: Facial movements are symmetrical. There is no facial droop. .  VIII: Hearing intact to soft speech and finger rub bilaterally  IX: Palate elevates symmetrically, uvula is midline. Dysarthria is not present.  XI: Shoulder shrug are symmetrical and strong.   XII: Tongue protrudes midline.      Motor exam:  Muscle tone is normal in all 4 limbs. and No abnormal movements appreciated.    Muscle strength:    Neck Flexors/Extensors: 5/5       Right  Left  Deltoid   5/5  5/5      Biceps   5/5  5/5  Triceps              5/5  5/5   Wrist extensors 5/5  5/5  Wrist flexors  5/5  5/5     5/5  5/5  Interossei  5/5  5/5  Thenar (APB)  5/5  5/5   Hip flexors  5/5  5/5  Quadriceps  5/5  5/5    Hamstrings  5/5  5/5  Dorsiflexors  5/5  5/5  Plantarflexors  5/5  5/5  Toe extension  5/5  5/5      Sensory " exam:     Vibratory: absent at great toes, reduced to 2seconds at both ankles, present for 10 seconds at both knees, 20-22 seconds at both index and 5th fingers.    Proprioception: mildly reduced at great oes.     Reduced pin prick to 4 inches above both ankles.    Reflexes:       Right  Left  Biceps   2/4  2/4  Triceps             2/4  2/4  Brachioradialis     2/4  2/4  Knee jerk  2/4  2/4  Ankle jerk  0/4  0/4     Frontal release signs are absent    Normal.    Coordination (finger-to-nose, heel/knee/shin, rapid alternating movements) was normal.     There was no ataxia, no tremors, and no dysmetria.     Station and gait > very slight duck walker but not apraxic on exam and he can easily stands up from exam chair without retropulsion,veering,leaning,swaying (to either side).           Brain MRI:    IMPRESSION:     1.  There is moderate dilatation of the bilateral lateral ventricles. This is slightly prominent than the associated cortical atrophy. This is suspicious for normal pressure hydrocephalus. The size of the ventricles is increased since the previous MRI   dated 1/3/2019.  2.  Mild cerebral volume loss.  3.  Chronic lacunar infarct in the left cerebellum.           Exam Ended: 05/12/22  9:04 AM Last Resulted: 05/12/22  3:36 PM             2 wk ago   (11/4/22) 1 mo ago   (10/18/22) 8 mo ago   (3/7/22) 1 yr ago   (10/26/21) 1 yr ago   (7/26/21) 1 yr ago   (4/16/21) 2 yr ago   (2/21/20)     Glycohemoglobin 4.0 - 5.6 % 8.4 High   8.1 High  CM  7.6 Abnormal  R  7.7 High  CM  8.6 High  CM  9.4 High  CM  9.7 High  R, CM             Early to Mild Stage of  Dementia- in setting of significant alcohol use and long term consumption and very strong family history of Neurodegenerative Dementia (4 family members) with acquired ventriculomegaly.     Stop drinking soon after last visit with me.     MOCA Score of 21/30.     FAQ score of 10 today  per wife.     New behavioral change of humming when eating over the last 6  months and this persists > can be seen with neurodegenerative disease such as Alzheimer's.      Has moderate Hydrocephalus by MRI (Brain) with slight  increase in size slightly since 2017 and 2019 and last MRI done in 5/2022.     I again discussed with Prabhakar that he  could have NPH given the triad of urinary urgency, slow gait decline in the last 3-5 years though without anjel apraxia or persistent magnetic quality to his gait. (he is not diffusely parkinsonian though he does semi shuffle).    We reviewed the Brain MRI(s) from 2017,2019 and 2022 today and I answered questions posed by wife.     2. Acquired Ventriculomegaly (chronic) and dating back atleast to 2802-0460 period of time.    Gait evaluation to me does not seem changed since my last visit.       3. 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 Length dependent and chronic (for over 3-5 years).     Plans:     A. Passed Driving Evaluation at [facility].     B. Have strongly advocated for Prabhakar to stop drinking and then re evaluation with me in about 4-5 months and if his walking is no better will at this time consider large volume CSF study.     C. I asked him to talk to his primary care provider about his vitamin B6 level which was over 400 when last checked.    D. Brain PET to be done as an additional assessment given the complexity of the potential causes of cognitive changes and family history Dementia.If this test is abnormal it will be helpful to gauge whether a future CSF removal would be considered but at this time will be holding off on the CSF test and atleast under better control of diabetes     E. Recommend High Intensity statin and baby ASA use long term for future vascular protection    F. Multifactorial gait difficulties- neurodegenerative brain disorder +/- diabetic neuropathy and ?? Acquired ventriculomegaly.    G. Recheck Vitamin B6 blood level > ordered  placed today for this.    H. PT assessment ordered due to future risk of falls     I have performed  a history and physical exam and a directed /focused  ROS today.     Total time spent today or this patient's care was 36 minutes   and included reviewing  the diagnostic workup to date (such as labs and imaging as well as interpreting such tests relevant to this patient's neurological condition),  reviewing/obtaining separately obtained history (from patient and/or accompanying family member)  for today's neurological problem(s) ,counseling and educating the patient and family member on issues related to cognition/memory and cognitive health factors and documenting  the clinical information in the EMR.              Parminder Moran MD  [facility].

## 2022-11-22 NOTE — Clinical Note
[facility]  Can you please refer this patient for management of diabetes ?  Appreciated. Parminder Dongs

## 2022-11-28 ENCOUNTER — TELEPHONE (OUTPATIENT)
Dept: MEDICAL GROUP | Age: 72
End: 2022-11-28
Payer: MEDICARE

## 2022-11-28 NOTE — TELEPHONE ENCOUNTER
Called patient got his voice mail and I let him that he does have labs in his chart or to let me know if he needs different ones to let me know

## 2022-11-30 ENCOUNTER — TELEPHONE (OUTPATIENT)
Dept: MEDICAL GROUP | Age: 72
End: 2022-11-30
Payer: MEDICARE

## 2022-11-30 DIAGNOSIS — E11.65 UNCONTROLLED TYPE 2 DIABETES MELLITUS WITH HYPERGLYCEMIA (HCC): ICD-10-CM

## 2022-12-05 ENCOUNTER — TELEPHONE (OUTPATIENT)
Dept: MEDICAL GROUP | Age: 72
End: 2022-12-05
Payer: MEDICARE

## 2022-12-05 DIAGNOSIS — E11.65 UNCONTROLLED TYPE 2 DIABETES MELLITUS WITH HYPERGLYCEMIA (HCC): ICD-10-CM

## 2022-12-05 DIAGNOSIS — R26.89 BALANCE PROBLEM: ICD-10-CM

## 2022-12-05 NOTE — TELEPHONE ENCOUNTER
VOICEMAIL  1. Caller Name: Prabhakar Sierra                        Call Back Number: [phone] (home)         2. Message: patient called in and needs an endo referral along with Physical therapy I will pend the orders     3. Patient approves office to leave a detailed voicemail/MyChart message: N\A

## 2022-12-19 ENCOUNTER — TELEPHONE (OUTPATIENT)
Dept: MEDICAL GROUP | Age: 72
End: 2022-12-19
Payer: MEDICARE

## 2022-12-19 DIAGNOSIS — F10.27 DEMENTIA ASSOCIATED WITH ALCOHOLISM WITH BEHAVIORAL DISTURBANCE (HCC): ICD-10-CM

## 2022-12-19 NOTE — TELEPHONE ENCOUNTER
VOICEMAIL  1. Caller Name: Prabhakar Sierra                        Call Back Number: [phone] (home)       2. Message: Patients [facility] called in in states [facility] , states don has fallen twice , that he cant get out of bed , she also states that he has went to bathroom on himself so she needs help     3. Patient approves office to leave a detailed voicemail/MyChart message: yes

## 2022-12-20 ENCOUNTER — HOME HEALTH ADMISSION (OUTPATIENT)
Dept: HOME HEALTH SERVICES | Facility: HOME HEALTHCARE | Age: 72
End: 2022-12-20
Payer: MEDICARE

## 2022-12-22 ENCOUNTER — HOSPITAL ENCOUNTER (INPATIENT)
Facility: MEDICAL CENTER | Age: 72
LOS: 6 days | DRG: 543 | End: 2022-12-28
Attending: EMERGENCY MEDICINE | Admitting: STUDENT IN AN ORGANIZED HEALTH CARE EDUCATION/TRAINING PROGRAM
Payer: MEDICARE

## 2022-12-22 ENCOUNTER — APPOINTMENT (OUTPATIENT)
Dept: RADIOLOGY | Facility: MEDICAL CENTER | Age: 72
DRG: 543 | End: 2022-12-22
Attending: EMERGENCY MEDICINE
Payer: MEDICARE

## 2022-12-22 ENCOUNTER — HOME CARE VISIT (OUTPATIENT)
Dept: HOME HEALTH SERVICES | Facility: HOME HEALTHCARE | Age: 72
End: 2022-12-22

## 2022-12-22 DIAGNOSIS — E11.65 UNCONTROLLED TYPE 2 DIABETES MELLITUS WITH HYPERGLYCEMIA (HCC): ICD-10-CM

## 2022-12-22 DIAGNOSIS — S22.000A COMPRESSION FRACTURE OF BODY OF THORACIC VERTEBRA (HCC): ICD-10-CM

## 2022-12-22 DIAGNOSIS — C61 PROSTATE CA (HCC): Chronic | ICD-10-CM

## 2022-12-22 DIAGNOSIS — S32.030A COMPRESSION FRACTURE OF L3 LUMBAR VERTEBRA, CLOSED, INITIAL ENCOUNTER (HCC): ICD-10-CM

## 2022-12-22 DIAGNOSIS — I10 ESSENTIAL HYPERTENSION: ICD-10-CM
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
## 2022-12-22 PROBLEM — R54 AGE-RELATED PHYSICAL DEBILITY: Status: ACTIVE | Noted: 2022-12-22

## 2022-12-22 PROBLEM — F03.90 DEMENTIA (HCC): Status: ACTIVE | Noted: 2022-07-26

## 2022-12-22 PROBLEM — G62.9 NEUROPATHY: Status: ACTIVE | Noted: 2022-12-22

## 2022-12-22 PROBLEM — S32.020A COMPRESSION FRACTURE OF L2 LUMBAR VERTEBRA, CLOSED, INITIAL ENCOUNTER (HCC): Status: ACTIVE | Noted: 2022-12-22

## 2022-12-22 LAB
ALBUMIN SERPL BCP-MCNC: 4 G/DL (ref 3.2–4.9)
ALBUMIN/GLOB SERPL: 1.5 G/DL
ALP SERPL-CCNC: 56 U/L (ref 30–99)
ALT SERPL-CCNC: 9 U/L (ref 2–50)
ANION GAP SERPL CALC-SCNC: 18 MMOL/L (ref 7–16)
APPEARANCE UR: CLEAR
AST SERPL-CCNC: 10 U/L (ref 12–45)
BASOPHILS # BLD AUTO: 0.4 % (ref 0–1.8)
BASOPHILS # BLD: 0.03 K/UL (ref 0–0.12)
BILIRUB SERPL-MCNC: 0.5 MG/DL (ref 0.1–1.5)
BILIRUB UR QL STRIP.AUTO: NEGATIVE
BUN SERPL-MCNC: 22 MG/DL (ref 8–22)
CALCIUM ALBUM COR SERPL-MCNC: 9.2 MG/DL (ref 8.5–10.5)
CALCIUM SERPL-MCNC: 9.2 MG/DL (ref 8.5–10.5)
CHLORIDE SERPL-SCNC: 101 MMOL/L (ref 96–112)
CO2 SERPL-SCNC: 17 MMOL/L (ref 20–33)
COLOR UR: YELLOW
CREAT SERPL-MCNC: 0.86 MG/DL (ref 0.5–1.4)
EKG IMPRESSION: NORMAL
EOSINOPHIL # BLD AUTO: 0.03 K/UL (ref 0–0.51)
EOSINOPHIL NFR BLD: 0.4 % (ref 0–6.9)
ERYTHROCYTE [DISTWIDTH] IN BLOOD BY AUTOMATED COUNT: 44.9 FL (ref 35.9–50)
GFR SERPLBLD CREATININE-BSD FMLA CKD-EPI: 91 ML/MIN/1.73 M 2
GLOBULIN SER CALC-MCNC: 2.7 G/DL (ref 1.9–3.5)
GLUCOSE BLD STRIP.AUTO-MCNC: 173 MG/DL (ref 65–99)
GLUCOSE BLD STRIP.AUTO-MCNC: 188 MG/DL (ref 65–99)
GLUCOSE SERPL-MCNC: 265 MG/DL (ref 65–99)
GLUCOSE UR STRIP.AUTO-MCNC: >=1000 MG/DL
HCT VFR BLD AUTO: 41.1 % (ref 42–52)
HGB BLD-MCNC: 13.7 G/DL (ref 14–18)
IMM GRANULOCYTES # BLD AUTO: 0.03 K/UL (ref 0–0.11)
IMM GRANULOCYTES NFR BLD AUTO: 0.4 % (ref 0–0.9)
KETONES UR STRIP.AUTO-MCNC: 40 MG/DL
LEUKOCYTE ESTERASE UR QL STRIP.AUTO: NEGATIVE
LYMPHOCYTES # BLD AUTO: 0.54 K/UL (ref 1–4.8)
LYMPHOCYTES NFR BLD: 7.5 % (ref 22–41)
MCH RBC QN AUTO: 29.5 PG (ref 27–33)
MCHC RBC AUTO-ENTMCNC: 33.3 G/DL (ref 33.7–35.3)
MCV RBC AUTO: 88.6 FL (ref 81.4–97.8)
MICRO URNS: ABNORMAL
MONOCYTES # BLD AUTO: 0.45 K/UL (ref 0–0.85)
MONOCYTES NFR BLD AUTO: 6.2 % (ref 0–13.4)
NEUTROPHILS # BLD AUTO: 6.13 K/UL (ref 1.82–7.42)
NEUTROPHILS NFR BLD: 85.1 % (ref 44–72)
NITRITE UR QL STRIP.AUTO: NEGATIVE
NRBC # BLD AUTO: 0 K/UL
NRBC BLD-RTO: 0 /100 WBC
PH UR STRIP.AUTO: 5 [PH] (ref 5–8)
PLATELET # BLD AUTO: 150 K/UL (ref 164–446)
PMV BLD AUTO: 10.9 FL (ref 9–12.9)
POTASSIUM SERPL-SCNC: 4 MMOL/L (ref 3.6–5.5)
PROT SERPL-MCNC: 6.7 G/DL (ref 6–8.2)
PROT UR QL STRIP: NEGATIVE MG/DL
RBC # BLD AUTO: 4.64 M/UL (ref 4.7–6.1)
RBC UR QL AUTO: NEGATIVE
SODIUM SERPL-SCNC: 136 MMOL/L (ref 135–145)
SP GR UR STRIP.AUTO: 1.04
TROPONIN T SERPL-MCNC: 15 NG/L (ref 6–19)
UROBILINOGEN UR STRIP.AUTO-MCNC: 0.2 MG/DL
WBC # BLD AUTO: 7.2 K/UL (ref 4.8–10.8)

## 2022-12-22 PROCEDURE — 80053 COMPREHEN METABOLIC PANEL: CPT

## 2022-12-22 PROCEDURE — 72131 CT LUMBAR SPINE W/O DYE: CPT

## 2022-12-22 PROCEDURE — 700101 HCHG RX REV CODE 250: Performed by: STUDENT IN AN ORGANIZED HEALTH CARE EDUCATION/TRAINING PROGRAM

## 2022-12-22 PROCEDURE — 81003 URINALYSIS AUTO W/O SCOPE: CPT

## 2022-12-22 PROCEDURE — 96374 THER/PROPH/DIAG INJ IV PUSH: CPT

## 2022-12-22 PROCEDURE — 700105 HCHG RX REV CODE 258: Performed by: EMERGENCY MEDICINE

## 2022-12-22 PROCEDURE — 72128 CT CHEST SPINE W/O DYE: CPT

## 2022-12-22 PROCEDURE — 99285 EMERGENCY DEPT VISIT HI MDM: CPT

## 2022-12-22 PROCEDURE — 770001 HCHG ROOM/CARE - MED/SURG/GYN PRIV*

## 2022-12-22 PROCEDURE — 700102 HCHG RX REV CODE 250 W/ 637 OVERRIDE(OP): Performed by: STUDENT IN AN ORGANIZED HEALTH CARE EDUCATION/TRAINING PROGRAM

## 2022-12-22 PROCEDURE — 85025 COMPLETE CBC W/AUTO DIFF WBC: CPT

## 2022-12-22 PROCEDURE — 87086 URINE CULTURE/COLONY COUNT: CPT

## 2022-12-22 PROCEDURE — 93005 ELECTROCARDIOGRAM TRACING: CPT | Performed by: EMERGENCY MEDICINE

## 2022-12-22 PROCEDURE — 700111 HCHG RX REV CODE 636 W/ 250 OVERRIDE (IP): Performed by: EMERGENCY MEDICINE

## 2022-12-22 PROCEDURE — 99223 1ST HOSP IP/OBS HIGH 75: CPT | Mod: AI | Performed by: STUDENT IN AN ORGANIZED HEALTH CARE EDUCATION/TRAINING PROGRAM

## 2022-12-22 PROCEDURE — 70450 CT HEAD/BRAIN W/O DYE: CPT

## 2022-12-22 PROCEDURE — A9270 NON-COVERED ITEM OR SERVICE: HCPCS | Performed by: STUDENT IN AN ORGANIZED HEALTH CARE EDUCATION/TRAINING PROGRAM

## 2022-12-22 PROCEDURE — 82962 GLUCOSE BLOOD TEST: CPT

## 2022-12-22 PROCEDURE — 700111 HCHG RX REV CODE 636 W/ 250 OVERRIDE (IP): Performed by: STUDENT IN AN ORGANIZED HEALTH CARE EDUCATION/TRAINING PROGRAM

## 2022-12-22 PROCEDURE — 84484 ASSAY OF TROPONIN QUANT: CPT

## 2022-12-22 PROCEDURE — 71045 X-RAY EXAM CHEST 1 VIEW: CPT

## 2022-12-22 PROCEDURE — 36415 COLL VENOUS BLD VENIPUNCTURE: CPT

## 2022-12-22 PROCEDURE — 96375 TX/PRO/DX INJ NEW DRUG ADDON: CPT

## 2022-12-22 RX ORDER — ACETAMINOPHEN, DIPHENHYDRAMINE HYDROCHLORIDE 500; 25 MG/1; MG/1
1-2 TABLET, FILM COATED ORAL
Status: ON HOLD | COMMUNITY
End: 2022-12-28

## 2022-12-22 RX ORDER — LIDOCAINE 50 MG/G
1 PATCH TOPICAL EVERY 24 HOURS
Status: DISCONTINUED | OUTPATIENT
Start: 2022-12-22 | End: 2022-12-28 | Stop reason: HOSPADM

## 2022-12-22 RX ORDER — BISACODYL 10 MG
10 SUPPOSITORY, RECTAL RECTAL
Status: DISCONTINUED | OUTPATIENT
Start: 2022-12-22 | End: 2022-12-28 | Stop reason: HOSPADM

## 2022-12-22 RX ORDER — ACETAMINOPHEN 325 MG/1
650 TABLET ORAL EVERY 6 HOURS
Status: DISPENSED | OUTPATIENT
Start: 2022-12-22 | End: 2022-12-27

## 2022-12-22 RX ORDER — ACETAMINOPHEN 325 MG/1
650 TABLET ORAL EVERY 6 HOURS PRN
Status: DISCONTINUED | OUTPATIENT
Start: 2022-12-27 | End: 2022-12-28 | Stop reason: HOSPADM

## 2022-12-22 RX ORDER — ACETAMINOPHEN 325 MG/1
650 TABLET ORAL EVERY 6 HOURS PRN
Status: DISCONTINUED | OUTPATIENT
Start: 2022-12-22 | End: 2022-12-22

## 2022-12-22 RX ORDER — FERROUS SULFATE 325(65) MG
325 TABLET ORAL
Status: DISCONTINUED | OUTPATIENT
Start: 2022-12-22 | End: 2022-12-28 | Stop reason: HOSPADM

## 2022-12-22 RX ORDER — DULOXETIN HYDROCHLORIDE 60 MG/1
60 CAPSULE, DELAYED RELEASE ORAL 2 TIMES DAILY
Status: DISCONTINUED | OUTPATIENT
Start: 2022-12-22 | End: 2022-12-28 | Stop reason: HOSPADM

## 2022-12-22 RX ORDER — ONDANSETRON 2 MG/ML
4 INJECTION INTRAMUSCULAR; INTRAVENOUS EVERY 4 HOURS PRN
Status: DISCONTINUED | OUTPATIENT
Start: 2022-12-22 | End: 2022-12-28 | Stop reason: HOSPADM

## 2022-12-22 RX ORDER — SODIUM CHLORIDE 9 MG/ML
1000 INJECTION, SOLUTION INTRAVENOUS ONCE
Status: COMPLETED | OUTPATIENT
Start: 2022-12-22 | End: 2022-12-22

## 2022-12-22 RX ORDER — ONDANSETRON 4 MG/1
4 TABLET, ORALLY DISINTEGRATING ORAL EVERY 4 HOURS PRN
Status: DISCONTINUED | OUTPATIENT
Start: 2022-12-22 | End: 2022-12-28 | Stop reason: HOSPADM

## 2022-12-22 RX ORDER — ALBUTEROL SULFATE 90 UG/1
2 AEROSOL, METERED RESPIRATORY (INHALATION) EVERY 6 HOURS PRN
Status: DISCONTINUED | OUTPATIENT
Start: 2022-12-22 | End: 2022-12-28 | Stop reason: HOSPADM

## 2022-12-22 RX ORDER — MORPHINE SULFATE 4 MG/ML
4 INJECTION INTRAVENOUS
Status: DISCONTINUED | OUTPATIENT
Start: 2022-12-22 | End: 2022-12-28 | Stop reason: HOSPADM

## 2022-12-22 RX ORDER — GABAPENTIN 300 MG/1
300 CAPSULE ORAL DAILY
Status: DISCONTINUED | OUTPATIENT
Start: 2022-12-23 | End: 2022-12-28 | Stop reason: HOSPADM

## 2022-12-22 RX ORDER — CELECOXIB 200 MG/1
200 CAPSULE ORAL 2 TIMES DAILY PRN
Status: DISCONTINUED | OUTPATIENT
Start: 2022-12-27 | End: 2022-12-23

## 2022-12-22 RX ORDER — BENAZEPRIL HYDROCHLORIDE 10 MG/1
40 TABLET ORAL DAILY
Status: DISCONTINUED | OUTPATIENT
Start: 2022-12-23 | End: 2022-12-26

## 2022-12-22 RX ORDER — SODIUM CHLORIDE 9 MG/ML
INJECTION, SOLUTION INTRAVENOUS CONTINUOUS
Status: DISCONTINUED | OUTPATIENT
Start: 2022-12-22 | End: 2022-12-24

## 2022-12-22 RX ORDER — ASPIRIN 81 MG/1
81 TABLET, CHEWABLE ORAL DAILY
Status: DISCONTINUED | OUTPATIENT
Start: 2022-12-23 | End: 2022-12-28 | Stop reason: HOSPADM

## 2022-12-22 RX ORDER — POLYETHYLENE GLYCOL 3350 17 G/17G
1 POWDER, FOR SOLUTION ORAL
Status: DISCONTINUED | OUTPATIENT
Start: 2022-12-22 | End: 2022-12-28 | Stop reason: HOSPADM

## 2022-12-22 RX ORDER — TERAZOSIN 5 MG/1
5 CAPSULE ORAL DAILY
Status: DISCONTINUED | OUTPATIENT
Start: 2022-12-23 | End: 2022-12-24

## 2022-12-22 RX ORDER — VITAMIN B COMPLEX
1000 TABLET ORAL DAILY
Status: DISCONTINUED | OUTPATIENT
Start: 2022-12-23 | End: 2022-12-28 | Stop reason: HOSPADM

## 2022-12-22 RX ORDER — LABETALOL HYDROCHLORIDE 5 MG/ML
10 INJECTION, SOLUTION INTRAVENOUS EVERY 4 HOURS PRN
Status: DISCONTINUED | OUTPATIENT
Start: 2022-12-22 | End: 2022-12-28 | Stop reason: HOSPADM

## 2022-12-22 RX ORDER — ATORVASTATIN CALCIUM 40 MG/1
80 TABLET, FILM COATED ORAL EVERY EVENING
Status: DISCONTINUED | OUTPATIENT
Start: 2022-12-22 | End: 2022-12-28 | Stop reason: HOSPADM

## 2022-12-22 RX ORDER — BUPROPION HYDROCHLORIDE 150 MG/1
150 TABLET, EXTENDED RELEASE ORAL 2 TIMES DAILY
Status: DISCONTINUED | OUTPATIENT
Start: 2022-12-22 | End: 2022-12-28 | Stop reason: HOSPADM

## 2022-12-22 RX ORDER — AMOXICILLIN 250 MG
2 CAPSULE ORAL 2 TIMES DAILY
Status: DISCONTINUED | OUTPATIENT
Start: 2022-12-23 | End: 2022-12-28 | Stop reason: HOSPADM

## 2022-12-22 RX ORDER — CELECOXIB 200 MG/1
200 CAPSULE ORAL 2 TIMES DAILY
Status: DISCONTINUED | OUTPATIENT
Start: 2022-12-22 | End: 2022-12-23

## 2022-12-22 RX ORDER — MORPHINE SULFATE 4 MG/ML
4 INJECTION INTRAVENOUS ONCE
Status: COMPLETED | OUTPATIENT
Start: 2022-12-22 | End: 2022-12-22

## 2022-12-22 RX ORDER — GUAIFENESIN/DEXTROMETHORPHAN 100-10MG/5
10 SYRUP ORAL EVERY 6 HOURS PRN
Status: DISCONTINUED | OUTPATIENT
Start: 2022-12-22 | End: 2022-12-28 | Stop reason: HOSPADM

## 2022-12-22 RX ORDER — OXYCODONE HYDROCHLORIDE 10 MG/1
10 TABLET ORAL
Status: DISCONTINUED | OUTPATIENT
Start: 2022-12-22 | End: 2022-12-28 | Stop reason: HOSPADM

## 2022-12-22 RX ORDER — OXYCODONE HYDROCHLORIDE 5 MG/1
5 TABLET ORAL
Status: DISCONTINUED | OUTPATIENT
Start: 2022-12-22 | End: 2022-12-28 | Stop reason: HOSPADM

## 2022-12-22 RX ORDER — OMEPRAZOLE 20 MG/1
20 CAPSULE, DELAYED RELEASE ORAL DAILY
Status: DISCONTINUED | OUTPATIENT
Start: 2022-12-23 | End: 2022-12-28 | Stop reason: HOSPADM

## 2022-12-22 RX ADMIN — BUPROPION HYDROCHLORIDE 150 MG: 150 TABLET, EXTENDED RELEASE ORAL at 19:12

## 2022-12-22 RX ADMIN — SODIUM CHLORIDE 1000 ML: 9 INJECTION, SOLUTION INTRAVENOUS at 15:57

## 2022-12-22 RX ADMIN — INSULIN HUMAN 3 UNITS: 100 INJECTION, SOLUTION PARENTERAL at 21:57

## 2022-12-22 RX ADMIN — MORPHINE SULFATE 4 MG: 4 INJECTION INTRAVENOUS at 17:26

## 2022-12-22 RX ADMIN — FERROUS SULFATE TAB 325 MG (65 MG ELEMENTAL FE) 325 MG: 325 (65 FE) TAB at 19:13

## 2022-12-22 RX ADMIN — LIDOCAINE 1 PATCH: 700 PATCH TOPICAL at 21:42

## 2022-12-22 RX ADMIN — DULOXETINE HYDROCHLORIDE 60 MG: 60 CAPSULE, DELAYED RELEASE ORAL at 19:12

## 2022-12-22 RX ADMIN — LIDOCAINE 1 PATCH: 700 PATCH TOPICAL at 21:41

## 2022-12-22 RX ADMIN — OXYCODONE 5 MG: 5 TABLET ORAL at 21:40

## 2022-12-22 RX ADMIN — ACETAMINOPHEN 650 MG: 325 TABLET, FILM COATED ORAL at 21:41

## 2022-12-22 RX ADMIN — ONDANSETRON 4 MG: 4 TABLET, ORALLY DISINTEGRATING ORAL at 19:23

## 2022-12-22 RX ADMIN — ATORVASTATIN CALCIUM 80 MG: 80 TABLET, FILM COATED ORAL at 19:12

## 2022-12-22 RX ADMIN — FENTANYL CITRATE 100 MCG: 50 INJECTION INTRAMUSCULAR; INTRAVENOUS at 16:12

## 2022-12-22 RX ADMIN — CELECOXIB 200 MG: 200 CAPSULE ORAL at 21:40

## 2022-12-22 ASSESSMENT — ENCOUNTER SYMPTOMS
PALPITATIONS: 0
BACK PAIN: 1
BLURRED VISION: 0
NECK PAIN: 0
DEPRESSION: 0
DOUBLE VISION: 0
DIZZINESS: 0
COUGH: 0
BRUISES/BLEEDS EASILY: 0
MYALGIAS: 1
HEARTBURN: 0
SHORTNESS OF BREATH: 0
HEADACHES: 0
NAUSEA: 0
FEVER: 0
FALLS: 1
FLANK PAIN: 0
CHILLS: 0
VOMITING: 0
ABDOMINAL PAIN: 0

## 2022-12-22 ASSESSMENT — LIFESTYLE VARIABLES
SUBSTANCE_ABUSE: 0
DO YOU DRINK ALCOHOL: NO

## 2022-12-22 ASSESSMENT — PAIN DESCRIPTION - PAIN TYPE
TYPE: ACUTE PAIN

## 2022-12-22 ASSESSMENT — FIBROSIS 4 INDEX: FIB4 SCORE: 1.22

## 2022-12-22 NOTE — ED PROVIDER NOTES
ED Provider Note    CHIEF COMPLAINT  Chief Complaint   Patient presents with    High Blood Sugar     Generalized body weakness/malaise for 1 week       LIMITATION TO HISTORY   Select: : None    HPI  Prabhakar Sierra is a 72 y.o. male who presents with a chief complaint of generalized weakness and malaise as well as multiple ground-level falls over the course of the past week.  He notes that he has not been eating and drinking well.  He denies any head trauma in his falls but notes that he has been landing on his back and states that he has severe pain from the most recent fall.  He is not anticoagulated.  He denies any fevers or chills, chest pain or shortness of breath, cough or cold symptoms, abdominal pain, vomiting, diarrhea, dysuria, melena, hematochezia.  He has felt nauseated.  He takes all of his medications as prescribed.    OUTSIDE HISTORIAN(S):  Select: None    EXTERNAL RECORDS REVIEWED  Select: Other -patient seen by physical therapist earlier today who recommended he be transported to the hospital after multiple falls in the past week and severe back pain.  Physical therapist contacted 911.    REVIEW OF SYSTEMS  See HPI for further details.  Generalized weakness.  Malaise.  Back pain.  Multiple falls.  Nausea.  All other systems are negative.     PAST MEDICAL HISTORY   has a past medical history of BPH (benign prostatic hyperplasia), GERD (gastroesophageal reflux disease), Hypertension, Hyponatremia (5/18/2017), Iron deficiency anemia secondary to inadequate dietary iron intake (5/26/2021), Mild cognitive impairment (7/26/2022), Neuropathic pain, leg, Right hip pain (11/14/2016), and Type II or unspecified type diabetes mellitus without mention of complication, not stated as uncontrolled.    SURGICAL HISTORY  patient denies any surgical history    FAMILY HISTORY  Family History   Problem Relation Age of Onset    Heart Disease Mother     Diabetes Father     Other Sister         Mental  retardation    Heart Disease Brother     Cancer Brother         Prostate    No Known Problems Daughter     No Known Problems Daughter        SOCIAL HISTORY  Social History     Tobacco Use    Smoking status: Some Days     Packs/day: 0.15     Years: 50.00     Pack years: 7.50     Types: Cigarettes     Last attempt to quit: 1/10/2007     Years since quitting: 15.9    Smokeless tobacco: Never   Vaping Use    Vaping Use: Never used   Substance and Sexual Activity    Alcohol use: Not Currently     Alcohol/week: 4.8 - 6.0 oz     Types: 8 - 10 Glasses of wine per week     Comment: 1 bottle of wine 2-3 days a week    Drug use: No    Sexual activity: Never     Partners: Female       CURRENT MEDICATIONS  Home Medications       Reviewed by Paulina Moss R.N. (Registered Nurse) on 12/22/22 at 1423  Med List Status: Partial     Medication Last Dose Status   Acetaminophen 500 MG Cap  Active   albuterol 108 (90 Base) MCG/ACT Aero Soln inhalation aerosol  Active   aspirin (ASA) 81 MG Chew Tab chewable tablet  Active   atorvastatin (LIPITOR) 80 MG tablet  Active   benazepril (LOTENSIN) 40 MG tablet  Active   Blood Glucose Monitoring Suppl Device  Active   Blood Glucose Monitoring Suppl Device  Active   Blood Glucose Test Strips  Active   buPROPion SR (WELLBUTRIN-SR) 150 MG TABLET SR 12 HR sustained-release tablet  Active   DULoxetine (CYMBALTA) 60 MG Cap DR Particles delayed-release capsule  Active   Empagliflozin (JARDIANCE) 25 MG Tab  Active   ferrous sulfate 325 (65 Fe) MG EC tablet  Active   gabapentin (NEURONTIN) 300 MG Cap  Active   glipiZIDE SR (GLIPIZIDE XL) 10 MG TABLET SR 24 HR  Active   Lancets  Active   metformin (GLUCOPHAGE) 1000 MG tablet  Active   Multiple Vitamins-Minerals (HM COMPLETE 50+ MENS ULTIMATE PO)  Active   naltrexone (DEPADE) 50 MG Tab  Active   non-formulary med  Active   omeprazole (PRILOSEC) 20 MG delayed-release capsule  Active   pioglitazone (ACTOS) 30 MG Tab  Active   sildenafil citrate (VIAGRA)  "100 MG tablet  Active   terazosin (HYTRIN) 5 MG Cap  Active   vitamin D (CHOLECALCIFEROL) 1000 UNIT Tab  Active                    ALLERGIES  No Known Allergies    PHYSICAL EXAM  VITAL SIGNS: /78   Pulse (!) 102   Temp 35.8 °C (96.5 °F) (Temporal)   Resp 18   Ht 1.778 m (5' 10\")   Wt 97.5 kg (215 lb)   SpO2 98%   BMI 30.85 kg/m²    Physical Exam  Vitals and nursing note reviewed.   Constitutional:       General: He is not in acute distress.     Appearance: Normal appearance. He is not ill-appearing, toxic-appearing or diaphoretic.   HENT:      Head: Normocephalic and atraumatic.      Nose: Nose normal.      Mouth/Throat:      Mouth: Mucous membranes are dry.   Eyes:      Extraocular Movements: Extraocular movements intact.      Conjunctiva/sclera: Conjunctivae normal.      Pupils: Pupils are equal, round, and reactive to light.   Cardiovascular:      Rate and Rhythm: Regular rhythm. Tachycardia present.      Pulses: Normal pulses.      Heart sounds: Normal heart sounds. No murmur heard.    No friction rub. No gallop.   Pulmonary:      Effort: Pulmonary effort is normal. No respiratory distress.      Breath sounds: Normal breath sounds. No stridor. No wheezing, rhonchi or rales.   Abdominal:      General: Abdomen is flat. Bowel sounds are normal. There is no distension.      Palpations: Abdomen is soft. There is no mass.      Tenderness: There is no abdominal tenderness. There is no guarding or rebound.   Musculoskeletal:         General: No swelling, tenderness, deformity or signs of injury. Normal range of motion.      Cervical back: Normal range of motion and neck supple. No rigidity.      Comments: Thoracic and lumbar spine tenderness.  No cervical spine tenderness.   Skin:     General: Skin is warm and dry.   Neurological:      General: No focal deficit present.      Mental Status: He is alert.   Psychiatric:         Mood and Affect: Mood normal.         Behavior: Behavior normal.     DIAGNOSTIC " STUDIES / PROCEDURES    LABS  Results for orders placed or performed during the hospital encounter of 12/22/22   CBC with Differential   Result Value Ref Range    WBC 7.2 4.8 - 10.8 K/uL    RBC 4.64 (L) 4.70 - 6.10 M/uL    Hemoglobin 13.7 (L) 14.0 - 18.0 g/dL    Hematocrit 41.1 (L) 42.0 - 52.0 %    MCV 88.6 81.4 - 97.8 fL    MCH 29.5 27.0 - 33.0 pg    MCHC 33.3 (L) 33.7 - 35.3 g/dL    RDW 44.9 35.9 - 50.0 fL    Platelet Count 150 (L) 164 - 446 K/uL    MPV 10.9 9.0 - 12.9 fL    Neutrophils-Polys 85.10 (H) 44.00 - 72.00 %    Lymphocytes 7.50 (L) 22.00 - 41.00 %    Monocytes 6.20 0.00 - 13.40 %    Eosinophils 0.40 0.00 - 6.90 %    Basophils 0.40 0.00 - 1.80 %    Immature Granulocytes 0.40 0.00 - 0.90 %    Nucleated RBC 0.00 /100 WBC    Neutrophils (Absolute) 6.13 1.82 - 7.42 K/uL    Lymphs (Absolute) 0.54 (L) 1.00 - 4.80 K/uL    Monos (Absolute) 0.45 0.00 - 0.85 K/uL    Eos (Absolute) 0.03 0.00 - 0.51 K/uL    Baso (Absolute) 0.03 0.00 - 0.12 K/uL    Immature Granulocytes (abs) 0.03 0.00 - 0.11 K/uL    NRBC (Absolute) 0.00 K/uL   Comp Metabolic Panel   Result Value Ref Range    Sodium 136 135 - 145 mmol/L    Potassium 4.0 3.6 - 5.5 mmol/L    Chloride 101 96 - 112 mmol/L    Co2 17 (L) 20 - 33 mmol/L    Anion Gap 18.0 (H) 7.0 - 16.0    Glucose 265 (H) 65 - 99 mg/dL    Bun 22 8 - 22 mg/dL    Creatinine 0.86 0.50 - 1.40 mg/dL    Calcium 9.2 8.5 - 10.5 mg/dL    AST(SGOT) 10 (L) 12 - 45 U/L    ALT(SGPT) 9 2 - 50 U/L    Alkaline Phosphatase 56 30 - 99 U/L    Total Bilirubin 0.5 0.1 - 1.5 mg/dL    Albumin 4.0 3.2 - 4.9 g/dL    Total Protein 6.7 6.0 - 8.2 g/dL    Globulin 2.7 1.9 - 3.5 g/dL    A-G Ratio 1.5 g/dL   Urinalysis    Specimen: Urine, Clean Catch   Result Value Ref Range    Color Yellow     Character Clear     Specific Gravity 1.043 <1.035    Ph 5.0 5.0 - 8.0    Glucose >=1000 (A) Negative mg/dL    Ketones 40 (A) Negative mg/dL    Protein Negative Negative mg/dL    Bilirubin Negative Negative    Urobilinogen, Urine 0.2  Negative    Nitrite Negative Negative    Leukocyte Esterase Negative Negative    Occult Blood Negative Negative    Micro Urine Req see below    CORRECTED CALCIUM   Result Value Ref Range    Correct Calcium 9.2 8.5 - 10.5 mg/dL   ESTIMATED GFR   Result Value Ref Range    GFR (CKD-EPI) 91 >60 mL/min/1.73 m 2   TROPONIN   Result Value Ref Range    Troponin T 15 6 - 19 ng/L   EKG   Result Value Ref Range    Report       [facility] Emergency Dept.    Test Date:  2022  Pt Name:    ROLANDO GREEN          Department: ER  MRN:        [id number]                      Room:       BL 15  Gender:     Male                         Technician: [id number]  :        [id number]                   Requested By:JONNY PEDROZA  Order #:    [id number]                    Reading MD: Jonny Pedroza MD    Measurements  Intervals                                Axis  Rate:       100                          P:          -9  NH:         139                          QRS:        -3  QRSD:       79                           T:          -10  QT:         353  QTc:        456    Interpretive Statements  Sinus tachycardia  Atrial premature complex  Low voltage, precordial leads  Borderline T abnormalities, diffuse leads  Compared to ECG 2019 22:46:34  Atrial premature complex(es) now present  Low QRS voltage now present  T-wave abnormality now present  Sinus rhythm no longer present  ST (T wave) deviation n o longer present  Possible ischemia no longer present  Electronically Signed On 2022 16:51:49 PST by Jonny Pedroza MD       RADIOLOGY  CT-TSPINE W/O PLUS RECONS   Final Result      Acute T11 compression fracture with approximately 10% loss of height. No significant retropulsion.      CT-LSPINE W/O PLUS RECONS   Final Result      1.  Acute compression fracture of L2 vertebral body, with up to 20% vertebral body height loss.   2.  Acute compression fracture of L3 vertebral body, with up to 10% vertebral body height  loss.   3.  Thoracic spine is reported separately.      CT-HEAD W/O   Final Result      1. No CT evidence of acute infarct, hemorrhage or mass.   2. Moderate global parenchymal atrophy. Chronic small vessel ischemic changes.   3. Unchanged ventricular dilatation, left greater than right. Normal pressure hydrocephalus is possible.      DX-CHEST-PORTABLE (1 VIEW)   Final Result      No acute cardiopulmonary abnormality.           COURSE & MEDICAL DECISION MAKING  Pertinent Labs & Imaging studies reviewed. (See chart for details)  This is a 72 year old male with a history of prostate cancer here after progressive weakness at home and multiple falls with associated back pain. He is tender in the thoracic and lumbar spine but neurologically intact. No obvious head trauma. No c-spine pain or tenderness. Will obtain head, T-, and L-spine scans to evaluate for traumatic injury.    Started on IV fluids for tachycardia and evidence of dehydration with dry mucous membranes.     CBC reassuring without leukocytosis although patient does have mild anemia with H/H of 13.7/41.1. Low suspicion that this is the etiology of his falls. Metabolic panel demonstrates normal electrolytes, renal, and liver function. CO2 is low, AG is elevated and BG is elevated to 265. This will be addressed by IV fluids and we can continue to monitor with repeat BMPs to ensure these values improve. Troponin is normal. UA demonstrates ketones.     CT head is without acute abnormality but there is ventricular dilatation which is unchanged from prior. NPH should be considered given patient's worsening unsteady gait.    CT T/L spine did reveal multiple compression fractures; no retropulsion noted and patient remains neurologically intact. I spoke with Dr. Guzman, on-call for spine surgery, and he recommended an off-the-shelf TLSO brace with outpatient follow-up in [facility]. He will see the patient in the ED.     Patient given a dose of pain  medication. He will benefit from hospitalization for additional workup and management. Discussed with the hospitalist and admitted in guarded condition.    HYDRATION: Based on the patient's presentation of Dehydration, Hyperglycemia, and Tachycardia the patient was given IV fluids. IV Hydration was used because oral hydration was not adequate alone. Upon recheck following hydration, the patient was improved.    FINAL IMPRESSION  1. T-11 fracture  2. L-2 fracture  3. L-3 fracture  4. Hyperglycemia  5. Dehydration    Electronically signed by: Elias Ramirez M.D., 12/22/2022 2:28 PM

## 2022-12-22 NOTE — ED NOTES
BIB EMS; history of general body malaise and weakness for a week; was seen by the home health nurse today was advised to come and check in; Pt is AOX4; denies any dizziness; no complaints as of the moment just thirsty. Received a total of 500 ml bolus from the EMS BGL went down to 340 from the initial BGL of 370.  IV intact and in SITU on right AC g.20.

## 2022-12-23 ENCOUNTER — TELEPHONE (OUTPATIENT)
Dept: NEUROLOGY | Facility: MEDICAL CENTER | Age: 72
End: 2022-12-23

## 2022-12-23 ENCOUNTER — TELEPHONE (OUTPATIENT)
Dept: HEALTH INFORMATION MANAGEMENT | Facility: OTHER | Age: 72
End: 2022-12-23

## 2022-12-23 PROBLEM — D61.818 PANCYTOPENIA (HCC): Status: ACTIVE | Noted: 2022-12-23

## 2022-12-23 LAB
ALBUMIN SERPL BCP-MCNC: 3.6 G/DL (ref 3.2–4.9)
BASOPHILS # BLD AUTO: 0.4 % (ref 0–1.8)
BASOPHILS # BLD: 0.02 K/UL (ref 0–0.12)
BUN SERPL-MCNC: 21 MG/DL (ref 8–22)
CALCIUM ALBUM COR SERPL-MCNC: 9.1 MG/DL (ref 8.5–10.5)
CALCIUM SERPL-MCNC: 8.8 MG/DL (ref 8.5–10.5)
CHLORIDE SERPL-SCNC: 103 MMOL/L (ref 96–112)
CO2 SERPL-SCNC: 17 MMOL/L (ref 20–33)
CORTIS SERPL-MCNC: 16.6 UG/DL (ref 0–23)
CREAT SERPL-MCNC: 0.77 MG/DL (ref 0.5–1.4)
EOSINOPHIL # BLD AUTO: 0.09 K/UL (ref 0–0.51)
EOSINOPHIL NFR BLD: 2 % (ref 0–6.9)
ERYTHROCYTE [DISTWIDTH] IN BLOOD BY AUTOMATED COUNT: 43.9 FL (ref 35.9–50)
GFR SERPLBLD CREATININE-BSD FMLA CKD-EPI: 94 ML/MIN/1.73 M 2
GLUCOSE BLD STRIP.AUTO-MCNC: 164 MG/DL (ref 65–99)
GLUCOSE BLD STRIP.AUTO-MCNC: 213 MG/DL (ref 65–99)
GLUCOSE BLD STRIP.AUTO-MCNC: 214 MG/DL (ref 65–99)
GLUCOSE SERPL-MCNC: 160 MG/DL (ref 65–99)
HCT VFR BLD AUTO: 35.9 % (ref 42–52)
HGB BLD-MCNC: 12.4 G/DL (ref 14–18)
IMM GRANULOCYTES # BLD AUTO: 0.03 K/UL (ref 0–0.11)
IMM GRANULOCYTES NFR BLD AUTO: 0.7 % (ref 0–0.9)
LYMPHOCYTES # BLD AUTO: 0.72 K/UL (ref 1–4.8)
LYMPHOCYTES NFR BLD: 16.2 % (ref 22–41)
MAGNESIUM SERPL-MCNC: 1.6 MG/DL (ref 1.5–2.5)
MCH RBC QN AUTO: 30 PG (ref 27–33)
MCHC RBC AUTO-ENTMCNC: 34.5 G/DL (ref 33.7–35.3)
MCV RBC AUTO: 86.7 FL (ref 81.4–97.8)
MONOCYTES # BLD AUTO: 0.41 K/UL (ref 0–0.85)
MONOCYTES NFR BLD AUTO: 9.2 % (ref 0–13.4)
NEUTROPHILS # BLD AUTO: 3.18 K/UL (ref 1.82–7.42)
NEUTROPHILS NFR BLD: 71.5 % (ref 44–72)
NRBC # BLD AUTO: 0 K/UL
NRBC BLD-RTO: 0 /100 WBC
PHOSPHATE SERPL-MCNC: 4.3 MG/DL (ref 2.5–4.5)
PLATELET # BLD AUTO: 139 K/UL (ref 164–446)
PMV BLD AUTO: 10.4 FL (ref 9–12.9)
POTASSIUM SERPL-SCNC: 3.5 MMOL/L (ref 3.6–5.5)
RBC # BLD AUTO: 4.14 M/UL (ref 4.7–6.1)
SODIUM SERPL-SCNC: 136 MMOL/L (ref 135–145)
WBC # BLD AUTO: 4.5 K/UL (ref 4.8–10.8)

## 2022-12-23 PROCEDURE — 85025 COMPLETE CBC W/AUTO DIFF WBC: CPT

## 2022-12-23 PROCEDURE — 36415 COLL VENOUS BLD VENIPUNCTURE: CPT

## 2022-12-23 PROCEDURE — 700111 HCHG RX REV CODE 636 W/ 250 OVERRIDE (IP): Performed by: HOSPITALIST

## 2022-12-23 PROCEDURE — A9270 NON-COVERED ITEM OR SERVICE: HCPCS | Performed by: HOSPITALIST

## 2022-12-23 PROCEDURE — 97163 PT EVAL HIGH COMPLEX 45 MIN: CPT

## 2022-12-23 PROCEDURE — 97165 OT EVAL LOW COMPLEX 30 MIN: CPT

## 2022-12-23 PROCEDURE — A9270 NON-COVERED ITEM OR SERVICE: HCPCS | Performed by: STUDENT IN AN ORGANIZED HEALTH CARE EDUCATION/TRAINING PROGRAM

## 2022-12-23 PROCEDURE — 82962 GLUCOSE BLOOD TEST: CPT | Mod: 91

## 2022-12-23 PROCEDURE — 99233 SBSQ HOSP IP/OBS HIGH 50: CPT | Performed by: HOSPITALIST

## 2022-12-23 PROCEDURE — 770001 HCHG ROOM/CARE - MED/SURG/GYN PRIV*

## 2022-12-23 PROCEDURE — 83735 ASSAY OF MAGNESIUM: CPT

## 2022-12-23 PROCEDURE — 700105 HCHG RX REV CODE 258: Performed by: STUDENT IN AN ORGANIZED HEALTH CARE EDUCATION/TRAINING PROGRAM

## 2022-12-23 PROCEDURE — 700101 HCHG RX REV CODE 250: Performed by: STUDENT IN AN ORGANIZED HEALTH CARE EDUCATION/TRAINING PROGRAM

## 2022-12-23 PROCEDURE — 700102 HCHG RX REV CODE 250 W/ 637 OVERRIDE(OP): Performed by: STUDENT IN AN ORGANIZED HEALTH CARE EDUCATION/TRAINING PROGRAM

## 2022-12-23 PROCEDURE — 700102 HCHG RX REV CODE 250 W/ 637 OVERRIDE(OP): Performed by: HOSPITALIST

## 2022-12-23 PROCEDURE — 82533 TOTAL CORTISOL: CPT

## 2022-12-23 PROCEDURE — 80069 RENAL FUNCTION PANEL: CPT

## 2022-12-23 RX ORDER — POTASSIUM CHLORIDE 20 MEQ/1
40 TABLET, EXTENDED RELEASE ORAL ONCE
Status: COMPLETED | OUTPATIENT
Start: 2022-12-23 | End: 2022-12-23

## 2022-12-23 RX ORDER — BENAZEPRIL HYDROCHLORIDE 10 MG/1
40 TABLET ORAL DAILY
Status: CANCELLED | OUTPATIENT
Start: 2022-12-24

## 2022-12-23 RX ORDER — MAGNESIUM SULFATE HEPTAHYDRATE 40 MG/ML
2 INJECTION, SOLUTION INTRAVENOUS ONCE
Status: COMPLETED | OUTPATIENT
Start: 2022-12-23 | End: 2022-12-23

## 2022-12-23 RX ORDER — ENOXAPARIN SODIUM 100 MG/ML
40 INJECTION SUBCUTANEOUS DAILY
Status: DISCONTINUED | OUTPATIENT
Start: 2022-12-23 | End: 2022-12-28 | Stop reason: HOSPADM

## 2022-12-23 RX ADMIN — ACETAMINOPHEN 650 MG: 325 TABLET, FILM COATED ORAL at 05:56

## 2022-12-23 RX ADMIN — SENNOSIDES AND DOCUSATE SODIUM 2 TABLET: 50; 8.6 TABLET ORAL at 16:33

## 2022-12-23 RX ADMIN — ACETAMINOPHEN 650 MG: 325 TABLET, FILM COATED ORAL at 12:36

## 2022-12-23 RX ADMIN — ASPIRIN 81 MG 81 MG: 81 TABLET ORAL at 05:57

## 2022-12-23 RX ADMIN — CELECOXIB 200 MG: 200 CAPSULE ORAL at 05:57

## 2022-12-23 RX ADMIN — INSULIN HUMAN 4 UNITS: 100 INJECTION, SOLUTION PARENTERAL at 16:45

## 2022-12-23 RX ADMIN — GABAPENTIN 300 MG: 300 CAPSULE ORAL at 05:57

## 2022-12-23 RX ADMIN — ACETAMINOPHEN 650 MG: 325 TABLET, FILM COATED ORAL at 16:33

## 2022-12-23 RX ADMIN — MAGNESIUM SULFATE HEPTAHYDRATE 2 G: 40 INJECTION, SOLUTION INTRAVENOUS at 09:00

## 2022-12-23 RX ADMIN — ENOXAPARIN SODIUM 40 MG: 40 INJECTION SUBCUTANEOUS at 17:20

## 2022-12-23 RX ADMIN — BUPROPION HYDROCHLORIDE 150 MG: 150 TABLET, EXTENDED RELEASE ORAL at 17:21

## 2022-12-23 RX ADMIN — FERROUS SULFATE TAB 325 MG (65 MG ELEMENTAL FE) 325 MG: 325 (65 FE) TAB at 12:37

## 2022-12-23 RX ADMIN — DULOXETINE HYDROCHLORIDE 60 MG: 60 CAPSULE, DELAYED RELEASE ORAL at 06:01

## 2022-12-23 RX ADMIN — ATORVASTATIN CALCIUM 80 MG: 80 TABLET, FILM COATED ORAL at 16:34

## 2022-12-23 RX ADMIN — ACETAMINOPHEN 650 MG: 325 TABLET, FILM COATED ORAL at 23:40

## 2022-12-23 RX ADMIN — INSULIN HUMAN 3 UNITS: 100 INJECTION, SOLUTION PARENTERAL at 20:57

## 2022-12-23 RX ADMIN — BENAZEPRIL HYDROCHLORIDE 40 MG: 10 TABLET ORAL at 05:56

## 2022-12-23 RX ADMIN — BUPROPION HYDROCHLORIDE 150 MG: 150 TABLET, EXTENDED RELEASE ORAL at 06:01

## 2022-12-23 RX ADMIN — TERAZOSIN 5 MG: 5 CAPSULE ORAL at 05:56

## 2022-12-23 RX ADMIN — SODIUM CHLORIDE: 9 INJECTION, SOLUTION INTRAVENOUS at 01:53

## 2022-12-23 RX ADMIN — SODIUM CHLORIDE: 9 INJECTION, SOLUTION INTRAVENOUS at 16:51

## 2022-12-23 RX ADMIN — DULOXETINE HYDROCHLORIDE 60 MG: 60 CAPSULE, DELAYED RELEASE ORAL at 17:21

## 2022-12-23 RX ADMIN — INSULIN HUMAN 3 UNITS: 100 INJECTION, SOLUTION PARENTERAL at 12:40

## 2022-12-23 RX ADMIN — FERROUS SULFATE TAB 325 MG (65 MG ELEMENTAL FE) 325 MG: 325 (65 FE) TAB at 08:42

## 2022-12-23 RX ADMIN — FERROUS SULFATE TAB 325 MG (65 MG ELEMENTAL FE) 325 MG: 325 (65 FE) TAB at 16:34

## 2022-12-23 RX ADMIN — INSULIN HUMAN 4 UNITS: 100 INJECTION, SOLUTION PARENTERAL at 08:51

## 2022-12-23 RX ADMIN — Medication 1000 UNITS: at 06:01

## 2022-12-23 RX ADMIN — OMEPRAZOLE 20 MG: 20 CAPSULE, DELAYED RELEASE ORAL at 05:57

## 2022-12-23 RX ADMIN — LIDOCAINE 1 PATCH: 700 PATCH TOPICAL at 16:34

## 2022-12-23 RX ADMIN — SENNOSIDES AND DOCUSATE SODIUM 2 TABLET: 50; 8.6 TABLET ORAL at 05:56

## 2022-12-23 RX ADMIN — LIDOCAINE 1 PATCH: 700 PATCH TOPICAL at 16:35

## 2022-12-23 RX ADMIN — POTASSIUM CHLORIDE 40 MEQ: 1500 TABLET, EXTENDED RELEASE ORAL at 08:43

## 2022-12-23 ASSESSMENT — ENCOUNTER SYMPTOMS
MYALGIAS: 1
HEARTBURN: 0
FEVER: 0
PALPITATIONS: 0
DEPRESSION: 0
DOUBLE VISION: 0
SPUTUM PRODUCTION: 0
VOMITING: 0
DIZZINESS: 1
HEMOPTYSIS: 0
NAUSEA: 0
NERVOUS/ANXIOUS: 1
BLURRED VISION: 0
ABDOMINAL PAIN: 0

## 2022-12-23 ASSESSMENT — COGNITIVE AND FUNCTIONAL STATUS - GENERAL
PERSONAL GROOMING: A LITTLE
DAILY ACTIVITIY SCORE: 14
SUGGESTED CMS G CODE MODIFIER DAILY ACTIVITY: CK
SUGGESTED CMS G CODE MODIFIER MOBILITY: CN
TURNING FROM BACK TO SIDE WHILE IN FLAT BAD: UNABLE
TOILETING: A LOT
HELP NEEDED FOR BATHING: A LOT
MOBILITY SCORE: 6
CLIMB 3 TO 5 STEPS WITH RAILING: TOTAL
STANDING UP FROM CHAIR USING ARMS: TOTAL
MOVING FROM LYING ON BACK TO SITTING ON SIDE OF FLAT BED: UNABLE
EATING MEALS: A LITTLE
DRESSING REGULAR LOWER BODY CLOTHING: A LOT
WALKING IN HOSPITAL ROOM: TOTAL
DRESSING REGULAR UPPER BODY CLOTHING: A LOT
MOVING TO AND FROM BED TO CHAIR: UNABLE

## 2022-12-23 ASSESSMENT — GAIT ASSESSMENTS: GAIT LEVEL OF ASSIST: UNABLE TO PARTICIPATE

## 2022-12-23 ASSESSMENT — ACTIVITIES OF DAILY LIVING (ADL): TOILETING: INDEPENDENT

## 2022-12-23 ASSESSMENT — PAIN DESCRIPTION - PAIN TYPE
TYPE: ACUTE PAIN
TYPE: ACUTE PAIN

## 2022-12-23 NOTE — PROGRESS NOTES
1 XL off the shelf TLSO delivered and fitted to Pt at Pt bedside. Pt tolerated fit well. Only has to wear when out of bed so we removed it as well, please contact traction techs with any questions.

## 2022-12-23 NOTE — ED NOTES
Report received from Gisel RN and Paulina RN. Assumed care of patient. Pt assessed, AAO x 4 . Patient's concerns addressed. Patient aware of POC. Fall precautions in place.  Pt repositioned and comfortable.  Call light within reach. This RN masked and in appropriate PPE during encounter.

## 2022-12-23 NOTE — PROGRESS NOTES
[facility] Medicine Daily Progress Note    Date of Service  12/23/2022    Chief Complaint  Prabhakar Sierra is a 72 y.o. male admitted 12/22/2022 with   Chief Complaint   Patient presents with    High Blood Sugar     Generalized body weakness/malaise for 1 week         Hospital Course  This is 72-year-old male with a past medical history significant for dementia, diabetes mellitus, hypertension, hyperlipidemia, neuropathy, GERD, osteoporosis, prostate cancer presented to the ER on 12/22/2022 after he was noted to have frequent falls.    Patient wife stated that he had multiple fall, difficulty ambulating with a walker, needing assistance with ADLs.  CT head, CT T and L-spine were obtained, noted to have acute T12 L2 and L3 compression fracture.  Neurosurgery was consulted, recommended TS LO brace.    Patient has history of prostate cancer, status postradiation treatment.  While obtaining orthostatic vital sign, patient is noted to be positive, will continue IV fluid, will obtain a.m. cortisol    Interval events:  -- Alert, awake, answering questions appropriately.  --Vital signs reviewed, noted to have orthostatic vital sign positive, patient is symptomatic--we will continue IV fluid and rep-eat orthostatic signs.  --Patient history of prostate cancer status postradiation treatment.  We will obtain PT/OT, neurosurgery continue to follow, procedure recommendation.  --Potassium is noted to be low at 3.5, monitor  \    I have discussed this patient's plan of care and discharge plan at IDT rounds today with Case Management, Nursing, Nursing leadership, and other members of the IDT team.    Consultants/Specialty  neurosurgery    Code Status  DNAR/DNI    Disposition  Patient is not medically cleared for discharge.   Anticipate discharge to to skilled nursing facility.  I have placed the appropriate orders for post-discharge needs.    Review of Systems  Review of Systems   Constitutional:  Positive for  malaise/fatigue. Negative for fever.   HENT:  Negative for congestion.    Eyes:  Negative for blurred vision and double vision.   Respiratory:  Negative for hemoptysis and sputum production.    Cardiovascular:  Negative for chest pain and palpitations.   Gastrointestinal:  Negative for abdominal pain, heartburn, nausea and vomiting.   Genitourinary:  Negative for dysuria.   Musculoskeletal:  Positive for myalgias.   Neurological:  Positive for dizziness.   Psychiatric/Behavioral:  Negative for depression. The patient is nervous/anxious.    All other systems reviewed and are negative.     Physical Exam  Temp:  [35.8 °C (96.5 °F)-36.9 °C (98.4 °F)] 36.6 °C (97.9 °F)  Pulse:  [] 83  Resp:  [16-18] 17  BP: ()/(51-86) 93/51  SpO2:  [87 %-100 %] 93 %    Physical Exam  Vitals reviewed.   Constitutional:       Appearance: Normal appearance.   HENT:      Head: Normocephalic and atraumatic.   Eyes:      Extraocular Movements: Extraocular movements intact.      Pupils: Pupils are equal, round, and reactive to light.   Cardiovascular:      Rate and Rhythm: Regular rhythm.   Pulmonary:      Breath sounds: Normal breath sounds. No rales.   Abdominal:      General: Bowel sounds are normal. There is no distension.      Palpations: Abdomen is soft.      Tenderness: There is no abdominal tenderness.   Musculoskeletal:      Cervical back: Neck supple.      Right lower leg: No edema.      Left lower leg: No edema.   Neurological:      Mental Status: He is alert and oriented to person, place, and time.      Cranial Nerves: No cranial nerve deficit.   Psychiatric:         Mood and Affect: Mood normal.       Fluids    Intake/Output Summary (Last 24 hours) at 12/23/2022 1328  Last data filed at 12/23/2022 1109  Gross per 24 hour   Intake 240 ml   Output 1250 ml   Net -1010 ml       Laboratory  Recent Labs     12/22/22  1430 12/23/22  0452   WBC 7.2 4.5*   RBC 4.64* 4.14*   HEMOGLOBIN 13.7* 12.4*   HEMATOCRIT 41.1* 35.9*   MCV  88.6 86.7   MCH 29.5 30.0   MCHC 33.3* 34.5   RDW 44.9 43.9   PLATELETCT 150* 139*   MPV 10.9 10.4     Recent Labs     12/22/22  1430 12/23/22  0452   SODIUM 136 136   POTASSIUM 4.0 3.5*   CHLORIDE 101 103   CO2 17* 17*   GLUCOSE 265* 160*   BUN 22 21   CREATININE 0.86 0.77   CALCIUM 9.2 8.8                   Imaging  CT-TSPINE W/O PLUS RECONS   Final Result      Acute T11 compression fracture with approximately 10% loss of height. No significant retropulsion.      CT-LSPINE W/O PLUS RECONS   Final Result      1.  Acute compression fracture of L2 vertebral body, with up to 20% vertebral body height loss.   2.  Acute compression fracture of L3 vertebral body, with up to 10% vertebral body height loss.   3.  Thoracic spine is reported separately.      CT-HEAD W/O   Final Result      1. No CT evidence of acute infarct, hemorrhage or mass.   2. Moderate global parenchymal atrophy. Chronic small vessel ischemic changes.   3. Unchanged ventricular dilatation, left greater than right. Normal pressure hydrocephalus is possible.      DX-CHEST-PORTABLE (1 VIEW)   Final Result      No acute cardiopulmonary abnormality.              Assessment/Plan  * Compression fracture of body of thoracic vertebra (HCC)- (present on admission)  Assessment & Plan  Acute T11 compression fracture noted on CT  Supportive pain control  PT/OT  Fall precautions  TLSO brace per neurosurgery  Neurosurgery follow-up appreciated    Considering patient frequent fall, there is a concern of NPH, will discuss with neurosurgery as an op    Pancytopenia (HCC)  Assessment & Plan  Noted to have pancytopenia, CBC daily, monitor    Neuropathy  Assessment & Plan  Cymbalta, gabapentin    Age-related physical debility  Assessment & Plan  PT/OT    Uncontrolled type 2 diabetes mellitus with hyperglycemia (HCC)- (present on admission)  Assessment & Plan  ISS    Vitamin D deficiency- (present on admission)  Assessment & Plan  Supplement    ACP (advance care  planning)  Assessment & Plan  Discussed in ED-DNR/DNI per patient's wish    Tobacco use  Assessment & Plan  Cessation advised  Declined nicotine replacement    Mixed hyperlipidemia- (present on admission)  Assessment & Plan  Statin    Compression fracture of L3 lumbar vertebra, closed, initial encounter (HCC)  Assessment & Plan  As above    Compression fracture of L2 lumbar vertebra, closed, initial encounter ([facility])  Assessment & Plan  As above    Fall  Assessment & Plan  Fall precautions    Dementia ([facility])  Assessment & Plan  Frequent reorientation  Minimize sedative    Essential hypertension- (present on admission)  Assessment & Plan  Hold due to orthostatic hypotension    GERD (gastroesophageal reflux disease)- (present on admission)  Assessment & Plan  PPI         VTE prophylaxis: enoxaparin ppx    I have performed a physical exam and reviewed and updated ROS and Plan today (12/23/2022). In review of yesterday's note (12/22/2022), there are no changes except as documented above.

## 2022-12-23 NOTE — ASSESSMENT & PLAN NOTE
Acute T11 compression fracture noted on CT  Neurosurgery has evaluated the patient, recommended to T SLO brace   PT/OT has evaluated the patient, recommend postacute, SNF referral is in place   considering patient frequent fall, there is a concern of NPH, I discussed the case with neurosurgery, neurosurgery stated they would follow this patient as an outpatient.

## 2022-12-23 NOTE — THERAPY
"Occupational Therapy   Initial Evaluation     Patient Name: Prabhakar Sierra  Age:  72 y.o., Sex:  male  Medical Record #: [id number]  Today's Date: 12/23/2022     Precautions  Precautions: (P) Fall Risk, TLSO (Thoracolumbosacral orthosis)  Comments: (P) Don TLSO at EOB    Assessment  Patient is 72 y.o. male who presents to acute w/ high blood sugar and for frequent falls. Pt found to have T11/L2/3 compression fx. TLSO brace recommended, pending hospital course pt may get kyphoplasty. PMH includes dementia, DM, HTN, neuropathy and osteoporosis. At EOB Required max A to don TLSO, pt then c/o dizziness which quickly resolved. Pt stood w/ CGA and FWW, however c/o dizziness unable to get a BP read. Once seated EOB pts BP was 82/62. Pt demo'd impaired balance, functional mobility, and activity tolerance impacting functional independence. WIll continue to follow while in house.     Plan    Occupational Therapy Initial Treatment Plan   Treatment Interventions: (P) Self Care / Activities of Daily Living, Neuro Re-Education / Balance, Therapeutic Activity, Therapeutic Exercises, Adaptive Equipment  Treatment Frequency: (P) 3 Times per Week  Duration: (P) Until Therapy Goals Met    DC Equipment Recommendations: (P) Unable to determine at this time  Discharge Recommendations: (P) Recommend post-acute placement for additional occupational therapy services prior to discharge home     Subjective    \"I have a walker, I just don't like using it\"     Objective       12/23/22 0848   Charge Group   OT Evaluation OT Evaluation Low   Total Time Spent   OT Time Spent Yes   OT Evaluation (Minutes) 30    Services   Is patient using  services for this encounter? No   Initial Contact Note    Initial Contact Note Order Received and Verified, Occupational Therapy Evaluation in Progress with Full Report to Follow.   Prior Living Situation   Prior Services None   Housing / Facility [address] [facility]   Bathroom Set up " "Bathtub / Shower Combination   Equipment Owned Front-Wheel Walker   Lives with - Patient's Self Care Capacity Spouse   Comments reports that spouse assists w/ \"all sorts of things\" and that he is \"running her ragged\" unable to identify how spouse assists him   Prior Level of ADL Function   Self Feeding Independent   Grooming / Hygiene Independent   Bathing Independent   Dressing Independent   Toileting Independent   Precautions   Precautions Fall Risk;TLSO (Thoracolumbosacral orthosis)   Comments Don TLSO at EOB   Vitals   O2 (LPM) 0   O2 Delivery Device None - Room Air   Pain 0 - 10 Group   Therapist Pain Assessment Post Activity Pain Same as Prior to Activity;Nurse Notified  (no c/o pain during session)   Cognition    Cognition / Consciousness X   Level of Consciousness Alert   New Learning Impaired   Comments very pleasent and cooperative, able to identfy s/s of orthostatic hypotension, pmh includes dementia, unclear how much new ed was internalized   Active ROM Upper Body   Active ROM Upper Body  WDL   Coordination Upper Body   Coordination WDL   Balance Assessment   Sitting Balance (Static) Fair -   Sitting Balance (Dynamic) Poor +   Standing Balance (Static) Poor +   Standing Balance (Dynamic) Poor   Weight Shift Sitting Fair   Weight Shift Standing Poor   Comments posterior lean w/ increased fatigue   Bed Mobility    Supine to Sit Moderate Assist   Sit to Supine Moderate Assist   Scooting Moderate Assist   Rolling Minimal Assist to Rt.;Minimum Assist to Lt.   Comments cues to log roll   ADL Assessment   Eating Supervision   Grooming Minimal Assist;Seated   Upper Body Dressing Maximal Assist  (to don TLSO)   Lower Body Dressing Maximal Assist  (socks)   How much help from another person does the patient currently need...   Putting on and taking off regular lower body clothing? 2   Bathing (including washing, rinsing, and drying)? 2   Toileting, which includes using a toilet, bedpan, or urinal? 2   Putting on " and taking off regular upper body clothing? 2   Taking care of personal grooming such as brushing teeth? 3   Eating meals? 3   6 Clicks Daily Activity Score 14   Functional Mobility   Sit to Stand Minimal Assist   Bed, Chair, Wheelchair Transfer Unable to Participate   Mobility stood EOB 2x w/ FWW   Comments deferred further mobility due to + orthostatics   Activity Tolerance   Sitting in Chair NT   Sitting Edge of Bed 10 min total   Standing 1 min total   Comments limited by orthostatic hypotension   Patient / Family Goals   Patient / Family Goal #1 To go home today   Short Term Goals   Short Term Goal # 1 Pt will don TLSO w/ SPV   Short Term Goal # 2 Pt will demo toilet txf w/ SPV   Short Term Goal # 3 Pt will demo standing grooming w/ SPV   Education Group   Role of Occupational Therapist Patient Response Patient;Acceptance;Explanation;Demonstration;Reinforcement Needed   Occupational Therapy Initial Treatment Plan    Treatment Interventions Self Care / Activities of Daily Living;Neuro Re-Education / Balance;Therapeutic Activity;Therapeutic Exercises;Adaptive Equipment   Treatment Frequency 3 Times per Week   Duration Until Therapy Goals Met   Problem List   Problem List Decreased Active Daily Living Skills;Decreased Homemaking Skills;Decreased Functional Mobility;Decreased Activity Tolerance;Safety Awareness Deficits / Cognition;Impaired Postural Control / Balance   Anticipated Discharge Equipment and Recommendations   DC Equipment Recommendations Unable to determine at this time   Discharge Recommendations Recommend post-acute placement for additional occupational therapy services prior to discharge home   Interdisciplinary Plan of Care Collaboration   IDT Collaboration with  Nursing;Physical Therapist   Patient Position at End of Therapy In Bed;Call Light within Reach;Tray Table within Reach;Phone within Reach;Bed Alarm On   Collaboration Comments report given   Session Information   Date / Session Number   12/23, 1 (1/3, 12/29)   Priority 2

## 2022-12-23 NOTE — CONSULTS
Consult called [phone] imaging and consult performed approximately 6 PM  Reason for consultation is ground-level fall with multiple endplate fractures.    Brief HPI this is a 77-year-old gentleman who has been having frequent falls is being evaluated by neurology for potential causes of the falls he has a history of prostate cancer status posttreatment with complete remission and now presents with multiple falls over the last couple days with intractable back pain showing a minimal endplate compression fractures at L2 20% and L3 10% no retropulsion he also has degenerative disc disease at multiple other levels the patient also has endplate fracture at T11 with 1110% height loss.    At this point the patient has intractable back pain is being admitted for conservative treatment with medications.    12 point review of systems negative for any bowel or bladder dysfunction focal deficits radiculopathy.  Past medical history significant for prostate cancer status post radiation treatment  Possible osteoporosis  Past surgical history noncontributory  Past medications noncontributory.    Physical examination is alert orient x3 able to answer question appropriate no signs dysarthria aphasia HEENT is atraumatic normocephalic pulmonary normal breathing cardiovascular is 2+ pulses x4 motor examination is 5-5 strength no focal deficits.    Imaging is thoracic and lumbar CT scans which show endplate fractures at T11 L2 and L3 all of approximately 10 to 20% endplate compression fractures with no middle column injury or retropulsion no sign of instability.        Assessment and plan    At this time we will treat the patient a TLSO brace he can have a off-the-shelf brace with it placed when he is standing or sitting at the side of the bed he does not need to wear it in bed or when he is taking a shower we can treat him conservatively with pain medications at this time we would not endorse moving forward with kyphoplasty as he has not  had a trial of conservative therapy if he is doing well in the TLSO brace we could see him back in 6 weeks in the office with standing films if he is not having progressive fractures we would treat him as an osteoporotic fracture with the conservative therapy and serial imaging.  Additionally he should be seen referred to his PCP for osteoporosis treatment if they are not providing that type of treatment with DEXA scans as well as consultation imaging and laboratory evaluations we refer him over to [facility] practice for osteoporosis treatment.    Total of 50 minutes in direct patient care coordination consultation no further neurosurgical needs this admission he can be seen as a follow-up in 6 weeks in our clinic

## 2022-12-23 NOTE — THERAPY
Physical Therapy   Initial Evaluation     Patient Name: Prabhakar Sierra  Age:  72 y.o., Sex:  male  Medical Record #: [id number]  Today's Date: 12/23/2022     Precautions  Precautions: Fall Risk;TLSO (Thoracolumbosacral orthosis)  Comments: Don TLSO at EOB    Assessment    Patient is 72 y.o. male who was recently admitted for weakness, frequent falls, and found to have acute T11 fracture and L2-L3 compression fractures. Pt is ordered to wear TLSO once EOB. May have plan for kyphoplasty pending clinical progression. Pt was agreeable to therapy evaluation and presented to PT with impaired balance, impaired gait, weakness, pain, and poor upright activity tolerance. Pt was primarily limited by reports of dizziness and lightheadedness and presented with orthostatic hypotension upon sitting/standing. Pt was able to stand for about 1 mins and presented with BP of 82/62 with symptoms. Pt was assisted back to supine and improved in BP back to 117/90. Currently pt requires Min to Mod A for all upright mobility. RN aware of BP concerns. Pt will continue to benefit from skilled PT while in house, with recommendation for post acute therapy at this time prior to d/c home.     Plan    Physical Therapy Initial Treatment Plan   Treatment Plan : (P) Bed Mobility, Equipment, Gait Training, Neuro Re-Education / Balance, Self Care / Home Evaluation, Stair Training, Therapeutic Activities, Therapeutic Exercise  Treatment Frequency: (P) 3 Times per Week  Duration: (P) Until Therapy Goals Met    DC Equipment Recommendations: (P) Unable to determine at this time  Discharge Recommendations: (P) Recommend post-acute placement for additional physical therapy services prior to discharge home     Objective       12/23/22 0836   Initial Contact Note    Initial Contact Note Order Received and Verified, Physical Therapy Evaluation in Progress with Full Report to Follow.   Precautions   Precautions Fall Risk;TLSO (Thoracolumbosacral orthosis)    Comments TLSO on at EOB   Pain 0 - 10 Group   Location Back   Location Orientation Lower   Description Aching   Therapist Pain Assessment Prior to Activity;During Activity;Post Activity;Nurse Notified  (c/o slight pain, however, not formally rated)   Prior Living Situation   Prior Services None   Housing / Facility 2 [address] House   Steps Into Home 0   Steps In Home 14   Rail Right Rail  (Steps in Home)   Bathroom Set up Bathtub / Shower Combination   Equipment Owned Front-Wheel Walker   Lives with - Patient's Self Care Capacity Spouse   Comments pt reports spouse is able to assist with all sorts of things, however, unable to clarify   Prior Level of Functional Mobility   Bed Mobility Independent   Transfer Status Independent   Ambulation Independent   Distance Ambulation (Feet)   (household distances)   Assistive Devices Used None   Stairs Independent   Comments pt reports of an IPLOF, however, has been progressively requiring more assist from spouse and having inc falls   History of Falls   History of Falls Yes   Date of Last Fall   (reports of frequent falls and inc)   Cognition    Cognition / Consciousness X   Level of Consciousness Alert   New Learning Impaired   Comments pleasant/cooperative; becomes lethargic as BP lowers with upright mobility; has a hx of dementia   Passive ROM Upper Body   Passive ROM Upper Body Not Tested   Active ROM Upper Body   Active ROM Upper Body  Not Tested   Strength Upper Body   Upper Body Strength  Not Tested   Sensation Upper Body   Upper Extremity Sensation  Not Tested   Upper Body Muscle Tone   Upper Body Muscle Tone  WDL   Passive ROM Lower Body   Passive ROM Lower Body WDL   Active ROM Lower Body    Active ROM Lower Body  WDL   Strength Lower Body   Lower Body Strength  X   Gross Strength Generalized Weakness, Equal Bilaterally   Sensation Lower Body   Lower Extremity Sensation   WDL   Lower Body Muscle Tone   Lower Body Muscle Tone  WDL   Neurological Concerns    Neurological Concerns No   Coordination Upper Body   Coordination WDL   Coordination Lower Body    Coordination Lower Body  WDL   Balance Assessment   Sitting Balance (Static) Fair -   Sitting Balance (Dynamic) Poor +   Standing Balance (Static) Poor +   Standing Balance (Dynamic) Poor   Weight Shift Sitting Fair   Weight Shift Standing Poor   Comments posterior lean with increased fatigue and lightheadedness upon standing and initial EOB sitting   Bed Mobility    Supine to Sit Moderate Assist   Sit to Supine Moderate Assist   Scooting Moderate Assist   Rolling Minimal Assist to Rt.;Minimum Assist to Lt.   Comments VC for log rolling in bed   Gait Analysis   Gait Level Of Assist Unable to Participate   Weight Bearing Status fwb   Functional Mobility   Sit to Stand Minimal Assist   Bed, Chair, Wheelchair Transfer Unable to Participate   Mobility EOb, stood x2, back to supine   Comments limited in standing duration due to lightheadedness and postive orthostatics   How much difficulty does the patient currently have...   Turning over in bed (including adjusting bedclothes, sheets and blankets)? 1   Sitting down on and standing up from a chair with arms (e.g., wheelchair, bedside commode, etc.) 1   Moving from lying on back to sitting on the side of the bed? 1   How much help from another person does the patient currently need...   Moving to and from a bed to a chair (including a wheelchair)? 1   Need to walk in a hospital room? 1   Climbing 3-5 steps with a railing? 1   6 clicks Mobility Score 6   Activity Tolerance   Sitting in Chair NT   Sitting Edge of Bed 10 mins   Standing 1 mins   Comments limited due to dizziness and lightheadedness; low BP concerns   Edema / Skin Assessment   Edema / Skin  Not Assessed   Patient / Family Goals    Patient / Family Goal #1 to go back home   Short Term Goals    Short Term Goal # 1 pt will go supine<>sit w/hob flat and rails down w/CGA in 6tx   Short Term Goal # 2 pt will go  sit<>stand w/fww w/spv in 6tx   Short Term Goal # 3 pt will transfer bed<>chair w/fww w/Min A in 6tx   Short Term Goal # 4 pt will ambulate for 50ft w/fww w/Min A in 6tx   Education Group   Education Provided Role of Physical Therapist;Brace Wear and Care;Spine Precautions   Spine Precautions Patient Response Patient;Acceptance;Explanation;Demonstration;Verbal Demonstration;Action Demonstration   Role of Physical Therapist Patient Response Patient;Acceptance;Explanation;Demonstration;Verbal Demonstration;Action Demonstration   Brace Wear & Care Patient Response Patient;Acceptance;Explanation;Demonstration;Verbal Demonstration;Action Demonstration   Physical Therapy Initial Treatment Plan    Treatment Plan  Bed Mobility;Equipment;Gait Training;Neuro Re-Education / Balance;Self Care / Home Evaluation;Stair Training;Therapeutic Activities;Therapeutic Exercise   Treatment Frequency 3 Times per Week   Duration Until Therapy Goals Met   Problem List    Problems Pain;Impaired Bed Mobility;Impaired Transfers;Impaired Ambulation;Functional Strength Deficit;Impaired Balance;Decreased Activity Tolerance;Limited Knowledge of Post-Op Precautions   Anticipated Discharge Equipment and Recommendations   DC Equipment Recommendations Unable to determine at this time   Discharge Recommendations Recommend post-acute placement for additional physical therapy services prior to discharge home   Interdisciplinary Plan of Care Collaboration   IDT Collaboration with  Nursing   Patient Position at End of Therapy In Bed;Bed Alarm On;Call Light within Reach;Tray Table within Reach;Phone within Reach   Collaboration Comments aware of visit and recs; RN aware of hypotension   Session Information   Date / Session Number  12/23-1 (1/3, 12/29)

## 2022-12-23 NOTE — ASSESSMENT & PLAN NOTE
Initially held d/t orthostatic hypotension  - switched to flomax  -Started to have higher blood pressures, today SBP 140s-160s however recently had severe orthostatic hypotension so we will continue to monitor  - BP still high today, no pain currently, will likely restart home ACE inhibitor at lower dose 10mg daily (12/28)  - PRNS

## 2022-12-23 NOTE — DISCHARGE PLANNING
HTH/SCP TCN chart review completed. Collaborated with DIPTI Rojo and OT prior to meeting with the pt. The most current review of medical record, knowledge of pt's PLOF and social support, LACE+ score of 75, 6 clicks scores of 6 mobility were considered.      Pt seen at bedside. Introduced TCN program. Provided education regarding post acute levels of care. Discussed HTH/SCP plan benefits (Meds to Beds, medical uber and [facility] transitional care). Pt verbalizes understanding. Noted per discussion with OT as well as current PT and OT notes, recommendations for post acute placement. Pt reports he is agreeable to post acute placement if needed at time of dc, though is hopeful for dc to home if possible. However given current functional status, 6 clicks, etc, this TCN obtained SNF and IRF choice, faxed to [facility] and alerted [facility]. Note that pt would like [facility] as 1st choice and then consider SNFs if [facility] declines. Pt is NOT medically cleared per chart review. Also noted per review, currently being managed non op in TLSO from neurosurgery.     Given aforementioned, no additional provider consults requested at this time. Will continue to monitor for updates with re: dc planning from therapy team. TCN will continue to follow and collaborate with discharge planning team as additional post acute needs arise. Thank you.     Completed today:  PT/OT with recommendations for post acute placement on 12/23  Choice obtained: IRF, SNF (see above; [facility] is 1st choice)  [facility] referral not sent given plan for placement

## 2022-12-23 NOTE — PROGRESS NOTES
4 Eyes Skin Assessment Completed by CARLITOS Villatoro and CARLITOS Petersen.    Head WDL  Ears WDL  Nose WDL  Mouth WDL  Neck WDL  Breast/Chest WDL  Shoulder Blades WDL  Spine WDL  (R) Arm/Elbow/Hand Redness, Blanching, Bruising, and Abrasion  (L) Arm/Elbow/Hand Redness, Blanching, Bruising, and Abrasion  Abdomen WDL  Groin WDL  Scrotum/Coccyx/Buttocks WDL  (R) Leg scattered Abrasion mostly around knees and R hip large area of bruising  (L) Leg scattered Abrasion mostly around knees   (R) Heel/Foot/Toe WDL callous to G toe  (L) Heel/Foot/Toe WDL callouse to G toe          Devices In Places Blood Pressure Cuff and Pulse Ox      Interventions In Place Pillows, Low Air Loss Mattress, and Heels Loaded W/Pillows    Possible Skin Injury No    Pictures Uploaded Into Epic Yes callouses  Wound Consult Placed N/A  RN Wound Prevention Protocol Ordered Yes

## 2022-12-23 NOTE — ED NOTES
Patient states pain had decreased; may have food as per ERP, had a hamburger and water brought by his wife

## 2022-12-23 NOTE — PROGRESS NOTES
Neurosurgery Progress Note    Subjective:  No acute events over night  No radiculopathy  +back pain    Exam:  A/Ox4  BLE 5/5    BP  Min: 124/71  Max: 177/84  Pulse  Av.5  Min: 89  Max: 102  Resp  Av.6  Min: 16  Max: 18  Temp  Av.5 °C (97.7 °F)  Min: 35.8 °C (96.5 °F)  Max: 36.9 °C (98.4 °F)  Monitored Temp 2  Av.7 °C (98.1 °F)  Min: 36.7 °C (98.1 °F)  Max: 36.7 °C (98.1 °F)  SpO2  Av %  Min: 94 %  Max: 100 %    No data recorded    Recent Labs     22  1430 22  0452   WBC 7.2 4.5*   RBC 4.64* 4.14*   HEMOGLOBIN 13.7* 12.4*   HEMATOCRIT 41.1* 35.9*   MCV 88.6 86.7   MCH 29.5 30.0   MCHC 33.3* 34.5   RDW 44.9 43.9   PLATELETCT 150* 139*   MPV 10.9 10.4     Recent Labs     22  1430 22  0452   SODIUM 136 136   POTASSIUM 4.0 3.5*   CHLORIDE 101 103   CO2 17* 17*   GLUCOSE 265* 160*   BUN 22 21   CREATININE 0.86 0.77   CALCIUM 9.2 8.8               Intake/Output                         22 0700 - 22 0659 22 0700 - 22 0659      Total 0-0659 Total                 Intake    P.O.  --  240 240  --  -- --    P.O. -- 240 240 -- -- --    Total Intake -- 240 240 -- -- --       Output    Urine  300  700 1000  --  -- --    Urine Void (mL)  -- -- --    Total Output  -- -- --       Net I/O     -300 -460 -760 -- -- --              Intake/Output Summary (Last 24 hours) at 2022 0912  Last data filed at 2022 0153  Gross per 24 hour   Intake 240 ml   Output 1000 ml   Net -760 ml             magnesium sulfate  2 g Once    lidocaine  1 Patch Q24HRS    lidocaine  1 Patch Q24HRS    Pharmacy Consult Request  1 Each PHARMACY TO DOSE    acetaminophen  650 mg Q6HRS    Followed by    [START ON 2022] acetaminophen  650 mg Q6HRS PRN    celecoxib  200 mg BID    Followed by    [START ON 2022] celecoxib  200 mg BID PRN    oxyCODONE immediate-release  5 mg Q3HRS PRN    Or    oxyCODONE immediate-release  10  mg Q3HRS PRN    Or    morphine injection  4 mg Q3HRS PRN    senna-docusate  2 Tablet BID    And    polyethylene glycol/lytes  1 Packet QDAY PRN    And    magnesium hydroxide  30 mL QDAY PRN    And    bisacodyl  10 mg QDAY PRN    labetalol  10 mg Q4HRS PRN    ondansetron  4 mg Q4HRS PRN    ondansetron  4 mg Q4HRS PRN    guaiFENesin dextromethorphan  10 mL Q6HRS PRN    albuterol  2 Puff Q6HRS PRN    aspirin  81 mg DAILY    atorvastatin  80 mg Q EVENING    benazepril  40 mg DAILY    buPROPion SR  150 mg BID    DULoxetine  60 mg BID    ferrous sulfate  325 mg TID WITH MEALS    gabapentin  300 mg DAILY    omeprazole  20 mg DAILY    terazosin  5 mg DAILY    vitamin D3  1,000 Units DAILY    insulin regular  3-14 Units 4X/DAY ACHS    And    dextrose bolus  25 g Q15 MIN PRN    NS   Continuous       Assessment and Plan:  Hospital day #2 T11, L2 and L3 compression fracture  POD #na   Prophylactic anticoagulation: yes         Start date/time: per IM    No radiculopathy and strength intact  Pain better with medications  Has not mobilize yet with TLSO  If tolerating TLSO/mobilize, ok to follow up in the office  Work with PT/OT  No NSAIDs  Signing off, please let us know with any additional questions

## 2022-12-23 NOTE — PROGRESS NOTES
0700 Patient's in bed. Bedside report received from ADIN Villatoro RN at the beginning of the shift.    0747 New order received and acknowledged from Dr Madison (see MAR).    0810 Vika OT and Hernan, PT worked with the patient. Per Therapists patient  was orthostatics and c/o's dizziness.    0850 Patient's sitting up in bed. Denies pain at this time. Educated on the importance/ use of IS at least 10x every hour while awake, able to reach 2500. Fall protocol in effect. Call light within reach. Reminded patient to call for assist. Assessment completed. No distress noted. Plan of care reviewed with the patient. Verbalized understanding.    0945 Patient's in bed. No distress noted. Notified dr Madison that patient was Orthostatics while working with PT and OT. New order received and acknowledged to do orthostatics bp and hr TID.    1145 Patient's in bed. No distress noted.    1340 Patient's in bed. No distress noted.     1515 Dr Madison visited. POC discussed with the patient and spouse.    1745 Patient's sitting up in bed, having Dinner. No distress noted.    1900   Patient's in bed. No changes in status. Bedside report given to Pasquale OAKLEY RN (Audra).

## 2022-12-23 NOTE — ED NOTES
Phone report given to Shauna URBINA of T[facility]. BGL = 173 mg/dL, Humulin R 3 units not given, patient was sleeping and could not eat after. Patient care transferred. AOx4, on room air. Transported via gurney with transport staff.

## 2022-12-23 NOTE — CARE PLAN
The patient is Stable - Low risk of patient condition declining or worsening    Shift Goals  Clinical Goals: pain management  Patient Goals: comfort, sleep  Family Goals: NA    Progress made toward(s) clinical / shift goals:    Problem: Pain - Standard  Goal: Alleviation of pain or a reduction in pain to the patient’s comfort goal  Outcome: Progressing     Problem: Fall Risk  Goal: Patient will remain free from falls  Outcome: Progressing       Patient is not progressing towards the following goals:

## 2022-12-23 NOTE — ASSESSMENT & PLAN NOTE
Continue ISS, progression protocol, Accu-Cheks ACH S.  Hemoglobin A1c of 8.4    Uncontrolled currently, sugars in the 200s  Since medically clear and awaiting discharge planning will restart home oral diabetes medications:  metformin 1000 mg twice daily, glipizide 10 mg daily (unable to start pioglitazone per pharmacy)    Sugars still in 200s, added po meds 12/27  CTM, if worsens may need to add lantus

## 2022-12-23 NOTE — H&P
Hospital Medicine History & Physical Note    Date of Service  12/22/2022    Primary Care Physician  Stanton Miller M.D.    Consultants  Neurosurgery (Dr. Guzman)    Code Status  DNAR/DNI    Chief Complaint  Chief Complaint   Patient presents with    High Blood Sugar     Generalized body weakness/malaise for 1 week       History of Presenting Illness  Prabhakar Sierra is a 72 y.o. male with history of dementia, diabetes, hypertension, hyperlipidemia, neuropathy, GERD, osteoporosis who presented 12/22/2022 with evaluation for frequent falls.  History obtained from patient's wife was at bedside in ER as patient is poor historian.  Per wife, patient had multiple falls recently, difficulty ambulating with walker, needing assistance with ADLs.  He has home health/home PT, wife feels that this is insufficient in providing care for patient.  Due to unrelenting pain and frequent falls, patient was brought to ER by spouse for evaluation.  No acute etiology seen on CT head.  CT T and L-spine however noted acute T11/L2/L3 compression fracture.  Neurosurgery was consulted, recommended TLSO brace, may consider kyphoplasty pending clinical course.  Admitted to medicine service.    I discussed the plan of care with patient, family, and bedside RN.    Review of Systems  Review of Systems   Constitutional:  Negative for chills and fever.   HENT:  Negative for hearing loss and tinnitus.    Eyes:  Negative for blurred vision and double vision.   Respiratory:  Negative for cough and shortness of breath.    Cardiovascular:  Negative for chest pain and palpitations.   Gastrointestinal:  Negative for abdominal pain, heartburn, nausea and vomiting.   Genitourinary:  Negative for dysuria and flank pain.   Musculoskeletal:  Positive for back pain, falls, joint pain and myalgias. Negative for neck pain.   Skin:  Negative for itching and rash.   Neurological:  Negative for dizziness and headaches.   Endo/Heme/Allergies:  Negative for  environmental allergies. Does not bruise/bleed easily.   Psychiatric/Behavioral:  Negative for depression and substance abuse.    All other systems reviewed and are negative.    Past Medical History   has a past medical history of BPH (benign prostatic hyperplasia), GERD (gastroesophageal reflux disease), Hypertension, Hyponatremia (5/18/2017), Iron deficiency anemia secondary to inadequate dietary iron intake (5/26/2021), Mild cognitive impairment (7/26/2022), Neuropathic pain, leg, Right hip pain (11/14/2016), and Type II or unspecified type diabetes mellitus without mention of complication, not stated as uncontrolled.    Surgical History   has no past surgical history on file.     Family History  family history includes Cancer in his brother; Diabetes in his father; Heart Disease in his brother and mother; No Known Problems in his daughter and daughter; Other in his sister.   Family history reviewed with patient. There is no family history that is pertinent to the chief complaint.     Social History   reports that he has been smoking cigarettes. He has a 7.50 pack-year smoking history. He has never used smokeless tobacco. He reports that he does not currently use alcohol after a past usage of about 4.8 - 6.0 oz per week. He reports that he does not use drugs.    Allergies  No Known Allergies    Medications  Prior to Admission Medications   Prescriptions Last Dose Informant Patient Reported? Taking?   Blood Glucose Monitoring Suppl Device SUPPLIES at SUPPLIES  No No   Sig: Meter: Dispense Device of Insurance Preference. Sig. Use as directed for blood sugar monitoring. #1. NR.   Blood Glucose Monitoring Suppl Device SUPPLIES at SUPPLIES  No No   Sig: Freestyle carlo glucose monitoring device with all necessary accessories including patch and transmitter  for type II noninsulin treated diabetes.  To check blood sugar once or twice daily and per any symptoms of high or low blood sugar.   Blood Glucose Test  "Strips SUPPLIES at SUPPLIES  No No   Sig: Test strips for meter dispensed, testing one time per day E11.65   DULoxetine (CYMBALTA) 60 MG Cap DR Particles delayed-release capsule \"FEW DAYS AGO\" at UNK  No No   Sig: Take 1 Capsule by mouth 2 times a day.   Empagliflozin (JARDIANCE) 25 MG Tab \"FEW DAYS AGO\" at UNK  No No   Sig: Take 1 Each by mouth every day.   Patient taking differently: Take 25 mg by mouth every day.   Lancets SUPPLIES at SUPPLIES  No No   Sig: Lancets for meter dispensed, to test one time per day E11.65   Multiple Vitamins-Minerals (HM COMPLETE 50+ MENS ULTIMATE PO) \"FEW DAYS AGO\" at UNK Patient Yes No   Sig: Take 1 Tablet by mouth every day.   albuterol 108 (90 Base) MCG/ACT Aero Soln inhalation aerosol PRN at PRN  No No   Sig: Inhale 2 Puffs every 6 hours as needed for Shortness of Breath.   aspirin (ASA) 81 MG Chew Tab chewable tablet \"FEW DAYS AGO\" at UNK Patient Yes No   Sig: Chew 1 Tablet every day.   atorvastatin (LIPITOR) 80 MG tablet \"FEW DAYS AGO\" at PM  No No   Sig: Take 1 Tablet by mouth every evening.   benazepril (LOTENSIN) 40 MG tablet \"FEW DAYS AGO\" at UNK  No No   Sig: Take 1 Tablet by mouth every day.   buPROPion SR (WELLBUTRIN-SR) 150 MG TABLET SR 12 HR sustained-release tablet \"FEW DAYS AGO\" at UNK  No No   Sig: Take 1 Tablet by mouth 2 times a day.   diphenhydrAMINE-APAP, sleep, (ACETAMINOPHEN PM)  MG Tab 12/21/2022 at PM  Yes Yes   Sig: Take 1-2 Tablets by mouth at bedtime as needed.   ferrous sulfate 325 (65 Fe) MG EC tablet \"FEW DAYS AGO\" at UNK  No No   Sig: Take 1 Tab by mouth 3 times a day, with meals.   gabapentin (NEURONTIN) 300 MG Cap \"FEW DAYS AGO\" at UNK  No No   Sig: Take 1 Capsule by mouth every day.   glipiZIDE SR (GLIPIZIDE XL) 10 MG TABLET SR 24 HR \"FEW DAYS AGO\" at UNK  No No   Sig: Take 1 Tablet by mouth every day.   metformin (GLUCOPHAGE) 1000 MG tablet \"FEW DAYS AGO\" at UNK  No No   Sig: Take 1 Tablet by mouth 2 times a day with meals.   naltrexone " "(DEPADE) 50 MG Tab \"FEW DAYS AGO\" at UNK  No No   Sig: Take 1 Tablet by mouth every day.   omeprazole (PRILOSEC) 20 MG delayed-release capsule \"FEW DAYS AGO\" at UNK  No No   Sig: TAKE ONE CAPSULE BY MOUTH DAILY (PRILOSEC)   Patient taking differently: Take 20 mg by mouth every day. TAKE ONE CAPSULE BY MOUTH DAILY (PRILOSEC)   pioglitazone (ACTOS) 30 MG Tab \"FEW DAYS AGO\" at UNK  No No   Sig: Take 1 Tablet by mouth every day.   sildenafil citrate (VIAGRA) 100 MG tablet PRN at PRN  Yes No   Sig: Take 100 mg by mouth as needed.   terazosin (HYTRIN) 5 MG Cap \"FEW DAYS AGO\" at UNK  No No   Sig: Take 1 Capsule by mouth every day.   vitamin D (CHOLECALCIFEROL) 1000 UNIT Tab \"FEW DAYS AGO\" at UNK Patient Yes No   Sig: Take 1 Tablet by mouth every day.      Facility-Administered Medications: None       Physical Exam  Temp:  [35.8 °C (96.5 °F)-36.7 °C (98.1 °F)] 36.7 °C (98.1 °F)  Pulse:  [] 92  Resp:  [16-18] 18  BP: (139-177)/(75-86) 143/77  SpO2:  [94 %-100 %] 94 %  Blood Pressure : (!) 167/86   Temperature: 36.3 °C (97.3 °F)   Pulse: 89   Respiration: 16   Pulse Oximetry: 97 %       Physical Exam  Vitals and nursing note reviewed.   Constitutional:       General: He is not in acute distress.  HENT:      Head: Normocephalic and atraumatic.      Nose: Nose normal.      Mouth/Throat:      Mouth: Mucous membranes are moist.      Pharynx: Oropharynx is clear.   Eyes:      General: No scleral icterus.     Extraocular Movements: Extraocular movements intact.   Cardiovascular:      Rate and Rhythm: Normal rate and regular rhythm.      Pulses: Normal pulses.      Heart sounds:     No friction rub.   Pulmonary:      Effort: No respiratory distress.      Breath sounds: No stridor. No wheezing or rales.   Abdominal:      General: There is no distension.      Tenderness: There is no abdominal tenderness. There is no guarding or rebound.   Musculoskeletal:      Cervical back: Neck supple. No tenderness.      Comments: Minimal " tenderness in thoracolumbar region  Positive straight leg raise test (L>R)   Skin:     General: Skin is warm and dry.      Capillary Refill: Capillary refill takes less than 2 seconds.   Neurological:      General: No focal deficit present.      Mental Status: He is alert and oriented to person, place, and time.   Psychiatric:         Mood and Affect: Mood normal.       Laboratory:  Recent Labs     12/22/22  1430   WBC 7.2   RBC 4.64*   HEMOGLOBIN 13.7*   HEMATOCRIT 41.1*   MCV 88.6   MCH 29.5   MCHC 33.3*   RDW 44.9   PLATELETCT 150*   MPV 10.9     Recent Labs     12/22/22  1430   SODIUM 136   POTASSIUM 4.0   CHLORIDE 101   CO2 17*   GLUCOSE 265*   BUN 22   CREATININE 0.86   CALCIUM 9.2     Recent Labs     12/22/22  1430   ALTSGPT 9   ASTSGOT 10*   ALKPHOSPHAT 56   TBILIRUBIN 0.5   GLUCOSE 265*         No results for input(s): NTPROBNP in the last 72 hours.      Recent Labs     12/22/22  1430   TROPONINT 15       Imaging:  CT-TSPINE W/O PLUS RECONS   Final Result      Acute T11 compression fracture with approximately 10% loss of height. No significant retropulsion.      CT-LSPINE W/O PLUS RECONS   Final Result      1.  Acute compression fracture of L2 vertebral body, with up to 20% vertebral body height loss.   2.  Acute compression fracture of L3 vertebral body, with up to 10% vertebral body height loss.   3.  Thoracic spine is reported separately.      CT-HEAD W/O   Final Result      1. No CT evidence of acute infarct, hemorrhage or mass.   2. Moderate global parenchymal atrophy. Chronic small vessel ischemic changes.   3. Unchanged ventricular dilatation, left greater than right. Normal pressure hydrocephalus is possible.      DX-CHEST-PORTABLE (1 VIEW)   Final Result      No acute cardiopulmonary abnormality.             X-Ray:  I have personally reviewed the images and compared with prior images.  EKG:  I have personally reviewed the images and compared with prior images.    Assessment/Plan:  Justification for  Admission Status  I anticipate this patient will require least 2 midnights of hospitalization, therefore appropriate for inpatient status.      * Compression fracture of body of thoracic vertebra (HCC)- (present on admission)  Assessment & Plan  Acute T11 compression fracture noted on CT  Supportive pain control  PT/OT  Fall precautions  TLSO brace per neurosurgery  Neurosurgery follow-up appreciated    Compression fracture of L3 lumbar vertebra, closed, initial encounter ([facility])  Assessment & Plan  As above    Compression fracture of L2 lumbar vertebra, closed, initial encounter ([facility])  Assessment & Plan  As above    Neuropathy  Assessment & Plan  Cymbalta, gabapentin    Fall  Assessment & Plan  Fall precautions    Uncontrolled type 2 diabetes mellitus with hyperglycemia ([facility])- (present on admission)  Assessment & Plan  ISS    Dementia ([facility])  Assessment & Plan  Frequent reorientation  Minimize sedative    Vitamin D deficiency- (present on admission)  Assessment & Plan  Supplement    Mixed hyperlipidemia- (present on admission)  Assessment & Plan  Statin    Essential hypertension- (present on admission)  Assessment & Plan  ACEI, Hydrin    Age-related physical debility  Assessment & Plan  PT/OT    ACP (advance care planning)  Assessment & Plan  Discussed in ED-DNR/DNI per patient's wish    Tobacco use  Assessment & Plan  Cessation advised  Declined nicotine replacement    GERD (gastroesophageal reflux disease)- (present on admission)  Assessment & Plan  PPI        VTE prophylaxis: SCDs/TEDs

## 2022-12-23 NOTE — DISCHARGE PLANNING
Received Choice form at 1309  Agency/Facility Name: [facility] [place]   Referral sent per Choice form @ [id number]

## 2022-12-23 NOTE — DISCHARGE PLANNING
Care Transition Team Assessment    Met with patient at bedside, discussed PLOF. Having frequent falls recently, PET scan scheduled for next week. Wife on speaker phone, she will visit at 1 pm today and would like to discuss plan with MD then. MD notified. PT recommend SNF/IP rehab. CM will continue to monitor.       Information Source  Orientation Level: Oriented X4  Elopement Risk  Legal Hold: No  Elopement Risk: Not at Risk for Elopement    Interdisciplinary Discharge Planning  Lives with - Patient's Self Care Capacity: Spouse  Patient or legal guardian wants to designate a caregiver: No  Housing / Facility: 2 Story House, 1 flight stairs inside.   Able to Return to Previous ADL's: Future Time w/Therapy  Prior Services: None  Durable Medical Equipment:  (fww, cane, grab bars)    Discharge Preparedness  What is your plan after discharge?: Uncertain - pending medical team collaboration  What are your discharge supports?: Spouse  Prior Functional Level: Independent with Medication Management  Difficulity with ADLs: Walking  Difficulty with ADLs Comment: uses FWW  Difficulity with IADLs: Cooking, Laundry, Shopping    Functional Assesment  Prior Functional Level: Independent with Medication Management    Vision / Hearing Impairment  Vision Impairment : Yes  Right Eye Vision: Impaired, Wears Glasses  Left Eye Vision: Impaired, Wears Glasses  Hearing Impairment : No  Domestic Abuse  Have you ever been the victim of abuse or violence?: No  Physical Abuse or Sexual Abuse: No  Verbal Abuse or Emotional Abuse: No  Possible Abuse/Neglect Reported to:: Not Applicable  Anticipated Discharge Information  Discharge Disposition: D/T to SNF with Medicare cert in anticipation of skilled care (03)

## 2022-12-23 NOTE — ED NOTES
Med Rec complete per patient  No oral antibiotics in the last 30 days  Allergies reviewed  Patient reports he hasn't taken any of his medications in 2 or 3 days

## 2022-12-23 NOTE — HOSPITAL COURSE
This is 72-year-old male with a past medical history significant for dementia, diabetes mellitus, hypertension, hyperlipidemia, neuropathy, GERD, osteoporosis, prostate cancer presented to the ER on 12/22/2022 after he was noted to have frequent falls.    Patient wife stated that he had multiple fall, difficulty ambulating with a walker, needing assistance with ADLs.  CT head, CT T and L-spine were obtained, noted to have acute T12 L2 and L3 compression fracture.  Neurosurgery was consulted, recommended [facility] brace.  PT/OT evaluate the patient, recommended postacute.  At this time patient medically stable to be discharged to skilled nursing facility.    Patient has history of prostate cancer, status postradiation treatment. His hospital course was complicated with significant orthostatic hypotension, patient noted to be symptomatic.  He is appropriate medication has been held.  His terazosin has been changed to tamsulosin.  Patient blood pressure noted to be on the higher side at 155 but patient stated that he felt the best today     Interval events:  10/26:   no acute events overnight, vital sign has been stable, patient is sitting in the chair, alert and awake, answering questions appropriately, denies any lightheadedness, dizziness associated with this.  Discussed with the nursing staff to obtain orthostatic vital signs.  --Sodium 133, monitor increase good p.o. intake of fluid.  --PT/OT has evaluated  and recs post-acute;  medically stable to be discharged to skilled nursing

## 2022-12-24 LAB
ALBUMIN SERPL BCP-MCNC: 3.4 G/DL (ref 3.2–4.9)
BACTERIA UR CULT: NORMAL
BUN SERPL-MCNC: 17 MG/DL (ref 8–22)
CALCIUM ALBUM COR SERPL-MCNC: 9 MG/DL (ref 8.5–10.5)
CALCIUM SERPL-MCNC: 8.5 MG/DL (ref 8.5–10.5)
CHLORIDE SERPL-SCNC: 107 MMOL/L (ref 96–112)
CO2 SERPL-SCNC: 16 MMOL/L (ref 20–33)
CORTIS SERPL-MCNC: 14 UG/DL (ref 0–23)
CREAT SERPL-MCNC: 0.65 MG/DL (ref 0.5–1.4)
ERYTHROCYTE [DISTWIDTH] IN BLOOD BY AUTOMATED COUNT: 45.1 FL (ref 35.9–50)
GFR SERPLBLD CREATININE-BSD FMLA CKD-EPI: 99 ML/MIN/1.73 M 2
GLUCOSE BLD STRIP.AUTO-MCNC: 147 MG/DL (ref 65–99)
GLUCOSE BLD STRIP.AUTO-MCNC: 171 MG/DL (ref 65–99)
GLUCOSE BLD STRIP.AUTO-MCNC: 182 MG/DL (ref 65–99)
GLUCOSE BLD STRIP.AUTO-MCNC: 190 MG/DL (ref 65–99)
GLUCOSE BLD STRIP.AUTO-MCNC: 233 MG/DL (ref 65–99)
GLUCOSE SERPL-MCNC: 135 MG/DL (ref 65–99)
HCT VFR BLD AUTO: 33.9 % (ref 42–52)
HGB BLD-MCNC: 11.5 G/DL (ref 14–18)
MCH RBC QN AUTO: 29.9 PG (ref 27–33)
MCHC RBC AUTO-ENTMCNC: 33.9 G/DL (ref 33.7–35.3)
MCV RBC AUTO: 88.1 FL (ref 81.4–97.8)
PHOSPHATE SERPL-MCNC: 2.8 MG/DL (ref 2.5–4.5)
PLATELET # BLD AUTO: 138 K/UL (ref 164–446)
PMV BLD AUTO: 11 FL (ref 9–12.9)
POTASSIUM SERPL-SCNC: 3.9 MMOL/L (ref 3.6–5.5)
RBC # BLD AUTO: 3.85 M/UL (ref 4.7–6.1)
SIGNIFICANT IND 70042: NORMAL
SITE SITE: NORMAL
SODIUM SERPL-SCNC: 136 MMOL/L (ref 135–145)
SOURCE SOURCE: NORMAL
WBC # BLD AUTO: 4.5 K/UL (ref 4.8–10.8)

## 2022-12-24 PROCEDURE — 700101 HCHG RX REV CODE 250: Performed by: STUDENT IN AN ORGANIZED HEALTH CARE EDUCATION/TRAINING PROGRAM

## 2022-12-24 PROCEDURE — 770001 HCHG ROOM/CARE - MED/SURG/GYN PRIV*

## 2022-12-24 PROCEDURE — 85027 COMPLETE CBC AUTOMATED: CPT

## 2022-12-24 PROCEDURE — 82533 TOTAL CORTISOL: CPT

## 2022-12-24 PROCEDURE — 80069 RENAL FUNCTION PANEL: CPT

## 2022-12-24 PROCEDURE — 36415 COLL VENOUS BLD VENIPUNCTURE: CPT

## 2022-12-24 PROCEDURE — A9270 NON-COVERED ITEM OR SERVICE: HCPCS | Performed by: HOSPITALIST

## 2022-12-24 PROCEDURE — 700102 HCHG RX REV CODE 250 W/ 637 OVERRIDE(OP): Performed by: HOSPITALIST

## 2022-12-24 PROCEDURE — 99232 SBSQ HOSP IP/OBS MODERATE 35: CPT | Performed by: HOSPITALIST

## 2022-12-24 PROCEDURE — 700111 HCHG RX REV CODE 636 W/ 250 OVERRIDE (IP): Performed by: HOSPITALIST

## 2022-12-24 PROCEDURE — 700105 HCHG RX REV CODE 258: Performed by: STUDENT IN AN ORGANIZED HEALTH CARE EDUCATION/TRAINING PROGRAM

## 2022-12-24 PROCEDURE — 700102 HCHG RX REV CODE 250 W/ 637 OVERRIDE(OP): Performed by: STUDENT IN AN ORGANIZED HEALTH CARE EDUCATION/TRAINING PROGRAM

## 2022-12-24 PROCEDURE — 82962 GLUCOSE BLOOD TEST: CPT | Mod: 91

## 2022-12-24 PROCEDURE — A9270 NON-COVERED ITEM OR SERVICE: HCPCS | Performed by: STUDENT IN AN ORGANIZED HEALTH CARE EDUCATION/TRAINING PROGRAM

## 2022-12-24 RX ORDER — TAMSULOSIN HYDROCHLORIDE 0.4 MG/1
0.4 CAPSULE ORAL
Status: DISCONTINUED | OUTPATIENT
Start: 2022-12-24 | End: 2022-12-28 | Stop reason: HOSPADM

## 2022-12-24 RX ADMIN — SENNOSIDES AND DOCUSATE SODIUM 2 TABLET: 50; 8.6 TABLET ORAL at 17:00

## 2022-12-24 RX ADMIN — TERAZOSIN 5 MG: 5 CAPSULE ORAL at 06:24

## 2022-12-24 RX ADMIN — INSULIN HUMAN 4 UNITS: 100 INJECTION, SOLUTION PARENTERAL at 21:00

## 2022-12-24 RX ADMIN — BUPROPION HYDROCHLORIDE 150 MG: 150 TABLET, EXTENDED RELEASE ORAL at 18:06

## 2022-12-24 RX ADMIN — LIDOCAINE 1 PATCH: 700 PATCH TOPICAL at 17:01

## 2022-12-24 RX ADMIN — GABAPENTIN 300 MG: 300 CAPSULE ORAL at 06:26

## 2022-12-24 RX ADMIN — DULOXETINE HYDROCHLORIDE 60 MG: 60 CAPSULE, DELAYED RELEASE ORAL at 18:06

## 2022-12-24 RX ADMIN — FERROUS SULFATE TAB 325 MG (65 MG ELEMENTAL FE) 325 MG: 325 (65 FE) TAB at 17:00

## 2022-12-24 RX ADMIN — ACETAMINOPHEN 650 MG: 325 TABLET, FILM COATED ORAL at 06:26

## 2022-12-24 RX ADMIN — SENNOSIDES AND DOCUSATE SODIUM 2 TABLET: 50; 8.6 TABLET ORAL at 06:25

## 2022-12-24 RX ADMIN — POLYETHYLENE GLYCOL 3350 1 PACKET: 17 POWDER, FOR SOLUTION ORAL at 08:12

## 2022-12-24 RX ADMIN — INSULIN HUMAN 3 UNITS: 100 INJECTION, SOLUTION PARENTERAL at 12:41

## 2022-12-24 RX ADMIN — DULOXETINE HYDROCHLORIDE 60 MG: 60 CAPSULE, DELAYED RELEASE ORAL at 06:30

## 2022-12-24 RX ADMIN — OMEPRAZOLE 20 MG: 20 CAPSULE, DELAYED RELEASE ORAL at 06:27

## 2022-12-24 RX ADMIN — ATORVASTATIN CALCIUM 80 MG: 80 TABLET, FILM COATED ORAL at 17:00

## 2022-12-24 RX ADMIN — INSULIN HUMAN 3 UNITS: 100 INJECTION, SOLUTION PARENTERAL at 08:20

## 2022-12-24 RX ADMIN — ENOXAPARIN SODIUM 40 MG: 40 INJECTION SUBCUTANEOUS at 17:02

## 2022-12-24 RX ADMIN — ACETAMINOPHEN 650 MG: 325 TABLET, FILM COATED ORAL at 17:01

## 2022-12-24 RX ADMIN — FERROUS SULFATE TAB 325 MG (65 MG ELEMENTAL FE) 325 MG: 325 (65 FE) TAB at 12:36

## 2022-12-24 RX ADMIN — TAMSULOSIN HYDROCHLORIDE 0.4 MG: 0.4 CAPSULE ORAL at 10:18

## 2022-12-24 RX ADMIN — ACETAMINOPHEN 650 MG: 325 TABLET, FILM COATED ORAL at 12:36

## 2022-12-24 RX ADMIN — ASPIRIN 81 MG 81 MG: 81 TABLET ORAL at 06:26

## 2022-12-24 RX ADMIN — FERROUS SULFATE TAB 325 MG (65 MG ELEMENTAL FE) 325 MG: 325 (65 FE) TAB at 08:11

## 2022-12-24 RX ADMIN — SODIUM CHLORIDE: 9 INJECTION, SOLUTION INTRAVENOUS at 06:34

## 2022-12-24 RX ADMIN — BUPROPION HYDROCHLORIDE 150 MG: 150 TABLET, EXTENDED RELEASE ORAL at 06:30

## 2022-12-24 RX ADMIN — Medication 1000 UNITS: at 06:26

## 2022-12-24 RX ADMIN — ACETAMINOPHEN 650 MG: 325 TABLET, FILM COATED ORAL at 23:36

## 2022-12-24 ASSESSMENT — ENCOUNTER SYMPTOMS
CONSTIPATION: 0
NERVOUS/ANXIOUS: 1
NAUSEA: 0
FEVER: 0
HEMOPTYSIS: 0
BLURRED VISION: 0
VOMITING: 0
SORE THROAT: 0
DEPRESSION: 0
HEARTBURN: 0
SPUTUM PRODUCTION: 0
ABDOMINAL PAIN: 0
DIZZINESS: 0
MYALGIAS: 1
PALPITATIONS: 0
DOUBLE VISION: 0

## 2022-12-24 NOTE — PROGRESS NOTES
[facility] Medicine Daily Progress Note    Date of Service  12/24/2022    Chief Complaint  Prabhakar Sierra is a 72 y.o. male admitted 12/22/2022 with   Chief Complaint   Patient presents with    High Blood Sugar     Generalized body weakness/malaise for 1 week         Hospital Course  This is 72-year-old male with a past medical history significant for dementia, diabetes mellitus, hypertension, hyperlipidemia, neuropathy, GERD, osteoporosis, prostate cancer presented to the ER on 12/22/2022 after he was noted to have frequent falls.    Patient wife stated that he had multiple fall, difficulty ambulating with a walker, needing assistance with ADLs.  CT head, CT T and L-spine were obtained, noted to have acute T12 L2 and L3 compression fracture.  Neurosurgery was consulted, recommended  LO brace.    Patient has history of prostate cancer, status postradiation treatment.  While obtaining orthostatic vital sign, patient is noted to be positive, will continue IV fluid, will obtain a.m. cortisol    Interval events:  -- Alert, awake, answering questions appropriately.  --Vital signs reviewed, noted to have orthostatic vital sign positive, patient is symptomatic--we will continue IV fluid and rep-eat orthostatic signs.  --Patient history of prostate cancer status postradiation treatment.  We will obtain PT/OT, neurosurgery continue to follow, procedure recommendation.  --Potassium is noted to be low at 3.5, monitor    2/24:  -- No acute events overnight, medicine attending stable, patient still have orthostatic vital sign positive but improved and patient is asymptomatic.  --Patient continued to have pancytopenia, monitor CBC on diagnosis.  A.m. cortisol ordered, pending  --We will hold his blood pressure medication  --PT/OT has evaluate the patient recommended postacute, SNF referral has been placed.    Patient blood pressure is better controlled, he is likely to be discharged to skilled nursing facility    I  have discussed this patient's plan of care and discharge plan at IDT rounds today with Case Management, Nursing, Nursing leadership, and other members of the IDT team.    Consultants/Specialty  neurosurgery    Code Status  DNAR/DNI    Disposition  Patient is not medically cleared for discharge.   Anticipate discharge to to skilled nursing facility.  I have placed the appropriate orders for post-discharge needs.    Review of Systems  Review of Systems   Constitutional:  Positive for malaise/fatigue. Negative for fever.   HENT:  Negative for congestion and sore throat.    Eyes:  Negative for blurred vision and double vision.   Respiratory:  Negative for hemoptysis and sputum production.    Cardiovascular:  Negative for chest pain and palpitations.   Gastrointestinal:  Negative for abdominal pain, constipation, heartburn, nausea and vomiting.   Genitourinary:  Negative for dysuria.   Musculoskeletal:  Positive for myalgias.   Neurological:  Negative for dizziness.   Psychiatric/Behavioral:  Negative for depression. The patient is nervous/anxious.    All other systems reviewed and are negative.     Physical Exam  Temp:  [36.1 °C (97 °F)-37.2 °C (99 °F)] 36.1 °C (97 °F)  Pulse:  [62-98] 98  Resp:  [16-18] 16  BP: ()/(51-79) 144/75  SpO2:  [92 %-95 %] 95 %    Physical Exam  Vitals and nursing note reviewed.   Constitutional:       Appearance: Normal appearance.   HENT:      Head: Normocephalic and atraumatic.   Eyes:      Extraocular Movements: Extraocular movements intact.      Pupils: Pupils are equal, round, and reactive to light.   Cardiovascular:      Rate and Rhythm: Regular rhythm.   Pulmonary:      Breath sounds: Normal breath sounds. No rales.   Abdominal:      General: Bowel sounds are normal. There is no distension.      Palpations: Abdomen is soft.      Tenderness: There is no abdominal tenderness.   Musculoskeletal:      Cervical back: Neck supple.      Right lower leg: No edema.      Left lower leg: No  edema.   Neurological:      Mental Status: He is alert and oriented to person, place, and time.      Cranial Nerves: No cranial nerve deficit.   Psychiatric:         Mood and Affect: Mood normal.       Fluids    Intake/Output Summary (Last 24 hours) at 12/24/2022 0840  Last data filed at 12/24/2022 0603  Gross per 24 hour   Intake 2638.93 ml   Output 1075 ml   Net 1563.93 ml         Laboratory  Recent Labs     12/22/22  1430 12/23/22  0452 12/24/22  0351   WBC 7.2 4.5* 4.5*   RBC 4.64* 4.14* 3.85*   HEMOGLOBIN 13.7* 12.4* 11.5*   HEMATOCRIT 41.1* 35.9* 33.9*   MCV 88.6 86.7 88.1   MCH 29.5 30.0 29.9   MCHC 33.3* 34.5 33.9   RDW 44.9 43.9 45.1   PLATELETCT 150* 139* 138*   MPV 10.9 10.4 11.0       Recent Labs     12/22/22  1430 12/23/22  0452 12/24/22  0351   SODIUM 136 136 136   POTASSIUM 4.0 3.5* 3.9   CHLORIDE 101 103 107   CO2 17* 17* 16*   GLUCOSE 265* 160* 135*   BUN 22 21 17   CREATININE 0.86 0.77 0.65   CALCIUM 9.2 8.8 8.5                     Imaging  CT-TSPINE W/O PLUS RECONS   Final Result      Acute T11 compression fracture with approximately 10% loss of height. No significant retropulsion.      CT-LSPINE W/O PLUS RECONS   Final Result      1.  Acute compression fracture of L2 vertebral body, with up to 20% vertebral body height loss.   2.  Acute compression fracture of L3 vertebral body, with up to 10% vertebral body height loss.   3.  Thoracic spine is reported separately.      CT-HEAD W/O   Final Result      1. No CT evidence of acute infarct, hemorrhage or mass.   2. Moderate global parenchymal atrophy. Chronic small vessel ischemic changes.   3. Unchanged ventricular dilatation, left greater than right. Normal pressure hydrocephalus is possible.      DX-CHEST-PORTABLE (1 VIEW)   Final Result      No acute cardiopulmonary abnormality.                Assessment/Plan  * Compression fracture of body of thoracic vertebra (HCC)- (present on admission)  Assessment & Plan  Acute T11 compression fracture noted  on CT  Neurosurgery has evaluated the patient, recommended to T SLO brace   PT/OT has evaluated the patient, recommend postacute, SNF referral is in place   considering patient frequent fall, there is a concern of NPH, I discussed the case with neurosurgery, neurosurgery stated they would follow this patient as an outpatient.    Uncontrolled type 2 diabetes mellitus with hyperglycemia (HCC)- (present on admission)  Assessment & Plan  Continue ISS, progression protocol, Accu-Cheks ACH S.  Hemoglobin A1c of 8.4    Tobacco use  Assessment & Plan  Cessation advised  Declined nicotine replacement    Vitamin D deficiency- (present on admission)  Assessment & Plan  Supplement    Pancytopenia (HCC)  Assessment & Plan  Noted to have pancytopenia, CBC daily, monitor    Neuropathy  Assessment & Plan  Cymbalta, gabapentin    Compression fracture of L3 lumbar vertebra, closed, initial encounter ([facility])  Assessment & Plan  As above    Compression fracture of L2 lumbar vertebra, closed, initial encounter ([facility])  Assessment & Plan  As above    Age-related physical debility  Assessment & Plan  PT/OT    Fall  Assessment & Plan  Fall precautions    Dementia (HCC)  Assessment & Plan  Frequent reorientation  Minimize sedative    ACP (advance care planning)  Assessment & Plan  Discussed in ED-DNR/DNI per patient's wish    Mixed hyperlipidemia- (present on admission)  Assessment & Plan  Statin    Essential hypertension- (present on admission)  Assessment & Plan  We will continue to hold due to orthostatic hypotension   -Patient will feel better with a blood pressure less than-150    GERD (gastroesophageal reflux disease)- (present on admission)  Assessment & Plan  PPI         VTE prophylaxis: enoxaparin ppx    I have performed a physical exam and reviewed and updated ROS and Plan today (12/24/2022). In review of yesterday's note (12/23/2022), there are no changes except as documented above.

## 2022-12-24 NOTE — PROGRESS NOTES
0715 Patient's in bed. Bedside report received from ADIN Zhu RN at the beginning of the shift.    0810 Patient's sitting up in bed. Denies pain at this time. Educated on the importance/ use of IS at least 10x every hour while awake, able to reach 2750. Fall protocol in effect. Call light within reach. Reminded patient to call for assist. Assessment completed. No distress noted. Plan of care reviewed with the patient. Verbalized understanding.    0839 New order received and acknowledged from Dr Madison.    0955 Dr Madison visited. POC discussed with the patient and spouse (over the phone).    1145 Patient's in bed. No distress noted.     1307 New order received and acknowledged from Dr Madison (see MAR).    1535 Patient's in bed. No distress noted. Spouse visiting.    1735 Patient's sitting up in bed, having Dinner. No distress noted.    1900 Patient's in bed. No changes in status. Bedside report given to Onckatherine OAKLEY RN (Shanita).

## 2022-12-24 NOTE — CARE PLAN
The patient is Stable - Low risk of patient condition declining or worsening    Shift Goals  Clinical Goals: safety, skin integrity (q2 hour turns), monitor for Orthostatic  hypotension, BM  Patient Goals: comfort  Family Goals: no family present    Progress made toward(s) clinical / shift goals:      Problem: Pain - Standard  Goal: Alleviation of pain or a reduction in pain to the patient’s comfort goal  Outcome: Progressing  Note: Educated on pain scale. Encouraged to verbalize pain. Will medicate as per MAR.     Problem: Knowledge Deficit - Standard  Goal: Patient and family/care givers will demonstrate understanding of plan of care, disease process/condition, diagnostic tests and medications  Outcome: Progressing  Note: POC discussed with the patient. Questions answered. Verbalized understanding.     Problem: Fall Risk  Goal: Patient will remain free from falls  Outcome: Progressing  Note: Fall protocol in effect. Call light within reach. Reminded patient to call for assist. Hourly rounds in effect. Kept room clutter free. Verbalized understanding.       Patient is not progressing towards the following goals:

## 2022-12-24 NOTE — CARE PLAN
The patient is Stable - Low risk of patient condition declining or worsening    Shift Goals  Clinical Goals: safety, pain control  Patient Goals: pain control, comfort  Family Goals: no family present    Progress made toward(s) clinical / shift goals:    Problem: Pain - Standard  Goal: Alleviation of pain or a reduction in pain to the patient’s comfort goal  Outcome: Progressing    Educated on pain scale. Encouraged to verbalize pain. Will medicate as per MAR.     Problem: Fall Risk  Goal: Patient will remain free from falls  Outcome: Progressing     Fall protocol in effect. Non skid socks in use. Call light within reach. Reminded patient to call for assist. Hourly rounds in effect. Kept room clutter free. Will medicate as per MAR.    Patient is not progressing towards the following goals:

## 2022-12-24 NOTE — DISCHARGE PLANNING
[facility] TCN chart review completed. No new TCN needs identified at this time. Currently anticipate dc to post acute placement once medically cleared. Noted per review, pt awaiting PET scan scheduled for next week as well (?). TCN will continue to follow and collaborate with discharge planning team as additional post acute needs arise. Thank you.    Completed:    Choice obtained: IRF, SNF (see above; [facility] is [facility]st choice)  [facility] referral not sent given plan for placement   PT/OT with recommendations for post acute placement on 12/23

## 2022-12-24 NOTE — CARE PLAN
The patient is Stable - Low risk of patient condition declining or worsening    Shift Goals  Clinical Goals: Q2 hour turns, Monitor for othostatic hypotension  Patient Goals: comfort, sleep  Family Goals: no family present    Progress made toward(s) clinical / shift goals:  Pt assessed for orthostatic hypotension, not found to be present; patient turned and repositioned q2 hours.     Problem: Pain - Standard  Goal: Alleviation of pain or a reduction in pain to the patient’s comfort goal  Outcome: Progressing     Problem: Fall Risk  Goal: Patient will remain free from falls  Outcome: Progressing     Problem: Communication  Goal: The ability to communicate needs accurately and effectively will improve  Outcome: Progressing       Patient is not progressing towards the following goals:

## 2022-12-24 NOTE — PROGRESS NOTES
Bedside report received. Assumed care of patient this PM. Assessment completed      Patient is A&O x 4, pt calls for assistance appropriately; Q4 hour neuro checks  Reports 3 /10 pain, prn medication administered.  Pt is r/a  Mobility 2 person assist to FWW   Bed alarm in use.  Voiding + urinal  Flatus +   Last BM pta on 12/22 reported by patient             Plan of care reviewed with the patient. Bed is locked and in the lowest position. Call light is within reach. Patient encouraged to voice needs and concerns, all needs met at this time. Hourly rounding in place.

## 2022-12-24 NOTE — TELEPHONE ENCOUNTER
Patient's wife call wanted you know  he's in the hospital.She was hoping you could come see him. I explained the hospital Neurology will see him.They have seen him she was hoping you could.

## 2022-12-25 LAB
ANION GAP SERPL CALC-SCNC: 14 MMOL/L (ref 7–16)
BUN SERPL-MCNC: 12 MG/DL (ref 8–22)
CALCIUM SERPL-MCNC: 9 MG/DL (ref 8.5–10.5)
CHLORIDE SERPL-SCNC: 100 MMOL/L (ref 96–112)
CO2 SERPL-SCNC: 16 MMOL/L (ref 20–33)
CREAT SERPL-MCNC: 0.59 MG/DL (ref 0.5–1.4)
GFR SERPLBLD CREATININE-BSD FMLA CKD-EPI: 102 ML/MIN/1.73 M 2
GLUCOSE BLD STRIP.AUTO-MCNC: 187 MG/DL (ref 65–99)
GLUCOSE BLD STRIP.AUTO-MCNC: 207 MG/DL (ref 65–99)
GLUCOSE BLD STRIP.AUTO-MCNC: 253 MG/DL (ref 65–99)
GLUCOSE SERPL-MCNC: 205 MG/DL (ref 65–99)
POTASSIUM SERPL-SCNC: 4.1 MMOL/L (ref 3.6–5.5)
SODIUM SERPL-SCNC: 130 MMOL/L (ref 135–145)

## 2022-12-25 PROCEDURE — 770001 HCHG ROOM/CARE - MED/SURG/GYN PRIV*

## 2022-12-25 PROCEDURE — 80048 BASIC METABOLIC PNL TOTAL CA: CPT

## 2022-12-25 PROCEDURE — 99232 SBSQ HOSP IP/OBS MODERATE 35: CPT | Performed by: HOSPITALIST

## 2022-12-25 PROCEDURE — A9270 NON-COVERED ITEM OR SERVICE: HCPCS | Performed by: STUDENT IN AN ORGANIZED HEALTH CARE EDUCATION/TRAINING PROGRAM

## 2022-12-25 PROCEDURE — A9270 NON-COVERED ITEM OR SERVICE: HCPCS | Performed by: HOSPITALIST

## 2022-12-25 PROCEDURE — 700102 HCHG RX REV CODE 250 W/ 637 OVERRIDE(OP): Performed by: STUDENT IN AN ORGANIZED HEALTH CARE EDUCATION/TRAINING PROGRAM

## 2022-12-25 PROCEDURE — 700101 HCHG RX REV CODE 250: Performed by: STUDENT IN AN ORGANIZED HEALTH CARE EDUCATION/TRAINING PROGRAM

## 2022-12-25 PROCEDURE — 36415 COLL VENOUS BLD VENIPUNCTURE: CPT

## 2022-12-25 PROCEDURE — 700102 HCHG RX REV CODE 250 W/ 637 OVERRIDE(OP): Performed by: HOSPITALIST

## 2022-12-25 PROCEDURE — 82962 GLUCOSE BLOOD TEST: CPT | Mod: 91

## 2022-12-25 PROCEDURE — 700111 HCHG RX REV CODE 636 W/ 250 OVERRIDE (IP): Performed by: HOSPITALIST

## 2022-12-25 RX ADMIN — ACETAMINOPHEN 650 MG: 325 TABLET, FILM COATED ORAL at 05:45

## 2022-12-25 RX ADMIN — ACETAMINOPHEN 650 MG: 325 TABLET, FILM COATED ORAL at 17:26

## 2022-12-25 RX ADMIN — ACETAMINOPHEN 650 MG: 325 TABLET, FILM COATED ORAL at 23:21

## 2022-12-25 RX ADMIN — BUPROPION HYDROCHLORIDE 150 MG: 150 TABLET, EXTENDED RELEASE ORAL at 17:27

## 2022-12-25 RX ADMIN — INSULIN HUMAN 3 UNITS: 100 INJECTION, SOLUTION PARENTERAL at 07:58

## 2022-12-25 RX ADMIN — INSULIN HUMAN 4 UNITS: 100 INJECTION, SOLUTION PARENTERAL at 17:31

## 2022-12-25 RX ADMIN — SENNOSIDES AND DOCUSATE SODIUM 2 TABLET: 50; 8.6 TABLET ORAL at 05:46

## 2022-12-25 RX ADMIN — TAMSULOSIN HYDROCHLORIDE 0.4 MG: 0.4 CAPSULE ORAL at 07:59

## 2022-12-25 RX ADMIN — ATORVASTATIN CALCIUM 80 MG: 80 TABLET, FILM COATED ORAL at 17:26

## 2022-12-25 RX ADMIN — DULOXETINE HYDROCHLORIDE 60 MG: 60 CAPSULE, DELAYED RELEASE ORAL at 05:46

## 2022-12-25 RX ADMIN — ENOXAPARIN SODIUM 40 MG: 40 INJECTION SUBCUTANEOUS at 17:26

## 2022-12-25 RX ADMIN — ACETAMINOPHEN 650 MG: 325 TABLET, FILM COATED ORAL at 11:43

## 2022-12-25 RX ADMIN — DULOXETINE HYDROCHLORIDE 60 MG: 60 CAPSULE, DELAYED RELEASE ORAL at 17:27

## 2022-12-25 RX ADMIN — LIDOCAINE 1 PATCH: 700 PATCH TOPICAL at 17:31

## 2022-12-25 RX ADMIN — FERROUS SULFATE TAB 325 MG (65 MG ELEMENTAL FE) 325 MG: 325 (65 FE) TAB at 11:44

## 2022-12-25 RX ADMIN — LIDOCAINE 1 PATCH: 700 PATCH TOPICAL at 17:25

## 2022-12-25 RX ADMIN — FERROUS SULFATE TAB 325 MG (65 MG ELEMENTAL FE) 325 MG: 325 (65 FE) TAB at 17:27

## 2022-12-25 RX ADMIN — Medication 1000 UNITS: at 05:46

## 2022-12-25 RX ADMIN — INSULIN HUMAN 7 UNITS: 100 INJECTION, SOLUTION PARENTERAL at 19:40

## 2022-12-25 RX ADMIN — OMEPRAZOLE 20 MG: 20 CAPSULE, DELAYED RELEASE ORAL at 05:46

## 2022-12-25 RX ADMIN — FERROUS SULFATE TAB 325 MG (65 MG ELEMENTAL FE) 325 MG: 325 (65 FE) TAB at 05:46

## 2022-12-25 RX ADMIN — ASPIRIN 81 MG 81 MG: 81 TABLET ORAL at 05:45

## 2022-12-25 RX ADMIN — BUPROPION HYDROCHLORIDE 150 MG: 150 TABLET, EXTENDED RELEASE ORAL at 05:45

## 2022-12-25 RX ADMIN — GABAPENTIN 300 MG: 300 CAPSULE ORAL at 05:45

## 2022-12-25 ASSESSMENT — ENCOUNTER SYMPTOMS
BLURRED VISION: 0
ABDOMINAL PAIN: 0
VOMITING: 0
PALPITATIONS: 0
DIZZINESS: 0
HEARTBURN: 0
DEPRESSION: 0
FEVER: 0
CONSTIPATION: 0
MYALGIAS: 1
HEMOPTYSIS: 0
NAUSEA: 0
DOUBLE VISION: 0
SORE THROAT: 0
SPUTUM PRODUCTION: 0
NERVOUS/ANXIOUS: 1

## 2022-12-25 ASSESSMENT — PAIN DESCRIPTION - PAIN TYPE
TYPE: ACUTE PAIN
TYPE: ACUTE PAIN

## 2022-12-25 NOTE — PROGRESS NOTES
Received report from CARLITOS Diehl. Assumed care at 0715. This pt is A&O x4 . Patient and RN discussed plan of care, all questions answered. Labs noted. Call light in place, personal belongings available bed in lowest position, 3 bedrails up, bed alarm on, patient educated on importance of calling for assistance, hourly rounding in place. No additional needs at this time. VSS

## 2022-12-25 NOTE — CARE PLAN
Problem: Pain - Standard  Goal: Alleviation of pain or a reduction in pain to the patient’s comfort goal  Outcome: Progressing     Problem: Knowledge Deficit - Standard  Goal: Patient and family/care givers will demonstrate understanding of plan of care, disease process/condition, diagnostic tests and medications  Outcome: Progressing     Problem: Fall Risk  Goal: Patient will remain free from falls  Outcome: Progressing   The patient is Stable - Low risk of patient condition declining or worsening    Shift Goals  Clinical Goals: safety, skin integrity (q2 hour turns), monitor for Orthostatic  hypotension, BM  Patient Goals: comfort  Family Goals: no family present    Progress made toward(s) clinical / shift goals:  continues to be impulsive yet cooperative  pleasant yet forgetful  up with BRP and reminded to use the urinal  and pt was able to use the urinal successfully - continues with urinary frequency for clear yellow urine     Patient is not progressing towards the following goals:

## 2022-12-25 NOTE — PROGRESS NOTES
[facility] Medicine Daily Progress Note    Date of Service  12/25/2022    Chief Complaint  Prabhakar Sierra is a 72 y.o. male admitted 12/22/2022 with   Chief Complaint   Patient presents with    High Blood Sugar     Generalized body weakness/malaise for 1 week         Hospital Course  This is 72-year-old male with a past medical history significant for dementia, diabetes mellitus, hypertension, hyperlipidemia, neuropathy, GERD, osteoporosis, prostate cancer presented to the ER on 12/22/2022 after he was noted to have frequent falls.    Patient wife stated that he had multiple fall, difficulty ambulating with a walker, needing assistance with ADLs.  CT head, CT T and L-spine were obtained, noted to have acute T12 L2 and L3 compression fracture.  Neurosurgery was consulted, recommended  LO brace.    Patient has history of prostate cancer, status postradiation treatment.  While obtaining orthostatic vital sign, patient is noted to be positive, will continue IV fluid, will obtain a.m. cortisol    Interval events:  -- Alert, awake, answering questions appropriately.  --Vital signs reviewed, noted to have orthostatic vital sign positive, patient is symptomatic--we will continue IV fluid and rep-eat orthostatic signs.  --Patient history of prostate cancer status postradiation treatment.  We will obtain PT/OT, neurosurgery continue to follow, procedure recommendation.  --Potassium is noted to be low at 3.5, monitor    12/24:  -- No acute events overnight, medicine attending stable, patient still have orthostatic vital sign positive but improved and patient is asymptomatic.  --Patient continued to have pancytopenia, monitor CBC on diagnosis.  A.m. cortisol ordered, pending  --We will hold his blood pressure medication  --PT/OT has evaluate the patient recommended postacute, SNF referral has been placed.    Patient blood pressure is better controlled, he is likely to be discharged to skilled nursing  facility    12/25:-No acute events overnight, vital signs been stable, orthostatic blood pressure was obtained today.  Patient stated thathe does not feel any dizziness/lightheadedness today.  -- PT/OT has evaluate the patient medical and postacute, patient medically stable to be discharged to skilled nursing facility  -- Neurosurgery continue to follow the patient, work-up of possible NPH will be done as an outpatient.  --- Neurosurgery recommended wearing brace,    I have discussed this patient's plan of care and discharge plan at IDT rounds today with Case Management, Nursing, Nursing leadership, and other members of the IDT team.    Consultants/Specialty  neurosurgery    Code Status  DNAR/DNI    Disposition  Patient is not medically cleared for discharge.   Anticipate discharge to to skilled nursing facility.  I have placed the appropriate orders for post-discharge needs.    Review of Systems  Review of Systems   Constitutional:  Positive for malaise/fatigue. Negative for fever.   HENT:  Negative for congestion and sore throat.    Eyes:  Negative for blurred vision and double vision.   Respiratory:  Negative for hemoptysis and sputum production.    Cardiovascular:  Negative for chest pain and palpitations.   Gastrointestinal:  Negative for abdominal pain, constipation, heartburn, nausea and vomiting.   Genitourinary:  Negative for dysuria.   Musculoskeletal:  Positive for myalgias.   Neurological:  Negative for dizziness.   Psychiatric/Behavioral:  Negative for depression. The patient is nervous/anxious.    All other systems reviewed and are negative.     Physical Exam  Temp:  [36.1 °C (97 °F)-36.7 °C (98 °F)] 36.6 °C (97.9 °F)  Pulse:  [65-98] 90  Resp:  [16-17] 16  BP: (101-132)/(61-82) 122/80  SpO2:  [95 %-97 %] 95 %    Physical Exam  Vitals and nursing note reviewed.   Constitutional:       Appearance: Normal appearance.   HENT:      Head: Normocephalic and atraumatic.   Eyes:      Extraocular Movements:  Extraocular movements intact.      Pupils: Pupils are equal, round, and reactive to light.   Cardiovascular:      Rate and Rhythm: Regular rhythm.   Pulmonary:      Breath sounds: Normal breath sounds. No rales.   Abdominal:      General: Bowel sounds are normal. There is no distension.      Palpations: Abdomen is soft.      Tenderness: There is no abdominal tenderness.   Musculoskeletal:      Cervical back: Neck supple.      Right lower leg: No edema.      Left lower leg: No edema.   Neurological:      Mental Status: He is alert and oriented to person, place, and time.      Cranial Nerves: No cranial nerve deficit.   Psychiatric:         Mood and Affect: Mood normal.       Fluids    Intake/Output Summary (Last 24 hours) at 12/25/2022 0849  Last data filed at 12/25/2022 0400  Gross per 24 hour   Intake 1159.06 ml   Output 2000 ml   Net -840.94 ml         Laboratory  Recent Labs     12/22/22  1430 12/23/22  0452 12/24/22  0351   WBC 7.2 4.5* 4.5*   RBC 4.64* 4.14* 3.85*   HEMOGLOBIN 13.7* 12.4* 11.5*   HEMATOCRIT 41.1* 35.9* 33.9*   MCV 88.6 86.7 88.1   MCH 29.5 30.0 29.9   MCHC 33.3* 34.5 33.9   RDW 44.9 43.9 45.1   PLATELETCT 150* 139* 138*   MPV 10.9 10.4 11.0       Recent Labs     12/22/22  1430 12/23/22  0452 12/24/22  0351   SODIUM 136 136 136   POTASSIUM 4.0 3.5* 3.9   CHLORIDE 101 103 107   CO2 17* 17* 16*   GLUCOSE 265* 160* 135*   BUN 22 21 17   CREATININE 0.86 0.77 0.65   CALCIUM 9.2 8.8 8.5                     Imaging  CT-TSPINE W/O PLUS RECONS   Final Result      Acute T11 compression fracture with approximately 10% loss of height. No significant retropulsion.      CT-LSPINE W/O PLUS RECONS   Final Result      1.  Acute compression fracture of L2 vertebral body, with up to 20% vertebral body height loss.   2.  Acute compression fracture of L3 vertebral body, with up to 10% vertebral body height loss.   3.  Thoracic spine is reported separately.      CT-HEAD W/O   Final Result      1. No CT evidence of  acute infarct, hemorrhage or mass.   2. Moderate global parenchymal atrophy. Chronic small vessel ischemic changes.   3. Unchanged ventricular dilatation, left greater than right. Normal pressure hydrocephalus is possible.      DX-CHEST-PORTABLE (1 VIEW)   Final Result      No acute cardiopulmonary abnormality.                Assessment/Plan  * Compression fracture of body of thoracic vertebra (HCC)- (present on admission)  Assessment & Plan  Acute T11 compression fracture noted on CT  Neurosurgery has evaluated the patient, recommended to T SLO brace   PT/OT has evaluated the patient, recommend postacute, SNF referral is in place   considering patient frequent fall, there is a concern of NPH, I discussed the case with neurosurgery, neurosurgery stated they would follow this patient as an outpatient.    Uncontrolled type 2 diabetes mellitus with hyperglycemia (HCC)- (present on admission)  Assessment & Plan  Continue ISS, progression protocol, Accu-Cheks ACH S.  Hemoglobin A1c of 8.4    Tobacco use  Assessment & Plan  Cessation advised  Declined nicotine replacement    Orthostatic hypotension  Assessment & Plan  Patient is noted to positive for orthostatic hypotension, stopped his antihypertensive medication.  Change terazosin to Flomax, will obtain daily orthostatic vital signs, monitor    Vitamin D deficiency- (present on admission)  Assessment & Plan  Supplement    Pancytopenia (HCC)  Assessment & Plan  Noted to have pancytopenia, CBC daily, monitor    Neuropathy  Assessment & Plan  Cymbalta, gabapentin    Compression fracture of L3 lumbar vertebra, closed, initial encounter (HCC)  Assessment & Plan  As above    Compression fracture of L2 lumbar vertebra, closed, initial encounter (HCC)  Assessment & Plan  As above    Age-related physical debility  Assessment & Plan  PT/OT    Fall  Assessment & Plan  Fall precautions    Dementia (HCC)  Assessment & Plan  Frequent reorientation  Minimize sedative    ACP (advance  care planning)  Assessment & Plan  Discussed in ED-DNR/DNI per patient's wish    Mixed hyperlipidemia- (present on admission)  Assessment & Plan  Statin    Essential hypertension- (present on admission)  Assessment & Plan  We will continue to hold due to orthostatic hypotension   -Patient will feel better with a blood pressure less than-150    GERD (gastroesophageal reflux disease)- (present on admission)  Assessment & Plan  PPI         VTE prophylaxis: enoxaparin ppx    I have performed a physical exam and reviewed and updated ROS and Plan today (12/25/2022). In review of yesterday's note (12/24/2022), there are no changes except as documented above.

## 2022-12-25 NOTE — ASSESSMENT & PLAN NOTE
Patient is noted to positive for orthostatic hypotension, stopped his antihypertensive medication.    --Change terazosin to Flomax, will obtain daily orthostatic vital signs, monitor  12/26: In improvement, patient stated that he feels better

## 2022-12-26 LAB
ANION GAP SERPL CALC-SCNC: 16 MMOL/L (ref 7–16)
BUN SERPL-MCNC: 16 MG/DL (ref 8–22)
CALCIUM SERPL-MCNC: 9.2 MG/DL (ref 8.5–10.5)
CHLORIDE SERPL-SCNC: 99 MMOL/L (ref 96–112)
CO2 SERPL-SCNC: 18 MMOL/L (ref 20–33)
CREAT SERPL-MCNC: 0.64 MG/DL (ref 0.5–1.4)
GFR SERPLBLD CREATININE-BSD FMLA CKD-EPI: 100 ML/MIN/1.73 M 2
GLUCOSE BLD STRIP.AUTO-MCNC: 184 MG/DL (ref 65–99)
GLUCOSE BLD STRIP.AUTO-MCNC: 207 MG/DL (ref 65–99)
GLUCOSE BLD STRIP.AUTO-MCNC: 228 MG/DL (ref 65–99)
GLUCOSE BLD STRIP.AUTO-MCNC: 235 MG/DL (ref 65–99)
GLUCOSE SERPL-MCNC: 192 MG/DL (ref 65–99)
POTASSIUM SERPL-SCNC: 4 MMOL/L (ref 3.6–5.5)
SODIUM SERPL-SCNC: 133 MMOL/L (ref 135–145)

## 2022-12-26 PROCEDURE — 97530 THERAPEUTIC ACTIVITIES: CPT

## 2022-12-26 PROCEDURE — 97535 SELF CARE MNGMENT TRAINING: CPT

## 2022-12-26 PROCEDURE — 700102 HCHG RX REV CODE 250 W/ 637 OVERRIDE(OP): Performed by: HOSPITALIST

## 2022-12-26 PROCEDURE — A9270 NON-COVERED ITEM OR SERVICE: HCPCS | Performed by: STUDENT IN AN ORGANIZED HEALTH CARE EDUCATION/TRAINING PROGRAM

## 2022-12-26 PROCEDURE — 80048 BASIC METABOLIC PNL TOTAL CA: CPT

## 2022-12-26 PROCEDURE — 700111 HCHG RX REV CODE 636 W/ 250 OVERRIDE (IP): Performed by: HOSPITALIST

## 2022-12-26 PROCEDURE — 82962 GLUCOSE BLOOD TEST: CPT

## 2022-12-26 PROCEDURE — 97116 GAIT TRAINING THERAPY: CPT

## 2022-12-26 PROCEDURE — 700102 HCHG RX REV CODE 250 W/ 637 OVERRIDE(OP): Performed by: STUDENT IN AN ORGANIZED HEALTH CARE EDUCATION/TRAINING PROGRAM

## 2022-12-26 PROCEDURE — A9270 NON-COVERED ITEM OR SERVICE: HCPCS | Performed by: HOSPITALIST

## 2022-12-26 PROCEDURE — 770001 HCHG ROOM/CARE - MED/SURG/GYN PRIV*

## 2022-12-26 PROCEDURE — 36415 COLL VENOUS BLD VENIPUNCTURE: CPT

## 2022-12-26 PROCEDURE — 99232 SBSQ HOSP IP/OBS MODERATE 35: CPT | Performed by: HOSPITALIST

## 2022-12-26 RX ADMIN — INSULIN HUMAN 4 UNITS: 100 INJECTION, SOLUTION PARENTERAL at 12:15

## 2022-12-26 RX ADMIN — ACETAMINOPHEN 650 MG: 325 TABLET, FILM COATED ORAL at 23:16

## 2022-12-26 RX ADMIN — DULOXETINE HYDROCHLORIDE 60 MG: 60 CAPSULE, DELAYED RELEASE ORAL at 17:19

## 2022-12-26 RX ADMIN — TAMSULOSIN HYDROCHLORIDE 0.4 MG: 0.4 CAPSULE ORAL at 08:27

## 2022-12-26 RX ADMIN — FERROUS SULFATE TAB 325 MG (65 MG ELEMENTAL FE) 325 MG: 325 (65 FE) TAB at 17:19

## 2022-12-26 RX ADMIN — ACETAMINOPHEN 650 MG: 325 TABLET, FILM COATED ORAL at 17:18

## 2022-12-26 RX ADMIN — ACETAMINOPHEN 650 MG: 325 TABLET, FILM COATED ORAL at 12:22

## 2022-12-26 RX ADMIN — ASPIRIN 81 MG 81 MG: 81 TABLET ORAL at 05:36

## 2022-12-26 RX ADMIN — ENOXAPARIN SODIUM 40 MG: 40 INJECTION SUBCUTANEOUS at 17:18

## 2022-12-26 RX ADMIN — ACETAMINOPHEN 650 MG: 325 TABLET, FILM COATED ORAL at 05:35

## 2022-12-26 RX ADMIN — INSULIN HUMAN 3 UNITS: 100 INJECTION, SOLUTION PARENTERAL at 17:20

## 2022-12-26 RX ADMIN — BUPROPION HYDROCHLORIDE 150 MG: 150 TABLET, EXTENDED RELEASE ORAL at 05:36

## 2022-12-26 RX ADMIN — GABAPENTIN 300 MG: 300 CAPSULE ORAL at 05:36

## 2022-12-26 RX ADMIN — INSULIN HUMAN 4 UNITS: 100 INJECTION, SOLUTION PARENTERAL at 08:33

## 2022-12-26 RX ADMIN — BUPROPION HYDROCHLORIDE 150 MG: 150 TABLET, EXTENDED RELEASE ORAL at 17:18

## 2022-12-26 RX ADMIN — DULOXETINE HYDROCHLORIDE 60 MG: 60 CAPSULE, DELAYED RELEASE ORAL at 05:36

## 2022-12-26 RX ADMIN — FERROUS SULFATE TAB 325 MG (65 MG ELEMENTAL FE) 325 MG: 325 (65 FE) TAB at 08:27

## 2022-12-26 RX ADMIN — OMEPRAZOLE 20 MG: 20 CAPSULE, DELAYED RELEASE ORAL at 05:36

## 2022-12-26 RX ADMIN — Medication 1000 UNITS: at 05:36

## 2022-12-26 RX ADMIN — ATORVASTATIN CALCIUM 80 MG: 80 TABLET, FILM COATED ORAL at 17:18

## 2022-12-26 RX ADMIN — FERROUS SULFATE TAB 325 MG (65 MG ELEMENTAL FE) 325 MG: 325 (65 FE) TAB at 12:22

## 2022-12-26 RX ADMIN — INSULIN HUMAN 4 UNITS: 100 INJECTION, SOLUTION PARENTERAL at 21:49

## 2022-12-26 ASSESSMENT — GAIT ASSESSMENTS
DEVIATION: DECREASED BASE OF SUPPORT
GAIT LEVEL OF ASSIST: CONTACT GUARD ASSIST
ASSISTIVE DEVICE: FRONT WHEEL WALKER
DISTANCE (FEET): 90

## 2022-12-26 ASSESSMENT — COGNITIVE AND FUNCTIONAL STATUS - GENERAL
SUGGESTED CMS G CODE MODIFIER MOBILITY: CL
MOBILITY SCORE: 12
HELP NEEDED FOR BATHING: A LOT
TURNING FROM BACK TO SIDE WHILE IN FLAT BAD: UNABLE
MOVING FROM LYING ON BACK TO SITTING ON SIDE OF FLAT BED: UNABLE
SUGGESTED CMS G CODE MODIFIER DAILY ACTIVITY: CK
TOILETING: A LITTLE
PERSONAL GROOMING: A LITTLE
DAILY ACTIVITIY SCORE: 17
CLIMB 3 TO 5 STEPS WITH RAILING: A LITTLE
DRESSING REGULAR UPPER BODY CLOTHING: A LITTLE
DRESSING REGULAR LOWER BODY CLOTHING: A LOT
WALKING IN HOSPITAL ROOM: A LITTLE
STANDING UP FROM CHAIR USING ARMS: A LITTLE
MOVING TO AND FROM BED TO CHAIR: UNABLE

## 2022-12-26 ASSESSMENT — ENCOUNTER SYMPTOMS
DEPRESSION: 0
SPUTUM PRODUCTION: 0
HEARTBURN: 0
NERVOUS/ANXIOUS: 1
FEVER: 0
DIZZINESS: 0
SPEECH CHANGE: 0
CONSTIPATION: 0
MYALGIAS: 1
ABDOMINAL PAIN: 0
BLURRED VISION: 0
HEMOPTYSIS: 0
SORE THROAT: 0
VOMITING: 0
PALPITATIONS: 0
NAUSEA: 0

## 2022-12-26 ASSESSMENT — PAIN DESCRIPTION - PAIN TYPE
TYPE: ACUTE PAIN
TYPE: ACUTE PAIN

## 2022-12-26 NOTE — CARE PLAN
The patient is Stable - Low risk of patient condition declining or worsening    Shift Goals  Clinical Goals: Rest  Patient Goals: Rest  Family Goals: no family present      Problem: Pain - Standard  Goal: Alleviation of pain or a reduction in pain to the patient’s comfort goal  Outcome: Progressing     Problem: Knowledge Deficit - Standard  Goal: Patient and family/care givers will demonstrate understanding of plan of care, disease process/condition, diagnostic tests and medications  Outcome: Progressing     Problem: Fall Risk  Goal: Patient will remain free from falls  Outcome: Progressing     Problem: Bowel Elimination  Goal: Establish and maintain regular bowel function  Outcome: Progressing       Progress made toward(s) clinical / shift goals:  Patient updated on the plan of care. All questions answered. Pain assessed. Medicated per MAR. Fall precautions in place. Care ongoing.

## 2022-12-26 NOTE — DISCHARGE PLANNING
[facility] TCN chart review completed. Per MD, patient is not medically cleared for discharge. Currently anticipate dc to post acute placement once medically cleared. Noted per review, pt awaiting PET scan scheduled for next week as well (?).     No new TCN needs identified today. TCN will continue to follow and collaborate with discharge planning team as additional post acute needs arise. Thank you.    Completed:     Choice obtained: IRF, SNF (see above; [facility] is [facility]st choice)  [facility] referral not sent given plan for placement   PT/OT with recommendations for post acute placement on 12/23

## 2022-12-26 NOTE — PROGRESS NOTES
Hospital Medicine Daily Progress Note    Date of Service  12/26/2022    Chief Complaint  Prabhakar Sierra is a 72 y.o. male admitted 12/22/2022 with   Chief Complaint   Patient presents with    High Blood Sugar     Generalized body weakness/malaise for 1 week         Hospital Course  This is 72-year-old male with a past medical history significant for dementia, diabetes mellitus, hypertension, hyperlipidemia, neuropathy, GERD, osteoporosis, prostate cancer presented to the ER on 12/22/2022 after he was noted to have frequent falls.    Patient wife stated that he had multiple fall, difficulty ambulating with a walker, needing assistance with ADLs.  CT head, CT T and L-spine were obtained, noted to have acute T12 L2 and L3 compression fracture.  Neurosurgery was consulted, recommended [facility] brace.  PT/OT evaluate the patient, recommended postacute.  At this time patient medically stable to be discharged to skilled nursing facility.    Patient has history of prostate cancer, status postradiation treatment. His hospital course was complicated with significant orthostatic hypotension, patient noted to be symptomatic.  He is appropriate medication has been held.  His terazosin has been changed to tamsulosin.  Patient blood pressure noted to be on the higher side at 155 but patient stated that he felt the best today     Interval events:  10/26:   no acute events overnight, vital sign has been stable, patient is sitting in the chair, alert and awake, answering questions appropriately, denies any lightheadedness, dizziness associated with this.  Discussed with the nursing staff to obtain orthostatic vital signs.  --Sodium 133, monitor increase good p.o. intake of fluid.  --PT/OT has evaluated  and recs post-acute;  medically stable to be discharged to skilled nursing  I have discussed this patient's plan of care and discharge plan at IDT rounds today with Case Management, Nursing, Nursing leadership, and other  members of the IDT team.    Consultants/Specialty  neurosurgery    Code Status  DNAR/DNI    Disposition  Patient is not medically cleared for discharge.   Anticipate discharge to to skilled nursing facility.  I have placed the appropriate orders for post-discharge needs.    Review of Systems  Review of Systems   Constitutional:  Positive for malaise/fatigue. Negative for fever.   HENT:  Negative for congestion and sore throat.    Eyes:  Negative for blurred vision.   Respiratory:  Negative for hemoptysis and sputum production.    Cardiovascular:  Negative for chest pain and palpitations.   Gastrointestinal:  Negative for abdominal pain, constipation, heartburn, nausea and vomiting.   Genitourinary:  Negative for dysuria.   Musculoskeletal:  Positive for myalgias.   Neurological:  Negative for dizziness and speech change.   Psychiatric/Behavioral:  Negative for depression. The patient is nervous/anxious.    All other systems reviewed and are negative.     Physical Exam  Temp:  [36.6 °C (97.9 °F)-37.1 °C (98.8 °F)] 36.6 °C (97.9 °F)  Pulse:  [89-97] 93  Resp:  [16-17] 17  BP: (126-173)/(84-96) 155/88  SpO2:  [93 %-94 %] 93 %    Physical Exam  Vitals and nursing note reviewed.   Constitutional:       Appearance: Normal appearance.   HENT:      Head: Normocephalic and atraumatic.   Eyes:      Extraocular Movements: Extraocular movements intact.      Pupils: Pupils are equal, round, and reactive to light.   Cardiovascular:      Rate and Rhythm: Regular rhythm.   Pulmonary:      Breath sounds: Normal breath sounds. No rales.   Abdominal:      General: Bowel sounds are normal. There is no distension.      Palpations: Abdomen is soft.      Tenderness: There is no abdominal tenderness.   Musculoskeletal:      Cervical back: Neck supple.      Right lower leg: No edema.      Left lower leg: No edema.   Neurological:      Mental Status: He is alert and oriented to person, place, and time.      Cranial Nerves: No cranial nerve  deficit.   Psychiatric:         Mood and Affect: Mood normal.       Fluids    Intake/Output Summary (Last 24 hours) at 12/26/2022 1308  Last data filed at 12/25/2022 2330  Gross per 24 hour   Intake 240 ml   Output 725 ml   Net -485 ml         Laboratory  Recent Labs     12/24/22  0351   WBC 4.5*   RBC 3.85*   HEMOGLOBIN 11.5*   HEMATOCRIT 33.9*   MCV 88.1   MCH 29.9   MCHC 33.9   RDW 45.1   PLATELETCT 138*   MPV 11.0       Recent Labs     12/24/22  0351 12/25/22  1040 12/26/22  0357   SODIUM 136 130* 133*   POTASSIUM 3.9 4.1 4.0   CHLORIDE 107 100 99   CO2 16* 16* 18*   GLUCOSE 135* 205* 192*   BUN 17 12 16   CREATININE 0.65 0.59 0.64   CALCIUM 8.5 9.0 9.2                     Imaging  CT-TSPINE W/O PLUS RECONS   Final Result      Acute T11 compression fracture with approximately 10% loss of height. No significant retropulsion.      CT-LSPINE W/O PLUS RECONS   Final Result      1.  Acute compression fracture of L2 vertebral body, with up to 20% vertebral body height loss.   2.  Acute compression fracture of L3 vertebral body, with up to 10% vertebral body height loss.   3.  Thoracic spine is reported separately.      CT-HEAD W/O   Final Result      1. No CT evidence of acute infarct, hemorrhage or mass.   2. Moderate global parenchymal atrophy. Chronic small vessel ischemic changes.   3. Unchanged ventricular dilatation, left greater than right. Normal pressure hydrocephalus is possible.      DX-CHEST-PORTABLE (1 VIEW)   Final Result      No acute cardiopulmonary abnormality.                Assessment/Plan  * Compression fracture of body of thoracic vertebra (HCC)- (present on admission)  Assessment & Plan  Acute T11 compression fracture noted on CT  Neurosurgery has evaluated the patient, recommended to T SLO brace   PT/OT has evaluated the patient, recommend postacute, SNF referral is in place   considering patient frequent fall, there is a concern of NPH, I discussed the case with neurosurgery, neurosurgery  stated they would follow this patient as an outpatient.    Uncontrolled type 2 diabetes mellitus with hyperglycemia (HCC)- (present on admission)  Assessment & Plan  Continue ISS, progression protocol, Accu-Cheks ACH S.  Hemoglobin A1c of 8.4    Orthostatic hypotension  Assessment & Plan  Patient is noted to positive for orthostatic hypotension, stopped his antihypertensive medication.    --Change terazosin to Flomax, will obtain daily orthostatic vital signs, monitor  12/26: In improvement, patient stated that he feels better    Tobacco use  Assessment & Plan  Cessation advised  Declined nicotine replacement    Essential hypertension- (present on admission)  Assessment & Plan  We will continue to hold due to orthostatic hypotension   -Patient will feel better with a blood pressure less than-150    Vitamin D deficiency- (present on admission)  Assessment & Plan  Supplement    Pancytopenia (HCC)  Assessment & Plan  Noted to have pancytopenia, CBC daily, monitor    Neuropathy  Assessment & Plan  Cymbalta, gabapentin    Compression fracture of L3 lumbar vertebra, closed, initial encounter ([facility])  Assessment & Plan  As above    Compression fracture of L2 lumbar vertebra, closed, initial encounter ([facility])  Assessment & Plan  As above    Age-related physical debility  Assessment & Plan  PT/OT    Fall  Assessment & Plan  Fall precautions    Dementia ([facility])  Assessment & Plan  Frequent reorientation  Minimize sedative    ACP (advance care planning)  Assessment & Plan  Discussed in ED-DNR/DNI per patient's wish    Mixed hyperlipidemia- (present on admission)  Assessment & Plan  Statin    GERD (gastroesophageal reflux disease)- (present on admission)  Assessment & Plan  PPI         VTE prophylaxis: enoxaparin ppx    I have performed a physical exam and reviewed and updated ROS and Plan today (12/26/2022). In review of yesterday's note (12/25/2022), there are no changes except as documented above.

## 2022-12-27 LAB
GLUCOSE BLD STRIP.AUTO-MCNC: 200 MG/DL (ref 65–99)
GLUCOSE BLD STRIP.AUTO-MCNC: 256 MG/DL (ref 65–99)
GLUCOSE BLD STRIP.AUTO-MCNC: 268 MG/DL (ref 65–99)

## 2022-12-27 PROCEDURE — 99232 SBSQ HOSP IP/OBS MODERATE 35: CPT | Performed by: INTERNAL MEDICINE

## 2022-12-27 PROCEDURE — 97530 THERAPEUTIC ACTIVITIES: CPT

## 2022-12-27 PROCEDURE — A9270 NON-COVERED ITEM OR SERVICE: HCPCS | Performed by: HOSPITALIST

## 2022-12-27 PROCEDURE — 82962 GLUCOSE BLOOD TEST: CPT | Mod: 91

## 2022-12-27 PROCEDURE — 700102 HCHG RX REV CODE 250 W/ 637 OVERRIDE(OP): Performed by: HOSPITALIST

## 2022-12-27 PROCEDURE — 700102 HCHG RX REV CODE 250 W/ 637 OVERRIDE(OP): Performed by: INTERNAL MEDICINE

## 2022-12-27 PROCEDURE — 700102 HCHG RX REV CODE 250 W/ 637 OVERRIDE(OP): Performed by: STUDENT IN AN ORGANIZED HEALTH CARE EDUCATION/TRAINING PROGRAM

## 2022-12-27 PROCEDURE — 700111 HCHG RX REV CODE 636 W/ 250 OVERRIDE (IP): Performed by: HOSPITALIST

## 2022-12-27 PROCEDURE — 700101 HCHG RX REV CODE 250: Performed by: STUDENT IN AN ORGANIZED HEALTH CARE EDUCATION/TRAINING PROGRAM

## 2022-12-27 PROCEDURE — A9270 NON-COVERED ITEM OR SERVICE: HCPCS | Performed by: INTERNAL MEDICINE

## 2022-12-27 PROCEDURE — A9270 NON-COVERED ITEM OR SERVICE: HCPCS | Performed by: STUDENT IN AN ORGANIZED HEALTH CARE EDUCATION/TRAINING PROGRAM

## 2022-12-27 PROCEDURE — 770001 HCHG ROOM/CARE - MED/SURG/GYN PRIV*

## 2022-12-27 PROCEDURE — 97116 GAIT TRAINING THERAPY: CPT

## 2022-12-27 RX ORDER — TERAZOSIN 5 MG/1
5 CAPSULE ORAL DAILY
Status: DISCONTINUED | OUTPATIENT
Start: 2022-12-27 | End: 2022-12-27

## 2022-12-27 RX ORDER — BENAZEPRIL HYDROCHLORIDE 10 MG/1
40 TABLET ORAL DAILY
Status: DISCONTINUED | OUTPATIENT
Start: 2022-12-27 | End: 2022-12-27

## 2022-12-27 RX ORDER — GLIPIZIDE 2.5 MG/1
10 TABLET, EXTENDED RELEASE ORAL
Status: DISCONTINUED | OUTPATIENT
Start: 2022-12-27 | End: 2022-12-28 | Stop reason: HOSPADM

## 2022-12-27 RX ORDER — BENAZEPRIL HYDROCHLORIDE 10 MG/1
20 TABLET ORAL DAILY
Status: DISCONTINUED | OUTPATIENT
Start: 2022-12-27 | End: 2022-12-27

## 2022-12-27 RX ORDER — PIOGLITAZONEHYDROCHLORIDE 30 MG/1
30 TABLET ORAL DAILY
Status: DISCONTINUED | OUTPATIENT
Start: 2022-12-28 | End: 2022-12-28 | Stop reason: HOSPADM

## 2022-12-27 RX ADMIN — INSULIN HUMAN 7 UNITS: 100 INJECTION, SOLUTION PARENTERAL at 20:01

## 2022-12-27 RX ADMIN — ATORVASTATIN CALCIUM 80 MG: 80 TABLET, FILM COATED ORAL at 18:01

## 2022-12-27 RX ADMIN — FERROUS SULFATE TAB 325 MG (65 MG ELEMENTAL FE) 325 MG: 325 (65 FE) TAB at 08:17

## 2022-12-27 RX ADMIN — GABAPENTIN 300 MG: 300 CAPSULE ORAL at 04:58

## 2022-12-27 RX ADMIN — LIDOCAINE 1 PATCH: 700 PATCH TOPICAL at 18:02

## 2022-12-27 RX ADMIN — LIDOCAINE 1 PATCH: 700 PATCH TOPICAL at 18:01

## 2022-12-27 RX ADMIN — INSULIN HUMAN 7 UNITS: 100 INJECTION, SOLUTION PARENTERAL at 12:06

## 2022-12-27 RX ADMIN — GLIPIZIDE 10 MG: 2.5 TABLET, EXTENDED RELEASE ORAL at 18:01

## 2022-12-27 RX ADMIN — ENOXAPARIN SODIUM 40 MG: 40 INJECTION SUBCUTANEOUS at 18:01

## 2022-12-27 RX ADMIN — ACETAMINOPHEN 650 MG: 325 TABLET, FILM COATED ORAL at 12:51

## 2022-12-27 RX ADMIN — SENNOSIDES AND DOCUSATE SODIUM 2 TABLET: 50; 8.6 TABLET ORAL at 04:58

## 2022-12-27 RX ADMIN — Medication 1000 UNITS: at 04:58

## 2022-12-27 RX ADMIN — OMEPRAZOLE 20 MG: 20 CAPSULE, DELAYED RELEASE ORAL at 04:58

## 2022-12-27 RX ADMIN — DULOXETINE HYDROCHLORIDE 60 MG: 60 CAPSULE, DELAYED RELEASE ORAL at 08:17

## 2022-12-27 RX ADMIN — DULOXETINE HYDROCHLORIDE 60 MG: 60 CAPSULE, DELAYED RELEASE ORAL at 18:00

## 2022-12-27 RX ADMIN — FERROUS SULFATE TAB 325 MG (65 MG ELEMENTAL FE) 325 MG: 325 (65 FE) TAB at 18:01

## 2022-12-27 RX ADMIN — BUPROPION HYDROCHLORIDE 150 MG: 150 TABLET, EXTENDED RELEASE ORAL at 08:17

## 2022-12-27 RX ADMIN — TAMSULOSIN HYDROCHLORIDE 0.4 MG: 0.4 CAPSULE ORAL at 08:17

## 2022-12-27 RX ADMIN — INSULIN HUMAN 3 UNITS: 100 INJECTION, SOLUTION PARENTERAL at 08:21

## 2022-12-27 RX ADMIN — SENNOSIDES AND DOCUSATE SODIUM 2 TABLET: 50; 8.6 TABLET ORAL at 18:00

## 2022-12-27 RX ADMIN — ACETAMINOPHEN 650 MG: 325 TABLET, FILM COATED ORAL at 04:58

## 2022-12-27 RX ADMIN — INSULIN HUMAN 7 UNITS: 100 INJECTION, SOLUTION PARENTERAL at 18:11

## 2022-12-27 RX ADMIN — BUPROPION HYDROCHLORIDE 150 MG: 150 TABLET, EXTENDED RELEASE ORAL at 18:00

## 2022-12-27 RX ADMIN — METFORMIN HYDROCHLORIDE 1000 MG: 500 TABLET ORAL at 18:00

## 2022-12-27 RX ADMIN — ASPIRIN 81 MG 81 MG: 81 TABLET ORAL at 04:59

## 2022-12-27 RX ADMIN — FERROUS SULFATE TAB 325 MG (65 MG ELEMENTAL FE) 325 MG: 325 (65 FE) TAB at 12:51

## 2022-12-27 ASSESSMENT — COGNITIVE AND FUNCTIONAL STATUS - GENERAL
TURNING FROM BACK TO SIDE WHILE IN FLAT BAD: UNABLE
SUGGESTED CMS G CODE MODIFIER MOBILITY: CL
WALKING IN HOSPITAL ROOM: A LITTLE
MOVING FROM LYING ON BACK TO SITTING ON SIDE OF FLAT BED: UNABLE
MOVING TO AND FROM BED TO CHAIR: UNABLE
MOBILITY SCORE: 12
STANDING UP FROM CHAIR USING ARMS: A LITTLE
CLIMB 3 TO 5 STEPS WITH RAILING: A LITTLE

## 2022-12-27 ASSESSMENT — ENCOUNTER SYMPTOMS
BACK PAIN: 1
FEVER: 0
CONSTIPATION: 0
SPEECH CHANGE: 0
SORE THROAT: 0
HEMOPTYSIS: 0
HEARTBURN: 0
BLURRED VISION: 0
ABDOMINAL PAIN: 0
NAUSEA: 0
DEPRESSION: 0
SPUTUM PRODUCTION: 0
PALPITATIONS: 0
DIZZINESS: 0
VOMITING: 0

## 2022-12-27 ASSESSMENT — PAIN DESCRIPTION - PAIN TYPE
TYPE: ACUTE PAIN

## 2022-12-27 ASSESSMENT — GAIT ASSESSMENTS
DEVIATION: DECREASED BASE OF SUPPORT;DECREASED HEEL STRIKE
GAIT LEVEL OF ASSIST: CONTACT GUARD ASSIST
DISTANCE (FEET): 120
ASSISTIVE DEVICE: FRONT WHEEL WALKER

## 2022-12-27 NOTE — CARE PLAN
Problem: Fall Risk  Goal: Patient will remain free from falls  Outcome: Progressing     Problem: Communication  Goal: The ability to communicate needs accurately and effectively will improve  Outcome: Progressing       The patient is Stable - Low risk of patient condition declining or worsening    Shift Goals  Clinical Goals: Safety  Patient Goals: Comfort  Family Goals: no family present    Progress made toward(s) clinical / shift goals:  Bed locked and in lowest position. Bed alarm on. Safety and fall precautions in place. Hourly rounding. Call light and belongings within reach.        Patient is not progressing towards the following goals:

## 2022-12-27 NOTE — DISCHARGE PLANNING
[facility] TCN chart review completed. Per MD, patient is medically cleared for discharge. He has been accepted by [facility]. Discharge is pending available bed at [facility].    No new TCN needs identified today. TCN will continue to follow and collaborate with discharge planning team as additional post acute needs arise. Thank you.    Completed:     Choice obtained: IRF, SNF (see above; [facility] is [facility]st choice)  [facility] referral not sent given plan for placement   PT/OT with recommendations for post acute placement on 12/23

## 2022-12-27 NOTE — PROGRESS NOTES
Hospital Medicine Daily Progress Note    Date of Service  12/27/2022    Chief Complaint  Prabhakar Sierra is a 72 y.o. male admitted 12/22/2022 with   Chief Complaint   Patient presents with    High Blood Sugar     Generalized body weakness/malaise for 1 week         Hospital Course  This is 72-year-old male with a past medical history significant for dementia, diabetes mellitus, hypertension, hyperlipidemia, neuropathy, GERD, osteoporosis, prostate cancer presented to the ER on 12/22/2022 after he was noted to have frequent falls.    Patient wife stated that he had multiple fall, difficulty ambulating with a walker, needing assistance with ADLs.  CT head, CT T and L-spine were obtained, noted to have acute T12 L2 and L3 compression fracture.  Neurosurgery was consulted, recommended TS LO brace.  CT head without contrast showed unchanged ventricular dilation left greater than right, normal pressure hydrocephalus possible, discussed with neurosurgery who will monitor outpatient.  PT/OT evaluate the patient, recommended postacute.  At this time patient medically stable to be discharged to skilled nursing facility.    Patient has history of prostate cancer, status postradiation treatment. His hospital course was complicated with significant orthostatic hypotension, patient noted to be symptomatic.  He is appropriate medication has been held.  His terazosin has been changed to tamsulosin.  Blood pressure then noted to be slightly elevated however patient reported he was feeling well.    Interval events:  -No acute overnight events  -Blood pressure 140s-170s, although recently had orthostatic hypotension we will continue to hold and monitor  -Sugars in the 200s, will restart home oral diabetic medications  -Awaiting for inpatient physiatry consult    I have discussed this patient's plan of care and discharge plan at IDT rounds today with Case Management, Nursing, Nursing leadership, and other members of the IDT  team.    Consultants/Specialty  neurosurgery    Code Status  DNAR/DNI    Disposition  Patient is medically cleared for discharge.   Anticipate discharge to to skilled nursing facility vs acute rehab  I have placed the appropriate orders for post-discharge needs.    Review of Systems  Review of Systems   Constitutional:  Negative for fever.   HENT:  Negative for congestion and sore throat.    Eyes:  Negative for blurred vision.   Respiratory:  Negative for hemoptysis and sputum production.    Cardiovascular:  Negative for chest pain and palpitations.   Gastrointestinal:  Negative for abdominal pain, constipation, heartburn, nausea and vomiting.   Genitourinary:  Negative for dysuria.   Musculoskeletal:  Positive for back pain.   Neurological:  Negative for dizziness and speech change.   Psychiatric/Behavioral:  Negative for depression.    All other systems reviewed and are negative.     Physical Exam  Temp:  [36.5 °C (97.7 °F)-36.7 °C (98 °F)] 36.7 °C (98 °F)  Pulse:  [] 100  Resp:  [16-18] 17  BP: (131-170)/(72-89) 131/81  SpO2:  [87 %-96 %] 96 %    Physical Exam  Vitals and nursing note reviewed.   Constitutional:       Appearance: Normal appearance.   HENT:      Head: Normocephalic and atraumatic.      Nose: Nose normal.      Mouth/Throat:      Mouth: Mucous membranes are moist.   Eyes:      General: No scleral icterus.     Conjunctiva/sclera: Conjunctivae normal.   Cardiovascular:      Rate and Rhythm: Normal rate and regular rhythm.      Heart sounds: No murmur heard.    No friction rub. No gallop.   Pulmonary:      Effort: Pulmonary effort is normal.      Breath sounds: Normal breath sounds. No rales.   Abdominal:      General: Bowel sounds are normal. There is no distension.      Palpations: Abdomen is soft.      Tenderness: There is no abdominal tenderness.   Musculoskeletal:      Cervical back: Normal range of motion.      Right lower leg: No edema.      Left lower leg: No edema.   Neurological:       Mental Status: He is alert and oriented to person, place, and time.   Psychiatric:         Mood and Affect: Mood normal.         Behavior: Behavior normal.       Fluids    Intake/Output Summary (Last 24 hours) at 12/27/2022 1310  Last data filed at 12/26/2022 2140  Gross per 24 hour   Intake --   Output 500 ml   Net -500 ml         Laboratory        Recent Labs     12/25/22  1040 12/26/22  0357   SODIUM 130* 133*   POTASSIUM 4.1 4.0   CHLORIDE 100 99   CO2 16* 18*   GLUCOSE 205* 192*   BUN 12 16   CREATININE 0.59 0.64   CALCIUM 9.0 9.2                     Imaging  CT-TSPINE W/O PLUS RECONS   Final Result      Acute T11 compression fracture with approximately 10% loss of height. No significant retropulsion.      CT-LSPINE W/O PLUS RECONS   Final Result      1.  Acute compression fracture of L2 vertebral body, with up to 20% vertebral body height loss.   2.  Acute compression fracture of L3 vertebral body, with up to 10% vertebral body height loss.   3.  Thoracic spine is reported separately.      CT-HEAD W/O   Final Result      1. No CT evidence of acute infarct, hemorrhage or mass.   2. Moderate global parenchymal atrophy. Chronic small vessel ischemic changes.   3. Unchanged ventricular dilatation, left greater than right. Normal pressure hydrocephalus is possible.      DX-CHEST-PORTABLE (1 VIEW)   Final Result      No acute cardiopulmonary abnormality.                Assessment/Plan  * Compression fracture of body of thoracic vertebra (HCC)- (present on admission)  Assessment & Plan  Acute T11 compression fracture noted on CT  Neurosurgery has evaluated the patient, recommended to T SLO brace   PT/OT has evaluated the patient, recommend postacute, SNF referral is in place   considering patient frequent fall, there is a concern of NPH, I discussed the case with neurosurgery, neurosurgery stated they would follow this patient as an outpatient.    Pancytopenia (HCC)  Assessment & Plan  Noted to have pancytopenia, CBC  daily, monitor    Neuropathy  Assessment & Plan  Cymbalta, gabapentin    Compression fracture of L3 lumbar vertebra, closed, initial encounter (HCC)  Assessment & Plan  As above    Compression fracture of L2 lumbar vertebra, closed, initial encounter (HCC)  Assessment & Plan  As above    Age-related physical debility  Assessment & Plan  PT/OT    Fall  Assessment & Plan  Fall precautions    Uncontrolled type 2 diabetes mellitus with hyperglycemia ([facility])- (present on admission)  Assessment & Plan  Continue ISS, progression protocol, Accu-Cheks ACH S.  Hemoglobin A1c of 8.4    Uncontrolled currently, sugars in the 200s  Since medically clear and awaiting discharge planning will restart home oral diabetes medications:  metformin 1000 mg twice daily, glipizide 10 mg daily (unable to start pioglitazone per pharmacy)      Dementia ([facility])  Assessment & Plan  Frequent reorientation  Minimize sedative    Orthostatic hypotension  Assessment & Plan  Patient is noted to positive for orthostatic hypotension, stopped his antihypertensive medication.    --Change terazosin to Flomax, will obtain daily orthostatic vital signs, monitor  12/26: In improvement, patient stated that he feels better    Vitamin D deficiency- (present on admission)  Assessment & Plan  Supplement    ACP (advance care planning)  Assessment & Plan  Discussed in ED-DNR/DNI per patient's wish    Tobacco use  Assessment & Plan  Cessation advised  Declined nicotine replacement    Mixed hyperlipidemia- (present on admission)  Assessment & Plan  Statin    Essential hypertension- (present on admission)  Assessment & Plan  We will continue to hold due to orthostatic hypotension  -Started to have higher blood pressures, today SBP 140s-160s however recently had severe orthostatic hypotension so we will continue to monitor, if persist will likely restart home ACE inhibitor at lower dose  - PRNS    GERD (gastroesophageal reflux disease)- (present on admission)  Assessment &  Plan  PPI         VTE prophylaxis: enoxaparin ppx    I have performed a physical exam and reviewed and updated ROS and Plan today (12/27/2022). In review of yesterday's note (12/26/2022), there are no changes except as documented above.

## 2022-12-27 NOTE — DISCHARGE PLANNING
Agency/Facility Name: [facility]   Spoke To: Randa   Outcome: Provided this DPA with authorization number for [facility]: [id number]

## 2022-12-27 NOTE — THERAPY
Physical Therapy   Daily Treatment     Patient Name: Prabhakar Sierra  Age:  72 y.o., Sex:  male  Medical Record #: [id number]  Today's Date: 12/26/2022     Precautions: Fall Risk;TLSO (Thoracolumbosacral orthosis);Spinal / Back Precautions   Comments: TLSO at EOB    Assessment  Pt progressing with PT. Pt completed supine <> sit and STS with min A, was able to ambulate 90ft with FWW, CGA. Pt receptive to edu on donning/doffing TLSO and spine precautions. Encouraged pt to sit in chair for all meals and ambulate daily with nursing to promote incr activity tolerance. Recommend PMR consult to optimize recovery. Will follow.     Plan  Continue Current Treatment Plan  DC Equipment Recommendations: Unable to determine at this time  Discharge Recommendations: Recommend post-acute placement for additional physical therapy services prior to discharge home (could tolerate 3hrs/therapy/day)     12/26/22 1431   Cognition    Level of Consciousness Alert   Comments pleasant and motivated, receptive to edu. requires step-by-step instructions and appears to have difficulty sequencing at times and asks for visual demos   Other Treatments   Other Treatments Provided max A to don/doff TLSO   Balance   Sitting Balance (Static) Fair +   Sitting Balance (Dynamic) Fair   Standing Balance (Static) Fair   Standing Balance (Dynamic) Fair -   Weight Shift Sitting Fair   Weight Shift Standing Fair   Skilled Intervention Verbal Cuing   Comments standing with FWW   Bed Mobility    Supine to Sit Minimal Assist   Sit to Supine Minimal Assist   Scooting Supervised   Skilled Intervention Verbal Cuing;Sequencing   Comments assist for logroll   Gait Analysis   Gait Level Of Assist Contact Guard Assist   Assistive Device Front Wheel Walker   Distance (Feet) 90   Deviation Decreased Base Of Support   Weight Bearing Status no restrictions   Skilled Intervention Verbal Cuing;Postural Facilitation   Comments cues to keep walker closer when turning, no  overt LOB but CGA for some unsteadiness and inconsistent stepping pattern   Functional Mobility   Sit to Stand Minimal Assist   Bed, Chair, Wheelchair Transfer Contact Guard Assist   Skilled Intervention Verbal Cuing;Tactile Cuing;Sequencing   Comments cues for hand placement   Short Term Goals    Short Term Goal # 1 pt will go supine<>sit w/hob flat and rails down w/CGA in 6tx   Goal Outcome # 1 goal not met   Short Term Goal # 2 pt will go sit<>stand w/fww w/spv in 6tx   Goal Outcome # 2 Goal not met   Short Term Goal # 3 pt will transfer bed<>chair w/fww w/Min A in 6tx   Goal Outcome # 3 Goal met, new goal added   Short Term Goal # 3 B pt will perform SPT to chair with SPV in 6 visits for improved independence   Goal Outcome # 3 B added 12/26   Short Term Goal # 4 pt will ambulate for 50ft w/fww w/Min A in 6tx   Goal Outcome # 4 Goal met, new goal added   Short Term Goal # 4 B pt will ambulate 150ft with FWW and SPV in 6 visits for improved independence   Goal Outcome # 4 B added 12/26

## 2022-12-27 NOTE — DISCHARGE PLANNING
Agency/Facility Name: [facility]   Spoke To: Dayo   Outcome: Left a voicemail for [facility] in admissions in regards to bed availability, as pt has been accepted. Waiting for callback.     @[id number]  Agency/Facility Name: [facility]   Spoke To: Dayo   Outcome: Stated that she is currently working on rearranging bed assignment due to new covid positive pts in their facility. No clear answer on when bed will be available but said maybe tomorrow. Dayo stated she will call this DPA back once it is clear when they can take the pt.

## 2022-12-27 NOTE — THERAPY
Occupational Therapy  Daily Treatment     Patient Name: Prabhakar Sierra  Age:  72 y.o., Sex:  male  Medical Record #: [id number]  Today's Date: 12/26/2022     Precautions: Fall Risk, TLSO (Thoracolumbosacral orthosis)  Comments: [facility] TLSO at EOB    Assessment    Pt pleasant and motivated for therapy, and progressing as expected. Today pt able to demo functional txfs SPV, toileting min/mod A, and LB dressing mod/max A. He tolerated mobilizing room/bathroom distances with FWW. Pt is primarily limited by poor retention (2/2 dementia), impaired balance, and weakness. Will continue to follow for skilled OT. Recommend PM&R consult (pt's activity tolerance has improved)    Plan    Occupational Therapy Treatment Plan  O.T. Treatment Plan: (P) Continue Current Treatment Plan    DC Equipment Recommendations: (P) Unable to determine at this time  Discharge Recommendations: (P) Recommend post-acute placement for additional occupational therapy services prior to discharge home (recommend PM&R consult)     12/26/22 0845   Cognition    Cognition / Consciousness X   Comments pleasant and motivated, forgetful   Balance   Sitting Balance (Static) Fair   Sitting Balance (Dynamic) Fair   Standing Balance (Static) Fair   Standing Balance (Dynamic) Fair -   Weight Shift Sitting Fair   Weight Shift Standing Fair   Skilled Intervention Verbal Cuing   Comments with FWW   Bed Mobility    Supine to Sit Minimal Assist   Rolling Minimum Assist to Lt.   Skilled Intervention Verbal Cuing   Activities of Daily Living   Grooming Minimal Assist;Seated   Lower Body Dressing Maximal Assist   Skilled Intervention Verbal Cuing   How much help from another person does the patient currently need...   6 Clicks Daily Activity Score 17   Functional Mobility   Sit to Stand Minimal Assist   Bed, Chair, Wheelchair Transfer Minimal Assist   Toilet Transfers Minimal Assist   Patient / Family Goals   Patient / Family Goal #1 To go home today   Short Term  Goals   Short Term Goal # 1 Pt will don TLSO w/ SPV   Goal Outcome # 1 Progressing as expected   Short Term Goal # 2 Pt will demo toilet txf w/ SPV   Goal Outcome # 2 Progressing as expected   Short Term Goal # 3 Pt will demo standing grooming w/ SPV   Goal Outcome # 3 Progressing as expected   Education Group   Role of Occupational Therapist Patient Response Patient;Acceptance;Explanation;Demonstration;Reinforcement Needed   Occupational Therapy Treatment Plan   O.T. Treatment Plan Continue Current Treatment Plan   Anticipated Discharge Equipment and Recommendations   DC Equipment Recommendations Unable to determine at this time   Discharge Recommendations Recommend post-acute placement for additional occupational therapy services prior to discharge home

## 2022-12-28 ENCOUNTER — HOSPITAL ENCOUNTER (INPATIENT)
Facility: REHABILITATION | Age: 72
LOS: 8 days | DRG: 560 | End: 2023-01-05
Attending: PHYSICAL MEDICINE & REHABILITATION | Admitting: PHYSICAL MEDICINE & REHABILITATION
Payer: MEDICARE

## 2022-12-28 VITALS
HEART RATE: 95 BPM | SYSTOLIC BLOOD PRESSURE: 155 MMHG | OXYGEN SATURATION: 93 % | BODY MASS INDEX: 30.78 KG/M2 | TEMPERATURE: 97.9 F | HEIGHT: 70 IN | DIASTOLIC BLOOD PRESSURE: 85 MMHG | WEIGHT: 215 LBS | RESPIRATION RATE: 17 BRPM

## 2022-12-28 DIAGNOSIS — E78.2 MIXED HYPERLIPIDEMIA: ICD-10-CM

## 2022-12-28 DIAGNOSIS — G62.9 NEUROPATHY: ICD-10-CM

## 2022-12-28 DIAGNOSIS — G47.09 OTHER INSOMNIA: ICD-10-CM

## 2022-12-28 DIAGNOSIS — R00.0 TACHYCARDIA: ICD-10-CM

## 2022-12-28 DIAGNOSIS — R68.2 DRY MOUTH: ICD-10-CM

## 2022-12-28 DIAGNOSIS — K21.9 GASTROESOPHAGEAL REFLUX DISEASE WITHOUT ESOPHAGITIS: ICD-10-CM

## 2022-12-28 DIAGNOSIS — E66.9 OBESITY (BMI 30.0-34.9): ICD-10-CM

## 2022-12-28 DIAGNOSIS — S22.000A THORACIC COMPRESSION FRACTURE, CLOSED, INITIAL ENCOUNTER (HCC): ICD-10-CM

## 2022-12-28 DIAGNOSIS — E11.65 UNCONTROLLED TYPE 2 DIABETES MELLITUS WITH HYPERGLYCEMIA (HCC): ICD-10-CM

## 2022-12-28 DIAGNOSIS — R33.9 URINARY RETENTION: ICD-10-CM

## 2022-12-28 DIAGNOSIS — K59.09 OTHER CONSTIPATION: ICD-10-CM

## 2022-12-28 DIAGNOSIS — F32.1 CURRENT MODERATE EPISODE OF MAJOR DEPRESSIVE DISORDER WITHOUT PRIOR EPISODE (HCC): ICD-10-CM

## 2022-12-28 DIAGNOSIS — I10 ESSENTIAL HYPERTENSION: ICD-10-CM

## 2022-12-28 DIAGNOSIS — F10.11 HISTORY OF ALCOHOL ABUSE: ICD-10-CM

## 2022-12-28 PROBLEM — Z74.09 IMPAIRED MOBILITY AND ADLS: Status: ACTIVE | Noted: 2022-12-28

## 2022-12-28 PROBLEM — Z86.59 HISTORY OF DEPRESSION: Status: ACTIVE | Noted: 2022-12-28

## 2022-12-28 PROBLEM — Z78.9 IMPAIRED MOBILITY AND ADLS: Status: ACTIVE | Noted: 2022-12-28

## 2022-12-28 LAB
ANION GAP SERPL CALC-SCNC: 13 MMOL/L (ref 7–16)
BASOPHILS # BLD AUTO: 0.5 % (ref 0–1.8)
BASOPHILS # BLD: 0.03 K/UL (ref 0–0.12)
BUN SERPL-MCNC: 17 MG/DL (ref 8–22)
CALCIUM SERPL-MCNC: 9.8 MG/DL (ref 8.5–10.5)
CHLORIDE SERPL-SCNC: 99 MMOL/L (ref 96–112)
CO2 SERPL-SCNC: 23 MMOL/L (ref 20–33)
CREAT SERPL-MCNC: 0.71 MG/DL (ref 0.5–1.4)
EOSINOPHIL # BLD AUTO: 0.13 K/UL (ref 0–0.51)
EOSINOPHIL NFR BLD: 2.1 % (ref 0–6.9)
ERYTHROCYTE [DISTWIDTH] IN BLOOD BY AUTOMATED COUNT: 44.7 FL (ref 35.9–50)
FLUAV RNA SPEC QL NAA+PROBE: NEGATIVE
FLUBV RNA SPEC QL NAA+PROBE: NEGATIVE
GFR SERPLBLD CREATININE-BSD FMLA CKD-EPI: 97 ML/MIN/1.73 M 2
GLUCOSE BLD STRIP.AUTO-MCNC: 179 MG/DL (ref 65–99)
GLUCOSE BLD STRIP.AUTO-MCNC: 184 MG/DL (ref 65–99)
GLUCOSE BLD STRIP.AUTO-MCNC: 215 MG/DL (ref 65–99)
GLUCOSE BLD STRIP.AUTO-MCNC: 238 MG/DL (ref 65–99)
GLUCOSE BLD STRIP.AUTO-MCNC: 254 MG/DL (ref 65–99)
GLUCOSE SERPL-MCNC: 179 MG/DL (ref 65–99)
HCT VFR BLD AUTO: 44.2 % (ref 42–52)
HGB BLD-MCNC: 15 G/DL (ref 14–18)
IMM GRANULOCYTES # BLD AUTO: 0.04 K/UL (ref 0–0.11)
IMM GRANULOCYTES NFR BLD AUTO: 0.6 % (ref 0–0.9)
LYMPHOCYTES # BLD AUTO: 0.82 K/UL (ref 1–4.8)
LYMPHOCYTES NFR BLD: 12.9 % (ref 22–41)
MCH RBC QN AUTO: 29.7 PG (ref 27–33)
MCHC RBC AUTO-ENTMCNC: 33.9 G/DL (ref 33.7–35.3)
MCV RBC AUTO: 87.5 FL (ref 81.4–97.8)
MONOCYTES # BLD AUTO: 0.62 K/UL (ref 0–0.85)
MONOCYTES NFR BLD AUTO: 9.8 % (ref 0–13.4)
NEUTROPHILS # BLD AUTO: 4.7 K/UL (ref 1.82–7.42)
NEUTROPHILS NFR BLD: 74.1 % (ref 44–72)
NRBC # BLD AUTO: 0 K/UL
NRBC BLD-RTO: 0 /100 WBC
PLATELET # BLD AUTO: 212 K/UL (ref 164–446)
PMV BLD AUTO: 10.6 FL (ref 9–12.9)
POTASSIUM SERPL-SCNC: 4.5 MMOL/L (ref 3.6–5.5)
RBC # BLD AUTO: 5.05 M/UL (ref 4.7–6.1)
RSV RNA SPEC QL NAA+PROBE: NEGATIVE
SARS-COV-2 RNA RESP QL NAA+PROBE: NOTDETECTED
SODIUM SERPL-SCNC: 135 MMOL/L (ref 135–145)
SPECIMEN SOURCE: NORMAL
WBC # BLD AUTO: 6.3 K/UL (ref 4.8–10.8)

## 2022-12-28 PROCEDURE — A9270 NON-COVERED ITEM OR SERVICE: HCPCS | Performed by: HOSPITALIST

## 2022-12-28 PROCEDURE — 700111 HCHG RX REV CODE 636 W/ 250 OVERRIDE (IP): Performed by: PHYSICAL MEDICINE & REHABILITATION

## 2022-12-28 PROCEDURE — 99223 1ST HOSP IP/OBS HIGH 75: CPT | Performed by: PHYSICAL MEDICINE & REHABILITATION

## 2022-12-28 PROCEDURE — 36415 COLL VENOUS BLD VENIPUNCTURE: CPT

## 2022-12-28 PROCEDURE — 94760 N-INVAS EAR/PLS OXIMETRY 1: CPT

## 2022-12-28 PROCEDURE — 770010 HCHG ROOM/CARE - REHAB SEMI PRIVAT*

## 2022-12-28 PROCEDURE — A9270 NON-COVERED ITEM OR SERVICE: HCPCS | Performed by: PHYSICAL MEDICINE & REHABILITATION

## 2022-12-28 PROCEDURE — A9270 NON-COVERED ITEM OR SERVICE: HCPCS | Performed by: INTERNAL MEDICINE

## 2022-12-28 PROCEDURE — 82962 GLUCOSE BLOOD TEST: CPT | Mod: 91

## 2022-12-28 PROCEDURE — A9270 NON-COVERED ITEM OR SERVICE: HCPCS | Performed by: STUDENT IN AN ORGANIZED HEALTH CARE EDUCATION/TRAINING PROGRAM

## 2022-12-28 PROCEDURE — 700102 HCHG RX REV CODE 250 W/ 637 OVERRIDE(OP): Performed by: INTERNAL MEDICINE

## 2022-12-28 PROCEDURE — 700102 HCHG RX REV CODE 250 W/ 637 OVERRIDE(OP): Performed by: STUDENT IN AN ORGANIZED HEALTH CARE EDUCATION/TRAINING PROGRAM

## 2022-12-28 PROCEDURE — 0241U HCHG SARS-COV-2 COVID-19 NFCT DS RESP RNA 4 TRGT MIC: CPT

## 2022-12-28 PROCEDURE — 700102 HCHG RX REV CODE 250 W/ 637 OVERRIDE(OP): Performed by: HOSPITALIST

## 2022-12-28 PROCEDURE — 99239 HOSP IP/OBS DSCHRG MGMT >30: CPT | Performed by: INTERNAL MEDICINE

## 2022-12-28 PROCEDURE — 85025 COMPLETE CBC W/AUTO DIFF WBC: CPT

## 2022-12-28 PROCEDURE — 80048 BASIC METABOLIC PNL TOTAL CA: CPT

## 2022-12-28 PROCEDURE — 82962 GLUCOSE BLOOD TEST: CPT

## 2022-12-28 PROCEDURE — 700102 HCHG RX REV CODE 250 W/ 637 OVERRIDE(OP): Performed by: PHYSICAL MEDICINE & REHABILITATION

## 2022-12-28 RX ORDER — POLYETHYLENE GLYCOL 3350 17 G/17G
1 POWDER, FOR SOLUTION ORAL
Status: CANCELLED | OUTPATIENT
Start: 2022-12-28

## 2022-12-28 RX ORDER — VITAMIN B COMPLEX
1000 TABLET ORAL DAILY
Status: DISCONTINUED | OUTPATIENT
Start: 2022-12-29 | End: 2022-12-30

## 2022-12-28 RX ORDER — LANOLIN ALCOHOL/MO/W.PET/CERES
3 CREAM (GRAM) TOPICAL NIGHTLY PRN
Status: DISCONTINUED | OUTPATIENT
Start: 2022-12-28 | End: 2023-01-05 | Stop reason: HOSPADM

## 2022-12-28 RX ORDER — LIDOCAINE 50 MG/G
1 PATCH TOPICAL EVERY 24 HOURS
Qty: 10 PATCH | Status: ON HOLD
Start: 2022-12-28 | End: 2023-01-05

## 2022-12-28 RX ORDER — ONDANSETRON 2 MG/ML
4 INJECTION INTRAMUSCULAR; INTRAVENOUS 4 TIMES DAILY PRN
Status: DISCONTINUED | OUTPATIENT
Start: 2022-12-28 | End: 2023-01-05 | Stop reason: HOSPADM

## 2022-12-28 RX ORDER — BISACODYL 10 MG
10 SUPPOSITORY, RECTAL RECTAL
Status: DISCONTINUED | OUTPATIENT
Start: 2022-12-28 | End: 2023-01-04

## 2022-12-28 RX ORDER — ECHINACEA PURPUREA EXTRACT 125 MG
2 TABLET ORAL PRN
Status: DISCONTINUED | OUTPATIENT
Start: 2022-12-28 | End: 2023-01-05 | Stop reason: HOSPADM

## 2022-12-28 RX ORDER — TAMSULOSIN HYDROCHLORIDE 0.4 MG/1
0.4 CAPSULE ORAL
Qty: 30 CAPSULE | Status: ON HOLD
Start: 2022-12-29 | End: 2023-01-05

## 2022-12-28 RX ORDER — ONDANSETRON 4 MG/1
4 TABLET, ORALLY DISINTEGRATING ORAL EVERY 4 HOURS PRN
Qty: 10 TABLET | Refills: 0 | Status: ON HOLD
Start: 2022-12-28 | End: 2023-01-05

## 2022-12-28 RX ORDER — LIDOCAINE 50 MG/G
1 PATCH TOPICAL EVERY 24 HOURS
Status: CANCELLED | OUTPATIENT
Start: 2022-12-29

## 2022-12-28 RX ORDER — VITAMIN B COMPLEX
1000 TABLET ORAL DAILY
Status: CANCELLED | OUTPATIENT
Start: 2022-12-29

## 2022-12-28 RX ORDER — ATORVASTATIN CALCIUM 40 MG/1
80 TABLET, FILM COATED ORAL EVERY EVENING
Status: DISCONTINUED | OUTPATIENT
Start: 2022-12-28 | End: 2023-01-05 | Stop reason: HOSPADM

## 2022-12-28 RX ORDER — AMOXICILLIN 250 MG
2 CAPSULE ORAL 2 TIMES DAILY
Status: DISCONTINUED | OUTPATIENT
Start: 2022-12-28 | End: 2023-01-04

## 2022-12-28 RX ORDER — OXYCODONE HYDROCHLORIDE 5 MG/1
5 TABLET ORAL
Status: CANCELLED | OUTPATIENT
Start: 2022-12-28

## 2022-12-28 RX ORDER — ASPIRIN 81 MG/1
81 TABLET, CHEWABLE ORAL DAILY
Status: DISCONTINUED | OUTPATIENT
Start: 2022-12-29 | End: 2023-01-05 | Stop reason: HOSPADM

## 2022-12-28 RX ORDER — TAMSULOSIN HYDROCHLORIDE 0.4 MG/1
0.4 CAPSULE ORAL
Status: CANCELLED | OUTPATIENT
Start: 2022-12-29

## 2022-12-28 RX ORDER — LIDOCAINE 50 MG/G
1 PATCH TOPICAL EVERY 24 HOURS
Status: DISCONTINUED | OUTPATIENT
Start: 2022-12-29 | End: 2023-01-05 | Stop reason: HOSPADM

## 2022-12-28 RX ORDER — DULOXETIN HYDROCHLORIDE 60 MG/1
60 CAPSULE, DELAYED RELEASE ORAL 2 TIMES DAILY
Status: CANCELLED | OUTPATIENT
Start: 2022-12-28

## 2022-12-28 RX ORDER — OXYCODONE HYDROCHLORIDE 10 MG/1
10 TABLET ORAL
Status: CANCELLED | OUTPATIENT
Start: 2022-12-28

## 2022-12-28 RX ORDER — OXYCODONE HYDROCHLORIDE 10 MG/1
10 TABLET ORAL
Status: DISCONTINUED | OUTPATIENT
Start: 2022-12-28 | End: 2022-12-30

## 2022-12-28 RX ORDER — ACETAMINOPHEN 500 MG
1000 TABLET ORAL 3 TIMES DAILY
Status: DISCONTINUED | OUTPATIENT
Start: 2022-12-28 | End: 2023-01-02

## 2022-12-28 RX ORDER — POLYETHYLENE GLYCOL 3350 17 G/17G
17 POWDER, FOR SOLUTION ORAL
Refills: 3 | Status: ON HOLD
Start: 2022-12-28 | End: 2023-01-05

## 2022-12-28 RX ORDER — ASPIRIN 81 MG/1
81 TABLET, CHEWABLE ORAL DAILY
Status: CANCELLED | OUTPATIENT
Start: 2022-12-29

## 2022-12-28 RX ORDER — BENAZEPRIL HYDROCHLORIDE 10 MG/1
10 TABLET ORAL DAILY
Status: DISCONTINUED | OUTPATIENT
Start: 2022-12-29 | End: 2022-12-29

## 2022-12-28 RX ORDER — BUPROPION HYDROCHLORIDE 150 MG/1
150 TABLET, EXTENDED RELEASE ORAL 2 TIMES DAILY
Status: CANCELLED | OUTPATIENT
Start: 2022-12-28

## 2022-12-28 RX ORDER — DULOXETIN HYDROCHLORIDE 30 MG/1
60 CAPSULE, DELAYED RELEASE ORAL 2 TIMES DAILY
Status: DISCONTINUED | OUTPATIENT
Start: 2022-12-28 | End: 2023-01-05 | Stop reason: HOSPADM

## 2022-12-28 RX ORDER — POLYETHYLENE GLYCOL 3350 17 G/17G
1 POWDER, FOR SOLUTION ORAL
Status: DISCONTINUED | OUTPATIENT
Start: 2022-12-28 | End: 2023-01-04

## 2022-12-28 RX ORDER — ENOXAPARIN SODIUM 100 MG/ML
40 INJECTION SUBCUTANEOUS
Status: DISCONTINUED | OUTPATIENT
Start: 2022-12-28 | End: 2022-12-28

## 2022-12-28 RX ORDER — DEXTROSE MONOHYDRATE 25 G/50ML
25 INJECTION, SOLUTION INTRAVENOUS
Status: DISCONTINUED | OUTPATIENT
Start: 2022-12-28 | End: 2023-01-05 | Stop reason: HOSPADM

## 2022-12-28 RX ORDER — OMEPRAZOLE 20 MG/1
20 CAPSULE, DELAYED RELEASE ORAL DAILY
Status: DISCONTINUED | OUTPATIENT
Start: 2022-12-29 | End: 2022-12-30

## 2022-12-28 RX ORDER — AMOXICILLIN 250 MG
2 CAPSULE ORAL 2 TIMES DAILY
Qty: 30 TABLET | Refills: 0 | Status: ON HOLD | OUTPATIENT
Start: 2022-12-28 | End: 2023-01-05

## 2022-12-28 RX ORDER — OXYCODONE HYDROCHLORIDE 5 MG/1
2.5-5 TABLET ORAL
Qty: 30 TABLET | Refills: 0 | Status: ON HOLD
Start: 2022-12-28 | End: 2023-01-05

## 2022-12-28 RX ORDER — ENOXAPARIN SODIUM 100 MG/ML
40 INJECTION SUBCUTANEOUS DAILY
Status: DISCONTINUED | OUTPATIENT
Start: 2022-12-28 | End: 2023-01-05 | Stop reason: HOSPADM

## 2022-12-28 RX ORDER — TAMSULOSIN HYDROCHLORIDE 0.4 MG/1
0.4 CAPSULE ORAL
Status: DISCONTINUED | OUTPATIENT
Start: 2022-12-29 | End: 2022-12-30

## 2022-12-28 RX ORDER — ENOXAPARIN SODIUM 100 MG/ML
40 INJECTION SUBCUTANEOUS DAILY
Status: CANCELLED | OUTPATIENT
Start: 2022-12-28

## 2022-12-28 RX ORDER — ONDANSETRON 4 MG/1
4 TABLET, ORALLY DISINTEGRATING ORAL 4 TIMES DAILY PRN
Status: DISCONTINUED | OUTPATIENT
Start: 2022-12-28 | End: 2023-01-05 | Stop reason: HOSPADM

## 2022-12-28 RX ORDER — GLIPIZIDE 2.5 MG/1
10 TABLET, EXTENDED RELEASE ORAL
Status: DISCONTINUED | OUTPATIENT
Start: 2022-12-28 | End: 2022-12-29

## 2022-12-28 RX ORDER — ATORVASTATIN CALCIUM 40 MG/1
80 TABLET, FILM COATED ORAL EVERY EVENING
Status: CANCELLED | OUTPATIENT
Start: 2022-12-28

## 2022-12-28 RX ORDER — OMEPRAZOLE 20 MG/1
20 CAPSULE, DELAYED RELEASE ORAL DAILY
Status: CANCELLED | OUTPATIENT
Start: 2022-12-29

## 2022-12-28 RX ORDER — BISACODYL 10 MG
10 SUPPOSITORY, RECTAL RECTAL
Status: CANCELLED | OUTPATIENT
Start: 2022-12-28

## 2022-12-28 RX ORDER — HYDROXYZINE HYDROCHLORIDE 25 MG/1
50 TABLET, FILM COATED ORAL EVERY 6 HOURS PRN
Status: DISCONTINUED | OUTPATIENT
Start: 2022-12-28 | End: 2023-01-05 | Stop reason: HOSPADM

## 2022-12-28 RX ORDER — BENAZEPRIL HYDROCHLORIDE 10 MG/1
10 TABLET ORAL DAILY
Status: DISCONTINUED | OUTPATIENT
Start: 2022-12-28 | End: 2022-12-28 | Stop reason: HOSPADM

## 2022-12-28 RX ORDER — HYDRALAZINE HYDROCHLORIDE 10 MG/1
10 TABLET, FILM COATED ORAL EVERY 8 HOURS PRN
Status: DISCONTINUED | OUTPATIENT
Start: 2022-12-28 | End: 2023-01-05 | Stop reason: HOSPADM

## 2022-12-28 RX ORDER — FERROUS SULFATE 325(65) MG
325 TABLET ORAL
Status: DISCONTINUED | OUTPATIENT
Start: 2022-12-28 | End: 2022-12-28

## 2022-12-28 RX ORDER — GLIPIZIDE 2.5 MG/1
10 TABLET, EXTENDED RELEASE ORAL
Status: CANCELLED | OUTPATIENT
Start: 2022-12-28

## 2022-12-28 RX ORDER — MORPHINE SULFATE 4 MG/ML
4 INJECTION INTRAVENOUS
Status: CANCELLED | OUTPATIENT
Start: 2022-12-28

## 2022-12-28 RX ORDER — FERROUS SULFATE 325(65) MG
325 TABLET ORAL
Status: CANCELLED | OUTPATIENT
Start: 2022-12-28

## 2022-12-28 RX ORDER — BENAZEPRIL HYDROCHLORIDE 10 MG/1
10 TABLET ORAL DAILY
Status: CANCELLED | OUTPATIENT
Start: 2022-12-29

## 2022-12-28 RX ORDER — ENOXAPARIN SODIUM 100 MG/ML
40 INJECTION SUBCUTANEOUS DAILY
Status: ON HOLD
Start: 2022-12-28 | End: 2023-01-05

## 2022-12-28 RX ORDER — BISACODYL 10 MG
10 SUPPOSITORY, RECTAL RECTAL
Refills: 0 | Status: ON HOLD
Start: 2022-12-28 | End: 2023-01-05

## 2022-12-28 RX ORDER — ACETAMINOPHEN 325 MG/1
650 TABLET ORAL EVERY 4 HOURS PRN
Status: DISCONTINUED | OUTPATIENT
Start: 2022-12-28 | End: 2023-01-05 | Stop reason: HOSPADM

## 2022-12-28 RX ORDER — GABAPENTIN 300 MG/1
300 CAPSULE ORAL DAILY
Status: CANCELLED | OUTPATIENT
Start: 2022-12-29

## 2022-12-28 RX ORDER — GAUZE BANDAGE 2" X 2"
100 BANDAGE TOPICAL DAILY
Status: DISCONTINUED | OUTPATIENT
Start: 2022-12-29 | End: 2022-12-29

## 2022-12-28 RX ORDER — PIOGLITAZONEHYDROCHLORIDE 30 MG/1
30 TABLET ORAL DAILY
Status: CANCELLED | OUTPATIENT
Start: 2022-12-29

## 2022-12-28 RX ORDER — BENAZEPRIL HYDROCHLORIDE 10 MG/1
10 TABLET ORAL DAILY
Status: ON HOLD
Start: 2022-12-28 | End: 2023-01-05

## 2022-12-28 RX ORDER — ALUMINA, MAGNESIA, AND SIMETHICONE 2400; 2400; 240 MG/30ML; MG/30ML; MG/30ML
20 SUSPENSION ORAL
Status: DISCONTINUED | OUTPATIENT
Start: 2022-12-28 | End: 2023-01-05 | Stop reason: HOSPADM

## 2022-12-28 RX ORDER — BUPROPION HYDROCHLORIDE 150 MG/1
150 TABLET, EXTENDED RELEASE ORAL 2 TIMES DAILY
Status: DISCONTINUED | OUTPATIENT
Start: 2022-12-28 | End: 2023-01-05 | Stop reason: HOSPADM

## 2022-12-28 RX ORDER — FERROUS SULFATE 325(65) MG
325 TABLET ORAL 2 TIMES DAILY WITH MEALS
Status: DISCONTINUED | OUTPATIENT
Start: 2022-12-28 | End: 2022-12-30

## 2022-12-28 RX ORDER — MORPHINE SULFATE 4 MG/ML
4 INJECTION INTRAVENOUS
Status: DISCONTINUED | OUTPATIENT
Start: 2022-12-28 | End: 2022-12-28

## 2022-12-28 RX ORDER — AMOXICILLIN 250 MG
2 CAPSULE ORAL 2 TIMES DAILY
Status: CANCELLED | OUTPATIENT
Start: 2022-12-28

## 2022-12-28 RX ORDER — OXYCODONE HYDROCHLORIDE 5 MG/1
5 TABLET ORAL
Status: DISCONTINUED | OUTPATIENT
Start: 2022-12-28 | End: 2022-12-30

## 2022-12-28 RX ORDER — GABAPENTIN 300 MG/1
300 CAPSULE ORAL DAILY
Status: DISCONTINUED | OUTPATIENT
Start: 2022-12-29 | End: 2022-12-30

## 2022-12-28 RX ORDER — PIOGLITAZONEHYDROCHLORIDE 15 MG/1
30 TABLET ORAL DAILY
Status: DISCONTINUED | OUTPATIENT
Start: 2022-12-29 | End: 2023-01-05 | Stop reason: HOSPADM

## 2022-12-28 RX ORDER — EMPAGLIFLOZIN 25 MG/1
25 TABLET, FILM COATED ORAL DAILY
Status: ON HOLD
Start: 2022-12-28 | End: 2023-01-05

## 2022-12-28 RX ORDER — ACETAMINOPHEN 325 MG/1
650 TABLET ORAL EVERY 6 HOURS PRN
Qty: 30 TABLET | Refills: 0 | Status: ON HOLD
Start: 2022-12-28 | End: 2023-01-05

## 2022-12-28 RX ORDER — LACTULOSE 20 G/30ML
30 SOLUTION ORAL
Status: DISCONTINUED | OUTPATIENT
Start: 2022-12-28 | End: 2023-01-05 | Stop reason: HOSPADM

## 2022-12-28 RX ADMIN — ATORVASTATIN CALCIUM 80 MG: 40 TABLET, FILM COATED ORAL at 20:15

## 2022-12-28 RX ADMIN — INSULIN HUMAN 4 UNITS: 100 INJECTION, SOLUTION PARENTERAL at 12:14

## 2022-12-28 RX ADMIN — METFORMIN HYDROCHLORIDE 1000 MG: 500 TABLET ORAL at 08:01

## 2022-12-28 RX ADMIN — BUPROPION HYDROCHLORIDE 150 MG: 150 TABLET, EXTENDED RELEASE ORAL at 06:07

## 2022-12-28 RX ADMIN — SENNOSIDES AND DOCUSATE SODIUM 2 TABLET: 50; 8.6 TABLET ORAL at 04:46

## 2022-12-28 RX ADMIN — OMEPRAZOLE 20 MG: 20 CAPSULE, DELAYED RELEASE ORAL at 04:46

## 2022-12-28 RX ADMIN — DULOXETINE HYDROCHLORIDE 60 MG: 30 CAPSULE, DELAYED RELEASE ORAL at 18:13

## 2022-12-28 RX ADMIN — BUPROPION HYDROCHLORIDE 150 MG: 150 TABLET, EXTENDED RELEASE ORAL at 18:13

## 2022-12-28 RX ADMIN — FERROUS SULFATE TAB 325 MG (65 MG ELEMENTAL FE) 325 MG: 325 (65 FE) TAB at 08:01

## 2022-12-28 RX ADMIN — GABAPENTIN 300 MG: 300 CAPSULE ORAL at 04:46

## 2022-12-28 RX ADMIN — FERROUS SULFATE TAB 325 MG (65 MG ELEMENTAL FE) 325 MG: 325 (65 FE) TAB at 12:13

## 2022-12-28 RX ADMIN — TAMSULOSIN HYDROCHLORIDE 0.4 MG: 0.4 CAPSULE ORAL at 08:01

## 2022-12-28 RX ADMIN — ASPIRIN 81 MG 81 MG: 81 TABLET ORAL at 04:44

## 2022-12-28 RX ADMIN — INSULIN HUMAN 4 UNITS: 100 INJECTION, SOLUTION PARENTERAL at 17:24

## 2022-12-28 RX ADMIN — Medication 1000 UNITS: at 04:46

## 2022-12-28 RX ADMIN — BENAZEPRIL HYDROCHLORIDE 10 MG: 10 TABLET ORAL at 10:20

## 2022-12-28 RX ADMIN — FERROUS SULFATE TAB 325 MG (65 MG ELEMENTAL FE) 325 MG: 325 (65 FE) TAB at 17:22

## 2022-12-28 RX ADMIN — INSULIN HUMAN 3 UNITS: 100 INJECTION, SOLUTION PARENTERAL at 20:17

## 2022-12-28 RX ADMIN — GLIPIZIDE 10 MG: 2.5 TABLET, EXTENDED RELEASE ORAL at 17:22

## 2022-12-28 RX ADMIN — OXYCODONE 5 MG: 5 TABLET ORAL at 06:06

## 2022-12-28 RX ADMIN — ACETAMINOPHEN 1000 MG: 500 TABLET ORAL at 15:51

## 2022-12-28 RX ADMIN — ENOXAPARIN SODIUM 40 MG: 40 INJECTION SUBCUTANEOUS at 17:21

## 2022-12-28 RX ADMIN — ACETAMINOPHEN 1000 MG: 500 TABLET ORAL at 20:15

## 2022-12-28 RX ADMIN — DULOXETINE HYDROCHLORIDE 60 MG: 60 CAPSULE, DELAYED RELEASE ORAL at 06:06

## 2022-12-28 RX ADMIN — INSULIN HUMAN 3 UNITS: 100 INJECTION, SOLUTION PARENTERAL at 08:04

## 2022-12-28 RX ADMIN — METFORMIN HYDROCHLORIDE 1000 MG: 500 TABLET ORAL at 17:22

## 2022-12-28 RX ADMIN — PIOGLITAZONE 30 MG: 30 TABLET ORAL at 08:01

## 2022-12-28 ASSESSMENT — ENCOUNTER SYMPTOMS
HEMOPTYSIS: 0
SPUTUM PRODUCTION: 0
DIZZINESS: 0
HEARTBURN: 0
PALPITATIONS: 0
BACK PAIN: 1
BLURRED VISION: 0
FEVER: 0
NAUSEA: 0
SORE THROAT: 0
DEPRESSION: 0
CONSTIPATION: 0
ABDOMINAL PAIN: 0
SPEECH CHANGE: 0
VOMITING: 0

## 2022-12-28 ASSESSMENT — LIFESTYLE VARIABLES
TOTAL SCORE: 0
ON A TYPICAL DAY WHEN YOU DRINK ALCOHOL HOW MANY DRINKS DO YOU HAVE: 0
HAVE PEOPLE ANNOYED YOU BY CRITICIZING YOUR DRINKING: NO
CONSUMPTION TOTAL: NEGATIVE
ALCOHOL_USE: NO
TOTAL SCORE: 0
AVERAGE NUMBER OF DAYS PER WEEK YOU HAVE A DRINK CONTAINING ALCOHOL: 0
EVER HAD A DRINK FIRST THING IN THE MORNING TO STEADY YOUR NERVES TO GET RID OF A HANGOVER: NO
HAVE YOU EVER FELT YOU SHOULD CUT DOWN ON YOUR DRINKING: NO
TOTAL SCORE: 0
HOW MANY TIMES IN THE PAST YEAR HAVE YOU HAD 5 OR MORE DRINKS IN A DAY: 0
EVER FELT BAD OR GUILTY ABOUT YOUR DRINKING: NO

## 2022-12-28 ASSESSMENT — PATIENT HEALTH QUESTIONNAIRE - PHQ9
2. FEELING DOWN, DEPRESSED, IRRITABLE, OR HOPELESS: NOT AT ALL
1. LITTLE INTEREST OR PLEASURE IN DOING THINGS: NOT AT ALL
SUM OF ALL RESPONSES TO PHQ9 QUESTIONS 1 AND 2: 0
2. FEELING DOWN, DEPRESSED, IRRITABLE, OR HOPELESS: NOT AT ALL
1. LITTLE INTEREST OR PLEASURE IN DOING THINGS: NOT AT ALL
SUM OF ALL RESPONSES TO PHQ9 QUESTIONS 1 AND 2: 0

## 2022-12-28 ASSESSMENT — PAIN DESCRIPTION - PAIN TYPE
TYPE: ACUTE PAIN

## 2022-12-28 ASSESSMENT — FIBROSIS 4 INDEX: FIB4 SCORE: 1.15

## 2022-12-28 NOTE — DISCHARGE PLANNING
"[facility] Transitional Care Coordination    Referral from: Dr. Garcia  Insurance Provider on Facesheet:  [facility]  Potential Rehab diagnosis: Other Ortho    Chart review indicates patient has ongoing medical management and therapy needs to possibly meet inpatient rehab facility criteria with the goal of returning to community.      D/C Support:  Spouse    Physiatry to consult.      Last Covid test:    Thank you for the referral.        1015 - Telephone call to spouse Heidy to discuss IRF referral.  Explained specifics of inpatient rehab, answered questions/concerns.  Heidy hopeful Don will meet criteria for IRF.  She is able to provide ADL support, \"pulled\" her back helping him out of bed, will not be able to provide physical assistance such as lifting/pulling.  There are 14 steps into residence.  Reviewed [facility] insurance to include copay - $275.00/day, days 1-5 - total $1375.00.  Heidy voiced understanding, believes they have already met yearly max out of pocket.  She is aware there may be additional copay at beginning of year.  Reviewed [facility] COVID testing and visitor policies.  Heidy voiced understanding.  [facility] will follow for PMR recommendations.      "

## 2022-12-28 NOTE — THERAPY
Physical Therapy   Daily Treatment     Patient Name: Prabhakar Sierra  Age:  72 y.o., Sex:  male  Medical Record #: [id number]  Today's Date: 12/27/2022     Precautions: Fall Risk;TLSO (Thoracolumbosacral orthosis);Spinal / Back Precautions   Comments: TLSO at EOB    Assessment  Pt motivated to participate, focused on gait training and pt demos improved step length and foot clearance with verbal cues and able to tolerate incr distance. Encouraged continued ambulation and up to chair with nursing. Will follow. Recommend PMR consult.     Plan  Continue Current Treatment Plan    DC Equipment Recommendations: Unable to determine at this time  Discharge Recommendations: Recommend post-acute placement for additional physical therapy services prior to discharge home       12/27/22 9656   Balance   Sitting Balance (Static) Fair +   Sitting Balance (Dynamic) Fair   Standing Balance (Static) Fair   Standing Balance (Dynamic) Fair -   Weight Shift Sitting Fair   Weight Shift Standing Fair   Comments standing with FWW   Bed Mobility    Supine to Sit Minimal Assist   Scooting Supervised   Comments good demo of log roll without cueing   Gait Analysis   Gait Level Of Assist Contact Guard Assist   Assistive Device Front Wheel Walker   Distance (Feet) 120   Deviation Decreased Base Of Support;Decreased Heel Strike   Weight Bearing Status no restrictions   Skilled Intervention Verbal Cuing;Postural Facilitation;Compensatory Strategies   Comments cues for incr step legth and foot clearance, incr cueing to keep walker closer when turning. no knee buckling but some knee bouncing at times.   Functional Mobility   Sit to Stand Minimal Assist   Bed, Chair, Wheelchair Transfer Contact Guard Assist   Toilet Transfers Contact Guard Assist   Skilled Intervention Verbal Cuing   Comments cues for hand placement for safety during, continues to require reminders but verbalizes understanding   Short Term Goals    Short Term Goal # 1 pt will  go supine<>sit w/hob flat and rails down w/CGA in 6tx   Goal Outcome # 1 goal not met   Short Term Goal # 2 pt will go sit<>stand w/fww w/spv in 6tx   Goal Outcome # 2 Goal not met   Short Term Goal # 3 B pt will perform SPT to chair with SPV in 6 visits for improved independence   Goal Outcome # 3 B Goal not met   Short Term Goal # 4 B pt will ambulate 150ft with FWW and SPV in 6 visits for improved independence   Goal Outcome # 4 B Goal not met

## 2022-12-28 NOTE — DISCHARGE PLANNING
[facility] Transitional Care Coordination    Dr. Evelyne Irving will accept Prabhakar to inpatient rehab.  GMT transport 2:30p today.  Nursing to call report to [phone].  Volate message to RN bedside RN Vikas.  DIPTI Rojo aware.  Call out to spouse Heidy with update.  Provided [facility] Nurses station number for updates to inform visitation.      [facility] will follow to assist as needed with transition to [facility].

## 2022-12-28 NOTE — DISCHARGE PLANNING
"HTH/SCP TCN chart review completed. Collaborated with DANNY Rojo. Noted physiatry consult present noting The patient is a very good candidate for an acute inpatient rehabilitation program with a coordinated program of care at an intensity and frequency not available at a lower level of care (please see Dr. Evelyne Irving, note 12/28/2022 at 9:06AM).     TCN met with patient at bedside. Patient requested TCN also speak with wife via telephone encounter via patient cellphone. Discussed current considerations for inpatient rehabilitation to both IRF at [facility] and SNF level of care at [place]. Patient and wife stated preference for IRF level of care. Advised that CM would follow with patient and wife should IRF level of care accept patient and have bed available today. Patient advised he \"is ready to go\" when medically cleared.     TCN will continue to follow and collaborate with discharge planning team should additional post acute needs arise. Thank you.    Completed:     Choice obtained: IRF, SNF (see above; [facility] is 1st choice). As of this note, noted IRF has accepted patient with GMT transport set for 2:30PM today  [facility] referral not sent given plan for placement   PT/OT with recommendations for post acute placement on 12/23  "

## 2022-12-28 NOTE — PROGRESS NOTES
Hospital Medicine Daily Progress Note    Date of Service  12/28/2022    Chief Complaint  Prabhakar Sierra is a 72 y.o. male admitted 12/22/2022 with   Chief Complaint   Patient presents with    High Blood Sugar     Generalized body weakness/malaise for 1 week         Hospital Course  This is 72-year-old male with a past medical history significant for dementia, diabetes mellitus, hypertension, hyperlipidemia, neuropathy, GERD, osteoporosis, prostate cancer presented to the ER on 12/22/2022 after he was noted to have frequent falls.    Patient wife stated that he had multiple fall, difficulty ambulating with a walker, needing assistance with ADLs.  CT head, CT T and L-spine were obtained, noted to have acute T12 L2 and L3 compression fracture.  Neurosurgery was consulted, recommended TS LO brace.  CT head without contrast showed unchanged ventricular dilation left greater than right, normal pressure hydrocephalus possible, discussed with neurosurgery who will monitor outpatient.  PT/OT evaluate the patient, recommended postacute.  At this time patient medically stable to be discharged to skilled nursing facility.    Patient has history of prostate cancer, status postradiation treatment. His hospital course was complicated with significant orthostatic hypotension, patient noted to be symptomatic.  He is appropriate medication has been held.  His terazosin has been changed to tamsulosin.  Blood pressure then higher, 150s-170s, added back benazepril but lower dose 10mg daily.      Interval events:  -No acute overnight events  - pain controlled  - -170s, 150s this am, will restart his benazepril but lower dose  - sugars in 250s, restarted po DM meds yesterday  - spoke to wife on the phone, she felt like patient's speech was a little slurred and he was confused, patient also noticed this, thinks it's the pain meds, did not want to lower today, oriented.     I have discussed this patient's plan of care and  discharge plan at IDT rounds today with Case Management, Nursing, Nursing leadership, and other members of the IDT team.    Consultants/Specialty  neurosurgery    Code Status  DNAR/DNI    Disposition  Patient is medically cleared for discharge.   Anticipate discharge to to skilled nursing facility vs acute rehab  I have placed the appropriate orders for post-discharge needs.    Review of Systems  Review of Systems   Constitutional:  Negative for fever.   HENT:  Negative for congestion and sore throat.    Eyes:  Negative for blurred vision.   Respiratory:  Negative for hemoptysis and sputum production.    Cardiovascular:  Negative for chest pain and palpitations.   Gastrointestinal:  Negative for abdominal pain, constipation, heartburn, nausea and vomiting.   Genitourinary:  Negative for dysuria.   Musculoskeletal:  Positive for back pain.   Neurological:  Negative for dizziness and speech change.   Psychiatric/Behavioral:  Negative for depression.    All other systems reviewed and are negative.     Physical Exam  Temp:  [36.4 °C (97.6 °F)-36.9 °C (98.4 °F)] 36.6 °C (97.9 °F)  Pulse:  [] 95  Resp:  [16-17] 17  BP: (117-155)/(74-85) 155/85  SpO2:  [90 %-96 %] 93 %    Physical Exam  Vitals and nursing note reviewed.   Constitutional:       Appearance: Normal appearance.   HENT:      Head: Normocephalic and atraumatic.      Nose: Nose normal.      Mouth/Throat:      Mouth: Mucous membranes are moist.   Eyes:      General: No scleral icterus.     Conjunctiva/sclera: Conjunctivae normal.   Cardiovascular:      Rate and Rhythm: Normal rate and regular rhythm.      Heart sounds: No murmur heard.    No friction rub. No gallop.   Pulmonary:      Effort: Pulmonary effort is normal.      Breath sounds: Normal breath sounds. No rales.   Abdominal:      General: Bowel sounds are normal. There is no distension.      Palpations: Abdomen is soft.      Tenderness: There is no abdominal tenderness.   Musculoskeletal:       Cervical back: Normal range of motion.      Right lower leg: No edema.      Left lower leg: No edema.   Neurological:      Mental Status: He is alert and oriented to person, place, and time.   Psychiatric:         Mood and Affect: Mood normal.         Behavior: Behavior normal.       Fluids  No intake or output data in the 24 hours ending 12/28/22 0904      Laboratory        Recent Labs     12/25/22  1040 12/26/22  0357 12/28/22  0756   SODIUM 130* 133* 135   POTASSIUM 4.1 4.0 4.5   CHLORIDE 100 99 99   CO2 16* 18* 23   GLUCOSE 205* 192* 179*   BUN 12 16 17   CREATININE 0.59 0.64 0.71   CALCIUM 9.0 9.2 9.8                     Imaging  CT-TSPINE W/O PLUS RECONS   Final Result      Acute T11 compression fracture with approximately 10% loss of height. No significant retropulsion.      CT-LSPINE W/O PLUS RECONS   Final Result      1.  Acute compression fracture of L2 vertebral body, with up to 20% vertebral body height loss.   2.  Acute compression fracture of L3 vertebral body, with up to 10% vertebral body height loss.   3.  Thoracic spine is reported separately.      CT-HEAD W/O   Final Result      1. No CT evidence of acute infarct, hemorrhage or mass.   2. Moderate global parenchymal atrophy. Chronic small vessel ischemic changes.   3. Unchanged ventricular dilatation, left greater than right. Normal pressure hydrocephalus is possible.      DX-CHEST-PORTABLE (1 VIEW)   Final Result      No acute cardiopulmonary abnormality.                Assessment/Plan  * Compression fracture of body of thoracic vertebra (HCC)- (present on admission)  Assessment & Plan  Acute T11 compression fracture noted on CT  Neurosurgery has evaluated the patient, recommended to T JOCE brace   PT/OT has evaluated the patient, recommend postacute, SNF referral is in place   considering patient frequent fall, there is a concern of NPH, I discussed the case with neurosurgery, neurosurgery stated they would follow this patient as an  outpatient.    Pancytopenia (HCC)  Assessment & Plan  Noted to have pancytopenia, CBC daily, monitor    Neuropathy  Assessment & Plan  Cymbalta, gabapentin    Compression fracture of L3 lumbar vertebra, closed, initial encounter (HCC)  Assessment & Plan  As above    Compression fracture of L2 lumbar vertebra, closed, initial encounter (HCC)  Assessment & Plan  As above    Age-related physical debility  Assessment & Plan  PT/OT    Fall  Assessment & Plan  Fall precautions    Uncontrolled type 2 diabetes mellitus with hyperglycemia (HCC)- (present on admission)  Assessment & Plan  Continue ISS, progression protocol, Accu-Cheks ACH S.  Hemoglobin A1c of 8.4    Uncontrolled currently, sugars in the 200s  Since medically clear and awaiting discharge planning will restart home oral diabetes medications:  metformin 1000 mg twice daily, glipizide 10 mg daily (unable to start pioglitazone per pharmacy)    Sugars still in 200s, added po meds 12/27  CTM, if worsens may need to add lantus      Dementia ([facility])  Assessment & Plan  Frequent reorientation  Minimize sedative    Orthostatic hypotension  Assessment & Plan  Patient is noted to positive for orthostatic hypotension, stopped his antihypertensive medication.    --Change terazosin to Flomax, will obtain daily orthostatic vital signs, monitor  12/26: In improvement, patient stated that he feels better    Vitamin D deficiency- (present on admission)  Assessment & Plan  Supplement    ACP (advance care planning)  Assessment & Plan  Discussed in ED-DNR/DNI per patient's wish    Tobacco use  Assessment & Plan  Cessation advised  Declined nicotine replacement    Mixed hyperlipidemia- (present on admission)  Assessment & Plan  Statin    Essential hypertension- (present on admission)  Assessment & Plan  Initially held d/t orthostatic hypotension  - switched to flomax  -Started to have higher blood pressures, today SBP 140s-160s however recently had severe orthostatic hypotension so  we will continue to monitor  - BP still high today, no pain currently, will likely restart home ACE inhibitor at lower dose 10mg daily (12/28)  - PRNS    GERD (gastroesophageal reflux disease)- (present on admission)  Assessment & Plan  PPI         VTE prophylaxis: enoxaparin ppx    I have performed a physical exam and reviewed and updated ROS and Plan today (12/28/2022). In review of yesterday's note (12/27/2022), there are no changes except as documented above.

## 2022-12-28 NOTE — CONSULTS
Medical chart review completed.     Patient is a 72 y.o. male  with a past medical history of dementia, prostate cancer status posttreatment in remission, diabetes, peripheral neuropathy, osteoporosis, hypertension, BPH, tobacco use, admitted to [facility] on 12/22, with complaints of severe back pain, generalized weakness and malaise as well as multiple ground-level falls in the previous week.    Patient had imaging including a CT of the spine which showed acute T11 compression fracture with 20% loss of height, acute L2 compression fracture with 20% loss of height, and acute L3 compression fracture with 10% loss of vertebral height.  He had a head CT that showed left greater than right ventricular dilatation suggestive of normal pressure hydrocephalus.    Patient was seen and evaluated by neurosurgery, Dr. Guzman, with recommendation for conservative management with a TLSO brace and follow-up in 6 weeks, not currently a good candidate for kyphoplasty.    Patient was admitted for his fractures and pain management, for dehydration and hyperglycemia.    Patient with multiple co-morbidities(including but not limited to orthostatic hypotension diabetes with hyperglycemia, ); with cognitive deficits and functional deficits in mobility/self-cares, and Severe de-conditioning.     Pre-morbidly, this patient lived in a two level home in [place] with 0 steps to enter, with spouse. The patient was evaluated by acute care Physical Therapy and Occupational Therapy; currently requiring minimal assistance for mobility and minimal to maximum assistance for ADLs, also with ongoing cognitive deficits.     The patient is a very good candidate for an acute inpatient rehabilitation program with a coordinated program of care at an intensity and frequency not available at a lower level of care.     Note: if the patient continues to progress while waiting for medical clearance, and no longer requires 2 out of 3 therapy services  (PT/OT/SLP), then the patient would no longer meet the criteria for acute inpatient rehabilitation.    This recommendation is substantiated by the patient's current medical condition with intervention and assessment of medical issues requiring an acute level of care for patient's safety and maximum outcome. A coordinated program of care will be provided by an interdisciplinary team including physical therapy, occupational therapy, speech language pathology, hospitalist, physiatry, and rehab nursing. Rehab goals include improved cognition, mobility, self-care management, strength and conditioning/endurance, pain management, bowel and bladder management, mood and affect, and safety with independent home management including caregiver training. Estimated length of stay is approximately 10-12 days. Rehab potential: Good. Disposition: to pre-morbid independent living setting with supportive care of patient's spouse. We will continue to follow with you in anticipation of discharge to acute inpatient rehabilitation when medically stable to do so at the discretion of his attending physician.     Thank you for allowing us to participate in his care.    Evelyne Irving M.D.  Physical Medicine and Rehabilitation

## 2022-12-28 NOTE — PROGRESS NOTES
Discharging Patient home per physician order.  Discharged with T medical transport.  Demonstrated understanding of discharge instructions, follow up appointments, home medications, prescriptions, home care for surgical wound.  Ambulating x1 assist w FWW and TLSO brace when OOB, voiding without difficulty, pain well controlled, tolerating oral medications, oxygen saturation greater than 90% , tolerating diet. Educational handouts given and discussed.  Verbalized understanding of discharge instructions and educational handouts.  Stated several reasons why to return to ED or seek medical attention. All questions answered.  Belongings and dressing supplies with patient at time of discharge.

## 2022-12-28 NOTE — H&P
REHABILITATION HISTORY AND PHYSICAL/POST ADMISSION EVALUATION    12/28/2022  3:30 PM  Prabhakar Sierra  [id number]/02  Admission  12/28/2022  2:43 PM  [facility] Code/Reason for admission: 0008.9 - Orthopaedic Disorders: Other Orthopaedic   Etiologic diagnosis/problem: Thoracic compression fracture, closed, initial encounter (HCC)  Chief Complaint: back pain with movement    HPI:  Patient is a 72 y.o. male  with a past medical history of dementia, possible NPH, prostate cancer status post treatment and in remission, diabetes, peripheral neuropathy, osteoporosis, hypertension, BPH, tobacco use, alcohol use, admitted to [facility] on 12/22, with complaints of severe back pain, generalized weakness and malaise as well as multiple ground-level falls in the previous week.     Patient had imaging including a CT of the spine which showed acute T11 compression fracture with 20% loss of height, acute L2 compression fracture with 20% loss of height, and acute L3 compression fracture with 10% loss of vertebral height.  He had a head CT that showed left greater than right ventricular dilatation suggestive of normal pressure hydrocephalus.     Patient was seen and evaluated by neurosurgery, Dr. Guzman, with recommendation for conservative management with a TLSO brace and follow-up in 6 weeks, not currently a good candidate for kyphoplasty. Patient was admitted for his fractures and pain management, for dehydration and hyperglycemia.    Patient currently reports back pain when he moves.  He reports his memory and cognition have declined as he is aged.  He does still drive a car.  He moved his bowels this morning and denies any issues with urination.  He has peripheral neuropathy with numbness and tingling in his feet.  He does drink alcohol daily but does not think this was associated with his multiple falls.    Review of patient's outpatient appointments showed a recent visit to the neurology clinic on 1122/22 with the  following diagnoses: Early to mild stage dementia secondary to significant and long-term alcohol use in addition to a very strong family history of neurodegenerative dementia, moderate hydrocephalus with possible diagnosis of NPH, peripheral sensory polyneuropathy secondary to diabetes and alcohol use, and multifactorial gait dysfunction secondary to neurodegenerative brain disorder, diabetic neuropathy and acquired ventriculomegaly.  Treatment plan at that time included recommendations to stop drinking, possible large-volume CSF lumbar puncture in the future, and brain PET scan given family history of dementia.    Patient was evaluated by Rehab Medicine physician and Physical Therapy and Occupational Therapy and determined to be appropriate for acute inpatient rehab and was transferred to [facility] on 12/28/2022  2:43 PM.    With this acute therapeutic intervention, this patient hopes to improve his functional status, and return to independent living with the supportive care of spouse.    REVIEW OF SYSTEMS:     A complete review of systems was performed and was negative in detail with the exception of items mentioned elsewhere in this document.    PMH:  Past Medical History:   Diagnosis Date    Alcohol use     BPH (benign prostatic hyperplasia)     Depression     GERD (gastroesophageal reflux disease)     Hypertension     Hyponatremia 05/18/2017    Iron deficiency anemia secondary to inadequate dietary iron intake 05/26/2021    Mild cognitive impairment 07/26/2022    Neuropathic pain, leg     Right hip pain 11/14/2016    Tobacco use     Type II or unspecified type diabetes mellitus without mention of complication, not stated as uncontrolled        PSH:  No past surgical history on file.    Family History   Problem Relation Age of Onset    Heart Disease Mother     Diabetes Father     Other Sister         Mental retardation    Heart Disease Brother     Cancer Brother         Prostate    No Known  Problems Daughter     No Known Problems Daughter         MEDICATIONS:  Scheduled Medications   Medication Dose Frequency    acetaminophen  1,000 mg TID    [START ON 12/29/2022] aspirin  81 mg DAILY    atorvastatin  80 mg Q EVENING    [START ON 12/29/2022] benazepril  10 mg DAILY    buPROPion SR  150 mg BID    DULoxetine  60 mg BID    enoxaparin (LOVENOX) injection  40 mg DAILY AT 1800    ferrous sulfate  325 mg TID WITH MEALS    [START ON 12/29/2022] gabapentin  300 mg DAILY    glipiZIDE SR  10 mg PM MEAL    insulin regular  3-14 Units 4X/DAY ACHS    [START ON 12/29/2022] lidocaine  1 Patch Q24HRS    [START ON 12/29/2022] lidocaine  1 Patch Q24HRS    metformin  1,000 mg BID WITH MEALS    [START ON 12/29/2022] omeprazole  20 mg DAILY    [START ON 12/29/2022] pioglitazone  30 mg DAILY    senna-docusate  2 Tablet BID    [START ON 12/29/2022] tamsulosin  0.4 mg AFTER BREAKFAST    [START ON 12/29/2022] vitamin D3  1,000 Units DAILY       ALLERGIES:  Patient has no known allergies.    PSYCHOSOCIAL HISTORY:  Pre-mobidly, the patient lived in a single level home/apartment with flight of stairs to enter, in [place] with spouse.  They have been  for 52 years.  He has 2 grown children and 6 grandchildren.    Patient smokes cigarettes daily and drinks several drinks daily.  Denies drugs.    LEVEL OF FUNCTION PRIOR TO DISABILTY:  Independent , recently passed  driving assessment.    LEVEL OF FUNCTION PRIOR TO ADMISSION to [facility]:  PT 12/26:    Balance   Sitting Balance (Static) Fair +   Sitting Balance (Dynamic) Fair   Standing Balance (Static) Fair   Standing Balance (Dynamic) Fair -   Weight Shift Sitting Fair   Weight Shift Standing Fair   Comments standing with FWW   Bed Mobility    Supine to Sit Minimal Assist   Scooting Supervised   Comments good demo of log roll without cueing   Gait Analysis   Gait Level Of Assist Contact Guard Assist   Assistive Device Front Wheel Walker   Distance (Feet)  120   Deviation Decreased Base Of Support;Decreased Heel Strike   Weight Bearing Status no restrictions   Skilled Intervention Verbal Cuing;Postural Facilitation;Compensatory Strategies   Comments cues for incr step legth and foot clearance, incr cueing to keep walker closer when turning. no knee buckling but some knee bouncing at times.   Functional Mobility   Sit to Stand Minimal Assist   Bed, Chair, Wheelchair Transfer Contact Guard Assist   Toilet Transfers Contact Guard Assist   Skilled Intervention Verbal Cuing   Comments cues for hand placement for safety during, continues to require reminders but verbalizes understanding       OT 12/26:    Cognition    Cognition / Consciousness X   Comments pleasant and motivated, forgetful   Balance   Sitting Balance (Static) Fair   Sitting Balance (Dynamic) Fair   Standing Balance (Static) Fair   Standing Balance (Dynamic) Fair -   Weight Shift Sitting Fair   Weight Shift Standing Fair   Skilled Intervention Verbal Cuing   Comments with FWW   Bed Mobility    Supine to Sit Minimal Assist   Rolling Minimum Assist to Lt.   Skilled Intervention Verbal Cuing   Activities of Daily Living   Grooming Minimal Assist;Seated   Lower Body Dressing Maximal Assist   Skilled Intervention Verbal Cuing       SLP:    Not assessed    CURRENT LEVEL OF FUNCTION:   Same as level of function prior to admission to [facility]    PHYSICAL EXAM:     VITAL SIGNS:   blood pressure is 112/68 (pended) and his pulse is 103 (abnormal, pended). His respiration is 18 (pended) and oxygen saturation is 95% (pended).     GENERAL: No apparent distress  HEENT: Normocephalic/atraumatic, moist mucous membranes  CARDIAC: Mild tachycardia, regular rhythm, normal S1, S2, no murmurs, no peripheral edema   LUNGS: Clear to auscultation, normal respiratory effort, on room air   ABDOMINAL: bowel sounds present, soft, nontender, and nondistended    EXTREMITIES: no edema or 2+ bilateral DP/PT  pulses    NEURO:    Mental status: alert    CRANIAL NERVES:  8: Hard of hearing    Motor:  Shoulder flexors:  Right -  5/5, Left -  5/5  Elbow flexors:  Right -  5/5, Left -  5/5  Elbow extensors:  Right -  5/5, Left -  5/5  Symmetrical   Hip flexors:  Right -  4/5, Left -  4/5  Knee ext:  Right -  5/5, Left -  5/5  Dorsiflexors:  Right -  5/5, Left -  5/5  Plantar flexors:  Right -  5/5, Left -  5/5     Sensory:   Decreased to light touch in the bilateral toes and feet      RADIOLOGY:      CT HEAD WITHOUT CONTRAST  12/22/2022 4:10 PM     HISTORY/REASON FOR EXAM:  Pain following trauma     TECHNIQUE/EXAM DESCRIPTION AND NUMBER OF VIEWS:  CT of the head without contrast.     The study was performed on a helical multidetector CT scanner. Contiguous 2.5 mm axial sections were obtained from the skull base through the vertex.     Up to date radiation dose reduction adjustments have been utilized to meet ALARA standards for radiation dose reduction.     COMPARISON:  5/12/2022     FINDINGS:  Unchanged ventricular dilatation, left greater than right.  Moderate global parenchymal atrophy. Chronic small vessel ischemic changes.  No significant mass effect or midline shift.  Basal cisterns are patent.  No evidence for intracranial hemorrhage.  Calvaria are intact.  Atherosclerosis.     Visualized orbits are unremarkable.  Visualized mastoid air cells are clear.  Mild polypoid mucosal thickening of the right maxillary sinus, likely of no clinical significance.     IMPRESSION:     1. No CT evidence of acute infarct, hemorrhage or mass.  2. Moderate global parenchymal atrophy. Chronic small vessel ischemic changes.  3. Unchanged ventricular dilatation, left greater than right. Normal pressure hydrocephalus is possible.                  12/22/2022 4:10 PM     HISTORY/REASON FOR EXAM:  Pain following trauma.     TECHNIQUE/EXAM DESCRIPTION AND NUMBER OF VIEWS:  CT lumbar spine without contrast, with reconstructions.     The  study was performed on a helical multidetector CT scanner. Thin-section helical scanning was performed from T12-L1 to the sacrum. Sagittal and coronal multiplanar reconstructions were generated from the axial images.     Low dose optimization technique was utilized for this CT exam including automated exposure control and adjustment of the mA and/or kV according to patient size.     COMPARISON: None.     FINDINGS:  Alignment in the lumbar spine is normal.     Acute compression fracture of L2 vertebral body, with up to 20% vertebral body height loss.     Acute compression fracture of L3 vertebral body, with up to 10% vertebral body height loss.     Thoracic spine is reported separately.     Moderate spondylosis at L5-S1. No significant lumbar disc height loss otherwise.     The sacrum and sacroiliac joints show no acute abnormality.        IMPRESSION:     1.  Acute compression fracture of L2 vertebral body, with up to 20% vertebral body height loss.  2.  Acute compression fracture of L3 vertebral body, with up to 10% vertebral body height loss.  3.  Thoracic spine is reported separately.               12/22/2022 4:10 PM     HISTORY/REASON FOR EXAM:  Mid-back pain, compression fracture suspected.        TECHNIQUE/EXAM DESCRIPTION AND NUMBER OF VIEWS:  CT thoracic spine without contrast, with reconstructions.     The study was performed on a [email or web address]. CT scanner. Thin-section helical scanning was performed from C7-T1 through T12-L1. Sagittal and coronal multiplanar reconstructions were generated from the axial images.     Low dose optimization technique was utilized for this CT exam including automated exposure control and adjustment of the mA and/or kV according to patient size.     COMPARISON: CT chest 6/23/2019.     FINDINGS:  Alignment in the thoracic spine is normal. Acute T11 compression fracture with approximately 10% loss of height. No significant retropulsion. The cervicothoracic junction appears intact.     The  prevertebral and paraspinous soft tissues are unremarkable.     Multilevel degenerative changes.     Mild paraseptal emphysematous changes.     IMPRESSION:     Acute T11 compression fracture with approximately 10% loss of height. No significant retropulsion.                     LABS:  Recent Labs     12/26/22  0357 12/28/22  0756   SODIUM 133* 135   POTASSIUM 4.0 4.5   CHLORIDE 99 99   CO2 18* 23   GLUCOSE 192* 179*   BUN 16 17   CREATININE 0.64 0.71   CALCIUM 9.2 9.8     Recent Labs     12/28/22  0756   WBC 6.3   RBC 5.05   HEMOGLOBIN 15.0   HEMATOCRIT 44.2   MCV 87.5   MCH 29.7   MCHC 33.9   RDW 44.7   PLATELETCT 212   MPV 10.6         PRIMARY REHAB DIAGNOSIS:    This patient is a 72 y.o. male admitted for acute inpatient rehabilitation with Thoracic compression fracture, closed, initial encounter ([facility]).    IMPAIRMENTS:   Cognitive  ADLs/IADLs  Mobility    SECONDARY DIAGNOSIS/MEDICAL CO-MORBIDITIES AFFECTING FUNCTION:    Normal pressure hydrocephalus  Dementia  History of depression  Hypertension  Hyperlipidemia  Tachycardia  Peripheral neuropathy  Diabetes with hyperglycemia  History of prostate cancer  Alcohol use  Tobacco use  History of vitamin D deficiency  History of iron deficiency anemia    RELEVANT CHANGES SINCE PREADMISSION EVALUATION:    Status unchanged    The patient's rehabilitation potential is Good  The patient's medical prognosis is good    PLAN:   Discussion and Recommendations, discussed with the patient and/or family:   1. The patient requires an acute inpatient rehabilitation program with a coordinated program of care at an intensity and frequency not available at a lower level of care. This recommendation is substantiated by the patient's medical physicians who recommend that the patient's intervention and assessment of medical issues needs to be done at an acute level of care for patient's safety and maximum outcome.     2. A coordinated program of care will be supplied by an  interdisciplinary team of physical therapy, occupational therapy, rehab physician, rehab nursing, and, if needed, speech therapy and rehab psychology. Rehab team presents a patient-specific rehabilitation and education program concentrating on prevention of future problems related to accessibility, mobility, skin, bowel, bladder, sexuality, and psychosocial and medical/surgical problems.     3. Need for Rehabilitation Physician: The rehab physician will be evaluating the patient on a multi-weekly basis to help coordinate the program of care. The rehab physician communicates between medical physicians, therapists, and nurses to maximize the patient's potential outcome. Specific areas in which the rehab physician will be providing daily assessment include the following:   A. Assessing the patient's heart rate and blood pressure response (vitals monitoring) to activity and making adjustments in medications or conservative measures as needed.   B. The rehab physician will be assessing the frequency at which the program can be increased to allow the patient to reach optimal functional outcome.   C. The rehab physician will also provide assessments in daily skin care, especially in light of patient's impairments in mobility.   D. The rehab physician will provide special expertise in understanding how to work with functional impairment and recommend appropriate interventions, compensatory techniques, and education that will facilitate the patient's outcome.     4. Rehab R.N.   The rehab RN will be working with patient to carry over in room mobility and activities of daily living when the patient is not in 3 hours of skilled therapy. Rehab nursing will be working in conjunction with rehab physician to address all the medical issues above and continue to assess laboratory work and discuss abnormalities with the treating physicians, assess vitals, and response to activity, and discuss and report abnormalities with the rehab  physician. Rehab RN will also continue daily skin care, supervise bladder/bowel program, instruct in medication administration, and ensure patient safety.     5. Therapies to treat at intensity and frequency of (may change after completion of evaluation by all therapeutic disciplines):       PT:  Physical therapy to address mobility, transfer, gait training and evaluation for adaptive equipment needs 1hour/day at least 5 days/week for the duration of the ELOS (see below)       OT:  Occupational therapy to address ADLs, self-care, home management training, functional mobility/transfers and assistive device evaluation, and community re-integration 1hour/day at least 5 days/week for the duration of the ELOS (see below).        ST/Dysphagia:  Speech therapy to address speech, language, and cognitive deficits as well as swallowing difficulties with retraining/dysphagia management and community re-integration with comprehension, expression, cognitive training 1hour/day at least 5 days/week for the duration of the ELOS (see below).     6. Medical management / Rehabilitation Issues/Adverse Potential affecting function as part of rehabilitation plan.    Normal pressure hydrocephalus?  Outpatient follow-up with neurology for possible lumbar puncture with large volume CSF removal    Dementia  Speech therapy assessment  Outpatient follow-up with neurology as well as PET scan  Aspirin for stroke/vascular prevention per neurology    History of depression  Cymbalta  Wellbutrin  Monitor need to consult psychology    Hypertension  Benazepril    Hyperlipidemia  Statin    Tachycardia  EKG on 12/22 was sinus tachycardia  Patient currently not anemic and does not have any pain at rest  May benefit from starting beta-blocker    History of iron deficiency anemia  Continue ferrous sulfate    History of vitamin D deficiency  Continue supplementation    Peripheral neuropathy  Cymbalta  Gabapentin    Diabetes with hyperglycemia  Last  hemoglobin A1c 11/22 8.4, uncontrolled  Glipizide  Actos  Sliding scale insulin  Consult hospitalist    History of prostate cancer  Completed treatment, apparently in remission  Flomax  Outpatient follow-up with urology    Alcohol use  Add vitamins  Needs counseling    Tobacco use  Needs counseling    I performed a complete drug regimen review and did not identify any potential clinically significant medication issues.    The patient's CODE STATUS was confirmed as DNR/DNI on admission, with the patient and/or family at bedside.    REHABILITATION ISSUES/ADVERSE POTENTIAL:  1.  Thoracic and lumbar compression fractures: Patient demonstrates functional deficits in strength, balance, coordination, and ADL's. Patient is admitted to [facility] for comprehensive rehabilitation therapy as described below.   Rehabilitation nursing monitors bowel and bladder control, educates on medication administration, co-morbidities and monitors patient safety.    2.  DVT prophylaxis:  Patient is on Lovenox for anticoagulation upon transfer. Encourage OOB. Monitor daily for signs and symptoms of DVT including but not limited to swelling and pain to prevent the development of DVT that may interfere with therapies.    3.  Pain: Scheduled Tylenol.  Monitor need to increase gabapentin.  As needed oxycodone.  Lidoderm patches.    4.  Nutrition/Dysphagia: Dietician monitors nutrient intake, recommend supplements prn and provide nutrition education to pt/family to promote optimal nutrition for wound healing/recovery.     5.  Bladder/bowel:  Start bowel and bladder program, to prevent constipation, urinary retention (which may lead to UTI), and urinary incontinence (which will impact upon pt's functional independence).   - TV Q3h while awake with post void bladder scans, I&O cath for PVRs >400  - up to commode after meal     6.  Skin/dermal ulcer prophylaxis: Monitor for new skin conditions with q.2 h. turns as required to  prevent the development of skin breakdown.     7.  GI prophylaxis: Omeprazole to prevent gastritis or GI bleed that would interfere with therapies.    8. Respiratory therapy: RT performs O2 management prn, breathing retraining, pulmonary hygiene and bronchospasm management prn to optimize participation in therapies.    Pt was seen today for 73 min, and entire time spent in face-to-face contact was >50% in counseling and coordination of care as detailed in A/P above.        GOALS/EXPECTED LEVEL OF FUNCTION BASED ON CURRENT MEDICAL AND FUNCTIONAL STATUS (may change based on patient's medical status and rate of impairment recovery):  Transfers:   Supervision  Mobility/Gait:   Supervision  ADL's:   Minimal Assistance  Cognition: Least verbal cues    DISPOSITION: Discharge to pre-morbid independent living setting with the supportive care of patient's spouse.      ELOS: 10-14 days    Evelyne Irving M.D.  Physical Medicine and Rehabilitation

## 2022-12-28 NOTE — FLOWSHEET NOTE
12/28/22 1512   Events/Summary/Plan   Events/Summary/Plan o2 spot check   Vital Signs   Pulse 100   Respiration 16   Pulse Oximetry 94 %   $ Pulse Oximetry (Spot Check) Yes   Respiratory Assessment   Level of Consciousness Alert   Chest Exam   Work Of Breathing / Effort Within Normal Limits   Breath Sounds   RUL Breath Sounds Clear   RML Breath Sounds Clear   RLL Breath Sounds Clear   LUDY Breath Sounds Clear   LLL Breath Sounds Clear   Oxygen   O2 Delivery Device None - Room Air

## 2022-12-28 NOTE — DISCHARGE PLANNING
Case Management Discharge Planning    Admission Date: 12/22/2022  GMLOS: 3.4  ALOS: 6    6-Clicks ADL Score: 17  6-Clicks Mobility Score: 12  PT and/or OT Eval ordered: Yes  Post-acute Referrals Ordered: Yes  Post-acute Choice Obtained: Yes  Has referral(s) been sent to post-acute provider:  Yes      Anticipated Discharge Dispo: Discharge Disposition: Disch to IP rehab facility or distinct part unit (62)    DME Needed: No    Action(s) Taken: Met with patient at bedside, discussed plan for discharge to IP rehab pending bed availability. Patient is very keen to discharge today.   11.12am: Accepted at [facility] rehab today, transport confirmed for 2.30 pm today.   Spoke to wife Heidy via phone, she will visit patient at rehab tomorrow and  some of his things(casi tree and ornaments)  here on her way there.     Escalations Completed: None    Medically Clear: Yes    Next Steps: Transport  at 2.30pm.    Barriers to Discharge: None

## 2022-12-28 NOTE — DISCHARGE SUMMARY
Discharge Summary    CHIEF COMPLAINT ON ADMISSION  Chief Complaint   Patient presents with    High Blood Sugar     Generalized body weakness/malaise for 1 week       Reason for Admission  ems    Admission Date  12/22/2022     CODE STATUS  DNAR/DNI    HPI & HOSPITAL COURSE    This is 72-year-old male with a past medical history significant for dementia, diabetes mellitus, hypertension, hyperlipidemia, neuropathy, GERD, osteoporosis, prostate cancer presented to the ER on 12/22/2022 after he was noted to have frequent falls.    Patient wife stated that he had multiple fall, difficulty ambulating with a walker, needing assistance with ADLs.  CT head, CT T and L-spine were obtained, noted to have acute T12 L2 and L3 compression fracture.  Neurosurgery was consulted, recommended [facility] brace.  PT/OT evaluate the patient, recommended postacute.  At this time patient medically stable to be discharged to skilled nursing facility.    Patient has history of prostate cancer, status postradiation treatment. His hospital course was complicated with significant orthostatic hypotension, patient noted to be symptomatic.  He is appropriate medication has been held.  His terazosin has been changed to tamsulosin.  His orthostatic hypotension improved.  However he then became more hypertensive with blood pressures to the 150s-170s.  He was restarted on his benazepril however decrease dose to 10 mg (previously 40 mg).  Sugars had been elevated inpatient with sliding scale only so restarted home oral diabetic medication.  At time of discharge he was afebrile, on room air, tolerating p.o. and pain controlled on p.o. medications.        Therefore, he is discharged in good and stable condition to an inpatient rehabilitation hospital.    The patient met 2-midnight criteria for an inpatient stay at the time of discharge.      FOLLOW UP ITEMS POST DISCHARGE  Monitor blood pressures and titrate blood pressure medication as needed  There was some  concern for possible normal pressure hydrocephalus, discussed with neurosurgery who will follow this up outpatient    DISCHARGE DIAGNOSES  Principal Problem:    Compression fracture of body of thoracic vertebra (HCC) POA: Yes  Active Problems:    GERD (gastroesophageal reflux disease) POA: Yes    Essential hypertension POA: Yes    Mixed hyperlipidemia POA: Yes    Tobacco use POA: Unknown    ACP (advance care planning) POA: Unknown    Vitamin D deficiency POA: Yes    Orthostatic hypotension POA: Unknown    Dementia (HCC) POA: Unknown    Uncontrolled type 2 diabetes mellitus with hyperglycemia (HCC) POA: Yes    Fall POA: Unknown    Age-related physical debility POA: Unknown    Compression fracture of L2 lumbar vertebra, closed, initial encounter (HCC) POA: Unknown    Compression fracture of L3 lumbar vertebra, closed, initial encounter ([facility]) POA: Unknown    Neuropathy POA: Unknown    Pancytopenia (HCC) POA: Unknown  Resolved Problems:    * No resolved hospital problems. *      FOLLOW UP  Future Appointments   Date Time Provider Department Center   1/4/2023  2:00 PM Ashwini Martinez, PT [facility]   1/6/2023  2:45 PM Ashwini Martinez, PT [facility]   1/9/2023  1:15 PM Ashwini Martinez, PT [facility]   1/12/2023  1:15 PM Ashwini Martinez, PT [facility]   1/16/2023  2:00 PM Ashwini Martinez, PT [facility]   1/19/2023  2:45 PM Ashwini Martinez, PT [facility]   1/23/2023  2:00 PM Ashwini Martinez, PT [facility]   1/25/2023  2:00 PM Ashwini Martinez, PT [facility]   1/30/2023  2:00 PM Ashwini Martinez, PT [facility]   2/2/2023  2:00 PM Ashwini Martinez, PT [facility]   2/6/2023  2:00 PM Ashwini Martinez, PT [facility]   2/9/2023  2:00 PM Ashwini Martinez, PT [facility]   2/13/2023 11:20 AM LALO DIABETES RN 24M SHAHID Payan   5/16/2023  2:00 PM Parminder Moran M.D. GN None     No follow-up provider specified.    MEDICATIONS ON DISCHARGE      Medication List        START taking these medications        Instructions   acetaminophen 325 MG Tabs  Commonly known as: Tylenol   Take 2 Tablets by mouth every 6 hours as needed for Mild Pain or Moderate Pain.  Dose: 650 mg     bisacodyl 10 MG Supp  Commonly known as: DULCOLAX   Insert 1 Suppository into the rectum 1 time a day as needed (if magnesium hydroxide ineffective after 24 hours).  Dose: 10 mg     enoxaparin 40 MG/0.4ML Sosy inj  Commonly known as: Lovenox   Inject 40 mg under the skin every day at 6 PM.  Dose: 40 mg     insulin regular 100 Unit/mL Soln  Commonly known as: HumuLIN R   Inject 3-14 Units under the skin 4 Times a Day,Before Meals and at Bedtime.  Dose: 3-14 Units     * lidocaine 5 % Ptch  Commonly known as: LIDODERM   Place 1 Patch on the skin every 24 hours.  Dose: 1 Patch     * lidocaine 5 % Ptch  Commonly known as: LIDODERM   Place 1 Patch on the skin every 24 hours.  Dose: 1 Patch     magnesium hydroxide 400 MG/5ML Susp  Commonly known as: MILK OF MAGNESIA   Take 30 mL by mouth 1 time a day as needed (if polyethylene glycol ineffective after 24 hours).  Dose: 30 mL     ondansetron 4 MG Tbdp  Commonly known as: ZOFRAN ODT   Take 1 Tablet by mouth every four hours as needed for Nausea/Vomiting (give PO if IV route is unavailable.).  Dose: 4 mg     oxyCODONE immediate-release 5 MG Tabs  Commonly known as: ROXICODONE   Take 0.5-1 Tablets by mouth every 3 hours as needed for Severe Pain for up to 5 days.  Dose: 2.5-5 mg     polyethylene glycol/lytes 17 g Pack  Commonly known as: MIRALAX   Take 1 Packet by mouth 1 time a day as needed (if sennosides and docusate ineffective after 24 hours).  Dose: 17 g     senna-docusate 8.6-50 MG Tabs  Commonly known as: PERICOLACE or SENOKOT S   Take 2 Tablets by mouth 2 times a day.  Dose: 2 Tablet     tamsulosin 0.4 MG capsule  Start taking on: December 29, 2022  Commonly known as: FLOMAX   Take 1 Capsule by mouth 1/2 hour after breakfast.  Dose: 0.4  mg           * This list has 2 medication(s) that are the same as other medications prescribed for you. Read the directions carefully, and ask your doctor or other care provider to review them with you.                CHANGE how you take these medications        Instructions   benazepril 10 MG Tabs  What changed:   medication strength  how much to take  Commonly known as: LOTENSIN   Take 1 Tablet by mouth every day.  Dose: 10 mg     omeprazole 20 MG delayed-release capsule  What changed:   how much to take  how to take this  when to take this  Commonly known as: PRILOSEC   TAKE ONE CAPSULE BY MOUTH DAILY (PRILOSEC)            CONTINUE taking these medications        Instructions   albuterol 108 (90 Base) MCG/ACT Aers inhalation aerosol   Doctor's comments: PER INSURANCE  Inhale 2 Puffs every 6 hours as needed for Shortness of Breath.  Dose: 2 Puff     aspirin 81 MG Chew chewable tablet  Commonly known as: ASA   Chew 1 Tablet every day.  Dose: 81 mg     atorvastatin 80 MG tablet  Commonly known as: LIPITOR   Doctor's comments: Replaces 40 mg dose  Take 1 Tablet by mouth every evening.  Dose: 80 mg     * Blood Glucose Monitoring Suppl Ernestina   Doctor's comments: I believe either Freestyle lite or Paul Countour are on [facility] formulary  Meter: Dispense Device of Insurance Preference. Sig. Use as directed for blood sugar monitoring. #1. NR.     * Blood Glucose Test Strips   Test strips for meter dispensed, testing one time per day E11.65     * Blood Glucose Monitoring Suppl Ernestina   Freestyle carlo glucose monitoring device with all necessary accessories including patch and transmitter  for type II noninsulin treated diabetes.  To check blood sugar once or twice daily and per any symptoms of high or low blood sugar.     buPROPion  MG Tb12 sustained-release tablet  Commonly known as: WELLBUTRIN-SR   Take 1 Tablet by mouth 2 times a day.  Dose: 150 mg     DULoxetine 60 MG Cpep delayed-release capsule  Commonly  known as: CYMBALTA   Doctor's comments: Patient requests 90 day supply  Take 1 Capsule by mouth 2 times a day.  Dose: 60 mg     ferrous sulfate 325 (65 Fe) MG EC tablet   Take 1 Tab by mouth 3 times a day, with meals.  Dose: 325 mg     gabapentin 300 MG Caps  Commonly known as: NEURONTIN   Take 1 Capsule by mouth every day.  Dose: 300 mg     glipiZIDE SR 10 MG Tb24  Commonly known as: glipiZIDE XL   Take 1 Tablet by mouth every day.  Dose: 10 mg     HM COMPLETE 50+ MENS ULTIMATE PO   Take 1 Tablet by mouth every day.  Dose: 1 Tablet     Jardiance 25 MG Tabs  Generic drug: Empagliflozin   Doctor's comments: May dispense a 30 day supply if patient can not afford higher co pay.  Replaces the 10 mg dose.  Take 25 mg by mouth every day.  Dose: 25 mg     Lancets   Lancets for meter dispensed, to test one time per day E11.65     metformin 1000 MG tablet  Commonly known as: GLUCOPHAGE   Take 1 Tablet by mouth 2 times a day with meals.  Dose: 1,000 mg     pioglitazone 30 MG Tabs  Commonly known as: ACTOS   Take 1 Tablet by mouth every day.  Dose: 30 mg     sildenafil citrate 100 MG tablet  Commonly known as: VIAGRA   Take 100 mg by mouth as needed.  Dose: 100 mg     vitamin D 1000 Unit (25 mcg) Tabs  Commonly known as: cholecalciferol   Take 1 Tablet by mouth every day.  Dose: 1,000 Units           * This list has 3 medication(s) that are the same as other medications prescribed for you. Read the directions carefully, and ask your doctor or other care provider to review them with you.                STOP taking these medications      Acetaminophen PM  MG Tabs  Generic drug: diphenhydrAMINE-APAP (sleep)     naltrexone 50 MG Tabs  Commonly known as: DEPADE     terazosin 5 MG Caps  Commonly known as: HYTRIN              Allergies  No Known Allergies    DIET  No orders of the defined types were placed in this encounter.      ACTIVITY  As tolerated and directed by rehab.  Weight bearing as tolerated    LINES, DRAINS, AND  WOUNDS  This is an automated list. Peripheral IVs will be removed prior to discharge.                     MENTAL STATUS ON TRANSFER             CONSULTATIONS  Neurosurgery    PROCEDURES  None    Discharge exam  Physical Exam  Constitutional:       General: He is not in acute distress.     Appearance: He is obese. He is not ill-appearing, toxic-appearing or diaphoretic.   HENT:      Head: Normocephalic and atraumatic.      Nose: Nose normal.      Mouth/Throat:      Mouth: Mucous membranes are moist.   Eyes:      General: No scleral icterus.     Conjunctiva/sclera: Conjunctivae normal.   Cardiovascular:      Rate and Rhythm: Normal rate and regular rhythm.      Heart sounds: No murmur heard.    No friction rub. No gallop.   Pulmonary:      Effort: Pulmonary effort is normal.      Breath sounds: Normal breath sounds.   Abdominal:      General: Bowel sounds are normal. There is no distension.      Palpations: Abdomen is soft.      Tenderness: There is no abdominal tenderness.   Musculoskeletal:      Cervical back: Normal range of motion.      Right lower leg: No edema.      Left lower leg: No edema.   Skin:     Coloration: Skin is not jaundiced.      Findings: No rash.   Neurological:      Mental Status: He is alert and oriented to person, place, and time.   Psychiatric:         Mood and Affect: Mood normal.         Behavior: Behavior normal.         LABORATORY  Lab Results   Component Value Date    SODIUM 135 12/28/2022    POTASSIUM 4.5 12/28/2022    CHLORIDE 99 12/28/2022    CO2 23 12/28/2022    GLUCOSE 179 (H) 12/28/2022    BUN 17 12/28/2022    CREATININE 0.71 12/28/2022        Lab Results   Component Value Date    WBC 6.3 12/28/2022    HEMOGLOBIN 15.0 12/28/2022    HEMATOCRIT 44.2 12/28/2022    PLATELETCT 212 12/28/2022      CT-TSPINE W/O PLUS RECONS  Narrative: 12/22/2022 4:10 PM    HISTORY/REASON FOR EXAM:  Mid-back pain, compression fracture suspected.    TECHNIQUE/EXAM DESCRIPTION AND NUMBER OF VIEWS:  CT  thoracic spine without contrast, with reconstructions.    The study was performed on a G.E. CT scanner. Thin-section helical scanning was performed from C7-T1 through T12-L1. Sagittal and coronal multiplanar reconstructions were generated from the axial images.    Low dose optimization technique was utilized for this CT exam including automated exposure control and adjustment of the mA and/or kV according to patient size.    COMPARISON: CT chest 6/23/2019.    FINDINGS:  Alignment in the thoracic spine is normal. Acute T11 compression fracture with approximately 10% loss of height. No significant retropulsion. The cervicothoracic junction appears intact.    The prevertebral and paraspinous soft tissues are unremarkable.    Multilevel degenerative changes.    Mild paraseptal emphysematous changes.  Impression: Acute T11 compression fracture with approximately 10% loss of height. No significant retropulsion.  CT-LSPINE W/O PLUS RECONS  Narrative: 12/22/2022 4:10 PM    HISTORY/REASON FOR EXAM:  Pain following trauma.    TECHNIQUE/EXAM DESCRIPTION AND NUMBER OF VIEWS:  CT lumbar spine without contrast, with reconstructions.    The study was performed on a helical multidetector CT scanner. Thin-section helical scanning was performed from T12-L1 to the sacrum. Sagittal and coronal multiplanar reconstructions were generated from the axial images.    Low dose optimization technique was utilized for this CT exam including automated exposure control and adjustment of the mA and/or kV according to patient size.    COMPARISON: None.    FINDINGS:  Alignment in the lumbar spine is normal.    Acute compression fracture of L2 vertebral body, with up to 20% vertebral body height loss.    Acute compression fracture of L3 vertebral body, with up to 10% vertebral body height loss.    Thoracic spine is reported separately.    Moderate spondylosis at L5-S1. No significant lumbar disc height loss otherwise.    The sacrum and sacroiliac  joints show no acute abnormality.  Impression: 1.  Acute compression fracture of L2 vertebral body, with up to 20% vertebral body height loss.  2.  Acute compression fracture of L3 vertebral body, with up to 10% vertebral body height loss.  3.  Thoracic spine is reported separately.  CT-HEAD W/O  Narrative:  CT HEAD WITHOUT CONTRAST  12/22/2022 4:10 PM    HISTORY/REASON FOR EXAM:  Pain following trauma    TECHNIQUE/EXAM DESCRIPTION AND NUMBER OF VIEWS:  CT of the head without contrast.    The study was performed on a helical multidetector CT scanner. Contiguous 2.5 mm axial sections were obtained from the skull base through the vertex.    Up to date radiation dose reduction adjustments have been utilized to meet ALARA standards for radiation dose reduction.    COMPARISON:  5/12/2022    FINDINGS:  Unchanged ventricular dilatation, left greater than right.  Moderate global parenchymal atrophy. Chronic small vessel ischemic changes.  No significant mass effect or midline shift.  Basal cisterns are patent.  No evidence for intracranial hemorrhage.  Calvaria are intact.  Atherosclerosis.    Visualized orbits are unremarkable.  Visualized mastoid air cells are clear.  Mild polypoid mucosal thickening of the right maxillary sinus, likely of no clinical significance.  Impression: 1. No CT evidence of acute infarct, hemorrhage or mass.  2. Moderate global parenchymal atrophy. Chronic small vessel ischemic changes.  3. Unchanged ventricular dilatation, left greater than right. Normal pressure hydrocephalus is possible.  DX-CHEST-PORTABLE (1 VIEW)  Narrative: 12/22/2022 3:15 PM    HISTORY/REASON FOR EXAM:  Sepsis.    TECHNIQUE/EXAM DESCRIPTION AND NUMBER OF VIEWS:  Single portable view of the chest.    COMPARISON:  1/2/2019    FINDINGS:    LUNGS: Clear. No effusions.  PNEUMOTHORAX: None.  LINES AND TUBES: None.  MEDIASTINUM: No cardiomegaly.  MUSCULOSKELETAL STRUCTURES: No acute displaced fracture.  Impression: No acute  cardiopulmonary abnormality.        Total time of the discharge process exceeds 35 minutes.

## 2022-12-28 NOTE — DISCHARGE INSTRUCTIONS
Discharge Instructions    Discharged to other by medical transportation with escort. Discharged via ambulance, hospital escort: Yes.  Special equipment needed: ROBERTO Rey    Be sure to schedule a follow-up appointment with your primary care doctor or any specialists as instructed.     Discharge Plan:        I understand that a diet low in cholesterol, fat, and sodium is recommended for good health. Unless I have been given specific instructions below for another diet, I accept this instruction as my diet prescription.   Other diet:     Special Instructions: None    -Is this patient being discharged with medication to prevent blood clots?  No    Is patient discharged on Warfarin / Coumadin?   No

## 2022-12-28 NOTE — CARE PLAN
Problem: Pain - Standard  Goal: Alleviation of pain or a reduction in pain to the patient’s comfort goal  Outcome: Progressing     Problem: Knowledge Deficit - Standard  Goal: Patient and family/care givers will demonstrate understanding of plan of care, disease process/condition, diagnostic tests and medications  Outcome: Progressing     Problem: Fall Risk  Goal: Patient will remain free from falls  Outcome: Progressing   The patient is Stable - Low risk of patient condition declining or worsening    Shift Goals  Clinical Goals: Safety  Patient Goals: Comfort  Family Goals: N/A    Progress made toward(s) clinical / shift goals:  Patient discharged to [facility]     Patient is not progressing towards the following goals:

## 2022-12-28 NOTE — PROGRESS NOTES
Patient admitted to facility at 1430; accompanied by hospital transport.  Patient assisted to room and positioned in bed for comfort and safety; call light within reach.  Patient assisted with stowing belongings and oriented to room and facility.  Admission assessment performed and documented in computer.  Admission paperwork completed; signed copies placed in chart.  Will continue to monitor.

## 2022-12-28 NOTE — FLOWSHEET NOTE
12/28/22 1511   Protocol Assessment   Initial Assessment Yes   Patient History   Pulmonary Diagnosis none   Procedures Relevant to Respiratory Status none   Home O2 No   Nocturnal CPAP No   Home Treatments/Frequency No   Sleep Apnea Screening   Have you had a sleep study? No   Have you been diagnosed with sleep apnea? No

## 2022-12-28 NOTE — CARE PLAN
Problem: Knowledge Deficit - Standard  Goal: Patient and family/care givers will demonstrate understanding of plan of care, disease process/condition, diagnostic tests and medications  Outcome: Progressing   Pt educated on POC and [facility] eval tomorrow.  Problem: Fall Risk  Goal: Patient will remain free from falls  Outcome: Progressing  Fall precautions in place. Pt educated to call for assistance as needed.     The patient is Stable - Low risk of patient condition declining or worsening    Shift Goals  Clinical Goals: Safety  Patient Goals: Comfort  Family Goals: no family present    Progress made toward(s) clinical / shift goals:  Pt verbalizes understanding of POC. Pt free from falls.     Patient is not progressing towards the following goals:

## 2022-12-28 NOTE — DISCHARGE PLANNING
Agency/Facility Name: [facility]   Outcome: Left a voicemail with Dayo in admissions in regards to bed availability. Waiting for callback.     @[phone]  Agency/Facility Name: [facility]   Spoke To: Dayo   Outcome: Informed Dayo pt is considering IPR instead of SNF. Per consult note pt is a very good candidate. Dayo stated that she could not hold the bed for him if he is not going there today for sure. DPA verbalized understanding and will follow up later on if IPR does not work out.

## 2022-12-28 NOTE — PROGRESS NOTES
Assumed care of patient at 0645. Bedside report received. Assessment complete.  AA&Ox3 Disoriented to Time, Forgetful; Requiring Frequent Redirection. Denies CP/SOB.  Reporting 0/10 pain. Declined intervention at this time.  Educated patient regarding pharmacologic and non pharmacologic modalities for pain management.  Skin per flowsheets  Tolerating diabetic diet. Denies N/V.  + void. + BM. Last BM 12/28  Pt ambulates x1 w FWW TLSO when OOB.  All needs met at this time. Call light within reach. Pt calls appropriately. Bed low and locked, non skid socks in place. Hourly rounding in place.

## 2022-12-28 NOTE — PREADMISSION SCREENING NOTE
Pre-Admission Screening Form    Patient Information:   Name: Prabhakar Sierra     MRN: [id number]       : 1950      Age: 72 y.o.   Gender: male      Race: White [7]       Marital Status:  [2]  Family Contact: Heidy Sierra        Relationship: Spouse [17]  Home Phone: [phone]           Cell Phone: [phone]  Advanced Directives: None  Code Status:  DNAR/DNI  Current Attending Provider: Paulina Garcia M.D.  Referring Physician: Dr. Paulina Garcia      Physiatrist Consult: Dr. Evelyne Irving       Referral Date: 2022  Primary Payor Source:  [facility]  Secondary Payor Source:      Medical Information:   Date of Admission to Acute Care [place]  Room Number: T302/01  Rehabilitation Diagnosis: 0008.9 - Orthopaedic Disorders: Other Orthopaedic  Immunization History   Administered Date(s) Administered    Hepatitis B Vaccine (Adol/Adult) 2019, 2019    Influenza (IM) Preservative Free - HISTORICAL DATA 10/18/2012, 2013    Influenza Vaccine Adult HD 2014, 10/27/2015, 10/26/2016, 10/16/2017, 10/01/2018, 10/22/2018, 10/10/2019, 2020, 2022    MODERNA SARS-COV-2 VACCINE (12+) 2021, 2021    Pneumococcal Conjugate Vaccine (Prevnar/PCV-13) 2015, 2016    Pneumococcal polysaccharide vaccine (PPSV-23) 12/10/2018    Tdap Vaccine 10/18/2012, 2016    Zoster Vaccine Recombinant (RZV) (SHINGRIX) 2020, 2021     No Known Allergies  Past Medical History:   Diagnosis Date    BPH (benign prostatic hyperplasia)     GERD (gastroesophageal reflux disease)     Hypertension     Hyponatremia 2017    Iron deficiency anemia secondary to inadequate dietary iron intake 2021    Mild cognitive impairment 2022    Neuropathic pain, leg     Right hip pain 2016    Type II or unspecified type diabetes mellitus without mention of complication, not stated as uncontrolled      History reviewed. No  pertinent surgical history.    History Leading to Admission, Conditions that Caused the Need for Rehab (CMS):     Giacomo Bedolla M.D.  Physician  [facility] Medicine  H&P     Addendum  Date of Service:  12/22/2022  5:54 PM    [facility]     Date of Service  12/22/2022     Primary Care Physician  Stanton Miller M.D.     Consultants  Neurosurgery (Dr. Guzman)     Code Status  DNAR/DNI     Chief Complaint    Chief Complaint  Patient presents with   High Blood Sugar      Generalized body weakness/malaise for 1 week        History of Presenting Illness  Prabhakar Sierra is a 72 y.o. male with history of dementia, diabetes, hypertension, hyperlipidemia, neuropathy, GERD, osteoporosis who presented 12/22/2022 with evaluation for frequent falls.  History obtained from patient's wife was at bedside in ER as patient is poor historian.  Per wife, patient had multiple falls recently, difficulty ambulating with walker, needing assistance with ADLs.  He has home health/home PT, wife feels that this is insufficient in providing care for patient.  Due to unrelenting pain and frequent falls, patient was brought to ER by spouse for evaluation.  No acute etiology seen on CT head.  CT T and L-spine however noted acute T11/L2/L3 compression fracture.  Neurosurgery was consulted, recommended TLSO brace, may consider kyphoplasty pending clinical course.  Admitted to medicine service.     I discussed the plan of care with patient, family, and bedside RN.     Review of Systems  Review of Systems   Constitutional:  Negative for chills and fever.   HENT:  Negative for hearing loss and tinnitus.    Eyes:  Negative for blurred vision and double vision.   Respiratory:  Negative for cough and shortness of breath.    Cardiovascular:  Negative for chest pain and palpitations.   Gastrointestinal:  Negative for abdominal pain, heartburn, nausea and vomiting.   Genitourinary:  Negative for dysuria  and flank pain.   Musculoskeletal:  Positive for back pain, falls, joint pain and myalgias. Negative for neck pain.   Skin:  Negative for itching and rash.   Neurological:  Negative for dizziness and headaches.   Endo/Heme/Allergies:  Negative for environmental allergies. Does not bruise/bleed easily.   Psychiatric/Behavioral:  Negative for depression and substance abuse.    All other systems reviewed and are negative.     Past Medical History   has a past medical history of BPH (benign prostatic hyperplasia), GERD (gastroesophageal reflux disease), Hypertension, Hyponatremia (5/18/2017), Iron deficiency anemia secondary to inadequate dietary iron intake (5/26/2021), Mild cognitive impairment (7/26/2022), Neuropathic pain, leg, Right hip pain (11/14/2016), and Type II or unspecified type diabetes mellitus without mention of complication, not stated as uncontrolled.     Surgical History   has no past surgical history on file.      Family History  family history includes Cancer in his brother; Diabetes in his father; Heart Disease in his brother and mother; No Known Problems in his daughter and daughter; Other in his sister.   Family history reviewed with patient. There is no family history that is pertinent to the chief complaint.      Social History   reports that he has been smoking cigarettes. He has a 7.50 pack-year smoking history. He has never used smokeless tobacco. He reports that he does not currently use alcohol after a past usage of about 4.8 - 6.0 oz per week. He reports that he does not use drugs.     Allergies  No Known Allergies    Assessment/Plan:  Justification for Admission Status  I anticipate this patient will require least 2 midnights of hospitalization, therefore appropriate for inpatient status.        * Compression fracture of body of thoracic vertebra (HCC)- (present on admission)  Assessment & Plan  Acute T11 compression fracture noted on CT  Supportive pain control  PT/OT  Fall  precautions  TLSO brace per neurosurgery  Neurosurgery follow-up appreciated     Compression fracture of L3 lumbar vertebra, closed, initial encounter (HCC)  Assessment & Plan  As above     Compression fracture of L2 lumbar vertebra, closed, initial encounter ([facility])  Assessment & Plan  As above     Neuropathy  Assessment & Plan  Cymbalta, gabapentin     Fall  Assessment & Plan  Fall precautions     Uncontrolled type 2 diabetes mellitus with hyperglycemia ([facility])- (present on admission)  Assessment & Plan  ISS     Dementia ([facility])  Assessment & Plan  Frequent reorientation  Minimize sedative     Vitamin D deficiency- (present on admission)  Assessment & Plan  Supplement     Mixed hyperlipidemia- (present on admission)  Assessment & Plan  Statin     Essential hypertension- (present on admission)  Assessment & Plan  ACEI, Hydrin     Age-related physical debility  Assessment & Plan  PT/OT     ACP (advance care planning)  Assessment & Plan  Discussed in ED-DNR/DNI per patient's wish     Tobacco use  Assessment & Plan  Cessation advised  Declined nicotine replacement     GERD (gastroesophageal reflux disease)- (present on admission)  Assessment & Plan  PPI     VTE prophylaxis: SCDs/Aileen Guzman M.D.  Physician  Surgery Neurosurgery  Consults     Signed  Date of Service:  12/22/2022  6:58 PM    Consult called 520 imaging and consult performed approximately 6 PM  Reason for consultation is ground-level fall with multiple endplate fractures.     Brief HPI this is a 77-year-old gentleman who has been having frequent falls is being evaluated by neurology for potential causes of the falls he has a history of prostate cancer status posttreatment with complete remission and now presents with multiple falls over the last couple days with intractable back pain showing a minimal endplate compression fractures at L2 20% and L3 10% no retropulsion he also has degenerative disc disease at multiple other levels the patient  also has endplate fracture at T11 with 1110% height loss.     At this point the patient has intractable back pain is being admitted for conservative treatment with medications.     12 point review of systems negative for any bowel or bladder dysfunction focal deficits radiculopathy.  Past medical history significant for prostate cancer status post radiation treatment  Possible osteoporosis  Past surgical history noncontributory  Past medications noncontributory.     Physical examination is alert orient x3 able to answer question appropriate no signs dysarthria aphasia HEENT is atraumatic normocephalic pulmonary normal breathing cardiovascular is 2+ pulses x4 motor examination is 5-5 strength no focal deficits.     Imaging is thoracic and lumbar CT scans which show endplate fractures at T11 L2 and L3 all of approximately 10 to 20% endplate compression fractures with no middle column injury or retropulsion no sign of instability.     Assessment and plan     At this time we will treat the patient a TLSO brace he can have a off-the-shelf brace with it placed when he is standing or sitting at the side of the bed he does not need to wear it in bed or when he is taking a shower we can treat him conservatively with pain medications at this time we would not endorse moving forward with kyphoplasty as he has not had a trial of conservative therapy if he is doing well in the TLSO brace we could see him back in 6 weeks in the office with standing films if he is not having progressive fractures we would treat him as an osteoporotic fracture with the conservative therapy and serial imaging.  Additionally he should be seen referred to his PCP for osteoporosis treatment if they are not providing that type of treatment with DEXA scans as well as consultation imaging and laboratory evaluations we refer him over to [facility] practice for osteoporosis treatment.           Evelyne Irving M.D.  Physician  Physical Medicine &  Rehab  Consults     Signed  Date of Service:  12/28/2022  9:06 AM  Consult Orders  IP Consult For Physiatry [[id number]] ordered by Paulina Garcia M.D. at 12/28/22 0903     Medical chart review completed.      Patient is a 72 y.o. male  with a past medical history of dementia, prostate cancer status posttreatment in remission, diabetes, peripheral neuropathy, osteoporosis, hypertension, BPH, tobacco use, admitted to [facility] in [place] on 12/22, with complaints of severe back pain, generalized weakness and malaise as well as multiple ground-level falls in the previous week.     Patient had imaging including a CT of the spine which showed acute T11 compression fracture with 20% loss of height, acute L2 compression fracture with 20% loss of height, and acute L3 compression fracture with 10% loss of vertebral height.  He had a head CT that showed left greater than right ventricular dilatation suggestive of normal pressure hydrocephalus.     Patient was seen and evaluated by neurosurgery, Dr. Guzman, with recommendation for conservative management with a TLSO brace and follow-up in 6 weeks, not currently a good candidate for kyphoplasty.     Patient was admitted for his fractures and pain management, for dehydration and hyperglycemia.     Patient with multiple co-morbidities(including but not limited to orthostatic hypotension diabetes with hyperglycemia, ); with cognitive deficits and functional deficits in mobility/self-cares, and Severe de-conditioning.      Pre-morbidly, this patient lived in a two level home in [place] with 0 steps to enter, with spouse. The patient was evaluated by acute care Physical Therapy and Occupational Therapy; currently requiring minimal assistance for mobility and minimal to maximum assistance for ADLs, also with ongoing cognitive deficits.      The patient is a very good candidate for an acute inpatient rehabilitation program with a coordinated program of care at an intensity and  frequency not available at a lower level of care.      Note: if the patient continues to progress while waiting for medical clearance, and no longer requires 2 out of 3 therapy services (PT/OT/SLP), then the patient would no longer meet the criteria for acute inpatient rehabilitation.     This recommendation is substantiated by the patient's current medical condition with intervention and assessment of medical issues requiring an acute level of care for patient's safety and maximum outcome. A coordinated program of care will be provided by an interdisciplinary team including physical therapy, occupational therapy, speech language pathology, hospitalist, physiatry, and rehab nursing. Rehab goals include improved cognition, mobility, self-care management, strength and conditioning/endurance, pain management, bowel and bladder management, mood and affect, and safety with independent home management including caregiver training. Estimated length of stay is approximately 10-12 days. Rehab potential: Good. Disposition: to pre-morbid independent living setting with supportive care of patient's spouse. We will continue to follow with you in anticipation of discharge to acute inpatient rehabilitation when medically stable to do so at the discretion of his attending physician.      Thank you for allowing us to participate in his care.     Evelyne Irving M.D.  Physical Medicine and Rehabilitation     Co-morbidities:  See above  Potential Risk - Complications: Contractures, Deep Vein Thrombosis, Incontinence, Malnutrition, Pain, Perceptual Impairment, Pneumonia, Pressure Ulcer, and Infection  Level of Risk: High    Ongoing Medical Management Needed (Medical/Nursing Needs):   Patient Active Problem List    Diagnosis Date Noted    Pancytopenia (HCC) 12/23/2022    Compression fracture of body of thoracic vertebra (HCC) 12/22/2022    Fall 12/22/2022    Age-related physical debility 12/22/2022    Compression fracture of L2  lumbar vertebra, closed, initial encounter ([facility]) 12/22/2022    Compression fracture of L3 lumbar vertebra, closed, initial encounter ([facility]) 12/22/2022    Neuropathy 12/22/2022    Uncontrolled type 2 diabetes mellitus with hyperglycemia ([facility]) 11/07/2022    Normal pressure hydrocephalus ([facility]) 11/07/2022    Thrombocytopenia, unspecified ([facility]) 11/07/2022    Tobacco dependency 08/22/2022    Dementia ([facility]) 07/26/2022    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 Moran 07/26/2022    Obesity (BMI 30.0-34.9) 09/16/2021    Orthostatic hypotension 01/03/2019    Alcohol use disorder, moderate, dependence ([facility]) 01/03/2019    Other male erectile dysfunction 04/11/2018    Vitamin D deficiency 05/12/2017    Current moderate episode of major depressive disorder without prior episode ([facility]) 11/11/2016    B12 deficiency 11/01/2016    Tremor, coarse 10/25/2016    GERD (gastroesophageal reflux disease) 09/27/2016    Essential hypertension 09/27/2016    Prostate CA ([facility])- feb 2022; RT tbd x 8 wks, mar- may 2022 09/27/2016    Mixed hyperlipidemia 09/27/2016    Tobacco use 09/27/2016    ACP (advance care planning) 09/27/2016     Vikas Lloyd R.N.  Registered Nurse  Progress Notes     Signed  Date of Service:  12/28/2022 10:14 AM    Assumed care of patient at 0645. Bedside report received. Assessment complete.  AA&Ox3 Disoriented to Time, Forgetful; Requiring Frequent Redirection. Denies CP/SOB.  Reporting 0/10 pain. Declined intervention at this time.  Educated patient regarding pharmacologic and non pharmacologic modalities for pain management.  Skin per flowsheets  Tolerating diabetic diet. Denies N/V.  + void. + BM. Last BM 12/28  Pt ambulates x1 w FWW TLSO when OOB.  All needs met at this time. Call light within reach. Pt calls appropriately. Bed low and locked, non skid socks in place. Hourly rounding in place.     Current Vital Signs:   Temperature: 36.6 °C (97.9 °F) Pulse: 95 Respiration: 17  "Blood Pressure : (!) 155/85  Weight: 97.5 kg (215 lb) Height: 177.8 cm (5' 10\")  Pulse Oximetry: 93 % O2 (LPM): 0      Completed Laboratory Reports:  Recent Labs     22  0357 22  0756   WBC  --  6.3   HEMOGLOBIN  --  15.0   HEMATOCRIT  --  44.2   PLATELETCT  --  212   SODIUM 133* 135   POTASSIUM 4.0 4.5   BUN 16 17   CREATININE 0.64 0.71   GLUCOSE 192* 179*     Additional Labs: Not Applicable    Prior Living Situation:   Housing / Facility: 2 Story House  Steps Into Home: 0  Steps In Home: 14  Lives with - Patient's Self Care Capacity: Spouse  Equipment Owned: Front-Wheel Walker    Prior Level of Function / Living Situation:   Physical Therapy: Prior Services: None  Housing / Facility: 2 Story House  Steps Into Home: 0  Steps In Home: 14  Rail: Right Rail  (Steps in Home)  Bathroom Set up: Bathtub / Shower Combination  Equipment Owned: Front-Wheel Walker  Lives with - Patient's Self Care Capacity: Spouse  Bed Mobility: Independent  Transfer Status: Independent  Ambulation: Independent  Distance Ambulation (Feet):  (household distances)  Assistive Devices Used: None  Stairs: Independent  Current Level of Function:   Gait Level Of Assist: Contact Guard Assist  Assistive Device: Front Wheel Walker  Distance (Feet): 120  Deviation: Decreased Base Of Support, Decreased Heel Strike  Weight Bearing Status: no restrictions  Skilled Intervention: Verbal Cuing, Postural Facilitation, Compensatory Strategies  Supine to Sit: Minimal Assist  Sit to Supine: Minimal Assist  Scooting: Supervised  Rolling: Minimum Assist to Lt.  Skilled Intervention: Verbal Cuing, Sequencing  Comments: good demo of log roll without cueing  Sit to Stand: Minimal Assist  Bed, Chair, Wheelchair Transfer: Contact Guard Assist  Toilet Transfers: Contact Guard Assist  Skilled Intervention: Verbal Cuing  Sitting in Chair: NT  Sitting Edge of Bed: 10 min total  Standin min total  Occupational Therapy:   Self Feeding: Independent  Grooming / " Hygiene: Independent  Bathing: Independent  Dressing: Independent  Toileting: Independent  Prior Services: None  Housing / Facility: [address]  Current Level of Function:   Eating: Supervision  Upper Body Dressing: Maximal Assist (to don TLSO)  Lower Body Dressing: Maximal Assist  Skilled Intervention: Verbal Cuing  Speech Language Pathology:      Rehabilitation Prognosis/Potential: Good  Estimated Length of Stay: 10-12 days    Nursing:      Continent - Frequency    Scope/Intensity of Services Recommended:  Physical Therapy: 1 hr / day  5 days / week. Therapeutic Interventions Required: Maximize Endurance, Mobility, Strength, and Safety  Occupational Therapy: 1 hr / day 5 days / week. Therapeutic Interventions Required: Maximize Self Care, ADLs, IADLs, and Energy Conservation  Speech & Language Pathology: 1 hr / day 5 days / week. Therapeutic Interventions Required: Maximize Cognition and Safety  Rehabilitation Nursin/7. Therapeutic Interventions Required: Monitor Pain, Skin, Vital Signs, Intake and Output, Labs, Safety, Aspiration Risk, Family Training, and DVT Prophylaxis; Bowel and bladder regimen; Infection control; ADL's.  Rehabilitation Physician: 3 - 5 days / week. Therapeutic Interventions Required: Medical Management  Respiratory Care: Consult. Therapeutic Interventions Required: Pulmonary Toileting and Respiratory care per protocol  Dietician: Consult. Therapeutic Interventions Required: Nutritional evaluation with recommendations to promote optimal health and healing.     He requires 24-hour rehabilitation nursing to manage bowel and bladder function, skin care, nutrition and fluid intake, pulmonary hygiene, pain control, safety, medication management, and patient/family goals. In addition, rehabilitation nursing will reiterate and reinforce therapy skills and equipment use, including ADLs, as well as provide education to the patient and family. Prabhakar Sierra is willing to participate  in and is able to tolerate the proposed plan of care.    Rehabilitation Goals and Plan (Expected frequency & duration of treatment in the IRF):   Return to the Community, Modified Independent Level of Care, Outpatient Support, and Family Able to Provide 24/7 Assistance  Anticipated Date of Rehabilitation Admission: 12/28/2022  Patient/Family oriented IRF level of care/facility/plan: Yes  Patient/Family willing to participate in IRF care/facility/plan: Yes  Patient able to tolerate IRF level of care proposed: Yes  Patient has potential to benefit IRF level of care proposed: Yes  Comments: Not Applicable    Special Needs or Precautions - Medical Necessity:  Safety Concerns/Precautions:  Fall Risk / High Risk for Falls, Balance, Cognition, and Bed / Chair Alarm  Pain Management  Splints/Braces/Orthotics: TLSO  Spine/Back Precautions    Diet:   DIET ORDERS (From admission to next 24h)       Start     Ordered    12/22/22 1836  Diet Order Diet: Cardiac; Second Modifier: (optional): Consistent CHO (Diabetic)  ALL MEALS        Question Answer Comment   Diet: Cardiac    Second Modifier: (optional) Consistent CHO (Diabetic)        12/22/22 1835                    Anticipated Discharge Destination / Patient/Family Goal:  Destination: Home with Assistance Support System: Spouse  Anticipated home health services: OT, PT, SLP, Nursing, Social Work, and Aide  Previously used HH service/ provider: Not Applicable  Anticipated DME Needs:  To be determined  Outpatient Services:  To be determined  Alternative resources to address additional identified needs:   Future Appointments   Date Time Provider Department Center   1/4/2023  2:00 PM Ashwini Martinez PT [facility]   1/6/2023  2:45 PM Ashwini Martinez PT [facility]   1/9/2023  1:15 PM Ashwini Martinez PT [facility]   1/12/2023  1:15 PM Ashwini Martinez, PT [facility]   1/16/2023  2:00 PM Ashwini Martinez PT [facility]   1/19/2023  2:45 PM  Ashwini Martinez, PT [facility]   1/23/2023  2:00 PM Ashwini Martinez, PT [facility]   1/25/2023  2:00 PM Ashwini Martinez, PT [facility]   1/30/2023  2:00 PM Ashwini Martinez, PT [facility]   2/2/2023  2:00 PM Ashwini Martinez, PT [facility]   2/6/2023  2:00 PM Ashwini Martinez, PT [facility]   2/9/2023  2:00 PM Ashwini Martinez, PT [facility]   2/13/2023 11:20 AM [facility] RN 25M SHAHID Payan   5/16/2023  2:00 PM Parminder Moran M.D. [facility] None       Pre-Screen Completed: 12/28/2022 10:51 AM Kimberly Bird R.N.

## 2022-12-29 PROBLEM — F10.11 HISTORY OF ALCOHOL ABUSE: Status: ACTIVE | Noted: 2019-01-03

## 2022-12-29 PROBLEM — F10.11 HISTORY OF ALCOHOL ABUSE: Status: ACTIVE | Noted: 2022-11-07

## 2022-12-29 PROBLEM — R00.0 TACHYCARDIA: Status: ACTIVE | Noted: 2022-12-29

## 2022-12-29 PROBLEM — E11.9 DIABETES MELLITUS, TYPE 2 (HCC): Status: ACTIVE | Noted: 2022-11-07

## 2022-12-29 PROBLEM — E83.42 HYPOMAGNESEMIA: Status: ACTIVE | Noted: 2022-12-29

## 2022-12-29 LAB
ALBUMIN SERPL BCP-MCNC: 3.9 G/DL (ref 3.2–4.9)
ALBUMIN/GLOB SERPL: 1.3 G/DL
ALP SERPL-CCNC: 69 U/L (ref 30–99)
ALT SERPL-CCNC: 13 U/L (ref 2–50)
ANION GAP SERPL CALC-SCNC: 15 MMOL/L (ref 7–16)
AST SERPL-CCNC: 13 U/L (ref 12–45)
BASOPHILS # BLD AUTO: 0.6 % (ref 0–1.8)
BASOPHILS # BLD: 0.04 K/UL (ref 0–0.12)
BILIRUB SERPL-MCNC: 0.7 MG/DL (ref 0.1–1.5)
BUN SERPL-MCNC: 21 MG/DL (ref 8–22)
CALCIUM ALBUM COR SERPL-MCNC: 9.7 MG/DL (ref 8.5–10.5)
CALCIUM SERPL-MCNC: 9.6 MG/DL (ref 8.5–10.5)
CHLORIDE SERPL-SCNC: 99 MMOL/L (ref 96–112)
CO2 SERPL-SCNC: 19 MMOL/L (ref 20–33)
CREAT SERPL-MCNC: 0.7 MG/DL (ref 0.5–1.4)
EOSINOPHIL # BLD AUTO: 0.14 K/UL (ref 0–0.51)
EOSINOPHIL NFR BLD: 2.3 % (ref 0–6.9)
ERYTHROCYTE [DISTWIDTH] IN BLOOD BY AUTOMATED COUNT: 44.2 FL (ref 35.9–50)
GFR SERPLBLD CREATININE-BSD FMLA CKD-EPI: 97 ML/MIN/1.73 M 2
GLOBULIN SER CALC-MCNC: 2.9 G/DL (ref 1.9–3.5)
GLUCOSE BLD STRIP.AUTO-MCNC: 196 MG/DL (ref 65–99)
GLUCOSE BLD STRIP.AUTO-MCNC: 199 MG/DL (ref 65–99)
GLUCOSE BLD STRIP.AUTO-MCNC: 208 MG/DL (ref 65–99)
GLUCOSE BLD STRIP.AUTO-MCNC: 235 MG/DL (ref 65–99)
GLUCOSE SERPL-MCNC: 163 MG/DL (ref 65–99)
HCT VFR BLD AUTO: 43.6 % (ref 42–52)
HGB BLD-MCNC: 14.7 G/DL (ref 14–18)
IMM GRANULOCYTES # BLD AUTO: 0.06 K/UL (ref 0–0.11)
IMM GRANULOCYTES NFR BLD AUTO: 1 % (ref 0–0.9)
LYMPHOCYTES # BLD AUTO: 0.91 K/UL (ref 1–4.8)
LYMPHOCYTES NFR BLD: 14.7 % (ref 22–41)
MAGNESIUM SERPL-MCNC: 1.2 MG/DL (ref 1.5–2.5)
MCH RBC QN AUTO: 29.5 PG (ref 27–33)
MCHC RBC AUTO-ENTMCNC: 33.7 G/DL (ref 33.7–35.3)
MCV RBC AUTO: 87.6 FL (ref 81.4–97.8)
MONOCYTES # BLD AUTO: 0.63 K/UL (ref 0–0.85)
MONOCYTES NFR BLD AUTO: 10.2 % (ref 0–13.4)
NEUTROPHILS # BLD AUTO: 4.4 K/UL (ref 1.82–7.42)
NEUTROPHILS NFR BLD: 71.2 % (ref 44–72)
NRBC # BLD AUTO: 0 K/UL
NRBC BLD-RTO: 0 /100 WBC
PLATELET # BLD AUTO: 224 K/UL (ref 164–446)
PMV BLD AUTO: 10.7 FL (ref 9–12.9)
POTASSIUM SERPL-SCNC: 3.9 MMOL/L (ref 3.6–5.5)
PROT SERPL-MCNC: 6.8 G/DL (ref 6–8.2)
RBC # BLD AUTO: 4.98 M/UL (ref 4.7–6.1)
SODIUM SERPL-SCNC: 133 MMOL/L (ref 135–145)
WBC # BLD AUTO: 6.2 K/UL (ref 4.8–10.8)

## 2022-12-29 PROCEDURE — A9270 NON-COVERED ITEM OR SERVICE: HCPCS | Performed by: HOSPITALIST

## 2022-12-29 PROCEDURE — 700101 HCHG RX REV CODE 250: Performed by: PHYSICAL MEDICINE & REHABILITATION

## 2022-12-29 PROCEDURE — 83735 ASSAY OF MAGNESIUM: CPT

## 2022-12-29 PROCEDURE — 82962 GLUCOSE BLOOD TEST: CPT | Mod: 91

## 2022-12-29 PROCEDURE — 80053 COMPREHEN METABOLIC PANEL: CPT

## 2022-12-29 PROCEDURE — 97161 PT EVAL LOW COMPLEX 20 MIN: CPT

## 2022-12-29 PROCEDURE — A9270 NON-COVERED ITEM OR SERVICE: HCPCS | Performed by: PHYSICAL MEDICINE & REHABILITATION

## 2022-12-29 PROCEDURE — 700102 HCHG RX REV CODE 250 W/ 637 OVERRIDE(OP): Performed by: HOSPITALIST

## 2022-12-29 PROCEDURE — 97530 THERAPEUTIC ACTIVITIES: CPT

## 2022-12-29 PROCEDURE — 700102 HCHG RX REV CODE 250 W/ 637 OVERRIDE(OP): Performed by: PHYSICAL MEDICINE & REHABILITATION

## 2022-12-29 PROCEDURE — 97166 OT EVAL MOD COMPLEX 45 MIN: CPT

## 2022-12-29 PROCEDURE — 770010 HCHG ROOM/CARE - REHAB SEMI PRIVAT*

## 2022-12-29 PROCEDURE — 99233 SBSQ HOSP IP/OBS HIGH 50: CPT | Performed by: PHYSICAL MEDICINE & REHABILITATION

## 2022-12-29 PROCEDURE — 92523 SPEECH SOUND LANG COMPREHEN: CPT

## 2022-12-29 PROCEDURE — 97535 SELF CARE MNGMENT TRAINING: CPT

## 2022-12-29 PROCEDURE — 85025 COMPLETE CBC W/AUTO DIFF WBC: CPT

## 2022-12-29 PROCEDURE — 99222 1ST HOSP IP/OBS MODERATE 55: CPT | Performed by: HOSPITALIST

## 2022-12-29 PROCEDURE — 700111 HCHG RX REV CODE 636 W/ 250 OVERRIDE (IP): Performed by: PHYSICAL MEDICINE & REHABILITATION

## 2022-12-29 PROCEDURE — 36415 COLL VENOUS BLD VENIPUNCTURE: CPT

## 2022-12-29 RX ORDER — GLIPIZIDE 2.5 MG/1
15 TABLET, EXTENDED RELEASE ORAL
Status: DISCONTINUED | OUTPATIENT
Start: 2022-12-29 | End: 2023-01-05 | Stop reason: HOSPADM

## 2022-12-29 RX ORDER — LANOLIN ALCOHOL/MO/W.PET/CERES
400 CREAM (GRAM) TOPICAL DAILY
Status: DISCONTINUED | OUTPATIENT
Start: 2022-12-29 | End: 2022-12-31

## 2022-12-29 RX ADMIN — INSULIN HUMAN 4 UNITS: 100 INJECTION, SOLUTION PARENTERAL at 17:24

## 2022-12-29 RX ADMIN — LIDOCAINE 1 PATCH: 700 PATCH TOPICAL at 08:08

## 2022-12-29 RX ADMIN — Medication 1000 UNITS: at 08:07

## 2022-12-29 RX ADMIN — ACETAMINOPHEN 650 MG: 325 TABLET ORAL at 06:34

## 2022-12-29 RX ADMIN — ACETAMINOPHEN 1000 MG: 500 TABLET ORAL at 20:45

## 2022-12-29 RX ADMIN — ACETAMINOPHEN 1000 MG: 500 TABLET ORAL at 08:06

## 2022-12-29 RX ADMIN — BUPROPION HYDROCHLORIDE 150 MG: 150 TABLET, EXTENDED RELEASE ORAL at 17:28

## 2022-12-29 RX ADMIN — Medication 3 MG: at 21:01

## 2022-12-29 RX ADMIN — INSULIN HUMAN 3 UNITS: 100 INJECTION, SOLUTION PARENTERAL at 20:53

## 2022-12-29 RX ADMIN — BENAZEPRIL HYDROCHLORIDE 10 MG: 10 TABLET, COATED ORAL at 08:07

## 2022-12-29 RX ADMIN — BUPROPION HYDROCHLORIDE 150 MG: 150 TABLET, EXTENDED RELEASE ORAL at 06:49

## 2022-12-29 RX ADMIN — FERROUS SULFATE TAB 325 MG (65 MG ELEMENTAL FE) 325 MG: 325 (65 FE) TAB at 07:57

## 2022-12-29 RX ADMIN — METFORMIN HYDROCHLORIDE 1000 MG: 500 TABLET ORAL at 17:27

## 2022-12-29 RX ADMIN — METFORMIN HYDROCHLORIDE 1000 MG: 500 TABLET ORAL at 07:57

## 2022-12-29 RX ADMIN — GLIPIZIDE 15 MG: 2.5 TABLET, EXTENDED RELEASE ORAL at 17:27

## 2022-12-29 RX ADMIN — ACETAMINOPHEN 1000 MG: 500 TABLET ORAL at 15:39

## 2022-12-29 RX ADMIN — LIDOCAINE 1 PATCH: 700 PATCH TOPICAL at 07:57

## 2022-12-29 RX ADMIN — DULOXETINE HYDROCHLORIDE 60 MG: 30 CAPSULE, DELAYED RELEASE ORAL at 06:49

## 2022-12-29 RX ADMIN — GABAPENTIN 300 MG: 300 CAPSULE ORAL at 08:07

## 2022-12-29 RX ADMIN — METOPROLOL TARTRATE 12.5 MG: 25 TABLET, FILM COATED ORAL at 10:32

## 2022-12-29 RX ADMIN — FERROUS SULFATE TAB 325 MG (65 MG ELEMENTAL FE) 325 MG: 325 (65 FE) TAB at 17:27

## 2022-12-29 RX ADMIN — INSULIN HUMAN 3 UNITS: 100 INJECTION, SOLUTION PARENTERAL at 11:08

## 2022-12-29 RX ADMIN — ENOXAPARIN SODIUM 40 MG: 40 INJECTION SUBCUTANEOUS at 17:28

## 2022-12-29 RX ADMIN — PIOGLITAZONE 30 MG: 15 TABLET ORAL at 08:07

## 2022-12-29 RX ADMIN — TAMSULOSIN HYDROCHLORIDE 0.4 MG: 0.4 CAPSULE ORAL at 07:57

## 2022-12-29 RX ADMIN — THIAMINE HCL TAB 100 MG 100 MG: 100 TAB at 08:07

## 2022-12-29 RX ADMIN — METOPROLOL TARTRATE 12.5 MG: 25 TABLET, FILM COATED ORAL at 17:29

## 2022-12-29 RX ADMIN — OMEPRAZOLE 20 MG: 20 CAPSULE, DELAYED RELEASE ORAL at 08:07

## 2022-12-29 RX ADMIN — INSULIN HUMAN 4 UNITS: 100 INJECTION, SOLUTION PARENTERAL at 07:59

## 2022-12-29 RX ADMIN — ASPIRIN 81 MG 81 MG: 81 TABLET ORAL at 08:07

## 2022-12-29 RX ADMIN — Medication 400 MG: at 10:32

## 2022-12-29 RX ADMIN — SENNOSIDES AND DOCUSATE SODIUM 2 TABLET: 50; 8.6 TABLET ORAL at 08:06

## 2022-12-29 RX ADMIN — ATORVASTATIN CALCIUM 80 MG: 40 TABLET, FILM COATED ORAL at 20:45

## 2022-12-29 RX ADMIN — DULOXETINE HYDROCHLORIDE 60 MG: 30 CAPSULE, DELAYED RELEASE ORAL at 17:27

## 2022-12-29 SDOH — ECONOMIC STABILITY: TRANSPORTATION INSECURITY
IN THE PAST 12 MONTHS, HAS LACK OF RELIABLE TRANSPORTATION KEPT YOU FROM MEDICAL APPOINTMENTS, MEETINGS, WORK OR FROM GETTING THINGS NEEDED FOR DAILY LIVING?: NO

## 2022-12-29 SDOH — ECONOMIC STABILITY: TRANSPORTATION INSECURITY
IN THE PAST 12 MONTHS, HAS THE LACK OF TRANSPORTATION KEPT YOU FROM MEDICAL APPOINTMENTS OR FROM GETTING MEDICATIONS?: NO

## 2022-12-29 ASSESSMENT — BRIEF INTERVIEW FOR MENTAL STATUS (BIMS)
INITIAL REPETITION OF BED BLUE SOCK - FIRST ATTEMPT: 3
WHAT MONTH IS IT: ACCURATE WITHIN 5 DAYS
BIMS SUMMARY SCORE: 12
ASKED TO RECALL BLUE: YES, AFTER CUEING (A COLOR")"
WHAT DAY OF THE WEEK IS IT: INCORRECT
WHAT DAY OF THE WEEK IS IT: CORRECT
ASKED TO RECALL BED: NO, COULD NOT RECALL
ASKED TO RECALL SOCK: YES, AFTER CUEING (SOMETHING TO WEAR")"
BIMS SUMMARY SCORE: 11
WHAT MONTH IS IT: ACCURATE WITHIN 5 DAYS
WHAT YEAR IS IT: CORRECT
ASKED TO RECALL BED: NO, COULD NOT RECALL
ASKED TO RECALL SOCK: YES, NO CUE REQUIRED
ASKED TO RECALL BLUE: YES, NO CUE REQUIRED
WHAT YEAR IS IT: CORRECT
INITIAL REPETITION OF BED BLUE SOCK - FIRST ATTEMPT: 3

## 2022-12-29 ASSESSMENT — ACTIVITIES OF DAILY LIVING (ADL)
BED_CHAIR_WHEELCHAIR_TRANSFER_DESCRIPTION: ADAPTIVE EQUIPMENT;INCREASED TIME;SET-UP OF EQUIPMENT;SUPERVISION FOR SAFETY;VERBAL CUEING
TOILETING: INDEPENDENT
TOILET_TRANSFER_DESCRIPTION: GRAB BAR;INCREASED TIME;SET-UP OF EQUIPMENT
TUB_SHOWER_TRANSFER_DESCRIPTION: GRAB BAR;SHOWER BENCH;INCREASED TIME;SUPERVISION FOR SAFETY

## 2022-12-29 ASSESSMENT — GAIT ASSESSMENTS
ASSISTIVE DEVICE: FRONT WHEEL WALKER
GAIT LEVEL OF ASSIST: MINIMAL ASSIST
DISTANCE (FEET): 60
DEVIATION: BRADYKINETIC;SHUFFLED GAIT

## 2022-12-29 ASSESSMENT — PAIN DESCRIPTION - PAIN TYPE
TYPE: ACUTE PAIN
TYPE: ACUTE PAIN

## 2022-12-29 NOTE — PROGRESS NOTES
Assumed care of patient. Bedside report received from Ora URBINA. Patient is in bed. Fall precautions in place. Call light and belongings within reach. Updated on POC, all questions and concerns answered at this time. No other needs at this time.

## 2022-12-29 NOTE — ASSESSMENT & PLAN NOTE
HR ok  On Lopressor  Cont to monitor   No respiratory distress. No stridor, Lungs sounds clear with good aeration bilaterally.

## 2022-12-29 NOTE — ASSESSMENT & PLAN NOTE
HbA1c 8.4  On Metformin, Glipizide, and Actos  Also on SSI  Observe serum glucose trends  Outpt meds include Metformin 1000 mg bid, Actos 30 mg daily, Glucotrol XL 10 mg daily, and Jardiance 25 mg daily

## 2022-12-29 NOTE — ASSESSMENT & PLAN NOTE
Blood pressure controlled on Benazepril and Metoprolol  Monitor for orthostatic hypotension on Flomax

## 2022-12-29 NOTE — CARE PLAN
Problem: Fall Risk - Rehab  Goal: Patient will remain free from falls  Note: Patient remains free from falls this shift. Patient was educated on using the call light to prevent falls. Patients bed is in the lowest position. The patients belongings are placed in near proximity to the patient. Bed alarm is on and in place. Seat belt alarm placed by therapy.      Problem: Skin Integrity  Goal: Patient's skin integrity will be maintained or improve  Note: 4 Eyes Skin Assessment Completed by writer RN and CARLITOS Brennan.    Head WDL  Ears WDL  Nose WDL  Mouth WDL  Neck WDL  Breast/Chest WDL  Shoulder Blades WDL  Spine WDL  (R) Arm/Elbow/Hand WDL  (L) Arm/Elbow/Hand Bruising and Scab  Abdomen WDL  Groin WDL  Scrotum/Coccyx/Buttocks Redness and Blanching  (R) Leg WDL  (L) Leg Scab  (R) Heel/Foot/Toe Callus on great toe  (L) Heel/Foot/Toe Scab        Interventions In Place Pillows    Possible Skin Injury No    Pictures Uploaded Into Epic Yes  Wound Consult Placed N/A  RN Wound Prevention Protocol Ordered No     The patient is Stable - Low risk of patient condition declining or worsening

## 2022-12-29 NOTE — THERAPY
"Speech Language Pathology   Initial Assessment     Patient Name: Prabhakar Sierra  AGE:  72 y.o., SEX:  male  Medical Record #: [id number]  Today's Date: 12/29/2022     Subjective    Pt pleasant and cooperative, motivated to participate in cognitive evaluation.      Objective       12/29/22 0903   Evaluation Charges   Charges Yes   SLP Speech Language Evaluation Speech Sound Language Comprehension   SLP Total Time Spent   SLP Individual Total Time Spent (Mins) 60   Prior Living Situation   Prior Services Home-Independent   Housing / Facility 1 Story House   Lives with - Patient's Self Care Capacity Spouse   Prior Level Of Function   Communication Within Functional Limits   Hearing Within Functional Limits for Evaluation   Vision Reading    Occupation (Pre-Hospital Vocational) Retired Due To Age   Cognitive Pattern Assessment   Cognitive Pattern Assessment Used BIMS   Brief Interview for Mental Status (BIMS)   Repetition of Three Words (First Attempt) 3   Temporal Orientation: Year Correct   Temporal Orientation: Month Accurate within 5 days   Temporal Orientation: Day Correct   Recall: \"Sock\" Yes, no cue required   Recall: \"Blue\" Yes, after cueing (\"a color\")   Recall: \"Bed\" No, could not recall   BIMS Summary Score 12   Confusion Assessment Method (CAM)   Is there evidence of an acute change in mental status from the patient's baseline? Yes   Inattention Behavior present, fluctuates (comes and goes, changes in severity)   Disorganized thinking Behavior present, fluctuates (comes and goes, changes in severity)   Altered level of consciousness Behavior not present   Functional Level of Assist   Comprehension Modified Independent   Comprehension Description Glasses;Verbal cues   Expression Modified Independent   Social Interaction Independent   Problem Solving Minimal Assist   Problem Solving Description Verbal cueing;Therapy schedule;Increased time;Supervision   Memory Moderate Assist   Memory Description " Verbal cueing;Therapy schedule;Increased time;Supervision;Bed/chair alarm   Outcome Measures   Outcome Measures Utilized SCCAN   SCCAN (Scales of Cognitive and Communicative Ability for Neurorehabilitation)   Oral Expression - Raw Score 19   Oral Expression - Scale Performance Score 100   Orientation - Raw Score 12   Orientation - Scale Performance Score 100   Memory - Raw Score 10   Memory - Scale Performance Score 53   Speech Comprehension - Raw Score 12   Speech Comprehension - Scale Performance Score 92   Reading Comprehension - Raw Score 11   Reading Comprehension - Scale Performance Score 92   Writing - Raw Score 4   Writing - Scale Performance Score 57   Attention - Raw Score 11   Attention - Scale Performance Score 69   Problem Solving - Raw Score 18   Problem Solving - Scale Performance Score 78   SCCAN Total Raw Score 76   SCCAN Degree of Severity Mild Impairment   Problem List   Problem List Attention Deficit;Executive Function Deficit;Memory Deficit   Current Discharge Plan   Current Discharge Plan Return to Prior Living Situation   Benefit   Therapy Benefit Patient would benefit from Inpatient Rehab Speech-Language Pathology to address above identified deficits.   Interdisciplinary Plan of Care Collaboration   IDT Collaboration with  Physician;Occupational Therapist   Patient Position at End of Therapy Seated;Chair Alarm On;Call Light within Reach;Tray Table within Reach;Phone within Reach   Collaboration Comments CLOF and POC   Speech Language Pathologist Assigned   Assigned SLP / Extension LC 60         Assessment    Patient is 72 y.o. male with a diagnosis of Thoracic compression fracture following mult GLF.  Additional factors influencing patient status/progress (ie: cognitive factors, co-morbidities, social support, etc): pt with medical hx significant for mild dementia per chart review. Pt reports living with SO, pt indep with med management, SO completes household finances and pt was still  driving prior to most recent hospitalization. Pt reports awareness of cognitive changes following recent hospitalization. Cognitive assessment completed with use of SCCAN.   Pt achieved a raw score of 76 which indicates MILD cognitive impairments. Pt with greatest impairment in areas of memory 53%, attention 69% and problem solving 78%. Pt scored low on writing portion 57% however this was due to legibility. Rec brief cognitive intervention to address implementation of functional memory strategies as well as addressing medictaion management as for pt was indep with meds prior and his goal is to return to PLOF. Pt is very motivated to participate in therapy at [facility].     Plan  Recommend Speech Therapy 30-60 minutes per day 5-6 days per week for 2 weeks for the following treatments:  SLP Self Care / ADL Training , SLP Cognitive Skill Development, and SLP Group Treatment    Goals:  Long term and short term goals have been discussed with patient and they are in agreement.    Speech Therapy Problems (Active)       Problem: Memory STGs       Dates: Start: 12/29/22         Goal: STG-Within one week, patient will implement use of functional memory strategies to assist in recall of information related to hospitalization and safety with 70% accuracy provided MOD A       Dates: Start: 12/29/22               Problem: Problem Solving STGs       Dates: Start: 12/29/22         Goal: STG-Within one week, patient will complete medication management tasks with 80% accuracy provided SPV       Dates: Start: 12/29/22               Problem: Speech/Swallowing LTGs       Dates: Start: 12/29/22         Goal: LTG-By discharge, patient will complete functional problem solving and recall information with 80% accuracy provided MOD I       Dates: Start: 12/29/22

## 2022-12-29 NOTE — CARE PLAN
Problem: Pain - Standard  Goal: Alleviation of pain or a reduction in pain to the patient’s comfort goal  Outcome: Progressing  Note: Patient able to verbalize pain level and verbalize an acceptable level of pain.    The patient is Stable - Low risk of patient condition declining or worsening    Shift Goals  Clinical Goals: Monitor I&Os  Patient Goals: Rest    Progress made toward(s) clinical / shift goals:  pain controlled with scheduled and prn meds    Patient is not progressing towards the following goals:

## 2022-12-29 NOTE — CARE PLAN
The patient is Stable - Low risk of patient condition declining or worsening    Shift Goals  Clinical Goals: Monitor I&Os  Patient Goals: Rest    Progress made toward(s) clinical / shift goals:    Problem: Knowledge Deficit - Standard  Goal: Patient and family/care givers will demonstrate understanding of plan of care, disease process/condition, diagnostic tests and medications  Outcome: Progressing   Patient educated on the POC and medications administered. Patient demonstrates understanding of use of call light after voiding for PRV monitoring. Patient uses call light appropriately.  Problem: Fall Risk - Rehab  Goal: Patient will remain free from falls  Outcome: Progressing   Bed alarm in use. Call light and belongings within reach. Bed locked and in low position.  Problem: Psychosocial  Goal: Patient's level of anxiety will decrease  Outcome: Progressing   Patient informed over the POC. All questions and concerns answered at this time.  Problem: Bladder / Voiding  Goal: Patient will establish and maintain regular urinary output  Outcome: Progressing  Urinal at bedside. Patient demonstrates understanding of PRV monitoring   Goal: Patient will establish and maintain bladder regimen  Outcome: Progressing

## 2022-12-29 NOTE — THERAPY
Physical Therapy   Initial Evaluation     Patient Name: Prabhakar Sierra  Age:  72 y.o., Sex:  male  Medical Record #: [id number]  Today's Date: 12/29/2022     Subjective    Pt reporting fatigue but agreeable to participate. Agreeable to spread out therapy sessions for increased rest time in the future     Objective       12/29/22 1030   PT Charge Group   PT Therapeutic Activities (Units) 1   PT Evaluation PT Evaluation Low   PT Total Time Spent   PT Individual Total Time Spent (Mins) 60   Prior Living Situation   Prior Services Home-Independent  (however pt reports frequent falls)   Housing / Facility 1 Story House   Steps Into Home 14   Steps In Home 0   Rail Right Rail (Steps into Home)   Elevator No   Equipment Owned 4-Wheel Walker  (walking sticks)   Prior Level of Functional Mobility   Bed Mobility Independent   Transfer Status Independent   Ambulation Independent   Distance Ambulation (Feet)   (limited community)   Assistive Devices Used 4-Wheel Walker  (walking sticks or no AD)   Wheelchair   (n/a)   Stairs Independent   Pain 0 - 10 Group   Location Back;Neck   Location Orientation Mid;Lower   Pain Rating Scale (NPRS) 3   Description Aching   Comfort Goal Comfort with Movement;Perform Activity   Therapist Pain Assessment Post Activity   Cognition    Orientation Level Oriented x 4   Level of Consciousness Alert   Ability To Follow Commands 2 Step   New Learning Impaired   Comments impaired STM   Active ROM Lower Body    Active ROM Lower Body  WDL   Strength Lower Body   Gross Strength Generalized Weakness, Equal Bilaterally   Sensation Lower Body   Rt Lower Extremity Light Touch Impaired   Lt Lower Extremity Light Touch Impaired   Comments B feet due to peripheral neuropathy   Lower Body Muscle Tone   Lower Body Muscle Tone  WDL   Balance Assessment   Sitting Balance (Static) Fair   Sitting Balance (Dynamic) Fair -   Standing Balance (Static) Poor   Standing Balance (Dynamic) Poor   Weight Shift Sitting  Fair   Weight Shift Standing Poor   Comments standing balance assessed with FWW   Bed Mobility    Supine to Sit Moderate Assist   Sit to Supine Minimal Assist   Sit to Stand Minimal Assist   Rolling Minimal Assist to Rt.;Minimum Assist to Lt.   Coordination Lower Body    Coordination Lower Body  Not Tested   Roll Left and Right   Assistance Needed Physical assistance   Physical Assistance Level 25% or less   CARE Score - Roll Left and Right 3   Roll Left and Right Discharge Goal   Discharge Goal 6   Sit to Lying   Assistance Needed Physical assistance   Physical Assistance Level 25% or less   CARE Score - Sit to Lying 3   Sit to Lying Discharge Goal   Discharge Goal 6   Lying to Sitting on Side of Bed   Assistance Needed Physical assistance   Physical Assistance Level 26%-50%   CARE Score - Lying to Sitting on Side of Bed 3   Lying to Sitting on Side of Bed Discharge Goal   Discharge Goal 6   Sit to Stand   Assistance Needed Physical assistance   Physical Assistance Level 25% or less   CARE Score - Sit to Stand 3   Sit to Stand Discharge Goal   Discharge Goal 6   Chair/Bed-to-Chair Transfer   Assistance Needed Physical assistance   Physical Assistance Level 25% or less   CARE Score - Chair/Bed-to-Chair Transfer 3   Chair/Bed-to-Chair Transfer Discharge Goal   Discharge Goal 5   Toilet Transfer   Reason if not Attempted Refused to perform   CARE Score - Toilet Transfer 7   Toilet Transfer Discharge Goal   Discharge Goal 6   Car Transfer   Reason if not Attempted Environmental limitations   CARE Score - Car Transfer 10   Car Transfer Discharge Goal   Discharge Goal 4   Walk 10 Feet   Assistance Needed Physical assistance   Physical Assistance Level 25% or less   CARE Score - Walk 10 Feet 3   Walk 10 Feet Discharge Goal   Discharge Goal 4   Walk 50 Feet with Two Turns   Assistance Needed Physical assistance   Physical Assistance Level 25% or less   CARE Score - Walk 50 Feet with Two Turns 3   Walk 50 Feet with Two  Turns Discharge Goal   Discharge Goal 4   Walk 150 Feet   Reason if not Attempted Safety concerns   CARE Score - Walk 150 Feet 88   Walk 150 Feet Discharge Goal   Discharge Goal 4   Walking 10 Feet on Uneven Surfaces   Reason if not Attempted Activity not applicable   CARE Score - Walking 10 Feet on Uneven Surfaces 9   Walking 10 Feet on Uneven Surfaces Discharge Goal   Discharge Goal 9   1 Step (Curb)   Assistance Needed Physical assistance   Physical Assistance Level 25% or less   CARE Score - 1 Step (Curb) 3   1 Step (Curb) Discharge Goal   Discharge Goal 4   4 Steps   Assistance Needed Physical assistance   Physical Assistance Level 25% or less   CARE Score - 4 Steps 3   4 Steps Discharge Goal   Discharge Goal 4   12 Steps   Reason if not Attempted Safety concerns   CARE Score - 12 Steps 88   12 Steps Discharge Goal   Discharge Goal 4   Picking Up Object   Reason if not Attempted Safety concerns   CARE Score - Picking Up Object 88   Picking Up Object Discharge Goal   Discharge Goal 5   Wheel 50 Feet with Two Turns   Reason if not Attempted Activity not applicable   CARE Score - Wheel 50 Feet with Two Turns 9   Wheel 50 Feet with Two Turns Discharge Goal   Discharge Goal 9   Wheel 150 Feet   Reason if not Attempted Activity not applicable   CARE Score - Wheel 150 Feet 9   Wheel 150 Feet Discharge Goal   Discharge Goal 9   Gait Functional Level of Assist    Gait Level Of Assist Minimal Assist   Assistive Device Front Wheel Walker   Distance (Feet) 60   # of Times Distance was Traveled 1   Deviation Bradykinetic;Shuffled Gait  (walker excessively anterior, VC's for posture)   Wheelchair Functional Level of Assist   Wheelchair Assist Supervised   Distance Wheelchair (Feet or Distance) 10   Wheelchair Description   (B LE propulsion)   Stairs Functional Level of Assist   Level of Assist with Stairs Minimal Assist   # of Stairs Climbed 4  (6inch steps B HR)   Stairs Description Extra time;Hand rails;Limited by  fatigue;Safety concerns;Verbal cueing   Transfer Functional Level of Assist   Bed, Chair, Wheelchair Transfer Minimal Assist   Bed Chair Wheelchair Transfer Description   (stand step FWW)   Problem List    Problems Pain;Impaired Bed Mobility;Impaired Transfers;Impaired Ambulation;Impaired Balance;Decreased Activity Tolerance;Safety Awareness Deficits / Cognition;Limited Knowledge of Post-Op Precautions   Precautions   Precautions TLSO (Thoracolumbosacral orthosis);Spinal / Back Precautions ;Fall Risk   Current Discharge Plan   Current Discharge Plan Return to Prior Living Situation   Interdisciplinary Plan of Care Collaboration   IDT Collaboration with  Physician;Occupational Therapist   Patient Position at End of Therapy In Bed;Bed Alarm On;Call Light within Reach;Tray Table within Reach;Phone within Reach   Collaboration Comments POC/CLOF   Benefit   Therapy Benefit Patient Would Benefit from Inpatient Rehabilitation Physical Therapy to Maximize Functional [place] with ADLs, IADLs and Mobility.   Strengths & Barriers   Strengths Able to follow instructions;Motivated for self care and independence;Pleasant and cooperative;Supportive family;Willingly participates in therapeutic activities   Barriers Dementia;Decreased endurance;Generalized weakness;Home accessibility;Impaired activity tolerance;Impaired balance;Impaired carryover of learning;Impaired functional cognition;Pain     Oriented pt to unit, 3 hr rule, passport, and use of call light. Participated in collaborative goal setting. Provided handouts in room to reinforce learning and abiding by spinal precautions as well as use of call light.  Educated pt regarding spinal precautions and log roll technique after pt was unable to recall either    Assessment  Patient is 72 y.o. male with a diagnosis of T11, L2, and L3 compression fractures s/p ground level fall. Pt's head CT was suggestive of possible NPH.  Additional factors influencing patient status /  progress (ie: cognitive factors, co-morbidities, social support, etc): PMHx of dementia, possible NPH, prostate cancer status post treatment and in remission, diabetes, peripheral neuropathy, osteoporosis, hypertension, BPH, tobacco use, alcohol use. Pt lives with his wife in [facility] with 14 HEMALATHA and R HR. Pt reports falling almost daily in last 6 months. Pt reports that his wife is able to assist upon DC however he is concerned about injuring her from his recent falls.  Pt is limited by back pain, impaired memory, decreased safety awareness, decreased knowledge of spinal precautions, impaired standing balance, and decreased endurance.    Plan  Recommend Physical Therapy  minutes per day 5-6 days per week for 2 weeks for the following treatments:  PT Gait Training, PT Therapeutic Exercises, PT Neuro Re-Ed/Balance, PT Therapeutic Activity, and PT Evaluation.    Passport items to be completed:  Get in/out of bed safely, in/out of a vehicle, safely use mobility device, walk or wheel around home/community, navigate up and down stairs, show how to get up/down from the ground, ensure home is accessible, demonstrate HEP, complete caregiver training    Goals:  Long term and short term goals have been discussed with patient and they are in agreement.    Physical Therapy Problems (Active)       Problem: Mobility       Dates: Start: 12/29/22         Goal: STG-Within one week, patient will ambulate 100ft with SBA using FWW       Dates: Start: 12/29/22            Goal: STG-Within one week, patient will ascend and descend four stairs with R HR with CGA       Dates: Start: 12/29/22               Problem: Mobility Transfers       Dates: Start: 12/29/22         Goal: STG-Within one week, patient will perform bed mobility with SPV        Dates: Start: 12/29/22            Goal: STG-Within one week, patient will transfer bed to chair with SPV using FWW       Dates: Start: 12/29/22               Problem: PT-Long Term Goals       Dates:  Start: 12/29/22         Goal: LTG-By discharge, patient will ambulate 200ft with SPV using LRAD        Dates: Start: 12/29/22            Goal: LTG-By discharge, patient will transfer one surface to another with set up assist       Dates: Start: 12/29/22            Goal: LTG-By discharge, patient will perform home exercise program with SPV       Dates: Start: 12/29/22            Goal: LTG-By discharge, patient will ambulate up/down 14 stairs with R HR with SBA       Dates: Start: 12/29/22

## 2022-12-29 NOTE — CONSULTS
DATE OF SERVICE:  12/29/2022    REQUESTING PHYSICIAN:  Evelyne Irving MD    CHIEF COMPLAINT / REASON FOR CONSULTATION:   Hypertension  Tachycardia  Diabetes    HISTORY OF PRESENT ILLNESS:  This is a 71 y/o male with a PMH significant for hypertension, diabetes, history of dementia, possible NPH (per Neurology), prostate cancer s/p treatment and in remission, peripheral neuropathy, osteoporosis, BPH, tobacco use, and alcohol abuse who has had several GLF's recently and now has complaints of severe back pain, generalized weakness and malaise.  Imaging included a CT of the spine which showed acute T11 compression fracture with 20% loss of height, acute L2 compression fracture with 20% loss of height, and acute L3 compression fracture with 10% loss of vertebral height.  He had a head CT that showed left greater than right ventricular dilatation suggestive of normal pressure hydrocephalus.  Patient was seen and evaluated by neurosurgery, Dr. Guzman, with recommendation for conservative management with a TLSO brace and follow-up in 6 weeks, not currently a good candidate for kyphoplasty.  Pt reports his memory and cognition have declined as he is aged.  He does still drive a car.  He has peripheral neuropathy with numbness and tingling in his feet.  He does drink alcohol daily but does not think this was associated with his multiple falls -- it was noted by his PMD that he had quit drinking last July 2022.      Because of the patient's weakness and debility, Rehab was consulted, evaluated the patient, and was deemed a good Rehab candidate.  The patient was transferred over to the Rehab facility on 12/28/2022.      The patient denies fever, chills, nausea, vomiting, headaches, blurry vision, or chest pain.    REVIEW OF SYSTEMS: All review of systems are negative pre AMA and CMS criteria except for that stated in the HPI.    PAST MEDICAL HISTORY:  Past Medical History:   Diagnosis Date    Alcohol use     BPH (benign  prostatic hyperplasia)     Depression     GERD (gastroesophageal reflux disease)     Hypertension     Hyponatremia 05/18/2017    Iron deficiency anemia secondary to inadequate dietary iron intake 05/26/2021    Mild cognitive impairment 07/26/2022    Neuropathic pain, leg     Right hip pain 11/14/2016    Tobacco use     Type II or unspecified type diabetes mellitus without mention of complication, not stated as uncontrolled        PAST SURGICAL HISTORY:  History reviewed. No pertinent surgical history.    No Known Allergies    CURRENT MEDICATIONS:    Current Facility-Administered Medications:     metoprolol tartrate    magnesium oxide    glipiZIDE SR    hydrOXYzine HCl    melatonin    Respiratory Therapy Consult    acetaminophen    lactulose    docusate sodium    mag hydrox-al hydrox-simeth    ondansetron **OR** ondansetron    sodium chloride    acetaminophen    hydrALAZINE    aspirin    atorvastatin    buPROPion SR    DULoxetine    enoxaparin (LOVENOX) injection    gabapentin    insulin regular **AND** POC blood glucose manual result **AND** NOTIFY MD and PharmD **AND** Administer 20 grams of glucose (approximately 8 ounces of fruit juice) every 15 minutes PRN FSBG less than 70 mg/dL **AND** dextrose bolus    lidocaine    lidocaine    metformin    omeprazole    oxyCODONE immediate-release **OR** oxyCODONE immediate-release **OR** [DISCONTINUED] morphine injection    pioglitazone    senna-docusate **AND** polyethylene glycol/lytes **AND** magnesium hydroxide **AND** bisacodyl    tamsulosin    vitamin D3    ferrous sulfate    thiamine    Social History     Socioeconomic History    Marital status:    Tobacco Use    Smoking status: Some Days     Packs/day: 0.15     Years: 50.00     Pack years: 7.50     Types: Cigarettes     Last attempt to quit: 1/10/2007     Years since quitting: 15.9    Smokeless tobacco: Never   Vaping Use    Vaping Use: Never used   Substance and Sexual Activity    Alcohol use: Not Currently      Alcohol/week: 4.8 - 6.0 oz     Types: 8 - 10 Glasses of wine per week     Comment: 1 bottle of wine 2-3 days a week    Drug use: No    Sexual activity: Never     Partners: Female       FAMILY HISTORY:  was reviewed and is not pertinent to this consultation.    PHYSICAL EXAMINATION:  VITAL SIGNS:  Temp is 98.6, blood pressure is 139/67, heart rate is 100-105, respiratory rate is 18.  GENERAL:  Patient was lying in bed in no distress.  HEENT:  Pupils were equal, round and reactive to light and accomodation.  Oral mucosa was pink and moist.  NECK:  Soft.  Supple.  No JVD.  HEART:  Regular rate and rhythm.  Normal S1 and S2.  No murmurs were appreciated.  LUNGS:  Are clear to auscultation bilaterally.  ABDOMEN:  Soft, non tender, non distended.  Bowels sound were positive in all four quadrants.  EXTREMITIES:  No clubbing, cyanosis.  There was no lower extremity edema.  NEUROLOGIC:  Cranial nerves two through twelve were grossly intact.    LABS:  Lab Results   Component Value Date/Time    SODIUM 133 (L) 12/29/2022 05:34 AM    POTASSIUM 3.9 12/29/2022 05:34 AM    CHLORIDE 99 12/29/2022 05:34 AM    CO2 19 (L) 12/29/2022 05:34 AM    GLUCOSE 163 (H) 12/29/2022 05:34 AM    BUN 21 12/29/2022 05:34 AM    CREATININE 0.70 12/29/2022 05:34 AM      Lab Results   Component Value Date/Time    WBC 6.2 12/29/2022 05:34 AM    RBC 4.98 12/29/2022 05:34 AM    HEMOGLOBIN 14.7 12/29/2022 05:34 AM    HEMATOCRIT 43.6 12/29/2022 05:34 AM    MCV 87.6 12/29/2022 05:34 AM    MCH 29.5 12/29/2022 05:34 AM    MCHC 33.7 12/29/2022 05:34 AM    MPV 10.7 12/29/2022 05:34 AM    NEUTSPOLYS 71.20 12/29/2022 05:34 AM    LYMPHOCYTES 14.70 (L) 12/29/2022 05:34 AM    MONOCYTES 10.20 12/29/2022 05:34 AM    EOSINOPHILS 2.30 12/29/2022 05:34 AM    BASOPHILS 0.60 12/29/2022 05:34 AM      Lab Results   Component Value Date/Time    PROTHROMBTM 14.4 01/02/2019 07:04 PM    INR 1.11 01/02/2019 07:04 PM        History of alcohol abuse  Has some mild dementia from  etoh (per Neurology)  Reportedly quit (and pt confirms) on 2022  On supplements --> will d/c    Essential hypertension  BP ok  On Benazepril --> will d/c  Will start Lopressor: 12.5 mg bid (for tachy)  Note: on Flomax  Cont to monitor    History of depression  On Cymbalta  On Wellbutrin    Mixed hyperlipidemia  On Lipitor    Prostate CA ([facility])- 2022; RT tbd x 8 wks, mar- may 2022  S/P prior treatment  Reportedly in remission    Thoracic compression fracture, closed, initial encounter ([facility])  2nd to [facility]  Neurosurgery --> conservative management  To wear TLSO    Diabetes mellitus, type 2 ([facility])  Hba1c: 8.4 ()  BS: 179-268  On Metformin: 1000 mg bid  On Actos: 30 mg daily  On Glucotrol SR: 10 mg qhs --> will increase to 15 mg qhs ()  Note: home meds include Metformin 1000 mg bid, Actos 30 mg daily, Glucotrol XL 10 mg daily, and Jardiance 25 mg daily  Note: pt's wife will bring in his home med of Jardiance  Cont to monitor    Tachycardia  -105  Will start Lopressor: 12.5 mg bid  Cont to monitor    Hypomagnesemia  M.2  On supplements      This case has been discussed with the attending Physiatrist.    Thank you for the consultation.  Will follow the patient with you.

## 2022-12-29 NOTE — THERAPY
"Occupational Therapy   Initial Evaluation     Patient Name: Prabhakar Sierra  Age:  72 y.o., Sex:  male  Medical Record #: [id number]  Today's Date: 12/29/2022     Subjective    Pt agreeable to OT eval     Objective       12/29/22 0701   OT Charge Group   Charges Yes   OT Self Care / ADL (Units) 1   OT Evaluation OT Evaluation Mod   OT Total Time Spent   OT Individual Total Time Spent (Mins) 60   Prior Living Situation   Prior Services Home-Independent   Housing / Facility 1 Story House  (town home)   Steps Into Home   (FOS)   Steps In Home 0   Rail Right Rail (Steps into Home)   Elevator No   Bathroom Set up Grab Bars;Walk In Shower;Bathtub / Shower Combination   Equipment Owned Grab Bar(s) In Tub / Shower;4-Wheel Walker   Lives with - Patient's Self Care Capacity Spouse   Comments frequent falls at home   Prior Level of ADL Function   Self Feeding Independent   Grooming / Hygiene Independent   Bathing Independent   Dressing Independent   Toileting Independent   Prior Level of IADL Function   Driving / Transportation Driving Independent   Pain 0 - 10 Group   Location Back   Location Orientation Mid;Lower   Pain Rating Scale (NPRS) 4   Cognition    Level of Consciousness Alert   ABS (Agitated Behavior Scale)   Agitated Behavior Scale Performed No   Cognitive Pattern Assessment   Cognitive Pattern Assessment Used BIMS   Brief Interview for Mental Status (BIMS)   Repetition of Three Words (First Attempt) 3   Temporal Orientation: Year Correct   Temporal Orientation: Month Accurate within 5 days   Temporal Orientation: Day Incorrect   Recall: \"Sock\" Yes, after cueing (\"something to wear\")   Recall: \"Blue\" Yes, no cue required   Recall: \"Bed\" No, could not recall   BIMS Summary Score 11   Confusion Assessment Method (CAM)   Is there evidence of an acute change in mental status from the patient's baseline? No   Inattention Behavior not present   Disorganized thinking Behavior not present   Altered level of " consciousness Behavior not present   Vision Screen   Vision Not tested   Passive ROM Upper Body   Passive ROM Upper Body WDL   Active ROM Upper Body   Active ROM Upper Body  WDL   Dominant Hand Right   Strength Upper Body   Upper Body Strength  X   Comments generalized weakness   Sensation Upper Body   Upper Extremity Sensation  WDL   Upper Body Muscle Tone   Upper Body Muscle Tone  WDL   Balance Assessment   Sitting Balance (Static) Fair   Sitting Balance (Dynamic) Fair -   Standing Balance (Static) Poor   Standing Balance (Dynamic) Poor   Weight Shift Sitting Fair   Weight Shift Standing Poor   Bed Mobility    Supine to Sit Moderate Assist   Sit to Supine Contact Guard Assist   Sit to Stand Minimal Assist   Scooting Standby Assist   Coordination Upper Body   Coordination WDL   Eating   Assistance Needed Set-up / clean-up   CARE Score - Eating 5   Eating Discharge Goal   Discharge Goal 6   Oral Hygiene   Assistance Needed Set-up / clean-up   Physical Assistance Level No physical assistance   CARE Score - Oral Hygiene 5   Oral Hygiene Discharge Goal   Discharge Goal 6   Shower/Bathe Self   Assistance Needed Physical assistance   Physical Assistance Level 26%-50%   CARE Score - Shower/Bathe Self 3   Shower/Bathe Self Discharge Goal   Discharge Goal 6   Upper Body Dressing   Assistance Needed Physical assistance   Physical Assistance Level 51%-75%   CARE Score - Upper Body Dressing 2   Upper Body Dressing Discharge Goal   Discharge Goal 5   Lower Body Dressing   Assistance Needed Physical assistance   Physical Assistance Level 76% or more   CARE Score - Lower Body Dressing 2   Lower Body Dressing Discharge Goal   Discharge Goal 6   Putting On/Taking Off Footwear   Assistance Needed Physical assistance   Physical Assistance Level 76% or more   CARE Score - Putting On/Taking Off Footwear 2   Putting On/Taking Off Footwear Discharge Goal   Discharge Goal 6   Toileting Hygiene   Assistance Needed Physical assistance    Physical Assistance Level 51%-75%   CARE Score - Toileting Hygiene 2   Toileting Hygiene Discharge Goal   Discharge Goal 6   Toilet Transfer   Assistance Needed Physical assistance   Physical Assistance Level 25% or less   CARE Score - Toilet Transfer 3   Toilet Transfer Discharge Goal   Discharge Goal 6   Hearing, Speech, and Vision   Ability to Hear Adequate   Ability to See in Adequate Light Adequate   Expression of Ideas and Wants Without difficulty   Understanding Verbal and Non-Verbal Content Understands   Functional Level of Assist   Eating Supervision   Eating Description Set-up of equipment or meal/tube feeding   Grooming Supervision   Grooming Description Increased time;Seated in wheelchair at sink;Set-up of equipment;Initial preparation for task   Bathing Moderate Assist   Bathing Description Grab bar;Hand held shower;Tub bench;Assit with back;Assit wtih lower extremities;Assit with perineal;Increased time;Initial preparation for task;Set-up of equipment   Upper Body Dressing Maximal Assist  (min A for shirt, total A for TLSO)   Upper Body Dressing Description Increased time;Initial preparation for task;Set-up of equipment;Supervision for safety   Lower Body Dressing Total Assist   Lower Body Dressing Description Grab bar;Cues for spinal precautions;Increased time;Assist with threading into pant leg;Initial preparation for task   Toileting Maximal Assist   Toileting Description Assist to pull pants up;Assist for standing balance;Grab bar;Increased time;Assist for hygiene;Set-up of equipment;Initial preparation for task   Bed, Chair, Wheelchair Transfer Minimal Assist   Bed Chair Wheelchair Transfer Description Adaptive equipment;Increased time;Set-up of equipment;Supervision for safety;Verbal cueing  (stand step transfer w/ FWW)   Toilet Transfers Minimal Assist   Toilet Transfer Description Grab bar;Increased time;Set-up of equipment   Tub / Shower Transfers Minimal Assist   Tub Shower Transfer  "Description Grab bar;Shower bench;Increased time;Supervision for safety   Problem List   Problem List Decreased Active Daily Living Skills;Decreased Homemaking Skills;Impaired Postural Control / Balance;Decreased Activity Tolerance;Impaired Cognitive Function   Precautions   Precautions Fall Risk;TLSO (Thoracolumbosacral orthosis);Spinal / Back Precautions    Comments TLSO on OOB off for showers   Current Discharge Plan   Current Discharge Plan Return to Prior Living Situation   Benefit    Therapy Benefit Patient Would Benefit from Inpatient Rehab Occupational Therapy to Maximize [place] with ADLs, IADLs and Functional Mobility.   Interdisciplinary Plan of Care Collaboration   IDT Collaboration with  Physical Therapist;Nursing   Patient Position at End of Therapy Bed Alarm On;In Bed;Tray Table within Reach;Call Light within Reach;Phone within Reach   Collaboration Comments CLOF; medication   Equipment Needs   Assistive Device / DME Grab Bars In Shower / Tub;Grab Bars By Toilet;Tub Transfer Bench;Shower Chair   Adaptive Equipment Reacher;Sock Aide;Dressing Stick   Strengths & Barriers   Strengths Able to follow instructions;Alert and oriented;Effective communication skills;Pleasant and cooperative;Supportive family;Willingly participates in therapeutic activities;Good insight into deficits/needs   Barriers Decreased endurance;Generalized weakness;Limited mobility;Pain;Impaired balance       Assessment  Patient is 72 y.o. male with a diagnosis of thoracic compression fracture. Pt has past medical history of dementia, possible NPH, prostate cancer status post treatment and in remission, diabetes, peripheral neuropathy, osteoporosis, hypertension, BPH, tobacco use, and alcohol use. Pt reported to [facility] after multiple ground-level falls. Pt was found to have \" acute T11 compression fracture with 20% loss of height, acute L2 compression fracture with 20% loss of height, and acute L3 compression fracture with 10% loss " "of vertebral height.  He had a head CT that showed left greater than right ventricular dilatation suggestive of normal pressure hydrocephalus\". Pt now has TLSO brace for activity OOB, ok to take off for showers.     During OT eval pt presented with impaired balance, pain, decreased short term memory, and decreased endurance. PT reports he was I with ADLs at home, but was falling ~1/week. Pt reports he usually showers in tub/shower combo w/ curtain, but also have a walk in shower with glass doors. Pt lives in a town home in [place] w/ his wife. Pt's home has  FOS to enter w/ railing on R side. Pt is very motivated to increase independence and d/c home with support from wife.     Plan  Recommend Occupational Therapy  minutes per day 5-7 days per week for 10-14 days for the following treatments:  OT Orthotics Training, OT E Stim Attended, OT Group Therapy, OT Self Care/ADL, OT Cognitive Skill Dev, OT Community Reintegration, OT Manual Ther Technique, OT Neuro Re-Ed/Balance, OT Sensory Int Techniques, OT Therapeutic Activity, OT Evaluation, and OT Therapeutic Exercise.    Passport items to be completed:  Perform bathroom transfers, complete dressing, complete feeding, get ready for the day, prepare a simple meal, participate in household tasks, adapt home for safety needs, demonstrate home exercise program, complete caregiver training     Goals:  Long term and short term goals have been discussed with patient and they are in agreement.    Occupational Therapy Goals (Active)       Problem: Bathing       Dates: Start: 12/29/22         Goal: STG-Within one week, patient will bathe w/ min A.        Dates: Start: 12/29/22               Problem: Dressing       Dates: Start: 12/29/22         Goal: STG-Within one week, patient will dress UB including TLSO w/ mod A.        Dates: Start: 12/29/22            Goal: STG-Within one week, patient will dress LB w/ LB dressing AE and mod A.       Dates: Start: 12/29/22               " Problem: OT Long Term Goals       Dates: Start: 12/29/22         Goal: LTG-By discharge, patient will complete basic self care tasks w/ mod I - min A.       Dates: Start: 12/29/22            Goal: LTG-By discharge, patient will perform bathroom transfers w/ mod I- CGA.        Dates: Start: 12/29/22               Problem: Toileting       Dates: Start: 12/29/22         Goal: STG-Within one week, patient will complete toileting tasks w/ min A.        Dates: Start: 12/29/22

## 2022-12-29 NOTE — PROGRESS NOTES
"Rehab Progress Note     Date of Service: 12/29/2022  Chief Complaint: Follow-up vertebral compression fractures    Interval Events (Subjective)    Patient seen and examined today in the hallway.  He is ambulating with physical therapy using a walker.  He currently denies any significant pain.  He slept well last night.  Continues to have mild tachycardia.  Hospitalist has been consulted for his medical comorbidities.  He moved his bowels this morning.    Patient has no other new questions, concerns, or complaints today.     ROS: No changes to bowel, bladder, pain, mood, or sleep.       Objective:  VITAL SIGNS: /67   Pulse (!) 105   Temp 37 °C (98.6 °F) (Oral)   Resp 18   Ht 1.778 m (5' 10\")   Wt 90.6 kg (199 lb 11.8 oz)   SpO2 95%   BMI 28.66 kg/m²   Gen: alert, no apparent distress  CV: Regular rate, regular rhythm  Resp: Clear to auscultation bilaterally  Neuro: Nonfocal  MSK: TLSO in place    Recent Results (from the past 72 hour(s))   POCT glucose device results    Collection Time: 12/26/22 12:13 PM   Result Value Ref Range    POC Glucose, Blood 235 (H) 65 - 99 mg/dL   POCT glucose device results    Collection Time: 12/26/22  5:16 PM   Result Value Ref Range    POC Glucose, Blood 184 (H) 65 - 99 mg/dL   POCT glucose device results    Collection Time: 12/26/22  9:47 PM   Result Value Ref Range    POC Glucose, Blood 228 (H) 65 - 99 mg/dL   POCT glucose device results    Collection Time: 12/27/22  8:20 AM   Result Value Ref Range    POC Glucose, Blood 200 (H) 65 - 99 mg/dL   POCT glucose device results    Collection Time: 12/27/22 12:02 PM   Result Value Ref Range    POC Glucose, Blood 268 (H) 65 - 99 mg/dL   POCT glucose device results    Collection Time: 12/27/22  6:09 PM   Result Value Ref Range    POC Glucose, Blood 256 (H) 65 - 99 mg/dL   POCT glucose device results    Collection Time: 12/27/22  7:59 PM   Result Value Ref Range    POC Glucose, Blood 254 (H) 65 - 99 mg/dL   CBC WITH DIFFERENTIAL    " Collection Time: 12/28/22  7:56 AM   Result Value Ref Range    WBC 6.3 4.8 - 10.8 K/uL    RBC 5.05 4.70 - 6.10 M/uL    Hemoglobin 15.0 14.0 - 18.0 g/dL    Hematocrit 44.2 42.0 - 52.0 %    MCV 87.5 81.4 - 97.8 fL    MCH 29.7 27.0 - 33.0 pg    MCHC 33.9 33.7 - 35.3 g/dL    RDW 44.7 35.9 - 50.0 fL    Platelet Count 212 164 - 446 K/uL    MPV 10.6 9.0 - 12.9 fL    Neutrophils-Polys 74.10 (H) 44.00 - 72.00 %    Lymphocytes 12.90 (L) 22.00 - 41.00 %    Monocytes 9.80 0.00 - 13.40 %    Eosinophils 2.10 0.00 - 6.90 %    Basophils 0.50 0.00 - 1.80 %    Immature Granulocytes 0.60 0.00 - 0.90 %    Nucleated RBC 0.00 /100 WBC    Neutrophils (Absolute) 4.70 1.82 - 7.42 K/uL    Lymphs (Absolute) 0.82 (L) 1.00 - 4.80 K/uL    Monos (Absolute) 0.62 0.00 - 0.85 K/uL    Eos (Absolute) 0.13 0.00 - 0.51 K/uL    Baso (Absolute) 0.03 0.00 - 0.12 K/uL    Immature Granulocytes (abs) 0.04 0.00 - 0.11 K/uL    NRBC (Absolute) 0.00 K/uL   Basic Metabolic Panel    Collection Time: 12/28/22  7:56 AM   Result Value Ref Range    Sodium 135 135 - 145 mmol/L    Potassium 4.5 3.6 - 5.5 mmol/L    Chloride 99 96 - 112 mmol/L    Co2 23 20 - 33 mmol/L    Glucose 179 (H) 65 - 99 mg/dL    Bun 17 8 - 22 mg/dL    Creatinine 0.71 0.50 - 1.40 mg/dL    Calcium 9.8 8.5 - 10.5 mg/dL    Anion Gap 13.0 7.0 - 16.0   ESTIMATED GFR    Collection Time: 12/28/22  7:56 AM   Result Value Ref Range    GFR (CKD-EPI) 97 >60 mL/min/1.73 m 2   POCT glucose device results    Collection Time: 12/28/22  8:03 AM   Result Value Ref Range    POC Glucose, Blood 179 (H) 65 - 99 mg/dL   POCT glucose device results    Collection Time: 12/28/22 12:12 PM   Result Value Ref Range    POC Glucose, Blood 238 (H) 65 - 99 mg/dL   COV-2, FLU A/B, AND RSV BY PCR (2-4 HOURS CEPHEID): Collect NP swab in VTM    Collection Time: 12/28/22  3:30 PM    Specimen: Respirate   Result Value Ref Range    Influenza virus A RNA Negative Negative    Influenza virus B, PCR Negative Negative    RSV, PCR Negative  Negative    SARS-CoV-2 by PCR NotDetected     SARS-CoV-2 Source NP Swab    POCT glucose device results    Collection Time: 12/28/22  5:21 PM   Result Value Ref Range    POC Glucose, Blood 215 (H) 65 - 99 mg/dL   POCT glucose device results    Collection Time: 12/28/22  8:13 PM   Result Value Ref Range    POC Glucose, Blood 184 (H) 65 - 99 mg/dL   CBC with Differential    Collection Time: 12/29/22  5:34 AM   Result Value Ref Range    WBC 6.2 4.8 - 10.8 K/uL    RBC 4.98 4.70 - 6.10 M/uL    Hemoglobin 14.7 14.0 - 18.0 g/dL    Hematocrit 43.6 42.0 - 52.0 %    MCV 87.6 81.4 - 97.8 fL    MCH 29.5 27.0 - 33.0 pg    MCHC 33.7 33.7 - 35.3 g/dL    RDW 44.2 35.9 - 50.0 fL    Platelet Count 224 164 - 446 K/uL    MPV 10.7 9.0 - 12.9 fL    Neutrophils-Polys 71.20 44.00 - 72.00 %    Lymphocytes 14.70 (L) 22.00 - 41.00 %    Monocytes 10.20 0.00 - 13.40 %    Eosinophils 2.30 0.00 - 6.90 %    Basophils 0.60 0.00 - 1.80 %    Immature Granulocytes 1.00 (H) 0.00 - 0.90 %    Nucleated RBC 0.00 /100 WBC    Neutrophils (Absolute) 4.40 1.82 - 7.42 K/uL    Lymphs (Absolute) 0.91 (L) 1.00 - 4.80 K/uL    Monos (Absolute) 0.63 0.00 - 0.85 K/uL    Eos (Absolute) 0.14 0.00 - 0.51 K/uL    Baso (Absolute) 0.04 0.00 - 0.12 K/uL    Immature Granulocytes (abs) 0.06 0.00 - 0.11 K/uL    NRBC (Absolute) 0.00 K/uL   Comp Metabolic Panel (CMP)    Collection Time: 12/29/22  5:34 AM   Result Value Ref Range    Sodium 133 (L) 135 - 145 mmol/L    Potassium 3.9 3.6 - 5.5 mmol/L    Chloride 99 96 - 112 mmol/L    Co2 19 (L) 20 - 33 mmol/L    Anion Gap 15.0 7.0 - 16.0    Glucose 163 (H) 65 - 99 mg/dL    Bun 21 8 - 22 mg/dL    Creatinine 0.70 0.50 - 1.40 mg/dL    Calcium 9.6 8.5 - 10.5 mg/dL    AST(SGOT) 13 12 - 45 U/L    ALT(SGPT) 13 2 - 50 U/L    Alkaline Phosphatase 69 30 - 99 U/L    Total Bilirubin 0.7 0.1 - 1.5 mg/dL    Albumin 3.9 3.2 - 4.9 g/dL    Total Protein 6.8 6.0 - 8.2 g/dL    Globulin 2.9 1.9 - 3.5 g/dL    A-G Ratio 1.3 g/dL   Magnesium    Collection  Time: 12/29/22  5:34 AM   Result Value Ref Range    Magnesium 1.2 (L) 1.5 - 2.5 mg/dL   ESTIMATED GFR    Collection Time: 12/29/22  5:34 AM   Result Value Ref Range    GFR (CKD-EPI) 97 >60 mL/min/1.73 m 2   CORRECTED CALCIUM    Collection Time: 12/29/22  5:34 AM   Result Value Ref Range    Correct Calcium 9.7 8.5 - 10.5 mg/dL   POCT glucose device results    Collection Time: 12/29/22  7:47 AM   Result Value Ref Range    POC Glucose, Blood 208 (H) 65 - 99 mg/dL       Scheduled Medications   Medication Dose Frequency    metoprolol tartrate  12.5 mg TWICE DAILY    magnesium oxide  400 mg DAILY    acetaminophen  1,000 mg TID    aspirin  81 mg DAILY    atorvastatin  80 mg Q EVENING    buPROPion SR  150 mg BID    DULoxetine  60 mg BID    enoxaparin (LOVENOX) injection  40 mg DAILY AT 1800    gabapentin  300 mg DAILY    glipiZIDE SR  10 mg PM MEAL    insulin regular  3-14 Units 4X/DAY ACHS    lidocaine  1 Patch Q24HRS    lidocaine  1 Patch Q24HRS    metformin  1,000 mg BID WITH MEALS    omeprazole  20 mg DAILY    pioglitazone  30 mg DAILY    senna-docusate  2 Tablet BID    tamsulosin  0.4 mg AFTER BREAKFAST    vitamin D3  1,000 Units DAILY    ferrous sulfate  325 mg BID WITH MEALS    thiamine  100 mg DAILY       Current Diet Order   Procedures    Diet Order Diet: Cardiac; Second Modifier: (optional): Consistent CHO (Diabetic)       Assessment:  This patient is a 72 y.o. male admitted for acute inpatient rehabilitation with Thoracic compression fracture, closed, initial encounter ([facility]).      Problem List/Medical Decision Making and Plan:    T11, L2, L3 vertebral compression fractures  Conservatively managed  TLSO when out of bed  Continue full rehab program  PT/OT/SLP, 1 hr each discipline, 5 days per week    Outpatient follow-up with neurosurgery    Pain management  Scheduled tylenol  Lidoderm patches  PRN oxycodone, last use never  PRN ice    Normal pressure hydrocephalus?  Outpatient follow-up with neurology for possible  lumbar puncture with large volume CSF removal     Dementia  Speech therapy assessment  Outpatient follow-up with neurology as well as PET scan  Aspirin for stroke/vascular prevention per neurology     History of depression  Cymbalta  Wellbutrin  Monitor need to consult psychology     Hypertension  Benazepril discontinued  Hospitalist consulted    Tachycardia  EKG on 12/22 was sinus tachycardia  Patient currently not anemic and does not have any pain at rest  Start low-dose beta-blocker  Consult hospitalist     Hyperlipidemia  Statin     History of iron deficiency anemia  Continue ferrous sulfate     History of vitamin D deficiency  Continue supplementation     Peripheral neuropathy  Cymbalta  Gabapentin     Diabetes with hyperglycemia  Last hemoglobin A1c 11/22 8.4, uncontrolled  Glipizide  Actos  Sliding scale insulin  Consult hospitalist    Hypomagnesemia  Start supplementation     History of prostate cancer  Completed treatment, apparently in remission  Flomax  Outpatient follow-up with urology     Alcohol use  Add vitamins  Needs counseling     Tobacco use  Needs counseling    Bowel program  Continue bowel medications  Last BM 12/29    Bladder program  Check PVRs 131-   Bladder scan for no voids  ICP for over 400 cc  Scheduled toileting     DVT prophylaxis  Lovenox      Total time:  35 minutes.  I spent greater than 50% of the time for patient care, counseling, and coordination on this date, including patient face-to face time, unit/floor time with review of records/pertinent lab data and studies, as well as discussing diagnostic evaluation/work up, planned therapeutic interventions, and future disposition of care, as per the interval events/subjective and the assessment and plan as noted above.      Evelyne Irving M.D.  Physical Medicine and Rehabilitation

## 2022-12-29 NOTE — DISCHARGE PLANNING
CASE MANAGEMENT INITIAL ASSESSMENT    Admit Date:  12/28/2022     CM to meet with patient to discuss role of case management / discharge planning / team conference.   Patient is a  72 y.o. male transferred from [facility].    Diagnosis: Compression fracture of body of thoracic vertebra ([facility]) [S22.000A]  Thoracic compression fracture, closed, initial encounter ([facility]) [S22.000A]    Co-morbidities:   Patient Active Problem List    Diagnosis Date Noted    Tachycardia 12/29/2022    Hypomagnesemia 12/29/2022    Thoracic compression fracture, closed, initial encounter ([facility]) 12/28/2022    Impaired mobility and ADLs 12/28/2022    History of depression 12/28/2022    Pancytopenia ([facility]) 12/23/2022    Compression fracture of body of thoracic vertebra ([facility]) 12/22/2022    Fall 12/22/2022    Age-related physical debility 12/22/2022    Compression fracture of L2 lumbar vertebra, closed, initial encounter ([facility]) 12/22/2022    Compression fracture of L3 lumbar vertebra, closed, initial encounter ([facility]) 12/22/2022    Neuropathy 12/22/2022    Diabetes mellitus, type 2 ([facility]) 11/07/2022    Normal pressure hydrocephalus ([facility]) 11/07/2022    Thrombocytopenia, unspecified ([facility]) 11/07/2022    Tobacco dependency 08/22/2022    Dementia ([facility]) 07/26/2022    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 Moran 07/26/2022    Obesity (BMI 30.0-34.9) 09/16/2021    Orthostatic hypotension 01/03/2019    History of alcohol abuse 01/03/2019    Other male erectile dysfunction 04/11/2018    Vitamin D deficiency 05/12/2017    Current moderate episode of major depressive disorder without prior episode ([facility]) 11/11/2016    B12 deficiency 11/01/2016    Tremor, coarse 10/25/2016    GERD (gastroesophageal reflux disease) 09/27/2016    Essential hypertension 09/27/2016    Prostate CA ([facility])- feb 2022; RT tbd x 8 wks, mar- may 2022 09/27/2016    Mixed hyperlipidemia 09/27/2016    Tobacco use 09/27/2016    ACP (advance care planning) 09/27/2016      Prior Living Situation:  Housing / Facility: 1 Story House  Lives with - Patient's Self Care Capacity: Spouse    Prior Level of Function:  Driving / Transportation: Driving Independent    Support Systems:  Primary : Heidy-spouse: [phone]    Advance Directives: Yes  Power of  (Name & Phone): TBD    Previous Services Utilized:   Equipment Owned: 4-Wheel Walker (walking sticks)  Prior Services: Home-Independent (however pt reports frequent falls)    Other Information:  Occupation (Pre-Hospital Vocational): Retired Due To Age     Primary Payor Source: [employer]  Primary Care Practitioner : Stanton Miller MD    Patient / Family Goal:  Patient / Family Goal: 1. Gain mobility    Plan:  1. Continue to follow patient through hospitalization and provide discharge planning in collaboration with patient, family, physicians and ancillary services.     2. Utilize community resources to ensure a safe discharge.

## 2022-12-30 LAB
GLUCOSE BLD STRIP.AUTO-MCNC: 130 MG/DL (ref 65–99)
GLUCOSE BLD STRIP.AUTO-MCNC: 142 MG/DL (ref 65–99)
GLUCOSE BLD STRIP.AUTO-MCNC: 151 MG/DL (ref 65–99)
GLUCOSE BLD STRIP.AUTO-MCNC: 196 MG/DL (ref 65–99)

## 2022-12-30 PROCEDURE — 700102 HCHG RX REV CODE 250 W/ 637 OVERRIDE(OP): Performed by: PHYSICAL MEDICINE & REHABILITATION

## 2022-12-30 PROCEDURE — 99232 SBSQ HOSP IP/OBS MODERATE 35: CPT | Performed by: HOSPITALIST

## 2022-12-30 PROCEDURE — 700111 HCHG RX REV CODE 636 W/ 250 OVERRIDE (IP): Performed by: PHYSICAL MEDICINE & REHABILITATION

## 2022-12-30 PROCEDURE — A9270 NON-COVERED ITEM OR SERVICE: HCPCS | Performed by: PHYSICAL MEDICINE & REHABILITATION

## 2022-12-30 PROCEDURE — 97112 NEUROMUSCULAR REEDUCATION: CPT

## 2022-12-30 PROCEDURE — 97129 THER IVNTJ 1ST 15 MIN: CPT

## 2022-12-30 PROCEDURE — 82962 GLUCOSE BLOOD TEST: CPT | Mod: 91

## 2022-12-30 PROCEDURE — 99233 SBSQ HOSP IP/OBS HIGH 50: CPT | Performed by: PHYSICAL MEDICINE & REHABILITATION

## 2022-12-30 PROCEDURE — 700101 HCHG RX REV CODE 250: Performed by: PHYSICAL MEDICINE & REHABILITATION

## 2022-12-30 PROCEDURE — 700102 HCHG RX REV CODE 250 W/ 637 OVERRIDE(OP): Performed by: HOSPITALIST

## 2022-12-30 PROCEDURE — 770010 HCHG ROOM/CARE - REHAB SEMI PRIVAT*

## 2022-12-30 PROCEDURE — A9270 NON-COVERED ITEM OR SERVICE: HCPCS | Performed by: HOSPITALIST

## 2022-12-30 PROCEDURE — 97530 THERAPEUTIC ACTIVITIES: CPT

## 2022-12-30 PROCEDURE — 97130 THER IVNTJ EA ADDL 15 MIN: CPT

## 2022-12-30 PROCEDURE — 97535 SELF CARE MNGMENT TRAINING: CPT

## 2022-12-30 RX ORDER — OMEPRAZOLE 20 MG/1
20 CAPSULE, DELAYED RELEASE ORAL
Status: DISCONTINUED | OUTPATIENT
Start: 2022-12-31 | End: 2023-01-05 | Stop reason: HOSPADM

## 2022-12-30 RX ORDER — TAMSULOSIN HYDROCHLORIDE 0.4 MG/1
0.4 CAPSULE ORAL
Status: DISCONTINUED | OUTPATIENT
Start: 2022-12-31 | End: 2023-01-03

## 2022-12-30 RX ADMIN — METFORMIN HYDROCHLORIDE 1000 MG: 500 TABLET ORAL at 17:34

## 2022-12-30 RX ADMIN — Medication 400 MG: at 08:19

## 2022-12-30 RX ADMIN — BUPROPION HYDROCHLORIDE 150 MG: 150 TABLET, EXTENDED RELEASE ORAL at 20:12

## 2022-12-30 RX ADMIN — LIDOCAINE 1 PATCH: 700 PATCH TOPICAL at 08:18

## 2022-12-30 RX ADMIN — METOPROLOL TARTRATE 12.5 MG: 25 TABLET, FILM COATED ORAL at 17:33

## 2022-12-30 RX ADMIN — PIOGLITAZONE 30 MG: 15 TABLET ORAL at 08:19

## 2022-12-30 RX ADMIN — OMEPRAZOLE 20 MG: 20 CAPSULE, DELAYED RELEASE ORAL at 08:19

## 2022-12-30 RX ADMIN — INSULIN HUMAN 3 UNITS: 100 INJECTION, SOLUTION PARENTERAL at 11:28

## 2022-12-30 RX ADMIN — DULOXETINE HYDROCHLORIDE 60 MG: 30 CAPSULE, DELAYED RELEASE ORAL at 20:12

## 2022-12-30 RX ADMIN — ACETAMINOPHEN 1000 MG: 500 TABLET ORAL at 08:19

## 2022-12-30 RX ADMIN — ACETAMINOPHEN 1000 MG: 500 TABLET ORAL at 14:53

## 2022-12-30 RX ADMIN — FERROUS SULFATE TAB 325 MG (65 MG ELEMENTAL FE) 325 MG: 325 (65 FE) TAB at 08:20

## 2022-12-30 RX ADMIN — ENOXAPARIN SODIUM 40 MG: 40 INJECTION SUBCUTANEOUS at 17:34

## 2022-12-30 RX ADMIN — TAMSULOSIN HYDROCHLORIDE 0.4 MG: 0.4 CAPSULE ORAL at 08:19

## 2022-12-30 RX ADMIN — ACETAMINOPHEN 1000 MG: 500 TABLET ORAL at 20:12

## 2022-12-30 RX ADMIN — GABAPENTIN 300 MG: 300 CAPSULE ORAL at 08:19

## 2022-12-30 RX ADMIN — DULOXETINE HYDROCHLORIDE 60 MG: 30 CAPSULE, DELAYED RELEASE ORAL at 05:29

## 2022-12-30 RX ADMIN — Medication 1000 UNITS: at 08:19

## 2022-12-30 RX ADMIN — BUPROPION HYDROCHLORIDE 150 MG: 150 TABLET, EXTENDED RELEASE ORAL at 05:29

## 2022-12-30 RX ADMIN — ASPIRIN 81 MG 81 MG: 81 TABLET ORAL at 08:19

## 2022-12-30 RX ADMIN — ATORVASTATIN CALCIUM 80 MG: 40 TABLET, FILM COATED ORAL at 20:12

## 2022-12-30 RX ADMIN — METOPROLOL TARTRATE 12.5 MG: 25 TABLET, FILM COATED ORAL at 05:29

## 2022-12-30 RX ADMIN — METFORMIN HYDROCHLORIDE 1000 MG: 500 TABLET ORAL at 08:19

## 2022-12-30 RX ADMIN — LIDOCAINE 1 PATCH: 700 PATCH TOPICAL at 08:19

## 2022-12-30 RX ADMIN — GLIPIZIDE 15 MG: 2.5 TABLET, EXTENDED RELEASE ORAL at 17:33

## 2022-12-30 RX ADMIN — INSULIN HUMAN 3 UNITS: 100 INJECTION, SOLUTION PARENTERAL at 17:27

## 2022-12-30 ASSESSMENT — ENCOUNTER SYMPTOMS
DIARRHEA: 0
VOMITING: 0
SHORTNESS OF BREATH: 0
NAUSEA: 0
ABDOMINAL PAIN: 0
FEVER: 0
NERVOUS/ANXIOUS: 0
CHILLS: 0

## 2022-12-30 ASSESSMENT — ACTIVITIES OF DAILY LIVING (ADL)
BED_CHAIR_WHEELCHAIR_TRANSFER_DESCRIPTION: ADAPTIVE EQUIPMENT;INCREASED TIME;SET-UP OF EQUIPMENT;SUPERVISION FOR SAFETY
BED_CHAIR_WHEELCHAIR_TRANSFER_DESCRIPTION: INCREASED TIME;ADAPTIVE EQUIPMENT;SET-UP OF EQUIPMENT;SUPERVISION FOR SAFETY;VERBAL CUEING
TOILET_TRANSFER_DESCRIPTION: GRAB BAR;INCREASED TIME;SET-UP OF EQUIPMENT;SUPERVISION FOR SAFETY

## 2022-12-30 ASSESSMENT — PAIN DESCRIPTION - PAIN TYPE: TYPE: ACUTE PAIN

## 2022-12-30 NOTE — CARE PLAN
Problem: Speech/Swallowing LTGs  Goal: LTG-By discharge, patient will complete functional problem solving and recall information with 80% accuracy provided MOD I  Outcome: Not Met     Problem: Problem Solving STGs  Goal: STG-Within one week, patient will complete medication management tasks with 80% accuracy provided SPV  Outcome: Not Met  Note: Recent eval     Problem: Memory STGs  Goal: STG-Within one week, patient will implement use of functional memory strategies to assist in recall of information related to hospitalization and safety with 70% accuracy provided MOD A  Outcome: Not Met  Note: Recent eval

## 2022-12-30 NOTE — CARE PLAN
The patient is Stable - Low risk of patient condition declining or worsening    Shift Goals  Clinical Goals: Monitor I&Os  Patient Goals: Rest, pain management, recovery    Progress made toward(s) clinical / shift goals:    Problem: Knowledge Deficit - Standard  Goal: Patient and family/care givers will demonstrate understanding of plan of care, disease process/condition, diagnostic tests and medications  Outcome: Progressing   Patient educated on the POC and medications administered.     Problem: Fall Risk - Rehab  Goal: Patient will remain free from falls  Outcome: Progressing   Bed locked and low position. Call light and belongings within reach. TLSO in use when OOB.     Problem: Skin Integrity  Goal: Patient's skin integrity will be maintained or improve  Outcome: Progressing     Problem: Pain - Standard  Goal: Alleviation of pain or a reduction in pain to the patient’s comfort goal  Outcome: Progressing   Use of 0-10 pain scale. Pain is controlled with scheduled tylenol    Patient is not progressing towards the following goals:

## 2022-12-30 NOTE — THERAPY
Physical Therapy   Daily Treatment     Patient Name: Prabhakar Sierra  Age:  72 y.o., Sex:  male  Medical Record #: [id number]  Today's Date: 12/30/2022     Precautions  Precautions: (P) Fall Risk, TLSO (Thoracolumbosacral orthosis), Spinal / Back Precautions   Comments: (P) dementia, TLSO on OOB off for showers    Subjective    Patient in bed, reporting having thrown up and declining therapy.     Objective       12/30/22 0831   Therapy Missed   Missed Therapy (Minutes) 15   Reason For Missed Therapy Non-Medical - Patient Refused  (malaise)   PT Charge Group   PT Therapeutic Activities (Units) 3   PT Total Time Spent   PT Individual Total Time Spent (Mins) 45   Precautions   Precautions Fall Risk;TLSO (Thoracolumbosacral orthosis);Spinal / Back Precautions    Comments dementia, TLSO on OOB off for showers   Interdisciplinary Plan of Care Collaboration   IDT Collaboration with  Nursing;Physical Therapist;Occupational Therapist   Patient Position at End of Therapy In Bed;Call Light within Reach;Tray Table within Reach;Phone within Reach   Collaboration Comments POC, CLOF     Education on standard therapy schedule with rest breaks between sessions vs. Reduced program. Encouraged patient to participate as much as possible and benefits of therapy and balance training; patient verbalizing understanding and is motivated to return home and be less of a burden to wife, however adamantly refusing OOB this session.     Assessment    Patient tolerated session poorly, reporting emesis earlier and  declined therapy at this time. Verbally receptive to education however did not carry over to action or change in behavior.    Strengths: Able to follow instructions, Motivated for self care and independence, Pleasant and cooperative, Supportive family, Willingly participates in therapeutic activities  Barriers: Dementia, Decreased endurance, Generalized weakness, Home accessibility, Impaired activity tolerance, Impaired balance,  Impaired carryover of learning, Impaired functional cognition, Pain    Plan    Gait training with FWW, stair training (14 steps with R rail), standing balance, fall recovery, general strengthening, activity tolerance/endurance.    Passport items to be completed:  Get in/out of bed safely, in/out of a vehicle, safely use mobility device, walk or wheel around home/community, navigate up and down stairs, show how to get up/down from the ground, ensure home is accessible, demonstrate HEP, complete caregiver training    Physical Therapy Problems (Active)       Problem: Mobility       Dates: Start: 12/29/22         Goal: STG-Within one week, patient will ambulate 100ft with SBA using FWW       Dates: Start: 12/29/22            Goal: STG-Within one week, patient will ascend and descend four stairs with R HR with CGA       Dates: Start: 12/29/22               Problem: Mobility Transfers       Dates: Start: 12/29/22         Goal: STG-Within one week, patient will perform bed mobility with SPV        Dates: Start: 12/29/22            Goal: STG-Within one week, patient will transfer bed to chair with SPV using FWW       Dates: Start: 12/29/22               Problem: PT-Long Term Goals       Dates: Start: 12/29/22         Goal: LTG-By discharge, patient will ambulate 200ft with SPV using LRAD        Dates: Start: 12/29/22            Goal: LTG-By discharge, patient will transfer one surface to another with set up assist       Dates: Start: 12/29/22            Goal: LTG-By discharge, patient will perform home exercise program with SPV       Dates: Start: 12/29/22            Goal: LTG-By discharge, patient will ambulate up/down 14 stairs with R HR with SBA       Dates: Start: 12/29/22

## 2022-12-30 NOTE — THERAPY
Occupational Therapy  Daily Treatment     Patient Name: Prabhakar Sierra  Age:  72 y.o., Sex:  male  Medical Record #: [id number]  Today's Date: 12/30/2022     Precautions  Precautions: (P) Fall Risk, TLSO (Thoracolumbosacral orthosis), Spinal / Back Precautions   Comments: (P) dementia, TLSO on OOB off for showers         Subjective    Pt agreeable to OT sessions     Objective       12/30/22 1001 12/30/22 1231   OT Charge Group   Charges Yes  --    OT Self Care / ADL (Units) 2 1   OT Neuromuscular Re-education / Balance (Units)  --  1   OT Total Time Spent   OT Individual Total Time Spent (Mins) 30 30   Precautions   Precautions Fall Risk;TLSO (Thoracolumbosacral orthosis);Spinal / Back Precautions  Fall Risk;TLSO (Thoracolumbosacral orthosis);Spinal / Back Precautions    Comments dementia, TLSO on OOB off for showers dementia, TLSO on OOB off for showers   Functional Level of Assist   Grooming Supervision;Seated  --    Grooming Description Increased time;Seated in wheelchair at sink;Supervision for safety  --    Upper Body Dressing  --  Maximal Assist  (TLSO)   Upper Body Dressing Description  --  Supervision for safety;Verbal cueing;Application of orthotic or brace;Increased time;Set-up of equipment   Lower Body Dressing Moderate Assist  --    Lower Body Dressing Description Grab bar;Assist with threading into pant leg;Increased time;Initial preparation for task;Verbal cueing;Set-up of equipment  --    Toileting Maximal Assist  --    Toileting Description Assist for hygiene;Assist to pull pants up;Assist for standing balance;Grab bar;Increased time;Supervision for safety;Verbal cueing  --    Bed, Chair, Wheelchair Transfer Minimal Assist Minimal Assist   Bed Chair Wheelchair Transfer Description Adaptive equipment;Increased time;Set-up of equipment;Supervision for safety  (FWW for bed>w/c transfer) Increased time;Adaptive equipment;Set-up of equipment;Supervision for safety;Verbal cueing   Toilet Transfers  Minimal Assist  --    Toilet Transfer Description Grab bar;Increased time;Set-up of equipment;Supervision for safety  --    Balance   Comments  --  Pt participated in ring toss activity to address balance and standing tolerance. Pt used BUEs to reach for and toss rings. Pt had 1 LOB but was luci to recover with R hand on // bar. Pt collected rings from ground w/ long reacher.   Bed Mobility    Supine to Sit Moderate Assist  --    Scooting Minimal Assist  --    Interdisciplinary Plan of Care Collaboration   IDT Collaboration with   --  Physical Therapist   Patient Position at End of Therapy Seated;Call Light within Reach;Tray Table within Reach;Phone within Reach In Bed;Call Light within Reach;Phone within Reach;Tray Table within Reach   Collaboration Comments  --  CLOF         Assessment    Pt tolerated sessions well focused on ADLs, activity tolerance, balance, and transfers. Pt required encouragement to complete therapy but was ultimately agreeable to participate. Pt cont to demonstrate impaired balance and endurance/activity tolerance throughout activities.   Strengths: Able to follow instructions, Alert and oriented, Effective communication skills, Pleasant and cooperative, Supportive family, Willingly participates in therapeutic activities, Good insight into deficits/needs  Barriers: Decreased endurance, Generalized weakness, Limited mobility, Pain, Impaired balance    Plan    ADLs, transfers, TLSO donning/doffing, balance, activity tolerance, standing tolerance    Occupational Therapy Goals (Active)       Problem: Bathing       Dates: Start: 12/29/22         Goal: STG-Within one week, patient will bathe w/ min A.        Dates: Start: 12/29/22               Problem: Dressing       Dates: Start: 12/29/22         Goal: STG-Within one week, patient will dress UB including TLSO w/ mod A.        Dates: Start: 12/29/22            Goal: STG-Within one week, patient will dress LB w/ LB dressing AE and mod A.        Dates: Start: 12/29/22               Problem: OT Long Term Goals       Dates: Start: 12/29/22         Goal: LTG-By discharge, patient will complete basic self care tasks w/ mod I - min A.       Dates: Start: 12/29/22            Goal: LTG-By discharge, patient will perform bathroom transfers w/ mod I- CGA.        Dates: Start: 12/29/22               Problem: Toileting       Dates: Start: 12/29/22         Goal: STG-Within one week, patient will complete toileting tasks w/ min A.        Dates: Start: 12/29/22

## 2022-12-30 NOTE — THERAPY
"Speech Language Pathology  Daily Treatment     Patient Name: Prabhakar Sierra  Age:  72 y.o., Sex:  male  Medical Record #: [id number]  Today's Date: 12/30/2022     Precautions  Precautions: Fall Risk, TLSO (Thoracolumbosacral orthosis), Spinal / Back Precautions   Comments: dementia, TLSO on OOB off for showers    Subjective    \"Have I met you before?\"     Objective       12/30/22 1033   Treatment Charges   SLP Cognitive Skill Development First 15 Minutes 1   SLP Cognitive Skill Development Additional 15 Minutes 3   SLP Total Time Spent   SLP Individual Total Time Spent (Mins) 60         Assessment    Pt with decreased recall of meeting this therapist day prior however reports recall of long cognitive test being completed. Pt presented with attention task with use of short story and target word THE, pt predicted that he would locate 90% of targets, pt indep located 9/15 targets. Increased to 11/15 with additional review and 15/15 with MOD-MAX A by SLP. Memory log initiated on this date, pt indep oriented to month and date however not BACILIO (pt reporting today was Sunday). Pt recalled that he did not eat breakfast due to nausea however verbalized that he was unable to recall any tasks completed in PT or OT sessions this morning. Education provided re: use of written aids to assist in recall, filling out as day occurs, and reviewing throughout the day to assist with recall. Legibility is a barrier when pt writes in cursive, slightly better with print however pt unable to maintain printing despite awareness that this was easier. SLP acting as scribe.          Plan    Cont to address memory with use of daily log as well as medication management.     Speech Therapy Problems (Active)       Problem: Memory STGs       Dates: Start: 12/29/22         Goal: STG-Within one week, patient will implement use of functional memory strategies to assist in recall of information related to hospitalization and safety with 70% " accuracy provided MOD A       Dates: Start: 12/29/22         Goal Note filed on 12/30/22 1049 by Pavithra Pittman MS,CCC-SLP       Recent eval                 Problem: Problem Solving STGs       Dates: Start: 12/29/22         Goal: STG-Within one week, patient will complete medication management tasks with 80% accuracy provided SPV       Dates: Start: 12/29/22         Goal Note filed on 12/30/22 1049 by Pavithra Pittman MS,CCC-SLP       Recent eval                 Problem: Speech/Swallowing LTGs       Dates: Start: 12/29/22         Goal: LTG-By discharge, patient will complete functional problem solving and recall information with 80% accuracy provided MOD I       Dates: Start: 12/29/22

## 2022-12-30 NOTE — PROGRESS NOTES
Assumed care of patient. Bedside report received from Navdepe URBINA. Patient is in bed. Fall precautions in place. Call light and belongings within reach. Updated on POC, all questions and concerns answered at this time. No other needs at this time.

## 2022-12-30 NOTE — PROGRESS NOTES
"Rehab Progress Note     Date of Service: 12/30/2022  Chief Complaint: Follow-up vertebral compression fractures    Interval Events (Subjective)    Patient seen and examined today in his room.  He had an episode of emesis this morning which she thinks is secondary to taking all of his pills at once.  He denies any abdominal pain.  He moved his bowels this morning.  He denies any significant pain due to his fractures.  We reviewed skeleton model so that he would understand where his compression fractures were.  Patient is eager to get discharged as soon as possible and is not concerned about his ability to do stairs at home.  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    Patient has no other new questions, concerns, or complaints today.       ROS: No changes to bowel, bladder, pain, mood, or sleep.         Objective:  VITAL SIGNS: /73   Pulse (!) 101   Temp 36.7 °C (98.1 °F) (Oral)   Resp 18   Ht 1.778 m (5' 10\")   Wt 90.6 kg (199 lb 11.8 oz)   SpO2 96%   BMI 28.66 kg/m²   Gen: alert, no apparent distress  CV: Regular rate, regular rhythm  Resp: Clear to auscultation bilaterally  GI: Soft, nontender, active bowel sounds      Recent Results (from the past 72 hour(s))   POCT glucose device results    Collection Time: 12/27/22 12:02 PM   Result Value Ref Range    POC Glucose, Blood 268 (H) 65 - 99 mg/dL   POCT glucose device results    Collection Time: 12/27/22  6:09 PM   Result Value Ref Range    POC Glucose, Blood 256 (H) 65 - 99 mg/dL   POCT glucose device results    Collection Time: 12/27/22  7:59 PM   Result Value Ref Range    POC Glucose, Blood 254 (H) 65 - 99 mg/dL   CBC WITH DIFFERENTIAL    Collection Time: 12/28/22  7:56 AM   Result Value Ref Range    WBC 6.3 4.8 - 10.8 K/uL    RBC 5.05 4.70 - 6.10 M/uL    Hemoglobin 15.0 14.0 - 18.0 g/dL    Hematocrit 44.2 42.0 - 52.0 %    MCV 87.5 81.4 - 97.8 fL    MCH 29.7 27.0 - 33.0 " pg    MCHC 33.9 33.7 - 35.3 g/dL    RDW 44.7 35.9 - 50.0 fL    Platelet Count 212 164 - 446 K/uL    MPV 10.6 9.0 - 12.9 fL    Neutrophils-Polys 74.10 (H) 44.00 - 72.00 %    Lymphocytes 12.90 (L) 22.00 - 41.00 %    Monocytes 9.80 0.00 - 13.40 %    Eosinophils 2.10 0.00 - 6.90 %    Basophils 0.50 0.00 - 1.80 %    Immature Granulocytes 0.60 0.00 - 0.90 %    Nucleated RBC 0.00 /100 WBC    Neutrophils (Absolute) 4.70 1.82 - 7.42 K/uL    Lymphs (Absolute) 0.82 (L) 1.00 - 4.80 K/uL    Monos (Absolute) 0.62 0.00 - 0.85 K/uL    Eos (Absolute) 0.13 0.00 - 0.51 K/uL    Baso (Absolute) 0.03 0.00 - 0.12 K/uL    Immature Granulocytes (abs) 0.04 0.00 - 0.11 K/uL    NRBC (Absolute) 0.00 K/uL   Basic Metabolic Panel    Collection Time: 12/28/22  7:56 AM   Result Value Ref Range    Sodium 135 135 - 145 mmol/L    Potassium 4.5 3.6 - 5.5 mmol/L    Chloride 99 96 - 112 mmol/L    Co2 23 20 - 33 mmol/L    Glucose 179 (H) 65 - 99 mg/dL    Bun 17 8 - 22 mg/dL    Creatinine 0.71 0.50 - 1.40 mg/dL    Calcium 9.8 8.5 - 10.5 mg/dL    Anion Gap 13.0 7.0 - 16.0   ESTIMATED GFR    Collection Time: 12/28/22  7:56 AM   Result Value Ref Range    GFR (CKD-EPI) 97 >60 mL/min/1.73 m 2   POCT glucose device results    Collection Time: 12/28/22  8:03 AM   Result Value Ref Range    POC Glucose, Blood 179 (H) 65 - 99 mg/dL   POCT glucose device results    Collection Time: 12/28/22 12:12 PM   Result Value Ref Range    POC Glucose, Blood 238 (H) 65 - 99 mg/dL   COV-2, FLU A/B, AND RSV BY PCR (2-4 HOURS CEPHEID): Collect NP swab in VTM    Collection Time: 12/28/22  3:30 PM    Specimen: Respirate   Result Value Ref Range    Influenza virus A RNA Negative Negative    Influenza virus B, PCR Negative Negative    RSV, PCR Negative Negative    SARS-CoV-2 by PCR NotDetected     SARS-CoV-2 Source NP Swab    POCT glucose device results    Collection Time: 12/28/22  5:21 PM   Result Value Ref Range    POC Glucose, Blood 215 (H) 65 - 99 mg/dL   POCT glucose device  results    Collection Time: 12/28/22  8:13 PM   Result Value Ref Range    POC Glucose, Blood 184 (H) 65 - 99 mg/dL   CBC with Differential    Collection Time: 12/29/22  5:34 AM   Result Value Ref Range    WBC 6.2 4.8 - 10.8 K/uL    RBC 4.98 4.70 - 6.10 M/uL    Hemoglobin 14.7 14.0 - 18.0 g/dL    Hematocrit 43.6 42.0 - 52.0 %    MCV 87.6 81.4 - 97.8 fL    MCH 29.5 27.0 - 33.0 pg    MCHC 33.7 33.7 - 35.3 g/dL    RDW 44.2 35.9 - 50.0 fL    Platelet Count 224 164 - 446 K/uL    MPV 10.7 9.0 - 12.9 fL    Neutrophils-Polys 71.20 44.00 - 72.00 %    Lymphocytes 14.70 (L) 22.00 - 41.00 %    Monocytes 10.20 0.00 - 13.40 %    Eosinophils 2.30 0.00 - 6.90 %    Basophils 0.60 0.00 - 1.80 %    Immature Granulocytes 1.00 (H) 0.00 - 0.90 %    Nucleated RBC 0.00 /100 WBC    Neutrophils (Absolute) 4.40 1.82 - 7.42 K/uL    Lymphs (Absolute) 0.91 (L) 1.00 - 4.80 K/uL    Monos (Absolute) 0.63 0.00 - 0.85 K/uL    Eos (Absolute) 0.14 0.00 - 0.51 K/uL    Baso (Absolute) 0.04 0.00 - 0.12 K/uL    Immature Granulocytes (abs) 0.06 0.00 - 0.11 K/uL    NRBC (Absolute) 0.00 K/uL   Comp Metabolic Panel (CMP)    Collection Time: 12/29/22  5:34 AM   Result Value Ref Range    Sodium 133 (L) 135 - 145 mmol/L    Potassium 3.9 3.6 - 5.5 mmol/L    Chloride 99 96 - 112 mmol/L    Co2 19 (L) 20 - 33 mmol/L    Anion Gap 15.0 7.0 - 16.0    Glucose 163 (H) 65 - 99 mg/dL    Bun 21 8 - 22 mg/dL    Creatinine 0.70 0.50 - 1.40 mg/dL    Calcium 9.6 8.5 - 10.5 mg/dL    AST(SGOT) 13 12 - 45 U/L    ALT(SGPT) 13 2 - 50 U/L    Alkaline Phosphatase 69 30 - 99 U/L    Total Bilirubin 0.7 0.1 - 1.5 mg/dL    Albumin 3.9 3.2 - 4.9 g/dL    Total Protein 6.8 6.0 - 8.2 g/dL    Globulin 2.9 1.9 - 3.5 g/dL    A-G Ratio 1.3 g/dL   Magnesium    Collection Time: 12/29/22  5:34 AM   Result Value Ref Range    Magnesium 1.2 (L) 1.5 - 2.5 mg/dL   ESTIMATED GFR    Collection Time: 12/29/22  5:34 AM   Result Value Ref Range    GFR (CKD-EPI) 97 >60 mL/min/1.73 m 2   CORRECTED CALCIUM     Collection Time: 12/29/22  5:34 AM   Result Value Ref Range    Correct Calcium 9.7 8.5 - 10.5 mg/dL   POCT glucose device results    Collection Time: 12/29/22  7:47 AM   Result Value Ref Range    POC Glucose, Blood 208 (H) 65 - 99 mg/dL   POCT glucose device results    Collection Time: 12/29/22 11:07 AM   Result Value Ref Range    POC Glucose, Blood 196 (H) 65 - 99 mg/dL   POCT glucose device results    Collection Time: 12/29/22  5:24 PM   Result Value Ref Range    POC Glucose, Blood 235 (H) 65 - 99 mg/dL   POCT glucose device results    Collection Time: 12/29/22  8:47 PM   Result Value Ref Range    POC Glucose, Blood 199 (H) 65 - 99 mg/dL   POCT glucose device results    Collection Time: 12/30/22  7:33 AM   Result Value Ref Range    POC Glucose, Blood 142 (H) 65 - 99 mg/dL       Scheduled Medications   Medication Dose Frequency    metoprolol tartrate  12.5 mg TWICE DAILY    magnesium oxide  400 mg DAILY    glipiZIDE SR  15 mg PM MEAL    acetaminophen  1,000 mg TID    aspirin  81 mg DAILY    atorvastatin  80 mg Q EVENING    buPROPion SR  150 mg BID    DULoxetine  60 mg BID    enoxaparin (LOVENOX) injection  40 mg DAILY AT 1800    gabapentin  300 mg DAILY    insulin regular  3-14 Units 4X/DAY ACHS    lidocaine  1 Patch Q24HRS    lidocaine  1 Patch Q24HRS    metformin  1,000 mg BID WITH MEALS    omeprazole  20 mg DAILY    pioglitazone  30 mg DAILY    senna-docusate  2 Tablet BID    tamsulosin  0.4 mg AFTER BREAKFAST    vitamin D3  1,000 Units DAILY    ferrous sulfate  325 mg BID WITH MEALS       Current Diet Order   Procedures    Diet Order Diet: Cardiac; Second Modifier: (optional): Consistent CHO (Diabetic)       Assessment:  This patient is a 72 y.o. male admitted for acute inpatient rehabilitation with Thoracic compression fracture, closed, initial encounter (Formerly [facility]).    I led and attended the weekly conference today, and agree with the IDT conference documentation and plan of care as noted below.    Date of  conference: 12/30/2022    Goals and barriers: See IDT note.    Biggest barriers: incontinent, high PVRs - urinary retention    Goals in next week: mild cognitive impairment, poor tolerance and endurance, stairs at home, lethargy    CM/social support: wife supportive    Anticipated DC date: 1/10, home evaluation on 9th    Home health: Pt/OT/SLP/RN    Equip: has his own    Follow up: PCP, neurosurgery, neurology         Problem List/Medical Decision Making and Plan:    T11, L2, L3 vertebral compression fractures  Conservatively managed  TLSO when out of bed  Continue full rehab program  PT/OT/SLP, 1 hr each discipline, 5 days per week    Outpatient follow-up with neurosurgery, Dr. Guzman    Pain management  Scheduled tylenol  Lidoderm patches  PRN oxycodone, last use never, will discontinue  PRN ice    Normal pressure hydrocephalus?  Outpatient follow-up with neurology for possible lumbar puncture with large volume CSF removal     Dementia?  Mild cognitive impairment, mostly memory/attention  Speech therapy assessment  Outpatient follow-up with neurology as well as PET scan  Aspirin for stroke/vascular prevention per neurology     History of depression  Cymbalta  Wellbutrin  Monitor need to consult psychology     Hypertension  Benazepril discontinued  Metoprolol started  Appreciate hospitalist assistance    Tachycardia, improved/intermittent  EKG on 12/22 was sinus tachycardia  Patient currently not anemic and does not have any pain at rest  Started low-dose beta-blocker 12/29  Appreciate hospitalist assistance     Hyperlipidemia  Statin     History of iron deficiency anemia  Discontinue ferrous sulfate as patient no longer has anemia     History of vitamin D deficiency  Discontinue supplementation for now as patient feels like he is taking too many pills    Peripheral neuropathy  Cymbalta  Gabapentin -patient not complaining of any pain so we will discontinue     Diabetes with hyperglycemia  Last hemoglobin A1c 11/22  8.4, uncontrolled  Glipizide  Actos  Sliding scale insulin  Appreciate hospitalist's assistance    Hypomagnesemia  Started supplementation  Monitor with intermittent labs     Alcohol use  Add vitamins  Needs counseling     Tobacco use  Needs counseling    Bowel program  Continue bowel medications  Last BM 12/30    Bladder program  History of prostate cancer  Completed treatment, apparently in remission  Continue Flomax  Outpatient follow-up with urology  Check PVRs -131, 307  Bladder scan for no voids  ICP for over 400 cc  Scheduled toileting     DVT prophylaxis  Lovenox      Total time:  40 minutes.  I spent greater than 50% of the time for patient care, counseling, and coordination on this date, including patient face-to face time, unit/floor time with review of records/pertinent lab data and studies, as well as discussing diagnostic evaluation/work up, planned therapeutic interventions, and future disposition of care, as per the interval events/subjective and the assessment and plan as noted above.        Evelyne Irving M.D.  Physical Medicine and Rehabilitation

## 2022-12-30 NOTE — CARE PLAN
Problem: Bathing  Goal: STG-Within one week, patient will bathe w/ min A.   Outcome: Not Met     Problem: Dressing  Goal: STG-Within one week, patient will dress UB including TLSO w/ mod A.   Outcome: Not Met  Goal: STG-Within one week, patient will dress LB w/ LB dressing AE and mod A.  Outcome: Not Met     Problem: Toileting  Goal: STG-Within one week, patient will complete toileting tasks w/ min A.   Outcome: Not Met   Pt toreyal was yesterday.

## 2022-12-30 NOTE — CARE PLAN
Problem: Mobility  Goal: STG-Within one week, patient will ambulate 100ft with SBA using FWW  Outcome: Progressing  Goal: STG-Within one week, patient will ascend and descend four stairs with R HR with CGA  Outcome: Progressing     Problem: Mobility Transfers  Goal: STG-Within one week, patient will perform bed mobility with SPV   Outcome: Progressing  Goal: STG-Within one week, patient will transfer bed to chair with SPV using FWW  Outcome: Progressing       Goals were established on initial evaluation yesterday and are still appropriate for pt

## 2022-12-30 NOTE — PROGRESS NOTES
[facility] Medicine Daily Progress Note    Date of Service  12/30/2022    Chief Complaint:  Hypertension  Tachycardia  Diabetes    Interval History:  Pt had an episode of nausea then vomiting this am.  Was 2nd to taking multiple meds at the same time.    Review of Systems  Review of Systems   Constitutional:  Negative for chills and fever.   Respiratory:  Negative for shortness of breath.    Cardiovascular:  Negative for chest pain.   Gastrointestinal:  Negative for abdominal pain, diarrhea, nausea and vomiting.   Psychiatric/Behavioral:  The patient is not nervous/anxious.       Physical Exam  Temp:  [36.4 °C (97.6 °F)-36.7 °C (98.1 °F)] 36.4 °C (97.6 °F)  Pulse:  [] 78  Resp:  [18] 18  BP: (125-127)/(73-79) 125/73  SpO2:  [95 %-96 %] 95 %    Physical Exam  Vitals and nursing note reviewed.   Constitutional:       Appearance: Normal appearance.   HENT:      Head: Atraumatic.   Eyes:      Conjunctiva/sclera: Conjunctivae normal.      Pupils: Pupils are equal, round, and reactive to light.   Cardiovascular:      Rate and Rhythm: Normal rate and regular rhythm.   Pulmonary:      Effort: Pulmonary effort is normal.      Breath sounds: Normal breath sounds.   Abdominal:      General: Bowel sounds are normal.      Palpations: Abdomen is soft.   Musculoskeletal:      Cervical back: Normal range of motion and neck supple.      Right lower leg: No edema.      Left lower leg: No edema.   Skin:     General: Skin is warm and dry.   Neurological:      Mental Status: He is alert and oriented to person, place, and time.   Psychiatric:         Mood and Affect: Mood normal.         Behavior: Behavior normal.       Fluids    Intake/Output Summary (Last 24 hours) at 12/30/2022 0914  Last data filed at 12/29/2022 2040  Gross per 24 hour   Intake 370 ml   Output --   Net 370 ml       Laboratory  Recent Labs     12/28/22  0756 12/29/22  0534   WBC 6.3 6.2   RBC 5.05 4.98   HEMOGLOBIN 15.0 14.7   HEMATOCRIT 44.2 43.6   MCV 87.5 87.6    MCH 29.7 29.5   MCHC 33.9 33.7   RDW 44.7 44.2   PLATELETCT 212 224   MPV 10.6 10.7     Recent Labs     22  0756 22  0534   SODIUM 135 133*   POTASSIUM 4.5 3.9   CHLORIDE 99 99   CO2 23 19*   GLUCOSE 179* 163*   BUN 17 21   CREATININE 0.71 0.70   CALCIUM 9.8 9.6                   Imaging    Assessment/Plan  * Thoracic compression fracture, closed, initial encounter (HCC)- (present on admission)  Assessment & Plan  2nd to GLF  Neurosurgery --> conservative management  To wear TLSO    Hypomagnesemia  Assessment & Plan  M.2  On supplements    Tachycardia  Assessment & Plan  HR trending better  On Lopressor: 12.5 mg bid ()  Cont to monitor    History of depression- (present on admission)  Assessment & Plan  On Cymbalta  On Wellbutrin    Diabetes mellitus, type 2 ([facility])- (present on admission)  Assessment & Plan  Hba1c: 8.4 ()  BS this am: 142  On Metformin: 1000 mg bid  On Actos: 30 mg daily  On Glucotrol SR: 10 mg qhs --> 15 mg qhs ()  Note: home meds include Metformin 1000 mg bid, Actos 30 mg daily, Glucotrol XL 10 mg daily, and Jardiance 25 mg daily  Note: pt's wife will bring in his home med of Jardiance  Cont to monitor    History of alcohol abuse- (present on admission)  Assessment & Plan  Has some mild dementia from etoh (per Neurology)  Reportedly quit (and pt confirms) on 2022    Mixed hyperlipidemia- (present on admission)  Assessment & Plan  On Lipitor    Prostate CA ([facility])- 2022; RT tbd x 8 wks, mar- may 2022- (present on admission)  Assessment & Plan  S/P prior treatment  Reportedly in remission    Essential hypertension- (present on admission)  Assessment & Plan  BP ok  Off Benazepril  On Lopressor: 12.5 mg bid (for tachy)  Note: on Flomax  Cont to monitor

## 2022-12-30 NOTE — CARE PLAN
"The patient is Watcher - Medium risk of patient condition declining or worsening    Shift Goals  Clinical Goals: safety    Problem: Pain - Standard  Goal: Alleviation of pain or a reduction in pain to the patient’s comfort goal  Note: Patient is having 3/10 back pain, scheduled lidocaine and tylenol given, will continue to monitor.      Problem: Fall Risk - Rehab  Goal: Patient will remain free from falls  Note: aPdma Fontenot Fall risk Assessment Score: 19    High fall risk Interventions   - Bed and strip alarm   - Yellow sign by the door   - Yellow wrist band \"Fall risk\"  - Room near to the nurse station  - Do not leave patient unattended in the bathroom  - Fall risk education provided     "

## 2022-12-31 LAB
GLUCOSE BLD STRIP.AUTO-MCNC: 108 MG/DL (ref 65–99)
GLUCOSE BLD STRIP.AUTO-MCNC: 144 MG/DL (ref 65–99)
GLUCOSE BLD STRIP.AUTO-MCNC: 156 MG/DL (ref 65–99)
GLUCOSE BLD STRIP.AUTO-MCNC: 162 MG/DL (ref 65–99)

## 2022-12-31 PROCEDURE — 700102 HCHG RX REV CODE 250 W/ 637 OVERRIDE(OP): Performed by: HOSPITALIST

## 2022-12-31 PROCEDURE — 97130 THER IVNTJ EA ADDL 15 MIN: CPT

## 2022-12-31 PROCEDURE — 700101 HCHG RX REV CODE 250: Performed by: PHYSICAL MEDICINE & REHABILITATION

## 2022-12-31 PROCEDURE — 700102 HCHG RX REV CODE 250 W/ 637 OVERRIDE(OP): Performed by: PHYSICAL MEDICINE & REHABILITATION

## 2022-12-31 PROCEDURE — 97129 THER IVNTJ 1ST 15 MIN: CPT

## 2022-12-31 PROCEDURE — 82962 GLUCOSE BLOOD TEST: CPT

## 2022-12-31 PROCEDURE — 99232 SBSQ HOSP IP/OBS MODERATE 35: CPT | Performed by: HOSPITALIST

## 2022-12-31 PROCEDURE — 770010 HCHG ROOM/CARE - REHAB SEMI PRIVAT*

## 2022-12-31 PROCEDURE — A9270 NON-COVERED ITEM OR SERVICE: HCPCS | Performed by: PHYSICAL MEDICINE & REHABILITATION

## 2022-12-31 PROCEDURE — A9270 NON-COVERED ITEM OR SERVICE: HCPCS | Performed by: HOSPITALIST

## 2022-12-31 PROCEDURE — 700111 HCHG RX REV CODE 636 W/ 250 OVERRIDE (IP): Performed by: PHYSICAL MEDICINE & REHABILITATION

## 2022-12-31 RX ADMIN — LIDOCAINE 1 PATCH: 700 PATCH TOPICAL at 09:27

## 2022-12-31 RX ADMIN — METOPROLOL TARTRATE 12.5 MG: 25 TABLET, FILM COATED ORAL at 05:48

## 2022-12-31 RX ADMIN — GLIPIZIDE 15 MG: 2.5 TABLET, EXTENDED RELEASE ORAL at 17:11

## 2022-12-31 RX ADMIN — METOPROLOL TARTRATE 12.5 MG: 25 TABLET, FILM COATED ORAL at 17:12

## 2022-12-31 RX ADMIN — DULOXETINE HYDROCHLORIDE 60 MG: 30 CAPSULE, DELAYED RELEASE ORAL at 05:48

## 2022-12-31 RX ADMIN — BUPROPION HYDROCHLORIDE 150 MG: 150 TABLET, EXTENDED RELEASE ORAL at 05:48

## 2022-12-31 RX ADMIN — PIOGLITAZONE 30 MG: 15 TABLET ORAL at 07:46

## 2022-12-31 RX ADMIN — ACETAMINOPHEN 1000 MG: 500 TABLET ORAL at 14:44

## 2022-12-31 RX ADMIN — Medication 400 MG: at 07:46

## 2022-12-31 RX ADMIN — INSULIN HUMAN 3 UNITS: 100 INJECTION, SOLUTION PARENTERAL at 11:03

## 2022-12-31 RX ADMIN — ATORVASTATIN CALCIUM 80 MG: 40 TABLET, FILM COATED ORAL at 21:23

## 2022-12-31 RX ADMIN — ENOXAPARIN SODIUM 40 MG: 40 INJECTION SUBCUTANEOUS at 17:12

## 2022-12-31 RX ADMIN — LIDOCAINE 1 PATCH: 700 PATCH TOPICAL at 09:26

## 2022-12-31 RX ADMIN — TAMSULOSIN HYDROCHLORIDE 0.4 MG: 0.4 CAPSULE ORAL at 17:12

## 2022-12-31 RX ADMIN — BUPROPION HYDROCHLORIDE 150 MG: 150 TABLET, EXTENDED RELEASE ORAL at 17:12

## 2022-12-31 RX ADMIN — ASPIRIN 81 MG 81 MG: 81 TABLET ORAL at 07:46

## 2022-12-31 RX ADMIN — ACETAMINOPHEN 1000 MG: 500 TABLET ORAL at 07:46

## 2022-12-31 RX ADMIN — OMEPRAZOLE 20 MG: 20 CAPSULE, DELAYED RELEASE ORAL at 07:46

## 2022-12-31 RX ADMIN — Medication 3 MG: at 21:23

## 2022-12-31 RX ADMIN — METFORMIN HYDROCHLORIDE 1000 MG: 500 TABLET ORAL at 07:46

## 2022-12-31 RX ADMIN — ACETAMINOPHEN 1000 MG: 500 TABLET ORAL at 21:23

## 2022-12-31 RX ADMIN — METFORMIN HYDROCHLORIDE 1000 MG: 500 TABLET ORAL at 17:12

## 2022-12-31 RX ADMIN — DULOXETINE HYDROCHLORIDE 60 MG: 30 CAPSULE, DELAYED RELEASE ORAL at 17:12

## 2022-12-31 ASSESSMENT — PAIN DESCRIPTION - PAIN TYPE: TYPE: ACUTE PAIN

## 2022-12-31 ASSESSMENT — ENCOUNTER SYMPTOMS
SHORTNESS OF BREATH: 0
HEADACHES: 0
FEVER: 0
BLURRED VISION: 0
NAUSEA: 0
DIZZINESS: 0
HALLUCINATIONS: 0
VOMITING: 0
PALPITATIONS: 0

## 2022-12-31 ASSESSMENT — PATIENT HEALTH QUESTIONNAIRE - PHQ9
2. FEELING DOWN, DEPRESSED, IRRITABLE, OR HOPELESS: NOT AT ALL
SUM OF ALL RESPONSES TO PHQ9 QUESTIONS 1 AND 2: 0
1. LITTLE INTEREST OR PLEASURE IN DOING THINGS: NOT AT ALL

## 2022-12-31 NOTE — THERAPY
"Speech Language Pathology  Daily Treatment     Patient Name: Prabhakar Sierra  Age:  72 y.o., Sex:  male  Medical Record #: [id number]  Today's Date: 12/31/2022     Precautions  Precautions: Fall Risk, TLSO (Thoracolumbosacral orthosis), Spinal / Back Precautions   Comments: dementia, TLSO on OOB off for showers    Subjective    Pt seen at bedside; pt appeared to be confused throughout session requiring frequent reassurance and comfort provided (\"Do I need to be here?\").     Objective       12/31/22 1301   Treatment Charges   SLP Cognitive Skill Development First 15 Minutes 1   SLP Cognitive Skill Development Additional 15 Minutes 3   SLP Total Time Spent   SLP Individual Total Time Spent (Mins) 60   Interdisciplinary Plan of Care Collaboration   IDT Collaboration with  Certified Nursing Assistant   Patient Position at End of Therapy In Bed;Bed Alarm On;Call Light within Reach;Tray Table within Reach;Phone within Reach   Collaboration Comments Assistance repositioning pt in bed.         Assessment    Pt did not recall daily memory log introduced in previous ST session. Pt reported, \"I have never seen this before\" (daily log was located in front of the pt on his tray table upon arrival). Pt completed daily log with SLP acting as scribe. Pt appeared to be anxious and confused regarding the role of ST and his purpose for current hospitalization throughout session- comfort, redirection, and reassurance provided. Pt participated in sequencing 4-step written tasks. Pt reported, \"I think there is something blocking my brain from thinking\", requiring frequent breaks from the task and closing his eyes. Pt achieved 75% accuracy indep, requiring mod cues to achieve 100% accuracy. Pt attempted to sequence 5-step written task; however, discontinued task due to difficulty and fatigue. Encouraged use of call light and to continue reviewing and completing daily memory log to reinforce recall and orientation- pt verbalized " agreement.     Plan    Continue to reinforce use of daily memory log, introduce functional medication management     Passport items to be completed:  Express basic needs, understand food/liquid recommendations, consistently follow swallow precautions, manage finances, manage medications, arrive to therapy appointments on time, complete daily memory log entries, solve problems related to safety situations, review education related to hospitalization, complete caregiver training     Speech Therapy Problems (Active)       Problem: Memory STGs       Dates: Start: 12/29/22         Goal: STG-Within one week, patient will implement use of functional memory strategies to assist in recall of information related to hospitalization and safety with 70% accuracy provided MOD A       Dates: Start: 12/29/22         Goal Note filed on 12/30/22 1049 by Pavithra Pittman MS,CCC-SLP       Recent eval                 Problem: Problem Solving STGs       Dates: Start: 12/29/22         Goal: STG-Within one week, patient will complete medication management tasks with 80% accuracy provided SPV       Dates: Start: 12/29/22         Goal Note filed on 12/30/22 1049 by Pavithra Pittman MS,CCC-SLP       Recent eval                 Problem: Speech/Swallowing LTGs       Dates: Start: 12/29/22         Goal: LTG-By discharge, patient will complete functional problem solving and recall information with 80% accuracy provided MOD I       Dates: Start: 12/29/22

## 2022-12-31 NOTE — CARE PLAN
Problem: VTE Prevention  Goal: Patient will remain free from venous thromboembolism (VTE)  Outcome: Progressing  Note: Pt is up and walking, participates in therapies, and up to dinning room for all meals.    The patient is Stable - Low risk of patient condition declining or worsening    Shift Goals  Clinical Goals: Safety  Patient Goals: Rest    Progress made toward(s) clinical / shift goals:  no s/s dvt    Patient is not progressing towards the following goals:

## 2022-12-31 NOTE — CARE PLAN
The patient is Stable - Low risk of patient condition declining or worsening    Shift Goals  Clinical Goals: Safety  Patient Goals: Rest    Progress made toward(s) clinical / shift goals:    Problem: Knowledge Deficit - Standard  Goal: Patient and family/care givers will demonstrate understanding of plan of care, disease process/condition, diagnostic tests and medications  Outcome: Progressing   Patient educated on the POC and medications administered.  Problem: Fall Risk - Rehab  Goal: Patient will remain free from falls  Outcome: Progressing   Bed locked and low position. Call light and belongings within reach. Patient calls appropriately for assistance.   Problem: Pain - Standard  Goal: Alleviation of pain or a reduction in pain to the patient’s comfort goal  Outcome: Progressing   Use of 0-10 pain scale. Declines any pain at this time.

## 2022-12-31 NOTE — PROGRESS NOTES
[facility] Medicine Daily Progress Note    Date of Service  12/31/2022    Chief Complaint:  Hypertension  Tachycardia  Diabetes    Interval History:  No further episodes of N/V.  Doing ok today.    Review of Systems  Review of Systems   Constitutional:  Negative for fever.   Eyes:  Negative for blurred vision.   Respiratory:  Negative for shortness of breath.    Cardiovascular:  Negative for palpitations.   Gastrointestinal:  Negative for nausea and vomiting.   Neurological:  Negative for dizziness and headaches.   Psychiatric/Behavioral:  Negative for hallucinations.       Physical Exam  Temp:  [36.6 °C (97.9 °F)-37.1 °C (98.7 °F)] 37.1 °C (98.7 °F)  Pulse:  [82-89] 82  Resp:  [16-18] 16  BP: (134-147)/(73-85) 147/85  SpO2:  [94 %-96 %] 94 %    Physical Exam  Vitals and nursing note reviewed.   Constitutional:       General: He is not in acute distress.  HENT:      Mouth/Throat:      Mouth: Mucous membranes are moist.      Pharynx: Oropharynx is clear.   Eyes:      General: No scleral icterus.  Cardiovascular:      Rate and Rhythm: Normal rate and regular rhythm.   Pulmonary:      Effort: Pulmonary effort is normal.      Breath sounds: No wheezing or rales.   Abdominal:      General: Bowel sounds are normal.      Palpations: Abdomen is soft.   Musculoskeletal:      Cervical back: No rigidity.      Right lower leg: No edema.      Left lower leg: No edema.   Skin:     General: Skin is warm and dry.   Neurological:      Mental Status: He is alert and oriented to person, place, and time.   Psychiatric:         Mood and Affect: Mood normal.         Behavior: Behavior normal.       Fluids    Intake/Output Summary (Last 24 hours) at 12/31/2022 0930  Last data filed at 12/31/2022 0728  Gross per 24 hour   Intake 120 ml   Output 350 ml   Net -230 ml         Laboratory  Recent Labs     12/29/22  0534   WBC 6.2   RBC 4.98   HEMOGLOBIN 14.7   HEMATOCRIT 43.6   MCV 87.6   MCH 29.5   MCHC 33.7   RDW 44.2   PLATELETCT 224   MPV  10.7       Recent Labs     22  0534   SODIUM 133*   POTASSIUM 3.9   CHLORIDE 99   CO2 19*   GLUCOSE 163*   BUN 21   CREATININE 0.70   CALCIUM 9.6                     Imaging    Assessment/Plan  * Thoracic compression fracture, closed, initial encounter ([facility])- (present on admission)  Assessment & Plan  2nd to F  Neurosurgery --> conservative management  To wear TLSO    Hypomagnesemia  Assessment & Plan  M.2  On supplements    Tachycardia  Assessment & Plan  HR better and ok recently  On Lopressor: 12.5 mg bid ()  Cont to monitor    History of depression- (present on admission)  Assessment & Plan  On Cymbalta  On Wellbutrin    Diabetes mellitus, type 2 ([facility])- (present on admission)  Assessment & Plan  Hba1c: 8.4 ()  BS trending better but a little labile: 108-196   On Metformin: 1000 mg bid  On Actos: 30 mg daily  On Glucotrol SR: 10 mg qhs --> 15 mg qhs ()  Note: home meds include Metformin 1000 mg bid, Actos 30 mg daily, Glucotrol XL 10 mg daily, and Jardiance 25 mg daily  Note: pt's wife will bring in his home med of Jardiance  Cont to monitor    History of alcohol abuse- (present on admission)  Assessment & Plan  Has some mild dementia from etoh (per Neurology)  Reportedly quit (and pt confirms) on 2022    Mixed hyperlipidemia- (present on admission)  Assessment & Plan  On Lipitor    Prostate CA ([facility])- 2022; RT tbd x 8 wks, mar- may 2022- (present on admission)  Assessment & Plan  S/P prior treatment  Reportedly in remission    Essential hypertension- (present on admission)  Assessment & Plan  BP a little labile but generally ok  Off Benazepril  On Lopressor: 12.5 mg bid (for tachy)  Consider adding on Benazapril  Note: on Flomax  Cont to monitor

## 2022-12-31 NOTE — PROGRESS NOTES
Assumed care of patient. Bedside report received from Shauna URBINA. Patient is in bed. Fall precautions in place. Call light and belongings within reach. Updated on POC, all questions and concerns answered at this time. No other needs at this time.

## 2023-01-01 LAB
ANION GAP SERPL CALC-SCNC: 14 MMOL/L (ref 7–16)
BUN SERPL-MCNC: 16 MG/DL (ref 8–22)
CALCIUM SERPL-MCNC: 9 MG/DL (ref 8.5–10.5)
CHLORIDE SERPL-SCNC: 100 MMOL/L (ref 96–112)
CO2 SERPL-SCNC: 19 MMOL/L (ref 20–33)
CREAT SERPL-MCNC: 0.67 MG/DL (ref 0.5–1.4)
GFR SERPLBLD CREATININE-BSD FMLA CKD-EPI: 98 ML/MIN/1.73 M 2
GLUCOSE BLD STRIP.AUTO-MCNC: 122 MG/DL (ref 65–99)
GLUCOSE BLD STRIP.AUTO-MCNC: 145 MG/DL (ref 65–99)
GLUCOSE BLD STRIP.AUTO-MCNC: 157 MG/DL (ref 65–99)
GLUCOSE BLD STRIP.AUTO-MCNC: 161 MG/DL (ref 65–99)
GLUCOSE SERPL-MCNC: 95 MG/DL (ref 65–99)
MAGNESIUM SERPL-MCNC: 1.1 MG/DL (ref 1.5–2.5)
PHOSPHATE SERPL-MCNC: 3.4 MG/DL (ref 2.5–4.5)
POTASSIUM SERPL-SCNC: 3.8 MMOL/L (ref 3.6–5.5)
SODIUM SERPL-SCNC: 133 MMOL/L (ref 135–145)

## 2023-01-01 PROCEDURE — 700111 HCHG RX REV CODE 636 W/ 250 OVERRIDE (IP): Performed by: HOSPITALIST

## 2023-01-01 PROCEDURE — 83735 ASSAY OF MAGNESIUM: CPT

## 2023-01-01 PROCEDURE — 80048 BASIC METABOLIC PNL TOTAL CA: CPT

## 2023-01-01 PROCEDURE — 700102 HCHG RX REV CODE 250 W/ 637 OVERRIDE(OP): Performed by: PHYSICAL MEDICINE & REHABILITATION

## 2023-01-01 PROCEDURE — 97535 SELF CARE MNGMENT TRAINING: CPT

## 2023-01-01 PROCEDURE — 36415 COLL VENOUS BLD VENIPUNCTURE: CPT

## 2023-01-01 PROCEDURE — A9270 NON-COVERED ITEM OR SERVICE: HCPCS | Performed by: PHYSICAL MEDICINE & REHABILITATION

## 2023-01-01 PROCEDURE — 84100 ASSAY OF PHOSPHORUS: CPT

## 2023-01-01 PROCEDURE — A9270 NON-COVERED ITEM OR SERVICE: HCPCS | Performed by: HOSPITALIST

## 2023-01-01 PROCEDURE — 99232 SBSQ HOSP IP/OBS MODERATE 35: CPT | Performed by: HOSPITALIST

## 2023-01-01 PROCEDURE — 700111 HCHG RX REV CODE 636 W/ 250 OVERRIDE (IP): Performed by: PHYSICAL MEDICINE & REHABILITATION

## 2023-01-01 PROCEDURE — 82962 GLUCOSE BLOOD TEST: CPT

## 2023-01-01 PROCEDURE — 770010 HCHG ROOM/CARE - REHAB SEMI PRIVAT*

## 2023-01-01 PROCEDURE — 97130 THER IVNTJ EA ADDL 15 MIN: CPT

## 2023-01-01 PROCEDURE — 97129 THER IVNTJ 1ST 15 MIN: CPT

## 2023-01-01 PROCEDURE — 97116 GAIT TRAINING THERAPY: CPT

## 2023-01-01 PROCEDURE — 700101 HCHG RX REV CODE 250: Performed by: PHYSICAL MEDICINE & REHABILITATION

## 2023-01-01 PROCEDURE — 97530 THERAPEUTIC ACTIVITIES: CPT

## 2023-01-01 PROCEDURE — 700102 HCHG RX REV CODE 250 W/ 637 OVERRIDE(OP): Performed by: HOSPITALIST

## 2023-01-01 RX ORDER — BENAZEPRIL HYDROCHLORIDE 10 MG/1
10 TABLET ORAL
Status: DISCONTINUED | OUTPATIENT
Start: 2023-01-01 | End: 2023-01-05 | Stop reason: HOSPADM

## 2023-01-01 RX ORDER — MAGNESIUM SULFATE HEPTAHYDRATE 40 MG/ML
2 INJECTION, SOLUTION INTRAVENOUS ONCE
Status: COMPLETED | OUTPATIENT
Start: 2023-01-01 | End: 2023-01-01

## 2023-01-01 RX ORDER — INSULIN LISPRO 100 [IU]/ML
2-12 INJECTION, SOLUTION INTRAVENOUS; SUBCUTANEOUS
Status: DISCONTINUED | OUTPATIENT
Start: 2023-01-01 | End: 2023-01-05 | Stop reason: HOSPADM

## 2023-01-01 RX ADMIN — ACETAMINOPHEN 1000 MG: 500 TABLET ORAL at 20:52

## 2023-01-01 RX ADMIN — BUPROPION HYDROCHLORIDE 150 MG: 150 TABLET, EXTENDED RELEASE ORAL at 18:39

## 2023-01-01 RX ADMIN — TAMSULOSIN HYDROCHLORIDE 0.4 MG: 0.4 CAPSULE ORAL at 17:39

## 2023-01-01 RX ADMIN — INSULIN LISPRO 2 UNITS: 100 INJECTION, SOLUTION INTRAVENOUS; SUBCUTANEOUS at 20:52

## 2023-01-01 RX ADMIN — METOPROLOL TARTRATE 12.5 MG: 25 TABLET, FILM COATED ORAL at 17:39

## 2023-01-01 RX ADMIN — ASPIRIN 81 MG 81 MG: 81 TABLET ORAL at 08:31

## 2023-01-01 RX ADMIN — DULOXETINE HYDROCHLORIDE 60 MG: 30 CAPSULE, DELAYED RELEASE ORAL at 08:35

## 2023-01-01 RX ADMIN — PIOGLITAZONE 30 MG: 15 TABLET ORAL at 08:31

## 2023-01-01 RX ADMIN — BENAZEPRIL HYDROCHLORIDE 10 MG: 10 TABLET, FILM COATED ORAL at 10:22

## 2023-01-01 RX ADMIN — DULOXETINE HYDROCHLORIDE 60 MG: 30 CAPSULE, DELAYED RELEASE ORAL at 18:39

## 2023-01-01 RX ADMIN — ATORVASTATIN CALCIUM 80 MG: 40 TABLET, FILM COATED ORAL at 20:52

## 2023-01-01 RX ADMIN — BUPROPION HYDROCHLORIDE 150 MG: 150 TABLET, EXTENDED RELEASE ORAL at 08:35

## 2023-01-01 RX ADMIN — MAGNESIUM SULFATE HEPTAHYDRATE 2 G: 40 INJECTION, SOLUTION INTRAVENOUS at 15:15

## 2023-01-01 RX ADMIN — ACETAMINOPHEN 1000 MG: 500 TABLET ORAL at 15:02

## 2023-01-01 RX ADMIN — METOPROLOL TARTRATE 12.5 MG: 25 TABLET, FILM COATED ORAL at 05:17

## 2023-01-01 RX ADMIN — METFORMIN HYDROCHLORIDE 1000 MG: 500 TABLET ORAL at 08:31

## 2023-01-01 RX ADMIN — SENNOSIDES AND DOCUSATE SODIUM 2 TABLET: 50; 8.6 TABLET ORAL at 20:52

## 2023-01-01 RX ADMIN — METFORMIN HYDROCHLORIDE 1000 MG: 500 TABLET ORAL at 17:39

## 2023-01-01 RX ADMIN — LIDOCAINE 1 PATCH: 700 PATCH TOPICAL at 08:36

## 2023-01-01 RX ADMIN — INSULIN LISPRO 2 UNITS: 100 INJECTION, SOLUTION INTRAVENOUS; SUBCUTANEOUS at 11:03

## 2023-01-01 RX ADMIN — OMEPRAZOLE 20 MG: 20 CAPSULE, DELAYED RELEASE ORAL at 08:31

## 2023-01-01 RX ADMIN — GLIPIZIDE 15 MG: 2.5 TABLET, EXTENDED RELEASE ORAL at 17:39

## 2023-01-01 RX ADMIN — ENOXAPARIN SODIUM 40 MG: 40 INJECTION SUBCUTANEOUS at 17:39

## 2023-01-01 RX ADMIN — ACETAMINOPHEN 1000 MG: 500 TABLET ORAL at 08:31

## 2023-01-01 ASSESSMENT — GAIT ASSESSMENTS
DEVIATION: DECREASED BASE OF SUPPORT;BRADYKINETIC;DECREASED HEEL STRIKE;DECREASED TOE OFF
DISTANCE (FEET): 70
ASSISTIVE DEVICE: FRONT WHEEL WALKER
GAIT LEVEL OF ASSIST: CONTACT GUARD ASSIST

## 2023-01-01 ASSESSMENT — ENCOUNTER SYMPTOMS
NERVOUS/ANXIOUS: 0
FEVER: 0
COUGH: 0
DIARRHEA: 0
BLURRED VISION: 0
DIZZINESS: 0

## 2023-01-01 ASSESSMENT — ACTIVITIES OF DAILY LIVING (ADL)
TOILET_TRANSFER_DESCRIPTION: GRAB BAR;INCREASED TIME;SET-UP OF EQUIPMENT
TUB_SHOWER_TRANSFER_DESCRIPTION: GRAB BAR;VERBAL CUEING;INCREASED TIME
TOILET_TRANSFER_DESCRIPTION: ADAPTIVE EQUIPMENT;GRAB BAR;INCREASED TIME;SET-UP OF EQUIPMENT;SUPERVISION FOR SAFETY;VERBAL CUEING
TOILETING_LEVEL_OF_ASSIST_DESCRIPTION: OTHER (COMMENT)
BED_CHAIR_WHEELCHAIR_TRANSFER_DESCRIPTION: VERBAL CUEING;INCREASED TIME
BED_CHAIR_WHEELCHAIR_TRANSFER_DESCRIPTION: ADAPTIVE EQUIPMENT;SET-UP OF EQUIPMENT;SUPERVISION FOR SAFETY;VERBAL CUEING

## 2023-01-01 ASSESSMENT — PATIENT HEALTH QUESTIONNAIRE - PHQ9
SUM OF ALL RESPONSES TO PHQ9 QUESTIONS 1 AND 2: 0
2. FEELING DOWN, DEPRESSED, IRRITABLE, OR HOPELESS: NOT AT ALL
1. LITTLE INTEREST OR PLEASURE IN DOING THINGS: NOT AT ALL

## 2023-01-01 ASSESSMENT — FIBROSIS 4 INDEX: FIB4 SCORE: 1.18

## 2023-01-01 ASSESSMENT — PAIN DESCRIPTION - PAIN TYPE: TYPE: ACUTE PAIN

## 2023-01-01 NOTE — CARE PLAN
The patient is Stable - Low risk of patient condition declining or worsening    Shift Goals  Clinical Goals: Safety  Patient Goals: Rest    Progress made toward(s) clinical / shift goals:    Problem: Fall Risk - Rehab  Goal: Patient will remain free from falls  Outcome: Progressing. Patient bed in lowest locked position with bed alarm on and call light within reach. Patient calls appropriately for needs.      Problem: Bladder / Voiding  Goal: Patient will establish and maintain regular urinary output  Outcome: Progressing. Patient continent of urine over shift. Uses urinal to void on own. PVRs over shift 103, and 119 this am.

## 2023-01-01 NOTE — THERAPY
"Speech Language Pathology  Daily Treatment     Patient Name: Prabhakar Sierra  Age:  72 y.o., Sex:  male  Medical Record #: [id number]  Today's Date: 1/1/2023     Precautions  Precautions: Fall Risk, TLSO (Thoracolumbosacral orthosis), Spinal / Back Precautions , Other (See Comments) (Dementia, TLSO on when OOB, May remove for showers)  Comments: dementia, TLSO on OOB off for showers    Subjective    Pt pleasant and cooperative, finishing breakfast upon arrival.      Objective       01/01/23 0833   Treatment Charges   SLP Cognitive Skill Development First 15 Minutes 1   SLP Cognitive Skill Development Additional 15 Minutes 3   SLP Total Time Spent   SLP Individual Total Time Spent (Mins) 60         Assessment    Orientation log completed due to reported confusion however pt achieved a score of 28/30 on this date. MIN cues for specific date and deficits/what's being addressed in therapies. Pt expresses motivation however requesting to get into bed. Agreeable to remain in chair throughout SLP session and then into bed for rest following assistance from CNA. Pt expressed unable to write due to \"this hand stuff going on\" with encouragement pt filled out daily log with MIN cues provided by SLP for items consumed and therapy completed thus far. Pt encouraged to cont to fill out as day continues. Pt presented with single step written directions, on trial one pt completed 6/8 indep, increased with MIN cues for attention to detail, trial two completed with 100% accuracy indep. Pt then presented with two step written instructions, pt completed 7/8 indep and increased to 100% provided additional review. Pt indep stating \"this is really good for my brain.\"         Plan    Pt likely not appropriate to address medictaions with med sort at this time, cont to reinforce memory log and attention tasks.     Speech Therapy Problems (Active)       Problem: Memory STGs       Dates: Start: 12/29/22         Goal: STG-Within one week, " patient will implement use of functional memory strategies to assist in recall of information related to hospitalization and safety with 70% accuracy provided MOD A       Dates: Start: 12/29/22         Goal Note filed on 12/30/22 1049 by Pavithra Pittman MS,CCC-SLP       Recent eval                 Problem: Problem Solving STGs       Dates: Start: 12/29/22         Goal: STG-Within one week, patient will complete medication management tasks with 80% accuracy provided SPV       Dates: Start: 12/29/22         Goal Note filed on 12/30/22 1049 by Pavithra Pittman MS,CCC-SLP       Recent eval                 Problem: Speech/Swallowing LTGs       Dates: Start: 12/29/22         Goal: LTG-By discharge, patient will complete functional problem solving and recall information with 80% accuracy provided MOD I       Dates: Start: 12/29/22

## 2023-01-01 NOTE — CARE PLAN
The patient is Stable - Low risk of patient condition declining or worsening    Shift Goals  Clinical Goals: Pain management  Patient Goals: Rest    Progress made toward(s) clinical / shift goals:    Problem: Pain - Standard  Goal: Alleviation of pain or a reduction in pain to the patient’s comfort goal  Outcome: Progressing     Patient able to verbalize pain level and verbalize an acceptable level of pain.

## 2023-01-01 NOTE — THERAPY
Occupational Therapy  Daily Treatment     Patient Name: Prabhakar Sierra  Age:  72 y.o., Sex:  male  Medical Record #: [id number]  Today's Date: 1/1/2023     Precautions  Precautions: (P) Fall Risk, TLSO (Thoracolumbosacral orthosis), Spinal / Back Precautions , Other (See Comments) (Dementia, TLSO on when OOB, May remove for showers)  Comments: dementia, TLSO on OOB off for showers         Subjective    Patient supine in bed when OT arrived. He stated he was having a difficult time waking up.      Objective       01/01/23 0701   OT Charge Group   Charges Yes   OT Self Care / ADL (Units) 4   OT Total Time Spent   OT Individual Total Time Spent (Mins) 60   Precautions   Precautions Fall Risk;TLSO (Thoracolumbosacral orthosis);Spinal / Back Precautions ;Other (See Comments)  (Dementia, TLSO on when OOB, May remove for showers)   Pain   Intervention Declines   Functional Level of Assist   Grooming Supervision;Seated   Grooming Description Increased time;Set-up of equipment   Bathing Moderate Assist   Bathing Description Grab bar;Hand held shower;Tub bench;Increased time;Assit with perineal;Verbal cueing   Upper Body Dressing Supervision   Upper Body Dressing Description Set-up of equipment   Lower Body Dressing Moderate Assist   Lower Body Dressing Description Assist with threading into pant leg;Cues for spinal precautions;Increased time   Toileting Total Assist   Toileting Description Other (comment)  (Incontinent of bowel upon awakening)   Bed, Chair, Wheelchair Transfer Minimal Assist   Bed Chair Wheelchair Transfer Description Verbal cueing;Increased time   Toilet Transfers Minimal Assist   Toilet Transfer Description Grab bar;Increased time;Set-up of equipment   Tub / Shower Transfers Minimal Assist   Tub Shower Transfer Description Grab bar;Verbal cueing;Increased time   Interdisciplinary Plan of Care Collaboration   Patient Position at End of Therapy Seated;Chair Alarm On;Self Releasing Lap Belt  Applied;Tray Table within Reach;Call Light within Reach   Strengths & Barriers   Strengths Able to follow instructions;Alert and oriented;Effective communication skills;Good insight into deficits/needs;Independent prior level of function;Making steady progress towards goals;Motivated for self care and independence;Pleasant and cooperative;Supportive family;Willingly participates in therapeutic activities   Barriers Decreased endurance;Fatigue;Generalized weakness;Impaired activity tolerance;Limited mobility;Impaired balance;Pain     Patient educated soiled with BM and require dependent brief change when OT arrived in room. Patient educated on log rolling technique. Patient educated on donning TLSO but required max A. Patient return demonstrated for hygiene. Patient completed log roll with max verbal cues and min A for supine to sit EOB. Patient navigated to bathroom using FWW with CGA. See above levels for bathing, grooming, and dressing self care.     Assessment    Patient tolerated session well. He required verbal cues to maintain spinal precautions throughout session. He perseverated a bit on going home, but at the same time stated that he knows he needs to be here, because his wife cannot take care of him. He completed self care with some cues to stay on task and initiation of next task. He c/o no pain during session. He stated he needs to learn to put on TLSO each time it was put on, but required cues to assist with putting it on each time.   Strengths: (P) Able to follow instructions, Alert and oriented, Effective communication skills, Good insight into deficits/needs, Independent prior level of function, Making steady progress towards goals, Motivated for self care and independence, Pleasant and cooperative, Supportive family, Willingly participates in therapeutic activities  Barriers: (P) Decreased endurance, Fatigue, Generalized weakness, Impaired activity tolerance, Limited mobility, Impaired balance,  Pain    Plan    ADLs, transfers, TLSO donning/doffing, balance, activity tolerance, standing tolerance       Passport items to be completed:  Perform bathroom transfers, complete dressing, complete feeding, get ready for the day, prepare a simple meal, participate in household tasks, adapt home for safety needs, demonstrate home exercise program, complete caregiver training     Occupational Therapy Goals (Active)       Problem: Bathing       Dates: Start: 12/29/22         Goal: STG-Within one week, patient will bathe w/ min A.        Dates: Start: 12/29/22               Problem: Dressing       Dates: Start: 12/29/22         Goal: STG-Within one week, patient will dress UB including TLSO w/ mod A.        Dates: Start: 12/29/22            Goal: STG-Within one week, patient will dress LB w/ LB dressing AE and mod A.       Dates: Start: 12/29/22               Problem: OT Long Term Goals       Dates: Start: 12/29/22         Goal: LTG-By discharge, patient will complete basic self care tasks w/ mod I - min A.       Dates: Start: 12/29/22            Goal: LTG-By discharge, patient will perform bathroom transfers w/ mod I- CGA.        Dates: Start: 12/29/22               Problem: Toileting       Dates: Start: 12/29/22         Goal: STG-Within one week, patient will complete toileting tasks w/ min A.        Dates: Start: 12/29/22

## 2023-01-01 NOTE — THERAPY
"Physical Therapy   Daily Treatment     Patient Name: Prabhakar Sierra  Age:  72 y.o., Sex:  male  Medical Record #: [id number]  Today's Date: 1/1/2023     Precautions  Precautions: Fall Risk, TLSO (Thoracolumbosacral orthosis), Spinal / Back Precautions , Other (See Comments) (Dementia, TLSO on when OOB, May remove for showers)  Comments: dementia, TLSO on OOB off for showers    Subjective    Pt received resting in bed, agreeable to participate. 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    Objective       01/01/23 0931   PT Charge Group   PT Gait Training (Units) 1   PT Therapeutic Activities (Units) 1   PT Total Time Spent   PT Individual Total Time Spent (Mins) 30   Cognition    New Learning Impaired   Attention Impaired   Gait Functional Level of Assist    Gait Level Of Assist Contact Guard Assist   Assistive Device Front Wheel Walker   Distance (Feet) 70   # of Times Distance was Traveled 1   Deviation Decreased Base Of Support;Bradykinetic;Decreased Heel Strike;Decreased Toe Off   Stairs Functional Level of Assist   Level of Assist with Stairs Minimal Assist  (d/t pt rushing from bowel urgency/incontinence)   # of Stairs Climbed 4  (on 6\" stairs)   Stairs Description Extra time;Hand rails;Limited by fatigue;Safety concerns;Supervision for safety;Verbal cueing   Transfer Functional Level of Assist   Bed, Chair, Wheelchair Transfer Contact Guard Assist   Bed Chair Wheelchair Transfer Description Adaptive equipment;Set-up of equipment;Supervision for safety;Verbal cueing  (stand step FWW)   Toilet Transfers Contact Guard Assist   Toilet Transfer Description Adaptive equipment;Grab bar;Increased time;Set-up of equipment;Supervision for safety;Verbal cueing  (wc<>commode over toilet)   Bed Mobility    Supine to Sit Minimal Assist   Sit to Supine Standby Assist   Sit to Stand Contact Guard Assist   Scooting Standby Assist   Interdisciplinary Plan of Care Collaboration   IDT Collaboration with  " "Nursing   Patient Position at End of Therapy In Bed;Call Light within Reach;Tray Table within Reach;Phone within Reach   Collaboration Comments notified RN about loose bowel incontinence     Required total A to don TLSO sitting EOB since pt attempted to loop his arm through the straps that wrap around the torso and did not appear to know where the other straps are supposed to Velcro together.  Stair training limited by episode of bowel incontinence, required max A for toilet hygiene after episode.    Assessment    Pt slowly improving with inc gait distance and dec assist with txs but remains limited by cognitive/attentional deficits. Pt became mildly upset after episode of bowel incontinence (\"I never used to do this, it's so gross\") but was easily redirected.    Strengths: Able to follow instructions, Motivated for self care and independence, Pleasant and cooperative, Supportive family, Willingly participates in therapeutic activities  Barriers: Dementia, Decreased endurance, Generalized weakness, Home accessibility, Impaired activity tolerance, Impaired balance, Impaired carryover of learning, Impaired functional cognition, Pain    Plan    Gait training with FWW, stair training (14 steps with R rail), standing balance, fall recovery, general strengthening, activity tolerance/endurance.     Passport items to be completed:  Get in/out of bed safely, in/out of a vehicle, safely use mobility device, walk or wheel around home/community, navigate up and down stairs, show how to get up/down from the ground, ensure home is accessible, demonstrate HEP, complete caregiver training    Physical Therapy Problems (Active)       Problem: Mobility       Dates: Start: 12/29/22         Goal: STG-Within one week, patient will ambulate 100ft with SBA using FWW       Dates: Start: 12/29/22            Goal: STG-Within one week, patient will ascend and descend four stairs with R HR with CGA       Dates: Start: 12/29/22               " Problem: Mobility Transfers       Dates: Start: 12/29/22         Goal: STG-Within one week, patient will perform bed mobility with SPV        Dates: Start: 12/29/22            Goal: STG-Within one week, patient will transfer bed to chair with SPV using FWW       Dates: Start: 12/29/22               Problem: PT-Long Term Goals       Dates: Start: 12/29/22         Goal: LTG-By discharge, patient will ambulate 200ft with SPV using LRAD        Dates: Start: 12/29/22            Goal: LTG-By discharge, patient will transfer one surface to another with set up assist       Dates: Start: 12/29/22            Goal: LTG-By discharge, patient will perform home exercise program with SPV       Dates: Start: 12/29/22            Goal: LTG-By discharge, patient will ambulate up/down 14 stairs with R HR with SBA       Dates: Start: 12/29/22               Unknown if ever smoked

## 2023-01-01 NOTE — PROGRESS NOTES
[facility] Medicine Daily Progress Note    Date of Service  1/1/2023    Chief Complaint:  Hypertension  Tachycardia  Diabetes    Interval History:  Discussed about stopping his oral Mg supplements 2nd to loose stools.  Will give Mg IV -- pt agrees.    Review of Systems  Review of Systems   Constitutional:  Negative for fever.   Eyes:  Negative for blurred vision.   Respiratory:  Negative for cough.    Cardiovascular:  Negative for chest pain.   Gastrointestinal:  Negative for diarrhea.   Musculoskeletal:  Negative for joint pain.   Neurological:  Negative for dizziness.   Psychiatric/Behavioral:  The patient is not nervous/anxious.       Physical Exam  Temp:  [36.8 °C (98.2 °F)-37.2 °C (98.9 °F)] 37.2 °C (98.9 °F)  Pulse:  [76-84] 76  Resp:  [18] 18  BP: (130-146)/(69-79) 130/79  SpO2:  [94 %-96 %] 96 %    Physical Exam  Vitals and nursing note reviewed.   Constitutional:       Appearance: He is not diaphoretic.   HENT:      Mouth/Throat:      Pharynx: No oropharyngeal exudate or posterior oropharyngeal erythema.   Eyes:      Extraocular Movements: Extraocular movements intact.   Neck:      Vascular: No carotid bruit.   Cardiovascular:      Rate and Rhythm: Normal rate and regular rhythm.   Pulmonary:      Effort: Pulmonary effort is normal.      Breath sounds: No wheezing or rales.   Abdominal:      General: There is no distension.      Palpations: Abdomen is soft.      Tenderness: There is no abdominal tenderness.   Musculoskeletal:      Right lower leg: No edema.      Left lower leg: No edema.   Skin:     General: Skin is warm and dry.   Neurological:      Mental Status: He is alert and oriented to person, place, and time.   Psychiatric:         Mood and Affect: Mood normal.         Behavior: Behavior normal.       Fluids    Intake/Output Summary (Last 24 hours) at 1/1/2023 1011  Last data filed at 1/1/2023 0615  Gross per 24 hour   Intake 520 ml   Output 1350 ml   Net -830 ml         Laboratory        Recent Labs      23  0533   SODIUM 133*   POTASSIUM 3.8   CHLORIDE 100   CO2 19*   GLUCOSE 95   BUN 16   CREATININE 0.67   CALCIUM 9.0                     Imaging    Assessment/Plan  * Thoracic compression fracture, closed, initial encounter ([facility])- (present on admission)  Assessment & Plan  2nd to GLF  Neurosurgery --> conservative management  To wear TLSO    Hypomagnesemia  Assessment & Plan  M.2  Off supplements 2nd to loose stools  Will give IV Mg  Monitor    Tachycardia  Assessment & Plan  HR ok  On Lopressor: 12.5 mg bid ()  Cont to monitor    History of depression- (present on admission)  Assessment & Plan  On Cymbalta  On Wellbutrin    Diabetes mellitus, type 2 ([facility])- (present on admission)  Assessment & Plan  Hba1c: 8.4 ()  BS better: 108-162   On Metformin: 1000 mg bid  On Actos: 30 mg daily  On Glucotrol SR: 10 mg qhs --> 15 mg qhs ()  Note: home meds include Metformin 1000 mg bid, Actos 30 mg daily, Glucotrol XL 10 mg daily, and Jardiance 25 mg daily  Note: pt's wife will bring in his home med of Jardiance  Cont to monitor    History of alcohol abuse- (present on admission)  Assessment & Plan  Has some mild dementia from etoh (per Neurology)  Reportedly quit (and pt confirms) on 2022    Mixed hyperlipidemia- (present on admission)  Assessment & Plan  On Lipitor    Prostate CA ([facility])- 2022; RT tbd x 8 wks, mar- may 2022- (present on admission)  Assessment & Plan  S/P prior treatment  Reportedly in remission    Essential hypertension- (present on admission)  Assessment & Plan  BP a little labile and occ rises up  Off Benazepril --> will restart  On Lopressor: 12.5 mg bid (for tachy)  Note: on Flomax  Cont to monitor

## 2023-01-02 LAB
GLUCOSE BLD STRIP.AUTO-MCNC: 112 MG/DL (ref 65–99)
GLUCOSE BLD STRIP.AUTO-MCNC: 128 MG/DL (ref 65–99)
GLUCOSE BLD STRIP.AUTO-MCNC: 136 MG/DL (ref 65–99)
GLUCOSE BLD STRIP.AUTO-MCNC: 219 MG/DL (ref 65–99)

## 2023-01-02 PROCEDURE — 97130 THER IVNTJ EA ADDL 15 MIN: CPT | Performed by: SPEECH-LANGUAGE PATHOLOGIST

## 2023-01-02 PROCEDURE — 82962 GLUCOSE BLOOD TEST: CPT

## 2023-01-02 PROCEDURE — 97129 THER IVNTJ 1ST 15 MIN: CPT | Performed by: SPEECH-LANGUAGE PATHOLOGIST

## 2023-01-02 PROCEDURE — 700101 HCHG RX REV CODE 250: Performed by: PHYSICAL MEDICINE & REHABILITATION

## 2023-01-02 PROCEDURE — 97116 GAIT TRAINING THERAPY: CPT

## 2023-01-02 PROCEDURE — 97535 SELF CARE MNGMENT TRAINING: CPT

## 2023-01-02 PROCEDURE — 97530 THERAPEUTIC ACTIVITIES: CPT

## 2023-01-02 PROCEDURE — A9270 NON-COVERED ITEM OR SERVICE: HCPCS | Performed by: PHYSICAL MEDICINE & REHABILITATION

## 2023-01-02 PROCEDURE — 700102 HCHG RX REV CODE 250 W/ 637 OVERRIDE(OP): Performed by: HOSPITALIST

## 2023-01-02 PROCEDURE — 700111 HCHG RX REV CODE 636 W/ 250 OVERRIDE (IP): Performed by: PHYSICAL MEDICINE & REHABILITATION

## 2023-01-02 PROCEDURE — A9270 NON-COVERED ITEM OR SERVICE: HCPCS | Performed by: HOSPITALIST

## 2023-01-02 PROCEDURE — 99232 SBSQ HOSP IP/OBS MODERATE 35: CPT | Performed by: PHYSICAL MEDICINE & REHABILITATION

## 2023-01-02 PROCEDURE — 700102 HCHG RX REV CODE 250 W/ 637 OVERRIDE(OP): Performed by: PHYSICAL MEDICINE & REHABILITATION

## 2023-01-02 PROCEDURE — 99232 SBSQ HOSP IP/OBS MODERATE 35: CPT | Performed by: HOSPITALIST

## 2023-01-02 PROCEDURE — 770010 HCHG ROOM/CARE - REHAB SEMI PRIVAT*

## 2023-01-02 RX ADMIN — GLIPIZIDE 15 MG: 2.5 TABLET, EXTENDED RELEASE ORAL at 17:15

## 2023-01-02 RX ADMIN — BUPROPION HYDROCHLORIDE 150 MG: 150 TABLET, EXTENDED RELEASE ORAL at 06:16

## 2023-01-02 RX ADMIN — TAMSULOSIN HYDROCHLORIDE 0.4 MG: 0.4 CAPSULE ORAL at 17:15

## 2023-01-02 RX ADMIN — OMEPRAZOLE 20 MG: 20 CAPSULE, DELAYED RELEASE ORAL at 08:29

## 2023-01-02 RX ADMIN — BENAZEPRIL HYDROCHLORIDE 10 MG: 10 TABLET, FILM COATED ORAL at 06:15

## 2023-01-02 RX ADMIN — DULOXETINE HYDROCHLORIDE 60 MG: 30 CAPSULE, DELAYED RELEASE ORAL at 17:14

## 2023-01-02 RX ADMIN — ASPIRIN 81 MG 81 MG: 81 TABLET ORAL at 08:29

## 2023-01-02 RX ADMIN — ACETAMINOPHEN 1000 MG: 500 TABLET ORAL at 08:34

## 2023-01-02 RX ADMIN — METFORMIN HYDROCHLORIDE 1000 MG: 500 TABLET ORAL at 17:07

## 2023-01-02 RX ADMIN — ATORVASTATIN CALCIUM 80 MG: 40 TABLET, FILM COATED ORAL at 20:12

## 2023-01-02 RX ADMIN — ENOXAPARIN SODIUM 40 MG: 40 INJECTION SUBCUTANEOUS at 17:17

## 2023-01-02 RX ADMIN — BUPROPION HYDROCHLORIDE 150 MG: 150 TABLET, EXTENDED RELEASE ORAL at 17:15

## 2023-01-02 RX ADMIN — LIDOCAINE 1 PATCH: 700 PATCH TOPICAL at 08:32

## 2023-01-02 RX ADMIN — METOPROLOL TARTRATE 12.5 MG: 25 TABLET, FILM COATED ORAL at 17:15

## 2023-01-02 RX ADMIN — DULOXETINE HYDROCHLORIDE 60 MG: 30 CAPSULE, DELAYED RELEASE ORAL at 06:15

## 2023-01-02 RX ADMIN — METFORMIN HYDROCHLORIDE 1000 MG: 500 TABLET ORAL at 08:29

## 2023-01-02 RX ADMIN — PIOGLITAZONE 30 MG: 15 TABLET ORAL at 08:29

## 2023-01-02 RX ADMIN — INSULIN LISPRO 4 UNITS: 100 INJECTION, SOLUTION INTRAVENOUS; SUBCUTANEOUS at 11:12

## 2023-01-02 RX ADMIN — METOPROLOL TARTRATE 12.5 MG: 25 TABLET, FILM COATED ORAL at 06:15

## 2023-01-02 ASSESSMENT — ENCOUNTER SYMPTOMS
CHILLS: 0
VOMITING: 0
SHORTNESS OF BREATH: 0
NAUSEA: 0
DIARRHEA: 0
NERVOUS/ANXIOUS: 0
FEVER: 0
ABDOMINAL PAIN: 0

## 2023-01-02 ASSESSMENT — GAIT ASSESSMENTS
DEVIATION: INCREASED BASE OF SUPPORT;BRADYKINETIC
DISTANCE (FEET): 60
ASSISTIVE DEVICE: FRONT WHEEL WALKER
GAIT LEVEL OF ASSIST: CONTACT GUARD ASSIST

## 2023-01-02 ASSESSMENT — PATIENT HEALTH QUESTIONNAIRE - PHQ9
2. FEELING DOWN, DEPRESSED, IRRITABLE, OR HOPELESS: NOT AT ALL
1. LITTLE INTEREST OR PLEASURE IN DOING THINGS: NOT AT ALL
SUM OF ALL RESPONSES TO PHQ9 QUESTIONS 1 AND 2: 0

## 2023-01-02 ASSESSMENT — PAIN DESCRIPTION - PAIN TYPE: TYPE: ACUTE PAIN

## 2023-01-02 NOTE — THERAPY
Speech Language Pathology  Daily Treatment     Patient Name: Prabhakar Sierra  Age:  72 y.o., Sex:  male  Medical Record #: [id number]  Today's Date: 1/2/2023     Precautions  Precautions: Fall Risk, TLSO (Thoracolumbosacral orthosis), Spinal / Back Precautions   Comments: dementia, TLSO on OOB off for showers    Subjective    Patient was sleeping when SLP arrived and reported feeling very tired after taking a shower.  However, he was agreeable to ST at bedside and cooperative with all tasks.      Objective       01/02/23 1401   Treatment Charges   SLP Cognitive Skill Development First 15 Minutes 1   SLP Cognitive Skill Development Additional 15 Minutes 3   SLP Total Time Spent   SLP Individual Total Time Spent (Mins) 60   Precautions   Precautions Fall Risk;TLSO (Thoracolumbosacral orthosis);Spinal / Back Precautions    Reading Comprehension    Functional Reading Materials Minimal (4)   Cognition   Simple Attention Minimal (4)   Sleep/Wake Cycle   Sleep & Rest Awake;Resting   Interdisciplinary Plan of Care Collaboration   Patient Position at End of Therapy In Bed;Call Light within Reach;Tray Table within Reach;Phone within Reach         Assessment    SLP provided patient with tasks to target attention and memory. Patient required minimal cues to complete memory book for today. He was independent for the date, recalled breakfast but not lunch and independently used his daily schedule to recall therapists' names. He also recalled at least one activity with each discipline today. Patient also completed a visual scanning task to locate 1-2 target letters in a field of 60 letters with 100% accuracy. He completed functional reading tasks with 75% accuracy, increasing to 100% with minimal cues.     Plan    Cont tx to target attention and memory    Passport items to be completed:  Express basic needs, understand food/liquid recommendations, consistently follow swallow precautions, manage finances, manage medications,  arrive to therapy appointments on time, complete daily memory log entries, solve problems related to safety situations, review education related to hospitalization, complete caregiver training     Speech Therapy Problems (Active)       Problem: Memory STGs       Dates: Start: 12/29/22         Goal: STG-Within one week, patient will implement use of functional memory strategies to assist in recall of information related to hospitalization and safety with 70% accuracy provided MOD A       Dates: Start: 12/29/22         Goal Note filed on 12/30/22 1049 by Pavithra Pittman MS,CCC-SLP       Recent eval                 Problem: Problem Solving STGs       Dates: Start: 12/29/22         Goal: STG-Within one week, patient will complete medication management tasks with 80% accuracy provided SPV       Dates: Start: 12/29/22         Goal Note filed on 12/30/22 1049 by Pavithra Pittman MS,CCC-SLP       Recent eval                 Problem: Speech/Swallowing LTGs       Dates: Start: 12/29/22         Goal: LTG-By discharge, patient will complete functional problem solving and recall information with 80% accuracy provided MOD I       Dates: Start: 12/29/22

## 2023-01-02 NOTE — PROGRESS NOTES
[facility] Medicine Daily Progress Note    Date of Service  1/2/2023    Chief Complaint:  Hypertension  Tachycardia  Diabetes    Interval History:  No complaints.  Doing ok.    Review of Systems  Review of Systems   Constitutional:  Negative for chills and fever.   Respiratory:  Negative for shortness of breath.    Cardiovascular:  Negative for chest pain.   Gastrointestinal:  Negative for abdominal pain, diarrhea, nausea and vomiting.   Psychiatric/Behavioral:  The patient is not nervous/anxious.       Physical Exam  Temp:  [36.8 °C (98.2 °F)-37.1 °C (98.7 °F)] 36.8 °C (98.3 °F)  Pulse:  [88-96] 90  Resp:  [16-18] 18  BP: (122-144)/(69-81) 139/77  SpO2:  [93 %-96 %] 93 %    Physical Exam  Vitals and nursing note reviewed.   Constitutional:       Appearance: Normal appearance.   HENT:      Head: Atraumatic.   Eyes:      Conjunctiva/sclera: Conjunctivae normal.      Pupils: Pupils are equal, round, and reactive to light.   Cardiovascular:      Rate and Rhythm: Normal rate and regular rhythm.      Heart sounds: No murmur heard.  Pulmonary:      Effort: Pulmonary effort is normal.      Breath sounds: No stridor. No wheezing or rales.   Abdominal:      General: There is no distension.      Palpations: Abdomen is soft.      Tenderness: There is no abdominal tenderness.   Musculoskeletal:      Cervical back: Normal range of motion and neck supple.      Right lower leg: No edema.      Left lower leg: No edema.   Skin:     General: Skin is warm and dry.      Findings: No rash.   Neurological:      Mental Status: He is alert and oriented to person, place, and time.   Psychiatric:         Mood and Affect: Mood normal.         Behavior: Behavior normal.       Fluids    Intake/Output Summary (Last 24 hours) at 1/2/2023 1038  Last data filed at 1/2/2023 0830  Gross per 24 hour   Intake 660 ml   Output 1250 ml   Net -590 ml         Laboratory        Recent Labs     01/01/23  0533   SODIUM 133*   POTASSIUM 3.8   CHLORIDE 100   CO2  19*   GLUCOSE 95   BUN 16   CREATININE 0.67   CALCIUM 9.0                     Imaging    Assessment/Plan  * Thoracic compression fracture, closed, initial encounter (HCC)- (present on admission)  Assessment & Plan  2nd to GLF  Neurosurgery --> conservative management  To wear TLSO    Hypomagnesemia  Assessment & Plan  M.2 --> 1.1 ()  Off supplements 2nd to loose stools  S/P IV Mg 2g ()  Monitor    Tachycardia  Assessment & Plan  HR ok  On Lopressor  Cont to monitor    History of depression- (present on admission)  Assessment & Plan  On Cymbalta  On Wellbutrin    Diabetes mellitus, type 2 (HCC)- (present on admission)  Assessment & Plan  Hba1c: 8.4 ()  BS: 108-162   On Metformin: 1000 mg bid  On Actos: 30 mg daily  On Glucotrol SR: 15 mg qhs  Note: home meds include Metformin 1000 mg bid, Actos 30 mg daily, Glucotrol XL 10 mg daily, and Jardiance 25 mg daily  Note: pt's wife will bring in his home med of Jardiance (just in case we need it)  Cont to monitor    History of alcohol abuse- (present on admission)  Assessment & Plan  Has some mild dementia from etoh (per Neurology)  Reportedly quit (and pt confirms) on 2022    Mixed hyperlipidemia- (present on admission)  Assessment & Plan  On Lipitor    Prostate CA ([facility])- 2022; RT tbd x 8 wks, mar- may 2022- (present on admission)  Assessment & Plan  S/P prior treatment  Reportedly in remission    Essential hypertension- (present on admission)  Assessment & Plan  BP ok but occ rises up  On Benazepril (restarted )  On Lopressor: 12.5 mg bid (for tachy)  Note: on Flomax  Cont to monitor

## 2023-01-02 NOTE — THERAPY
Occupational Therapy  Daily Treatment     Patient Name: Prabhakar Sierra  Age:  72 y.o., Sex:  male  Medical Record #: [id number]  Today's Date: 1/2/2023     Precautions  Precautions: Fall Risk, TLSO (Thoracolumbosacral orthosis), Spinal / Back Precautions , Other (See Comments) (Dementia, TLSO on when OOB, May remove for showers)  Comments: dementia, TLSO on OOB off for showers         Subjective    Pt supine in bed upon arrival, pleasant and cooperative, agreeable to therapy and shower      Objective       01/02/23 1031   OT Charge Group   OT Self Care / ADL (Units) 4   OT Total Time Spent   OT Individual Total Time Spent (Mins) 60   Functional Level of Assist   Grooming Modified Independent;Seated   Bathing Standby Assist   Upper Body Dressing Minimal Assist  (assist to don/doff TLSO)   Lower Body Dressing Maximal Assist  (dressing AE were not available in room)   Toileting Standby Assist  (standing to urinate at toilet)   Toilet Transfers Contact Guard Assist  (stand pivot w/c<>toilet with GB)   Tub / Shower Transfers Contact Guard Assist  (bed to shower with FWW)   Interdisciplinary Plan of Care Collaboration   Patient Position at End of Therapy Seated;Chair Alarm On;Self Releasing Lap Belt Applied;Call Light within Reach;Tray Table within Reach         Assessment    Pt completed shower routine at [facility] for transfers, Min -Max A for ADL's overall using FWW and w/c, shower bench, GB's. Pt's functional performance was impacted by spinal precautions, assist with TLSO, impaired standing balance, shuffling gait at FWW. Pt would benefit from AE edu to increase independence with dressing .     Strengths: Able to follow instructions, Alert and oriented, Effective communication skills, Good insight into deficits/needs, Independent prior level of function, Making steady progress towards goals, Motivated for self care and independence, Pleasant and cooperative, Supportive family, Willingly participates in therapeutic  activities  Barriers: Decreased endurance, Fatigue, Generalized weakness, Impaired activity tolerance, Limited mobility, Impaired balance, Pain    Plan    Refer to Primary OT POC/goals. Needs edu on dressing AE    Occupational Therapy Goals (Active)       Problem: Bathing       Dates: Start: 12/29/22         Goal: STG-Within one week, patient will bathe w/ min A.        Dates: Start: 12/29/22               Problem: Dressing       Dates: Start: 12/29/22         Goal: STG-Within one week, patient will dress UB including TLSO w/ mod A.        Dates: Start: 12/29/22            Goal: STG-Within one week, patient will dress LB w/ LB dressing AE and mod A.       Dates: Start: 12/29/22               Problem: OT Long Term Goals       Dates: Start: 12/29/22         Goal: LTG-By discharge, patient will complete basic self care tasks w/ mod I - min A.       Dates: Start: 12/29/22            Goal: LTG-By discharge, patient will perform bathroom transfers w/ mod I- CGA.        Dates: Start: 12/29/22               Problem: Toileting       Dates: Start: 12/29/22         Goal: STG-Within one week, patient will complete toileting tasks w/ min A.        Dates: Start: 12/29/22

## 2023-01-02 NOTE — CARE PLAN
"The patient is Watcher - Medium risk of patient condition declining or worsening    Shift Goals  Clinical Goals: Pain management  Patient Goals: Rest    Progress made toward(s) clinical / shift goals:    Problem: Fall Risk - Rehab  Goal: Patient will remain free from falls  Note: Padma Fontenot Fall risk Assessment Score: 17    High fall risk Interventions   - Alarming seatbelt  - Bed and strip alarm   - Yellow sign by the door   - Yellow wrist band \"Fall risk\"  - Room near to the nurse station  - Do not leave patient unattended in the bathroom  - Fall risk education provided       Problem: Skin Integrity  Goal: Patient's skin integrity will be maintained or improve  Note: Patient's skin remains intact and free from new or accidental injury this shift.  Will continue to monitor.      Problem: Infection  Goal: Patient will remain free from infection  Note: Patient remains free from s/s infection; afebrile.  Will continue to monitor.              "

## 2023-01-02 NOTE — PROGRESS NOTES
"Rehab Progress Note     Date of Service: 1/2/2023  Chief Complaint: Follow-up vertebral compression fractures    Interval Events (Subjective)    Patient seen and examined today in his room.  He is resting quietly in bed.  He did require some IV magnesium per the hospitalist.  He wants to know when his IV can be removed.  He currently denies any back pain at rest or with mobility.  He reports today is his 52nd wedding anniversary and is hoping to discharge home as soon as possible.    Patient has no other new questions, concerns, or complaints today.         ROS: No changes to bowel, bladder, pain, mood, or sleep.           Objective:  VITAL SIGNS: /77   Pulse 90   Temp 36.8 °C (98.3 °F) (Oral)   Resp 18   Ht 1.778 m (5' 10\")   Wt 90.5 kg (199 lb 8.3 oz)   SpO2 93%   BMI 28.63 kg/m²   Gen: alert, no apparent distress  CV: Regular rate, regular rhythm  Resp: Clear to auscultation bilaterally        Recent Results (from the past 72 hour(s))   POCT glucose device results    Collection Time: 12/30/22  5:25 PM   Result Value Ref Range    POC Glucose, Blood 151 (H) 65 - 99 mg/dL   POCT glucose device results    Collection Time: 12/30/22  7:57 PM   Result Value Ref Range    POC Glucose, Blood 130 (H) 65 - 99 mg/dL   POCT glucose device results    Collection Time: 12/31/22  7:44 AM   Result Value Ref Range    POC Glucose, Blood 108 (H) 65 - 99 mg/dL   POCT glucose device results    Collection Time: 12/31/22 10:59 AM   Result Value Ref Range    POC Glucose, Blood 162 (H) 65 - 99 mg/dL   POCT glucose device results    Collection Time: 12/31/22  5:11 PM   Result Value Ref Range    POC Glucose, Blood 144 (H) 65 - 99 mg/dL   POCT glucose device results    Collection Time: 12/31/22  9:28 PM   Result Value Ref Range    POC Glucose, Blood 156 (H) 65 - 99 mg/dL   Basic Metabolic Panel    Collection Time: 01/01/23  5:33 AM   Result Value Ref Range    Sodium 133 (L) 135 - 145 mmol/L    Potassium 3.8 3.6 - 5.5 mmol/L    " Chloride 100 96 - 112 mmol/L    Co2 19 (L) 20 - 33 mmol/L    Glucose 95 65 - 99 mg/dL    Bun 16 8 - 22 mg/dL    Creatinine 0.67 0.50 - 1.40 mg/dL    Calcium 9.0 8.5 - 10.5 mg/dL    Anion Gap 14.0 7.0 - 16.0   MAGNESIUM    Collection Time: 01/01/23  5:33 AM   Result Value Ref Range    Magnesium 1.1 (L) 1.5 - 2.5 mg/dL   PHOSPHORUS    Collection Time: 01/01/23  5:33 AM   Result Value Ref Range    Phosphorus 3.4 2.5 - 4.5 mg/dL   ESTIMATED GFR    Collection Time: 01/01/23  5:33 AM   Result Value Ref Range    GFR (CKD-EPI) 98 >60 mL/min/1.73 m 2   POCT glucose device results    Collection Time: 01/01/23  7:49 AM   Result Value Ref Range    POC Glucose, Blood 122 (H) 65 - 99 mg/dL   POCT glucose device results    Collection Time: 01/01/23 10:59 AM   Result Value Ref Range    POC Glucose, Blood 161 (H) 65 - 99 mg/dL   POCT glucose device results    Collection Time: 01/01/23  5:03 PM   Result Value Ref Range    POC Glucose, Blood 145 (H) 65 - 99 mg/dL   POCT glucose device results    Collection Time: 01/01/23  8:01 PM   Result Value Ref Range    POC Glucose, Blood 157 (H) 65 - 99 mg/dL   POCT glucose device results    Collection Time: 01/02/23  7:11 AM   Result Value Ref Range    POC Glucose, Blood 128 (H) 65 - 99 mg/dL   POCT glucose device results    Collection Time: 01/02/23 11:12 AM   Result Value Ref Range    POC Glucose, Blood 219 (H) 65 - 99 mg/dL       Scheduled Medications   Medication Dose Frequency    insulin lispro  2-12 Units 4X/DAY ACHS    benazepril  10 mg Q DAY    omeprazole  20 mg QAM AC    tamsulosin  0.4 mg AFTER DINNER    metoprolol tartrate  12.5 mg TWICE DAILY    glipiZIDE SR  15 mg PM MEAL    acetaminophen  1,000 mg TID    aspirin  81 mg DAILY    atorvastatin  80 mg Q EVENING    buPROPion SR  150 mg BID    DULoxetine  60 mg BID    enoxaparin (LOVENOX) injection  40 mg DAILY AT 1800    lidocaine  1 Patch Q24HRS    lidocaine  1 Patch Q24HRS    metformin  1,000 mg BID WITH MEALS    pioglitazone  30 mg  DAILY    senna-docusate  2 Tablet BID       Current Diet Order   Procedures    Diet Order Diet: Cardiac; Second Modifier: (optional): Consistent CHO (Diabetic)       Assessment:  This patient is a 72 y.o. male admitted for acute inpatient rehabilitation with Thoracic compression fracture, closed, initial encounter ([facility]).    I led and attended the weekly conference, and agree with the IDT conference documentation and plan of care as noted below.    Date of conference: 12/30/2022    Goals and barriers: See IDT note.    Biggest barriers: incontinent, high PVRs - urinary retention    Goals in next week: mild cognitive impairment, poor tolerance and endurance, stairs at home, lethargy    CM/social support: wife supportive    Anticipated DC date: 1/10, home evaluation on 9th    Home health: Pt/OT/SLP/RN    Equip: has his own    Follow up: PCP, neurosurgery, neurology         Problem List/Medical Decision Making and Plan:    T11, L2, L3 vertebral compression fractures  Conservatively managed  TLSO when out of bed  Continue full rehab program  PT/OT/SLP, 1 hr each discipline, 5 days per week    Outpatient follow-up with neurosurgery, Dr. Guzman    Pain management  As needed tylenol  Lidoderm patches  PRN ice    Normal pressure hydrocephalus?  Outpatient follow-up with neurology for possible lumbar puncture with large volume CSF removal     Dementia?  Mild cognitive impairment, mostly memory/attention  Speech therapy assessment  Outpatient follow-up with neurology as well as PET scan  Aspirin for stroke/vascular prevention per neurology     History of depression  Cymbalta  Wellbutrin  Monitor need to consult psychology  Mood currently stable     Hypertension  Benazepril discontinued  Metoprolol started  Appreciate hospitalist assistance    Tachycardia, improved  EKG on 12/22 was sinus tachycardia  Patient currently not anemic and does not have any pain at rest  Started low-dose beta-blocker 12/29  Appreciate hospitalist  assistance     Hyperlipidemia  Statin     History of iron deficiency anemia  Discontinue ferrous sulfate as patient no longer has anemia     History of vitamin D deficiency  Discontinue supplementation for now as patient feels like he is taking too many pills    Peripheral neuropathy  Cymbalta  Gabapentin -patient not complaining of any pain so we will discontinue     Diabetes with hyperglycemia  Last hemoglobin A1c 11/22 8.4, uncontrolled  Glipizide  Actos  Sliding scale insulin  Appreciate hospitalist's assistance    Hypomagnesemia  Started supplementation  Required IV supplementation  Appreciate hospitalist assistance  Monitor with intermittent labs     Alcohol use  Added vitamins  Needs counseling     Tobacco use  Needs counseling    Bowel program  Continue bowel medications  Last BM 1/2    Bladder program  History of prostate cancer  Completed treatment, apparently in remission  Continue Flomax  Outpatient follow-up with urology  Check PVRs -131, 307, 120  Bladder scan for no voids  ICP for over 400 cc  Scheduled toileting     DVT prophylaxis  Lovenox      Total time:  16 minutes.  I spent greater than 50% of the time for patient care, counseling, and coordination on this date, including patient face-to face time, unit/floor time with review of records/pertinent lab data and studies, as well as discussing diagnostic evaluation/work up, planned therapeutic interventions, and future disposition of care, as per the interval events/subjective and the assessment and plan as noted above.    I have performed a physical exam, reviewed and updated ROS, as well as the assessment and plan today 1/2/2023. In review of note from 12/30/2022 there are no new changes except as documented above.            Evelyne Irving M.D.  Physical Medicine and Rehabilitation

## 2023-01-03 LAB
ANION GAP SERPL CALC-SCNC: 12 MMOL/L (ref 7–16)
BUN SERPL-MCNC: 14 MG/DL (ref 8–22)
CALCIUM SERPL-MCNC: 9.5 MG/DL (ref 8.5–10.5)
CHLORIDE SERPL-SCNC: 100 MMOL/L (ref 96–112)
CO2 SERPL-SCNC: 21 MMOL/L (ref 20–33)
CREAT SERPL-MCNC: 0.62 MG/DL (ref 0.5–1.4)
GFR SERPLBLD CREATININE-BSD FMLA CKD-EPI: 101 ML/MIN/1.73 M 2
GLUCOSE BLD STRIP.AUTO-MCNC: 100 MG/DL (ref 65–99)
GLUCOSE BLD STRIP.AUTO-MCNC: 112 MG/DL (ref 65–99)
GLUCOSE BLD STRIP.AUTO-MCNC: 125 MG/DL (ref 65–99)
GLUCOSE BLD STRIP.AUTO-MCNC: 213 MG/DL (ref 65–99)
GLUCOSE SERPL-MCNC: 101 MG/DL (ref 65–99)
MAGNESIUM SERPL-MCNC: 1.1 MG/DL (ref 1.5–2.5)
PHOSPHATE SERPL-MCNC: 3.2 MG/DL (ref 2.5–4.5)
POTASSIUM SERPL-SCNC: 4.1 MMOL/L (ref 3.6–5.5)
SODIUM SERPL-SCNC: 133 MMOL/L (ref 135–145)

## 2023-01-03 PROCEDURE — 700111 HCHG RX REV CODE 636 W/ 250 OVERRIDE (IP): Performed by: HOSPITALIST

## 2023-01-03 PROCEDURE — 97112 NEUROMUSCULAR REEDUCATION: CPT

## 2023-01-03 PROCEDURE — A9270 NON-COVERED ITEM OR SERVICE: HCPCS | Performed by: HOSPITALIST

## 2023-01-03 PROCEDURE — 97116 GAIT TRAINING THERAPY: CPT

## 2023-01-03 PROCEDURE — 99232 SBSQ HOSP IP/OBS MODERATE 35: CPT | Mod: 25 | Performed by: PHYSICAL MEDICINE & REHABILITATION

## 2023-01-03 PROCEDURE — 770010 HCHG ROOM/CARE - REHAB SEMI PRIVAT*

## 2023-01-03 PROCEDURE — 84100 ASSAY OF PHOSPHORUS: CPT

## 2023-01-03 PROCEDURE — 700102 HCHG RX REV CODE 250 W/ 637 OVERRIDE(OP): Performed by: PHYSICAL MEDICINE & REHABILITATION

## 2023-01-03 PROCEDURE — 36415 COLL VENOUS BLD VENIPUNCTURE: CPT

## 2023-01-03 PROCEDURE — 700111 HCHG RX REV CODE 636 W/ 250 OVERRIDE (IP): Performed by: PHYSICAL MEDICINE & REHABILITATION

## 2023-01-03 PROCEDURE — 97535 SELF CARE MNGMENT TRAINING: CPT

## 2023-01-03 PROCEDURE — 83735 ASSAY OF MAGNESIUM: CPT

## 2023-01-03 PROCEDURE — 99406 BEHAV CHNG SMOKING 3-10 MIN: CPT | Performed by: PHYSICAL MEDICINE & REHABILITATION

## 2023-01-03 PROCEDURE — 97530 THERAPEUTIC ACTIVITIES: CPT

## 2023-01-03 PROCEDURE — 82962 GLUCOSE BLOOD TEST: CPT | Mod: 91

## 2023-01-03 PROCEDURE — 99231 SBSQ HOSP IP/OBS SF/LOW 25: CPT | Performed by: HOSPITALIST

## 2023-01-03 PROCEDURE — 700101 HCHG RX REV CODE 250: Performed by: PHYSICAL MEDICINE & REHABILITATION

## 2023-01-03 PROCEDURE — 97130 THER IVNTJ EA ADDL 15 MIN: CPT

## 2023-01-03 PROCEDURE — 700102 HCHG RX REV CODE 250 W/ 637 OVERRIDE(OP): Performed by: HOSPITALIST

## 2023-01-03 PROCEDURE — 80048 BASIC METABOLIC PNL TOTAL CA: CPT

## 2023-01-03 PROCEDURE — A9270 NON-COVERED ITEM OR SERVICE: HCPCS | Performed by: PHYSICAL MEDICINE & REHABILITATION

## 2023-01-03 PROCEDURE — 97129 THER IVNTJ 1ST 15 MIN: CPT

## 2023-01-03 RX ORDER — MAGNESIUM SULFATE HEPTAHYDRATE 40 MG/ML
2 INJECTION, SOLUTION INTRAVENOUS 2 TIMES DAILY
Status: COMPLETED | OUTPATIENT
Start: 2023-01-03 | End: 2023-01-03

## 2023-01-03 RX ORDER — TAMSULOSIN HYDROCHLORIDE 0.4 MG/1
0.8 CAPSULE ORAL
Status: DISCONTINUED | OUTPATIENT
Start: 2023-01-03 | End: 2023-01-05 | Stop reason: HOSPADM

## 2023-01-03 RX ADMIN — METOPROLOL TARTRATE 12.5 MG: 25 TABLET, FILM COATED ORAL at 05:13

## 2023-01-03 RX ADMIN — PIOGLITAZONE 30 MG: 15 TABLET ORAL at 08:06

## 2023-01-03 RX ADMIN — INSULIN LISPRO 4 UNITS: 100 INJECTION, SOLUTION INTRAVENOUS; SUBCUTANEOUS at 11:33

## 2023-01-03 RX ADMIN — BUPROPION HYDROCHLORIDE 150 MG: 150 TABLET, EXTENDED RELEASE ORAL at 06:06

## 2023-01-03 RX ADMIN — BENAZEPRIL HYDROCHLORIDE 10 MG: 10 TABLET, FILM COATED ORAL at 05:12

## 2023-01-03 RX ADMIN — LIDOCAINE 1 PATCH: 700 PATCH TOPICAL at 08:06

## 2023-01-03 RX ADMIN — MAGNESIUM SULFATE HEPTAHYDRATE 2 G: 40 INJECTION, SOLUTION INTRAVENOUS at 14:56

## 2023-01-03 RX ADMIN — Medication 3 MG: at 20:17

## 2023-01-03 RX ADMIN — DULOXETINE HYDROCHLORIDE 60 MG: 30 CAPSULE, DELAYED RELEASE ORAL at 18:07

## 2023-01-03 RX ADMIN — ATORVASTATIN CALCIUM 80 MG: 40 TABLET, FILM COATED ORAL at 20:01

## 2023-01-03 RX ADMIN — METFORMIN HYDROCHLORIDE 1000 MG: 500 TABLET ORAL at 08:06

## 2023-01-03 RX ADMIN — GLIPIZIDE 15 MG: 2.5 TABLET, EXTENDED RELEASE ORAL at 17:20

## 2023-01-03 RX ADMIN — MAGNESIUM SULFATE HEPTAHYDRATE 2 G: 40 INJECTION, SOLUTION INTRAVENOUS at 20:05

## 2023-01-03 RX ADMIN — METFORMIN HYDROCHLORIDE 1000 MG: 500 TABLET ORAL at 17:21

## 2023-01-03 RX ADMIN — SENNOSIDES AND DOCUSATE SODIUM 2 TABLET: 50; 8.6 TABLET ORAL at 20:01

## 2023-01-03 RX ADMIN — OMEPRAZOLE 20 MG: 20 CAPSULE, DELAYED RELEASE ORAL at 08:06

## 2023-01-03 RX ADMIN — DULOXETINE HYDROCHLORIDE 60 MG: 30 CAPSULE, DELAYED RELEASE ORAL at 06:06

## 2023-01-03 RX ADMIN — METOPROLOL TARTRATE 12.5 MG: 25 TABLET, FILM COATED ORAL at 17:21

## 2023-01-03 RX ADMIN — ENOXAPARIN SODIUM 40 MG: 40 INJECTION SUBCUTANEOUS at 17:19

## 2023-01-03 RX ADMIN — TAMSULOSIN HYDROCHLORIDE 0.8 MG: 0.4 CAPSULE ORAL at 17:21

## 2023-01-03 RX ADMIN — ASPIRIN 81 MG 81 MG: 81 TABLET ORAL at 08:06

## 2023-01-03 RX ADMIN — BUPROPION HYDROCHLORIDE 150 MG: 150 TABLET, EXTENDED RELEASE ORAL at 18:08

## 2023-01-03 ASSESSMENT — ENCOUNTER SYMPTOMS
VOMITING: 0
CHILLS: 0
SHORTNESS OF BREATH: 0
FEVER: 0
ABDOMINAL PAIN: 0
NAUSEA: 0
DIARRHEA: 0

## 2023-01-03 ASSESSMENT — GAIT ASSESSMENTS
DISTANCE (FEET): 100
DEVIATION: INCREASED BASE OF SUPPORT;BRADYKINETIC;SHUFFLED GAIT
GAIT LEVEL OF ASSIST: CONTACT GUARD ASSIST
ASSISTIVE DEVICE: FRONT WHEEL WALKER

## 2023-01-03 ASSESSMENT — ACTIVITIES OF DAILY LIVING (ADL): BED_CHAIR_WHEELCHAIR_TRANSFER_DESCRIPTION: ADAPTIVE EQUIPMENT;SET-UP OF EQUIPMENT

## 2023-01-03 ASSESSMENT — PAIN DESCRIPTION - PAIN TYPE: TYPE: ACUTE PAIN

## 2023-01-03 NOTE — THERAPY
"Speech Language Pathology  Daily Treatment     Patient Name: Prabhakar Sierra  Age:  72 y.o., Sex:  male  Medical Record #: [id number]  Today's Date: 1/3/2023     Precautions  Precautions: Fall Risk, TLSO (Thoracolumbosacral orthosis), Spinal / Back Precautions   Comments: dementia, TLSO on OOB off for showers    Subjective    \"Im really tired, ill go as long as I can.\"     Objective       01/03/23 1033   Treatment Charges   SLP Cognitive Skill Development First 15 Minutes 1   SLP Cognitive Skill Development Additional 15 Minutes 3   SLP Total Time Spent   SLP Individual Total Time Spent (Mins) 60         Assessment    Pt presented with two step written directions, pt completed with 100% accuracy indep. Task complexity increased with short story and target word, pt located 3/11 targets. Encouraged additional review with slow attention to details. Pt perseverative \"I cant, I dont see them.\" Pt required max cues to locate. Pt asking why this activity would help his back, discussed that cognition is impacting his ability to follow directions and pay attention therefore with increased cognition it would assist pt/ot goals as well. Pt verbalized understanding but die report \"this is nothing new\" pt does have dementia diagnosis. Discussed that it would be beneficial for pts SO to be present in therapy to assess current function, determine baseline level and best plan of care.          Plan    Cont to address attention, recall and functional problem solving    Speech Therapy Problems (Active)       Problem: Memory STGs       Dates: Start: 12/29/22         Goal: STG-Within one week, patient will implement use of functional memory strategies to assist in recall of information related to hospitalization and safety with 70% accuracy provided MOD A       Dates: Start: 12/29/22         Goal Note filed on 12/30/22 1049 by Pavithra Pittman MS,CCC-SLP       Recent eval                 Problem: Problem Solving STGs       " Dates: Start: 12/29/22         Goal: STG-Within one week, patient will complete medication management tasks with 80% accuracy provided SPV       Dates: Start: 12/29/22         Goal Note filed on 12/30/22 1049 by Pavithra Pittman MS,CCC-SLP       Recent eval                 Problem: Speech/Swallowing LTGs       Dates: Start: 12/29/22         Goal: LTG-By discharge, patient will complete functional problem solving and recall information with 80% accuracy provided MOD I       Dates: Start: 12/29/22

## 2023-01-03 NOTE — CARE PLAN
"The patient is Stable - Low risk of patient condition declining or worsening    Shift Goals  Clinical Goals: Safety  Patient Goals: Rest    Progress made toward(s) clinical / shift goals:    Problem: Fall Risk - Rehab  Goal: Patient will remain free from falls  Outcome: Progressing     Padma Fontenot Fall risk Assessment : 17    High fall risk Interventions     - Bed and strip alarm   - Yellow sign by the door   - Yellow wrist band \"Fall risk\"  - Room near to the nurse station  - Do not leave patient unattended in the bathroom  - Fall risk education provided  Pt uses call light consistently and appropriately. Waits for assistance does not attempt self transfer this shift. Able to verbalize needs.   "

## 2023-01-03 NOTE — CARE PLAN
"The patient is Watcher - Medium risk of patient condition declining or worsening    Shift Goals  Clinical Goals: Safety  Patient Goals: Rest    Progress made toward(s) clinical / shift goals:    Problem: Fall Risk - Rehab  Goal: Patient will remain free from falls  Note: Padma Fontenot Fall risk Assessment Score: 17    High fall risk Interventions   - Alarming seatbelt  - Bed and strip alarm   - Yellow sign by the door   - Yellow wrist band \"Fall risk\"  - Room near to the nurse station  - Do not leave patient unattended in the bathroom  - Fall risk education provided       Problem: Skin Integrity  Goal: Patient's skin integrity will be maintained or improve  Note: Patient's skin remains intact and free from new or accidental injury this shift.  Will continue to monitor.              "

## 2023-01-03 NOTE — PROGRESS NOTES
"Rehab Progress Note     Date of Service: 1/3/2023  Chief Complaint: Follow-up vertebral compression fractures    Interval Events (Subjective)    Patient seen and examined today during his speech therapy session.  He is discouraged by his cognitive impairments.  He understands plan to discharge on the 10th with home evaluation prior to make sure he is safe to do his stairs at home.  His wife still needs to come in for family training as well.  We discussed tobacco as well as alcohol sensation strategies.    Return to the room in the afternoon to give wife medical and functional updates.  She reports he has been having difficulty with his memory and his walking for some time prior to his hospitalization.  He quit alcohol 5 months ago but has been having 2 to 3 cigarettes a day.    Patient has no other new questions, concerns, or complaints today.     ROS: No changes to bowel, bladder, pain, mood, or sleep.       Objective:  VITAL SIGNS: /78   Pulse 84   Temp 36.2 °C (97.2 °F) (Oral)   Resp 18   Ht 1.778 m (5' 10\")   Wt 90.5 kg (199 lb 8.3 oz)   SpO2 94%   BMI 28.63 kg/m²   Gen: alert, no apparent distress  CV: Regular rate, regular rhythm  Resp: Clear to auscultation bilaterally  MSK: TLSO in place        Recent Results (from the past 72 hour(s))   POCT glucose device results    Collection Time: 12/31/22 10:59 AM   Result Value Ref Range    POC Glucose, Blood 162 (H) 65 - 99 mg/dL   POCT glucose device results    Collection Time: 12/31/22  5:11 PM   Result Value Ref Range    POC Glucose, Blood 144 (H) 65 - 99 mg/dL   POCT glucose device results    Collection Time: 12/31/22  9:28 PM   Result Value Ref Range    POC Glucose, Blood 156 (H) 65 - 99 mg/dL   Basic Metabolic Panel    Collection Time: 01/01/23  5:33 AM   Result Value Ref Range    Sodium 133 (L) 135 - 145 mmol/L    Potassium 3.8 3.6 - 5.5 mmol/L    Chloride 100 96 - 112 mmol/L    Co2 19 (L) 20 - 33 mmol/L    Glucose 95 65 - 99 mg/dL    Bun 16 8 - " 22 mg/dL    Creatinine 0.67 0.50 - 1.40 mg/dL    Calcium 9.0 8.5 - 10.5 mg/dL    Anion Gap 14.0 7.0 - 16.0   MAGNESIUM    Collection Time: 01/01/23  5:33 AM   Result Value Ref Range    Magnesium 1.1 (L) 1.5 - 2.5 mg/dL   PHOSPHORUS    Collection Time: 01/01/23  5:33 AM   Result Value Ref Range    Phosphorus 3.4 2.5 - 4.5 mg/dL   ESTIMATED GFR    Collection Time: 01/01/23  5:33 AM   Result Value Ref Range    GFR (CKD-EPI) 98 >60 mL/min/1.73 m 2   POCT glucose device results    Collection Time: 01/01/23  7:49 AM   Result Value Ref Range    POC Glucose, Blood 122 (H) 65 - 99 mg/dL   POCT glucose device results    Collection Time: 01/01/23 10:59 AM   Result Value Ref Range    POC Glucose, Blood 161 (H) 65 - 99 mg/dL   POCT glucose device results    Collection Time: 01/01/23  5:03 PM   Result Value Ref Range    POC Glucose, Blood 145 (H) 65 - 99 mg/dL   POCT glucose device results    Collection Time: 01/01/23  8:01 PM   Result Value Ref Range    POC Glucose, Blood 157 (H) 65 - 99 mg/dL   POCT glucose device results    Collection Time: 01/02/23  7:11 AM   Result Value Ref Range    POC Glucose, Blood 128 (H) 65 - 99 mg/dL   POCT glucose device results    Collection Time: 01/02/23 11:12 AM   Result Value Ref Range    POC Glucose, Blood 219 (H) 65 - 99 mg/dL   POCT glucose device results    Collection Time: 01/02/23  5:09 PM   Result Value Ref Range    POC Glucose, Blood 136 (H) 65 - 99 mg/dL   POCT glucose device results    Collection Time: 01/02/23  8:09 PM   Result Value Ref Range    POC Glucose, Blood 112 (H) 65 - 99 mg/dL   Basic Metabolic Panel    Collection Time: 01/03/23  5:43 AM   Result Value Ref Range    Sodium 133 (L) 135 - 145 mmol/L    Potassium 4.1 3.6 - 5.5 mmol/L    Chloride 100 96 - 112 mmol/L    Co2 21 20 - 33 mmol/L    Glucose 101 (H) 65 - 99 mg/dL    Bun 14 8 - 22 mg/dL    Creatinine 0.62 0.50 - 1.40 mg/dL    Calcium 9.5 8.5 - 10.5 mg/dL    Anion Gap 12.0 7.0 - 16.0   MAGNESIUM    Collection Time:  01/03/23  5:43 AM   Result Value Ref Range    Magnesium 1.1 (L) 1.5 - 2.5 mg/dL   PHOSPHORUS    Collection Time: 01/03/23  5:43 AM   Result Value Ref Range    Phosphorus 3.2 2.5 - 4.5 mg/dL   ESTIMATED GFR    Collection Time: 01/03/23  5:43 AM   Result Value Ref Range    GFR (CKD-EPI) 101 >60 mL/min/1.73 m 2   POCT glucose device results    Collection Time: 01/03/23  7:35 AM   Result Value Ref Range    POC Glucose, Blood 125 (H) 65 - 99 mg/dL       Scheduled Medications   Medication Dose Frequency    insulin lispro  2-12 Units 4X/DAY ACHS    benazepril  10 mg Q DAY    omeprazole  20 mg QAM AC    tamsulosin  0.4 mg AFTER DINNER    metoprolol tartrate  12.5 mg TWICE DAILY    glipiZIDE SR  15 mg PM MEAL    aspirin  81 mg DAILY    atorvastatin  80 mg Q EVENING    buPROPion SR  150 mg BID    DULoxetine  60 mg BID    enoxaparin (LOVENOX) injection  40 mg DAILY AT 1800    lidocaine  1 Patch Q24HRS    lidocaine  1 Patch Q24HRS    metformin  1,000 mg BID WITH MEALS    pioglitazone  30 mg DAILY    senna-docusate  2 Tablet BID       Current Diet Order   Procedures    Diet Order Diet: Cardiac; Second Modifier: (optional): Consistent CHO (Diabetic)       Assessment:  This patient is a 72 y.o. male admitted for acute inpatient rehabilitation with Thoracic compression fracture, closed, initial encounter ([facility]).    I led and attended the weekly conference, and agree with the IDT conference documentation and plan of care as noted below.    Date of conference: 12/30/2022    Goals and barriers: See IDT note.    Biggest barriers: incontinent, high PVRs - urinary retention    Goals in next week: mild cognitive impairment, poor tolerance and endurance, stairs at home, lethargy    CM/social support: wife supportive    Anticipated DC date: 1/10, home evaluation on 9th    Home health: PT/OT/SLP/RN    Equip: has his own    Follow up: PCP, neurosurgery, neurology         Problem List/Medical Decision Making and Plan:    T11, L2, L3 vertebral  compression fractures  Conservatively managed  TLSO when out of bed  Continue full rehab program  PT/OT/SLP, 1 hr each discipline, 5 days per week    Outpatient follow-up with neurosurgery, Dr. Guzman    Pain management, resolved  As needed tylenol, 12/29  Lidoderm patches  PRN ice    Normal pressure hydrocephalus?  Outpatient follow-up with neurology for possible lumbar puncture with large volume CSF removal     Dementia?  Mild cognitive impairment, mostly memory/attention  Speech therapy assessment  Outpatient follow-up with neurology as well as PET scan  Aspirin for stroke/vascular prevention per neurology     History of depression  Cymbalta  Wellbutrin  Monitor need to consult psychology  Mood currently stable     Hypertension  Benazepril  Metoprolol  Appreciate hospitalist assistance    Tachycardia, resolved  Metoprolol  Appreciate hospitalist assistance     Hyperlipidemia  Statin     History of iron deficiency anemia  Discontinued ferrous sulfate as patient no longer has anemia     History of vitamin D deficiency  Discontinued supplementation for now as patient feels like he is taking too many pills    Peripheral neuropathy  Cymbalta  Gabapentin discontinued as patient's not complaining of any pain and to eliminate polypharmacy     Diabetes with hyperglycemia  Last hemoglobin A1c 11/22 8.4, uncontrolled  Glipizide  Actos  Sliding scale insulin  Appreciate hospitalist's assistance    Hypomagnesemia, continues  Continue oral supplementation  Required IV supplementation x 2  Appreciate hospitalist assistance     Alcohol use  Added vitamins  Counseling provided today     Tobacco use  Counseling provided today    Bowel program  Continue bowel medications  Last BM 1/2    Bladder program  History of prostate cancer  Urinary retention  Completed treatment, apparently in remission  Continue Flomax --> increased dose to 0.8 mg 1/3  Outpatient follow-up with urology  Check PVRs -131, 307, 120, 364, 255  Bladder scan for  no voids  ICP for over 400 cc -not requiring  Scheduled toileting     DVT prophylaxis  Lovenox      I had a 5 minute discussion with patient on 1/3/2023 about smoking cessation.  Discussed that smoking is associated with respiratory, cardiovascular, oncologic, and neurologic illnesses.  Patient was provided advice and counseling on different methods of smoking cessation as well as provided supportive listening for psychologic aspect of addiction.                         Evelyne Irving M.D.  Physical Medicine and Rehabilitation

## 2023-01-03 NOTE — CARE PLAN
Problem: Problem Solving STGs  Goal: STG-Within one week, patient will complete medication management tasks with 80% accuracy provided SPV  Outcome: Not Met  Note: Not appropriate to address at this time, addressing simple attention skills due to variability in performance and assistance needed.      Problem: Memory STGs  Goal: STG-Within one week, patient will implement use of functional memory strategies to assist in recall of information related to hospitalization and safety with 70% accuracy provided MOD A  Outcome: Not Met  Note: MAX A required, poor carryover, perseverative on same questions re: POC and hospitalization

## 2023-01-03 NOTE — PROGRESS NOTES
Hospital Medicine Daily Progress Note        Chief Complaint:  Hypertension  Diabetes    Interval History:  No overnight problems.  Labs reviewed.    Review of Systems  Review of Systems   Constitutional:  Negative for chills and fever.   HENT: Negative.     Eyes: Negative.    Respiratory:  Negative for cough and shortness of breath.    Cardiovascular:  Negative for chest pain and palpitations.   Gastrointestinal:  Negative for abdominal pain, diarrhea, nausea and vomiting.   Musculoskeletal:  Positive for back pain.   Skin:  Negative for itching and rash.   Endo/Heme/Allergies:  Negative for polydipsia. Does not bruise/bleed easily.      Physical Exam  Temp:  [36.6 °C (97.9 °F)-36.9 °C (98.5 °F)] 36.6 °C (97.9 °F)  Pulse:  [90-92] 92  Resp:  [18] 18  BP: (121-139)/(51-73) 139/73  SpO2:  [94 %-97 %] 94 %    Physical Exam  Vitals reviewed.   Constitutional:       General: He is not in acute distress.     Appearance: Normal appearance. He is not ill-appearing.   HENT:      Head: Normocephalic and atraumatic.      Right Ear: External ear normal.      Left Ear: External ear normal.      Nose: Nose normal.      Mouth/Throat:      Pharynx: Oropharynx is clear.   Eyes:      General:         Right eye: No discharge.         Left eye: No discharge.      Extraocular Movements: Extraocular movements intact.      Conjunctiva/sclera: Conjunctivae normal.   Cardiovascular:      Rate and Rhythm: Normal rate and regular rhythm.   Pulmonary:      Effort: No respiratory distress.      Breath sounds: No wheezing.      Comments: Decreased BS  Abdominal:      General: Bowel sounds are normal. There is no distension.      Palpations: Abdomen is soft.      Tenderness: There is no abdominal tenderness.   Musculoskeletal:      Cervical back: Normal range of motion and neck supple.      Right lower leg: No edema.      Left lower leg: No edema.   Skin:     General: Skin is warm and dry.   Neurological:      Mental Status: He is alert and  oriented to person, place, and time.       Fluids    Intake/Output Summary (Last 24 hours) at 1/4/2023 1409  Last data filed at 1/4/2023 0710  Gross per 24 hour   Intake 440 ml   Output 900 ml   Net -460 ml       Laboratory  Recent Labs     01/04/23  0546   WBC 6.2   RBC 4.39*   HEMOGLOBIN 13.3*   HEMATOCRIT 40.4*   MCV 92.0   MCH 30.3   MCHC 32.9*   RDW 47.8   PLATELETCT 188   MPV 10.6     Recent Labs     01/03/23  0543   SODIUM 133*   POTASSIUM 4.1   CHLORIDE 100   CO2 21   GLUCOSE 101*   BUN 14   CREATININE 0.62   CALCIUM 9.5                   Assessment/Plan  * Thoracic compression fracture, closed, initial encounter (H[facility])- (present on admission)  Assessment & Plan  2/2 GLF  Non-surgical management  TLSO  Pain control per Physiatry    Hypomagnesemia  Assessment & Plan  Needs more MgSO4  Check F/U labs in AM    History of depression- (present on admission)  Assessment & Plan  On Cymbalta and Wellbutrin    Diabetes mellitus, type 2 ([facility])- (present on admission)  Assessment & Plan  HbA1c 8.4  On Metformin, Glipizide, and Actos  Also on SSI  Outpt meds include Metformin 1000 mg bid, Actos 30 mg daily, Glucotrol XL 10 mg daily, and Jardiance 25 mg daily    History of alcohol abuse- (present on admission)  Assessment & Plan  Start Folate, MVT, and Thiamine    Mixed hyperlipidemia- (present on admission)  Assessment & Plan  On Lipitor    Prostate CA (HCC)- feb 2022; RT tbd x 8 wks, mar- may 2022- (present on admission)  Assessment & Plan  S/P prior treatment  Reportedly in remission    Essential hypertension- (present on admission)  Assessment & Plan  On Benazepril and Metoprolol  Monitor for orthostatic hypotension on Flomax       DNR

## 2023-01-03 NOTE — TELEPHONE ENCOUNTER
Sorry, I guess I am a week behind on my dates. The office visit Don last saw you was on 9/22/2017 not 9/15/17.   O-Z Flap Text: The defect edges were debeveled with a #15 scalpel blade.  Given the location of the defect, shape of the defect and the proximity to free margins an O-Z flap was deemed most appropriate.  Using a sterile surgical marker, an appropriate transposition flap was drawn incorporating the defect and placing the expected incisions within the relaxed skin tension lines where possible. The area thus outlined was incised deep to adipose tissue with a #15 scalpel blade.  The skin margins were undermined to an appropriate distance in all directions utilizing iris scissors.

## 2023-01-03 NOTE — THERAPY
Occupational Therapy  Daily Treatment     Patient Name: Prabhakar Sierra  Age:  72 y.o., Sex:  male  Medical Record #: [id number]  Today's Date: 1/3/2023     Precautions  Precautions: Fall Risk, TLSO (Thoracolumbosacral orthosis), Spinal / Back Precautions   Comments: dementia, TLSO on OOB off for showers         Subjective    Pt seated in w/c upon arrival, pleasant and cooperative, agreeable to therapy       Objective       01/03/23 0901   OT Charge Group   OT Self Care / ADL (Units) 4   OT Total Time Spent   OT Individual Total Time Spent (Mins) 60   Functional Level of Assist   Grooming Standby Assist;Seated  (set-up assist for shaving, oral care)   Interdisciplinary Plan of Care Collaboration   Patient Position at End of Therapy Seated;Call Light within Reach;Tray Table within Reach;Chair Alarm On;Self Releasing Lap Belt Applied     Edu on hip kit AE for LB Dressing - demo and hands-in training provided. Pt required cues for sequence and carry-over when using reacher, dressing stick, and sock aid.     Assessment    Pt with many questions this session, I attempted to answer to the best of my abilities, however it is the same questions that he is perseverating on since I last worked with this pt. Questions included: when will his IV be discharged, when will he go home, how long does he have to wear the brace, etc. Perseveration was impairing his attention to task requiring frequent redirection. Edu initiated for dressing AE as pt is unable to lift his RLE into a figure-4 position, but is able to lift his LLE into figure-4 position to reach his foot. Pt would benefit from ongoing training and practice as carry-over of learning was impaired.     Strengths: Able to follow instructions, Alert and oriented, Effective communication skills, Good insight into deficits/needs, Independent prior level of function, Making steady progress towards goals, Motivated for self care and independence, Pleasant and cooperative,  Supportive family, Willingly participates in therapeutic activities  Barriers: Decreased endurance, Fatigue, Generalized weakness, Impaired activity tolerance, Limited mobility, Impaired balance, Pain    Plan    Refer to Primary OT POC/goals     Occupational Therapy Goals (Active)       Problem: Bathing       Dates: Start: 12/29/22         Goal: STG-Within one week, patient will bathe w/ min A.        Dates: Start: 12/29/22               Problem: Dressing       Dates: Start: 12/29/22         Goal: STG-Within one week, patient will dress UB including TLSO w/ mod A.        Dates: Start: 12/29/22            Goal: STG-Within one week, patient will dress LB w/ LB dressing AE and mod A.       Dates: Start: 12/29/22               Problem: OT Long Term Goals       Dates: Start: 12/29/22         Goal: LTG-By discharge, patient will complete basic self care tasks w/ mod I - min A.       Dates: Start: 12/29/22            Goal: LTG-By discharge, patient will perform bathroom transfers w/ mod I- CGA.        Dates: Start: 12/29/22               Problem: Toileting       Dates: Start: 12/29/22         Goal: STG-Within one week, patient will complete toileting tasks w/ min A.        Dates: Start: 12/29/22

## 2023-01-04 ENCOUNTER — APPOINTMENT (OUTPATIENT)
Dept: PHYSICAL THERAPY | Facility: MEDICAL CENTER | Age: 73
End: 2023-01-04
Attending: PSYCHIATRY & NEUROLOGY
Payer: MEDICARE

## 2023-01-04 LAB
ERYTHROCYTE [DISTWIDTH] IN BLOOD BY AUTOMATED COUNT: 47.8 FL (ref 35.9–50)
GLUCOSE BLD STRIP.AUTO-MCNC: 112 MG/DL (ref 65–99)
GLUCOSE BLD STRIP.AUTO-MCNC: 182 MG/DL (ref 65–99)
GLUCOSE BLD STRIP.AUTO-MCNC: 75 MG/DL (ref 65–99)
HCT VFR BLD AUTO: 40.4 % (ref 42–52)
HGB BLD-MCNC: 13.3 G/DL (ref 14–18)
MAGNESIUM SERPL-MCNC: 1.5 MG/DL (ref 1.5–2.5)
MCH RBC QN AUTO: 30.3 PG (ref 27–33)
MCHC RBC AUTO-ENTMCNC: 32.9 G/DL (ref 33.7–35.3)
MCV RBC AUTO: 92 FL (ref 81.4–97.8)
PLATELET # BLD AUTO: 188 K/UL (ref 164–446)
PMV BLD AUTO: 10.6 FL (ref 9–12.9)
RBC # BLD AUTO: 4.39 M/UL (ref 4.7–6.1)
WBC # BLD AUTO: 6.2 K/UL (ref 4.8–10.8)

## 2023-01-04 PROCEDURE — 700111 HCHG RX REV CODE 636 W/ 250 OVERRIDE (IP): Performed by: PHYSICAL MEDICINE & REHABILITATION

## 2023-01-04 PROCEDURE — 770010 HCHG ROOM/CARE - REHAB SEMI PRIVAT*

## 2023-01-04 PROCEDURE — 97130 THER IVNTJ EA ADDL 15 MIN: CPT

## 2023-01-04 PROCEDURE — A9270 NON-COVERED ITEM OR SERVICE: HCPCS | Performed by: HOSPITALIST

## 2023-01-04 PROCEDURE — 97530 THERAPEUTIC ACTIVITIES: CPT

## 2023-01-04 PROCEDURE — 700101 HCHG RX REV CODE 250: Performed by: PHYSICAL MEDICINE & REHABILITATION

## 2023-01-04 PROCEDURE — 99232 SBSQ HOSP IP/OBS MODERATE 35: CPT | Performed by: PHYSICAL MEDICINE & REHABILITATION

## 2023-01-04 PROCEDURE — 97110 THERAPEUTIC EXERCISES: CPT

## 2023-01-04 PROCEDURE — 36415 COLL VENOUS BLD VENIPUNCTURE: CPT

## 2023-01-04 PROCEDURE — 99231 SBSQ HOSP IP/OBS SF/LOW 25: CPT | Performed by: HOSPITALIST

## 2023-01-04 PROCEDURE — 83735 ASSAY OF MAGNESIUM: CPT

## 2023-01-04 PROCEDURE — 97129 THER IVNTJ 1ST 15 MIN: CPT

## 2023-01-04 PROCEDURE — 85027 COMPLETE CBC AUTOMATED: CPT

## 2023-01-04 PROCEDURE — 700102 HCHG RX REV CODE 250 W/ 637 OVERRIDE(OP): Performed by: HOSPITALIST

## 2023-01-04 PROCEDURE — 82962 GLUCOSE BLOOD TEST: CPT | Mod: 91

## 2023-01-04 PROCEDURE — 97116 GAIT TRAINING THERAPY: CPT

## 2023-01-04 PROCEDURE — 97535 SELF CARE MNGMENT TRAINING: CPT

## 2023-01-04 PROCEDURE — A9270 NON-COVERED ITEM OR SERVICE: HCPCS | Performed by: PHYSICAL MEDICINE & REHABILITATION

## 2023-01-04 PROCEDURE — 700102 HCHG RX REV CODE 250 W/ 637 OVERRIDE(OP): Performed by: PHYSICAL MEDICINE & REHABILITATION

## 2023-01-04 RX ORDER — AMOXICILLIN 250 MG
2 CAPSULE ORAL 2 TIMES DAILY PRN
Status: DISCONTINUED | OUTPATIENT
Start: 2023-01-04 | End: 2023-01-05

## 2023-01-04 RX ORDER — POLYETHYLENE GLYCOL 3350 17 G/17G
1 POWDER, FOR SOLUTION ORAL
Status: DISCONTINUED | OUTPATIENT
Start: 2023-01-04 | End: 2023-01-05

## 2023-01-04 RX ORDER — GAUZE BANDAGE 2" X 2"
100 BANDAGE TOPICAL DAILY
Status: DISCONTINUED | OUTPATIENT
Start: 2023-01-04 | End: 2023-01-05 | Stop reason: HOSPADM

## 2023-01-04 RX ORDER — SALIVA STIMULANT COMB. NO.7
GEL (GRAM) MUCOUS MEMBRANE 3 TIMES DAILY
Status: DISCONTINUED | OUTPATIENT
Start: 2023-01-04 | End: 2023-01-05 | Stop reason: HOSPADM

## 2023-01-04 RX ORDER — BISACODYL 10 MG
10 SUPPOSITORY, RECTAL RECTAL
Status: DISCONTINUED | OUTPATIENT
Start: 2023-01-04 | End: 2023-01-05

## 2023-01-04 RX ORDER — FOLIC ACID 1 MG/1
1 TABLET ORAL DAILY
Status: DISCONTINUED | OUTPATIENT
Start: 2023-01-04 | End: 2023-01-05 | Stop reason: HOSPADM

## 2023-01-04 RX ADMIN — ENOXAPARIN SODIUM 40 MG: 40 INJECTION SUBCUTANEOUS at 17:23

## 2023-01-04 RX ADMIN — ATORVASTATIN CALCIUM 80 MG: 40 TABLET, FILM COATED ORAL at 20:11

## 2023-01-04 RX ADMIN — THIAMINE HCL TAB 100 MG 100 MG: 100 TAB at 14:51

## 2023-01-04 RX ADMIN — Medication: at 15:00

## 2023-01-04 RX ADMIN — INSULIN LISPRO 2 UNITS: 100 INJECTION, SOLUTION INTRAVENOUS; SUBCUTANEOUS at 11:17

## 2023-01-04 RX ADMIN — BUPROPION HYDROCHLORIDE 150 MG: 150 TABLET, EXTENDED RELEASE ORAL at 20:11

## 2023-01-04 RX ADMIN — METOPROLOL TARTRATE 12.5 MG: 25 TABLET, FILM COATED ORAL at 17:24

## 2023-01-04 RX ADMIN — DULOXETINE HYDROCHLORIDE 60 MG: 30 CAPSULE, DELAYED RELEASE ORAL at 20:11

## 2023-01-04 RX ADMIN — METFORMIN HYDROCHLORIDE 1000 MG: 500 TABLET ORAL at 17:23

## 2023-01-04 RX ADMIN — METOPROLOL TARTRATE 12.5 MG: 25 TABLET, FILM COATED ORAL at 06:03

## 2023-01-04 RX ADMIN — DULOXETINE HYDROCHLORIDE 60 MG: 30 CAPSULE, DELAYED RELEASE ORAL at 06:03

## 2023-01-04 RX ADMIN — THERA TABS 1 TABLET: TAB at 14:51

## 2023-01-04 RX ADMIN — GLIPIZIDE 15 MG: 2.5 TABLET, EXTENDED RELEASE ORAL at 17:23

## 2023-01-04 RX ADMIN — LIDOCAINE 1 PATCH: 700 PATCH TOPICAL at 08:09

## 2023-01-04 RX ADMIN — METFORMIN HYDROCHLORIDE 1000 MG: 500 TABLET ORAL at 08:10

## 2023-01-04 RX ADMIN — Medication: at 21:00

## 2023-01-04 RX ADMIN — BUPROPION HYDROCHLORIDE 150 MG: 150 TABLET, EXTENDED RELEASE ORAL at 06:03

## 2023-01-04 RX ADMIN — ASPIRIN 81 MG 81 MG: 81 TABLET ORAL at 08:11

## 2023-01-04 RX ADMIN — OMEPRAZOLE 20 MG: 20 CAPSULE, DELAYED RELEASE ORAL at 08:11

## 2023-01-04 RX ADMIN — FOLIC ACID 1 MG: 1 TABLET ORAL at 14:51

## 2023-01-04 RX ADMIN — TAMSULOSIN HYDROCHLORIDE 0.8 MG: 0.4 CAPSULE ORAL at 17:23

## 2023-01-04 RX ADMIN — PIOGLITAZONE 30 MG: 15 TABLET ORAL at 08:10

## 2023-01-04 RX ADMIN — BENAZEPRIL HYDROCHLORIDE 10 MG: 10 TABLET, FILM COATED ORAL at 06:00

## 2023-01-04 ASSESSMENT — GAIT ASSESSMENTS
DISTANCE (FEET): 150
GAIT LEVEL OF ASSIST: STANDBY ASSIST
ASSISTIVE DEVICE: FRONT WHEEL WALKER
DEVIATION: INCREASED BASE OF SUPPORT;SHUFFLED GAIT;BRADYKINETIC

## 2023-01-04 ASSESSMENT — ACTIVITIES OF DAILY LIVING (ADL)
TOILET_TRANSFER_DESCRIPTION: ADAPTIVE EQUIPMENT;GRAB BAR;INCREASED TIME
TOILETING_LEVEL_OF_ASSIST_DESCRIPTION: GRAB BAR;INCREASED TIME;SUPERVISION FOR SAFETY
BED_CHAIR_WHEELCHAIR_TRANSFER_DESCRIPTION: ADAPTIVE EQUIPMENT;INCREASED TIME;SUPERVISION FOR SAFETY

## 2023-01-04 ASSESSMENT — ENCOUNTER SYMPTOMS
ABDOMINAL PAIN: 0
FEVER: 0
EYES NEGATIVE: 1
COUGH: 0
NAUSEA: 0
POLYDIPSIA: 0
SHORTNESS OF BREATH: 0
BACK PAIN: 1
PALPITATIONS: 0
CHILLS: 0
VOMITING: 0
BRUISES/BLEEDS EASILY: 0
DIARRHEA: 0

## 2023-01-04 NOTE — THERAPY
"Physical Therapy   Daily Treatment     Patient Name: Prabhakar Sierra  Age:  72 y.o., Sex:  male  Medical Record #: [id number]  Today's Date: 1/3/2023     Precautions  Precautions: Fall Risk, TLSO (Thoracolumbosacral orthosis), Spinal / Back Precautions   Comments: dementia, TLSO on OOB off for showers    Subjective    \"I'm just tired\" \"No bending, no twisting. What's the L?\"     Objective       01/03/23 1330   PT Charge Group   PT Gait Training (Units) 2   PT Neuromuscular Re-Education / Balance (Units) 1   PT Therapeutic Activities (Units) 1   PT Total Time Spent   PT Individual Total Time Spent (Mins) 60   Gait Functional Level of Assist    Gait Level Of Assist Contact Guard Assist   Assistive Device Front Wheel Walker   Distance (Feet) 100   # of Times Distance was Traveled 1  (as well as 30ftx1)   Deviation Increased Base Of Support;Bradykinetic;Shuffled Gait   Stairs Functional Level of Assist   Level of Assist with Stairs Contact Guard Assist   # of Stairs Climbed 8  (x2)   Stairs Description   (sidestepping R HR)   Transfer Functional Level of Assist   Bed, Chair, Wheelchair Transfer Standby Assist   Bed Chair Wheelchair Transfer Description Adaptive equipment;Set-up of equipment  (stand step FWW)   Bed Mobility    Supine to Sit Minimal Assist   Sit to Stand Standby Assist   Neuro-Muscular Treatments   Comments standing balance: static no UE support 1 min EO, 10\" EC, NBOS 15\"   Interdisciplinary Plan of Care Collaboration   IDT Collaboration with  Family / Caregiver   Patient Position at End of Therapy Seated;Chair Alarm On;Self Releasing Lap Belt Applied;Family / Friend in Room   Collaboration Comments Gabby alcazar, present for session and agreeable to home evaluation on 1/9     Spouse present during session and observed bed mob, transfers, donning TLSO, stair negotiation, and balance training. Gabby remained with pt in family room post tx to encourage more time OOB    Assessment    Don is making " steady progress toward his goals. He progressed to 8 stairs consecutively before requiring a seated rest and progressed his ambulation distance to 100ft. Pt's spouse is supportive and intends to provide recommended assistance upon DC.  Strengths: Able to follow instructions, Motivated for self care and independence, Pleasant and cooperative, Supportive family, Willingly participates in therapeutic activities  Barriers: Dementia, Decreased endurance, Generalized weakness, Home accessibility, Impaired activity tolerance, Impaired balance, Impaired carryover of learning, Impaired functional cognition, Pain    Plan    Activity tracker for increased time OOB, bed mobility, stairs (goal= 12 before seated rest), endurance with ambulation, standing balance training    Passport items to be completed:  Get in/out of bed safely, in/out of a vehicle, safely use mobility device, walk or wheel around home/community, navigate up and down stairs, show how to get up/down from the ground, ensure home is accessible, demonstrate HEP, complete caregiver training    Physical Therapy Problems (Active)       Problem: Mobility       Dates: Start: 12/29/22         Goal: STG-Within one week, patient will ambulate 100ft with SBA using FWW       Dates: Start: 12/29/22            Goal: STG-Within one week, patient will ascend and descend four stairs with R HR with CGA       Dates: Start: 12/29/22               Problem: Mobility Transfers       Dates: Start: 12/29/22         Goal: STG-Within one week, patient will perform bed mobility with SPV        Dates: Start: 12/29/22            Goal: STG-Within one week, patient will transfer bed to chair with SPV using FWW       Dates: Start: 12/29/22               Problem: PT-Long Term Goals       Dates: Start: 12/29/22         Goal: LTG-By discharge, patient will ambulate 200ft with SPV using LRAD        Dates: Start: 12/29/22            Goal: LTG-By discharge, patient will transfer one surface to  another with set up assist       Dates: Start: 12/29/22            Goal: LTG-By discharge, patient will perform home exercise program with SPV       Dates: Start: 12/29/22            Goal: LTG-By discharge, patient will ambulate up/down 14 stairs with R HR with SBA       Dates: Start: 12/29/22

## 2023-01-04 NOTE — THERAPY
"Physical Therapy   Daily Treatment     Patient Name: Prabhakar Sierra  Age:  72 y.o., Sex:  male  Medical Record #: [id number]  Today's Date: 1/4/2023     Precautions  Precautions: Fall Risk, TLSO (Thoracolumbosacral orthosis), Spinal / Back Precautions   Comments: dementia, TLSO on OOB off for showers    Subjective    \"I feel like I'm getting better everyday\" \"I'm not doing BLT- no bending, lifting, oh what's the T?\" Able to remember twisting with 1 cue to think about a spinal movement     Objective       01/04/23 0901   PT Charge Group   PT Gait Training (Units) 2   PT Therapeutic Activities (Units) 2   PT Total Time Spent   PT Individual Total Time Spent (Mins) 60   Gait Functional Level of Assist    Gait Level Of Assist Standby Assist   Assistive Device Front Wheel Walker   Distance (Feet) 150   # of Times Distance was Traveled 1  (as well as 239cdr8 and 25ftx1)   Deviation Increased Base Of Support;Shuffled Gait;Bradykinetic   Stairs Functional Level of Assist   Level of Assist with Stairs Contact Guard Assist   # of Stairs Climbed 12   Stairs Description Supervision for safety;Verbal cueing;Limited by fatigue  (sidestepping with R HR)   Transfer Functional Level of Assist   Bed, Chair, Wheelchair Transfer Standby Assist   Bed Chair Wheelchair Transfer Description   (stand step FWW)   Bed Mobility    Supine to Sit Standby Assist  (VC's for logroll)   Sit to Supine Standby Assist   Sit to Stand Standby Assist   Rolling Standby Assist   Interdisciplinary Plan of Care Collaboration   Patient Position at End of Therapy Seated;Chair Alarm On;Self Releasing Lap Belt Applied;Call Light within Reach;Tray Table within Reach;Phone within Reach     Pt requires depA for donning of TLSO EOB. Added an activity tracker to the wall in pt's room to encourage incr time OOB  Pt needed to sit after initial ambulation attempt of 25ft due to lightheadedness, /60 90bpm. Symptoms improved after seated therex and did not " return for duration of session    Assessment    Pt is making steady progress toward his goals. He improved in his endurance with stairs (progressed from 8 to 12 before seated rest) and ambulation (progressed to 150ft SBA FWW). He only required min VC's for logroll and spinal precautions within function.  Strengths: Able to follow instructions, Motivated for self care and independence, Pleasant and cooperative, Supportive family, Willingly participates in therapeutic activities  Barriers: Dementia, Decreased endurance, Generalized weakness, Home accessibility, Impaired activity tolerance, Impaired balance, Impaired carryover of learning, Impaired functional cognition, Pain    Plan    Stairs (goal 16 steps before needing to rest), standing balance training, endurance ambulation vs NuStep    Passport items to be completed:  Get in/out of bed safely, in/out of a vehicle, safely use mobility device, walk or wheel around home/community, navigate up and down stairs, show how to get up/down from the ground, ensure home is accessible, demonstrate HEP, complete caregiver training    Physical Therapy Problems (Active)       Problem: Mobility       Dates: Start: 12/29/22         Goal: STG-Within one week, patient will ambulate 100ft with SBA using FWW       Dates: Start: 12/29/22            Goal: STG-Within one week, patient will ascend and descend four stairs with R HR with CGA       Dates: Start: 12/29/22               Problem: Mobility Transfers       Dates: Start: 12/29/22         Goal: STG-Within one week, patient will perform bed mobility with SPV        Dates: Start: 12/29/22            Goal: STG-Within one week, patient will transfer bed to chair with SPV using FWW       Dates: Start: 12/29/22               Problem: PT-Long Term Goals       Dates: Start: 12/29/22         Goal: LTG-By discharge, patient will ambulate 200ft with SPV using LRAD        Dates: Start: 12/29/22            Goal: LTG-By discharge, patient will  transfer one surface to another with set up assist       Dates: Start: 12/29/22            Goal: LTG-By discharge, patient will perform home exercise program with SPV       Dates: Start: 12/29/22            Goal: LTG-By discharge, patient will ambulate up/down 14 stairs with R HR with SBA       Dates: Start: 12/29/22

## 2023-01-04 NOTE — DIETARY
Nutrition Services: Diabetes Diet Education Consult   Day 7 of admit.  Prabhakar Sierra is a 72 y.o. male with admitting DX of Compression fracture of body of thoracic vertebra ([facility]) [S22.000A]  Thoracic compression fracture, closed, initial encounter ([facility]) [S22.000A]    RD able to visit pt at bedside to provide diabetes diet education. RD discussed concepts of a consistent-CHO diet, provided handout reinforcing topics discussed. Pt demonstrated some readiness and limited evidence of learning. He reports his wife does the cooking and would be interested in handout provided by RD. RD able to answer all questions to patient's satisfaction. Encouraged pt to request RD follow up if he or his wife had any diet-related questions during remainder of admit.    No other education needs identified at this time. Consider referral to outpatient nutrition services for continuation of education as indicated or per pt preferences.     Please re-consult RD as indicated.

## 2023-01-04 NOTE — PROGRESS NOTES
Patient care assumed. Report received from [facility] CANDI Miramontes. Patient is alert and calm, resting in bed. Call light and bedside table within reach. Will continue to monitor.

## 2023-01-04 NOTE — THERAPY
Occupational Therapy  Daily Treatment     Patient Name: Prabhakar Sierra  Age:  72 y.o., Sex:  male  Medical Record #: [id number]  Today's Date: 1/4/2023     Precautions  Precautions: (P) Fall Risk, TLSO (Thoracolumbosacral orthosis), Spinal / Back Precautions   Comments: (P) dementia, TLSO on OOB off for showers         Subjective    Pt reports fatigue from busy day, but pleasant and agreeable to OT session.      Objective       01/04/23 1401   OT Charge Group   OT Self Care / ADL (Units) 1   OT Therapy Activity (Units) 1   OT Therapeutic Exercise (Units) 2   OT Total Time Spent   OT Individual Total Time Spent (Mins) 60   Precautions   Precautions Fall Risk;TLSO (Thoracolumbosacral orthosis);Spinal / Back Precautions    Comments dementia, TLSO on OOB off for showers   Cognition    Level of Consciousness Alert   Functional Level of Assist   Grooming Standby Assist;Standing   Upper Body Dressing Moderate Assist   Upper Body Dressing Description Application of orthotic or brace  (set up to don pullover shirt; assist to don TLSO)   Toileting Standby Assist   Toileting Description Grab bar;Increased time;Supervision for safety  (standing at toilet to void)   Bed, Chair, Wheelchair Transfer Standby Assist   Bed Chair Wheelchair Transfer Description Adaptive equipment;Increased time;Supervision for safety  (stand step with FWW)   Toilet Transfers Contact Guard Assist   Toilet Transfer Description Adaptive equipment;Grab bar;Increased time  (FWW in/out of bathroom)   Standing Upper Body Exercises   Chest Press 2 sets of 10;Bilateral;Weight (See Comments for lbs)   Front Arm Raise 2 sets of 10;Bilateral;Weight (See Comments for lbs)   Bicep Curls 2 sets of 10;Bilateral;Weight (See Comments for lbs)   Comments 4# medicine ball used for UB exercises, standing at EOM, CGA-SBA for balance, seated rest break between sets.   Bed Mobility    Supine to Sit Standby Assist   Sit to Supine Standby Assist   Scooting Standby  Assist   Rolling Standby Assist   Skilled Intervention Verbal Cuing  (for logroll technique)   Interdisciplinary Plan of Care Collaboration   Patient Position at End of Therapy In Bed;Bed Alarm On;Call Light within Reach;Tray Table within Reach;Phone within Reach       Functional ambulation with FWW to increase safety and endurance with household mobility, 120' x2 with CGA-SBA, increased time.     Assessment    Pt tolerated OT session well. He demonstrated good carryover with logroll technique getting in/out of bed. Pt con't to require assistance with TLSO mgt, and intermittent cues for recall of spinal prec. Pt con't to fatigue quickly, and requires frequent rest breaks during session. Pt progressing with standing tolerance/balance and functional mobility using FWW.   Strengths: Able to follow instructions, Alert and oriented, Effective communication skills, Good insight into deficits/needs, Independent prior level of function, Making steady progress towards goals, Motivated for self care and independence, Pleasant and cooperative, Supportive family, Willingly participates in therapeutic activities  Barriers: Decreased endurance, Fatigue, Generalized weakness, Impaired activity tolerance, Limited mobility, Impaired balance, Pain    Plan    ADLs w/ AE education, functional mobility/transfers using FWW, TLSO donning/doffing, balance, activity tolerance, standing tolerance       Occupational Therapy Goals (Active)       Problem: Dressing       Dates: Start: 12/29/22         Goal: STG-Within one week, patient will dress LB w/ LB dressing AE and mod A.       Dates: Start: 12/29/22         Goal Note filed on 01/04/23 1126 by Sue Parrish, OT       Cassius SAMANO                  Problem: Functional Transfers       Dates: Start: 01/04/23         Goal: STG-Within one week, patient will transfer to step in shower with SBA using LRAD.        Dates: Start: 01/04/23               Problem: OT Long Term Goals       Dates: Start:  12/29/22         Goal: LTG-By discharge, patient will complete basic self care tasks w/ mod I - min A.       Dates: Start: 12/29/22            Goal: LTG-By discharge, patient will perform bathroom transfers w/ mod I- CGA.        Dates: Start: 12/29/22

## 2023-01-04 NOTE — PROGRESS NOTES
Hospital Medicine Daily Progress Note        Chief Complaint:  Hypertension  Diabetes    Interval History:  Doing well.  Labs reviewed.    Review of Systems  Review of Systems   Constitutional:  Negative for chills and fever.   HENT: Negative.     Eyes: Negative.    Respiratory:  Negative for cough and shortness of breath.    Cardiovascular:  Negative for chest pain and palpitations.   Gastrointestinal:  Negative for abdominal pain, diarrhea, nausea and vomiting.   Musculoskeletal:  Positive for back pain.   Skin:  Negative for itching and rash.   Endo/Heme/Allergies:  Negative for polydipsia. Does not bruise/bleed easily.      Physical Exam  Temp:  [36.6 °C (97.9 °F)-36.9 °C (98.5 °F)] 36.6 °C (97.9 °F)  Pulse:  [90-92] 92  Resp:  [18] 18  BP: (121-139)/(51-73) 139/73  SpO2:  [94 %-97 %] 94 %    Physical Exam  Vitals reviewed.   Constitutional:       General: He is not in acute distress.     Appearance: Normal appearance. He is not ill-appearing.   HENT:      Head: Normocephalic and atraumatic.      Right Ear: External ear normal.      Left Ear: External ear normal.      Nose: Nose normal.      Mouth/Throat:      Pharynx: Oropharynx is clear.   Eyes:      General:         Right eye: No discharge.         Left eye: No discharge.      Extraocular Movements: Extraocular movements intact.      Conjunctiva/sclera: Conjunctivae normal.   Cardiovascular:      Rate and Rhythm: Normal rate and regular rhythm.   Pulmonary:      Effort: No respiratory distress.      Breath sounds: No wheezing.      Comments: Decreased BS  Abdominal:      General: Bowel sounds are normal. There is no distension.      Palpations: Abdomen is soft.      Tenderness: There is no abdominal tenderness.   Musculoskeletal:      Cervical back: Normal range of motion and neck supple.      Right lower leg: No edema.      Left lower leg: No edema.   Skin:     General: Skin is warm and dry.   Neurological:      Mental Status: He is alert and oriented to  person, place, and time.       Fluids    Intake/Output Summary (Last 24 hours) at 1/4/2023 1414  Last data filed at 1/4/2023 0710  Gross per 24 hour   Intake 440 ml   Output 900 ml   Net -460 ml       Laboratory  Recent Labs     01/04/23  0546   WBC 6.2   RBC 4.39*   HEMOGLOBIN 13.3*   HEMATOCRIT 40.4*   MCV 92.0   MCH 30.3   MCHC 32.9*   RDW 47.8   PLATELETCT 188   MPV 10.6     Recent Labs     01/03/23  0543   SODIUM 133*   POTASSIUM 4.1   CHLORIDE 100   CO2 21   GLUCOSE 101*   BUN 14   CREATININE 0.62   CALCIUM 9.5                   Assessment/Plan  * Thoracic compression fracture, closed, initial encounter ([facility])- (present on admission)  Assessment & Plan  2/2 GLF  Non-surgical management  TLSO  Pain control per Physiatry    Hypomagnesemia  Assessment & Plan  Mg++ finally corrected    History of depression- (present on admission)  Assessment & Plan  On Cymbalta and Wellbutrin    Diabetes mellitus, type 2 ([facility])- (present on admission)  Assessment & Plan  HbA1c 8.4  On Metformin, Glipizide, and Actos  Also on SSI  Observe serum glucose trends  Outpt meds include Metformin 1000 mg bid, Actos 30 mg daily, Glucotrol XL 10 mg daily, and Jardiance 25 mg daily    History of alcohol abuse- (present on admission)  Assessment & Plan  Started Folate, MVT, and Thiamine    Mixed hyperlipidemia- (present on admission)  Assessment & Plan  On Lipitor    Prostate CA ([facility])- feb 2022; RT tbd x 8 wks, mar- may 2022- (present on admission)  Assessment & Plan  S/P prior treatment  Reportedly in remission    Essential hypertension- (present on admission)  Assessment & Plan  On Benazepril and Metoprolol  Observe blood pressure trends  Monitor for orthostatic hypotension on Flomax       DNR

## 2023-01-04 NOTE — PROGRESS NOTES
"Rehab Progress Note     Date of Service: 1/4/2023  Chief Complaint: Follow-up vertebral compression fractures    Interval Events (Subjective)    Patient seen and examined today in his room.  He is complaining of a dry mouth which he has had for quite some time success.  He reports not having a glucometer at home and would like one at discharge.  He is concerned that he may not be ready for discharge home early next week.  Advised him we will reassess after his home evaluation on Monday.  He currently denies any pain.    Patient has no other new questions, concerns, or complaints today.     ROS: No changes to bowel, bladder, pain, mood, or sleep.       Objective:  VITAL SIGNS: /73   Pulse 92   Temp 36.6 °C (97.9 °F) (Oral)   Resp 18   Ht 1.778 m (5' 10\")   Wt 90.5 kg (199 lb 8.3 oz)   SpO2 94%   BMI 28.63 kg/m²   Gen: alert, no apparent distress  CV: Regular rate, regular rhythm  Resp: Clear to auscultation bilaterally      Recent Results (from the past 72 hour(s))   POCT glucose device results    Collection Time: 01/01/23 10:59 AM   Result Value Ref Range    POC Glucose, Blood 161 (H) 65 - 99 mg/dL   POCT glucose device results    Collection Time: 01/01/23  5:03 PM   Result Value Ref Range    POC Glucose, Blood 145 (H) 65 - 99 mg/dL   POCT glucose device results    Collection Time: 01/01/23  8:01 PM   Result Value Ref Range    POC Glucose, Blood 157 (H) 65 - 99 mg/dL   POCT glucose device results    Collection Time: 01/02/23  7:11 AM   Result Value Ref Range    POC Glucose, Blood 128 (H) 65 - 99 mg/dL   POCT glucose device results    Collection Time: 01/02/23 11:12 AM   Result Value Ref Range    POC Glucose, Blood 219 (H) 65 - 99 mg/dL   POCT glucose device results    Collection Time: 01/02/23  5:09 PM   Result Value Ref Range    POC Glucose, Blood 136 (H) 65 - 99 mg/dL   POCT glucose device results    Collection Time: 01/02/23  8:09 PM   Result Value Ref Range    POC Glucose, Blood 112 (H) 65 - 99 " mg/dL   Basic Metabolic Panel    Collection Time: 01/03/23  5:43 AM   Result Value Ref Range    Sodium 133 (L) 135 - 145 mmol/L    Potassium 4.1 3.6 - 5.5 mmol/L    Chloride 100 96 - 112 mmol/L    Co2 21 20 - 33 mmol/L    Glucose 101 (H) 65 - 99 mg/dL    Bun 14 8 - 22 mg/dL    Creatinine 0.62 0.50 - 1.40 mg/dL    Calcium 9.5 8.5 - 10.5 mg/dL    Anion Gap 12.0 7.0 - 16.0   MAGNESIUM    Collection Time: 01/03/23  5:43 AM   Result Value Ref Range    Magnesium 1.1 (L) 1.5 - 2.5 mg/dL   PHOSPHORUS    Collection Time: 01/03/23  5:43 AM   Result Value Ref Range    Phosphorus 3.2 2.5 - 4.5 mg/dL   ESTIMATED GFR    Collection Time: 01/03/23  5:43 AM   Result Value Ref Range    GFR (CKD-EPI) 101 >60 mL/min/1.73 m 2   POCT glucose device results    Collection Time: 01/03/23  7:35 AM   Result Value Ref Range    POC Glucose, Blood 125 (H) 65 - 99 mg/dL   POCT glucose device results    Collection Time: 01/03/23 11:30 AM   Result Value Ref Range    POC Glucose, Blood 213 (H) 65 - 99 mg/dL   POCT glucose device results    Collection Time: 01/03/23  5:18 PM   Result Value Ref Range    POC Glucose, Blood 100 (H) 65 - 99 mg/dL   POCT glucose device results    Collection Time: 01/03/23  7:59 PM   Result Value Ref Range    POC Glucose, Blood 112 (H) 65 - 99 mg/dL   CBC WITHOUT DIFFERENTIAL    Collection Time: 01/04/23  5:46 AM   Result Value Ref Range    WBC 6.2 4.8 - 10.8 K/uL    RBC 4.39 (L) 4.70 - 6.10 M/uL    Hemoglobin 13.3 (L) 14.0 - 18.0 g/dL    Hematocrit 40.4 (L) 42.0 - 52.0 %    MCV 92.0 81.4 - 97.8 fL    MCH 30.3 27.0 - 33.0 pg    MCHC 32.9 (L) 33.7 - 35.3 g/dL    RDW 47.8 35.9 - 50.0 fL    Platelet Count 188 164 - 446 K/uL    MPV 10.6 9.0 - 12.9 fL   MAGNESIUM    Collection Time: 01/04/23  5:46 AM   Result Value Ref Range    Magnesium 1.5 1.5 - 2.5 mg/dL   POCT glucose device results    Collection Time: 01/04/23  7:07 AM   Result Value Ref Range    POC Glucose, Blood 112 (H) 65 - 99 mg/dL       Scheduled Medications    Medication Dose Frequency    tamsulosin  0.8 mg AFTER DINNER    insulin lispro  2-12 Units 4X/DAY ACHS    benazepril  10 mg Q DAY    omeprazole  20 mg QAM AC    metoprolol tartrate  12.5 mg TWICE DAILY    glipiZIDE SR  15 mg PM MEAL    aspirin  81 mg DAILY    atorvastatin  80 mg Q EVENING    buPROPion SR  150 mg BID    DULoxetine  60 mg BID    enoxaparin (LOVENOX) injection  40 mg DAILY AT 1800    lidocaine  1 Patch Q24HRS    lidocaine  1 Patch Q24HRS    metformin  1,000 mg BID WITH MEALS    pioglitazone  30 mg DAILY    senna-docusate  2 Tablet BID       Current Diet Order   Procedures    Diet Order Diet: Cardiac; Second Modifier: (optional): Consistent CHO (Diabetic)       Assessment:  This patient is a 72 y.o. male admitted for acute inpatient rehabilitation with Thoracic compression fracture, closed, initial encounter ([facility]).    I led and attended the weekly conference, and agree with the IDT conference documentation and plan of care as noted below.    Date of conference: 1/4/2023    Goals and barriers: See IDT note.    Biggest barriers: diabetic control, incontinent of bowel/bladder, impaired carry over of learning, impaired short term memory    Goals in next week: home evaluation    CM/social support: wife supportive    Anticipated DC date: 1/10, home evaluation on 9th    Home health: PT/OT/SLP/RN    Equip: has his own    Follow up: PCP, neurosurgery, neurology         Problem List/Medical Decision Making and Plan:    T11, L2, L3 vertebral compression fractures  Conservatively managed  TLSO when out of bed  Continue full rehab program  PT/OT/SLP, 1 hr each discipline, 5 days per week  1/4: decrease SLP 30 min, share other 30 min with OT/PT    Outpatient follow-up with neurosurgery, Dr. Guzman    Pain management, resolved  As needed tylenol, 12/29  Lidoderm patches  PRN ice    Normal pressure hydrocephalus?  Outpatient follow-up with neurology for possible lumbar puncture with large volume CSF removal      Dementia?  Mild cognitive impairment, mostly memory/attention  Speech therapy assessment  Outpatient follow-up with neurology as well as PET scan -wife to reschedule  Aspirin for stroke/vascular prevention per neurology     History of depression  Cymbalta  Wellbutrin  Monitor need to consult psychology  Mood currently stable     Hypertension  Benazepril  Metoprolol  Appreciate hospitalist assistance    Tachycardia, resolved  Metoprolol  Appreciate hospitalist assistance     Hyperlipidemia  Statin     History of iron deficiency anemia  Discontinued ferrous sulfate as patient no longer has anemia     History of vitamin D deficiency  Discontinued supplementation for now as patient feels like he is taking too many pills    Peripheral neuropathy  Cymbalta  Gabapentin discontinued as patient's not complaining of any pain and to eliminate polypharmacy     Diabetes with hyperglycemia  Last hemoglobin A1c 11/22 8.4, uncontrolled  Glipizide  Actos  Sliding scale insulin  Appreciate hospitalist's assistance patient needs glucometer  For discharge    Hypomagnesemia, improved  Continue oral supplementation  Required IV supplementation x 2  Appreciate hospitalist assistance     Alcohol use  Added vitamins  Counseling provided 1/3     Tobacco use  Counseling provided 1/3    Bowel program  Bowel incontinence  Loose stools  Discontinued scheduled bowel medications  Last BM 1/3    Bladder program  History of prostate cancer  Urinary retention, continues  Completed treatment, apparently in remission  Continue Flomax --> increased dose to 0.8 mg 1/3  Outpatient follow-up with urology  Check PVRs -267  Bladder scan for no voids  ICP for over 400 cc -not requiring  Scheduled toileting     DVT prophylaxis  Lovemarco antoniox        Evelyne Irving M.D.  Physical Medicine and Rehabilitation

## 2023-01-04 NOTE — CARE PLAN
The patient is Stable - Low risk of patient condition declining or worsening    Shift Goals  Clinical Goals: Glucose management  Patient Goals: Rest    Progress made toward(s) clinical / shift goals:    Problem: Diabetes Management  Goal: Patient's ability to maintain appropriate glucose levels will be maintained or improve  Outcome: Progressing     Patient on FSBG  Ac/hs. Mantaned glucose level between defined ranges. Educated in s/s of hyper/hipoglycemia and diabetic diet.

## 2023-01-04 NOTE — CARE PLAN
The patient is Stable - Low risk of patient condition declining or worsening    Shift Goals  Clinical Goals: safety  Patient Goals: safety      Problem: Fall Risk - Rehab  Goal: Patient will remain free from falls  Outcome: Progressing patient has good safety awareness and does not attempt to self-transfer, uses call light appropriately when assistance is needed.       Problem: Bladder / Voiding  Goal: Patient will establish and maintain regular urinary output  Outcome: Not Met patient is incontinent at times despite time voiding. Will continue to time void to improve continent episodes.

## 2023-01-04 NOTE — CARE PLAN
"The patient is Watcher - Medium risk of patient condition declining or worsening    Shift Goals  Clinical Goals: Glucose management  Patient Goals: Rest    Progress made toward(s) clinical / shift goals:    Problem: Knowledge Deficit - Standard  Goal: Patient and family/care givers will demonstrate understanding of plan of care, disease process/condition, diagnostic tests and medications  Note: Mag replaced with total of 4 grams IV for mag level 1.1    Blood sugar check 112 mg/dl.          Problem: Fall Risk - Rehab  Goal: Patient will remain free from falls  Note: Padma Fontenot Fall risk Assessment Score: 17    High fall risk Interventions   - Alarming seatbelt  - Bed and strip alarm   - Yellow sign by the door   - Yellow wrist band \"Fall risk\"  - Room near to the nurse station  - Do not leave patient unattended in the bathroom  - Fall risk education provided         "

## 2023-01-04 NOTE — DISCHARGE PLANNING
Case Management/IDT follow up.   IDT continues to recommend IRF level of care as patient continue to make progress with all therapies.   Projected dc date set for: 1/10/23      DC needs: Glucometer, 24/7 supervision for safety.   Recommendations made for home health for PT/OT/SLP  Follow up with: PCP, Dr. Guzman, Neuro after PET scan.      CM tried to call patients spouse, phone went straight to voicemail. Cm will call family providing update from IDT and discussed plan of care.    Plan:  Continue to follow

## 2023-01-04 NOTE — CARE PLAN
Problem: Mobility  Goal: STG-Within one week, patient will ambulate 100ft with SBA using FWW  Outcome: Met  Goal: STG-Within one week, patient will ascend and descend four stairs with R HR with CGA  Outcome: Met     Problem: Mobility Transfers  Goal: STG-Within one week, patient will perform bed mobility with SPV   Outcome: Met  Goal: STG-Within one week, patient will transfer bed to chair with SPV using FWW  Outcome: Met

## 2023-01-04 NOTE — CARE PLAN
Problem: Dressing  Goal: STG-Within one week, patient will dress LB w/ LB dressing AE and mod A.  Outcome: Not Met  Note: Max A.      Problem: Bathing  Goal: STG-Within one week, patient will bathe w/ min A.   Outcome: Met     Problem: Dressing  Goal: STG-Within one week, patient will dress UB including TLSO w/ mod A.   Outcome: Met     Problem: Toileting  Goal: STG-Within one week, patient will complete toileting tasks w/ min A.   Outcome: Met      yes

## 2023-01-04 NOTE — THERAPY
"Speech Language Pathology  Daily Treatment     Patient Name: Prabhakar Sierra  Age:  72 y.o., Sex:  male  Medical Record #: [id number]  Today's Date: 1/4/2023     Precautions  Precautions: Fall Risk, TLSO (Thoracolumbosacral orthosis), Spinal / Back Precautions   Comments: dementia, TLSO on OOB off for showers    Subjective    Pt pleasant and cooperative, in bed resting, willing to participate in therapy earlier than scheduled at bedside.      Objective       01/04/23 1033   Treatment Charges   SLP Cognitive Skill Development First 15 Minutes 1   SLP Cognitive Skill Development Additional 15 Minutes 3   SLP Total Time Spent   SLP Individual Total Time Spent (Mins) 60   Speech Language Pathologist Assigned   Assigned SLP / Extension LC 30         Assessment    Pt had just completed diabetes education class upon SLP arrival, pt required MOD A to identify two things he learned. Pt cont to have the same questions re: dc and current POC as well as cont to report not wanting SO to be here as for \"shes going to have to take care of me soon enough\" reinforced that it would be beneficial for SO to be present to see assist needed and prepare for dc. SLP to discuss with team at . Pt is not filling out daily log, today reporting \"ulysses never seen that before\" despite mult use in therapy. Limited carryover is a barrier at this time. Pt required MOD cues overall to recall PT and activities completed this morning, unable to recall breakfast meal at all. Pt verbalizes awareness for strengthening and motivation for therapy however refused to get out of bed at this time due to \"im just too tired, maybe its the medications and they need to make changes?\"     Strengths: Pleasant and cooperative  Barriers: Lack of motivation, Impaired functional cognition, Impaired insight/denial of deficits, Impaired carryover of learning, Dementia, Confused    Plan    Follow up with team at , likely decrease to 30 min cognitive therapy due to " dementia and limited carryover.    Speech Therapy Problems (Active)       Problem: Memory STGs       Dates: Start: 12/29/22         Goal: STG-Within one week, patient will implement use of functional memory strategies to assist in recall of information related to hospitalization and safety with 70% accuracy provided MOD A       Dates: Start: 12/29/22         Goal Note filed on 01/03/23 1542 by Pavithra Pittman MS,CCC-SLP       MAX A required, poor carryover, perseverative on same questions re: POC and hospitalization                  Problem: Problem Solving STGs       Dates: Start: 12/29/22         Goal: STG-Within one week, patient will complete medication management tasks with 80% accuracy provided SPV       Dates: Start: 12/29/22         Goal Note filed on 01/03/23 1542 by Pavithra Pittman MS,CCC-SLP       Not appropriate to address at this time, addressing simple attention skills due to variability in performance and assistance needed.                  Problem: Speech/Swallowing LTGs       Dates: Start: 12/29/22         Goal: LTG-By discharge, patient will complete functional problem solving and recall information with 80% accuracy provided MOD I       Dates: Start: 12/29/22

## 2023-01-05 VITALS
BODY MASS INDEX: 28.56 KG/M2 | OXYGEN SATURATION: 96 % | DIASTOLIC BLOOD PRESSURE: 71 MMHG | RESPIRATION RATE: 18 BRPM | TEMPERATURE: 98 F | HEART RATE: 92 BPM | SYSTOLIC BLOOD PRESSURE: 113 MMHG | WEIGHT: 199.52 LBS | HEIGHT: 70 IN

## 2023-01-05 PROBLEM — K59.09 OTHER CONSTIPATION: Status: ACTIVE | Noted: 2023-01-05

## 2023-01-05 PROBLEM — R33.9 URINARY RETENTION: Status: ACTIVE | Noted: 2023-01-05

## 2023-01-05 PROBLEM — R68.2 DRY MOUTH: Status: ACTIVE | Noted: 2023-01-05

## 2023-01-05 PROBLEM — R00.0 TACHYCARDIA: Status: RESOLVED | Noted: 2022-12-29 | Resolved: 2023-01-05

## 2023-01-05 PROBLEM — E83.42 HYPOMAGNESEMIA: Status: RESOLVED | Noted: 2022-12-29 | Resolved: 2023-01-05

## 2023-01-05 PROBLEM — G47.09 OTHER INSOMNIA: Status: ACTIVE | Noted: 2023-01-05

## 2023-01-05 LAB
FLUAV RNA SPEC QL NAA+PROBE: NEGATIVE
FLUBV RNA SPEC QL NAA+PROBE: NEGATIVE
GLUCOSE BLD STRIP.AUTO-MCNC: 112 MG/DL (ref 65–99)
GLUCOSE BLD STRIP.AUTO-MCNC: 129 MG/DL (ref 65–99)
GLUCOSE BLD STRIP.AUTO-MCNC: 287 MG/DL (ref 65–99)
RSV RNA SPEC QL NAA+PROBE: NEGATIVE
SARS-COV-2 RNA RESP QL NAA+PROBE: NOTDETECTED
SPECIMEN SOURCE: NORMAL

## 2023-01-05 PROCEDURE — 99239 HOSP IP/OBS DSCHRG MGMT >30: CPT | Performed by: PHYSICAL MEDICINE & REHABILITATION

## 2023-01-05 PROCEDURE — 97110 THERAPEUTIC EXERCISES: CPT

## 2023-01-05 PROCEDURE — 97530 THERAPEUTIC ACTIVITIES: CPT

## 2023-01-05 PROCEDURE — 0241U HCHG SARS-COV-2 COVID-19 NFCT DS RESP RNA 4 TRGT MIC: CPT

## 2023-01-05 PROCEDURE — 97116 GAIT TRAINING THERAPY: CPT

## 2023-01-05 PROCEDURE — 97130 THER IVNTJ EA ADDL 15 MIN: CPT

## 2023-01-05 PROCEDURE — 82962 GLUCOSE BLOOD TEST: CPT | Mod: 91

## 2023-01-05 PROCEDURE — 700102 HCHG RX REV CODE 250 W/ 637 OVERRIDE(OP): Performed by: HOSPITALIST

## 2023-01-05 PROCEDURE — A9270 NON-COVERED ITEM OR SERVICE: HCPCS | Performed by: HOSPITALIST

## 2023-01-05 PROCEDURE — 97112 NEUROMUSCULAR REEDUCATION: CPT

## 2023-01-05 PROCEDURE — A9270 NON-COVERED ITEM OR SERVICE: HCPCS | Performed by: PHYSICAL MEDICINE & REHABILITATION

## 2023-01-05 PROCEDURE — 700101 HCHG RX REV CODE 250: Performed by: PHYSICAL MEDICINE & REHABILITATION

## 2023-01-05 PROCEDURE — 700102 HCHG RX REV CODE 250 W/ 637 OVERRIDE(OP): Performed by: PHYSICAL MEDICINE & REHABILITATION

## 2023-01-05 PROCEDURE — 99231 SBSQ HOSP IP/OBS SF/LOW 25: CPT | Performed by: HOSPITALIST

## 2023-01-05 PROCEDURE — 97129 THER IVNTJ 1ST 15 MIN: CPT

## 2023-01-05 RX ORDER — ASPIRIN 81 MG/1
81 TABLET, CHEWABLE ORAL DAILY
Qty: 100 TABLET | Status: SHIPPED
Start: 2023-01-05 | End: 2024-02-05

## 2023-01-05 RX ORDER — BUPROPION HYDROCHLORIDE 150 MG/1
150 TABLET, EXTENDED RELEASE ORAL 2 TIMES DAILY
Qty: 60 TABLET | Status: SHIPPED
Start: 2023-01-05 | End: 2023-02-08 | Stop reason: SDUPTHER

## 2023-01-05 RX ORDER — DULOXETIN HYDROCHLORIDE 60 MG/1
60 CAPSULE, DELAYED RELEASE ORAL 2 TIMES DAILY
Qty: 30 CAPSULE | Status: SHIPPED
Start: 2023-01-05 | End: 2023-02-08 | Stop reason: SDUPTHER

## 2023-01-05 RX ORDER — LANOLIN ALCOHOL/MO/W.PET/CERES
100 CREAM (GRAM) TOPICAL DAILY
Status: SHIPPED
Start: 2023-01-06 | End: 2023-05-11

## 2023-01-05 RX ORDER — LIDOCAINE 50 MG/G
1 PATCH TOPICAL EVERY 24 HOURS
Qty: 10 PATCH | Status: SHIPPED
Start: 2023-01-06 | End: 2023-02-08

## 2023-01-05 RX ORDER — GLIPIZIDE 5 MG/1
15 TABLET, FILM COATED, EXTENDED RELEASE ORAL
Qty: 30 TABLET | Status: SHIPPED
Start: 2023-01-05 | End: 2023-01-31

## 2023-01-05 RX ORDER — ENOXAPARIN SODIUM 100 MG/ML
40 INJECTION SUBCUTANEOUS DAILY
Status: SHIPPED
Start: 2023-01-05 | End: 2023-01-31

## 2023-01-05 RX ORDER — AMOXICILLIN 250 MG
2 CAPSULE ORAL 2 TIMES DAILY
Qty: 120 TABLET | Refills: 0 | Status: SHIPPED
Start: 2023-01-05 | End: 2023-01-31

## 2023-01-05 RX ORDER — LANOLIN ALCOHOL/MO/W.PET/CERES
3 CREAM (GRAM) TOPICAL NIGHTLY PRN
Qty: 30 TABLET | Status: SHIPPED
Start: 2023-01-05 | End: 2023-01-31

## 2023-01-05 RX ORDER — BENAZEPRIL HYDROCHLORIDE 10 MG/1
10 TABLET ORAL DAILY
Qty: 30 TABLET | Status: SHIPPED
Start: 2023-01-06 | End: 2023-01-31

## 2023-01-05 RX ORDER — TAMSULOSIN HYDROCHLORIDE 0.4 MG/1
0.8 CAPSULE ORAL
Qty: 30 CAPSULE | Status: SHIPPED
Start: 2023-01-05 | End: 2023-01-31

## 2023-01-05 RX ORDER — PIOGLITAZONEHYDROCHLORIDE 30 MG/1
30 TABLET ORAL DAILY
Qty: 100 TABLET | Refills: 4 | Status: SHIPPED
Start: 2023-01-05 | End: 2023-02-08 | Stop reason: SDUPTHER

## 2023-01-05 RX ORDER — LIDOCAINE 50 MG/G
1 PATCH TOPICAL EVERY 24 HOURS
Qty: 10 PATCH | Status: SHIPPED
Start: 2023-01-06 | End: 2023-01-31

## 2023-01-05 RX ORDER — ATORVASTATIN CALCIUM 80 MG/1
80 TABLET, FILM COATED ORAL EVERY EVENING
Qty: 90 TABLET | Refills: 4 | Status: SHIPPED
Start: 2023-01-05 | End: 2023-04-21 | Stop reason: SDUPTHER

## 2023-01-05 RX ORDER — OMEPRAZOLE 20 MG/1
20 CAPSULE, DELAYED RELEASE ORAL
Qty: 30 CAPSULE | Status: SHIPPED
Start: 2023-01-06 | End: 2023-02-08 | Stop reason: SDUPTHER

## 2023-01-05 RX ORDER — BISACODYL 10 MG
10 SUPPOSITORY, RECTAL RECTAL
Status: DISCONTINUED | OUTPATIENT
Start: 2023-01-05 | End: 2023-01-05 | Stop reason: HOSPADM

## 2023-01-05 RX ORDER — ACETAMINOPHEN 325 MG/1
650 TABLET ORAL EVERY 4 HOURS PRN
Qty: 30 TABLET | Refills: 0 | Status: SHIPPED
Start: 2023-01-05 | End: 2023-05-11

## 2023-01-05 RX ORDER — FOLIC ACID 1 MG/1
1 TABLET ORAL DAILY
Qty: 30 TABLET | Status: SHIPPED
Start: 2023-01-06 | End: 2023-02-08 | Stop reason: SDUPTHER

## 2023-01-05 RX ORDER — POLYETHYLENE GLYCOL 3350 17 G/17G
1 POWDER, FOR SOLUTION ORAL DAILY
Status: DISCONTINUED | OUTPATIENT
Start: 2023-01-05 | End: 2023-01-05 | Stop reason: HOSPADM

## 2023-01-05 RX ORDER — AMOXICILLIN 250 MG
2 CAPSULE ORAL 2 TIMES DAILY
Status: DISCONTINUED | OUTPATIENT
Start: 2023-01-05 | End: 2023-01-05 | Stop reason: HOSPADM

## 2023-01-05 RX ORDER — SALIVA STIMULANT COMB. NO.7
1 GEL (GRAM) MUCOUS MEMBRANE 3 TIMES DAILY
Status: SHIPPED
Start: 2023-01-05 | End: 2023-01-31

## 2023-01-05 RX ADMIN — SENNOSIDES AND DOCUSATE SODIUM 2 TABLET: 50; 8.6 TABLET ORAL at 10:41

## 2023-01-05 RX ADMIN — OMEPRAZOLE 20 MG: 20 CAPSULE, DELAYED RELEASE ORAL at 08:17

## 2023-01-05 RX ADMIN — LIDOCAINE 1 PATCH: 700 PATCH TOPICAL at 08:17

## 2023-01-05 RX ADMIN — METOPROLOL TARTRATE 12.5 MG: 25 TABLET, FILM COATED ORAL at 05:57

## 2023-01-05 RX ADMIN — DULOXETINE HYDROCHLORIDE 60 MG: 30 CAPSULE, DELAYED RELEASE ORAL at 06:01

## 2023-01-05 RX ADMIN — ASPIRIN 81 MG 81 MG: 81 TABLET ORAL at 08:17

## 2023-01-05 RX ADMIN — Medication: at 15:00

## 2023-01-05 RX ADMIN — BUPROPION HYDROCHLORIDE 150 MG: 150 TABLET, EXTENDED RELEASE ORAL at 06:01

## 2023-01-05 RX ADMIN — FOLIC ACID 1 MG: 1 TABLET ORAL at 08:18

## 2023-01-05 RX ADMIN — THIAMINE HCL TAB 100 MG 100 MG: 100 TAB at 08:18

## 2023-01-05 RX ADMIN — BENAZEPRIL HYDROCHLORIDE 10 MG: 10 TABLET, FILM COATED ORAL at 05:58

## 2023-01-05 RX ADMIN — POLYETHYLENE GLYCOL 3350 1 PACKET: 17 POWDER, FOR SOLUTION ORAL at 10:41

## 2023-01-05 RX ADMIN — PIOGLITAZONE 30 MG: 15 TABLET ORAL at 08:18

## 2023-01-05 RX ADMIN — Medication: at 09:00

## 2023-01-05 RX ADMIN — INSULIN LISPRO 6 UNITS: 100 INJECTION, SOLUTION INTRAVENOUS; SUBCUTANEOUS at 11:01

## 2023-01-05 RX ADMIN — THERA TABS 1 TABLET: TAB at 08:18

## 2023-01-05 RX ADMIN — LIDOCAINE 1 PATCH: 700 PATCH TOPICAL at 08:16

## 2023-01-05 RX ADMIN — METFORMIN HYDROCHLORIDE 1000 MG: 500 TABLET ORAL at 08:17

## 2023-01-05 ASSESSMENT — BRIEF INTERVIEW FOR MENTAL STATUS (BIMS)
WHAT MONTH IS IT: ACCURATE WITHIN 5 DAYS
BIMS SUMMARY SCORE: 15
ASKED TO RECALL BED: YES, NO CUE REQUIRED
ASKED TO RECALL SOCK: YES, NO CUE REQUIRED
ASKED TO RECALL BLUE: YES, NO CUE REQUIRED
WHAT YEAR IS IT: CORRECT
INITIAL REPETITION OF BED BLUE SOCK - FIRST ATTEMPT: 3
WHAT DAY OF THE WEEK IS IT: CORRECT

## 2023-01-05 ASSESSMENT — ENCOUNTER SYMPTOMS
BACK PAIN: 1
CHILLS: 0
BRUISES/BLEEDS EASILY: 0
SHORTNESS OF BREATH: 0
PALPITATIONS: 0
VOMITING: 0
COUGH: 0
POLYDIPSIA: 0
EYES NEGATIVE: 1
FEVER: 0
NAUSEA: 0
ABDOMINAL PAIN: 0

## 2023-01-05 ASSESSMENT — ACTIVITIES OF DAILY LIVING (ADL)
BED_CHAIR_WHEELCHAIR_TRANSFER_DESCRIPTION: ADAPTIVE EQUIPMENT;SET-UP OF EQUIPMENT;SUPERVISION FOR SAFETY;VERBAL CUEING
TOILETING_LEVEL_OF_ASSIST: REQUIRES PHYSICAL ASSIST WITH TOILETING
BED_CHAIR_WHEELCHAIR_TRANSFER_DESCRIPTION: SUPERVISION FOR SAFETY;VERBAL CUEING;SET-UP OF EQUIPMENT;INITIAL PREPARATION FOR TASK;INCREASED TIME
TOILET_TRANSFER_LEVEL_OF_ASSIST: REQUIRES SUPERVISION WITH TOILET TRANSFER
SHOWER_TRANSFER_LEVEL_OF_ASSIST: REQUIRES PHYSICAL ASSIST WITH SHOWER TRANSFER

## 2023-01-05 ASSESSMENT — GAIT ASSESSMENTS
DISTANCE (FEET): 150
GAIT LEVEL OF ASSIST: STANDBY ASSIST
DEVIATION: SHUFFLED GAIT;INCREASED BASE OF SUPPORT;BRADYKINETIC
ASSISTIVE DEVICE: FRONT WHEEL WALKER

## 2023-01-05 NOTE — DISCHARGE PLANNING
Patient accepted at for today at [facility].  Wife and patient agreeable.      Case management  Reviewed signed copy of IMM and answered all questions.    Dc date /disposition: 1/5/23-SNF

## 2023-01-05 NOTE — CARE PLAN
"The patient is Stable - Low risk of patient condition declining or worsening    Shift Goals  Clinical Goals: safety  Patient Goals: safety    Progress made toward(s) clinical / shift goals:    Problem: Fall Risk - Rehab  Goal: Patient will remain free from falls  Outcome: Progressing  Note: Padma Fontenot Fall risk Assessment Score: 17    High fall risk Interventions   - Alarming seatbelt  - Bed and strip alarm   - Yellow sign by the door   - Yellow wrist band \"Fall risk\"  - Room near to the nurse station  - Do not leave patient unattended in the bathroom  - Fall risk education provided       Problem: Skin Integrity  Goal: Patient's skin integrity will be maintained or improve  Outcome: Progressing  Note: Patient's skin remains intact and free from new or accidental injury this shift.  Will continue to monitor.            "

## 2023-01-05 NOTE — PROGRESS NOTES
Patient care assumed. Report received from [facility] CANDI Miramontes. Patient is alert and calm, resting in bed. Call light and bedside table within reach. Will continue to monitor.

## 2023-01-05 NOTE — PROGRESS NOTES
Hospital Medicine Daily Progress Note        Chief Complaint:  Hypertension  Diabetes    Interval History:  No 24 hour clinical changes.  Will discontinue IV.    Review of Systems  Review of Systems   Constitutional:  Negative for chills and fever.   HENT: Negative.     Eyes: Negative.    Respiratory:  Negative for cough and shortness of breath.    Cardiovascular:  Negative for chest pain and palpitations.   Gastrointestinal:  Negative for abdominal pain, nausea and vomiting.   Musculoskeletal:  Positive for back pain.   Skin:  Negative for itching and rash.   Endo/Heme/Allergies:  Negative for polydipsia. Does not bruise/bleed easily.      Physical Exam  Temp:  [36.7 °C (98 °F)-37.2 °C (98.9 °F)] 37.2 °C (98.9 °F)  Pulse:  [80-93] 88  Resp:  [18] 18  BP: (117-131)/(67-78) 117/75  SpO2:  [94 %] 94 %    Physical Exam  Vitals reviewed.   Constitutional:       General: He is not in acute distress.     Appearance: Normal appearance. He is not ill-appearing.   HENT:      Head: Normocephalic and atraumatic.      Right Ear: External ear normal.      Left Ear: External ear normal.      Nose: Nose normal.      Mouth/Throat:      Pharynx: Oropharynx is clear.   Eyes:      General:         Right eye: No discharge.         Left eye: No discharge.      Extraocular Movements: Extraocular movements intact.      Conjunctiva/sclera: Conjunctivae normal.   Cardiovascular:      Rate and Rhythm: Normal rate and regular rhythm.   Pulmonary:      Effort: No respiratory distress.      Breath sounds: No wheezing.      Comments: Decreased BS  Abdominal:      General: Bowel sounds are normal. There is no distension.      Palpations: Abdomen is soft.      Tenderness: There is no abdominal tenderness.   Musculoskeletal:      Cervical back: Normal range of motion and neck supple.      Right lower leg: No edema.      Left lower leg: No edema.   Skin:     General: Skin is warm and dry.   Neurological:      Mental Status: He is alert and oriented  to person, place, and time.       Fluids    Intake/Output Summary (Last 24 hours) at 1/5/2023 1425  Last data filed at 1/5/2023 0900  Gross per 24 hour   Intake 700 ml   Output 1100 ml   Net -400 ml       Laboratory  Recent Labs     01/04/23  0546   WBC 6.2   RBC 4.39*   HEMOGLOBIN 13.3*   HEMATOCRIT 40.4*   MCV 92.0   MCH 30.3   MCHC 32.9*   RDW 47.8   PLATELETCT 188   MPV 10.6     Recent Labs     01/03/23  0543   SODIUM 133*   POTASSIUM 4.1   CHLORIDE 100   CO2 21   GLUCOSE 101*   BUN 14   CREATININE 0.62   CALCIUM 9.5                   Assessment/Plan  * Thoracic compression fracture, closed, initial encounter (HCC)- (present on admission)  Assessment & Plan  2/2 GLF  Non-surgical management  TLSO  Pain control per Physiatry    History of depression- (present on admission)  Assessment & Plan  On Cymbalta and Wellbutrin    Diabetes mellitus, type 2 ([facility])- (present on admission)  Assessment & Plan  HbA1c 8.4  On Metformin, Glipizide, and Actos  Also on SSI  Observe serum glucose trends  Outpt meds include Metformin 1000 mg bid, Actos 30 mg daily, Glucotrol XL 10 mg daily, and Jardiance 25 mg daily    History of alcohol abuse- (present on admission)  Assessment & Plan  Continue Folate, MVT, and Thiamine    Mixed hyperlipidemia- (present on admission)  Assessment & Plan  On Lipitor    Prostate CA ([facility])- feb 2022; RT tbd x 8 wks, mar- may 2022- (present on admission)  Assessment & Plan  S/P prior treatment  Reportedly in remission   F/U    Essential hypertension- (present on admission)  Assessment & Plan  Blood pressure controlled on Benazepril and Metoprolol  Monitor for orthostatic hypotension on Flomax       DNR     no

## 2023-01-05 NOTE — DISCHARGE PLANNING
DIPTI spoke with patients wife to update on IDT and DC date of 1/10/23.  She explained she cannot provide 24/7 or any hands on care.  She explained patient would need to be Mod I to go home.  She is open to SNF.  DIPTI updated Dr. Irving.  DIPTI will continue to monitor for DC needs.

## 2023-01-05 NOTE — DISCHARGE SUMMARY
Admission Date: 12/28/2022    Discharge Date: 1/5/2023    Attending Provider: Evelyne Irving MD    Admission Diagnosis:   Active Hospital Problems    Diagnosis     *Thoracic compression fracture, closed, initial encounter ([facility])     Urinary retention     Dry mouth     Other constipation     Other insomnia     History of depression     Diabetes mellitus, type 2 (HCC)     History of alcohol abuse     Prostate CA ([facility])- feb 2022; RT tbd x 8 wks, mar- may 2022     Essential hypertension     Mixed hyperlipidemia     Tobacco use        Discharge Diagnosis:  Active Hospital Problems    Diagnosis     *Thoracic compression fracture, closed, initial encounter (HCC)     Urinary retention     Dry mouth     Other constipation     Other insomnia     History of depression     Diabetes mellitus, type 2 (HCC)     History of alcohol abuse     Prostate CA ([facility])- feb 2022; RT tbd x 8 wks, mar- may 2022     Essential hypertension     Mixed hyperlipidemia     Tobacco use        HPI per H&P:    Patient is a 72 y.o. male  with a past medical history of dementia, possible NPH, prostate cancer status post treatment and in remission, diabetes, peripheral neuropathy, osteoporosis, hypertension, BPH, tobacco use, alcohol use, admitted to [facility] on 12/22, with complaints of severe back pain, generalized weakness and malaise as well as multiple ground-level falls in the previous week.     Patient had imaging including a CT of the spine which showed acute T11 compression fracture with 20% loss of height, acute L2 compression fracture with 20% loss of height, and acute L3 compression fracture with 10% loss of vertebral height.  He had a head CT that showed left greater than right ventricular dilatation suggestive of normal pressure hydrocephalus.     Patient was seen and evaluated by neurosurgery, Dr. Guzman, with recommendation for conservative management with a TLSO brace and follow-up in 6 weeks, not currently a good candidate for  kyphoplasty. Patient was admitted for his fractures and pain management, for dehydration and hyperglycemia.     Patient currently reports back pain when he moves.  He reports his memory and cognition have declined as he is aged.  He does still drive a car.  He moved his bowels this morning and denies any issues with urination.  He has peripheral neuropathy with numbness and tingling in his feet.  He does drink alcohol daily but does not think this was associated with his multiple falls.     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  Treatment plan at that time included recommendations to stop drinking, possible large-volume CSF lumbar puncture in the future, and brain PET scan given family history of dementia.     Patient was evaluated by Rehab Medicine physician and Physical Therapy and Occupational Therapy and determined to be appropriate for acute inpatient rehab and was transferred to [facility] on 12/28/2022  2:43 PM.    Rehab Hospital Course by Problem List:    T11, L2, L3 vertebral compression fractures  Conservatively managed  TLSO when out of bed     Outpatient follow-up with neurosurgery, Dr. Guzman     Pain management, resolved  As needed tylenol, 12/29  Lidoderm patches     Normal pressure hydrocephalus?  Outpatient follow-up with neurology for possible lumbar puncture with large volume CSF removal     Dementia?  Mild cognitive impairment, mostly memory/attention  Speech therapy  assessment  Outpatient follow-up with neurology as well as PET scan -wife to reschedule  Aspirin for stroke/vascular prevention per neurology     History of depression  Cymbalta  Wellbutrin  Mood currently stable     Hypertension  Benazepril  Metoprolol     Tachycardia, resolved  Metoprolol     Hyperlipidemia  Statin     History of iron deficiency anemia  Discontinued ferrous sulfate as patient no longer has anemia     History of vitamin D deficiency  Discontinued supplementation for now as patient feels like he is taking too many pills     Peripheral neuropathy  Cymbalta  Gabapentin discontinued as patient's not complaining of any pain and to eliminate polypharmacy     Diabetes with hyperglycemia  Last hemoglobin A1c 11/22 8.4, uncontrolled  Glipizide  Actos  Sliding scale insulin    Hypomagnesemia, improved  Continue oral supplementation  Required IV supplementation x 2     Alcohol use  Added vitamins  Counseling provided 1/3     Tobacco use  Counseling provided 1/3     Bowel program  Bowel incontinence  Loose stools  Discontinued scheduled bowel medications  Last BM 1/3     Bladder program  History of prostate cancer  Urinary retention, continues  Completed treatment, apparently in remission  Continue Flomax --> increased dose to 0.8 mg 1/3  Outpatient follow-up with urology  Check PVRs -267, 290  Bladder scan for no voids  ICP for over 400 cc -not requiring  Scheduled toileting      DVT prophylaxis  Lovenox      Functional Status at Discharge  Eating:  Supervision  Eating Description:  Set-up of equipment or meal/tube feeding  Grooming:  Standby Assist, Standing  Grooming Description:  Increased time, Set-up of equipment  Bathing:  Standby Assist  Bathing Description:  Grab bar, Hand held shower, Tub bench, Increased time, Assit with perineal, Verbal cueing  Upper Body Dressing:  Moderate Assist  Upper Body Dressing Description:  Application of orthotic or brace (set up to don pullover shirt; assist to don  TLSO)  Lower Body Dressing:  Maximal Assist (dressing AE were not available in room)  Lower Body Dressing Description:  Assist with threading into pant leg, Cues for spinal precautions, Increased time     Walk:  Standby Assist  Distance Walked:  150  Number of Times Distance Was Traveled:  3  Assistive Device:  Front Wheel Walker  Gait Deviation:  Shuffled Gait, Increased Base Of Support, Bradykinetic (decr step length R LE)  Wheelchair:  Supervised  Distance Propelled:  10   Wheelchair Description:   (B LE propulsion)  Stairs Contact Guard Assist  Stairs Description Supervision for safety, Verbal cueing, Limited by fatigue (sidestepping with R HR)     Comprehension:  Modified Independent  Comprehension Description:  Glasses, Verbal cues  Expression:  Modified Independent  Expression Description:     Social Interaction:  Independent  Social Interaction Description:     Problem Solving:  Moderate Assist  Problem Solving Description:  Verbal cueing, Therapy schedule, Increased time, Supervision  Memory:  Moderate Assist  Memory Description:  Verbal cueing, Therapy schedule, Increased time, Supervision, Bed/chair alarm       Evelyne CHAHAL M.D., personally performed a complete drug regimen review and no potential clinically significant medication issues were identified.     Discharge Medication:     Medication List        START taking these medications        Instructions   artificial saliva Gel   Use 1 Each in the mouth or throat in the morning, at noon, and at bedtime.  Dose: 1 Each     folic acid 1 MG Tabs  Start taking on: January 6, 2023  Commonly known as: FOLVITE   Take 1 Tablet by mouth every day.  Dose: 1 mg     melatonin 3 MG Tabs   Take 1 Tablet by mouth at bedtime as needed (insomnia).  Dose: 3 mg     metoprolol tartrate 25 MG Tabs  Commonly known as: LOPRESSOR   Take 0.5 Tablets by mouth 2 times a day.  Dose: 12.5 mg     multivitamin Tabs  Start taking on: January 6, 2023   Take 1 Tablet by mouth  every day.  Dose: 1 Tablet     thiamine 100 MG tablet  Start taking on: January 6, 2023  Commonly known as: THIAMINE   Take 1 Tablet by mouth every day.  Dose: 100 mg            CHANGE how you take these medications        Instructions   acetaminophen 325 MG Tabs  What changed:   when to take this  reasons to take this  Commonly known as: Tylenol   Take 2 Tablets by mouth every four hours as needed for Mild Pain.  Dose: 650 mg     glipiZIDE SR 5 MG Tb24  What changed:   medication strength  how much to take  when to take this  Commonly known as: GLUCOTROL   Take 3 Tablets by mouth with dinner.  Dose: 15 mg     omeprazole 20 MG delayed-release capsule  Start taking on: January 6, 2023  What changed:   how much to take  how to take this  when to take this  additional instructions  Commonly known as: PRILOSEC   Take 1 Capsule by mouth every morning before breakfast.  Dose: 20 mg     tamsulosin 0.4 MG capsule  What changed:   how much to take  when to take this  Commonly known as: FLOMAX   Take 2 Capsules by mouth 1/2 hour after dinner.  Dose: 0.8 mg            CONTINUE taking these medications        Instructions   aspirin 81 MG Chew chewable tablet  Commonly known as: ASA   Chew 1 Tablet every day.  Dose: 81 mg     atorvastatin 80 MG tablet  Commonly known as: LIPITOR   Doctor's comments: Replaces 40 mg dose  Take 1 Tablet by mouth every evening.  Dose: 80 mg     benazepril 10 MG Tabs  Start taking on: January 6, 2023  Commonly known as: LOTENSIN   Take 1 Tablet by mouth every day.  Dose: 10 mg     * Blood Glucose Monitoring Suppl Ernestina   Doctor's comments: I believe either Freestyle lite or Paul Countour are on [facility] formulary  Meter: Dispense Device of Insurance Preference. Sig. Use as directed for blood sugar monitoring. #1. NR.     * Blood Glucose Test Strips   Test strips for meter dispensed, testing one time per day E11.65     * Blood Glucose Monitoring Suppl Ernestina   Freestyle carlo glucose monitoring device with  all necessary accessories including patch and transmitter  for type II noninsulin treated diabetes.  To check blood sugar once or twice daily and per any symptoms of high or low blood sugar.     buPROPion  MG Tb12 sustained-release tablet  Commonly known as: WELLBUTRIN-SR   Take 1 Tablet by mouth 2 times a day.  Dose: 150 mg     DULoxetine 60 MG Cpep delayed-release capsule  Commonly known as: CYMBALTA   Take 1 Capsule by mouth 2 times a day.  Dose: 60 mg     enoxaparin 40 MG/0.4ML Sosy inj  Commonly known as: Lovenox   Inject 40 mg under the skin every day at 6 PM.  Dose: 40 mg     Lancets   Lancets for meter dispensed, to test one time per day E11.65     * lidocaine 5 % Ptch  Start taking on: January 6, 2023  Commonly known as: LIDODERM   Place 1 Patch on the skin every 24 hours.  Dose: 1 Patch     * lidocaine 5 % Ptch  Start taking on: January 6, 2023  Commonly known as: LIDODERM   Place 1 Patch on the skin every 24 hours.  Dose: 1 Patch     metformin 1000 MG tablet  Commonly known as: GLUCOPHAGE   Take 1 Tablet by mouth 2 times a day with meals.  Dose: 1,000 mg     pioglitazone 30 MG Tabs  Commonly known as: ACTOS   Take 1 Tablet by mouth every day.  Dose: 30 mg     senna-docusate 8.6-50 MG Tabs  Commonly known as: PERICOLACE or SENOKOT S   Take 2 Tablets by mouth 2 times a day.  Dose: 2 Tablet           * This list has 5 medication(s) that are the same as other medications prescribed for you. Read the directions carefully, and ask your doctor or other care provider to review them with you.                STOP taking these medications      albuterol 108 (90 Base) MCG/ACT Aers inhalation aerosol     bisacodyl 10 MG Supp  Commonly known as: DULCOLAX     ferrous sulfate 325 (65 Fe) MG EC tablet     gabapentin 300 MG Caps  Commonly known as: NEURONTIN     HM COMPLETE 50+ MENS ULTIMATE PO     insulin regular 100 Unit/mL Soln  Commonly known as: HumuLIN R     Jardiance 25 MG Tabs  Generic drug:  Empagliflozin     magnesium hydroxide 400 MG/5ML Susp  Commonly known as: MILK OF MAGNESIA     ondansetron 4 MG Tbdp  Commonly known as: ZOFRAN ODT     oxyCODONE immediate-release 5 MG Tabs  Commonly known as: ROXICODONE     polyethylene glycol/lytes 17 g Pack  Commonly known as: MIRALAX     sildenafil citrate 100 MG tablet  Commonly known as: VIAGRA     vitamin D 1000 Unit (25 mcg) Tabs  Commonly known as: cholecalciferol              Discharge Diet:  Diabetic     Discharge Activity:  As tolerated     Disposition:  Patient to discharge to skilled nursing facility for further rehabilitation.       Equipment:  Follow-up & Discharge Instructions:  Follow up: PCP, neurosurgery, neurology      Condition on Discharge:  Good    More than 35 minutes was spent on discharging this patient, including face-to-face time, prescription management, and the dictation of this note.    Evelyne Ivring M.D.    Date of Service: 1/5/2023

## 2023-01-05 NOTE — THERAPY
"Speech Language Pathology  Daily Treatment     Patient Name: Prabhakar Sierra  Age:  72 y.o., Sex:  male  Medical Record #: [id number]  Today's Date: 1/5/2023     Precautions  Precautions: Fall Risk, TLSO (Thoracolumbosacral orthosis), Spinal / Back Precautions   Comments: dementia, TLSO on OOB off for showers    Subjective    Pt seated in  at bedside, had just undone seatbelt with alarm sounding attempting to transfer from  to bed as SLP entered room. Pt indep identified what he was attempting to do, when asked what he is suppose to do pt reported \"call for help\" pt aware of safety precautions however impulsive resulting in increased fall risk.      Objective       01/05/23 1033   Treatment Charges   SLP Cognitive Skill Development First 15 Minutes 1   SLP Cognitive Skill Development Additional 15 Minutes 1   SLP Total Time Spent   SLP Individual Total Time Spent (Mins) 30         Assessment    Pt presented with who has more task (coins) pt completed task at a rapid rate however with review pt correctly calculated 29/40 indep. Unable to correct at this time due to time constraints rec cont activity in future ST session.     Strengths: Pleasant and cooperative  Barriers: Lack of motivation, Impaired functional cognition, Impaired insight/denial of deficits, Impaired carryover of learning, Dementia, Confused    Plan    Review and correct who has more for attention skills     Speech Therapy Problems (Active)       Problem: Memory STGs       Dates: Start: 12/29/22         Goal: STG-Within one week, patient will implement use of functional memory strategies to assist in recall of information related to hospitalization and safety with 70% accuracy provided MOD A       Dates: Start: 12/29/22         Goal Note filed on 01/03/23 1542 by Pavithra Pittman MS,CCC-SLP       MAX A required, poor carryover, perseverative on same questions re: POC and hospitalization                  Problem: Problem Solving STGs  "      Dates: Start: 12/29/22         Goal: STG-Within one week, patient will complete medication management tasks with 80% accuracy provided SPV       Dates: Start: 12/29/22         Goal Note filed on 01/03/23 1545 by Pavithra Pittman MS,[facility]-SLP       Not appropriate to address at this time, addressing simple attention skills due to variability in performance and assistance needed.                  Problem: Speech/Swallowing LTGs       Dates: Start: 12/29/22         Goal: LTG-By discharge, patient will complete functional problem solving and recall information with 80% accuracy provided MOD I       Dates: Start: 12/29/22

## 2023-01-06 ENCOUNTER — APPOINTMENT (OUTPATIENT)
Dept: PHYSICAL THERAPY | Facility: MEDICAL CENTER | Age: 73
End: 2023-01-06
Attending: PSYCHIATRY & NEUROLOGY
Payer: MEDICARE

## 2023-01-06 NOTE — CARE PLAN
Problem: Dressing  Goal: STG-Within one week, patient will dress LB w/ LB dressing AE and mod A.  Outcome: Discharged - Not Met     Problem: OT Long Term Goals  Goal: LTG-By discharge, patient will complete basic self care tasks w/ mod I - min A.  Outcome: Discharged - Not Met  Goal: LTG-By discharge, patient will perform bathroom transfers w/ mod I- CGA.   Outcome: Discharged - Not Met     Problem: Functional Transfers  Goal: STG-Within one week, patient will transfer to step in shower with SBA using LRAD.   Outcome: Discharged - Not Met

## 2023-01-06 NOTE — CARE PLAN
Problem: PT-Long Term Goals  Goal: LTG-By discharge, patient will ambulate 200ft with SPV using LRAD   1/5/2023 1608 by Dirk Aquino, PT  Outcome: Met  1/5/2023 1607 by Dirk Aquino, PT  Reactivated  1/5/2023 1607 by Dirk Aquino, PT  Outcome: Discharged - Not Met     Problem: PT-Long Term Goals  Goal: LTG-By discharge, patient will transfer one surface to another with set up assist  Outcome: Discharged - Not Met  Goal: LTG-By discharge, patient will perform home exercise program with SPV  Outcome: Discharged - Not Met  Goal: LTG-By discharge, patient will ambulate up/down 14 stairs with R HR with SBA  Outcome: Discharged - Not Met

## 2023-01-06 NOTE — PROGRESS NOTES
"Rehab Progress Note     Date of Service: 1/5/2023  Chief Complaint: Follow-up vertebral compression fractures    Interval Events (Subjective)    Patient seen and examined today in his room.  He is resting quietly in bed.  He did require some IV magnesium per the hospitalist.  He wants to know when his IV can be removed.  He currently denies any back pain at rest or with mobility.  He reports today is his 52nd wedding anniversary and is hoping to discharge home as soon as possible.    Patient has no other new questions, concerns, or complaints today.         ROS: No changes to bowel, bladder, pain, mood, or sleep.           Objective:  VITAL SIGNS: /71   Pulse 92   Temp 36.7 °C (98 °F) (Oral)   Resp 18   Ht 1.778 m (5' 10\")   Wt 90.5 kg (199 lb 8.3 oz)   SpO2 96%   BMI 28.63 kg/m²   Gen: alert, no apparent distress  CV: Regular rate, regular rhythm  Resp: Clear to auscultation bilaterally        Recent Results (from the past 72 hour(s))   POCT glucose device results    Collection Time: 01/02/23  5:09 PM   Result Value Ref Range    POC Glucose, Blood 136 (H) 65 - 99 mg/dL   POCT glucose device results    Collection Time: 01/02/23  8:09 PM   Result Value Ref Range    POC Glucose, Blood 112 (H) 65 - 99 mg/dL   Basic Metabolic Panel    Collection Time: 01/03/23  5:43 AM   Result Value Ref Range    Sodium 133 (L) 135 - 145 mmol/L    Potassium 4.1 3.6 - 5.5 mmol/L    Chloride 100 96 - 112 mmol/L    Co2 21 20 - 33 mmol/L    Glucose 101 (H) 65 - 99 mg/dL    Bun 14 8 - 22 mg/dL    Creatinine 0.62 0.50 - 1.40 mg/dL    Calcium 9.5 8.5 - 10.5 mg/dL    Anion Gap 12.0 7.0 - 16.0   MAGNESIUM    Collection Time: 01/03/23  5:43 AM   Result Value Ref Range    Magnesium 1.1 (L) 1.5 - 2.5 mg/dL   PHOSPHORUS    Collection Time: 01/03/23  5:43 AM   Result Value Ref Range    Phosphorus 3.2 2.5 - 4.5 mg/dL   ESTIMATED GFR    Collection Time: 01/03/23  5:43 AM   Result Value Ref Range    GFR (CKD-EPI) 101 >60 mL/min/1.73 m 2 "   POCT glucose device results    Collection Time: 01/03/23  7:35 AM   Result Value Ref Range    POC Glucose, Blood 125 (H) 65 - 99 mg/dL   POCT glucose device results    Collection Time: 01/03/23 11:30 AM   Result Value Ref Range    POC Glucose, Blood 213 (H) 65 - 99 mg/dL   POCT glucose device results    Collection Time: 01/03/23  5:18 PM   Result Value Ref Range    POC Glucose, Blood 100 (H) 65 - 99 mg/dL   POCT glucose device results    Collection Time: 01/03/23  7:59 PM   Result Value Ref Range    POC Glucose, Blood 112 (H) 65 - 99 mg/dL   CBC WITHOUT DIFFERENTIAL    Collection Time: 01/04/23  5:46 AM   Result Value Ref Range    WBC 6.2 4.8 - 10.8 K/uL    RBC 4.39 (L) 4.70 - 6.10 M/uL    Hemoglobin 13.3 (L) 14.0 - 18.0 g/dL    Hematocrit 40.4 (L) 42.0 - 52.0 %    MCV 92.0 81.4 - 97.8 fL    MCH 30.3 27.0 - 33.0 pg    MCHC 32.9 (L) 33.7 - 35.3 g/dL    RDW 47.8 35.9 - 50.0 fL    Platelet Count 188 164 - 446 K/uL    MPV 10.6 9.0 - 12.9 fL   MAGNESIUM    Collection Time: 01/04/23  5:46 AM   Result Value Ref Range    Magnesium 1.5 1.5 - 2.5 mg/dL   POCT glucose device results    Collection Time: 01/04/23  7:07 AM   Result Value Ref Range    POC Glucose, Blood 112 (H) 65 - 99 mg/dL   POCT glucose device results    Collection Time: 01/04/23 11:15 AM   Result Value Ref Range    POC Glucose, Blood 182 (H) 65 - 99 mg/dL   POCT glucose device results    Collection Time: 01/04/23  5:02 PM   Result Value Ref Range    POC Glucose, Blood 75 65 - 99 mg/dL   POCT glucose device results    Collection Time: 01/04/23  8:14 PM   Result Value Ref Range    POC Glucose, Blood 129 (H) 65 - 99 mg/dL   POCT glucose device results    Collection Time: 01/05/23  7:22 AM   Result Value Ref Range    POC Glucose, Blood 112 (H) 65 - 99 mg/dL   POCT glucose device results    Collection Time: 01/05/23 11:00 AM   Result Value Ref Range    POC Glucose, Blood 287 (H) 65 - 99 mg/dL   COV-2, FLU A/B, AND RSV BY PCR (2-4 HOURS CEPHEID): Collect [facility] swab  in [facility]    Collection Time: 01/05/23  2:25 PM    Specimen: Nasopharyngeal; Respirate   Result Value Ref Range    Influenza virus A RNA Negative Negative    Influenza virus B, PCR Negative Negative    RSV, PCR Negative Negative    SARS-CoV-2 by PCR NotDetected     SARS-CoV-2 Source NP Swab        Scheduled Medications   Medication Dose Frequency    senna-docusate  2 Tablet BID    And    polyethylene glycol/lytes  1 Packet DAILY    artificial saliva   TID    folic acid  1 mg DAILY    thiamine  100 mg DAILY    multivitamin  1 Tablet DAILY    tamsulosin  0.8 mg AFTER DINNER    insulin lispro  2-12 Units 4X/DAY ACHS    benazepril  10 mg Q DAY    omeprazole  20 mg QAM AC    metoprolol tartrate  12.5 mg TWICE DAILY    glipiZIDE SR  15 mg PM MEAL    aspirin  81 mg DAILY    atorvastatin  80 mg Q EVENING    buPROPion SR  150 mg BID    DULoxetine  60 mg BID    enoxaparin (LOVENOX) injection  40 mg DAILY AT 1800    lidocaine  1 Patch Q24HRS    lidocaine  1 Patch Q24HRS    metformin  1,000 mg BID WITH MEALS    pioglitazone  30 mg DAILY       Current Diet Order   Procedures    Diet Order Diet: Cardiac; Second Modifier: (optional): Consistent CHO (Diabetic)       Assessment:  This patient is a 72 y.o. male admitted for acute inpatient rehabilitation with Thoracic compression fracture, closed, initial encounter ([facility]).    I led and attended the weekly conference, and agree with the IDT conference documentation and plan of care as noted below.    Date of conference: 12/30/2022    Goals and barriers: See IDT note.    Biggest barriers: incontinent, high PVRs - urinary retention    Goals in next week: mild cognitive impairment, poor tolerance and endurance, stairs at home, lethargy    CM/social support: wife supportive    Anticipated DC date: 1/10, home evaluation on 9th    Home health: Pt/OT/SLP/RN    Equip: has his own    Follow up: PCP, neurosurgery, neurology         Problem List/Medical Decision Making and Plan:    T11, L2, L3  vertebral compression fractures  Conservatively managed  TLSO when out of bed  Continue full rehab program  PT/OT/SLP, 1 hr each discipline, 5 days per week    Outpatient follow-up with neurosurgery, Dr. Guzman    Pain management  As needed tylenol  Lidoderm patches  PRN ice    Normal pressure hydrocephalus?  Outpatient follow-up with neurology for possible lumbar puncture with large volume CSF removal     Dementia?  Mild cognitive impairment, mostly memory/attention  Speech therapy assessment  Outpatient follow-up with neurology as well as PET scan  Aspirin for stroke/vascular prevention per neurology     History of depression  Cymbalta  Wellbutrin  Monitor need to consult psychology  Mood currently stable     Hypertension  Benazepril discontinued  Metoprolol started  Appreciate hospitalist assistance    Tachycardia, improved  EKG on 12/22 was sinus tachycardia  Patient currently not anemic and does not have any pain at rest  Started low-dose beta-blocker 12/29  Appreciate hospitalist assistance     Hyperlipidemia  Statin     History of iron deficiency anemia  Discontinue ferrous sulfate as patient no longer has anemia     History of vitamin D deficiency  Discontinue supplementation for now as patient feels like he is taking too many pills    Peripheral neuropathy  Cymbalta  Gabapentin -patient not complaining of any pain so we will discontinue     Diabetes with hyperglycemia  Last hemoglobin A1c 11/22 8.4, uncontrolled  Glipizide  Actos  Sliding scale insulin  Appreciate hospitalist's assistance    Hypomagnesemia  Started supplementation  Required IV supplementation  Appreciate hospitalist assistance  Monitor with intermittent labs     Alcohol use  Added vitamins  Needs counseling     Tobacco use  Needs counseling    Bowel program  Continue bowel medications  Last BM 1/2    Bladder program  History of prostate cancer  Completed treatment, apparently in remission  Continue Flomax  Outpatient follow-up with  urology  Check PVRs -131, 307, 120  Bladder scan for no voids  ICP for over 400 cc  Scheduled toileting     DVT prophylaxis  Lovenox      Evelyne Irving M.D.  Physical Medicine and Rehabilitation

## 2023-01-06 NOTE — PROGRESS NOTES
[facility] discharged to [facility] per order. Report given to Carli at [facility]. COBRA and discharge order copies placed in chart. Patient has all belongings. Patient left facility at 1620 via [facility] transport accompanied by rehab staff.  Have enjoyed working with this pleasant patient.

## 2023-01-06 NOTE — DISCHARGE INSTRUCTIONS
Physical Therapy Discharge Instructions for Prabhakar Sierra    1/5/2023    Level of Assist Required for Ambulation: Supervision on Flat Surfaces, Hand Held Assist on Curbs, Hand Held Assist on Stairs  Device Recommended for Ambulation: Front-Wheeled Walker  Level of Assist Required for Transfers: Supervision  Device Recommended for Transfers: Front-Wheeled Walker  Prosthesis / Orthosis Recommendation / Location:  ([facility])    Occupational Therapy Discharge Instructions for Prabhakar Sierra    1/5/2023    Level of Assist Required for Eating: Requires Supervision with Eating  Level of Assist Required for Grooming: Requires Supervision with Grooming  Level of Assist Required for Dressing: Requires Physical Assist with Dressing  Level of Assist Required for Toileting: Requires Physical Assist with Toileting  Level of Assist Required for Toilet Transfer: Requires Supervision with Toilet Transfer  Equipment for Toilet Transfer: Grab Bars by Toilet  Level of Assist Required for Bathing: Requires Supervision with Bathing  Equipment for Bathing: Shower Chair, Grab Bars in Tub / Shower, Hand Held Shower Head, Long Handled Sponge  Level of Assist Required for Shower Transfer: Requires Physical Assist with Shower Transfer  Equipment for Shower Transfer: Shower Chair, Grab Bars in Tub / Shower  Driving: Please Contact Physician Prior to Driving

## 2023-01-06 NOTE — THERAPY
"Physical Therapy   Daily Treatment     Patient Name: Prabhakar Sierra  Age:  72 y.o., Sex:  male  Medical Record #: [id number]  Today's Date: 1/5/2023     Precautions  Precautions: Fall Risk, TLSO (Thoracolumbosacral orthosis), Spinal / Back Precautions   Comments: dementia, TLSO on OOB off for showers    Subjective    AM: \"I really feel like I am making progress\"  PM: Pt's spouse requesting to have pt shower prior to DC to SNF. Assisted pt to  and notified RN     Objective       01/05/23 0901 01/05/23 1530   PT Charge Group   PT Gait Training (Units) 1  --    PT Therapeutic Activities (Units) 1 2   PT Total Time Spent   PT Individual Total Time Spent (Mins) 30 30   Gait Functional Level of Assist    Gait Level Of Assist Standby Assist  --    Assistive Device Front Wheel Walker  --    Distance (Feet) 150  --    # of Times Distance was Traveled 3  --    Deviation Shuffled Gait;Increased Base Of Support;Bradykinetic  (decr step length R LE)  --    Transfer Functional Level of Assist   Bed, Chair, Wheelchair Transfer Standby Assist Standby Assist   Bed Chair Wheelchair Transfer Description Supervision for safety;Verbal cueing;Set-up of equipment;Initial preparation for task;Increased time  (stand step FWW)   (stand step FWW)   Bed Mobility    Supine to Sit  --  Standby Assist   Sit to Supine  --  Standby Assist   Sit to Stand Standby Assist Standby Assist   Scooting  --  Standby Assist   Rolling  --  Standby Assist   Interdisciplinary Plan of Care Collaboration   IDT Collaboration with  Physician;Speech Therapist Family / Caregiver   Patient Position at End of Therapy Seated;Chair Alarm On;Self Releasing Lap Belt Applied;Call Light within Reach;Tray Table within Reach;Phone within Reach Seated;Chair Alarm On;Family / Friend in Room   Collaboration Comments MD present to round on pt, ST request pt remain seated until her session Recommendations for DC, demonstration of fxl mobility and education re donning TLSO "     AM tx: educated CNA on donning TLSO. Completed seated marching, LAQ, ankle pumps 2x10 ea as well as gait training    PM tx: Demonstrated bed mob, donning TLSO, and transfers with FWW to spouse, Heidy. Discussed DME recommendations including bed rail to aid in bed mob. Reiterated that the team is anticipating pt will require SPV for bed mob, transfers, and gait FWW; CGA for stairs; and mod-depA for dressing, toileting, and bathing due to spinal precautions, TLSO, and impaired memory    Assessment    Pt has made good progress in his endurance and independence with fxl mobility while admitted. Pt's spouse notified CM that pt will need to be [place] in order to return home. Given pt's dementia and hx of falls, the team is recommending that pt have assist upon DC. Plan is now to DC to SNF for extended rehab stay.  Strengths: Able to follow instructions, Motivated for self care and independence, Pleasant and cooperative, Supportive family, Willingly participates in therapeutic activities  Barriers: Dementia, Decreased endurance, Generalized weakness, Home accessibility, Impaired activity tolerance, Impaired balance, Impaired carryover of learning, Impaired functional cognition, Pain    Plan    DC    Passport items to be completed:  Get in/out of bed safely, in/out of a vehicle, safely use mobility device, walk or wheel around home/community, navigate up and down stairs, show how to get up/down from the ground, ensure home is accessible, demonstrate HEP, complete caregiver training    Physical Therapy Problems (Active)       Problem: PT-Long Term Goals       Dates: Start: 12/29/22         Goal: LTG-By discharge, patient will ambulate 200ft with SPV using LRAD        Dates: Start: 12/29/22            Goal: LTG-By discharge, patient will transfer one surface to another with set up assist       Dates: Start: 12/29/22            Goal: LTG-By discharge, patient will perform home exercise program with SPV       Dates: Start:  12/29/22            Goal: LTG-By discharge, patient will ambulate up/down 14 stairs with R HR with SBA       Dates: Start: 12/29/22

## 2023-01-06 NOTE — THERAPY
Occupational Therapy  Daily Treatment     Patient Name: Prabhakar Sierra  Age:  72 y.o., Sex:  male  Medical Record #: [id number]  Today's Date: 1/5/2023     Precautions  Precautions: Fall Risk, TLSO (Thoracolumbosacral orthosis), Spinal / Back Precautions   Comments: dementia, TLSO on OOB off for showers         Subjective    Pt up in w/c upon arrival visiting with his spouse, agreeable to OT session.      Objective     01/05/23 1331   OT Charge Group   OT Neuromuscular Re-education / Balance (Units) 2   OT Therapeutic Exercise (Units) 2   OT Total Time Spent   OT Individual Total Time Spent (Mins) 60   Functional Level of Assist   Bed, Chair, Wheelchair Transfer Standby Assist  (SPT with bed rail w/c to EOB)   Bed Chair Wheelchair Transfer Description Adaptive equipment;Set-up of equipment;Supervision for safety;Verbal cueing   Sitting Upper Body Exercises   Bilateral Row 3 sets of 10;Bilateral;Weight (See Comments for lbs)  (25#)   Upper Extremity Bike Level 3 Resistance  (Motomed 10 min to progress endurance)   Balance   Skilled Intervention Postural Facilitation;Tactile Cuing;Verbal Cuing   Comments Standing in // bars: ring toss initially standing on ground without UE support then progressed to standing on airex foam mat, CGA and mirror in front for visual feedback. Pt then completed 2x10 alternating toe taps to cone, one UE support   Bed Mobility    Sit to Supine Standby Assist   Interdisciplinary Plan of Care Collaboration   IDT Collaboration with  Family / Caregiver;Nursing;Physical Therapist   Patient Position at End of Therapy In Bed;Bed Alarm On;Call Light within Reach;Tray Table within Reach;Phone within Reach;Family / Friend in Room   Collaboration Comments Pt's spouse present and supportive throughout; d/c planning discussed with nursing and PT       Assessment    Pt tolerated OT session well though was somewhat limited by fatigue. Had one LOB during balance activities and required [place] to  correct. Needed intermittent rest breaks throughout. Spouse reports she cannot care for pt at current level, is now d/c'ing SNF this afternoon.     Strengths: Able to follow instructions, Alert and oriented, Effective communication skills, Good insight into deficits/needs, Independent prior level of function, Making steady progress towards goals, Motivated for self care and independence, Pleasant and cooperative, Supportive family, Willingly participates in therapeutic activities  Barriers: Decreased endurance, Fatigue, Generalized weakness, Impaired activity tolerance, Limited mobility, Impaired balance, Pain    Plan    D/c SNF this afternoon for ongoing therapies     Occupational Therapy Goals (Active)       Problem: Dressing       Dates: Start: 12/29/22         Goal: STG-Within one week, patient will dress LB w/ LB dressing AE and mod A.       Dates: Start: 12/29/22         Goal Note filed on 01/04/23 1126 by Sue Parrish, OT       Cassius SAMANO                  Problem: Functional Transfers       Dates: Start: 01/04/23         Goal: STG-Within one week, patient will transfer to step in shower with SBA using LRAD.        Dates: Start: 01/04/23               Problem: OT Long Term Goals       Dates: Start: 12/29/22         Goal: LTG-By discharge, patient will complete basic self care tasks w/ mod I - min A.       Dates: Start: 12/29/22            Goal: LTG-By discharge, patient will perform bathroom transfers w/ mod I- CGA.        Dates: Start: 12/29/22

## 2023-01-09 ENCOUNTER — APPOINTMENT (OUTPATIENT)
Dept: PHYSICAL THERAPY | Facility: MEDICAL CENTER | Age: 73
End: 2023-01-09
Attending: PSYCHIATRY & NEUROLOGY
Payer: MEDICARE

## 2023-01-12 ENCOUNTER — APPOINTMENT (OUTPATIENT)
Dept: PHYSICAL THERAPY | Facility: MEDICAL CENTER | Age: 73
End: 2023-01-12
Attending: PSYCHIATRY & NEUROLOGY
Payer: MEDICARE

## 2023-01-16 ENCOUNTER — APPOINTMENT (OUTPATIENT)
Dept: PHYSICAL THERAPY | Facility: MEDICAL CENTER | Age: 73
End: 2023-01-16
Attending: PSYCHIATRY & NEUROLOGY
Payer: MEDICARE

## 2023-01-17 ENCOUNTER — TELEPHONE (OUTPATIENT)
Dept: MEDICAL GROUP | Age: 73
End: 2023-01-17
Payer: MEDICARE

## 2023-01-17 DIAGNOSIS — F43.20 HOSPITALISM: ICD-10-CM

## 2023-01-17 NOTE — TELEPHONE ENCOUNTER
Patients wife called in and states that they need a home health referral since he is getting released from the hospital on the 23rd thank you I will pend the referral

## 2023-01-18 ENCOUNTER — HOME HEALTH ADMISSION (OUTPATIENT)
Dept: HOME HEALTH SERVICES | Facility: HOME HEALTHCARE | Age: 73
End: 2023-01-18
Payer: MEDICARE

## 2023-01-19 ENCOUNTER — APPOINTMENT (OUTPATIENT)
Dept: PHYSICAL THERAPY | Facility: MEDICAL CENTER | Age: 73
End: 2023-01-19
Attending: PSYCHIATRY & NEUROLOGY
Payer: MEDICARE

## 2023-01-20 ENCOUNTER — HOME HEALTH ADMISSION (OUTPATIENT)
Dept: HOME HEALTH SERVICES | Facility: HOME HEALTHCARE | Age: 73
End: 2023-01-20
Payer: MEDICARE

## 2023-01-20 ENCOUNTER — TELEPHONE (OUTPATIENT)
Dept: NEUROLOGY | Facility: MEDICAL CENTER | Age: 73
End: 2023-01-20

## 2023-01-20 NOTE — TELEPHONE ENCOUNTER
VOICEMAIL  1. Caller Name: Heidy                          Call Back Number: [phone]    2. Message: Wife called to say that Don fell and broke his back and was put in rehab until recently and was unable to get his PET Scan done that Dr Moran ordered. Would like to know if he needs to have the order done again or does the old order still stand? Can MA please return her call.     3. Patient approves office to leave a detailed voicemail/[employer] message: N\A    Routed to Jillian SAMANO

## 2023-01-23 ENCOUNTER — APPOINTMENT (OUTPATIENT)
Dept: PHYSICAL THERAPY | Facility: MEDICAL CENTER | Age: 73
End: 2023-01-23
Attending: PSYCHIATRY & NEUROLOGY
Payer: MEDICARE

## 2023-01-25 ENCOUNTER — HOME CARE VISIT (OUTPATIENT)
Dept: HOME HEALTH SERVICES | Facility: HOME HEALTHCARE | Age: 73
End: 2023-01-25
Payer: MEDICARE

## 2023-01-25 ENCOUNTER — APPOINTMENT (OUTPATIENT)
Dept: PHYSICAL THERAPY | Facility: MEDICAL CENTER | Age: 73
End: 2023-01-25
Attending: PSYCHIATRY & NEUROLOGY
Payer: MEDICARE

## 2023-01-25 PROCEDURE — 665001 SOC-HOME HEALTH

## 2023-01-25 PROCEDURE — G0493 RN CARE EA 15 MIN HH/HOSPICE: HCPCS

## 2023-01-26 VITALS
OXYGEN SATURATION: 96 % | TEMPERATURE: 97.5 F | RESPIRATION RATE: 18 BRPM | HEART RATE: 81 BPM | DIASTOLIC BLOOD PRESSURE: 68 MMHG | SYSTOLIC BLOOD PRESSURE: 102 MMHG

## 2023-01-26 ASSESSMENT — ENCOUNTER SYMPTOMS
LAST BOWEL MOVEMENT: 66499
NAUSEA: DENIES
LOWEST PAIN SEVERITY IN PAST 24 HOURS: 0/10
DRY SKIN: 1
PAIN LOCATION - PAIN QUALITY: DULL ACHY
PAIN LOCATION - PAIN SEVERITY: 0/10
DENIES PAIN: 1
HYPERTENSION: 1
HIGHEST PAIN SEVERITY IN PAST 24 HOURS: 2/10
PAIN LOCATION - PAIN FREQUENCY: INTERMITTENT
VOMITING: DENIES
PAIN LOCATION - PAIN DURATION: DAILY
SUBJECTIVE PAIN PROGRESSION: WAXING AND WANING
PERSON REPORTING PAIN: PATIENT
FATIGUES EASILY: 1
PAIN SEVERITY GOAL: 0/10
DIFFICULTY THINKING: 1
POOR JUDGMENT: 1
PAIN LOCATION: LOW BACK

## 2023-01-26 ASSESSMENT — ACTIVITIES OF DAILY LIVING (ADL)
BATHING ASSESSED: 1
BATHING_CURRENT_FUNCTION: MODERATE ASSIST
TOILETING: STAND BY ASSIST
AMBULATION ASSISTANCE: 1
AMBULATION ASSISTANCE: MINIMUM ASSIST
TOILETING: 1
OASIS_M1830: 03

## 2023-01-26 NOTE — CASE COMMUNICATION
PRIMARY DIAGNOSIS:  Wedge compression fracture of t11-T12 vertebra, subsequent encounter fracture with routine healing, Wedge compression fracture of second lumbar vertebra, subsequent encounter for fracture with routine healing,Type 2 diabetes mellitus with  diabetic neuropathy, unspecified       SKILLED NEED: fall prevention, medication management, health assessment, wound care    NURSING FREQUENCY: 2w2, 1w2, 3 prn  ZIPE CODE: [id number]   DISCIPLINES ORDERED: PT OT SN HHA SLP  INSURANCE: [facility]  CERTIFICATION PERIOD: 1/24-3/24  SPECIAL CONSIDERATION: NONE  Case communication to  attending provider and P amb anti    [facility]. Medication list left in home care folder. See MAR for drug allergy interactions. There are   major drug to drug interactions.  The best contact phone number is  and PTS  WIFE manages the medications.          Drug-Drug: aspirin and enoxaparin    The risk of bleeding in enoxaparin treated patients may be increased by aspirin, including the development of procedure-related epidural or spinal hematomas.    Details   Major   aspirin (ASA) 81 MG Chew Tab chewable tablet        enoxaparin (LOVENOX) 40 MG/0.4ML Solution Prefilled Syringe inj

## 2023-01-26 NOTE — CASE COMMUNICATION
noted  ----- Message -----  From: Nancy Fuentes R.N.  Sent: 1/26/2023   7:47 AM PST  To: Lety Charlton R.N., Mihaela Hillman, *        PRIMARY DIAGNOSIS:  Wedge compression fracture of t11-T12 vertebra, subsequent encounter fracture with routine healing, Wedge compression fracture of second lumbar vertebra, subsequent encounter for fracture with routine healing,Type 2 diabetes mellitus with  diabetic neuropathy, unspecified       SKILLED  NEED: fall prevention, medication management, health assessment, wound care    NURSING FREQUENCY: 2w2, 1w2, 3 prn  ZIPE CODE: 10026  DISCIPLINES ORDERED: PT OT SN HHA SLP  INSURANCE: [facility]  CERTIFICATION PERIOD: 1/24-3/24  SPECIAL CONSIDERATION: NONE  Case communication to HH attending provider and P juliet anti    Opened patient to [facility] medication reconciliation process done. Medication list left in home care folder. See MA R for drug allergy interactions. There are   major drug to drug interactions.  The best contact phone number is  and PTS WIFE manages the medications.          Drug-Drug: aspirin and enoxaparin    The risk of bleeding in enoxaparin treated patients may be increased by aspirin, including the development of procedure-related epidural or spinal hematomas.    Details   Major   aspirin (ASA) 81 MG Chew Tab chewable tablet         enoxaparin (LOVENOX) 40 MG/0.4ML Solution Prefilled Syringe inj

## 2023-01-27 ENCOUNTER — HOME CARE VISIT (OUTPATIENT)
Dept: HOME HEALTH SERVICES | Facility: HOME HEALTHCARE | Age: 73
End: 2023-01-27
Payer: MEDICARE

## 2023-01-27 PROCEDURE — G0495 RN CARE TRAIN/EDU IN HH: HCPCS

## 2023-01-27 PROCEDURE — G0151 HHCP-SERV OF PT,EA 15 MIN: HCPCS

## 2023-01-27 SDOH — ECONOMIC STABILITY: HOUSING INSECURITY: HOME SAFETY: RECOMMENDED REMOVAL OF THROW AND AREA RUGS.  PATIENT AND WIFE ABLE TO SPEAKBACK VERBAL UNDERSTANDING.

## 2023-01-27 ASSESSMENT — ENCOUNTER SYMPTOMS
STOOL FREQUENCY: DAILY
DENIES PAIN: 1
VOMITING: DENIES
LAST BOWEL MOVEMENT: 66501
FATIGUES EASILY: 1
ARTHRALGIAS: 1
DENIES PAIN: 1
PERSON REPORTING PAIN: PATIENT
DIFFICULTY THINKING: 1
NAUSEA: DENIES
POOR JUDGMENT: 1
MUSCLE WEAKNESS: 1
BOWEL PATTERN NORMAL: 1

## 2023-01-27 ASSESSMENT — ACTIVITIES OF DAILY LIVING (ADL)
AMBULATION_DISTANCE/DURATION_TOLERATED: 70 FEET
AMBULATION ASSISTANCE ON FLAT SURFACES: 1

## 2023-01-27 NOTE — Clinical Note
pt sitted in recliner , alertand oriented x3, forgetful at times . denies pain, states he is feeling fine. no change in medications as reported by wife. wife left during snv. pt didnt check blood glucose , states he still needs to get his monitoring device . pt instructed on the importance of checking blood glucose , carmen he is on  hypoglycemic medications  nstructed n daily dibetic foot check, states he had been daibetic for a long time, reviewed s/s of hypo/hyperglycemia using patient's safety in binder..pt lives in second floor condo , has 19 steps going in and out.ambulates using walker .instructed on safety/fll prevention. Pt/Cg response to the services provided: denies chest pains, breathing problems , s/s of hypo/hyperglycemia or falls Plan for the next visit: cont health assessment/teachings per poc.

## 2023-01-28 NOTE — CASE COMMUNICATION
PT evaluation completed on 1/27/23.  Frequency 1 week 1, 2 week 3 effective 1/27/23.  Please assist with authorization as needed.

## 2023-01-28 NOTE — CASE COMMUNICATION
noted  ----- Message -----  From: Carmen Casanova, PT  Sent: 1/27/2023   4:18 PM PST  To: Lety Charlton R.N., Kristen Prieto, OT, *      PT evaluation completed on 1/27/23.  Frequency 1 week 1, 2 week 3 effective 1/27/23.  Please assist with authorization as needed.

## 2023-01-29 VITALS
RESPIRATION RATE: 18 BRPM | HEART RATE: 86 BPM | TEMPERATURE: 97.5 F | DIASTOLIC BLOOD PRESSURE: 74 MMHG | OXYGEN SATURATION: 97 % | SYSTOLIC BLOOD PRESSURE: 118 MMHG

## 2023-01-29 ASSESSMENT — ENCOUNTER SYMPTOMS
LIMITED RANGE OF MOTION: 1
PERSON REPORTING PAIN: PATIENT
DRY SKIN: 1
MUSCLE WEAKNESS: 1

## 2023-01-30 ENCOUNTER — APPOINTMENT (OUTPATIENT)
Dept: PHYSICAL THERAPY | Facility: MEDICAL CENTER | Age: 73
End: 2023-01-30
Attending: PSYCHIATRY & NEUROLOGY
Payer: MEDICARE

## 2023-01-30 ENCOUNTER — HOME CARE VISIT (OUTPATIENT)
Dept: HOME HEALTH SERVICES | Facility: HOME HEALTHCARE | Age: 73
End: 2023-01-30
Payer: MEDICARE

## 2023-01-30 VITALS
SYSTOLIC BLOOD PRESSURE: 122 MMHG | HEART RATE: 92 BPM | OXYGEN SATURATION: 94 % | DIASTOLIC BLOOD PRESSURE: 60 MMHG | TEMPERATURE: 98 F | RESPIRATION RATE: 18 BRPM

## 2023-01-30 VITALS
TEMPERATURE: 97.5 F | SYSTOLIC BLOOD PRESSURE: 120 MMHG | HEART RATE: 88 BPM | RESPIRATION RATE: 18 BRPM | DIASTOLIC BLOOD PRESSURE: 65 MMHG | OXYGEN SATURATION: 95 %

## 2023-01-30 PROCEDURE — G0151 HHCP-SERV OF PT,EA 15 MIN: HCPCS

## 2023-01-30 PROCEDURE — G0156 HHCP-SVS OF AIDE,EA 15 MIN: HCPCS

## 2023-01-30 ASSESSMENT — ENCOUNTER SYMPTOMS
PERSON REPORTING PAIN: PATIENT
POOR JUDGMENT: 1
DENIES PAIN: 1
DIFFICULTY THINKING: 1

## 2023-01-30 ASSESSMENT — ACTIVITIES OF DAILY LIVING (ADL): TRANSPORTATION COMMENTS: REQUIRES ASSIST OF ANOTHER PERSON AND AD OUT OF HOME ON UNEVEN SURFACES AND STEPS

## 2023-01-30 NOTE — CASE COMMUNICATION
noted  ----- Message -----  From: Selena Galeana R.N.  Sent: 1/29/2023  12:58 PM PST  To: Lety Charlton R.N., Tisha Alberto R.N., *      pt sitted in recliner , alertand oriented x3, forgetful at times . denies pain, states he is feeling fine. no change in medications as reported by wife. wife left during snv. pt didnt check blood glucose , states he still needs to get his monitoring device . pt instructed on the importance of check ing blood glucose , carmen he is on  hypoglycemic medications  nstructed n daily dibetic foot check, states he had been daibetic for a long time, reviewed s/s of hypo/hyperglycemia using patient's safety in binder..pt lives in second floor cond , has 19 steps going in and out.ambulates using walker .instructed on safety/fll prevention. Pt/Cg response to the services provided: denies chest pains, breathing problems , s/s of hypo/hyperglyce david or falls Plan for the next visit: cont health assessment/teachings per poc.

## 2023-01-31 ENCOUNTER — HOME CARE VISIT (OUTPATIENT)
Dept: HOME HEALTH SERVICES | Facility: HOME HEALTHCARE | Age: 73
End: 2023-01-31
Payer: MEDICARE

## 2023-01-31 VITALS
DIASTOLIC BLOOD PRESSURE: 78 MMHG | HEART RATE: 100 BPM | TEMPERATURE: 97.1 F | SYSTOLIC BLOOD PRESSURE: 136 MMHG | OXYGEN SATURATION: 97 % | RESPIRATION RATE: 18 BRPM

## 2023-01-31 PROCEDURE — G0495 RN CARE TRAIN/EDU IN HH: HCPCS

## 2023-01-31 ASSESSMENT — ENCOUNTER SYMPTOMS
POOR JUDGMENT: 1
BOWEL PATTERN NORMAL: 1
STOOL FREQUENCY: DAILY
LAST BOWEL MOVEMENT: 66505
DENIES PAIN: 1

## 2023-01-31 NOTE — CASE COMMUNICATION
Action Needed:  Wife is requesting referal to [facility] for foot & nail care in the home. She thinks someone coming to the house would be better than going out to the podiatrist

## 2023-02-01 ENCOUNTER — PATIENT OUTREACH (OUTPATIENT)
Dept: HEALTH INFORMATION MANAGEMENT | Facility: OTHER | Age: 73
End: 2023-02-01

## 2023-02-01 ENCOUNTER — HOME CARE VISIT (OUTPATIENT)
Dept: HOME HEALTH SERVICES | Facility: HOME HEALTHCARE | Age: 73
End: 2023-02-01
Payer: MEDICARE

## 2023-02-01 ENCOUNTER — PATIENT MESSAGE (OUTPATIENT)
Dept: HEALTH INFORMATION MANAGEMENT | Facility: OTHER | Age: 73
End: 2023-02-01

## 2023-02-01 ENCOUNTER — DOCUMENTATION (OUTPATIENT)
Dept: VASCULAR LAB | Facility: MEDICAL CENTER | Age: 73
End: 2023-02-01

## 2023-02-01 VITALS
DIASTOLIC BLOOD PRESSURE: 65 MMHG | TEMPERATURE: 97.9 F | RESPIRATION RATE: 18 BRPM | SYSTOLIC BLOOD PRESSURE: 122 MMHG | OXYGEN SATURATION: 97 % | HEART RATE: 105 BPM

## 2023-02-01 DIAGNOSIS — I10 ESSENTIAL HYPERTENSION: ICD-10-CM

## 2023-02-01 PROCEDURE — G0151 HHCP-SERV OF PT,EA 15 MIN: HCPCS

## 2023-02-01 SDOH — ECONOMIC STABILITY: TRANSPORTATION INSECURITY
IN THE PAST 12 MONTHS, HAS LACK OF TRANSPORTATION KEPT YOU FROM MEETINGS, WORK, OR FROM GETTING THINGS NEEDED FOR DAILY LIVING?: NO

## 2023-02-01 SDOH — SOCIAL STABILITY: SOCIAL NETWORK: ARE YOU MARRIED, WIDOWED, DIVORCED, SEPARATED, NEVER MARRIED, OR LIVING WITH A PARTNER?: MARRIED

## 2023-02-01 SDOH — ECONOMIC STABILITY: FOOD INSECURITY: WITHIN THE PAST 12 MONTHS, YOU WORRIED THAT YOUR FOOD WOULD RUN OUT BEFORE YOU GOT MONEY TO BUY MORE.: OFTEN TRUE

## 2023-02-01 SDOH — HEALTH STABILITY: MENTAL HEALTH: HOW MANY STANDARD DRINKS CONTAINING ALCOHOL DO YOU HAVE ON A TYPICAL DAY?: PATIENT DOES NOT DRINK

## 2023-02-01 SDOH — ECONOMIC STABILITY: INCOME INSECURITY: HOW HARD IS IT FOR YOU TO PAY FOR THE VERY BASICS LIKE FOOD, HOUSING, MEDICAL CARE, AND HEATING?: NOT VERY HARD

## 2023-02-01 SDOH — ECONOMIC STABILITY: HOUSING INSECURITY
IN THE LAST 12 MONTHS, WAS THERE A TIME WHEN YOU DID NOT HAVE A STEADY PLACE TO SLEEP OR SLEPT IN A SHELTER (INCLUDING NOW)?: NO

## 2023-02-01 SDOH — ECONOMIC STABILITY: FOOD INSECURITY: WITHIN THE PAST 12 MONTHS, THE FOOD YOU BOUGHT JUST DIDN'T LAST AND YOU DIDN'T HAVE MONEY TO GET MORE.: OFTEN TRUE

## 2023-02-01 SDOH — HEALTH STABILITY: MENTAL HEALTH
STRESS IS WHEN SOMEONE FEELS TENSE, NERVOUS, ANXIOUS, OR CAN'T SLEEP AT NIGHT BECAUSE THEIR MIND IS TROUBLED. HOW STRESSED ARE YOU?: ONLY A LITTLE

## 2023-02-01 SDOH — ECONOMIC STABILITY: INCOME INSECURITY: IN THE LAST 12 MONTHS, WAS THERE A TIME WHEN YOU WERE NOT ABLE TO PAY THE MORTGAGE OR RENT ON TIME?: NO

## 2023-02-01 SDOH — SOCIAL STABILITY: SOCIAL NETWORK: IN A TYPICAL WEEK, HOW MANY TIMES DO YOU TALK ON THE PHONE WITH FAMILY, FRIENDS, OR NEIGHBORS?: THREE TIMES A WEEK

## 2023-02-01 SDOH — SOCIAL STABILITY: SOCIAL NETWORK
DO YOU BELONG TO ANY CLUBS OR ORGANIZATIONS SUCH AS CHURCH GROUPS UNIONS, FRATERNAL OR ATHLETIC GROUPS, OR SCHOOL GROUPS?: NO

## 2023-02-01 SDOH — HEALTH STABILITY: MENTAL HEALTH: HOW OFTEN DO YOU HAVE 6 OR MORE DRINKS ON ONE OCCASION?: NEVER

## 2023-02-01 SDOH — SOCIAL STABILITY: SOCIAL NETWORK: HOW OFTEN DO YOU GET TOGETHER WITH FRIENDS OR RELATIVES?: THREE TIMES A WEEK

## 2023-02-01 SDOH — SOCIAL STABILITY: SOCIAL NETWORK: HOW OFTEN DO YOU ATTENT MEETINGS OF THE CLUB OR ORGANIZATION YOU BELONG TO?: NEVER

## 2023-02-01 SDOH — HEALTH STABILITY: PHYSICAL HEALTH: ON AVERAGE, HOW MANY DAYS PER WEEK DO YOU ENGAGE IN MODERATE TO STRENUOUS EXERCISE (LIKE A BRISK WALK)?: 0 DAYS

## 2023-02-01 SDOH — HEALTH STABILITY: MENTAL HEALTH: HOW OFTEN DO YOU HAVE A DRINK CONTAINING ALCOHOL?: NEVER

## 2023-02-01 SDOH — HEALTH STABILITY: PHYSICAL HEALTH: ON AVERAGE, HOW MANY MINUTES DO YOU ENGAGE IN EXERCISE AT THIS LEVEL?: 0 MIN

## 2023-02-01 SDOH — SOCIAL STABILITY: SOCIAL NETWORK: HOW OFTEN DO YOU ATTEND CHURCH OR RELIGIOUS SERVICES?: NEVER

## 2023-02-01 SDOH — ECONOMIC STABILITY: HOUSING INSECURITY: IN THE LAST 12 MONTHS, HOW MANY PLACES HAVE YOU LIVED?: 1

## 2023-02-01 ASSESSMENT — LIFESTYLE VARIABLES
AUDIT-C TOTAL SCORE: 0
SKIP TO QUESTIONS 9-10: 1

## 2023-02-01 ASSESSMENT — ENCOUNTER SYMPTOMS
DENIES PAIN: 1
POOR JUDGMENT: 1
PERSON REPORTING PAIN: PATIENT

## 2023-02-01 ASSESSMENT — ACTIVITIES OF DAILY LIVING (ADL): TRANSPORTATION COMMENTS: REQUIRES ASSIST OF ANOTHER PERSON AND WALKER OUT OF HOME ON STEPS AND UNEVEN SURFACES

## 2023-02-01 NOTE — CASE COMMUNICATION
Patient's resting HR at 105 during PT visit on 2/1/23 was outside of normal parameters and is to be reported to MD and CM. Patient denies any dizziness, light headedness, chest pain.  All other vitals are within HH parameters. No change in mentation.

## 2023-02-01 NOTE — CASE COMMUNICATION
Quality Review Completed for 1/25 SOC OASIS by GARY Barton, RN on 2/1/2023:   Edits completed by GARY Barton RN:     1.  and  diagnosis coding updated per chart review  2. Per EMR records from providers,  checked #2 and  is daily  3. Pneumonia vaccine is yes per administration records  4.  changed to 1 per PT eval on 1/27  5.  changed to 1/27 for collaboration  6.  checked D therapeutic diet  7. Per narra tive min level of assist, changed , 1810, 1845, 1850 to 2.  to 3  8. [id number] E, F, changed to 3  9. [id number] C, D, E, F, I, J, K, L changed to 3  10. Added to 485 forms the following: exercises prescribed, removed WC added walker to Activities Permitted, incontinence to Functional Limitations, ambulate only w/assist, bleeding precautions to Safety Measures  11.   changed to 8 per therapy

## 2023-02-01 NOTE — PROGRESS NOTES
[facility] Community Health Worker Intake    Social determinates of health intake complete.   Identified barriers to medication costs and food.  Contact information provided to [facility] through My Chart.  Has PCP appointment scheduled for 2/8/23  Scheduled CHW Follow-Up: 2/2/23  Type of Follow-Up (phone, virtual, clinic, home): Phone  Case Management General Assessment in Epic completed: Yes    Pt lives with his wife and expresses that he feels capable of managing his health well. Pt is currently on  services. Pt states that he does not currently exercise but it was recommended by PT that he start out 2x per week. CHW will mail pt low intensity home exercises. Pt expressed concern with paying for all of his medications. CHW will give pt resources that may help with the cost. Pt reports that he and his wife struggle with having enough food through out the month. CHW will see if pt qualifies for [facility] [employer] [facility] delivery and mail out food bank information. CHW will also refer pt to meals on wheels to see if he qualifies. CHW will follow up with pt on 2/3/23.

## 2023-02-02 ENCOUNTER — HOME CARE VISIT (OUTPATIENT)
Dept: HOME HEALTH SERVICES | Facility: HOME HEALTHCARE | Age: 73
End: 2023-02-02
Payer: MEDICARE

## 2023-02-02 ENCOUNTER — APPOINTMENT (OUTPATIENT)
Dept: PHYSICAL THERAPY | Facility: MEDICAL CENTER | Age: 73
End: 2023-02-02
Attending: PSYCHIATRY & NEUROLOGY
Payer: MEDICARE

## 2023-02-02 VITALS
OXYGEN SATURATION: 97 % | DIASTOLIC BLOOD PRESSURE: 70 MMHG | RESPIRATION RATE: 18 BRPM | TEMPERATURE: 97.9 F | SYSTOLIC BLOOD PRESSURE: 134 MMHG | HEART RATE: 106 BPM

## 2023-02-02 PROCEDURE — G0156 HHCP-SVS OF AIDE,EA 15 MIN: HCPCS

## 2023-02-02 PROCEDURE — G0495 RN CARE TRAIN/EDU IN HH: HCPCS

## 2023-02-02 ASSESSMENT — ENCOUNTER SYMPTOMS: MUSCLE WEAKNESS: 1

## 2023-02-02 NOTE — CASE COMMUNICATION
at rest, missed 2 doses of metoprolol 12.5mg this week  1) Needs refill for Metoprolol tartrate 25mg 1/2 tab twice daily  2) Requests something for diarrhea, has had since radiation. Suggest metamucil or colestid? for bulking agent

## 2023-02-02 NOTE — CASE COMMUNICATION
noted  ----- Message -----  From: Carmen Casanova, PT  Sent: 2/1/2023   2:37 PM PST  To: Lety Charlton R.N., Kristen Prieto, OT, *      Patient's resting HR at 105 during PT visit on 2/1/23 was outside of normal parameters and is to be reported to MD and CM. Patient denies any dizziness, light headedness, chest pain.  All other vitals are within HH parameters. No change in mentation.

## 2023-02-02 NOTE — PROGRESS NOTES
CHW signed pt up for weekly food delivery through [employer]. CHW spoke to pt and his wife columba about these services.

## 2023-02-02 NOTE — PROGRESS NOTES
[facility]    Received referral from [facility] to review patient's medication list.    Med list reviewed and reconciled.    Buproprion + metoprolol - bupropion may increase effects of metoprolol; monitor for s/s of bradycardia    Allergies reviewed.      Radha Metzger, PharmD

## 2023-02-03 ENCOUNTER — HOME CARE VISIT (OUTPATIENT)
Dept: HOME HEALTH SERVICES | Facility: HOME HEALTHCARE | Age: 73
End: 2023-02-03
Payer: MEDICARE

## 2023-02-03 ENCOUNTER — PATIENT OUTREACH (OUTPATIENT)
Dept: HEALTH INFORMATION MANAGEMENT | Facility: OTHER | Age: 73
End: 2023-02-03

## 2023-02-03 VITALS
OXYGEN SATURATION: 94 % | TEMPERATURE: 98.5 F | DIASTOLIC BLOOD PRESSURE: 74 MMHG | SYSTOLIC BLOOD PRESSURE: 130 MMHG | HEART RATE: 98 BPM | RESPIRATION RATE: 18 BRPM

## 2023-02-03 VITALS
DIASTOLIC BLOOD PRESSURE: 70 MMHG | RESPIRATION RATE: 18 BRPM | TEMPERATURE: 97.6 F | HEART RATE: 60 BPM | OXYGEN SATURATION: 96 % | SYSTOLIC BLOOD PRESSURE: 134 MMHG

## 2023-02-03 DIAGNOSIS — E11.65 TYPE 2 DIABETES MELLITUS WITH HYPERGLYCEMIA, WITHOUT LONG-TERM CURRENT USE OF INSULIN (HCC): ICD-10-CM

## 2023-02-03 PROCEDURE — G0153 HHCP-SVS OF S/L PATH,EA 15MN: HCPCS

## 2023-02-03 ASSESSMENT — ENCOUNTER SYMPTOMS
AGGRESSION WITHIN DEFINED LIMITS: 1
DENIES PAIN: 1
DENIES PAIN: 1
ANGER WITHIN DEFINED LIMITS: 1
CHANGE IN APPETITE: UNCHANGED
APPETITE LEVEL: GOOD
MUSCLE WEAKNESS: 1
STOOL FREQUENCY: LESS THAN DAILY
PERSON REPORTING PAIN: PATIENT
BOWEL PATTERN NORMAL: 1
POOR JUDGMENT: 1
LAST BOWEL MOVEMENT: 66507

## 2023-02-03 NOTE — CASE COMMUNICATION
i agree with this  ----- Message -----  From: Ernestina Barton R.N.  Sent: 2/1/2023   1:07 PM PST  To: Nancy Fuentes R.N.      Quality Review Completed for 1/25 SOC OASIS by GARY Barton, RN on 2/1/2023:   Edits completed by GARY Barton, RN:     1.  and  diagnosis coding updated per chart review  2. Per EMR records from providers,  checked #2 and  is daily  3. Pneumonia vaccine is yes per administration records  4 .  changed to 1 per PT eval on 1/27  5.  changed to 1/27 for collaboration  6.  checked D therapeutic diet  7. Per narrative min level of assist, changed , 1810, 1845, 1850 to 2.  to 3  8. [id number] E, F, changed to 3  9. [id number] C, D, E, F, I, J, K, L changed to 3  10. Added to 485 forms the following: exercises prescribed, removed WC added walker to Activities Permitted, incontinence to Functional Limitations, amb ulate only w/assist, bleeding precautions to Safety Measures  11.   changed to 8 per therapy

## 2023-02-03 NOTE — PROGRESS NOTES
CHW mailed pt resources on chair based exercises, strength and balance exercises that are low intensity, Food bank [facility] information, and resources that may help with prescription assistance. CHW will follow up with pt on 2/8/23.

## 2023-02-06 ENCOUNTER — HOME CARE VISIT (OUTPATIENT)
Dept: HOME HEALTH SERVICES | Facility: HOME HEALTHCARE | Age: 73
End: 2023-02-06
Payer: MEDICARE

## 2023-02-06 ENCOUNTER — APPOINTMENT (OUTPATIENT)
Dept: PHYSICAL THERAPY | Facility: MEDICAL CENTER | Age: 73
End: 2023-02-06
Attending: PSYCHIATRY & NEUROLOGY
Payer: MEDICARE

## 2023-02-06 VITALS
SYSTOLIC BLOOD PRESSURE: 115 MMHG | OXYGEN SATURATION: 96 % | DIASTOLIC BLOOD PRESSURE: 62 MMHG | RESPIRATION RATE: 18 BRPM | HEART RATE: 91 BPM | TEMPERATURE: 97.4 F

## 2023-02-06 VITALS
OXYGEN SATURATION: 96 % | SYSTOLIC BLOOD PRESSURE: 105 MMHG | DIASTOLIC BLOOD PRESSURE: 65 MMHG | RESPIRATION RATE: 18 BRPM | TEMPERATURE: 97.4 F | HEART RATE: 95 BPM

## 2023-02-06 PROCEDURE — G0156 HHCP-SVS OF AIDE,EA 15 MIN: HCPCS

## 2023-02-06 PROCEDURE — G0151 HHCP-SERV OF PT,EA 15 MIN: HCPCS

## 2023-02-06 ASSESSMENT — ENCOUNTER SYMPTOMS
HIGHEST PAIN SEVERITY IN PAST 24 HOURS: 0/10
PAIN SEVERITY GOAL: 0/10
DENIES PAIN: 1
DENIES PAIN: 1
PERSON REPORTING PAIN: PATIENT
PERSON REPORTING PAIN: PATIENT
POOR JUDGMENT: 1

## 2023-02-06 NOTE — CASE COMMUNICATION
noted  ----- Message -----  From: Doris Clay, SLP  Sent: 2/3/2023   3:56 PM PST  To: Lety Charlton R.N., Mihaela Hillman, *      SLP eval completed 2/3/23. Requesting auth 1w1, 2w3

## 2023-02-07 ENCOUNTER — HOME CARE VISIT (OUTPATIENT)
Dept: HOME HEALTH SERVICES | Facility: HOME HEALTHCARE | Age: 73
End: 2023-02-07
Payer: MEDICARE

## 2023-02-07 PROBLEM — F10.21 ALCOHOL DEPENDENCE IN REMISSION (HCC): Status: ACTIVE | Noted: 2019-01-03

## 2023-02-07 PROCEDURE — G0180 MD CERTIFICATION HHA PATIENT: HCPCS | Performed by: INTERNAL MEDICINE

## 2023-02-07 NOTE — CASE COMMUNICATION
noted  ----- Message -----  From: Carmen Casanova, PT  Sent: 2/6/2023   5:00 PM PST  To: Lety Charlton R.N., Tisha Alberto R.N., *      Patient's resting heartrate at 95 bpm is outside of normal parameters and is to be reported to MD and CM. Patient denies any dizziness, light headedness, chest pain.  All other vitals are within HH parameters. Patient is demonstrating poor carryover with education and adherence with HEP thus far.

## 2023-02-07 NOTE — CASE COMMUNICATION
Patient's resting heartrate at 95 bpm is outside of normal parameters and is to be reported to MD and CM. Patient denies any dizziness, light headedness, chest pain.  All other vitals are within HH parameters. Patient is demonstrating poor carryover with education and adherence with HEP thus far.

## 2023-02-08 ENCOUNTER — OFFICE VISIT (OUTPATIENT)
Dept: MEDICAL GROUP | Age: 73
End: 2023-02-08
Payer: MEDICARE

## 2023-02-08 ENCOUNTER — HOME CARE VISIT (OUTPATIENT)
Dept: HOME HEALTH SERVICES | Facility: HOME HEALTHCARE | Age: 73
End: 2023-02-08
Payer: MEDICARE

## 2023-02-08 VITALS
DIASTOLIC BLOOD PRESSURE: 76 MMHG | TEMPERATURE: 98 F | HEART RATE: 90 BPM | SYSTOLIC BLOOD PRESSURE: 130 MMHG | OXYGEN SATURATION: 98 % | RESPIRATION RATE: 19 BRPM

## 2023-02-08 VITALS
RESPIRATION RATE: 16 BRPM | OXYGEN SATURATION: 95 % | HEIGHT: 70 IN | HEART RATE: 118 BPM | DIASTOLIC BLOOD PRESSURE: 75 MMHG | SYSTOLIC BLOOD PRESSURE: 125 MMHG | WEIGHT: 196 LBS | BODY MASS INDEX: 28.06 KG/M2 | TEMPERATURE: 97.2 F

## 2023-02-08 DIAGNOSIS — G91.2 NORMAL PRESSURE HYDROCEPHALUS (HCC): ICD-10-CM

## 2023-02-08 DIAGNOSIS — E08.65 DIABETES MELLITUS DUE TO UNDERLYING CONDITION, UNCONTROLLED, WITH HYPERGLYCEMIA (HCC): ICD-10-CM

## 2023-02-08 DIAGNOSIS — E88.9 PERIPHERAL NEUROPATHY DUE TO DISORDER OF METABOLISM (HCC): ICD-10-CM

## 2023-02-08 DIAGNOSIS — F10.11 HISTORY OF ALCOHOL ABUSE: ICD-10-CM

## 2023-02-08 DIAGNOSIS — G63 PERIPHERAL NEUROPATHY DUE TO DISORDER OF METABOLISM (HCC): ICD-10-CM

## 2023-02-08 DIAGNOSIS — F10.21 ALCOHOL DEPENDENCE IN REMISSION (HCC): ICD-10-CM

## 2023-02-08 DIAGNOSIS — I47.10 PSVT (PAROXYSMAL SUPRAVENTRICULAR TACHYCARDIA) (HCC): ICD-10-CM

## 2023-02-08 DIAGNOSIS — Z12.11 SCREEN FOR COLON CANCER: ICD-10-CM

## 2023-02-08 DIAGNOSIS — I10 ESSENTIAL HYPERTENSION: ICD-10-CM

## 2023-02-08 DIAGNOSIS — F32.1 CURRENT MODERATE EPISODE OF MAJOR DEPRESSIVE DISORDER WITHOUT PRIOR EPISODE (HCC): ICD-10-CM

## 2023-02-08 DIAGNOSIS — E88.9 PERIPHERAL NEUROPATHY DUE TO METABOLIC DISORDER (HCC): ICD-10-CM

## 2023-02-08 DIAGNOSIS — F32.1 CURRENT MODERATE EPISODE OF MAJOR DEPRESSIVE DISORDER, UNSPECIFIED WHETHER RECURRENT (HCC): ICD-10-CM

## 2023-02-08 DIAGNOSIS — E61.2 MAGNESIUM DEFICIENCY: ICD-10-CM

## 2023-02-08 DIAGNOSIS — F10.97 MODERATE DEMENTIA ASSOCIATED WITH ALCOHOLISM, WITH MOOD DISTURBANCE (HCC): ICD-10-CM

## 2023-02-08 DIAGNOSIS — E11.65 UNCONTROLLED TYPE 2 DIABETES MELLITUS WITH HYPERGLYCEMIA (HCC): ICD-10-CM

## 2023-02-08 DIAGNOSIS — F10.27 DEMENTIA ASSOCIATED WITH ALCOHOLISM WITH BEHAVIORAL DISTURBANCE (HCC): ICD-10-CM

## 2023-02-08 DIAGNOSIS — F01.50 VASCULAR DEMENTIA, UNSPECIFIED DEMENTIA SEVERITY, UNSPECIFIED WHETHER BEHAVIORAL, PSYCHOTIC, OR MOOD DISTURBANCE OR ANXIETY (HCC): ICD-10-CM

## 2023-02-08 DIAGNOSIS — G63 PERIPHERAL NEUROPATHY DUE TO METABOLIC DISORDER (HCC): ICD-10-CM

## 2023-02-08 DIAGNOSIS — K21.9 GASTROESOPHAGEAL REFLUX DISEASE WITHOUT ESOPHAGITIS: ICD-10-CM

## 2023-02-08 PROBLEM — D69.6 THROMBOCYTOPENIA, UNSPECIFIED (HCC): Status: RESOLVED | Noted: 2022-11-07 | Resolved: 2023-02-08

## 2023-02-08 PROBLEM — K59.09 OTHER CONSTIPATION: Status: RESOLVED | Noted: 2023-01-05 | Resolved: 2023-02-08

## 2023-02-08 PROBLEM — Z86.59 HISTORY OF DEPRESSION: Status: RESOLVED | Noted: 2022-12-28 | Resolved: 2023-02-08
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
## 2023-02-08 PROBLEM — R68.2 DRY MOUTH: Status: RESOLVED | Noted: 2023-01-05 | Resolved: 2023-02-08

## 2023-02-08 PROBLEM — I95.1 ORTHOSTATIC HYPOTENSION: Status: RESOLVED | Noted: 2019-01-03 | Resolved: 2023-02-08

## 2023-02-08 PROCEDURE — G0153 HHCP-SVS OF S/L PATH,EA 15MN: HCPCS

## 2023-02-08 PROCEDURE — 99214 OFFICE O/P EST MOD 30 MIN: CPT | Performed by: INTERNAL MEDICINE

## 2023-02-08 RX ORDER — OMEPRAZOLE 20 MG/1
20 CAPSULE, DELAYED RELEASE ORAL 2 TIMES DAILY
Qty: 180 CAPSULE | Refills: 4 | Status: SHIPPED | OUTPATIENT
Start: 2023-02-08 | End: 2024-02-05 | Stop reason: SDUPTHER

## 2023-02-08 RX ORDER — FOLIC ACID 1 MG/1
1 TABLET ORAL DAILY
Qty: 90 TABLET | Refills: 3 | Status: SHIPPED | OUTPATIENT
Start: 2023-02-08 | End: 2024-01-31

## 2023-02-08 RX ORDER — BENAZEPRIL HYDROCHLORIDE 40 MG/1
40 TABLET ORAL DAILY
COMMUNITY
End: 2023-08-23

## 2023-02-08 RX ORDER — BUPROPION HYDROCHLORIDE 150 MG/1
150 TABLET, EXTENDED RELEASE ORAL 2 TIMES DAILY
Qty: 180 TABLET | Refills: 3 | Status: SHIPPED | OUTPATIENT
Start: 2023-02-08 | End: 2024-02-05 | Stop reason: SDUPTHER

## 2023-02-08 RX ORDER — ORAL SEMAGLUTIDE 3 MG/1
3 TABLET ORAL DAILY
Qty: 30 TABLET | Refills: 2 | Status: SHIPPED | OUTPATIENT
Start: 2023-02-08 | End: 2023-05-11

## 2023-02-08 RX ORDER — LANOLIN ALCOHOL/MO/W.PET/CERES
400 CREAM (GRAM) TOPICAL DAILY
Qty: 100 TABLET | Refills: 3 | Status: ON HOLD | OUTPATIENT
Start: 2023-02-08 | End: 2024-02-18

## 2023-02-08 RX ORDER — DULOXETIN HYDROCHLORIDE 60 MG/1
60 CAPSULE, DELAYED RELEASE ORAL 2 TIMES DAILY
Qty: 180 CAPSULE | Refills: 3 | Status: SHIPPED | OUTPATIENT
Start: 2023-02-08 | End: 2024-02-05 | Stop reason: SDUPTHER

## 2023-02-08 RX ORDER — EMPAGLIFLOZIN 25 MG/1
25 TABLET, FILM COATED ORAL DAILY
Qty: 90 TABLET | Refills: 4 | Status: SHIPPED
Start: 2023-02-08 | End: 2023-08-18

## 2023-02-08 RX ORDER — PIOGLITAZONEHYDROCHLORIDE 30 MG/1
30 TABLET ORAL DAILY
Qty: 90 TABLET | Refills: 4 | Status: SHIPPED | OUTPATIENT
Start: 2023-02-08 | End: 2024-01-18

## 2023-02-08 ASSESSMENT — PATIENT HEALTH QUESTIONNAIRE - PHQ9
1. LITTLE INTEREST OR PLEASURE IN DOING THINGS: NOT AT ALL
5. POOR APPETITE OR OVEREATING: NOT AT ALL
7. TROUBLE CONCENTRATING ON THINGS, SUCH AS READING THE NEWSPAPER OR WATCHING TELEVISION: NOT AT ALL
4. FEELING TIRED OR HAVING LITTLE ENERGY: NOT AT ALL
3. TROUBLE FALLING OR STAYING ASLEEP OR SLEEPING TOO MUCH: NOT AT ALL
6. FEELING BAD ABOUT YOURSELF - OR THAT YOU ARE A FAILURE OR HAVE LET YOURSELF OR YOUR FAMILY DOWN: NOT AL ALL
8. MOVING OR SPEAKING SO SLOWLY THAT OTHER PEOPLE COULD HAVE NOTICED. OR THE OPPOSITE, BEING SO FIGETY OR RESTLESS THAT YOU HAVE BEEN MOVING AROUND A LOT MORE THAN USUAL: NOT AT ALL
9. THOUGHTS THAT YOU WOULD BE BETTER OFF DEAD, OR OF HURTING YOURSELF: NOT AT ALL
2. FEELING DOWN, DEPRESSED, IRRITABLE, OR HOPELESS: NOT AT ALL
SUM OF ALL RESPONSES TO PHQ QUESTIONS 1-9: 0
SUM OF ALL RESPONSES TO PHQ9 QUESTIONS 1 AND 2: 0

## 2023-02-08 ASSESSMENT — FIBROSIS 4 INDEX: FIB4 SCORE: 1.4

## 2023-02-08 ASSESSMENT — ENCOUNTER SYMPTOMS
DENIES PAIN: 1
PERSON REPORTING PAIN: PATIENT
POOR JUDGMENT: 1
PSYCHIATRIC NEGATIVE: 1
MUSCULOSKELETAL NEGATIVE: 1
CARDIOVASCULAR NEGATIVE: 1
EYES NEGATIVE: 1
CONSTITUTIONAL NEGATIVE: 1
GASTROINTESTINAL NEGATIVE: 1
RESPIRATORY NEGATIVE: 1
NEUROLOGICAL NEGATIVE: 1
DIFFICULTY THINKING: 1

## 2023-02-08 NOTE — PROGRESS NOTES
Subjective     Prabhakar Sierra is a 72 y.o. male who presents with Follow-Up ([facility]  wants the patch to check his blood sugar )  The patient is here for followup of chronic medical problems listed below. The patient is compliant with medications and having no side effects from them. Denies chest pain, abdominal pain, dyspnea, myalgias, or cough.   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 Moran    Tobacco dependency    Diabetes mellitus, type 2 ([facility])    Normal pressure hydrocephalus ([facility])    Compression fracture of body of thoracic vertebra ([facility])    Age-related physical debility    Compression fracture of L2 lumbar vertebra, closed, initial encounter ([facility])    Compression fracture of L3 lumbar vertebra, closed, initial encounter ([facility])    Neuropathy    Pancytopenia ([facility])    Thoracic compression fracture, closed, initial encounter ([facility])    Impaired mobility and ADLs    Urinary retention    Other insomnia    Uncontrolled type 2 diabetes mellitus with hyperglycemia ([facility])    PSVT (paroxysmal supraventricular tachycardia) ([facility])    Peripheral neuropathy due to disorder of metabolism ([facility])     Outpatient Medications Prior to Visit   Medication Sig Dispense Refill    benazepril (LOTENSIN) 20 MG Tab Take 40 mg by mouth every day.      Accu-Chek Softclix Lancets Misc Use 2 lancet as directed twice a day 102 Each 3    glucose blood (FREESTYLE LITE) strip Use 2 strip via meter twice a day 100 Strip 3    acetaminophen (TYLENOL)  325 MG Tab Take 2 Tablets by mouth every four hours as needed for Mild Pain. 30 Tablet 0    aspirin (ASA) 81 MG Chew Tab chewable tablet Chew 1 Tablet every day. 100 Tablet     atorvastatin (LIPITOR) 80 MG tablet Take 1 Tablet by mouth every evening. 90 Tablet 4    metformin (GLUCOPHAGE) 1000 MG tablet Take 1 Tablet by mouth 2 times a day with meals. 180 Tablet 3    thiamine (THIAMINE) 100 MG tablet Take 1 Tablet by mouth every day.      Blood Glucose Monitoring Suppl Device Freestyle carlo glucose monitoring device with all necessary accessories including patch and transmitter  for type II noninsulin treated diabetes.  To check blood sugar once or twice daily and per any symptoms of high or low blood sugar. 1 Each 1    Blood Glucose Monitoring Suppl Device Meter: Dispense Device of Insurance Preference. Sig. Use as directed for blood sugar monitoring. #1. NR. 1 Each 0    Blood Glucose Test Strips Test strips for meter dispensed, testing one time per day E11.65 100 Strip 2    Lancets Lancets for meter dispensed, to test one time per day E11.65 100 Each 2    Empagliflozin (JARDIANCE) 25 MG Tab Take 25 mg by mouth every day. Indications: Type 2 Diabetes      Semaglutide (RYBELSUS) 3 MG Tab Take 3 mg by mouth every day at 6 PM. Indications: Type 2 Diabetes      multivitamin Tab Take 1 Tablet by mouth every day. 30 Tablet     buPROPion SR (WELLBUTRIN-SR) 150 MG TABLET SR 12 HR sustained-release tablet Take 1 Tablet by mouth 2 times a day. 60 Tablet     DULoxetine (CYMBALTA) 60 MG Cap DR Particles delayed-release capsule Take 1 Capsule by mouth 2 times a day. 30 Capsule     lidocaine (LIDODERM) 5 % Patch Place 1 Patch on the skin every 24 hours. 10 Patch     omeprazole (PRILOSEC) 20 MG delayed-release capsule Take 1 Capsule by mouth every morning before breakfast. 30 Capsule     pioglitazone (ACTOS) 30 MG Tab Take 1 Tablet by mouth every day. 100 Tablet 4    folic acid (FOLVITE) 1 MG Tab Take 1 Tablet by mouth  every day. 30 Tablet     metoprolol tartrate (LOPRESSOR) 25 MG Tab Take 0.5 Tablets by mouth 2 times a day. 60 Tablet      No facility-administered medications prior to visit.     No Known Allergies  No visits with results within 1 Month(s) from this visit.   Latest known visit with results is:   Admission on 12/28/2022, Discharged on 01/05/2023   Component Date Value    Influenza virus A RNA 12/28/2022 Negative     Influenza virus B, PCR 12/28/2022 Negative     RSV, PCR 12/28/2022 Negative     SARS-CoV-2 by PCR 12/28/2022 NotDetected     SARS-CoV-2 Source 12/28/2022 NP Swab     POC Glucose, Blood 12/28/2022 215 (H)     POC Glucose, Blood 12/28/2022 184 (H)     WBC 12/29/2022 6.2     RBC 12/29/2022 4.98     Hemoglobin 12/29/2022 14.7     Hematocrit 12/29/2022 43.6     MCV 12/29/2022 87.6     MCH 12/29/2022 29.5     MCHC 12/29/2022 33.7     RDW 12/29/2022 44.2     Platelet Count 12/29/2022 224     MPV 12/29/2022 10.7     Neutrophils-Polys 12/29/2022 71.20     Lymphocytes 12/29/2022 14.70 (L)     Monocytes 12/29/2022 10.20     Eosinophils 12/29/2022 2.30     Basophils 12/29/2022 0.60     Immature Granulocytes 12/29/2022 1.00 (H)     Nucleated RBC 12/29/2022 0.00     Neutrophils (Absolute) 12/29/2022 4.40     Lymphs (Absolute) 12/29/2022 0.91 (L)     Monos (Absolute) 12/29/2022 0.63     Eos (Absolute) 12/29/2022 0.14     Baso (Absolute) 12/29/2022 0.04     Immature Granulocytes (a* 12/29/2022 0.06     NRBC (Absolute) 12/29/2022 0.00     Sodium 12/29/2022 133 (L)     Potassium 12/29/2022 3.9     Chloride 12/29/2022 99     Co2 12/29/2022 19 (L)     Anion Gap 12/29/2022 15.0     Glucose 12/29/2022 163 (H)     Bun 12/29/2022 21     Creatinine 12/29/2022 0.70     Calcium 12/29/2022 9.6     AST(SGOT) 12/29/2022 13     ALT(SGPT) 12/29/2022 13     Alkaline Phosphatase 12/29/2022 69     Total Bilirubin 12/29/2022 0.7     Albumin 12/29/2022 3.9     Total Protein 12/29/2022 6.8     Globulin 12/29/2022 2.9     A-G Ratio 12/29/2022  1.3     Magnesium 12/29/2022 1.2 (L)     GFR (CKD-EPI) 12/29/2022 97     Correct Calcium 12/29/2022 9.7     POC Glucose, Blood 12/29/2022 208 (H)     POC Glucose, Blood 12/29/2022 196 (H)     POC Glucose, Blood 12/29/2022 235 (H)     POC Glucose, Blood 12/29/2022 199 (H)     POC Glucose, Blood 12/30/2022 142 (H)     POC Glucose, Blood 12/30/2022 196 (H)     POC Glucose, Blood 12/30/2022 151 (H)     POC Glucose, Blood 12/30/2022 130 (H)     POC Glucose, Blood 12/31/2022 108 (H)     POC Glucose, Blood 12/31/2022 162 (H)     POC Glucose, Blood 12/31/2022 144 (H)     POC Glucose, Blood 12/31/2022 156 (H)     Sodium 01/01/2023 133 (L)     Potassium 01/01/2023 3.8     Chloride 01/01/2023 100     Co2 01/01/2023 19 (L)     Glucose 01/01/2023 95     Bun 01/01/2023 16     Creatinine 01/01/2023 0.67     Calcium 01/01/2023 9.0     Anion Gap 01/01/2023 14.0     Magnesium 01/01/2023 1.1 (L)     Phosphorus 01/01/2023 3.4     GFR (CKD-EPI) 01/01/2023 98     POC Glucose, Blood 01/01/2023 122 (H)     POC Glucose, Blood 01/01/2023 161 (H)     POC Glucose, Blood 01/01/2023 145 (H)     POC Glucose, Blood 01/01/2023 157 (H)     POC Glucose, Blood 01/02/2023 128 (H)     POC Glucose, Blood 01/02/2023 219 (H)     POC Glucose, Blood 01/02/2023 136 (H)     Sodium 01/03/2023 133 (L)     Potassium 01/03/2023 4.1     Chloride 01/03/2023 100     Co2 01/03/2023 21     Glucose 01/03/2023 101 (H)     Bun 01/03/2023 14     Creatinine 01/03/2023 0.62     Calcium 01/03/2023 9.5     Anion Gap 01/03/2023 12.0     Magnesium 01/03/2023 1.1 (L)     Phosphorus 01/03/2023 3.2     POC Glucose, Blood 01/02/2023 112 (H)     GFR (CKD-EPI) 01/03/2023 101     POC Glucose, Blood 01/03/2023 125 (H)     POC Glucose, Blood 01/03/2023 213 (H)     POC Glucose, Blood 01/03/2023 100 (H)     POC Glucose, Blood 01/03/2023 112 (H)     WBC 01/04/2023 6.2     RBC 01/04/2023 4.39 (L)     Hemoglobin 01/04/2023 13.3 (L)     Hematocrit 01/04/2023 40.4 (L)     MCV 01/04/2023 92.0      MCH 01/04/2023 30.3     MCHC 01/04/2023 32.9 (L)     RDW 01/04/2023 47.8     Platelet Count 01/04/2023 188     MPV 01/04/2023 10.6     Magnesium 01/04/2023 1.5     POC Glucose, Blood 01/04/2023 112 (H)     POC Glucose, Blood 01/04/2023 182 (H)     POC Glucose, Blood 01/04/2023 75     POC Glucose, Blood 01/04/2023 129 (H)     POC Glucose, Blood 01/05/2023 112 (H)     POC Glucose, Blood 01/05/2023 287 (H)     Influenza virus A RNA 01/05/2023 Negative     Influenza virus B, PCR 01/05/2023 Negative     RSV, PCR 01/05/2023 Negative     SARS-CoV-2 by PCR 01/05/2023 NotDetected     SARS-CoV-2 Source 01/05/2023 NP Swab       Lab Results   Component Value Date/Time    HBA1C 8.4 (H) 11/04/2022 09:56 AM    HBA1C 8.1 (H) 10/18/2022 11:23 AM     Lab Results   Component Value Date/Time    SODIUM 133 (L) 01/03/2023 05:43 AM    POTASSIUM 4.1 01/03/2023 05:43 AM    CHLORIDE 100 01/03/2023 05:43 AM    CO2 21 01/03/2023 05:43 AM    GLUCOSE 101 (H) 01/03/2023 05:43 AM    BUN 14 01/03/2023 05:43 AM    CREATININE 0.62 01/03/2023 05:43 AM    ALKPHOSPHAT 69 12/29/2022 05:34 AM    ASTSGOT 13 12/29/2022 05:34 AM    ALTSGPT 13 12/29/2022 05:34 AM    TBILIRUBIN 0.7 12/29/2022 05:34 AM     Lab Results   Component Value Date/Time    INR 1.11 01/02/2019 07:04 PM    INR 0.95 01/18/2016 12:00 AM     Lab Results   Component Value Date/Time    CHOLSTRLTOT 181 11/04/2022 09:56 AM    LDL 87 11/04/2022 09:56 AM    HDL 44 11/04/2022 09:56 AM    TRIGLYCERIDE 252 (H) 11/04/2022 09:56 AM       Lab Results   Component Value Date/Time    TESTOSTERONE <12 (L) 10/27/2022 03:39 PM     No results found for: TSH  No results found for: FREET4  No results found for: URICACID  No components found for: VITB12  Lab Results   Component Value Date/Time    25HYDROXY 23 (L) 10/18/2022 11:23 AM    25HYDROXY 30 05/10/2022 09:17 AM   '          HPI    Review of Systems   Constitutional: Negative.    HENT: Negative.     Eyes: Negative.    Respiratory: Negative.    "  Cardiovascular: Negative.    Gastrointestinal: Negative.    Genitourinary: Negative.    Musculoskeletal: Negative.    Skin: Negative.    Neurological: Negative.    Endo/Heme/Allergies: Negative.    Psychiatric/Behavioral: Negative.              Objective     /75 (BP Location: Right arm, Patient Position: Sitting, BP Cuff Size: Adult)   Pulse (!) 118   Temp 36.2 °C (97.2 °F) (Temporal)   Resp 16   Ht 1.778 m (5' 10\")   Wt 88.9 kg (196 lb)   SpO2 95%   BMI 28.12 kg/m²      Physical Exam  Constitutional:       General: He is not in acute distress.     Appearance: He is well-developed. He is not diaphoretic.   HENT:      Head: Normocephalic and atraumatic.      Right Ear: External ear normal.      Left Ear: External ear normal.      Nose: Nose normal.      Mouth/Throat:      Pharynx: No oropharyngeal exudate.   Eyes:      General: No scleral icterus.        Right eye: No discharge.         Left eye: No discharge.      Conjunctiva/sclera: Conjunctivae normal.      Pupils: Pupils are equal, round, and reactive to light.   Neck:      Thyroid: No thyromegaly.      Vascular: No JVD.      Trachea: No tracheal deviation.   Cardiovascular:      Rate and Rhythm: Normal rate and regular rhythm.      Heart sounds: Normal heart sounds. No murmur heard.    No friction rub. No gallop.   Pulmonary:      Effort: Pulmonary effort is normal. No respiratory distress.      Breath sounds: Normal breath sounds. No stridor. No wheezing or rales.   Chest:      Chest wall: No tenderness.   Abdominal:      General: Bowel sounds are normal. There is no distension.      Palpations: Abdomen is soft. There is no mass.      Tenderness: There is no abdominal tenderness. There is no guarding or rebound.   Musculoskeletal:         General: No tenderness. Normal range of motion.      Cervical back: Normal range of motion and neck supple.   Lymphadenopathy:      Cervical: No cervical adenopathy.   Skin:     General: Skin is warm and dry.    "   Coloration: Skin is not pale.      Findings: No erythema or rash.   Neurological:      Mental Status: He is alert and oriented to person, place, and time.      Motor: No abnormal muscle tone.      Coordination: Coordination normal.      Deep Tendon Reflexes: Reflexes are normal and symmetric. Reflexes normal.   Psychiatric:         Behavior: Behavior normal.         Thought Content: Thought content normal.         Judgment: Judgment normal.                           Assessment & Plan        1. Magnesium deficiency     - magnesium oxide 400 (240 Mg) MG Tab; Take 1 Tablet by mouth every day.  Dispense: 100 Tablet; Refill: 3    2. Diabetes mellitus due to underlying condition, uncontrolled, with hyperglycemia (HCC)    Under good control. Continue same regimen.'  - Referral to Endocrinology  - Semaglutide (RYBELSUS) 3 MG Tab; Take 3 mg by mouth every day at 6 PM. Indications: Type 2 Diabetes  Dispense: 30 Tablet; Refill: 2  - Empagliflozin (JARDIANCE) 25 MG Tab; Take 25 mg by mouth every day. Indications: Type 2 Diabetes  Dispense: 90 Tablet; Refill: 4  - Patient identified as fall risk.  Appropriate orders and counseling given.  - Comp Metabolic Panel; Future  - Hemoglobin A1c; Future  - Lipid Profile; Future  - Microalbumin Creat Ratio Urine - Lab Collect; Future  - Referral to Ophthalmology  - Diabetic Monofilament Lower Extremity Exam  - REFERRAL TO CARE MANAGEMENT  -  Charge for Chronic Care Management Program  -  Charge for Chronic Care Management Program  - REFERRAL TO CARE MANAGEMENT  -  Charge for Chronic Care Management Program    3. History of alcohol abuse    Under good control. Continue same regimen.'  - folic acid (FOLVITE) 1 MG Tab; Take 1 Tablet by mouth every day. Indications: ALCOHOL ABUSE  Dispense: 90 Tablet; Refill: 3    4. Alcohol dependence in remission (HCC)    Under good control. Continue same regimen.'  - REFERRAL TO CARE MANAGEMENT  -  Charge for Chronic Care Management  Program  -  Charge for Chronic Care Management Program  - REFERRAL TO CARE MANAGEMENT  -  Charge for Chronic Care Management Program    5. Vascular dementia, unspecified dementia severity, unspecified whether behavioral, psychotic, or mood disturbance or anxiety (HCC)    Under good control. Continue same regimen.'    6. Current moderate episode of major depressive disorder, unspecified whether recurrent (HCC)     - Referral to Psychology  - REFERRAL TO CARE MANAGEMENT  -  Charge for Chronic Care Management Program  -  Charge for Chronic Care Management Program  - REFERRAL TO CARE MANAGEMENT  -  Charge for Chronic Care Management Program    7. Moderate dementia associated with alcoholism, with mood disturbance (HCC)     - Referral to Psychology  - REFERRAL TO CARE MANAGEMENT  -  Charge for Chronic Care Management Program  -  Charge for Chronic Care Management Program  - REFERRAL TO CARE MANAGEMENT  -  Charge for Chronic Care Management Program    8. Current moderate episode of major depressive disorder without prior episode (HCC)    Under good control. Continue same regimen.'  - buPROPion SR (WELLBUTRIN-SR) 150 MG TABLET SR 12 HR sustained-release tablet; Take 1 Tablet by mouth 2 times a day. Indications: Major Depressive Disorder  Dispense: 180 Tablet; Refill: 3  - REFERRAL TO CARE MANAGEMENT  -  Charge for Chronic Care Management Program  -  Charge for Chronic Care Management Program  - REFERRAL TO CARE MANAGEMENT  -  Charge for Chronic Care Management Program    9. 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 Continue same regimen.'  - DULoxetine (CYMBALTA) 60 MG Cap DR Particles delayed-release capsule; Take 1 Capsule by mouth 2 times a day. Indications: Major Depressive Disorder  Dispense: 180 Capsule; Refill: 3    10. Gastroesophageal reflux disease without esophagitis     - omeprazole (PRILOSEC) 20 MG delayed-release  capsule; Take 1 Capsule by mouth 2 times a day. Indications: Gastroesophageal Reflux Disease  Dispense: 180 Capsule; Refill: 4    11. Uncontrolled type 2 diabetes mellitus with hyperglycemia (HCC)    Under good control. Continue same regimen.'  - pioglitazone (ACTOS) 30 MG Tab; Take 1 Tablet by mouth every day. Indications: Type 2 Diabetes  Dispense: 90 Tablet; Refill: 4  - REFERRAL TO CARE MANAGEMENT  -  Charge for Chronic Care Management Program  -  Charge for Chronic Care Management Program  - REFERRAL TO CARE MANAGEMENT  -  Charge for Chronic Care Management Program    12. Normal pressure hydrocephalus (HCC)   Under good control. Continue same regimen.'  .GOOD[  13. 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 Essential hypertensionUnder good control. Continue same regimen.' Under good control. Continue same regimen.'     - metoprolol tartrate (LOPRESSOR) 25 MG Tab; Take 0.5 Tablets by mouth 2 times a day. Indications: High Blood Pressure Disorder  Dispense: 90 Tablet; Refill: 3    15. PSVT (paroxysmal supraventricular tachycardia) (HCC)   Under good control. Continue same regimen.'  - metoprolol tartrate (LOPRESSOR) 25 MG Tab; Take 0.5 Tablets by mouth 2 times a day. Indications: High Blood Pressure Disorder  Dispense: 90 Tablet; Refill: 3    16. Screen for colon cancer     - TIMOTHY (FIT DNA)    17. Peripheral neuropathy due to disorder of metabolism (HCC)      Under good control. Continue same regimen.'

## 2023-02-09 ENCOUNTER — HOME CARE VISIT (OUTPATIENT)
Dept: HOME HEALTH SERVICES | Facility: HOME HEALTHCARE | Age: 73
End: 2023-02-09
Payer: MEDICARE

## 2023-02-09 ENCOUNTER — APPOINTMENT (OUTPATIENT)
Dept: PHYSICAL THERAPY | Facility: MEDICAL CENTER | Age: 73
End: 2023-02-09
Attending: PSYCHIATRY & NEUROLOGY
Payer: MEDICARE

## 2023-02-09 VITALS
RESPIRATION RATE: 20 BRPM | HEART RATE: 125 BPM | DIASTOLIC BLOOD PRESSURE: 74 MMHG | OXYGEN SATURATION: 95 % | SYSTOLIC BLOOD PRESSURE: 120 MMHG | TEMPERATURE: 96.6 F

## 2023-02-09 VITALS
TEMPERATURE: 96.6 F | RESPIRATION RATE: 20 BRPM | HEART RATE: 125 BPM | DIASTOLIC BLOOD PRESSURE: 74 MMHG | OXYGEN SATURATION: 95 % | SYSTOLIC BLOOD PRESSURE: 120 MMHG

## 2023-02-09 PROCEDURE — G0156 HHCP-SVS OF AIDE,EA 15 MIN: HCPCS

## 2023-02-09 PROCEDURE — G0495 RN CARE TRAIN/EDU IN HH: HCPCS

## 2023-02-09 PROCEDURE — G0153 HHCP-SVS OF S/L PATH,EA 15MN: HCPCS

## 2023-02-09 ASSESSMENT — ENCOUNTER SYMPTOMS
PERSON REPORTING PAIN: PATIENT
HIGHEST PAIN SEVERITY IN PAST 24 HOURS: 0/10
SHORTNESS OF BREATH: 1
DENIES PAIN: 1
LAST BOWEL MOVEMENT: 66514
POOR JUDGMENT: 1
DIFFICULTY THINKING: 1
PERSON REPORTING PAIN: PATIENT
DENIES PAIN: 1
DYSPNEA ACTIVITY LEVEL: AT REST
FATIGUE: 1
DENIES PAIN: 1
PAIN SEVERITY GOAL: 0/10
POOR JUDGMENT: 1
BOWEL PATTERN NORMAL: 1
DIFFICULTY THINKING: 1

## 2023-02-09 ASSESSMENT — ACTIVITIES OF DAILY LIVING (ADL): TRANSPORTATION COMMENTS: WALKER

## 2023-02-09 NOTE — Clinical Note
FYI  911 was called by Bernie Amato. Pt refused to go to the hospital he felt asystematic.   Patient had a sponge bath today. Patients pulse was 125 and was given medication.by Rn   HHA took  pulse it was 97 manually end of the appointment.  Lety Charlton Rn and Ofelia Alberto Rn Supervisor were sent a Case communication through [employer].

## 2023-02-09 NOTE — CASE COMMUNICATION
fyi, patient canceled PT visit on 2/8/23 as he states he was too exhausted to participate in PT today as he went to see PCP earlier and it fatigued him significantly.   Patient requested scheduling for next week.  PT called wife later in day and scheduled for next week.

## 2023-02-09 NOTE — Clinical Note
Dr. Miller, voicemail also left with your MA, patient's HR today is 125, regular, T 96.6, no change in patient symptoms, all other VS WDL, I noted yesterday  at PCP appointment, patient is out of metoprolol, spouse going to pharmacy to  to administer now.

## 2023-02-09 NOTE — Clinical Note
1200: Follow up report: Patient continued to have tachycardia and developed irregularity to HR with palor and diaphoresis, SN called [facility], EKG showed sinus arrhythmia per paramedic, patient color returned to pink, diaphoresis resolved. Spouse obtained Metoprolol from pharmacy, SN administered per order at visit, patient declined ER evaluation. Received message from [facility] at 1300, patient's HR decreased to 96, patient continued without symptoms.

## 2023-02-10 ENCOUNTER — HOME CARE VISIT (OUTPATIENT)
Dept: HOME HEALTH SERVICES | Facility: HOME HEALTHCARE | Age: 73
End: 2023-02-10
Payer: MEDICARE

## 2023-02-10 ENCOUNTER — PATIENT OUTREACH (OUTPATIENT)
Dept: HEALTH INFORMATION MANAGEMENT | Facility: OTHER | Age: 73
End: 2023-02-10
Payer: MEDICARE

## 2023-02-10 DIAGNOSIS — I10 ESSENTIAL HYPERTENSION: ICD-10-CM

## 2023-02-10 PROCEDURE — G0155 HHCP-SVS OF CSW,EA 15 MIN: HCPCS

## 2023-02-10 ASSESSMENT — ACTIVITIES OF DAILY LIVING (ADL)
SHOPPING_REQUIRES_ASSISTANCE: 1
AMBULATION_REQUIRES_ASSISTANCE: 1
LAUNDRY_REQUIRES_ASSISTANCE: 1

## 2023-02-10 NOTE — CASE COMMUNICATION
noted  ----- Message -----  From: Carmen Casanova, PT  Sent: 2/9/2023   8:36 AM PST  To: BRIAN Truong, patient canceled PT visit on 2/8/23 as he states he was too exhausted to participate in PT today as he went to see PCP earlier and it fatigued him significantly.   Patient requested scheduling for next week.  PT called wife later in day and scheduled for next week.

## 2023-02-10 NOTE — CASE COMMUNICATION
noted  ----- Message -----  From: Nicki Amato R.N.  Sent: 2/9/2023   3:56 PM PST  To: Lety Charlton R.N., Tisha Alberto R.N., *      1200: Follow up report: Patient continued to have tachycardia and developed irregularity to HR with palor and diaphoresis, SN called [facility], EKG showed sinus arrhythmia per paramedic, patient color returned to pink, diaphoresis resolved. Spouse obtained Metoprolol from pharmacy, SN administered per  order at visit, patient declined ER evaluation. Received message from [facility] at 1300, patient's HR decreased to 96, patient continued without symptoms.

## 2023-02-10 NOTE — CASE COMMUNICATION
noted  ----- Message -----  From: GARY LingNVILMA.  Sent: 2/9/2023   5:14 PM PST  To: Lety Charlton R.N., Stanton Miller M.D.      FYI  911 was called by Bernie Amato. Pt refused to go to the hospital he felt asystematic.   Patient had a sponge bath today. Patients pulse was 125 and was given medication.by Rn   HHA took  pulse it was 97 manually end of the appointment.  Lety Charlton Rn and Ofelia Alberto Rn Superv isor were sent a Case communication through [facility].

## 2023-02-10 NOTE — PROGRESS NOTES
CHW called pt to follow up regarding the resources that were mailed to him. Pt states that he has not received the packet of resources yet. Pt also states that he is in an appointment with a SW at this time. CHW will follow up wit pt next week.

## 2023-02-10 NOTE — CASE COMMUNICATION
noted  ----- Message -----  From: Nicki Amato R.N.  Sent: 2/9/2023  11:00 AM PST  To: Lety Charlton R.N., Tisha Alberto R.N., *      Dr. Miller, voicemail also left with your MA, patient's HR today is 125, regular, T 96.6, no change in patient symptoms, all other VS WDL, I noted yesterday  at PCP appointment, patient is out of metoprolol, spouse going to pharmacy to  to administer now.

## 2023-02-13 ENCOUNTER — HOME CARE VISIT (OUTPATIENT)
Dept: HOME HEALTH SERVICES | Facility: HOME HEALTHCARE | Age: 73
End: 2023-02-13
Payer: MEDICARE

## 2023-02-13 ENCOUNTER — HOSPITAL ENCOUNTER (OUTPATIENT)
Dept: RADIOLOGY | Facility: MEDICAL CENTER | Age: 73
End: 2023-02-13
Attending: PSYCHIATRY & NEUROLOGY
Payer: MEDICARE

## 2023-02-13 VITALS
SYSTOLIC BLOOD PRESSURE: 122 MMHG | OXYGEN SATURATION: 98 % | RESPIRATION RATE: 18 BRPM | TEMPERATURE: 97.2 F | HEART RATE: 94 BPM | DIASTOLIC BLOOD PRESSURE: 80 MMHG

## 2023-02-13 PROCEDURE — G0156 HHCP-SVS OF AIDE,EA 15 MIN: HCPCS

## 2023-02-13 ASSESSMENT — ENCOUNTER SYMPTOMS
DENIES PAIN: 1
PERSON REPORTING PAIN: PATIENT
PAIN SEVERITY GOAL: 0/10
HIGHEST PAIN SEVERITY IN PAST 24 HOURS: 0/10

## 2023-02-14 ENCOUNTER — PATIENT OUTREACH (OUTPATIENT)
Dept: HEALTH INFORMATION MANAGEMENT | Facility: OTHER | Age: 73
End: 2023-02-14
Payer: MEDICARE

## 2023-02-14 ENCOUNTER — HOME CARE VISIT (OUTPATIENT)
Dept: HOME HEALTH SERVICES | Facility: HOME HEALTHCARE | Age: 73
End: 2023-02-14
Payer: MEDICARE

## 2023-02-14 VITALS
SYSTOLIC BLOOD PRESSURE: 140 MMHG | DIASTOLIC BLOOD PRESSURE: 78 MMHG | HEART RATE: 94 BPM | TEMPERATURE: 97.8 F | RESPIRATION RATE: 18 BRPM | OXYGEN SATURATION: 96 %

## 2023-02-14 DIAGNOSIS — I10 ESSENTIAL HYPERTENSION: ICD-10-CM

## 2023-02-14 PROCEDURE — G0153 HHCP-SVS OF S/L PATH,EA 15MN: HCPCS

## 2023-02-14 NOTE — PROGRESS NOTES
CHW attempted to call pt and follow up regarding the resources that were mailed out. Pt did not answer. CHW LVM with the contact information to return call.

## 2023-02-14 NOTE — CASE COMMUNICATION
noted  ----- Message -----  From: GARY LingNCYRIL  Sent: 2/13/2023   5:43 PM PST  To: Lety Charlton R.N., Stanton Miller M.D.      [facility]    Pt temperature 97.2 today out of Care plan perimeters

## 2023-02-15 ENCOUNTER — HOME CARE VISIT (OUTPATIENT)
Dept: HOME HEALTH SERVICES | Facility: HOME HEALTHCARE | Age: 73
End: 2023-02-15
Payer: MEDICARE

## 2023-02-15 VITALS
OXYGEN SATURATION: 97 % | HEART RATE: 109 BPM | RESPIRATION RATE: 18 BRPM | DIASTOLIC BLOOD PRESSURE: 82 MMHG | TEMPERATURE: 98.1 F | SYSTOLIC BLOOD PRESSURE: 118 MMHG

## 2023-02-15 PROCEDURE — G0495 RN CARE TRAIN/EDU IN HH: HCPCS

## 2023-02-15 PROCEDURE — G0151 HHCP-SERV OF PT,EA 15 MIN: HCPCS

## 2023-02-15 ASSESSMENT — ENCOUNTER SYMPTOMS
DENIES PAIN: 1
PERSON REPORTING PAIN: PATIENT
PERSON REPORTING PAIN: PATIENT
DENIES PAIN: 1
POOR JUDGMENT: 1
DIFFICULTY THINKING: 1

## 2023-02-15 ASSESSMENT — ACTIVITIES OF DAILY LIVING (ADL): TRANSPORTATION COMMENTS: REQUIRES ASSIST OF ANOTHER PERSON AND WALKER OUT OF HOME ON STEPS AND UNEVEN SURFACES

## 2023-02-15 NOTE — Clinical Note
pt ambulates using walker, alert and oriented x3, wife present. denies pain .pt fell yesterday as reported.  denies injury ..had taken his am meds ,wife said needs medbox flilled .pt didn't check blood glucose, wife said they wer told just to check sugar twice a week . pt on multiple medications for diabetis .assisted pt in checking blood glucose, teaching steps on how to use glucometer,  mg/dl. . pt not able to remember steps. medbox refilled wife intermittently present helping  to get ready for and appoitment to fit shoes. wife said pt takes medications twice a day including antidepressant medicatons. instructed on the importance of not skipping meals and s/s of hypoglycemia .wife said they have to leave soon for appt.. Pt/Cg response to the services provided: denies chest pains, breathing problems , bleedingor falls. Plan for the next visit: needs follow up on medications, fall prevention.

## 2023-02-15 NOTE — CASE COMMUNICATION
Patient's heartrate at 109 at rest is outside of normal parameters and is to be reported to MD and CM. Patient denies any dizziness, light headedness, chest pain.  All other vitals are within  parameters.  Wife reports she just gave patient his medications approx 15 min before PT arrived.  Nurse to see patient this afternoon and will reasess.  Patient also reports having fall yesterday.        Falls Template         Date & Time of f all: 2/14/23, 5:00 pm         Cause of fall:  Mechanical, patient reports he could not get foot of recliner to push into chair prior to getting up so he attempted to get up with it out, causing him to lose balance and fall.  Patient denies any injury or hitting head.           Location:  Living Room         Was the fall witnessed?                  If yes, by who? No          Actions taken by patient:  called for wife to assist.  Wife as sisted to standing position with FWW.           Any new injury?                   If yes, what kind?  No          Any recent medication changes?    No          Did patient have appropriate DME available?  Yes                 If no, please explain:  NA          Actions Taken: Notified care team and Notified MD.  PT educated in need to always put foot of recliner in prior to getting up and if cannot do himself always ask for assistance.   Patient able to demonstrate with ease during PT visit.

## 2023-02-16 ENCOUNTER — HOME CARE VISIT (OUTPATIENT)
Dept: HOME HEALTH SERVICES | Facility: HOME HEALTHCARE | Age: 73
End: 2023-02-16
Payer: MEDICARE

## 2023-02-16 ENCOUNTER — PATIENT OUTREACH (OUTPATIENT)
Dept: HEALTH INFORMATION MANAGEMENT | Facility: OTHER | Age: 73
End: 2023-02-16
Payer: MEDICARE

## 2023-02-16 VITALS
HEART RATE: 87 BPM | RESPIRATION RATE: 16 BRPM | TEMPERATURE: 97.5 F | DIASTOLIC BLOOD PRESSURE: 62 MMHG | OXYGEN SATURATION: 97 % | SYSTOLIC BLOOD PRESSURE: 138 MMHG

## 2023-02-16 VITALS
OXYGEN SATURATION: 95 % | SYSTOLIC BLOOD PRESSURE: 122 MMHG | RESPIRATION RATE: 18 BRPM | TEMPERATURE: 98.1 F | HEART RATE: 92 BPM | DIASTOLIC BLOOD PRESSURE: 76 MMHG

## 2023-02-16 DIAGNOSIS — I10 ESSENTIAL HYPERTENSION: ICD-10-CM

## 2023-02-16 DIAGNOSIS — E11.65 UNCONTROLLED TYPE 2 DIABETES MELLITUS WITH HYPERGLYCEMIA (HCC): ICD-10-CM

## 2023-02-16 PROCEDURE — G0156 HHCP-SVS OF AIDE,EA 15 MIN: HCPCS

## 2023-02-16 PROCEDURE — 99999 PR NO CHARGE: CPT | Performed by: INTERNAL MEDICINE

## 2023-02-16 PROCEDURE — 99490 CHRNC CARE MGMT STAFF 1ST 20: CPT | Performed by: INTERNAL MEDICINE

## 2023-02-16 ASSESSMENT — ENCOUNTER SYMPTOMS
LIMITED RANGE OF MOTION: 1
HIGHEST PAIN SEVERITY IN PAST 24 HOURS: 0/10
DENIES PAIN: 1
BOWEL PATTERN NORMAL: 1
MUSCLE WEAKNESS: 1
NAUSEA: DENIES
PERSON REPORTING PAIN: PATIENT
DRY SKIN: 1
RESPIRATORY SYMPTOMS COMMENTS: DENIES
PERSON REPORTING PAIN: PATIENT
FATIGUES EASILY: 1
PAIN SEVERITY GOAL: 0/10
LAST BOWEL MOVEMENT: 66519
VOMITING: DENIES
DENIES PAIN: 1

## 2023-02-16 ASSESSMENT — PAIN SCALES - PAIN ASSESSMENT IN ADVANCED DEMENTIA (PAINAD)
NEGVOCALIZATION: 0 - NONE.
CONSOLABILITY: 0 - NO NEED TO CONSOLE.
FACIALEXPRESSION: 0 - SMILING OR INEXPRESSIVE.
TOTALSCORE: 0
BODYLANGUAGE: 0 - RELAXED.

## 2023-02-16 NOTE — CASE COMMUNICATION
noted  ----- Message -----  From: Carmen Casanova, PT  Sent: 2/15/2023   1:27 PM PST  To: Lety Charlton R.N., *      Patient's heartrate at 109 at rest is outside of normal parameters and is to be reported to MD and CM. Patient denies any dizziness, light headedness, chest pain.  All other vitals are within  parameters.  Wife reports she just gave patient his medications approx 15 min before PT arrived.  Nurse to see patient this aft ernoon and will reasess.  Patient also reports having fall yesterday.        Falls Template         Date & Time of fall: 2/14/23, 5:00 pm         Cause of fall:  Mechanical, patient reports he could not get foot of recliner to push into chair prior to getting up so he attempted to get up with it out, causing him to lose balance and fall.  Patient denies any injury or hitting head.           Location:  Living Room         Was the fall  witnessed?                  If yes, by who? No          Actions taken by patient:  called for wife to assist.  Wife assisted to standing position with FWW.           Any new injury?                   If yes, what kind?  No          Any recent medication changes?    No          Did patient have appropriate DME available?  Yes                 If no, please explain:  NA          Actions Taken: Notified care team and Notified MD.  PT educat ed in need to always put foot of recliner in prior to getting up and if cannot do himself always ask for assistance.  Patient able to demonstrate with ease during PT visit.

## 2023-02-17 ENCOUNTER — HOME CARE VISIT (OUTPATIENT)
Dept: HOME HEALTH SERVICES | Facility: HOME HEALTHCARE | Age: 73
End: 2023-02-17
Payer: MEDICARE

## 2023-02-17 VITALS
SYSTOLIC BLOOD PRESSURE: 138 MMHG | DIASTOLIC BLOOD PRESSURE: 74 MMHG | HEART RATE: 83 BPM | TEMPERATURE: 98.2 F | OXYGEN SATURATION: 96 % | RESPIRATION RATE: 18 BRPM

## 2023-02-17 VITALS
SYSTOLIC BLOOD PRESSURE: 138 MMHG | OXYGEN SATURATION: 96 % | TEMPERATURE: 98.2 F | HEART RATE: 83 BPM | RESPIRATION RATE: 18 BRPM | DIASTOLIC BLOOD PRESSURE: 74 MMHG

## 2023-02-17 PROCEDURE — G0299 HHS/HOSPICE OF RN EA 15 MIN: HCPCS

## 2023-02-17 PROCEDURE — G0151 HHCP-SERV OF PT,EA 15 MIN: HCPCS

## 2023-02-17 PROCEDURE — G0152 HHCP-SERV OF OT,EA 15 MIN: HCPCS

## 2023-02-17 ASSESSMENT — ENCOUNTER SYMPTOMS
PERSON REPORTING PAIN: PATIENT
DEPRESSED MOOD: 1
POOR JUDGMENT: 1
MUSCLE WEAKNESS: 1
DYSPNEA ON EXERTION: 1
DENIES PAIN: 1
VOMITING: DENIES
DIFFICULTY THINKING: 1
LIMITED RANGE OF MOTION: 1
NAUSEA: DENIES
DIFFICULTY THINKING: 1
DENIES PAIN: 1
FATIGUES EASILY: 1
PERSON REPORTING PAIN: PATIENT
DENIES PAIN: 1

## 2023-02-17 ASSESSMENT — ACTIVITIES OF DAILY LIVING (ADL)
FEEDING: INDEPENDENT
TRANSPORTATION COMMENTS: REQUIRES ASSIST OF ANOTHER PERSON AND WALKER OUT OF HOME ON STEPS AND UNEVEN SURFACES
GROOMING_WITHIN_DEFINED_LIMITS: 1
PREPARING MEALS: DEPENDENT
TOILETING: SUPERVISION
AMBULATION ASSISTANCE: 1
PHYSICAL TRANSFERS ASSESSED: 1
GROOMING_CURRENT_FUNCTION: SUPERVISION
TOILETING EQUIPMENT USED: GRAB BAR
FEEDING_WITHIN_DEFINED_LIMITS: 1
SHOPPING: DEPENDENT
GROOMING ASSESSED: 1
CURRENT_FUNCTION: CONTACT GUARD ASSIST
AMBULATION_DISTANCE/DURATION_TOLERATED: 100 FEET
DRESSING_UB_CURRENT_FUNCTION: INDEPENDENT
WASHING_UPB_CURRENT_FUNCTION: STAND BY ASSIST
SHOPPING ASSESSED: 1
WASHING_LB_CURRENT_FUNCTION: MODERATE ASSIST
TRANSPORTATION ASSESSED: 1
FEEDING ASSESSED: 1
BATHING_CURRENT_FUNCTION: MODERATE ASSIST
BATHING ASSESSED: 1
AMBULATION ASSISTANCE: SUPERVISION
AMBULATION ASSISTANCE ON FLAT SURFACES: 1
TOILETING: 1
DRESSING_LB_CURRENT_FUNCTION: MAXIMUM ASSIST
TRANSPORTATION: DEPENDENT

## 2023-02-17 ASSESSMENT — PAIN SCALES - PAIN ASSESSMENT IN ADVANCED DEMENTIA (PAINAD)
FACIALEXPRESSION: 0 - SMILING OR INEXPRESSIVE.
NEGVOCALIZATION: 0 - NONE.
CONSOLABILITY: 0 - NO NEED TO CONSOLE.
TOTALSCORE: 0
BODYLANGUAGE: 0 - RELAXED.

## 2023-02-17 NOTE — CASE COMMUNICATION
noted  ----- Message -----  From: Selena Galeana R.N.  Sent: 2/16/2023   7:57 PM PST  To: Lety Charlton R.N., Tisha Alberto R.N., *       pt ambulates using walker, alert and oriented x3, wife present. denies pain .pt fell yesterday as reported.  denies injury ..had taken his am meds ,wife said needs medbox flilled .pt didn't check blood glucose, wife said they wer told just to check sugar twice a week . pt on multiple medications f or diabetis .assisted pt in checking blood glucose, teaching steps on how to use glucometer,  mg/dl. . pt not able to remember steps. medbox refilled wife intermittently present helping  to get ready for and appoitment to fit shoes. wife said pt takes medications twice a day including antidepressant medicatons. instructed on the importance of not skipping meals and s/s of hypoglycemia .wife said they have to leave soon for  appt.. Pt/Cg response to the services provided: denies chest pains, breathing problems , bleedingor falls. Plan for the next visit: needs follow up on medications, fall prevention.

## 2023-02-17 NOTE — Clinical Note
pt sitted in the living area, ambulated to the dining area using walker, not wearing back brace. alert and orieted x3, forgetful,  wife present. denies pain. had taken morning medications.as reported.. pt/wife reinstructed /showed steps on using glucometer. pt not able to really able to comprehend when asked keeps on saying yes if he understood it. random blood glucose 234mg/dl, had eaten already. teachings on hypo/hyperglycemia provided. pt has coming appt with diabetis RN . medications reviewed again with pt's wife . she reported that pt had frequent urination but pt denies pain or burning and had been eating a lot. pt got up during snv and had iincontinent of brownish loose stools scattered in hallway and bathroom. assisted pt in showering., afterwards wife assisted in dressing up pt. reenforced safety/fall preevntion, use back brace when up . Pt/Cg response to the services provided: denies chest pains,breathing problems or new falls .

## 2023-02-18 VITALS
SYSTOLIC BLOOD PRESSURE: 132 MMHG | HEART RATE: 93 BPM | TEMPERATURE: 98.2 F | RESPIRATION RATE: 18 BRPM | OXYGEN SATURATION: 95 % | DIASTOLIC BLOOD PRESSURE: 78 MMHG

## 2023-02-18 ASSESSMENT — ENCOUNTER SYMPTOMS
DRY SKIN: 1
LAST BOWEL MOVEMENT: 66522
DIARRHEA: 1
PERSON REPORTING PAIN: PATIENT
RESPIRATORY SYMPTOMS COMMENTS: DENIES
STOOL FREQUENCY: LESS THAN DAILY

## 2023-02-18 NOTE — CASE COMMUNICATION
PT reassessment completed on 2/17/23.  Plan to continue PT.  Frequency:   1 week 4,  Effective 2/20/23.  Please assist with authorization as needed.  Carmen PT to follow.

## 2023-02-20 ENCOUNTER — HOME CARE VISIT (OUTPATIENT)
Dept: HOME HEALTH SERVICES | Facility: HOME HEALTHCARE | Age: 73
End: 2023-02-20
Payer: MEDICARE

## 2023-02-20 VITALS
OXYGEN SATURATION: 97 % | TEMPERATURE: 97.5 F | HEART RATE: 96 BPM | SYSTOLIC BLOOD PRESSURE: 140 MMHG | DIASTOLIC BLOOD PRESSURE: 84 MMHG | RESPIRATION RATE: 18 BRPM

## 2023-02-20 PROCEDURE — G0299 HHS/HOSPICE OF RN EA 15 MIN: HCPCS

## 2023-02-20 ASSESSMENT — PAIN SCALES - PAIN ASSESSMENT IN ADVANCED DEMENTIA (PAINAD)
CONSOLABILITY: 0 - NO NEED TO CONSOLE.
FACIALEXPRESSION: 0 - SMILING OR INEXPRESSIVE.
NEGVOCALIZATION: 0 - NONE.
TOTALSCORE: 0
BODYLANGUAGE: 0 - RELAXED.

## 2023-02-20 ASSESSMENT — ENCOUNTER SYMPTOMS
VOMITING: DENIES
POOR JUDGMENT: 1
LIMITED RANGE OF MOTION: 1
BOWEL PATTERN NORMAL: 1
DENIES PAIN: 1
MUSCLE WEAKNESS: 1
LAST BOWEL MOVEMENT: 66524
DYSPNEA ON EXERTION: 1
NAUSEA: DENIES
STOOL FREQUENCY: LESS THAN DAILY
PERSON REPORTING PAIN: PATIENT
FATIGUES EASILY: 1
RESPIRATORY SYMPTOMS COMMENTS: DENIES

## 2023-02-20 NOTE — CASE COMMUNICATION
noted  ----- Message -----  From: Carmen Casanova, PT  Sent: 2/17/2023   4:59 PM PST  To: Lety Charlton R.N., Ashwini Bonilla, OT, *      PT reassessment completed on 2/17/23.  Plan to continue PT.  Frequency:   1 week 4,  Effective 2/20/23.  Please assist with authorization as needed.  Carmen PT to follow.

## 2023-02-20 NOTE — CASE COMMUNICATION
noted  ----- Message -----  From: Selena Galeana R.N.  Sent: 2/18/2023  12:50 PM PST  To: Lety Charlton R.N., Tisha Alberto R.N., *       pt sitted in the living area, ambulated to the dining area using walker, not wearing back brace. alert and orieted x3, forgetful,  wife present. denies pain. had taken morning medications.as reported.. pt/wife reinstructed /showed steps on using glucometer. pt not able to really able to comprehend  when asked keeps on saying yes if he understood it. random blood glucose 234mg/dl, had eaten already. teachings on hypo/hyperglycemia provided. pt has coming appt with diabetis RN . medications reviewed again with pt's wife . she reported that pt had frequent urination but pt denies pain or burning and had been eating a lot. pt got up during snv and had iincontinent of brownish loose stools scattered in hallway and bathroom. assisted p t in showering., afterwards wife assisted in dressing up pt. reenforced safety/fall preevntion, use back brace when up . Pt/Cg response to the services provided: denies chest pains,breathing problems or new falls .

## 2023-02-20 NOTE — Clinical Note
alert and oriented x3. states he is feeling fine,denies pain.  ambulates using walker. pt has not taken his morning meds, HR 96/min. fasting blood glucose done by pt's wife during snv, 185mg/dl. wife reported that she would like MSW to come after pt's visit to PCP, so he can sign papers. diabetic booklet provided , reviewed with pt and his wife. pt took his AM meds after eating some yougurt  pt said depression is stable,had been taking antidepressant meds for a long  time. denies suicidal ideation..wife verbalized that pt is taking lots of diabetic medications, 2 antidepressant ,aside from his blood pressure medications, states no wonder he always feels tired. informed to check with PCP,next appt is not until may. . reenforced fall prevention, since most of medications can cause dizziness, lightheadedness.discuss sn discipline  discharge with wife and agreeable, wants to know if cna can come this week. Pt/Cg response to the services provided: denies chest pains, new or increased breathing problems ,bleeding or falls.

## 2023-02-21 ENCOUNTER — HOME CARE VISIT (OUTPATIENT)
Dept: HOME HEALTH SERVICES | Facility: HOME HEALTHCARE | Age: 73
End: 2023-02-21
Payer: MEDICARE

## 2023-02-21 VITALS
TEMPERATURE: 98.3 F | SYSTOLIC BLOOD PRESSURE: 120 MMHG | RESPIRATION RATE: 18 BRPM | DIASTOLIC BLOOD PRESSURE: 74 MMHG | OXYGEN SATURATION: 94 % | HEART RATE: 82 BPM

## 2023-02-21 PROCEDURE — G0153 HHCP-SVS OF S/L PATH,EA 15MN: HCPCS

## 2023-02-21 ASSESSMENT — ENCOUNTER SYMPTOMS: DRY SKIN: 1

## 2023-02-21 NOTE — CASE COMMUNICATION
noted  ----- Message -----  From: Ashwini Bonilla OT  Sent: 2/20/2023   4:32 PM PST  To: Lety Charlton R.N., Mihaela Hillman, Acacia Recinos, SLP, *      OCCUPATIONAL THERAPY EVALUATION AND PLAN OF CARE     ·       Patient:  Prabhakar Sierra     ·       Home Health Admission due to:  Client has had multiple falls resulting in T12, L2 and L3 compression spinal fxs.  He is wearing TLSO when out of bed.     ·       Living Situation/PLOF:   Lives in second floor condo with his wife. He has 19 stairs to enter/exit.  Using FWW.  He has a shower chair and a handheld shower.  Grab bars in shower and by toilet.            PLOF: Was sup with mobility using FWW, I with transfers,  Wife doing all IADLs.     ·       Past Medical History:  DM2,depression, urinary retention, dementia, alcohol use, prostate ca in remission     ·       Skilled Therapeutic Need:  Decreased activity tole trena, Decline in ADLs, Poor safety awareness, Balance control and Transfer training     Recommend skilled HHOT to address deficits and improve function-report sent to MD     ·       Frequency:   1W1 effective 2/17/23     ·       Goals: NA.  OT and client's wife Heidy, agree that at this point OT probably can not help him secondary to no bending and his cognitive status.  Heidy is hoping that once his TLSO is off, that OT could be arzate d back in to reassess.  OT spoke with PT to let her know to order OT back in if home health still seeing client when TLSO is allowed to be removed.       Does the patient get SOB with Minimum exertion?  not apparent  How often (if at all) does pain interfere with patient's movements?  rarely

## 2023-02-21 NOTE — CASE COMMUNICATION
OCCUPATIONAL THERAPY EVALUATION AND PLAN OF CARE     ·       Patient:  Prabhakar Sierra     ·       Home Health Admission due to:  Client has had multiple falls resulting in T12, L2 and L3 compression spinal fxs.  He is wearing TLSO when out of bed.     ·       Living Situation/PLOF:  Lives in second floor condo with his wife. He has 19 stairs to enter/exit.  Using FWW.  He has a shower chair and a handheld shower.  Grab bars i n shower and by toilet.            PLOF: Was sup with mobility using FWW, I with transfers,  Wife doing all IADLs.     ·       Past Medical History:  DM2,depression, urinary retention, dementia, alcohol use, prostate ca in remission     ·       Skilled Therapeutic Need:  Decreased activity tolerance, Decline in ADLs, Poor safety awareness, Balance control and Transfer training     Recommend skilled HHOT to address deficits and improve f unction-report sent to MD     ·       Frequency:   1W1 effective 2/17/23     ·       Goals: NA.  OT and client's wife Heidy, agree that at this point OT probably can not help him secondary to no bending and his cognitive status.  Heidy is hoping that once his TLSO is off, that OT could be called back in to reassess.  OT spoke with PT to let her know to order OT back in if home health still seeing client when TLSO is allowed to be remove d.       Does the patient get SOB with Minimum exertion?  not apparent  How often (if at all) does pain interfere with patient's movements?  rarely

## 2023-02-22 ENCOUNTER — HOME CARE VISIT (OUTPATIENT)
Dept: HOME HEALTH SERVICES | Facility: HOME HEALTHCARE | Age: 73
End: 2023-02-22
Payer: MEDICARE

## 2023-02-22 VITALS
RESPIRATION RATE: 18 BRPM | SYSTOLIC BLOOD PRESSURE: 139 MMHG | HEART RATE: 93 BPM | OXYGEN SATURATION: 92 % | TEMPERATURE: 97.4 F | DIASTOLIC BLOOD PRESSURE: 75 MMHG

## 2023-02-22 VITALS
DIASTOLIC BLOOD PRESSURE: 75 MMHG | SYSTOLIC BLOOD PRESSURE: 139 MMHG | TEMPERATURE: 97.4 F | RESPIRATION RATE: 18 BRPM | OXYGEN SATURATION: 92 % | HEART RATE: 93 BPM

## 2023-02-22 PROCEDURE — G0156 HHCP-SVS OF AIDE,EA 15 MIN: HCPCS

## 2023-02-22 PROCEDURE — G0151 HHCP-SERV OF PT,EA 15 MIN: HCPCS

## 2023-02-22 ASSESSMENT — ACTIVITIES OF DAILY LIVING (ADL): TRANSPORTATION COMMENTS: REQUIRES ASSIST OF ANOTHER PERSON AND WALKER OUT OF HOME

## 2023-02-22 ASSESSMENT — ENCOUNTER SYMPTOMS
DENIES PAIN: 1
POOR JUDGMENT: 1
DIFFICULTY THINKING: 1
PERSON REPORTING PAIN: PATIENT
PERSON REPORTING PAIN: PATIENT
DENIES PAIN: 1
POOR JUDGMENT: 1

## 2023-02-22 NOTE — CASE COMMUNICATION
Patient's resting pulse at 93 bpm is outside of normal parameters and is to be reported to MD and CM. Patient denies any dizziness, light headedness, chest pain.  All other vitals are within HH parameters.

## 2023-02-22 NOTE — CASE COMMUNICATION
noted  ----- Message -----  From: Selena Galeana R.N.  Sent: 2/21/2023   6:58 PM PST  To: Lety Charlton R.N., Tisha Alberto R.N., *      alert and oriented x3. states he is feeling fine,denies pain.  ambulates using walker. pt has not taken his morning meds, HR 96/min. fasting blood glucose done by pt's wife during snv, 185mg/dl. wife reported that she would like MSW to come after pt's visit to PCP, so he can sign papers. diabetic b ooklet provided , reviewed with pt and his wife. pt took his AM meds after eating some yougurt  pt said depression is stable,had been taking antidepressant meds for a long  time. denies suicidal ideation..wife verbalized that pt is taking lots of diabetic medications, 2 antidepressant ,aside from his blood pressure medications, states no wonder he always feels tired. informed to check with PCP,next appt is not until may. . reenforced fa ll prevention, since most of medications can cause dizziness, lightheadedness.discuss sn discipline  discharge with wife and agreeable, wants to know if cna can come this week. Pt/Cg response to the services provided: denies chest pains, new or increased breathing problems ,bleeding or falls.

## 2023-02-23 ENCOUNTER — HOME CARE VISIT (OUTPATIENT)
Dept: HOME HEALTH SERVICES | Facility: HOME HEALTHCARE | Age: 73
End: 2023-02-23
Payer: MEDICARE

## 2023-02-23 VITALS
TEMPERATURE: 98.4 F | DIASTOLIC BLOOD PRESSURE: 80 MMHG | SYSTOLIC BLOOD PRESSURE: 132 MMHG | HEART RATE: 96 BPM | OXYGEN SATURATION: 94 % | RESPIRATION RATE: 18 BRPM

## 2023-02-23 PROCEDURE — G0153 HHCP-SVS OF S/L PATH,EA 15MN: HCPCS

## 2023-02-23 NOTE — CASE COMMUNICATION
noted  ----- Message -----  From: Carmen Casanova, PT  Sent: 2/22/2023   2:56 PM PST  To: Lety Charlton R.N., *      Patient's resting pulse at 93 bpm is outside of normal parameters and is to be reported to MD and CM. Patient denies any dizziness, light headedness, chest pain.  All other vitals are within HH parameters.

## 2023-02-23 NOTE — CASE COMMUNICATION
noted  ----- Message -----  From: Selena Galeana R.N.  Sent: 2/22/2023   9:11 AM PST  To: Lety Charlton R.N., Tisha Alberto R.N., *      Spoke to pt's [facility] ,pt has appointments already wants to reschedule for friday .

## 2023-02-24 ENCOUNTER — HOME CARE VISIT (OUTPATIENT)
Dept: HOME HEALTH SERVICES | Facility: HOME HEALTHCARE | Age: 73
End: 2023-02-24
Payer: MEDICARE

## 2023-02-24 NOTE — Clinical Note
FYI: Missed visit per patient/patient spouse request, declined a SN visit before next week's scheduled visit on Thursday, added HHA visits to continue this service.

## 2023-02-25 ENCOUNTER — HOME CARE VISIT (OUTPATIENT)
Dept: HOME HEALTH SERVICES | Facility: HOME HEALTHCARE | Age: 73
End: 2023-02-25
Payer: MEDICARE

## 2023-02-25 NOTE — CASE COMMUNICATION
noted  ----- Message -----  From: Nicki Amato R.N.  Sent: 2/24/2023  10:45 AM PST  To: Lety Charlton R.N., Kelly Miller, *      FYI: Missed visit per patient/patient spouse request, declined a SN visit before next week's scheduled visit on Thursday, added HHA visits to continue this service.

## 2023-02-25 NOTE — Clinical Note
ON CALL NOTE 2-25-23    Patient wife called this service this afternoon to inform [facility] that her  had fallen at home.   Wife states no injury and her  was tucked up in bed now.    Wife anxious for a HHA visit on Monday to shower her .     Wife states patient not on any blood thinners and sustained no injury due to this fall. Wife advised to take patient to the hospital if she has any concerns and wife refusing SN visit at this time.

## 2023-02-25 NOTE — Clinical Note
Falls Template         Date & Time of fall: 2-25-23 1200           Cause of fall:  Physiological           Location:  Bedroom           Was the fall witnessed?                  If yes, by who? Yes, Details: wife presnt at tme of fall.            Actions taken by patient:  Patient fell out of bed,got up and got back into bed.            Any new injury?                   If yes, what kind?  No            Any recent medication changes?    No            Did patient have appropriate DME available?  Yes                 If no, please explain:  walker            Actions Taken: Notified care team and Notified MD

## 2023-02-26 ASSESSMENT — ENCOUNTER SYMPTOMS
POOR JUDGMENT: 1
DIFFICULTY THINKING: 1
PERSON REPORTING PAIN: PATIENT
DENIES PAIN: 1

## 2023-02-27 NOTE — CASE COMMUNICATION
noted  ----- Message -----  From: Danielle Garcai R.N.  Sent: 2/25/2023   4:03 PM PST  To: Lety Charlton R.N., *      ON CALL NOTE 2-25-23    Patient wife called this service this afternoon to inform [facility] that her  had fallen at home.   Wife states no injury and her  was tucked up in bed now.    Wife anxious for a HHA visit on Monday to shower her .     Wife states patient not on any blood thinners and sustai elza no injury due to this fall. Wife advised to take patient to the hospital if she has any concerns and wife refusing SN visit at this time.

## 2023-02-27 NOTE — CASE COMMUNICATION
" Falls Template         Date & Time of fall: 2/23/23 morning           Cause of fall:  Mechanical           Location:  Bedroom           Was the fall witnessed?  No            Actions taken by patient:  Pt \"slipped down\" \"but did not fall\" while getting out of bed.  Wife came into room to assist patient.            Any new injury?  No                          Any recent medication changes?   No            Did patient have appropriate  DME available?  Yes. Patient to wear proper footwear and get help before getting out of bed.                        Actions Taken: Notified care team and Notified MD"

## 2023-02-27 NOTE — CASE COMMUNICATION
"noted  ----- Message -----  From: Acacia Recinos, DERICK  Sent: 2/26/2023   6:26 PM PST  To: Lety Charlton R.N., Kristen Prieto OT, *       Falls Template         Date & Time of fall: 2/23/23 morning           Cause of fall:  Mechanical           Location:  Bedroom           Was the fall witnessed?  No            Actions taken by patient:  Pt \"slipped down\" \"but did not fall\" while getting out of bed.  Wife came into room to assist patie nt.            Any new injury?  No                          Any recent medication changes?   No            Did patient have appropriate DME available?  Yes. Patient to wear proper footwear and get help before getting out of bed.                        Actions Taken: Notified care team and Notified MD"

## 2023-03-01 ENCOUNTER — HOME CARE VISIT (OUTPATIENT)
Dept: HOME HEALTH SERVICES | Facility: HOME HEALTHCARE | Age: 73
End: 2023-03-01
Payer: MEDICARE

## 2023-03-01 VITALS
OXYGEN SATURATION: 93 % | DIASTOLIC BLOOD PRESSURE: 72 MMHG | HEART RATE: 93 BPM | SYSTOLIC BLOOD PRESSURE: 132 MMHG | TEMPERATURE: 97.5 F | RESPIRATION RATE: 18 BRPM

## 2023-03-01 PROCEDURE — G0151 HHCP-SERV OF PT,EA 15 MIN: HCPCS

## 2023-03-01 PROCEDURE — 665001 SOC-HOME HEALTH

## 2023-03-01 ASSESSMENT — ENCOUNTER SYMPTOMS
DENIES PAIN: 1
POOR JUDGMENT: 1
PERSON REPORTING PAIN: PATIENT
DIFFICULTY THINKING: 1

## 2023-03-02 ENCOUNTER — HOME CARE VISIT (OUTPATIENT)
Dept: HOME HEALTH SERVICES | Facility: HOME HEALTHCARE | Age: 73
End: 2023-03-02
Payer: MEDICARE

## 2023-03-02 VITALS
SYSTOLIC BLOOD PRESSURE: 110 MMHG | TEMPERATURE: 97.3 F | HEART RATE: 93 BPM | OXYGEN SATURATION: 97 % | RESPIRATION RATE: 16 BRPM | DIASTOLIC BLOOD PRESSURE: 50 MMHG

## 2023-03-02 PROCEDURE — G0495 RN CARE TRAIN/EDU IN HH: HCPCS

## 2023-03-02 ASSESSMENT — ENCOUNTER SYMPTOMS
DIFFICULTY THINKING: 1
POOR JUDGMENT: 1
DENIES PAIN: 1

## 2023-03-02 NOTE — CASE COMMUNICATION
Falls Template         Date & Time of fall: 2/25/23, 2/26/23         Cause of fall:  Physiological?  Patient's wife reports that patient had 4 falls over the weekend.  Wife reports unknown cause of fall but was like his legs just gave out during all falls.           Location:  Bedroom         Was the fall witnessed?                  If yes, by who? No          Actions taken by patient:  Called for wife, wife able to assist to standin g with assist of belt  then assisted to bed          Any new injury?                   If yes, what kind?  No          Any recent medication changes?    No          Did patient have appropriate DME available?  Yes, PT provided gait/transfer belt and educated to use at all times with patient and mobility.  Educated patient and wife in need to have assist of another person, walker, gait belt and TLSO at all times with mobility.                    If no, please explain:  NA          Actions Taken: Notified care team, Notified MD and Informed RN supervisor to schedule follow-up visit.  PT recommended assessment at ER but patient and wife declined at this time.  Wife does report she is concerned that she will not be able to care for patient at home if this continues.  PT educated wife that if she does feel this way to take patient to ER for assessment and assist.  Wif e able to speakback verbal understanding.

## 2023-03-02 NOTE — CASE COMMUNICATION
noted  ----- Message -----  From: Carmen Casanova, PT  Sent: 3/1/2023   5:36 PM PST  To: Lety Charlton R.N., Tisha Alberto R.N., *       Falls Template         Date & Time of fall: 2/25/23, 2/26/23         Cause of fall:  Physiological?  Patient's wife reports that patient had 4 falls over the weekend.  Wife reports unknown cause of fall but was like his legs just gave out during all falls.           Location:  Bedroom         W as the fall witnessed?                  If yes, by who? No          Actions taken by patient:  Called for wife, wife able to assist to standing with assist of belt  then assisted to bed          Any new injury?                   If yes, what kind?  No          Any recent medication changes?    No          Did patient have appropriate DME available?  Yes, PT provided gait/transfer belt and educated to use at all times with patient  and mobility.  Educated patient and wife in need to have assist of another person, walker, gait belt and TLSO at all times with mobility.                   If no, please explain:  NA          Actions Taken: Notified care team, Notified MD and Informed RN supervisor to schedule follow-up visit.  PT recommended assessment at ER but patient and wife declined at this time.  Wife does report she is concerned that she will not be able to care  for patient at home if this continues.  PT educated wife that if she does feel this way to take patient to ER for assessment and assist.  Wife able to speakback verbal understanding.

## 2023-03-02 NOTE — CASE COMMUNICATION
Patient's resting HR at 93 bpm is outside of normal parameters and is to be reported to MD and CM. Patient denies any dizziness, light headedness, chest pain.  All other vitals are within HH parameters.

## 2023-03-03 ENCOUNTER — HOME CARE VISIT (OUTPATIENT)
Dept: HOME HEALTH SERVICES | Facility: HOME HEALTHCARE | Age: 73
End: 2023-03-03
Payer: MEDICARE

## 2023-03-03 PROCEDURE — G0156 HHCP-SVS OF AIDE,EA 15 MIN: HCPCS

## 2023-03-04 VITALS
DIASTOLIC BLOOD PRESSURE: 62 MMHG | SYSTOLIC BLOOD PRESSURE: 122 MMHG | HEART RATE: 89 BPM | OXYGEN SATURATION: 95 % | TEMPERATURE: 98.8 F | RESPIRATION RATE: 17 BRPM

## 2023-03-04 ASSESSMENT — ENCOUNTER SYMPTOMS
DENIES PAIN: 1
HIGHEST PAIN SEVERITY IN PAST 24 HOURS: 0/10
PERSON REPORTING PAIN: PATIENT
PAIN SEVERITY GOAL: 0/10

## 2023-03-06 ASSESSMENT — ACTIVITIES OF DAILY LIVING (ADL)
CURRENT_FUNCTION: STAND BY ASSIST
AMBULATION ASSISTANCE: STAND BY ASSIST

## 2023-03-06 ASSESSMENT — ENCOUNTER SYMPTOMS
MUSCLE WEAKNESS: 1
STOOL FREQUENCY: DAILY
BOWEL PATTERN NORMAL: 1
LAST BOWEL MOVEMENT: 66535

## 2023-03-06 NOTE — CASE COMMUNICATION
noted  ----- Message -----  From: Danielle Garcia R.N.  Sent: 2/25/2023   4:03 PM PST  To: Lety Charlton R.N., *       Falls Template         Date & Time of fall: 2-25-23 1200           Cause of fall:  Physiological           Location:  Bedroom           Was the fall witnessed?                  If yes, by who? Yes, Details: wife presnt at tme of fall.            Actions taken by patient:  Patient fell out of bed,got up and got  back into bed.            Any new injury?                   If yes, what kind?  No            Any recent medication changes?    No            Did patient have appropriate DME available?  Yes                 If no, please explain:  walker            Actions Taken: Notified care team and Notified MD

## 2023-03-07 ENCOUNTER — HOME CARE VISIT (OUTPATIENT)
Dept: HOME HEALTH SERVICES | Facility: HOME HEALTHCARE | Age: 73
End: 2023-03-07
Payer: MEDICARE

## 2023-03-07 VITALS
RESPIRATION RATE: 17 BRPM | HEART RATE: 83 BPM | SYSTOLIC BLOOD PRESSURE: 105 MMHG | DIASTOLIC BLOOD PRESSURE: 65 MMHG | OXYGEN SATURATION: 97 % | TEMPERATURE: 97.5 F

## 2023-03-07 VITALS
TEMPERATURE: 98.3 F | SYSTOLIC BLOOD PRESSURE: 122 MMHG | OXYGEN SATURATION: 98 % | DIASTOLIC BLOOD PRESSURE: 80 MMHG | RESPIRATION RATE: 17 BRPM | HEART RATE: 99 BPM

## 2023-03-07 PROCEDURE — G0156 HHCP-SVS OF AIDE,EA 15 MIN: HCPCS

## 2023-03-07 PROCEDURE — G0495 RN CARE TRAIN/EDU IN HH: HCPCS

## 2023-03-07 PROCEDURE — G0151 HHCP-SERV OF PT,EA 15 MIN: HCPCS

## 2023-03-07 ASSESSMENT — PAIN SCALES - PAIN ASSESSMENT IN ADVANCED DEMENTIA (PAINAD)
NEGVOCALIZATION: 0 - NONE.
BODYLANGUAGE: 0 - RELAXED.
CONSOLABILITY: 0 - NO NEED TO CONSOLE.
FACIALEXPRESSION: 0 - SMILING OR INEXPRESSIVE.
TOTALSCORE: 0

## 2023-03-07 ASSESSMENT — ENCOUNTER SYMPTOMS
PAIN SEVERITY GOAL: 0/10
LAST BOWEL MOVEMENT: 66540
PERSON REPORTING PAIN: PATIENT
POOR JUDGMENT: 1
DENIES PAIN: 1
DENIES PAIN: 1
PERSON REPORTING PAIN: CAREGIVER
STOOL FREQUENCY: TWICE DAILY
DIFFICULTY THINKING: 1
PERSON REPORTING PAIN: PATIENT
POOR JUDGMENT: 1
DENIES PAIN: 1
HIGHEST PAIN SEVERITY IN PAST 24 HOURS: 0/10
BOWEL PATTERN NORMAL: 1
DIFFICULTY THINKING: 1

## 2023-03-07 ASSESSMENT — ACTIVITIES OF DAILY LIVING (ADL)
AMBULATION ASSISTANCE: STAND BY ASSIST
CURRENT_FUNCTION: STAND BY ASSIST
TRANSPORTATION COMMENTS: FALLS

## 2023-03-07 NOTE — Clinical Note
Discharge planning from home care discussed, patient and spouse agree with discharge from home care, although both requested for HHA assistance to continue. SN instructed patient and spouse on skilled home care services, both instructed independent HHA services is not a covered benefit with insurance. MSW attempted to schedule follow up visit with patient, spouse cancelled as patient had another appointment. Voalte message to MSW, RNCM, RN supervisor requesting last MSW visit prior to planned discharge date 3/14/23, MSW offerred to see patient Monday, 3/13/23 at 1000, patient and spouse accept appointment, state no need to call to confirm. NOMNC signed by patient's spouse Heidy for dc 3/14/23.

## 2023-03-08 ENCOUNTER — HOSPITAL ENCOUNTER (OUTPATIENT)
Dept: RADIOLOGY | Facility: MEDICAL CENTER | Age: 73
End: 2023-03-08
Attending: PSYCHIATRY & NEUROLOGY
Payer: MEDICARE

## 2023-03-08 DIAGNOSIS — F02.80 DEMENTIA ASSOCIATED WITH OTHER UNDERLYING DISEASE WITHOUT BEHAVIORAL DISTURBANCE (HCC): ICD-10-CM

## 2023-03-08 DIAGNOSIS — G31.9 DEGENERATIVE DISEASE OF NERVOUS SYSTEM, UNSPECIFIED (HCC): ICD-10-CM

## 2023-03-08 PROCEDURE — A9552 F18 FDG: HCPCS

## 2023-03-08 NOTE — CASE COMMUNICATION
noted  ----- Message -----  From: Carmen Casanova, PT  Sent: 3/7/2023   5:55 PM PST  To: Lety Charlton R.N., *      PT discharged patient on 3/7/23 with partial goals met, unable to meet other goals.  Lack of progress, non-compliance with HEP

## 2023-03-08 NOTE — CASE COMMUNICATION
noted  ----- Message -----  From: Nicki Amato R.N.  Sent: 3/7/2023   5:46 PM PST  To: Lety Charlton R.N., Tisha Alberto R.N., *      Discharge planning from home care discussed, patient and spouse agree with discharge from home care, although both requested for HHA assistance to continue. SN instructed patient and spouse on skilled home care services, both instructed independent HHA services is not a covered benefit with [facility] [facility]. MSW attempted to schedule follow up visit with patient, spouse cancelled as patient had another appointment. Voalte message to MSW, RNCM, RN supervisor requesting last MSW visit prior to planned discharge date 3/14/23, MSW offerred to see patient Monday, 3/13/23 at 1000, patient and spouse accept appointment, state no need to call to confirm. NOMNC signed by patient's spouse Heidy for dc 3/14/23.

## 2023-03-08 NOTE — CASE COMMUNICATION
PT discharged patient on 3/7/23 with partial goals met, unable to meet other goals.  Lack of progress, non-compliance with HEP

## 2023-03-09 ENCOUNTER — HOME CARE VISIT (OUTPATIENT)
Dept: HOME HEALTH SERVICES | Facility: HOME HEALTHCARE | Age: 73
End: 2023-03-09
Payer: MEDICARE

## 2023-03-09 VITALS
DIASTOLIC BLOOD PRESSURE: 68 MMHG | TEMPERATURE: 97.7 F | HEART RATE: 89 BPM | SYSTOLIC BLOOD PRESSURE: 128 MMHG | RESPIRATION RATE: 17 BRPM | OXYGEN SATURATION: 95 %

## 2023-03-09 PROCEDURE — G0156 HHCP-SVS OF AIDE,EA 15 MIN: HCPCS

## 2023-03-09 ASSESSMENT — ENCOUNTER SYMPTOMS
PERSON REPORTING PAIN: PATIENT
DENIES PAIN: 1
HIGHEST PAIN SEVERITY IN PAST 24 HOURS: 0/10
PAIN SEVERITY GOAL: 0/10

## 2023-03-09 NOTE — CASE COMMUNICATION
LATE ENTRY - Fall Care Conference completed 3/1/23.   o What education was given: Per clinicians, pt does not always use his walker . Pt needs a higher LOC. At present, pt has 24 hr care with wife who does the best she can, but is not always right there with him to help him and he doesnt wait or ask for her help. Pt has Cognition issues, poor insight.  o Is patient is following recommended precautions: no, see above.  o Disciplines in:  SN / PT / HH Aide  o Are medications playing a part: no  o MD notified: yes  o Is patient is using appropriate DME (or is there DME that they need?): has what he needs, but does not always use it.   o Was care plan modified/changed to reflect additional fall prevention interventions: yes

## 2023-03-13 ENCOUNTER — HOME CARE VISIT (OUTPATIENT)
Dept: HOME HEALTH SERVICES | Facility: HOME HEALTHCARE | Age: 73
End: 2023-03-13
Payer: MEDICARE

## 2023-03-13 VITALS
SYSTOLIC BLOOD PRESSURE: 142 MMHG | OXYGEN SATURATION: 95 % | RESPIRATION RATE: 17 BRPM | TEMPERATURE: 98.9 F | DIASTOLIC BLOOD PRESSURE: 82 MMHG | HEART RATE: 88 BPM

## 2023-03-13 PROCEDURE — G0156 HHCP-SVS OF AIDE,EA 15 MIN: HCPCS

## 2023-03-13 PROCEDURE — G0155 HHCP-SVS OF CSW,EA 15 MIN: HCPCS

## 2023-03-13 ASSESSMENT — ENCOUNTER SYMPTOMS
PAIN SEVERITY GOAL: 0/10
PERSON REPORTING PAIN: PATIENT
HIGHEST PAIN SEVERITY IN PAST 24 HOURS: 0/10
DENIES PAIN: 1

## 2023-03-13 ASSESSMENT — ACTIVITIES OF DAILY LIVING (ADL)
DRESSING_REQUIRES_ASSISTANCE: 1
SHOPPING_REQUIRES_ASSISTANCE: 1
LAUNDRY_REQUIRES_ASSISTANCE: 1
BATHING_REQUIRES_ASSISTANCE: 1
AMBULATION_REQUIRES_ASSISTANCE: 1

## 2023-03-14 ENCOUNTER — HOME CARE VISIT (OUTPATIENT)
Dept: HOME HEALTH SERVICES | Facility: HOME HEALTHCARE | Age: 73
End: 2023-03-14
Payer: MEDICARE

## 2023-03-14 VITALS
HEART RATE: 83 BPM | DIASTOLIC BLOOD PRESSURE: 60 MMHG | RESPIRATION RATE: 18 BRPM | OXYGEN SATURATION: 92 % | TEMPERATURE: 98.2 F | SYSTOLIC BLOOD PRESSURE: 112 MMHG

## 2023-03-14 PROCEDURE — G0493 RN CARE EA 15 MIN HH/HOSPICE: HCPCS

## 2023-03-14 ASSESSMENT — ENCOUNTER SYMPTOMS
DENIES PAIN: 1
POOR JUDGMENT: 1

## 2023-03-14 ASSESSMENT — ACTIVITIES OF DAILY LIVING (ADL)
HOME_HEALTH_OASIS: 01
OASIS_M1830: 03

## 2023-03-15 ENCOUNTER — HOME CARE VISIT (OUTPATIENT)
Dept: HOME HEALTH SERVICES | Facility: HOME HEALTHCARE | Age: 73
End: 2023-03-15
Payer: MEDICARE

## 2023-03-15 NOTE — CASE COMMUNICATION
"Quality Review for DC OASIS by KAVYA Washburn, RN on  March 15, 2023     Edits completed by KAVYA Washburn, RN:  1.  is 1 per narrative \"is now able to make it to bathroom\".  2.  E is NA per the plan of care.   "

## 2023-03-16 ENCOUNTER — HOME CARE VISIT (OUTPATIENT)
Dept: HOME HEALTH SERVICES | Facility: HOME HEALTHCARE | Age: 73
End: 2023-03-16
Payer: MEDICARE

## 2023-03-16 NOTE — CASE COMMUNICATION
"I agree with changes  ----- Message -----  From: Emani Washburn R.N.  Sent: 3/15/2023   2:03 PM PDT  To: Lety Charlton R.N.      Quality Review for DC OASIS by KAVYA Washburn RN on  March 15, 2023     Edits completed by KAVYA Washburn RN:  1.  is 1 per narrative \"is now able to make it to bathroom\".  2.  E is NA per the plan of care.   "

## 2023-03-27 ENCOUNTER — TELEPHONE (OUTPATIENT)
Dept: NEUROLOGY | Facility: MEDICAL CENTER | Age: 73
End: 2023-03-27
Payer: MEDICARE

## 2023-03-27 NOTE — PROGRESS NOTES
INITIAL CARE MANAGEMENT CARE PLAN/ASSESSMENT     Initial assessment completed with Heidy and Prabhakar. The first part of this intake was completed with Ame BURLESON. See her note for more information. Prabhakar was hospitalized for a fracture after falling. He also underwent radiation. Pt's wife said he was recently diagnosed with Alzheimer's dementia. He has become progressively more fatigued. He needs assistance with his ADLs and IADLs.Wife assists with ADLs and IADLs. He walks with a walker or uses a wheel chair.  Pt's wife said he would do exercises with PT, but would not do the exercises on is own. Scheduled PCP appt to address fatigue, mobility, and diarrhea. He was recently discharged from . She has already looked into support services, but she will let me know if they need further assistance identifying or applying to caregiver or respite services.     Medication Self-Management Goals:     Reviewed medications listed below with patient.      Current Outpatient Medications:     Cyanocobalamin (VITAMIN B 12 PO), Take 2 Tablets by mouth every day. Indications: supplement, Disp: , Rfl:     benazepril (LOTENSIN) 20 MG Tab, Take 40 mg by mouth every day. Indications: High Blood Pressure Disorder, Disp: , Rfl:     magnesium oxide 400 (240 Mg) MG Tab, Take 1 Tablet by mouth every day., Disp: 100 Tablet, Rfl: 3    Semaglutide (RYBELSUS) 3 MG Tab, Take 3 mg by mouth every day at 6 PM. Indications: Type 2 Diabetes, Disp: 30 Tablet, Rfl: 2    metoprolol tartrate (LOPRESSOR) 25 MG Tab, Take 0.5 Tablets by mouth 2 times a day. Indications: High Blood Pressure Disorder, Disp: 90 Tablet, Rfl: 3    folic acid (FOLVITE) 1 MG Tab, Take 1 Tablet by mouth every day. Indications: ALCOHOL ABUSE, Disp: 90 Tablet, Rfl: 3    buPROPion SR (WELLBUTRIN-SR) 150 MG TABLET SR 12 HR sustained-release tablet, Take 1 Tablet by mouth 2 times a day. Indications: Major Depressive Disorder, Disp: 180 Tablet, Rfl: 3    DULoxetine (CYMBALTA) 60 MG Cap  DR Particles delayed-release capsule, Take 1 Capsule by mouth 2 times a day. Indications: Major Depressive Disorder, Disp: 180 Capsule, Rfl: 3    omeprazole (PRILOSEC) 20 MG delayed-release capsule, Take 1 Capsule by mouth 2 times a day. Indications: Gastroesophageal Reflux Disease, Disp: 180 Capsule, Rfl: 4    pioglitazone (ACTOS) 30 MG Tab, Take 1 Tablet by mouth every day. Indications: Type 2 Diabetes, Disp: 90 Tablet, Rfl: 4    Empagliflozin (JARDIANCE) 25 MG Tab, Take 25 mg by mouth every day. Indications: Type 2 Diabetes, Disp: 90 Tablet, Rfl: 4    Accu-Chek Softclix Lancets Misc, Use 2 lancet as directed twice a day, Disp: 102 Each, Rfl: 3    glucose blood (FREESTYLE LITE) strip, Use 2 strip via meter twice a day, Disp: 100 Strip, Rfl: 3    acetaminophen (TYLENOL) 325 MG Tab, Take 2 Tablets by mouth every four hours as needed for Mild Pain., Disp: 30 Tablet, Rfl: 0    aspirin (ASA) 81 MG Chew Tab chewable tablet, Chew 1 Tablet every day., Disp: 100 Tablet, Rfl:     atorvastatin (LIPITOR) 80 MG tablet, Take 1 Tablet by mouth every evening., Disp: 90 Tablet, Rfl: 4    metformin (GLUCOPHAGE) 1000 MG tablet, Take 1 Tablet by mouth 2 times a day with meals., Disp: 180 Tablet, Rfl: 3    thiamine (THIAMINE) 100 MG tablet, Take 1 Tablet by mouth every day., Disp: , Rfl:     Blood Glucose Monitoring Suppl Device, Freestyle carlo glucose monitoring device with all necessary accessories including patch and transmitter  for type II noninsulin treated diabetes.  To check blood sugar once or twice daily and per any symptoms of high or low blood sugar., Disp: 1 Each, Rfl: 1    Blood Glucose Monitoring Suppl Device, Meter: Dispense Device of Insurance Preference. Sig. Use as directed for blood sugar monitoring. #1. NR., Disp: 1 Each, Rfl: 0    Blood Glucose Test Strips, Test strips for meter dispensed, testing one time per day E11.65, Disp: 100 Strip, Rfl: 2    Lancets, Lancets for meter dispensed, to test one time  per day E11.65, Disp: 100 Each, Rfl: 2         Goal: Maintain medication regimen as prescribed       Physical/Functional/Environmental Status:     Activities of Daily Living:  Bathing: needs assistance   Dressing: needs assistance  Grooming: independent  Mouth Care:    Toileting: needs assistance  Climbing a Flight of Stairs: needs assistance    Independent Activities of Daily Living:  Shopping: needs assistance  Cooking: needs assistance  Managing Medications: needs assistance  Using the phone and looking up numbers: independent  Driving or using public transportation: total dependence  Managing Finances: needs assistance        2/1/2023     1:57 PM 3/27/2023     1:31 PM 3/27/2023     4:47 PM   STEADI Fall Risk   One or more falls in the last year Yes Yes Yes   Advised to use a cane or walker to get around safely  Yes Yes   Feels unsteady when walking  Yes Yes   Steadies self on furniture while walking at home   Yes   Worried about falling   Yes   Needs to push with hands when rising from a chair   Yes   Has trouble stepping up onto a curb / using stairs   Yes   Often has to rush to the toilet  Yes Yes   Has lost some feeling in feet   Yes   Takes medicine that makes him/her feel lightheaded or more tired than usual  Yes Yes   Takes medicine to sleep or improve mood  Yes Yes       Goal: Prevent falls      Financial Status:     No needs at this time     Goal: N/A     Transportation Status:    No needs at this time    Goal: N/A    Mental/Behavioral/Psychosocial Status:        1/3/2023     7:10 AM 1/5/2023     3:04 PM 2/8/2023    11:08 AM   Depression Screen (PHQ-2/PHQ-9)   PHQ-2 Total Score 0 0 0   PHQ-9 Total Score   0       Interpretation of PHQ-9 Total Score   Score Severity   1-4 No Depression   5-9 Mild Depression   10-14 Moderate Depression   15-19 Moderately Severe Depression   20-27 Severe Depression       Goal:  Unable to speak w/ pt; not assessed at this time      Chronic Care Management Care  Plan      Goal:  Carry out ADLs and IADLs  Barriers: Health, mobility, Alzheimer's dx, fatigue  Interventions: Follow up w/ PCP to address fatigue/mobility concerns, establish exercise regimen, identify resources for ADL/IADL assistance    Start Date: 3/27  End Date:      Goal:  Prevent falls  Barriers: Strength, mobility, fatigue, Alzheimer's dx, medications  Interventions: Discussed fall prevention, pt uses walker or wheel chair    Start Date: 3/27  End Date:      Discussion: Discussed w/ patient    Goals: See care plan

## 2023-03-27 NOTE — TELEPHONE ENCOUNTER
Caller Name: Prabhakar Sierra  Call Back Number: [phone]    How would the patient prefer to be contacted with a response: Phone call OK to leave a detailed message    Received a message from SETH Coronel stating that patient would like to be schedule for an appointment with Dr. Moran to go over Alzheimer Disease. Reach out to patient and left message with the first availability with Dr. Moran and to call us back to schedule his appointment.     SETH Michael

## 2023-03-28 ENCOUNTER — OFFICE VISIT (OUTPATIENT)
Dept: MEDICAL GROUP | Age: 73
End: 2023-03-28
Payer: MEDICARE

## 2023-03-28 ENCOUNTER — HOSPITAL ENCOUNTER (OUTPATIENT)
Dept: LAB | Facility: MEDICAL CENTER | Age: 73
End: 2023-03-28
Attending: INTERNAL MEDICINE
Payer: MEDICARE

## 2023-03-28 ENCOUNTER — HOSPITAL ENCOUNTER (OUTPATIENT)
Facility: MEDICAL CENTER | Age: 73
End: 2023-03-28
Attending: INTERNAL MEDICINE
Payer: MEDICARE

## 2023-03-28 VITALS
HEIGHT: 70 IN | SYSTOLIC BLOOD PRESSURE: 122 MMHG | BODY MASS INDEX: 26.92 KG/M2 | HEART RATE: 100 BPM | DIASTOLIC BLOOD PRESSURE: 68 MMHG | OXYGEN SATURATION: 98 % | TEMPERATURE: 96.9 F | WEIGHT: 188 LBS

## 2023-03-28 DIAGNOSIS — E83.42 HYPOMAGNESEMIA: ICD-10-CM

## 2023-03-28 DIAGNOSIS — G58.8 OTHER MONONEUROPATHY: ICD-10-CM

## 2023-03-28 DIAGNOSIS — E61.1 IRON DEFICIENCY: ICD-10-CM

## 2023-03-28 DIAGNOSIS — Z79.899 POLYPHARMACY: ICD-10-CM

## 2023-03-28 DIAGNOSIS — E11.65 UNCONTROLLED TYPE 2 DIABETES MELLITUS WITH HYPERGLYCEMIA (HCC): ICD-10-CM

## 2023-03-28 DIAGNOSIS — E78.2 MIXED HYPERLIPIDEMIA: ICD-10-CM

## 2023-03-28 DIAGNOSIS — R73.01 IFG (IMPAIRED FASTING GLUCOSE): ICD-10-CM

## 2023-03-28 DIAGNOSIS — F02.A0 MILD EARLY ONSET ALZHEIMER'S DEMENTIA WITHOUT BEHAVIORAL DISTURBANCE, PSYCHOTIC DISTURBANCE, MOOD DISTURBANCE, OR ANXIETY (HCC): ICD-10-CM

## 2023-03-28 DIAGNOSIS — E53.8 VITAMIN B 12 DEFICIENCY: ICD-10-CM

## 2023-03-28 DIAGNOSIS — E55.9 VITAMIN D DEFICIENCY: ICD-10-CM

## 2023-03-28 DIAGNOSIS — R29.898 WEAKNESS OF BOTH LOWER EXTREMITIES: ICD-10-CM

## 2023-03-28 DIAGNOSIS — E29.1 HYPOGONADISM MALE: ICD-10-CM

## 2023-03-28 DIAGNOSIS — R35.0 URINARY FREQUENCY: ICD-10-CM

## 2023-03-28 DIAGNOSIS — I10 ESSENTIAL HYPERTENSION: ICD-10-CM

## 2023-03-28 DIAGNOSIS — G30.0 MILD EARLY ONSET ALZHEIMER'S DEMENTIA WITHOUT BEHAVIORAL DISTURBANCE, PSYCHOTIC DISTURBANCE, MOOD DISTURBANCE, OR ANXIETY (HCC): ICD-10-CM

## 2023-03-28 DIAGNOSIS — R26.89 KEEPS LOSING BALANCE: ICD-10-CM

## 2023-03-28 LAB
25(OH)D3 SERPL-MCNC: 23 NG/ML (ref 30–100)
ALBUMIN SERPL BCP-MCNC: 4.6 G/DL (ref 3.2–4.9)
ALBUMIN/GLOB SERPL: 2 G/DL
ALP SERPL-CCNC: 64 U/L (ref 30–99)
ALT SERPL-CCNC: 16 U/L (ref 2–50)
ANION GAP SERPL CALC-SCNC: 15 MMOL/L (ref 7–16)
APPEARANCE UR: CLEAR
AST SERPL-CCNC: 11 U/L (ref 12–45)
BASOPHILS # BLD AUTO: 0.6 % (ref 0–1.8)
BASOPHILS # BLD: 0.04 K/UL (ref 0–0.12)
BILIRUB SERPL-MCNC: 0.3 MG/DL (ref 0.1–1.5)
BILIRUB UR STRIP-MCNC: NEGATIVE MG/DL
BUN SERPL-MCNC: 20 MG/DL (ref 8–22)
CALCIUM ALBUM COR SERPL-MCNC: 9 MG/DL (ref 8.5–10.5)
CALCIUM SERPL-MCNC: 9.5 MG/DL (ref 8.5–10.5)
CHLORIDE SERPL-SCNC: 99 MMOL/L (ref 96–112)
CHOLEST SERPL-MCNC: 149 MG/DL (ref 100–199)
CO2 SERPL-SCNC: 24 MMOL/L (ref 20–33)
COLOR UR AUTO: YELLOW
CREAT SERPL-MCNC: 0.95 MG/DL (ref 0.5–1.4)
CREAT UR-MCNC: 34.36 MG/DL
EOSINOPHIL # BLD AUTO: 0.11 K/UL (ref 0–0.51)
EOSINOPHIL NFR BLD: 1.6 % (ref 0–6.9)
ERYTHROCYTE [DISTWIDTH] IN BLOOD BY AUTOMATED COUNT: 50.2 FL (ref 35.9–50)
EST. AVERAGE GLUCOSE BLD GHB EST-MCNC: 186 MG/DL
FERRITIN SERPL-MCNC: 115 NG/ML (ref 22–322)
FOLATE SERPL-MCNC: >40 NG/ML
GFR SERPLBLD CREATININE-BSD FMLA CKD-EPI: 84 ML/MIN/1.73 M 2
GLOBULIN SER CALC-MCNC: 2.3 G/DL (ref 1.9–3.5)
GLUCOSE SERPL-MCNC: 223 MG/DL (ref 65–99)
GLUCOSE UR STRIP.AUTO-MCNC: 500 MG/DL
HBA1C MFR BLD: 8.1 % (ref 4–5.6)
HCT VFR BLD AUTO: 43.3 % (ref 42–52)
HDLC SERPL-MCNC: 42 MG/DL
HGB BLD-MCNC: 14.4 G/DL (ref 14–18)
IMM GRANULOCYTES # BLD AUTO: 0.03 K/UL (ref 0–0.11)
IMM GRANULOCYTES NFR BLD AUTO: 0.4 % (ref 0–0.9)
IRON SATN MFR SERPL: 28 % (ref 15–55)
IRON SERPL-MCNC: 79 UG/DL (ref 50–180)
KETONES UR STRIP.AUTO-MCNC: NEGATIVE MG/DL
LDLC SERPL CALC-MCNC: 45 MG/DL
LEUKOCYTE ESTERASE UR QL STRIP.AUTO: NEGATIVE
LYMPHOCYTES # BLD AUTO: 1.47 K/UL (ref 1–4.8)
LYMPHOCYTES NFR BLD: 21 % (ref 22–41)
MAGNESIUM SERPL-MCNC: 1.4 MG/DL (ref 1.5–2.5)
MCH RBC QN AUTO: 29.9 PG (ref 27–33)
MCHC RBC AUTO-ENTMCNC: 33.3 G/DL (ref 33.7–35.3)
MCV RBC AUTO: 89.8 FL (ref 81.4–97.8)
MICROALBUMIN UR-MCNC: 3.7 MG/DL
MICROALBUMIN/CREAT UR: 108 MG/G (ref 0–30)
MONOCYTES # BLD AUTO: 0.57 K/UL (ref 0–0.85)
MONOCYTES NFR BLD AUTO: 8.2 % (ref 0–13.4)
NEUTROPHILS # BLD AUTO: 4.77 K/UL (ref 1.82–7.42)
NEUTROPHILS NFR BLD: 68.2 % (ref 44–72)
NITRITE UR QL STRIP.AUTO: NORMAL
NRBC # BLD AUTO: 0 K/UL
NRBC BLD-RTO: 0 /100 WBC
PH UR STRIP.AUTO: 5 [PH] (ref 5–8)
PLATELET # BLD AUTO: 224 K/UL (ref 164–446)
PMV BLD AUTO: 10.6 FL (ref 9–12.9)
POTASSIUM SERPL-SCNC: 5.7 MMOL/L (ref 3.6–5.5)
PROT SERPL-MCNC: 6.9 G/DL (ref 6–8.2)
PROT UR QL STRIP: NEGATIVE MG/DL
RBC # BLD AUTO: 4.82 M/UL (ref 4.7–6.1)
RBC UR QL AUTO: NEGATIVE
SODIUM SERPL-SCNC: 138 MMOL/L (ref 135–145)
SP GR UR STRIP.AUTO: 1.01
TIBC SERPL-MCNC: 278 UG/DL (ref 250–450)
TRIGL SERPL-MCNC: 308 MG/DL (ref 0–149)
TSH SERPL DL<=0.005 MIU/L-ACNC: 0.93 UIU/ML (ref 0.38–5.33)
UIBC SERPL-MCNC: 199 UG/DL (ref 110–370)
UROBILINOGEN UR STRIP-MCNC: 0.2 MG/DL
VIT B12 SERPL-MCNC: 554 PG/ML (ref 211–911)
WBC # BLD AUTO: 7 K/UL (ref 4.8–10.8)

## 2023-03-28 PROCEDURE — 82607 VITAMIN B-12: CPT

## 2023-03-28 PROCEDURE — 36415 COLL VENOUS BLD VENIPUNCTURE: CPT

## 2023-03-28 PROCEDURE — 83540 ASSAY OF IRON: CPT

## 2023-03-28 PROCEDURE — 82306 VITAMIN D 25 HYDROXY: CPT

## 2023-03-28 PROCEDURE — 83036 HEMOGLOBIN GLYCOSYLATED A1C: CPT

## 2023-03-28 PROCEDURE — 82728 ASSAY OF FERRITIN: CPT

## 2023-03-28 PROCEDURE — 82746 ASSAY OF FOLIC ACID SERUM: CPT

## 2023-03-28 PROCEDURE — 99214 OFFICE O/P EST MOD 30 MIN: CPT | Performed by: INTERNAL MEDICINE

## 2023-03-28 PROCEDURE — 80053 COMPREHEN METABOLIC PANEL: CPT

## 2023-03-28 PROCEDURE — 82043 UR ALBUMIN QUANTITATIVE: CPT

## 2023-03-28 PROCEDURE — 85025 COMPLETE CBC W/AUTO DIFF WBC: CPT

## 2023-03-28 PROCEDURE — 83735 ASSAY OF MAGNESIUM: CPT

## 2023-03-28 PROCEDURE — 84443 ASSAY THYROID STIM HORMONE: CPT

## 2023-03-28 PROCEDURE — 80061 LIPID PANEL: CPT

## 2023-03-28 PROCEDURE — 81002 URINALYSIS NONAUTO W/O SCOPE: CPT | Performed by: INTERNAL MEDICINE

## 2023-03-28 PROCEDURE — 82570 ASSAY OF URINE CREATININE: CPT

## 2023-03-28 PROCEDURE — 83550 IRON BINDING TEST: CPT

## 2023-03-28 ASSESSMENT — ENCOUNTER SYMPTOMS
RESPIRATORY NEGATIVE: 1
GASTROINTESTINAL NEGATIVE: 1
CARDIOVASCULAR NEGATIVE: 1
CONSTITUTIONAL NEGATIVE: 1
MUSCULOSKELETAL NEGATIVE: 1
NEUROLOGICAL NEGATIVE: 1
PSYCHIATRIC NEGATIVE: 1
EYES NEGATIVE: 1

## 2023-03-28 ASSESSMENT — FIBROSIS 4 INDEX: FIB4 SCORE: 1.4

## 2023-03-28 NOTE — PROGRESS NOTES
Subjective     Prabhakar Sierra is a 72 y.o. male who presents with Diarrhea (Has stoped )  And  The patient is here for followup of chronic medical problems listed below. The patient is compliant with medications and having no side effects from them. Denies chest pain, abdominal pain, dyspnea, myalgias, or cough.   Patient Active Problem List   Diagnosis    GERD (gastroesophageal reflux disease)    Essential hypertension    Prostate CA ([facility])- feb 2022; RT tbd x 8 wks, mar- may 2022    Mixed hyperlipidemia    ACP (advance care planning)    Tremor, coarse    B12 deficiency    Current moderate episode of major depressive disorder ([facility])    Vitamin D deficiency    Other male erectile dysfunction    Alcohol dependence in remission ([facility])    Obesity (BMI 30.0-34.9)    Dementia ([facility])    Dementia associated with alcoholism with behavioral disturbance ([facility])- Dr. Moran    Tobacco dependency    Diabetes mellitus, type 2 ([facility])    Normal pressure hydrocephalus ([facility])    Compression fracture of body of thoracic vertebra ([facility])    Age-related physical debility    Compression fracture of L2 lumbar vertebra, closed, initial encounter ([facility])    Compression fracture of L3 lumbar vertebra, closed, initial encounter ([facility])    Neuropathy    Thoracic compression fracture, closed, initial encounter ([facility])    Impaired mobility and ADLs    Urinary retention    Other insomnia    Uncontrolled type 2 diabetes mellitus with hyperglycemia ([facility])    PSVT (paroxysmal supraventricular tachycardia) ([facility])    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 Indications: supplement      benazepril (LOTENSIN) 20 MG Tab Take 40 mg by mouth every day. Indications: High Blood Pressure Disorder      magnesium oxide 400 (240 Mg) MG Tab Take 1 Tablet by mouth every day. 100 Tablet 3    Semaglutide  (RYBELSUS) 3 MG Tab Take 3 mg by mouth every day at 6 PM. Indications: Type 2 Diabetes 30 Tablet 2    metoprolol tartrate (LOPRESSOR) 25 MG Tab Take 0.5 Tablets by mouth 2 times a day. Indications: High Blood Pressure Disorder 90 Tablet 3    folic acid (FOLVITE) 1 MG Tab Take 1 Tablet by mouth every day. Indications: ALCOHOL ABUSE 90 Tablet 3    buPROPion SR (WELLBUTRIN-SR) 150 MG TABLET SR 12 HR sustained-release tablet Take 1 Tablet by mouth 2 times a day. Indications: Major Depressive Disorder 180 Tablet 3    DULoxetine (CYMBALTA) 60 MG Cap DR Particles delayed-release capsule Take 1 Capsule by mouth 2 times a day. Indications: Major Depressive Disorder 180 Capsule 3    omeprazole (PRILOSEC) 20 MG delayed-release capsule Take 1 Capsule by mouth 2 times a day. Indications: Gastroesophageal Reflux Disease 180 Capsule 4    pioglitazone (ACTOS) 30 MG Tab Take 1 Tablet by mouth every day. Indications: Type 2 Diabetes 90 Tablet 4    Empagliflozin (JARDIANCE) 25 MG Tab Take 25 mg by mouth every day. Indications: Type 2 Diabetes 90 Tablet 4    Accu-Chek Softclix Lancets Misc Use 2 lancet as directed twice a day 102 Each 3    glucose blood (FREESTYLE LITE) strip Use 2 strip via meter twice a day 100 Strip 3    acetaminophen (TYLENOL) 325 MG Tab Take 2 Tablets by mouth every four hours as needed for Mild Pain. 30 Tablet 0    aspirin (ASA) 81 MG Chew Tab chewable tablet Chew 1 Tablet every day. 100 Tablet     atorvastatin (LIPITOR) 80 MG tablet Take 1 Tablet by mouth every evening. 90 Tablet 4    metformin (GLUCOPHAGE) 1000 MG tablet Take 1 Tablet by mouth 2 times a day with meals. 180 Tablet 3    thiamine (THIAMINE) 100 MG tablet Take 1 Tablet by mouth every day.      Blood Glucose Monitoring Suppl Device Freestyle carlo glucose monitoring device with all necessary accessories including patch and transmitter  for type II noninsulin treated diabetes.  To check blood sugar once or twice daily and per any symptoms of  "high or low blood sugar. 1 Each 1    Blood Glucose Monitoring Suppl Device Meter: Dispense Device of Insurance Preference. Sig. Use as directed for blood sugar monitoring. #1. NR. 1 Each 0    Blood Glucose Test Strips Test strips for meter dispensed, testing one time per day E11.65 100 Strip 2    Lancets Lancets for meter dispensed, to test one time per day E11.65 100 Each 2     No facility-administered medications prior to visit.               HPI    Review of Systems   Constitutional: Negative.    HENT: Negative.     Eyes: Negative.    Respiratory: Negative.     Cardiovascular: Negative.    Gastrointestinal: Negative.    Genitourinary: Negative.    Musculoskeletal: Negative.    Skin: Negative.    Neurological: Negative.    Endo/Heme/Allergies: Negative.    Psychiatric/Behavioral: Negative.              Objective     /68 (BP Location: Right arm, Patient Position: Sitting, BP Cuff Size: Adult)   Pulse 100   Temp 36.1 °C (96.9 °F) (Temporal)   Ht 1.778 m (5' 10\")   Wt 85.3 kg (188 lb)   SpO2 98%   BMI 26.98 kg/m²      Physical Exam  Constitutional:       General: He is not in acute distress.     Appearance: He is well-developed. He is not diaphoretic.   HENT:      Head: Normocephalic and atraumatic.      Right Ear: External ear normal.      Left Ear: External ear normal.      Nose: Nose normal.      Mouth/Throat:      Pharynx: No oropharyngeal exudate.   Eyes:      General: No scleral icterus.        Right eye: No discharge.         Left eye: No discharge.      Conjunctiva/sclera: Conjunctivae normal.      Pupils: Pupils are equal, round, and reactive to light.   Neck:      Thyroid: No thyromegaly.      Vascular: No JVD.      Trachea: No tracheal deviation.   Cardiovascular:      Rate and Rhythm: Normal rate and regular rhythm.      Heart sounds: Normal heart sounds. No murmur heard.    No friction rub. No gallop.   Pulmonary:      Effort: Pulmonary effort is normal. No respiratory distress.      Breath " sounds: Normal breath sounds. No stridor. No wheezing or rales.   Chest:      Chest wall: No tenderness.   Abdominal:      General: Bowel sounds are normal. There is no distension.      Palpations: Abdomen is soft. There is no mass.      Tenderness: There is no abdominal tenderness. There is no guarding or rebound.   Musculoskeletal:         General: No tenderness. Normal range of motion.      Cervical back: Normal range of motion and neck supple.   Lymphadenopathy:      Cervical: No cervical adenopathy.   Skin:     General: Skin is warm and dry.      Coloration: Skin is not pale.      Findings: No erythema or rash.   Neurological:      Mental Status: He is alert and oriented to person, place, and time.      Motor: No abnormal muscle tone.      Coordination: Coordination normal.      Deep Tendon Reflexes: Reflexes are normal and symmetric. Reflexes normal.   Psychiatric:         Behavior: Behavior normal.         Thought Content: Thought content normal.         Judgment: Judgment normal.                           Assessment & Plan         1. Urinary frequency  Urinalysis negative.  Probably medication effect from Rybelsus.  - POCT Urinalysis  - Referral to Physical Therapy    2. Vitamin D deficiency  Good control continue vitamin D3 2000 units daily.    3. Mixed hyperlipidemia  Good control continue atorvastatin 80 mg daily.  - CBC WITH DIFFERENTIAL; Future  - TSH; Future  - Lipid Profile; Future  - Lipid Profile; Future  - Comp Metabolic Panel; Future  - Lipid Profile; Future  - Referral to Geriatrics    4. Essential hypertension  Good control continue with Lotensin 20 mg daily and metoprolol 25 mg 1/2 pill twice daily    Unknown control.  Check levels.  - Referral to Geriatrics    6. Vitamin B 12 deficiency  Unknown control.  Continue with vitamin B-12 1000 mcg daily.  - VITAMIN B12; Future  - FOLATE; Future    7. Uncontrolled type 2 diabetes mellitus with hyperglycemia (HCC)  Unknown control.  Check A1c and  continue current regimen of Rybelsus Rybelsus 3 mg daily.  Actos 30 mg daily, Jardiance 25 mg daily, and metformin 1000 mg twice daily.  - Lipid Profile; Future  - HEMOGLOBIN A1C; Future  - MICROALBUMIN CREAT RATIO URINE; Future  - Referral to Geriatrics  Check A1c.  8. Hypomagnesemia  Under control check level.  Continue 400 mg of magnesium oxide for now.       10. Iron deficiency  Unknown control check labs.  - IRON/TOTAL IRON BIND; Future  - FERRITIN; Future    11. Keeps losing balance  Ongoing problem.  Needs physical therapy and referral to geriatrics for further evaluation continue follow-up with neurology.  - Referral to Physical Therapy  - Referral to Geriatrics    12. Weakness of both lower extremities      As above  - Referral to Physical Therapy  - Referral to Geriatrics    13. Other mononeuropathy     - Referral to Physical Therapy  - Referral to Geriatrics    14. Polypharmacy  As above  - Referral to Geriatrics    15. Mild early onset Alzheimer's dementia without behavioral disturbance, psychotic disturbance, mood disturbance, or anxiety (HCC)  As above  - Referral to Geriatrics

## 2023-03-29 ENCOUNTER — HOSPITAL ENCOUNTER (OUTPATIENT)
Dept: RADIOLOGY | Facility: MEDICAL CENTER | Age: 73
End: 2023-03-29
Payer: MEDICARE

## 2023-03-29 DIAGNOSIS — E78.5 HYPERLIPIDEMIA, UNSPECIFIED HYPERLIPIDEMIA TYPE: ICD-10-CM

## 2023-03-29 DIAGNOSIS — E11.00 DM (DIABETES MELLITUS), TYPE 2, UNCONTROLLED, WITH HYPEROSMOLARITY (HCC): ICD-10-CM

## 2023-03-29 DIAGNOSIS — I10 HYPERTENSION, ESSENTIAL: ICD-10-CM

## 2023-03-29 DIAGNOSIS — Z02.89 MEDICATION MANAGEMENT CONTRACT AGREEMENT: ICD-10-CM

## 2023-03-29 DIAGNOSIS — M81.0 OSTEOPOROSIS, UNSPECIFIED OSTEOPOROSIS TYPE, UNSPECIFIED PATHOLOGICAL FRACTURE PRESENCE: ICD-10-CM

## 2023-03-29 DIAGNOSIS — E11.65 DM (DIABETES MELLITUS), TYPE 2, UNCONTROLLED, WITH HYPEROSMOLARITY (HCC): ICD-10-CM

## 2023-03-29 PROCEDURE — 77080 DXA BONE DENSITY AXIAL: CPT

## 2023-04-21 DIAGNOSIS — E78.2 MIXED HYPERLIPIDEMIA: ICD-10-CM

## 2023-04-21 RX ORDER — ATORVASTATIN CALCIUM 80 MG/1
80 TABLET, FILM COATED ORAL EVERY EVENING
Qty: 90 TABLET | Refills: 4 | Status: SHIPPED | OUTPATIENT
Start: 2023-04-21 | End: 2024-02-05

## 2023-04-21 NOTE — TELEPHONE ENCOUNTER
Received request via: Pharmacy    Was the patient seen in the last year in this department? Yes  3/28/2023  Does the patient have an active prescription (recently filled or refills available) for medication(s) requested? No    Does the patient have assisted Plus and need 100 day supply (blood pressure, diabetes and cholesterol meds only)? Yes, quantity updated to 100 days

## 2023-04-24 ENCOUNTER — HOSPITAL ENCOUNTER (OUTPATIENT)
Dept: LAB | Facility: MEDICAL CENTER | Age: 73
End: 2023-04-24
Attending: PSYCHIATRY & NEUROLOGY
Payer: MEDICARE

## 2023-04-24 ENCOUNTER — HOSPITAL ENCOUNTER (OUTPATIENT)
Dept: LAB | Facility: MEDICAL CENTER | Age: 73
End: 2023-04-24
Attending: UROLOGY
Payer: MEDICARE

## 2023-04-24 DIAGNOSIS — E67.8 EXCESSIVE VITAMIN B6 INTAKE: ICD-10-CM

## 2023-04-24 LAB — PSA SERPL-MCNC: 0.03 NG/ML (ref 0–4)

## 2023-04-24 PROCEDURE — 84207 ASSAY OF VITAMIN B-6: CPT

## 2023-04-24 PROCEDURE — 84153 ASSAY OF PSA TOTAL: CPT

## 2023-04-24 PROCEDURE — 36415 COLL VENOUS BLD VENIPUNCTURE: CPT

## 2023-04-26 ENCOUNTER — PATIENT OUTREACH (OUTPATIENT)
Dept: HEALTH INFORMATION MANAGEMENT | Facility: OTHER | Age: 73
End: 2023-04-26
Payer: MEDICARE

## 2023-04-26 DIAGNOSIS — I10 ESSENTIAL HYPERTENSION: ICD-10-CM

## 2023-04-26 DIAGNOSIS — E11.65 UNCONTROLLED TYPE 2 DIABETES MELLITUS WITH HYPERGLYCEMIA (HCC): ICD-10-CM

## 2023-04-28 ENCOUNTER — PATIENT OUTREACH (OUTPATIENT)
Dept: HEALTH INFORMATION MANAGEMENT | Facility: OTHER | Age: 73
End: 2023-04-28
Payer: MEDICARE

## 2023-04-28 DIAGNOSIS — I10 ESSENTIAL HYPERTENSION: ICD-10-CM

## 2023-04-28 NOTE — PROGRESS NOTES
CHW called pt to advise him of the upcoming changes to his food delivery from [facility]. Pt did not answer. CHW left a vm with the contact information for pt to return call.

## 2023-04-29 LAB — VIT B6 SERPL-MCNC: 670.2 NMOL/L (ref 20–125)

## 2023-04-30 ENCOUNTER — DOCUMENTATION (OUTPATIENT)
Dept: NEUROLOGY | Facility: MEDICAL CENTER | Age: 73
End: 2023-04-30
Payer: MEDICARE

## 2023-04-30 DIAGNOSIS — E67.8 EXCESSIVE VITAMIN B6 INTAKE: ICD-10-CM

## 2023-05-01 ENCOUNTER — PATIENT OUTREACH (OUTPATIENT)
Dept: HEALTH INFORMATION MANAGEMENT | Facility: OTHER | Age: 73
End: 2023-05-01
Payer: MEDICARE

## 2023-05-01 DIAGNOSIS — I10 ESSENTIAL HYPERTENSION: ICD-10-CM

## 2023-05-01 NOTE — PROGRESS NOTES
CHW contacted pt to discuss the upcoming changes with [facility]. Per pt, they would not like a delivery with canned goods only and pt is aware that they will not receive another food delivery until June 1st. CHW encouraged pt to reach out to CHW as needed.

## 2023-05-03 ENCOUNTER — TELEPHONE (OUTPATIENT)
Dept: NEUROLOGY | Facility: MEDICAL CENTER | Age: 73
End: 2023-05-03
Payer: MEDICARE

## 2023-05-04 ENCOUNTER — TELEPHONE (OUTPATIENT)
Dept: NEUROLOGY | Facility: MEDICAL CENTER | Age: 73
End: 2023-05-04
Payer: MEDICARE

## 2023-05-04 ENCOUNTER — PHYSICAL THERAPY (OUTPATIENT)
Dept: PHYSICAL THERAPY | Facility: MEDICAL CENTER | Age: 73
End: 2023-05-04
Attending: INTERNAL MEDICINE
Payer: MEDICARE

## 2023-05-04 DIAGNOSIS — E13.41: ICD-10-CM

## 2023-05-04 DIAGNOSIS — R26.89 KEEPS LOSING BALANCE: ICD-10-CM

## 2023-05-04 DIAGNOSIS — R35.0 URINARY FREQUENCY: ICD-10-CM

## 2023-05-04 DIAGNOSIS — R29.898 WEAKNESS OF BOTH LOWER EXTREMITIES: ICD-10-CM

## 2023-05-04 PROCEDURE — 97163 PT EVAL HIGH COMPLEX 45 MIN: CPT

## 2023-05-04 PROCEDURE — 97110 THERAPEUTIC EXERCISES: CPT

## 2023-05-04 SDOH — ECONOMIC STABILITY: GENERAL: QUALITY OF LIFE: FAIR

## 2023-05-04 ASSESSMENT — BALANCE ASSESSMENTS
LOOK OVER LEFT AND RIGHT SHOULDERS WHILE STANDING: 0
PICK UP OBJECT FROM THE FLOOR FROM A STANDING POSITION: 1
SITTING UNSUPPORTED: 4
LONG VERSION TOTAL SCORE (MAX 56): 22
STANDING ON ONE LEG: 0
STANDING UNSUPPORTED WITH FEET TOGETHER: 2
SITTING TO STANDING: 2
STANDING TO SITTING: 3
REACHING FORWARD WITH OUTSTRETCHED ARM WHILE STANDING: 1
STANDING UNSUPPORTED ONE FOOT IN FRONT: 0
STANDING UNSUPPORTED: 3
STANDING UNSUPPORTED WITH EYES CLOSED: 3
PLACE ALTERNATE FOOT ON STEP OR STOOL WHILE STANDING UNSUPPORTED: 0
LONG VERSION TOTAL SCORE (MAX 56): 22
TURN 360 DEGREES: 0
TRANSFERS: 3

## 2023-05-04 ASSESSMENT — ACTIVITIES OF DAILY LIVING (ADL): POOR_BALANCE: 1

## 2023-05-04 NOTE — TELEPHONE ENCOUNTER
Called  for pt to check mychart or call back.        Parminder Moran M.D.  [facility], [facility]  Rehana     Can you contact this patient and ensure he (or a family member) reads my note below ?   It was send to him over 3 days ago in MY CHART but I just received a message it has not been read.     -----------------------------------------------------------------------------  Prabhakar       Your Vitamin B6 blood level is much too high.       Vitamin B6 is called Pyridoxine.       Please stop taking any supplements or vitamins with Vitamin B6 in them at this time.       Then in about 1 month go back to the [facility] and get another Vitamin B6 blood level for comparative purposes.)           Parminder Moran MD   [facility]

## 2023-05-04 NOTE — OP THERAPY EVALUATION
Outpatient Physical Therapy  INITIAL EVALUATION    [facility] Outpatient Physical Therapy  [address]  [place] [address]  Phone:  [phone]  Fax:  [phone]    Date of Evaluation: 05/04/2023    Patient: Prabhakar Sierra  YOB: 1950  MRN: [id number]     Referring Provider: [place] [address]   Referring Diagnosis No admission diagnoses are documented for this encounter.     Time Calculation  Start time: 1445  Stop time: 1535 Time Calculation (min): 50 minutes         Chief Complaint: Loss Of Balance    Visit Diagnoses     ICD-10-CM   1. Urinary frequency  R35.0   2. Keeps losing balance  R26.89   3. Weakness of both lower extremities  R29.898   4. Other specified diabetes mellitus with diabetic mononeuropathy, unspecified whether long term insulin use (HCC)  E13.41       Date of onset of impairment: 12/22/2022    Subjective:   History of Present Illness:     Mechanism of injury:  Pt reports he broke his back back in December of 2022. He reports he is here today because he wants to be able to stay at home. Pt reports he ambulates with cane or walker but reports he doesn't want to use his cane and walker and wants to be able to return to his local gym. Pt reports he had his most recent fall a couple days ago at home. He reports he has had multiple falls in the last few months sustaining abrasion type injuries. Pt has a hx of diabetes and B/L neuropathy of the feet.   Quality of life:  Fair    Past Medical History:   Diagnosis Date    Alcohol use     BPH (benign prostatic hyperplasia)     Depression     Fall 12/22/2022    GERD (gastroesophageal reflux disease)     History of depression 12/28/2022    Hypertension     Hyponatremia 05/18/2017    Iron deficiency anemia secondary to inadequate dietary iron intake 05/26/2021    Mild cognitive impairment 07/26/2022    Neuropathic pain, leg     Orthostatic  hypotension 1/3/2019    Right hip pain 2016    Thrombocytopenia, unspecified (HCC) 2022    Tobacco use     Type II or unspecified type diabetes mellitus without mention of complication, not stated as uncontrolled      No past surgical history on file.  Social History     Tobacco Use    Smoking status: Some Days     Packs/day: 0.15     Years: 50.00     Pack years: 7.50     Types: Cigarettes     Last attempt to quit: 1/10/2007     Years since quittin.3    Smokeless tobacco: Never   Substance Use Topics    Alcohol use: Not Currently     Alcohol/week: 4.8 - 6.0 oz     Types: 8 - 10 Glasses of wine per week     Comment: 1 bottle of wine 2-3 days a week     Family and Occupational History     Socioeconomic History    Marital status:      Spouse name: Not on file    Number of children: Not on file    Years of education: Not on file    Highest education level: Not on file   Occupational History    Not on file       Objective   Ambulation     Comments   TU seconds, with FW walker      Therapeutic Exercises (CPT 64270):     1. Sit to stand, 1x10, HEP    2. SLR, 1x10, HEP    3. LAQ, 1x10, HEP      Therapeutic Exercise Summary: Pt was educated today regarding current fall risk, expectations for rehab potential and appropriate exercise program for home. HEP was given to patient in print out form and instructions were communicated to pt.       Time-based treatments/modalities:    Physical Therapy Timed Treatment Charges  Therapeutic exercise minutes (CPT 73836): 10 minutes      Assessment, Response and Plan:   Impairments: impaired balance and limited mobility    Assessment details:  Pt is a 71 y/o M presenting to PT with signs and symptoms consistent with high fall risk secondary to deconditioning and neuropathy. Pt has deficits of reduced gait speed, lack of appropriate recovery strategies and decreased LE strength. Given pt's age, overall health, current condition and adherence to PT  recommendations, anticipated duration of episode is 12 weeks.      Prognosis: fair    Goals:   Short Term Goals:   1. Pt will achieve a ABEBE score of 35 or greater  2. Pt will be able to demonstrate appropriate safe independent use of walker consistently  3. Pt will be able to ambulate with use of walker safely and independently for 500 feet or greater  Short term goal time span:  2-4 weeks      Long Term Goals:    1. Pt will be able to achieve 46 or greater on ABEBE balance scale  2. Pt will be able to safely ambulate 500 feet or greater independently and safely with single point cane  3. Pt will be able to ascend and descend 2 flights of stairs with use of single point cane and 1 handrail  Long term goal time span:  2-4 months    Plan:   Therapy options:  Physical therapy treatment to continue  Planned therapy interventions:  Neuromuscular Re-education (CPT 51183), Therapeutic Exercise (CPT 60946) and Therapeutic Activities (CPT 19754)  Frequency:  2x week  Duration in weeks:  12  Discussed with:  Patient    Functional Assessment Used  Abebe Balance Total Score (0-56): 22     Referring provider co-signature:  I have reviewed this plan of care and my co-signature certifies the need for services.    Certification Period: 05/04/2023 to  Other 7/27/23    Physician Signature: ________________________________ Date: ______________

## 2023-05-05 NOTE — PROGRESS NOTES
Assessment    Monthly outreach completed with Prabhakar and Heidy. He is doing okay. He has had a few falls. Discussed fall prevention. He saw Dr. Miller and was referred back to PT for balance. He started PT. Provided Heidy w/ the number to schedule SCP rides to these appts. Last A1c was 8.1. Pt has follow up appt with PCP on 5/17.     Education    Fall prevention, exercise benefits    Plan of Care and Goals    Goal: Improve mobility  Barriers: Balance, motivation   Interventions: PT     Start Date: 5/5    Barriers:    Motivation, balance, transportation    Progress:    Progressing     Next outreach: 1 month

## 2023-05-09 ENCOUNTER — PHYSICAL THERAPY (OUTPATIENT)
Dept: PHYSICAL THERAPY | Facility: MEDICAL CENTER | Age: 73
End: 2023-05-09
Attending: INTERNAL MEDICINE
Payer: MEDICARE

## 2023-05-09 DIAGNOSIS — R26.89 KEEPS LOSING BALANCE: ICD-10-CM

## 2023-05-09 PROCEDURE — 97110 THERAPEUTIC EXERCISES: CPT

## 2023-05-09 PROCEDURE — 97530 THERAPEUTIC ACTIVITIES: CPT

## 2023-05-09 NOTE — OP THERAPY DAILY TREATMENT
Outpatient Physical Therapy  DAILY TREATMENT     [facility] Outpatient Physical Therapy  [address]  [place] [address]  Phone:  [phone]  Fax:  [phone]    Date: 05/09/2023    Patient: Prabhakar Sierra  YOB: 1950  MRN: [id number]     Time Calculation    Start time: 1548  Stop time: 1628 Time Calculation (min): 40 minutes         Chief Complaint: Loss Of Balance    Visit #: 2    SUBJECTIVE:  Pt reports he has had no falls since his last visit and has been consistently using his walker.          Therapeutic Exercises (CPT 87645):     1. Sit to stand, 2x10, HEP    2. SLR, 1x10, NT reviewed    3. LAQ, 1x10 3#, HEP    4. Nustep, 6 mins, cardio warm up      Therapeutic Exercise Summary: Pt was educated today regarding current fall risk, expectations for rehab potential and appropriate exercise program for home. HEP was given to patient in print out form and instructions were communicated to pt.       Therapeutic Treatments and Modalities:     1. Neuromuscular Re-education (CPT 56576), tandem stance, marching in place, transfer practice with walker  Time-based treatments/modalities:    Physical Therapy Timed Treatment Charges  Therapeutic activity minutes (CPT 00310): 25 minutes  Therapeutic exercise minutes (CPT 64628): 15 minutes      Pain rating (1-10) before treatment:  0    ASSESSMENT:   Response to treatment: Pt had good tolerance for today's PT session. Pt is significantly deconditioned causing for multiple rest breaks during PT session. Pt will continue to benefit from skilled PT to address aforementioned deficits.      PLAN/RECOMMENDATIONS:   Plan for treatment: therapy treatment to continue next visit.  Planned interventions for next visit: continue with current treatment, neuromuscular re-education (CPT 85012), therapeutic activities (CPT 09744), and therapeutic exercise (CPT 50268).

## 2023-05-11 ENCOUNTER — APPOINTMENT (OUTPATIENT)
Dept: RADIOLOGY | Facility: MEDICAL CENTER | Age: 73
End: 2023-05-11
Attending: EMERGENCY MEDICINE
Payer: MEDICARE

## 2023-05-11 ENCOUNTER — HOSPITAL ENCOUNTER (EMERGENCY)
Facility: MEDICAL CENTER | Age: 73
End: 2023-05-11
Attending: EMERGENCY MEDICINE
Payer: MEDICARE

## 2023-05-11 ENCOUNTER — APPOINTMENT (OUTPATIENT)
Dept: PHYSICAL THERAPY | Facility: MEDICAL CENTER | Age: 73
End: 2023-05-11
Attending: INTERNAL MEDICINE
Payer: MEDICARE

## 2023-05-11 VITALS
TEMPERATURE: 97.4 F | BODY MASS INDEX: 27.32 KG/M2 | HEIGHT: 70 IN | RESPIRATION RATE: 15 BRPM | DIASTOLIC BLOOD PRESSURE: 64 MMHG | OXYGEN SATURATION: 94 % | HEART RATE: 94 BPM | SYSTOLIC BLOOD PRESSURE: 123 MMHG | WEIGHT: 190.8 LBS

## 2023-05-11 DIAGNOSIS — R53.1 WEAKNESS: ICD-10-CM

## 2023-05-11 DIAGNOSIS — R29.6 FALLING: ICD-10-CM

## 2023-05-11 DIAGNOSIS — S32.020A COMPRESSION FRACTURE OF L2 LUMBAR VERTEBRA, CLOSED, INITIAL ENCOUNTER (HCC): ICD-10-CM

## 2023-05-11 DIAGNOSIS — F03.B0 MODERATE DEMENTIA WITHOUT BEHAVIORAL DISTURBANCE, PSYCHOTIC DISTURBANCE, MOOD DISTURBANCE, OR ANXIETY, UNSPECIFIED DEMENTIA TYPE (HCC): ICD-10-CM

## 2023-05-11 DIAGNOSIS — S32.030A COMPRESSION FRACTURE OF L3 LUMBAR VERTEBRA, CLOSED, INITIAL ENCOUNTER (HCC): ICD-10-CM

## 2023-05-11 LAB
ALBUMIN SERPL BCP-MCNC: 4 G/DL (ref 3.2–4.9)
ALBUMIN/GLOB SERPL: 1.5 G/DL
ALP SERPL-CCNC: 95 U/L (ref 30–99)
ALT SERPL-CCNC: 16 U/L (ref 2–50)
ANION GAP SERPL CALC-SCNC: 13 MMOL/L (ref 7–16)
APPEARANCE UR: CLEAR
AST SERPL-CCNC: 14 U/L (ref 12–45)
BASOPHILS # BLD AUTO: 0.4 % (ref 0–1.8)
BASOPHILS # BLD: 0.03 K/UL (ref 0–0.12)
BILIRUB SERPL-MCNC: 0.3 MG/DL (ref 0.1–1.5)
BILIRUB UR QL STRIP.AUTO: NEGATIVE
BUN SERPL-MCNC: 21 MG/DL (ref 8–22)
CALCIUM ALBUM COR SERPL-MCNC: 9.2 MG/DL (ref 8.5–10.5)
CALCIUM SERPL-MCNC: 9.2 MG/DL (ref 8.4–10.2)
CHLORIDE SERPL-SCNC: 100 MMOL/L (ref 96–112)
CO2 SERPL-SCNC: 22 MMOL/L (ref 20–33)
COLOR UR: YELLOW
CREAT SERPL-MCNC: 1.04 MG/DL (ref 0.5–1.4)
EKG IMPRESSION: NORMAL
EOSINOPHIL # BLD AUTO: 0.1 K/UL (ref 0–0.51)
EOSINOPHIL NFR BLD: 1.3 % (ref 0–6.9)
ERYTHROCYTE [DISTWIDTH] IN BLOOD BY AUTOMATED COUNT: 46 FL (ref 35.9–50)
GFR SERPLBLD CREATININE-BSD FMLA CKD-EPI: 75 ML/MIN/1.73 M 2
GLOBULIN SER CALC-MCNC: 2.6 G/DL (ref 1.9–3.5)
GLUCOSE SERPL-MCNC: 349 MG/DL (ref 65–99)
GLUCOSE UR STRIP.AUTO-MCNC: >=1000 MG/DL
HCT VFR BLD AUTO: 40 % (ref 42–52)
HGB BLD-MCNC: 13.5 G/DL (ref 14–18)
IMM GRANULOCYTES # BLD AUTO: 0.02 K/UL (ref 0–0.11)
IMM GRANULOCYTES NFR BLD AUTO: 0.3 % (ref 0–0.9)
KETONES UR STRIP.AUTO-MCNC: ABNORMAL MG/DL
LEUKOCYTE ESTERASE UR QL STRIP.AUTO: NEGATIVE
LYMPHOCYTES # BLD AUTO: 1.1 K/UL (ref 1–4.8)
LYMPHOCYTES NFR BLD: 14 % (ref 22–41)
MCH RBC QN AUTO: 29.9 PG (ref 27–33)
MCHC RBC AUTO-ENTMCNC: 33.8 G/DL (ref 33.7–35.3)
MCV RBC AUTO: 88.5 FL (ref 81.4–97.8)
MICRO URNS: ABNORMAL
MONOCYTES # BLD AUTO: 0.67 K/UL (ref 0–0.85)
MONOCYTES NFR BLD AUTO: 8.5 % (ref 0–13.4)
NEUTROPHILS # BLD AUTO: 5.92 K/UL (ref 1.82–7.42)
NEUTROPHILS NFR BLD: 75.5 % (ref 44–72)
NITRITE UR QL STRIP.AUTO: NEGATIVE
NRBC # BLD AUTO: 0 K/UL
NRBC BLD-RTO: 0 /100 WBC
PH UR STRIP.AUTO: 5 [PH] (ref 5–8)
PLATELET # BLD AUTO: 203 K/UL (ref 164–446)
PMV BLD AUTO: 10.5 FL (ref 9–12.9)
POTASSIUM SERPL-SCNC: 4.3 MMOL/L (ref 3.6–5.5)
PROT SERPL-MCNC: 6.6 G/DL (ref 6–8.2)
PROT UR QL STRIP: NEGATIVE MG/DL
RBC # BLD AUTO: 4.52 M/UL (ref 4.7–6.1)
RBC UR QL AUTO: NEGATIVE
SODIUM SERPL-SCNC: 135 MMOL/L (ref 135–145)
SP GR UR STRIP.AUTO: <=1.005
WBC # BLD AUTO: 7.8 K/UL (ref 4.8–10.8)

## 2023-05-11 PROCEDURE — 71045 X-RAY EXAM CHEST 1 VIEW: CPT

## 2023-05-11 PROCEDURE — 93005 ELECTROCARDIOGRAM TRACING: CPT | Performed by: EMERGENCY MEDICINE

## 2023-05-11 PROCEDURE — 80053 COMPREHEN METABOLIC PANEL: CPT

## 2023-05-11 PROCEDURE — 36415 COLL VENOUS BLD VENIPUNCTURE: CPT

## 2023-05-11 PROCEDURE — 81003 URINALYSIS AUTO W/O SCOPE: CPT

## 2023-05-11 PROCEDURE — 99284 EMERGENCY DEPT VISIT MOD MDM: CPT

## 2023-05-11 PROCEDURE — 85025 COMPLETE CBC W/AUTO DIFF WBC: CPT

## 2023-05-11 PROCEDURE — 72220 X-RAY EXAM SACRUM TAILBONE: CPT

## 2023-05-11 RX ORDER — ACETAMINOPHEN 500 MG
500-1000 TABLET ORAL EVERY 6 HOURS PRN
Status: SHIPPED | COMMUNITY
End: 2023-10-24

## 2023-05-11 ASSESSMENT — FIBROSIS 4 INDEX: FIB4 SCORE: 0.9

## 2023-05-11 NOTE — ED TRIAGE NOTES
"Chief Complaint   Patient presents with    Weakness     \"I have been falling, fell yesterday and today.\" Increasing more and more, PCP advised he come have a work up. Pt uses a walker. He denies having anything that makes him fall, wife reports recent DX of alzheimer's.    Wife states he seems to have gone down hill after radiation [place].  "

## 2023-05-11 NOTE — ED PROVIDER NOTES
"CHIEF COMPLAINT  Chief Complaint   Patient presents with    Weakness     \"I have been falling, fell yesterday and today.\" Increasing more and more, PCP advised he come have a work up. Pt uses a walker. He denies having anything that makes him fall, wife reports recent DX of alzheimer's.       LIMITATION TO HISTORY   Select: None    HPI    Prabhakar Sierra is a 72 y.o. male who presents to the Emergency Department due to increased falling.  The patient had a history of a spinal fracture back in December secondary to multiple radiation treatments for prostate cancer.  Patient apparently was in rehab and since then the wife is noticed the patient's been having a steady decline and increasing weakness and inability to continue to walk.  The patient will have these episodes where he falls back.  2 days ago he was on the toilet stood up and fell back and hurt his tailbone.  Patient was then walking in the street yesterday and fell down and hurt again his tailbone.  Since then the patient has been just more more weak.  The wife did contact the patient's neurologist they have an appointment on Tuesday but the neurologist recommended that the patient come into the emergency department for evaluation because of the increasing falls.  There is been no fevers no chills no signs of infectious etiologies.  This has been a gradual decline since December.  Patient's only complaint is that his tailbone hurts.  Patient denies any other symptoms.  But does have a history of dementia    OUTSIDE HISTORIAN(S):  Select: I provided history as well as the     EXTERNAL RECORDS REVIEWED  Select: Other physical therapy done on the ninth the assessment was \"Response to treatment: Pt had good tolerance for today's PT session. Pt is significantly deconditioned causing for multiple rest breaks during PT session. Pt will continue to benefit from skilled PT to address aforementioned deficits.\"      PAST MEDICAL HISTORY  Past Medical " History:   Diagnosis Date    Alcohol use     BPH (benign prostatic hyperplasia)     Depression     Fall 2022    GERD (gastroesophageal reflux disease)     History of depression 2022    Hypertension     Hyponatremia 2017    Iron deficiency anemia secondary to inadequate dietary iron intake 2021    Mild cognitive impairment 2022    Neuropathic pain, leg     Orthostatic hypotension 1/3/2019    Right hip pain 2016    Thrombocytopenia, unspecified (HCC) 2022    Tobacco use     Type II or unspecified type diabetes mellitus without mention of complication, not stated as uncontrolled      .    SURGICAL HISTORY  History reviewed. No pertinent surgical history.      FAMILY HISTORY  Family History   Problem Relation Age of Onset    Heart Disease Mother     Diabetes Father     Other Sister         Mental retardation    Heart Disease Brother     Cancer Brother         Prostate    No Known Problems Daughter     No Known Problems Daughter           SOCIAL HISTORY  Social History     Socioeconomic History    Marital status:      Spouse name: Not on file    Number of children: Not on file    Years of education: Not on file    Highest education level: Not on file   Occupational History    Not on file   Tobacco Use    Smoking status: Some Days     Packs/day: 0.15     Years: 50.00     Pack years: 7.50     Types: Cigarettes     Last attempt to quit: 1/10/2007     Years since quittin.3    Smokeless tobacco: Never   Vaping Use    Vaping Use: Never used   Substance and Sexual Activity    Alcohol use: Not Currently     Alcohol/week: 4.8 - 6.0 oz     Types: 8 - 10 Glasses of wine per week     Comment: 1 bottle of wine 2-3 days a week    Drug use: No    Sexual activity: Never     Partners: Female   Other Topics Concern    Not on file   Social History Narrative    Not on file     Social Determinants of Health     Financial Resource Strain: Low Risk  (2023)    Overall Financial Resource  Strain (CARDIA)     Difficulty of Paying Living Expenses: Not very hard   Food Insecurity: Food Insecurity Present (2/1/2023)    Hunger Vital Sign     Worried About Running Out of Food in the Last Year: Often true     Ran Out of Food in the Last Year: Often true   Transportation Needs: No Transportation Needs (2/1/2023)    PRAPARE - Transportation     Lack of Transportation (Medical): No     Lack of Transportation (Non-Medical): No   Physical Activity: Inactive (2/1/2023)    Exercise Vital Sign     Days of Exercise per Week: 0 days     Minutes of Exercise per Session: 0 min   Stress: No Stress Concern Present (2/1/2023)    [place] [employer] - Occupational Stress Questionnaire     Feeling of Stress : Only a little   Social Connections: Moderately Isolated (2/1/2023)    Social Connection and Isolation Panel [NHANES]     Frequency of Communication with Friends and Family: Three times a week     Frequency of Social Gatherings with Friends and Family: Three times a week     Attends Orthodoxy Services: Never     Active Member of Clubs or Organizations: No     Attends Club or Organization Meetings: Never     Marital Status:    Intimate Partner Violence: Not on file   Housing Stability: Low Risk  (2/1/2023)    Housing Stability Vital Sign     Unable to Pay for Housing in the Last Year: No     Number of Places Lived in the Last Year: 1     Unstable Housing in the Last Year: No         CURRENT MEDICATIONS  No current facility-administered medications on file prior to encounter.     Current Outpatient Medications on File Prior to Encounter   Medication Sig Dispense Refill    acetaminophen (TYLENOL) 500 MG Tab Take 500-1,000 mg by mouth every 6 hours as needed. Indications: Pain      atorvastatin (LIPITOR) 80 MG tablet Take 1 Tablet by mouth every evening. Indications: High Amount of Fats in the Blood 90 Tablet 4    benazepril (LOTENSIN) 40 MG tablet Take 40 mg by mouth every day. Indications: High  "Blood Pressure Disorder      magnesium oxide 400 (240 Mg) MG Tab Take 1 Tablet by mouth every day. 100 Tablet 3    metoprolol tartrate (LOPRESSOR) 25 MG Tab Take 0.5 Tablets by mouth 2 times a day. Indications: High Blood Pressure Disorder 90 Tablet 3    folic acid (FOLVITE) 1 MG Tab Take 1 Tablet by mouth every day. Indications: ALCOHOL ABUSE 90 Tablet 3    buPROPion SR (WELLBUTRIN-SR) 150 MG TABLET SR 12 HR sustained-release tablet Take 1 Tablet by mouth 2 times a day. Indications: Major Depressive Disorder 180 Tablet 3    DULoxetine (CYMBALTA) 60 MG Cap DR Particles delayed-release capsule Take 1 Capsule by mouth 2 times a day. Indications: Major Depressive Disorder 180 Capsule 3    omeprazole (PRILOSEC) 20 MG delayed-release capsule Take 1 Capsule by mouth 2 times a day. Indications: Gastroesophageal Reflux Disease 180 Capsule 4    pioglitazone (ACTOS) 30 MG Tab Take 1 Tablet by mouth every day. Indications: Type 2 Diabetes 90 Tablet 4    Empagliflozin (JARDIANCE) 25 MG Tab Take 25 mg by mouth every day. Indications: Type 2 Diabetes 90 Tablet 4    aspirin (ASA) 81 MG Chew Tab chewable tablet Chew 1 Tablet every day. 100 Tablet     metformin (GLUCOPHAGE) 1000 MG tablet Take 1 Tablet by mouth 2 times a day with meals. 180 Tablet 3           ALLERGIES  No Known Allergies    PHYSICAL EXAM  VITAL SIGNS:/67   Pulse 87   Temp 36.7 °C (98 °F) (Temporal)   Resp 16   Ht 1.778 m (5' 10\")   Wt 86.5 kg (190 lb 12.8 oz)   SpO2 94%   BMI 27.38 kg/m²     Constitutional: Well-developed no acute distress   HENT: Normocephalic, Atraumatic, Bilateral external ears normal.  Eyes:  conjunctiva are normal.   Neck: Supple.  Nontender midline  Cardiovascular: Regular rate and rhythm without murmurs gallops or rubs.   Thorax & Lungs: No respiratory distress. Breathing comfortably. Lungs are clear to auscultation bilaterally, there are no wheezes no rales. Chest wall is nontender.  Abdomen: Soft, nontender " nondistended.  Skin: Warm, Dry, No erythema,   Back: No tenderness, No CVA tenderness.  Patient is tender about the coccyx but otherwise nontender throughout the whole spine.  The patient is able to stand without assistance sit up without assistance  Musculoskeletal: No clubbing cyanosis or edema good range of motion   Neurologic: Alert & oriented x 3, normal sensation moving all extremities appears normal   Psychiatric: Affect normal, Judgment normal, Mood normal.       DIAGNOSTIC STUDIES / PROCEDURES  EKG  I have independently interpreted this EKG  Interpreted below by myself as     LABS  Results for orders placed or performed during the hospital encounter of 05/11/23   CBC with Differential   Result Value Ref Range    WBC 7.8 4.8 - 10.8 K/uL    RBC 4.52 (L) 4.70 - 6.10 M/uL    Hemoglobin 13.5 (L) 14.0 - 18.0 g/dL    Hematocrit 40.0 (L) 42.0 - 52.0 %    MCV 88.5 81.4 - 97.8 fL    MCH 29.9 27.0 - 33.0 pg    MCHC 33.8 33.7 - 35.3 g/dL    RDW 46.0 35.9 - 50.0 fL    Platelet Count 203 164 - 446 K/uL    MPV 10.5 9.0 - 12.9 fL    Neutrophils-Polys 75.50 (H) 44.00 - 72.00 %    Lymphocytes 14.00 (L) 22.00 - 41.00 %    Monocytes 8.50 0.00 - 13.40 %    Eosinophils 1.30 0.00 - 6.90 %    Basophils 0.40 0.00 - 1.80 %    Immature Granulocytes 0.30 0.00 - 0.90 %    Nucleated RBC 0.00 /100 WBC    Neutrophils (Absolute) 5.92 1.82 - 7.42 K/uL    Lymphs (Absolute) 1.10 1.00 - 4.80 K/uL    Monos (Absolute) 0.67 0.00 - 0.85 K/uL    Eos (Absolute) 0.10 0.00 - 0.51 K/uL    Baso (Absolute) 0.03 0.00 - 0.12 K/uL    Immature Granulocytes (abs) 0.02 0.00 - 0.11 K/uL    NRBC (Absolute) 0.00 K/uL   Complete Metabolic Panel (CMP)   Result Value Ref Range    Sodium 135 135 - 145 mmol/L    Potassium 4.3 3.6 - 5.5 mmol/L    Chloride 100 96 - 112 mmol/L    Co2 22 20 - 33 mmol/L    Anion Gap 13.0 7.0 - 16.0    Glucose 349 (H) 65 - 99 mg/dL    Bun 21 8 - 22 mg/dL    Creatinine 1.04 0.50 - 1.40 mg/dL    Calcium 9.2 8.4 - 10.2 mg/dL    AST(SGOT) 14 12  - 45 U/L    ALT(SGPT) 16 2 - 50 U/L    Alkaline Phosphatase 95 30 - 99 U/L    Total Bilirubin 0.3 0.1 - 1.5 mg/dL    Albumin 4.0 3.2 - 4.9 g/dL    Total Protein 6.6 6.0 - 8.2 g/dL    Globulin 2.6 1.9 - 3.5 g/dL    A-G Ratio 1.5 g/dL   URINALYSIS,CULTURE IF INDICATED    Specimen: Urine, Clean Catch   Result Value Ref Range    Color Yellow     Character Clear     Specific Gravity <=1.005 <1.035    Ph 5.0 5.0 - 8.0    Glucose >=1000 (A) Negative mg/dL    Ketones Trace (A) Negative mg/dL    Protein Negative Negative mg/dL    Bilirubin Negative Negative    Nitrite Negative Negative    Leukocyte Esterase Negative Negative    Occult Blood Negative Negative    Micro Urine Req see below    CORRECTED CALCIUM   Result Value Ref Range    Correct Calcium 9.2 8.5 - 10.5 mg/dL   ESTIMATED GFR   Result Value Ref Range    GFR (CKD-EPI) 75 >60 mL/min/1.73 m 2   EKG   Result Value Ref Range    Report       [facility] Emergency Dept.    Test Date:  2023  Pt Name:    ROLANDO GREEN          Department: [facility]  MRN:        [id number]                      Room:       -ROOM 5  Gender:     Male                         Technician: [id number]  :        1950                   Requested By:KIRA WOODS  Order #:    [id number]                    Reading MD: KIRA WOODS MD    Measurements  Intervals                                Axis  Rate:       91                           P:          9  MO:         168                          QRS:        4  QRSD:       91                           T:          33  QT:         350  QTc:        431    Interpretive Statements  Sinus rhythm  Atrial premature complexes  Compared to ECG 2022 15:17:45  Sinus tachycardia no longer present  T-wave abnormality no longer present  no acute changes  Electronically Signed On 2023 16:47:49 PDT by KIRA WOODS MD           RADIOLOGY  I have independently interpreted the diagnostic imaging associated with this  visit and am waiting the final reading from the radiologist.   My preliminary interpretation is as follows: No acute fracture seen on coccyx no acute intrapulmonary abnormality seen on chest x-ray      Radiologist interpretation:   DX-SACRUM AND COCCYX 2+   Final Result      Negative sacrum and coccyx exam.      DX-CHEST-PORTABLE (1 VIEW)   Final Result         1. No acute cardiopulmonary abnormalities are identified.           COURSE & MEDICAL DECISION MAKING    ED COURSE:    ED Observation Status? Yes; Patient placed in observation status at 3:15 PM 5/11/2023    Observation plan is as follows: We will evaluate for injuries with x-rays and metabolic evaluation using laboratory studies to see if there is any signs of metabolic derangement for his weakness.  We will place the patient on monitor and evaluate for cardiac ectopy.    INTERVENTIONS BY ME:  Medications - No data to display    Response on recheck: Patient is unchanged from prior evaluation still is able to stand up with mild assistance    I have discussed management of the patient with the following physicians and sources:   Case management    Patient discharged from ED observation at 5:10 PM 05/11/23  .      INITIAL ASSESSMENT, COURSE AND PLAN  Care Narrative: Patient presents for evaluation.  Clinically the patient does appear well he does have some tenderness around the coccyx area x-rays obtained no signs of fractures.  Wife has been very concerned because of his increasing weakness over the past 5 months so a metabolic evaluation was done which was unconcerning at this time.  EKG is unchanged from his prior.  At this point he does have a history of dementia as well as decreasing physical activity.  The physical therapist even related that the patient is deconditioned.  After long discussion with the wife she is extremely exhausted and sounds like she is really unable to care for his day-to-day needs.  I did describe to the wife that the only options  were additional help at home or nursing help.  Patient does not want to go to a nursing home so at this point I will try to get home health to help at home for short periods of time.  The patient is to follow-up with her neurologist for further outpatient evaluation which they have an appointment on Tuesday as well as her primary care physician to discuss other options for home health and home care and also possibly to set up for nursing care.  At this point the patient has no acute findings and will be discharged home.                MEDICAL DECISION MAKING:  PROBLEMS EVALUATED THIS VISIT:  Generalized weakness  Dementia  Evaluated for acute coronary syndrome and metabolic derangements  Evaluated for fracture of the tailbone    RISK:  Patient is worsening from his standpoint of deconditioning and may have home falls        FINAL DIAGNOSIS  1. Moderate dementia without behavioral disturbance, psychotic disturbance, mood disturbance, or anxiety, unspecified dementia type (HCC)    2. h/o Compression fracture of L3 lumbar vertebra, closed, initial encounter (HCC)    3. H/O Compression fracture of L2 lumbar vertebra, closed, initial encounter (HCC)    4. Weakness    5. Falling            The patient will return for new or worsening symptoms and is stable at the time of discharge.    The patient is referred to a primary physician for blood pressure management, diabetic screening, and for all other preventative health concerns.        DISPOSITION:  Patient will be discharged home in stable condition.    FOLLOW UP:  Stanton Miller M.D.   [address]  [place] [id number] [phone]    Schedule an appointment as soon as possible for a visit   on Thursday for further care and options      OUTPATIENT MEDICATIONS:  New Prescriptions    No medications on file         Electronically signed by: Gene Gipson M.D., 5/11/2023  2:30 PM

## 2023-05-12 ENCOUNTER — TELEPHONE (OUTPATIENT)
Dept: HEALTH INFORMATION MANAGEMENT | Facility: OTHER | Age: 73
End: 2023-05-12
Payer: MEDICARE

## 2023-05-12 ENCOUNTER — HOME HEALTH ADMISSION (OUTPATIENT)
Dept: HOME HEALTH SERVICES | Facility: HOME HEALTHCARE | Age: 73
End: 2023-05-12
Payer: MEDICARE

## 2023-05-12 NOTE — DISCHARGE PLANNING
This referral has been escalated to a Clinical Supervisor for review in order to determine Home Health appropriateness.  This issue will be resolved as quickly as possible.

## 2023-05-12 NOTE — DISCHARGE PLANNING
note:  Received notification from MD that pt needs homehealth services.     Met with pt and wife. They are agreeable. Choice for [facility] signed and faxed to [facility]PA. Referral sent .   CM follow .

## 2023-05-12 NOTE — ED NOTES
Patient and wife given discharge instructions questions and concerns addressed.  Patient taken out to car via wheel chair

## 2023-05-12 NOTE — DISCHARGE PLANNING
note:  CM called Jodi at [facility] and she confirmed receiving the referral but intake will need to complete their screening. CM will follow.

## 2023-05-12 NOTE — FACE TO FACE
Face to Face Supporting Documentation - Home Health    The encounter with this patient was in whole or in part the primary reason for home health admission.    Date of encounter:   Patient:                    MRN:                       YOB: 2024  Prabhakar Sierra  [id number]  1950     Home health to see patient for:  Skilled Nursing care for assessment, interventions & education, Physical Therapy evaluation and treatment, and Occupational therapy evaluation and treatment    Skilled need for:  Recent Deterioration of Health Status patient has a history of Alzheimer's dementia as well as back fracture since December.  The patient has been deteriorating from the standpoint of his strength having increasing weakness and wife is having a harder time taking care of him at home.    Skilled nursing interventions to include:  Comment: We will need help caring for him at home for short durations as well as physical therapy    Homebound status evidenced by:  Need the aid of supportive devices such as crutches, canes, wheelchairs or walkers or Needs the assistance of another person in order to leave the home. Leaving home requires a considerable and taxing effort. There is a normal inability to leave the home.    Community Physician to provide follow up care: Stanton Miller M.D.     Optional Interventions? No      I certify the face to face encounter for this home health care referral meets the CMS requirements and the encounter/clinical assessment with the patient was, in whole, or in part, for the medical condition(s) listed above, which is the primary reason for home health care. Based on my clinical findings: the service(s) are medically necessary, support the need for home health care, and the homebound criteria are met.  I certify that this patient has had a face to face encounter by myself.  Gene Gipson M.D. - NPI: [id number]

## 2023-05-12 NOTE — TELEPHONE ENCOUNTER
Contacted pt to follow up after ED visit yesterday. Spoke w/ pt's wife Heidy about palliative care services. They are very interested in palliative consultation. mAe CHW will also look into additional respite and caregiver support resources as needed. Plan to follow up w/ them in clinic after 5/17 visit.

## 2023-05-12 NOTE — DISCHARGE PLANNING
ATTN: Case Management  RE: Referral for Home Health    As of 5/12/23, we have accepted the Home Health referral for the patient listed above.    A [facility] clinician will be out to see the patient within 48 hours. If you have any questions or concerns regarding the patient's transition to Home Health, please do not hesitate to contact us at [phone].      We look forward to collaborating with you,  [facility] Home Health Team

## 2023-05-15 DIAGNOSIS — E88.9 PERIPHERAL NEUROPATHY DUE TO DISORDER OF METABOLISM (HCC): ICD-10-CM

## 2023-05-15 DIAGNOSIS — C61 PROSTATE CA (HCC): Chronic | ICD-10-CM

## 2023-05-15 DIAGNOSIS — S32.030A COMPRESSION FRACTURE OF L3 LUMBAR VERTEBRA, CLOSED, INITIAL ENCOUNTER (HCC): ICD-10-CM

## 2023-05-15 DIAGNOSIS — F10.27 DEMENTIA ASSOCIATED WITH ALCOHOLISM WITH BEHAVIORAL DISTURBANCE (HCC): ICD-10-CM

## 2023-05-15 DIAGNOSIS — G63 PERIPHERAL NEUROPATHY DUE TO DISORDER OF METABOLISM (HCC): ICD-10-CM

## 2023-05-15 DIAGNOSIS — G91.2 NORMAL PRESSURE HYDROCEPHALUS (HCC): ICD-10-CM

## 2023-05-15 DIAGNOSIS — Z74.09 IMPAIRED MOBILITY AND ADLS: ICD-10-CM

## 2023-05-15 DIAGNOSIS — S22.000A COMPRESSION FRACTURE OF BODY OF THORACIC VERTEBRA (HCC): ICD-10-CM

## 2023-05-15 DIAGNOSIS — E11.65 UNCONTROLLED TYPE 2 DIABETES MELLITUS WITH HYPERGLYCEMIA (HCC): ICD-10-CM

## 2023-05-15 DIAGNOSIS — E66.9 OBESITY (BMI 30.0-34.9): ICD-10-CM

## 2023-05-15 DIAGNOSIS — S32.020A COMPRESSION FRACTURE OF L2 LUMBAR VERTEBRA, CLOSED, INITIAL ENCOUNTER (HCC): ICD-10-CM

## 2023-05-15 DIAGNOSIS — Z78.9 IMPAIRED MOBILITY AND ADLS: ICD-10-CM

## 2023-05-15 DIAGNOSIS — F32.1 CURRENT MODERATE EPISODE OF MAJOR DEPRESSIVE DISORDER, UNSPECIFIED WHETHER RECURRENT (HCC): ICD-10-CM

## 2023-05-15 PROCEDURE — 1125F AMNT PAIN NOTED PAIN PRSNT: CPT | Performed by: INTERNAL MEDICINE

## 2023-05-16 ENCOUNTER — OFFICE VISIT (OUTPATIENT)
Dept: NEUROLOGY | Facility: MEDICAL CENTER | Age: 73
End: 2023-05-16
Attending: PSYCHIATRY & NEUROLOGY
Payer: MEDICARE

## 2023-05-16 VITALS
SYSTOLIC BLOOD PRESSURE: 118 MMHG | HEART RATE: 98 BPM | OXYGEN SATURATION: 95 % | TEMPERATURE: 95.3 F | DIASTOLIC BLOOD PRESSURE: 64 MMHG | RESPIRATION RATE: 19 BRPM | WEIGHT: 187.17 LBS | BODY MASS INDEX: 26.8 KG/M2 | HEIGHT: 70 IN

## 2023-05-16 DIAGNOSIS — E11.42 DIABETIC POLYNEUROPATHY ASSOCIATED WITH TYPE 2 DIABETES MELLITUS (HCC): ICD-10-CM

## 2023-05-16 DIAGNOSIS — G30.1 MODERATE LATE ONSET ALZHEIMER'S DEMENTIA WITHOUT BEHAVIORAL DISTURBANCE, PSYCHOTIC DISTURBANCE, MOOD DISTURBANCE, OR ANXIETY (HCC): Primary | ICD-10-CM

## 2023-05-16 DIAGNOSIS — G91.9 ACQUIRED HYDROCEPHALUS (HCC): ICD-10-CM

## 2023-05-16 DIAGNOSIS — G30.9 ALZHEIMER'S DISEASE, UNSPECIFIED (CODE) (HCC): ICD-10-CM

## 2023-05-16 DIAGNOSIS — R26.89 NEED FOR ASSISTANCE DUE TO UNSTEADY GAIT: ICD-10-CM

## 2023-05-16 DIAGNOSIS — E55.9 VITAMIN D DEFICIENCY: ICD-10-CM

## 2023-05-16 DIAGNOSIS — E67.8 EXCESSIVE VITAMIN B6 INTAKE: ICD-10-CM

## 2023-05-16 DIAGNOSIS — F02.B0 MODERATE LATE ONSET ALZHEIMER'S DEMENTIA WITHOUT BEHAVIORAL DISTURBANCE, PSYCHOTIC DISTURBANCE, MOOD DISTURBANCE, OR ANXIETY (HCC): Primary | ICD-10-CM

## 2023-05-16 PROCEDURE — 99215 OFFICE O/P EST HI 40 MIN: CPT | Performed by: PSYCHIATRY & NEUROLOGY

## 2023-05-16 PROCEDURE — 3078F DIAST BP <80 MM HG: CPT | Performed by: PSYCHIATRY & NEUROLOGY

## 2023-05-16 PROCEDURE — 3074F SYST BP LT 130 MM HG: CPT | Performed by: PSYCHIATRY & NEUROLOGY

## 2023-05-16 ASSESSMENT — FIBROSIS 4 INDEX: FIB4 SCORE: 1.26

## 2023-05-16 ASSESSMENT — MONTREAL COGNITIVE ASSESSMENT (MOCA)
3. DRAW A CLOCK: CONTOUR, NUMBERS, HANDS: 2/3
9. REPEAT EACH SENTENCE: 2/2
2. COPY DRAWING: 0/1
WHAT IS THE VERSION OF MOCA ADMINISTERED: 7.3
8. SERIAL SUBTRACTION OF 7S: 1/5
DELAYED RECALL SUBSCORE: 1/5
10. [FLUENCY] NAME WORDS STARTING WITH DESIGNATED LETTER: 0/1
ORIENTATION SUBSCORE: 2/6
6. READ LIST OF DIGITS [FORWARD/BACKWARD]: 2/2
4. NAME EACH OF THE THREE ANIMALS SHOWN: 3/3
11. FOR EACH PAIR OF WORDS, WHAT CATEGORY DO THEY BELONG TO (OUT OF 2): 1/2
1. ALTERNATING TRAIL MAKING: 0/1
7. [VIGILENCE] TAP WHEN HEARING DESIGNATED LETTER: 1/1
5. MEMORY TRIALS: SECOND TRIAL
WHAT IS THE TOTAL SCORE (OUT OF 30): 15

## 2023-05-16 NOTE — PROGRESS NOTES
"Reason: Alz type Dementia    Last seen by me in  November 2022.     [facility] 72 [facility] right handed gentleman who has been on Disability (Mental) since age 47-48 when at that time he was having very low level cognitive issues (\"little stuff\" per his wife here today).      The changes has been very gradual over the last 20 years or so.    Brain PET Scan was abnormal done in the last 3 months.    Short term memory has continued to very gradually decline per his wife.  Typically he may forget what is told within 5 minutes. It could east forget within 1 to 2 hours where he ate or what he ate.    Communication and speech ability has not considerably changed in the last 6-12 months or so.     Previously, Prabhakar had a formal Neuropsychological evaluation ([facility]) in May 2022 due to \"behabvioral changes and memory impairment over several years\"  Objective examination reveals an individual of average range premorbid ability with currently intact language, attention/concentration,visual spatial ability and visual memory.  Findings consistent with a Major Neurocognitive Disorder. Factors suggested to impair cognition:  Poorly controlled diabetes- \"patient reports noncompliance with daily blood sugar checks> he will eat an entire bag of oreos in one day, psychiatric symptoms of mild range depression and mild range anxiety     Evaluation by Saurabh Styles PhD.     The DSM5 Diagnoses:      1. Major Unspecified Neurocognitive Disorder(with behavioral disturbance).  2. Alcohol Use Disorder, moderate to severe  3. Major Depression Disorder, with anxious distress         History of drinking of a bottle of wine (Cheap white wine) a day or 3-4 cans of beer and this has been an issue since around age 29.   Early in  His 30's he was drinking hard liquor.      Smoking- Camels- 2 packs per week (on and off since age 12)    Wife has not noticed any delusions,paranoid,behavioral changes (sundowning behaviors) in the last 3-6 " "months.     No dysarthria,dysphagia, headache(s), orthostatic dizziness/faintness episodes,double vision,vertiginous events In the recent months.     Denies any persistent neurological deficits to suggest a clinical ischemic stroke event(s) nor any history of concussion(s), seizure(s)- described or documented in the problem list.     Prabhakar has continued to drive with any accident or incidents known to his wife or admitted to today in the last 6-12 months. He [facility] in the last 6 months.     No RLS features or REM Sleep Behavioral symptoms in the recent months.Averages 7-8 hours of sleep most nights in the recent months.  Denies nodding off routinely or daily in the last several weeks nor any tendency to need or want to take naps daily in the recent weeks.    Wife describes they used to walk the entire neighbor as of 4-5 years ago.  His walking has become slower and more unsteady and he has tended to become more \"duck walking\" in the last 12 months. The wife has noticed that when getting up from a recliner that he tends to lean backwards.     No consistent,persistent or evolving involuntary movements of his head-face,trunk or limbs in the last 3 months.     No hypophonia, evolving postural instability, micrographia in the last 6-12 months.     He has had neuropathy (peripheral) with numbness and has reduced sensation of both feet (from ankle to his toes)- bilaterally and the degree and location of his numbness has to him been the same in the last 2 plus years. He has not noticed any burning,stabbing,aching pains of the toes or bottoms of the feet in the last 6 months.   Hands-fingers have been stable in the last 6-12 months.        Mother,Brother and Sister (chronic sensory deprivation)- Dementia  Father: Long standing Diabetes with  Alcoholism>  at age 83  Brother:  (MI) at age 72.        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 ([facility]) 02/08/2023    Urinary retention 01/05/2023    Other insomnia 01/05/2023    Thoracic compression fracture, closed, initial encounter ([facility]) 12/28/2022    Impaired mobility and ADLs 12/28/2022    Compression fracture of body of thoracic vertebra ([facility]) 12/22/2022    Age-related physical debility 12/22/2022    Compression fracture of L2 lumbar vertebra, closed, initial encounter ([facility]) 12/22/2022    Compression fracture of L3 lumbar vertebra, closed, initial encounter ([facility]) 12/22/2022    Neuropathy 12/22/2022    Diabetes mellitus, type 2 ([facility]) 11/07/2022    Normal pressure hydrocephalus ([facility]) 11/07/2022    Tobacco dependency 08/22/2022    Dementia ([facility]) 07/26/2022    Dementia associated with alcoholism with behavioral disturbance ([facility])- Dr. Moran 07/26/2022    Obesity (BMI 30.0-34.9) 09/16/2021    Alcohol dependence in remission ([facility]) 01/03/2019    Other male erectile dysfunction 04/11/2018    Vitamin D deficiency 05/12/2017    Current moderate episode of major depressive disorder ([facility]) 11/11/2016    B12 deficiency 11/01/2016    Tremor, coarse 10/25/2016    GERD (gastroesophageal reflux disease) 09/27/2016    Essential hypertension 09/27/2016    Prostate CA ([facility])- feb 2022; RT tbd x 8 wks, mar- may 2022 09/27/2016    Mixed hyperlipidemia 09/27/2016    ACP (advance care planning) 09/27/2016       Past medical history:   Past Medical History:   Diagnosis Date    Alcohol use     BPH (benign prostatic hyperplasia)     Depression     Fall 12/22/2022    GERD (gastroesophageal reflux disease)     History of depression 12/28/2022    Hypertension     Hyponatremia 05/18/2017    Iron deficiency anemia secondary to inadequate dietary iron intake 05/26/2021    Mild cognitive impairment 07/26/2022    Neuropathic pain, leg      Orthostatic hypotension 1/3/2019    Right hip pain 2016    Thrombocytopenia, unspecified (HCC) 2022    Tobacco use     Type II or unspecified type diabetes mellitus without mention of complication, not stated as uncontrolled        Past surgical history:   History reviewed. No pertinent surgical history.      Social history:   Social History     Socioeconomic History    Marital status:      Spouse name: Not on file    Number of children: Not on file    Years of education: Not on file    Highest education level: Not on file   Occupational History    Not on file   Tobacco Use    Smoking status: Some Days     Packs/day: 0.15     Years: 50.00     Pack years: 7.50     Types: Cigarettes     Last attempt to quit: 1/10/2007     Years since quittin.3    Smokeless tobacco: Never   Vaping Use    Vaping Use: Never used   Substance and Sexual Activity    Alcohol use: Not Currently     Alcohol/week: 4.8 - 6.0 oz     Types: 8 - 10 Glasses of wine per week     Comment: 1 bottle of wine 2-3 days a week    Drug use: No    Sexual activity: Never     Partners: Female   Other Topics Concern    Not on file   Social History Narrative    Not on file     Social Determinants of Health     Financial Resource Strain: Low Risk  (2023)    Overall Financial Resource Strain (CARDIA)     Difficulty of Paying Living Expenses: Not very hard   Food Insecurity: Food Insecurity Present (2023)    Hunger Vital Sign     Worried About Running Out of Food in the Last Year: Often true     Ran Out of Food in the Last Year: Often true   Transportation Needs: No Transportation Needs (2023)    PRAPARE - Transportation     Lack of Transportation (Medical): No     Lack of Transportation (Non-Medical): No   Physical Activity: Inactive (2023)    Exercise Vital Sign     Days of Exercise per Week: 0 days     Minutes of Exercise per Session: 0 min   Stress: No Stress Concern Present (2023)    [place] [employer]  [employer] - Occupational Stress Questionnaire     Feeling of Stress : Only a little   Social Connections: Moderately Isolated (2/1/2023)    Social Connection and Isolation Panel [NHANES]     Frequency of Communication with Friends and Family: Three times a week     Frequency of Social Gatherings with Friends and Family: Three times a week     Attends Religion Services: Never     Active Member of Clubs or Organizations: No     Attends Club or Organization Meetings: Never     Marital Status:    Intimate Partner Violence: Not on file   Housing Stability: Low Risk  (2/1/2023)    Housing Stability Vital Sign     Unable to Pay for Housing in the Last Year: No     Number of Places Lived in the Last Year: 1     Unstable Housing in the Last Year: No       Family history:   Family History   Problem Relation Age of Onset    Heart Disease Mother     Diabetes Father     Other Sister         Mental retardation    Heart Disease Brother     Cancer Brother         Prostate    No Known Problems Daughter     No Known Problems Daughter          Current medications:   Current Outpatient Medications   Medication    acetaminophen (TYLENOL) 500 MG Tab    atorvastatin (LIPITOR) 80 MG tablet    benazepril (LOTENSIN) 40 MG tablet    magnesium oxide 400 (240 Mg) MG Tab    metoprolol tartrate (LOPRESSOR) 25 MG Tab    folic acid (FOLVITE) 1 MG Tab    buPROPion SR (WELLBUTRIN-SR) 150 MG TABLET SR 12 HR sustained-release tablet    DULoxetine (CYMBALTA) 60 MG Cap DR Particles delayed-release capsule    omeprazole (PRILOSEC) 20 MG delayed-release capsule    pioglitazone (ACTOS) 30 MG Tab    Empagliflozin (JARDIANCE) 25 MG Tab    aspirin (ASA) 81 MG Chew Tab chewable tablet    metformin (GLUCOPHAGE) 1000 MG tablet     No current facility-administered medications for this visit.       Medication Allergy:  No Known Allergies        Physical examination:   Vitals:    05/16/23 1357   BP: 118/64   BP Location: Right arm   Patient Position: Sitting  "  Pulse: 98   Resp: 19   Temp: (!) 35.2 °C (95.3 °F)   TempSrc: Temporal   SpO2: 95%   Weight: 84.9 kg (187 lb 2.7 oz)   Height: 1.778 m (5' 10\")       Normal cephalic atraumatic.  There is full range of movement around the neck in all directions without restrictions or discrete pain evoked triggers.  No lower extremity edema.      Neurological  Exam:      Mark Cognitive Assessment (MOCA) Version 7.1    Years of Education: 4 years college    TOTAL SCORE: 15/30  (to be scanned into the MEDIA section in the E.M.R.)          Mental status: Awake, alert and fully oriented to person, place, and situation. Normal attention and concentration.  Did not appear/act combative,irritable,anxious,paranoid/delusional or aggressive to or with me.    Speech and language: Speech is fluent without errors, clear, intact to repetition, and intact to naming.     Follows 3 step motor commands in sequence without significant delay and correctly.    Cranial nerve exam:  II: Pupils are equally round and reactive to light. Visual fields are intact by confrontation.  III, IV, VI: EOMI, no diplopia, no ptosis.  V: Sensation to light touch is normal over V1-3 distributions bilaterally.  .  VII: Facial movements are symmetrical. There is no facial droop. .  VIII: Hearing intact to soft speech and finger rub bilaterally  IX: Palate elevates symmetrically, uvula is midline. Dysarthria is not present.  XI: Shoulder shrug are symmetrical and strong.   XII: Tongue protrudes midline.      Motor exam:  Muscle tone is normal in all 4 limbs. and No abnormal movements appreciated.    Muscle strength:    Neck Flexors/Extensors: 5/5       Right  Left  Deltoid   5/5  5/5      Biceps   5/5  5/5  Triceps  5/5  5/5   Wrist extensors 5/5  5/5  Wrist flexors  5/5  5/5     5/5  5/5  Interossei  5/5  5/5  Thenar (APB)  5/5  5/5   Hip flexors  5/5  5/5  Quadriceps  5/5  5/5    Hamstrings  5/5  5/5  Dorsiflexors  5/5  5/5  Plantarflexors  5/5  5/5  Toe " extension  5/5  5/5      Sensory exam:    Vibratory- absent at great toes, absent at ankles, 8 seconds at the ankles (symmetrically).    Moderately reduced at great toes (only able to detect very large great toes).    Reduced pin prick to just above both ankles.    Reflexes:       Right  Left  Biceps   2/4  2/4  Triceps              2/4  2/4  Brachioradialis             2/4  2/4  Knee jerk  2/4  2/4  Ankle jerk  0/4  0/4     Frontal release signs are absent    bilaterally toes are downgoing to plantar stimulation..    Coordination (finger-to-nose, heel/knee/shin, rapid alternating movements) was normal.     There was no ataxia, no tremors, and no dysmetria.     Station and gait > able to stand ; no leaning backwards. Mild wide based stance; able to control walker easily for me.    Positive Rombergism.      Labs and Tests:    Elevated B6 blood level > over 600     [id number] 8:05 AM     HISTORY/REASON FOR EXAM:  Memory Loss. History of prostate carcinoma        TECHNIQUE/EXAM DESCRIPTION:   MRI of the brain without and with contrast.     T1 sagittal, T2 fast spin-echo axial, T1 coronal, FLAIR coronal, diffusion-weighted and apparent diffusion coefficient (ADC map) axial images were obtained of the whole brain. T1 postcontrast axial and T1 postcontrast coronal images were obtained.     The study was performed on a 3 [place] MRI scanner.     20 mL ProHance contrast was administered intravenously.     COMPARISON:  None.     FINDINGS:     There is no acute infarct. There is no acute or chronic parenchymal hemorrhage. There is no intra-axial space-occupying lesion. Mild cerebral volume loss is seen. There is chronic lacunar infarct in the left cerebellum. Mild to moderate   cerebral volume loss is seen. There is no extra-axial fluid collection, hemorrhage or mass. The visualized flow voids of the cerebral vasculature are unremarkable.  There is no large lesion identified in the expected course of the  intracranial portions   of the cranial nerves.     The skull bones are unremarkable. There is minimal mucosal thickening in the bilateral maxillary sinuses. The extracranial soft tissue including orbits appear grossly normal.     There is no abnormal parenchymal or meningeal contrast enhancement.     IMPRESSION:     1.  There is moderate dilatation of the bilateral lateral ventricles. This is slightly prominent than the associated cortical atrophy. This is suspicious for normal pressure hydrocephalus. The size of the ventricles is increased since the previous MRI   dated 1/3/2019.  2.  Mild cerebral volume loss.  3.  Chronic lacunar infarct in the left cerebellum.           Exam Ended: 05/12/22  9:04 AM Last Resulted: 05/12/22  3:36 PM              Ref Range & Units 1 mo ago  (3/28/23) 6 mo ago  (11/4/22) 7 mo ago  (10/18/22) 1 yr ago  (3/7/22) 1 yr ago  (10/26/21) 1 yr ago  (7/26/21) 2 yr ago  (4/16/21)   Glycohemoglobin 4.0 - 5.6 % 8.1 High   8.4 High  CM  8.1 High  CM  7.6 Abnormal  R  7.7 High  CM  8.6 High  CM  9.4 High  CM    Comment: Increased risk for diabetes:  5.7 -6.4%   Diabetes:  >6.4%   Glycemic control for adults with diabetes:          Impression/Plans/Recommendations:     Alzheimer's Type - Mild to Moderate Stage of  Dementia- and  in setting of prior significant alcohol use and long term consumption and very strong family history of Neurodegenerative Dementia (4 family members) with acquired ventriculomegaly.        MOCA Score of 15/30.     FAQ score of 24 today  per wife.    Global Deterioration Score today in the 5 range.  (Wife does not feel that she could not leave him alone).  He is dependent on her for showering,finances and medication management.    2. Unsteady Gait- acquired Hydrocephalus.    We had a 10 to 15  minutes discussion about the consideration of a large volume CSF removal procedure and the pros and cons of such a procedure (risks) vs if in fact this procedure helped his walking  ability the ultimate potential solution would be a  Shunt (Neurosurgical procedure)> both Harpal and his wife seemed to understand this concept and both agreed NOT to proceed with a Large Volume CSF removal and would NOT want to undergo a  Shunt Procedure.         Prior behavioral change of humming when eating over the last 12  months and this is improved and rarely occurs > can be seen with neurodegenerative disease such as Alzheimer's.      Has moderate Hydrocephalus by MRI (Brain) with slight  increase in size slightly since 2017 and 2019 and last MRI done in 5/2022.         We reviewed the Brain MRI(s) from 2017,2019 and 2022 today and I answered questions posed by wife.     2. Acquired Ventriculomegaly (chronic) and dating back atleast to [id number] period of time.     Gait evaluation to me does not seem changed since my last visit.        3. 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 Length dependent and chronic (for over 3-5 years).    Clearly has reduced proprioception at the great toes to the point that he can only detect very large up and down movements.     Plans:     A. He is not longer driving.     B. He has stopped drinking since around 12/2022.       C. Recommend High Intensity statin and baby ASA use long term for future vascular protection     D. Multifactorial gait difficulties- neurodegenerative brain disorder +/- diabetic neuropathy and ?? Acquired ventriculomegaly.     E. Recheck Vitamin B6 blood level > ordered placed today for this and wife aware of this issue.     F. Had a formal PT assessment ordered due to future risk of falls    G. He will hold off on cognitive medications at this time (Memantine/Donezepil).    H. I am keeping up with editorials and  comments from  many academic neurologists throughout the [place] who are writing reviews and summaries of the present state of this provisionally  approved anti amyloid compound (aducanumab) and Leqembi.      Based on the critique of the data so far,  there are clear risks of using these compounds including the cumulative risks of microfibrosis of the cortical brain tissue from the effects of the inflammatory removal of amyloid , the risks of potentially giving or needing to get an acute clot busting medication (like Teneceplase) to treat an acute ischemic stroke in evolution and the longer term risks of spontaneous brain bleeds and brain swelling (some of which can be life threatening events).    I have discussed with Don and wife today that given  the great controversy about the study's data and analysis of the lack of clinical  efficacy to this point in time, I feel that I need to wait and see reasonable post marketing data and/or more robust efficacy data supported by the academic community and/or the American Academy of Neurology  before making a commitment to prescribe such a compound(s)  and  where there is more than  a minor/minimal amount of risk to the patient involved in being administered this compound on a chronic (monthly) basis.       I have performed  a history and physical exam and a directed /focused  ROS today.     Total time spent today or this patient's care was 52 minutes   and included reviewing  the diagnostic workup to date (such as labs and imaging as well as interpreting such tests relevant to this patient's neurological condition),  reviewing/obtaining separately obtained history (from patient and/or accompanying family member)  for today's neurological problem(s) ,counseling and educating the patient and family member on issues related to cognition/memory and cognitive health factors and documenting  the clinical information in the EMR.        I have performed  a history and physical exam and a directed /focused  ROS today.      Follow up in 1 year or so RICHELLE Moran MD  [facility],  [facility].   [facility]

## 2023-05-17 ENCOUNTER — HOME CARE VISIT (OUTPATIENT)
Dept: HOME HEALTH SERVICES | Facility: HOME HEALTHCARE | Age: 73
End: 2023-05-17
Payer: MEDICARE

## 2023-05-17 ENCOUNTER — PATIENT OUTREACH (OUTPATIENT)
Dept: HEALTH INFORMATION MANAGEMENT | Facility: OTHER | Age: 73
End: 2023-05-17

## 2023-05-17 ENCOUNTER — OFFICE VISIT (OUTPATIENT)
Dept: MEDICAL GROUP | Age: 73
End: 2023-05-17
Payer: MEDICARE

## 2023-05-17 VITALS
TEMPERATURE: 97.4 F | DIASTOLIC BLOOD PRESSURE: 60 MMHG | RESPIRATION RATE: 16 BRPM | OXYGEN SATURATION: 94 % | HEART RATE: 93 BPM | SYSTOLIC BLOOD PRESSURE: 112 MMHG

## 2023-05-17 VITALS
SYSTOLIC BLOOD PRESSURE: 110 MMHG | TEMPERATURE: 96.9 F | DIASTOLIC BLOOD PRESSURE: 70 MMHG | OXYGEN SATURATION: 95 % | HEART RATE: 78 BPM | WEIGHT: 187 LBS | BODY MASS INDEX: 26.77 KG/M2 | HEIGHT: 70 IN

## 2023-05-17 DIAGNOSIS — N32.81 OAB (OVERACTIVE BLADDER): ICD-10-CM

## 2023-05-17 DIAGNOSIS — F02.B0 MODERATE LATE ONSET ALZHEIMER'S DEMENTIA WITHOUT BEHAVIORAL DISTURBANCE, PSYCHOTIC DISTURBANCE, MOOD DISTURBANCE, OR ANXIETY (HCC): ICD-10-CM

## 2023-05-17 DIAGNOSIS — F10.21 ALCOHOL DEPENDENCE IN REMISSION (HCC): ICD-10-CM

## 2023-05-17 DIAGNOSIS — F32.1 CURRENT MODERATE EPISODE OF MAJOR DEPRESSIVE DISORDER, UNSPECIFIED WHETHER RECURRENT (HCC): ICD-10-CM

## 2023-05-17 DIAGNOSIS — S32.030A COMPRESSION FRACTURE OF L3 LUMBAR VERTEBRA, CLOSED, INITIAL ENCOUNTER (HCC): ICD-10-CM

## 2023-05-17 DIAGNOSIS — E11.65 UNCONTROLLED TYPE 2 DIABETES MELLITUS WITH HYPERGLYCEMIA (HCC): ICD-10-CM

## 2023-05-17 DIAGNOSIS — Z00.00 MEDICARE ANNUAL WELLNESS VISIT, SUBSEQUENT: ICD-10-CM

## 2023-05-17 DIAGNOSIS — E55.9 VITAMIN D DEFICIENCY: ICD-10-CM

## 2023-05-17 DIAGNOSIS — I10 ESSENTIAL HYPERTENSION: ICD-10-CM

## 2023-05-17 DIAGNOSIS — S22.000A THORACIC COMPRESSION FRACTURE, CLOSED, INITIAL ENCOUNTER (HCC): ICD-10-CM

## 2023-05-17 DIAGNOSIS — F03.918 NEURODEGENERATIVE DEMENTIA WITH BEHAVIORAL DISTURBANCE (HCC): ICD-10-CM

## 2023-05-17 DIAGNOSIS — K21.00 GASTROESOPHAGEAL REFLUX DISEASE WITH ESOPHAGITIS, UNSPECIFIED WHETHER HEMORRHAGE: ICD-10-CM

## 2023-05-17 DIAGNOSIS — G91.2 NORMAL PRESSURE HYDROCEPHALUS (HCC): ICD-10-CM

## 2023-05-17 DIAGNOSIS — S22.000A COMPRESSION FRACTURE OF BODY OF THORACIC VERTEBRA (HCC): ICD-10-CM

## 2023-05-17 DIAGNOSIS — G63 PERIPHERAL NEUROPATHY DUE TO DISORDER OF METABOLISM (HCC): ICD-10-CM

## 2023-05-17 DIAGNOSIS — I47.10 PSVT (PAROXYSMAL SUPRAVENTRICULAR TACHYCARDIA) (HCC): ICD-10-CM

## 2023-05-17 DIAGNOSIS — E88.9 PERIPHERAL NEUROPATHY DUE TO DISORDER OF METABOLISM (HCC): ICD-10-CM

## 2023-05-17 DIAGNOSIS — C61 PROSTATE CA (HCC): Chronic | ICD-10-CM

## 2023-05-17 DIAGNOSIS — G30.1 MODERATE LATE ONSET ALZHEIMER'S DEMENTIA WITHOUT BEHAVIORAL DISTURBANCE, PSYCHOTIC DISTURBANCE, MOOD DISTURBANCE, OR ANXIETY (HCC): ICD-10-CM

## 2023-05-17 DIAGNOSIS — S32.020A COMPRESSION FRACTURE OF L2 LUMBAR VERTEBRA, CLOSED, INITIAL ENCOUNTER (HCC): ICD-10-CM

## 2023-05-17 DIAGNOSIS — R33.9 URINARY RETENTION: ICD-10-CM

## 2023-05-17 PROBLEM — E66.9 OBESITY (BMI 30.0-34.9): Status: RESOLVED | Noted: 2021-09-16 | Resolved: 2023-05-17

## 2023-05-17 PROBLEM — E11.9 DIABETES MELLITUS, TYPE 2 (HCC): Status: RESOLVED | Noted: 2022-11-07 | Resolved: 2023-05-17

## 2023-05-17 PROCEDURE — 665001 SOC-HOME HEALTH

## 2023-05-17 PROCEDURE — 3078F DIAST BP <80 MM HG: CPT | Performed by: INTERNAL MEDICINE

## 2023-05-17 PROCEDURE — 99214 OFFICE O/P EST MOD 30 MIN: CPT | Mod: 25 | Performed by: INTERNAL MEDICINE

## 2023-05-17 PROCEDURE — G0493 RN CARE EA 15 MIN HH/HOSPICE: HCPCS

## 2023-05-17 PROCEDURE — G0439 PPPS, SUBSEQ VISIT: HCPCS | Performed by: INTERNAL MEDICINE

## 2023-05-17 PROCEDURE — 3074F SYST BP LT 130 MM HG: CPT | Performed by: INTERNAL MEDICINE

## 2023-05-17 ASSESSMENT — ENCOUNTER SYMPTOMS
DYSPNEA ACTIVITY LEVEL: AFTER AMBULATING LESS THAN 10 FT
LAST BOWEL MOVEMENT: 66611
BOWEL PATTERN NORMAL: 1
SEVERE DYSPNEA: 1
DENIES PAIN: 1
DEPRESSED MOOD: 1
SHORTNESS OF BREATH: 1
DIFFICULTY THINKING: 1
NAUSEA: DENIES
PERSON REPORTING PAIN: PATIENT
GENERAL WELL-BEING: GOOD
VOMITING: DENIES
POOR JUDGMENT: 1

## 2023-05-17 ASSESSMENT — FIBROSIS 4 INDEX: FIB4 SCORE: 1.26

## 2023-05-17 ASSESSMENT — ACTIVITIES OF DAILY LIVING (ADL): BATHING_REQUIRES_ASSISTANCE: 0

## 2023-05-17 ASSESSMENT — PATIENT HEALTH QUESTIONNAIRE - PHQ9: CLINICAL INTERPRETATION OF PHQ2 SCORE: 0

## 2023-05-17 NOTE — PROGRESS NOTES
Chief Complaint   Patient presents with    Annual Exam     AWV   Patient seen today for annual wellness visit and all metrics were discussed and updated with regard to health maintenance.    However he had rather significant issues to cover with regard to his total care management which include diabetes out-of-control, progressive dementia, and urinary frequency, following treatment for his prostate cancer and radiation therapy.    Patient urologist suggested a trial of Myrbetriq's for presumed overactive bladder rather than obstructive uropathy since he is no longer obstructed following radiation therapy completion for his prostate CA.    His dementia was further evaluated by his neurologist yesterday who advised against any of the new monoclonal antibody therapies as well as holding off on any usage of Aricept or Namenda, as well as discouraging a trial of high-volume CSF removal as a diagnostic tool for possible normal pressure set hydrocephalus as a cause of his dementia.  More likely his dementia is just secondary to poor Alzheimer type and that the ventriculomegaly is secondary to this.  We will consider using Aricept or Namenda in the future as an aid for his diminished cognition.  His MoCA score was quite significantly reduced according to neurologist at 21 out of 30.    With regard to his diabetes, is still poorly controlled with random blood sugar in the 300s and A1c = 8.12 months ago.  Further adjustments in his diabetic regimen were made at that time to be reassessed in another 2 months..    Patient Active Problem List   Diagnosis    GERD (gastroesophageal reflux disease)    Essential hypertension    Prostate CA (HCC)- feb 2022; RT tbd x 8 wks, mar- may 2022    Mixed hyperlipidemia    Tremor, coarse    B12 deficiency    Current moderate episode of major depressive disorder ([facility])    Vitamin D deficiency    Other male erectile dysfunction    Alcohol dependence in remission ([facility])    Moderate late onset  Alzheimer's dementia without behavioral disturbance, psychotic disturbance, mood disturbance, or anxiety (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)- Dr. Moran    Tobacco dependency    Normal pressure hydrocephalus ([facility])    Compression fracture of body of thoracic vertebra ([facility])    Age-related physical debility    Compression fracture of L2 lumbar vertebra, closed, initial encounter (HCC)    Compression fracture of L3 lumbar vertebra, closed, initial encounter ([facility])    Neuropathy    Thoracic compression fracture, closed, initial encounter ([facility])    Impaired mobility and ADLs    Urinary retention    Other insomnia    Uncontrolled type 2 diabetes mellitus with hyperglycemia ([facility])    PSVT (paroxysmal supraventricular tachycardia) (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)    Neurodegenerative dementia with behavioral disturbance ([facility])         Outpatient Medications Prior to Visit   Medication Sig Dispense Refill    acetaminophen (TYLENOL) 500 MG Tab Take 500-1,000 mg by mouth every 6 hours as needed. Indications: Pain      atorvastatin (LIPITOR) 80 MG tablet Take 1 Tablet by mouth every evening. Indications: High Amount of Fats in the Blood 90 Tablet 4    benazepril (LOTENSIN) 40 MG tablet Take 40 mg by mouth every day. Indications: High Blood Pressure Disorder      magnesium oxide 400 (240 Mg) MG Tab Take 1 Tablet by mouth every day. 100 Tablet 3    metoprolol tartrate (LOPRESSOR) 25 MG Tab Take 0.5 Tablets by mouth 2 times a day. Indications: High Blood Pressure Disorder 90 Tablet 3    folic acid (FOLVITE) 1 MG Tab Take 1 Tablet by mouth every day. Indications: ALCOHOL ABUSE 90 Tablet 3    buPROPion SR (WELLBUTRIN-SR) 150 MG TABLET SR 12 HR sustained-release tablet Take 1 Tablet by mouth 2 times a day. Indications: Major Depressive Disorder 180 Tablet 3    DULoxetine (CYMBALTA) 60 MG Cap DR Particles delayed-release capsule Take 1 Capsule by mouth 2 times a  day. Indications: Major Depressive Disorder 180 Capsule 3    omeprazole (PRILOSEC) 20 MG delayed-release capsule Take 1 Capsule by mouth 2 times a day. Indications: Gastroesophageal Reflux Disease 180 Capsule 4    pioglitazone (ACTOS) 30 MG Tab Take 1 Tablet by mouth every day. Indications: Type 2 Diabetes 90 Tablet 4    Empagliflozin (JARDIANCE) 25 MG Tab Take 25 mg by mouth every day. Indications: Type 2 Diabetes 90 Tablet 4    aspirin (ASA) 81 MG Chew Tab chewable tablet Chew 1 Tablet every day. 100 Tablet     metformin (GLUCOPHAGE) 1000 MG tablet Take 1 Tablet by mouth 2 times a day with meals. 180 Tablet 3     No facility-administered medications prior to visit.       HPI:  Prabhakar is a 72 y.o. 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 ([facility]) 02/08/2023    Urinary retention 01/05/2023    Other insomnia 01/05/2023    Thoracic compression fracture, closed, initial encounter ([facility]) 12/28/2022    Impaired mobility and ADLs 12/28/2022    Compression fracture of body of thoracic vertebra ([facility]) 12/22/2022    Age-related physical debility 12/22/2022    Compression fracture of L2 lumbar vertebra, closed, initial encounter ([facility]) 12/22/2022    Compression fracture of L3 lumbar vertebra, closed, initial encounter ([facility]) 12/22/2022    Neuropathy 12/22/2022    Diabetes mellitus, type 2 ([facility]) 11/07/2022    Normal pressure hydrocephalus ([facility]) 11/07/2022    Tobacco dependency 08/22/2022    Moderate late onset Alzheimer's dementia without behavioral disturbance, psychotic disturbance, mood disturbance, or anxiety ([facility]) 07/26/2022    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 Moran 07/26/2022    Obesity (BMI 30.0-34.9) 09/16/2021    Alcohol dependence  in remission ([facility]) 01/03/2019    Other male erectile dysfunction 04/11/2018    Vitamin D deficiency 05/12/2017    Current moderate episode of major depressive disorder ([facility]) 11/11/2016    B12 deficiency 11/01/2016    Tremor, coarse 10/25/2016    GERD (gastroesophageal reflux disease) 09/27/2016    Essential hypertension 09/27/2016    Prostate CA ([facility])- feb 2022; RT tbd x 8 wks, mar- may 2022 09/27/2016    Mixed hyperlipidemia 09/27/2016    ACP (advance care planning) 09/27/2016       Current Outpatient Medications   Medication Sig Dispense Refill    acetaminophen (TYLENOL) 500 MG Tab Take 500-1,000 mg by mouth every 6 hours as needed. Indications: Pain      atorvastatin (LIPITOR) 80 MG tablet Take 1 Tablet by mouth every evening. Indications: High Amount of Fats in the Blood 90 Tablet 4    benazepril (LOTENSIN) 40 MG tablet Take 40 mg by mouth every day. Indications: High Blood Pressure Disorder      magnesium oxide 400 (240 Mg) MG Tab Take 1 Tablet by mouth every day. 100 Tablet 3    metoprolol tartrate (LOPRESSOR) 25 MG Tab Take 0.5 Tablets by mouth 2 times a day. Indications: High Blood Pressure Disorder 90 Tablet 3    folic acid (FOLVITE) 1 MG Tab Take 1 Tablet by mouth every day. Indications: ALCOHOL ABUSE 90 Tablet 3    buPROPion SR (WELLBUTRIN-SR) 150 MG TABLET SR 12 HR sustained-release tablet Take 1 Tablet by mouth 2 times a day. Indications: Major Depressive Disorder 180 Tablet 3    DULoxetine (CYMBALTA) 60 MG Cap DR Particles delayed-release capsule Take 1 Capsule by mouth 2 times a day. Indications: Major Depressive Disorder 180 Capsule 3    omeprazole (PRILOSEC) 20 MG delayed-release capsule Take 1 Capsule by mouth 2 times a day. Indications: Gastroesophageal Reflux Disease 180 Capsule 4    pioglitazone (ACTOS) 30 MG Tab Take 1 Tablet by mouth every day. Indications: Type 2 Diabetes 90 Tablet 4    Empagliflozin (JARDIANCE) 25 MG Tab Take 25 mg by mouth every day. Indications: Type 2 Diabetes 90 Tablet 4     aspirin (ASA) 81 MG Chew Tab chewable tablet Chew 1 Tablet every day. 100 Tablet     metformin (GLUCOPHAGE) 1000 MG tablet Take 1 Tablet by mouth 2 times a day with meals. 180 Tablet 3     No current facility-administered medications for this visit.        Patient is taking medications as noted in medication list.  Current supplements as per medication list.     Allergies: Patient has no known allergies.    Current social contact/activities:  family    Is patient current with immunizations? Yes.    He  reports that he has been smoking cigarettes. He has a 7.50 pack-year smoking history. He has never used smokeless tobacco. He reports that he does not currently use alcohol after a past usage of about 4.8 - 6.0 oz of alcohol per week. He reports that he does not use drugs.  Ready to quit: Not Answered  Counseling given: Not Answered      ROS:    Gait: Uses a walker   Ostomy: No   Other tubes: No   Amputations: No   Chronic oxygen use No   Last eye exam 3 month ago   Wears hearing aids: No   : Reports urinary leakage during the last 6 months that has somewhat interfered with their daily activities or sleep.    Screening:    Depression Screening  Little interest or pleasure in doing things?  0 - not at all  Feeling down, depressed, or hopeless? 0 - not at all  Patient Health Questionnaire Score: 0    If depressive symptoms identified deferred to follow up visit unless specifically addressed in assessment and plan.    Interpretation of PHQ-9 Total Score   Score Severity   1-4 No Depression   5-9 Mild Depression   10-14 Moderate Depression   15-19 Moderately Severe Depression   20-27 Severe Depression    Screening for Cognitive Impairment  Three Minute Recall (daughter, heaven, mountain)  3/3    Josemanuel clock face with all 12 numbers and set the hands to show 10 past 11.  No    If cognitive concerns identified, deferred for follow up unless specifically addressed in assessment and plan.    Fall Risk Assessment  Has the  patient had two or more falls in the last year or any fall with injury in the last year?  Yes  If fall risk identified, deferred for follow up unless specifically addressed in assessment and plan.    Safety Assessment  Throw rugs on floor.  No  Handrails on all stairs.  Yes  Good lighting in all hallways.  Yes  Difficulty hearing.  Yes  Patient counseled about all safety risks that were identified.    Functional Assessment ADLs  Are there any barriers preventing you from cooking for yourself or meeting nutritional needs?  No.    Are there any barriers preventing you from driving safely or obtaining transportation?  No.    Are there any barriers preventing you from using a telephone or calling for help?  No.    Are there any barriers preventing you from shopping?  Yes.    Are there any barriers preventing you from taking care of your own finances?  Yes.    Are there any barriers preventing you from managing your medications?  Yes.    Are there any barriers preventing you from showering, bathing or dressing yourself?  No.    Are you currently engaging in any exercise or physical activity?  Yes.     What is your perception of your health?  Good.    Advance Care Planning  Do you have an Advance Directive, Living Will, Durable Power of , or POLST? Yes  Advance Directive Living Will Durable Power of  POLST      Health Maintenance Summary            Overdue - ABDOMINAL AORTIC ANEURYSM (AAA) SCREEN (Once) Overdue - never done      No completion history exists for this topic.              Ordered - COLORECTAL CANCER SCREENING (COLON CANCER SCREENING ANNUAL FIT - Yearly) Ordered on 2/8/2023 12/19/2018  OCCULT BLOOD FECES IMMUNOASSAY    05/14/2017  OCCULT BLOOD FECES IMMUNOASSAY              Overdue - Annual Wellness Visit (Every 366 Days) Overdue since 2/27/2021 02/27/2020  Subsequent Annual Wellness Visit - Includes PPPS ()    12/10/2018  Subsequent Annual Wellness Visit - Includes PPPS  ()    09/14/2017  Visit Dx: Medicare annual wellness visit, initial              Overdue - COVID-19 Vaccine (3 - Booster for Moderna series) Overdue since 4/15/2021      02/18/2021  Imm Admin: MODERNA SARS-COV-2 VACCINE (12+)    01/21/2021  Imm Admin: MODERNA SARS-COV-2 VACCINE (12+)              Overdue - RETINAL SCREENING (Yearly) Overdue since 9/28/2022 09/28/2021  REFERRAL FOR RETINAL SCREENING EXAM    05/14/2021  Done    02/27/2020  Done              A1C SCREENING (Every 6 Months) Next due on 9/28/2023 03/28/2023  HEMOGLOBIN A1C    11/04/2022  HEMOGLOBIN A1C    10/18/2022  HEMOGLOBIN A1C    03/07/2022  POCT Hemoglobin A1C    10/26/2021  HEMOGLOBIN A1C    Only the first 5 history entries have been loaded, but more history exists.              DIABETES MONOFILAMENT / LE EXAM (Yearly) Next due on 2/8/2024 02/08/2023  Diabetic Monofilament Lower Extremity Exam    11/07/2022  SmartData: WORKFLOW - DIABETES - DIABETIC FOOT EXAM PERFORMED    11/07/2022  Diabetic Monofilament Lower Extremity Exam    07/28/2021  Diabetic Monofilament Lower Extremity Exam    04/15/2021  SmartData: WORKFLOW - DIABETES - DIABETIC FOOT EXAM PERFORMED    Only the first 5 history entries have been loaded, but more history exists.              FASTING LIPID PROFILE (Yearly) Next due on 3/28/2024      03/28/2023  Lipid Profile    11/04/2022  Lipid Profile    10/18/2022  Lipid Profile    05/10/2022  Lipid Profile    10/26/2021  Lipid Profile    Only the first 5 history entries have been loaded, but more history exists.              URINE ACR / MICROALBUMIN (Yearly) Next due on 3/28/2024      03/28/2023  MICROALBUMIN CREAT RATIO URINE    11/04/2022  MICROALBUMIN CREAT RATIO URINE    07/26/2021  MICROALBUMIN CREAT RATIO URINE    04/17/2021  MICROALBUMIN CREAT RATIO URINE    02/21/2020  MICROALBUMIN CREAT RATIO URINE    Only the first 5 history entries have been loaded, but more history exists.              SERUM CREATININE  (Yearly) Next due on 5/11/2024 05/11/2023  Complete Metabolic Panel (CMP)    03/28/2023  Comp Metabolic Panel    01/03/2023  Basic Metabolic Panel    01/01/2023  Basic Metabolic Panel    12/29/2022  Comp Metabolic Panel (CMP)    Only the first 5 history entries have been loaded, but more history exists.              IMM DTaP/Tdap/Td Vaccine (3 - Td or Tdap) Next due on 9/27/2026 09/27/2016  Imm Admin: Tdap Vaccine    10/18/2012  Imm Admin: Tdap Vaccine              IMM PNEUMOCOCCAL VACCINE: 65+ Years (Series Information) Completed      12/10/2018  Imm Admin: Pneumococcal polysaccharide vaccine (PPSV-23)    09/27/2016  Imm Admin: Pneumococcal Conjugate Vaccine (Prevnar/PCV-13)    12/31/2015  Imm Admin: Pneumococcal Conjugate Vaccine (Prevnar/PCV-13)              HEPATITIS C SCREENING  Completed      02/21/2020  HEP C VIRUS ANTIBODY              IMM ZOSTER VACCINES (Series Information) Completed      07/28/2021  Imm Admin: Zoster Vaccine Recombinant (RZV) (SHINGRIX)    02/27/2020  Imm Admin: Zoster Vaccine Recombinant (RZV) (SHINGRIX)              IMM INFLUENZA (Series Information) Completed      11/07/2022  Imm Admin: Influenza Vaccine Adult HD    09/04/2020  Imm Admin: Influenza Vaccine Adult HD    10/10/2019  Imm Admin: Influenza Vaccine Adult HD    10/22/2018  Imm Admin: Influenza Vaccine Adult HD    10/01/2018  Imm Admin: Influenza Vaccine Adult HD    Only the first 5 history entries have been loaded, but more history exists.              HPV Vaccines (Series Information) Aged Out      No completion history exists for this topic.              IMM MENINGOCOCCAL ACWY VACCINE (Series Information) Aged Out      No completion history exists for this topic.              Discontinued - IMM HEP B VACCINE  Discontinued      12/18/2019  Imm Admin: Hepatitis B Vaccine (Adol/Adult)    06/12/2019  Imm Admin: Hepatitis B Vaccine (Adol/Adult)                    Patient Care Team:  Stanton Miller M.D. as PCP -  General (Internal Medicine)  Guy Chahal D.C. as Consulting Physician (Chiropractic)  [facility] as Consulting Physician (Optometry)  Diony Anaya M.D. (Urology)  Kimberly Ramos as Consulting Physician (Ophthalmology)  Shannon Putnam, PT, DPT as Transitional Care Navigator  [facility] as Home Health Provider  Marilyn Leo R.N. as Chronic Care Manager (Registered Nurse)  Ame Ruvalcaba as Community Health Worker  GRACE Ling, PT as Physical Therapist (Physical Therapy)    Social History     Tobacco Use    Smoking status: Some Days     Packs/day: 0.15     Years: 50.00     Pack years: 7.50     Types: Cigarettes     Last attempt to quit: 1/10/2007     Years since quittin.3    Smokeless tobacco: Never   Vaping Use    Vaping Use: Never used   Substance Use Topics    Alcohol use: Not Currently     Alcohol/week: 4.8 - 6.0 oz     Types: 8 - 10 Glasses of wine per week     Comment: 1 bottle of wine 2-3 days a week    Drug use: No     Family History   Problem Relation Age of Onset    Heart Disease Mother     Diabetes Father     Other Sister         Mental retardation    Heart Disease Brother     Cancer Brother         Prostate    No Known Problems Daughter     No Known Problems Daughter      He  has a past medical history of Alcohol use, BPH (benign prostatic hyperplasia), Depression, Fall (2022), GERD (gastroesophageal reflux disease), History of depression (2022), Hypertension, Hyponatremia (2017), Iron deficiency anemia secondary to inadequate dietary iron intake (2021), Mild cognitive impairment (2022), Neuropathic pain, leg, Orthostatic hypotension (1/3/2019), Right hip pain (2016), Thrombocytopenia, unspecified (HCC) (2022), Tobacco use, and Type II or unspecified type diabetes mellitus without mention of complication, not stated as uncontrolled.    He has no past medical history of Encounter for long-term  (current) use of other medications.   No past surgical history on file.    Exam:   There were no vitals taken for this visit. There is no height or weight on file to calculate BMI.    Hearing good.    Dentition good  Alert, oriented in no acute distress.  Eye contact is good, speech goal directed, affect calm      Assessment and Plan. The following treatment and monitoring plan is recommended:    1. Medicare annual wellness visit, subsequent  Needs diabetic retinal screening COVID-vaccine booster with 5 Valent vaccine and abdominal ultrasounds screening for aneurysm one-time.  To be ordered.  We will get COVID-vaccine at pharmacy.  And retinal screening at next office visit.    2. Uncontrolled type 2 diabetes mellitus with hyperglycemia (HCC)       3. Moderate late onset Alzheimer's dementia without behavioral disturbance, psychotic disturbance, mood disturbance, or anxiety (HCC)  No treatment for now other than palliative care.  Continue follow-up with neurology.  Consider Aricept and/or Namenda in the future on follow-up visit once diabetes is better controlled and overactive bladder is better controlled.  We will not start Aricept at this time until we get his bladder better controlled as I do not want start more than 1 medicine at a time to reduce risk of potential side effects.  In understanding which drug is causing any side effects.    4. Current moderate episode of major depressive disorder, unspecified whether recurrent (HCC)  Under good control at this time but may need further medication and/or counseling.  Continue bupropion under 50 mg twice daily.    5. Essential hypertension  Under good control continue current regimen with metoprolol 25 mg daily and benazepril 40 mg daily.    6. Gastroesophageal reflux disease with esophagitis, unspecified whether hemorrhage  Under good control with omeprazole 20 mg daily.  Continue unchanged.    7. Prostate CA (HCC)- feb 2022; RT tbd x 8 wks, mar- may 2022  Continue  follow-up with radiation therapy and urology.  Has completed RT now.  Continue serial PSAs to monitor.    8. Normal pressure hydrocephalus (HCC)  See comments above in history.  Patient's family and patient agree with not proceeding with shunt for possible NPH or trial of large-volume CSF removal for now.    9. Compression fracture of L2 lumbar vertebra, closed, initial encounter (HCC)  Continue palliative care and physical therapy for this.    10. Compression fracture of body of thoracic vertebra (HCC)  As above    11. Compression fracture of L3 lumbar vertebra, closed, initial encounter (HCC)  As above    12. Thoracic compression fracture, closed, initial encounter (HCC)  As above    13. Vitamin D deficiency  Continue with vitamin D 2000 units daily.  Check level.    14. Alcohol dependence in remission ([facility])  Continue avoidance.  Consider naltrexone in the future.  Discussed with family on follow-up visits.    15. Urinary retention-this seems to be stable at this time rather now has overactive bladder and Myrbetriq will be prescribed.  Follow-up with urology as well       16. PSVT (paroxysmal supraventricular tachycardia) ([facility])  Under good control metoprolol.  Continue unchanged at current dosage of 25 mg 1/2 pill twice daily.    17. 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  Hold off on starting that at this time since he does not seem to be terribly symptomatic from that.    18. Neurodegenerative dementia with behavioral disturbance (HCC)  See above comments.  Consider Aricept or Namenda.  Continue with antidepressants    19. OAB (overactive bladder)-new problem.  Start Myrbetriq.  Follow-up in 3 months assess response.  Titrate dose upward to achieve adequate response.  Start at 25 mg daily.     - mirabegron ER (MYRBETRIQ) 25 MG TABLET SR 24 HR; Take 1 Tablet by mouth every day.  Dispense: 90 Tablet; Refill: 3        Services suggested: No services needed at  this time  Health Care Screening recommendations as per orders if indicated.  Referrals offered: PT/OT/Nutrition counseling/Behavioral Health/Smoking cessation as per orders if indicated.    Discussion today about general wellness and lifestyle habits:    Prevent falls and reduce trip hazards; Cautioned about securing or removing rugs.  Have a working fire alarm and carbon monoxide detector;   Engage in regular physical activity and social activities     Follow-up: No follow-ups on file.

## 2023-05-17 NOTE — PROGRESS NOTES
CHW and CARLITOS Sanders, met with pt and pt's wife, Heidy, In clinic to discuss needed resources. Pt and Heidy stated that they could use assistance with, transportation, caregiver, therapist for pt and support groups, along with food assistance. CHW will gather all information and mail it out. Pt has the contact information to reach out as needed.

## 2023-05-17 NOTE — PROGRESS NOTES
Ame BURLESON and I met with Prabhakar and his wife Heidy after AWV today. Discussed palliative referral and additional community resources. Ame BURLESON will assist in connecting the pt to support groups, caregiver support and respite programs, counseling services, food giraldo, and transportation resources. Provided pt with personal care management pamphlet and contact information. Encouraged them to call for any questions, concerns, or need.

## 2023-05-18 ENCOUNTER — HOME CARE VISIT (OUTPATIENT)
Dept: HOME HEALTH SERVICES | Facility: HOME HEALTHCARE | Age: 73
End: 2023-05-18
Payer: MEDICARE

## 2023-05-18 ENCOUNTER — APPOINTMENT (OUTPATIENT)
Dept: PHYSICAL THERAPY | Facility: MEDICAL CENTER | Age: 73
End: 2023-05-18
Attending: INTERNAL MEDICINE
Payer: MEDICARE

## 2023-05-18 VITALS
HEART RATE: 83 BPM | TEMPERATURE: 98.1 F | RESPIRATION RATE: 18 BRPM | DIASTOLIC BLOOD PRESSURE: 60 MMHG | OXYGEN SATURATION: 95 % | SYSTOLIC BLOOD PRESSURE: 112 MMHG

## 2023-05-18 PROCEDURE — G0151 HHCP-SERV OF PT,EA 15 MIN: HCPCS

## 2023-05-18 ASSESSMENT — ACTIVITIES OF DAILY LIVING (ADL)
AMBULATION ASSISTANCE: MINIMUM ASSIST
AMBULATION_DISTANCE/DURATION_TOLERATED: 100 FT
PHYSICAL TRANSFERS ASSESSED: 1
AMBULATION ASSISTANCE: 1
AMBULATION ASSISTANCE: SUPERVISION
TRANSPORTATION COMMENTS: PT REQUIRES ASSIST AND ASSISTIVE DEVICE TO LEAVE HOME; FALL RISK
BATHING ASSESSED: 1
BATHING_CURRENT_FUNCTION: MODERATE ASSIST
CURRENT_FUNCTION: SUPERVISION
OASIS_M1830: 03
AMBULATION ASSISTANCE ON FLAT SURFACES: 1
AMBULATION ASSISTANCE: 1

## 2023-05-18 ASSESSMENT — ENCOUNTER SYMPTOMS
DEPRESSED MOOD: 1
DENIES PAIN: 1
DIFFICULTY THINKING: 1

## 2023-05-19 ENCOUNTER — APPOINTMENT (OUTPATIENT)
Dept: PHYSICAL THERAPY | Facility: MEDICAL CENTER | Age: 73
End: 2023-05-19
Attending: INTERNAL MEDICINE
Payer: MEDICARE

## 2023-05-19 ENCOUNTER — HOME CARE VISIT (OUTPATIENT)
Dept: HOME HEALTH SERVICES | Facility: HOME HEALTHCARE | Age: 73
End: 2023-05-19
Payer: MEDICARE

## 2023-05-19 ENCOUNTER — HOME CARE VISIT (OUTPATIENT)
Dept: HOME HEALTH SERVICES | Facility: HOME HEALTHCARE | Age: 73
End: 2023-05-19

## 2023-05-19 VITALS
DIASTOLIC BLOOD PRESSURE: 62 MMHG | RESPIRATION RATE: 18 BRPM | HEART RATE: 80 BPM | SYSTOLIC BLOOD PRESSURE: 112 MMHG | TEMPERATURE: 97.4 F | OXYGEN SATURATION: 93 %

## 2023-05-19 VITALS
DIASTOLIC BLOOD PRESSURE: 80 MMHG | SYSTOLIC BLOOD PRESSURE: 120 MMHG | TEMPERATURE: 97.6 F | RESPIRATION RATE: 16 BRPM | HEART RATE: 74 BPM | OXYGEN SATURATION: 94 %

## 2023-05-19 PROCEDURE — G0495 RN CARE TRAIN/EDU IN HH: HCPCS

## 2023-05-19 PROCEDURE — G0156 HHCP-SVS OF AIDE,EA 15 MIN: HCPCS

## 2023-05-19 ASSESSMENT — ACTIVITIES OF DAILY LIVING (ADL)
CURRENT_FUNCTION: ONE PERSON
AMBULATION ASSISTANCE: STAND BY ASSIST

## 2023-05-19 ASSESSMENT — ENCOUNTER SYMPTOMS
LAST BOWEL MOVEMENT: 66613
DENIES PAIN: 1
BOWEL PATTERN NORMAL: 1
MUSCLE WEAKNESS: 1
STOOL FREQUENCY: DAILY
POOR JUDGMENT: 1

## 2023-05-19 NOTE — CASE COMMUNICATION
noted  ----- Message -----  From: Lety Hughes, PT  Sent: 5/18/2023   3:06 PM PDT  To: Lety Charlton R.N.; Tisha Alberto R.N.; *      LAURE musa completed on 5/18/2023. Requesting 1w1, 2w4 effective 5/18/2023.

## 2023-05-19 NOTE — CASE COMMUNICATION
PRIMARY DIAGNOSIS: dementia, compression fracture  SKILLED NEED: fall prevention, medication management, health assessment,     NURSING FREQUENCY: 2w2, 1w2, 3 prn  ZIPE CODE: [id number]  DISCIPLINES ORDERED: PT, OT, MSW, SN, HHA  INSURANCE: [facility]  CERTIFICATION PERIOD: 5/17-7/15  SPECIAL CONSIDERATION: NONE  Case communication to HH attending provider and P amb anti    Opened patient to [facility] medication reconciliation process  done. Medication list left in home care folder. See MAR for drug allergy interactions. There are no  major drug to drug interactions.  The best contact phone number is [phone] and wife manages the medications.

## 2023-05-19 NOTE — CASE COMMUNICATION
noted  ----- Message -----  From: Nancy Fuentes R.N.  Sent: 5/19/2023  11:13 AM PDT  To: Lety Charlton R.N.; Tisha Alberto R.N.; *        PRIMARY DIAGNOSIS: dementia, compression fracture  SKILLED NEED: fall prevention, medication management, health assessment,     NURSING FREQUENCY: 2w2, 1w2, 3 prn  ZIPE CODE: [id number]  DISCIPLINES ORDERED: PT, OT, MSW, SN, HHA  INSURANCE: [facility]  CERTIFICATION PERIOD: 5/17-7/15  SPECIAL CONSI DERATION: NONE  Case communication to  attending provider and P amb anti    Opened patient to [facility] medication reconciliation process done. Medication list left in home care folder. See MAR for drug allergy interactions. There are no  major drug to drug interactions.  The best contact phone number is [phone] and wife manages the medications.

## 2023-05-21 ENCOUNTER — HOME CARE VISIT (OUTPATIENT)
Dept: HOME HEALTH SERVICES | Facility: HOME HEALTHCARE | Age: 73
End: 2023-05-21
Payer: MEDICARE

## 2023-05-22 ENCOUNTER — DOCUMENTATION (OUTPATIENT)
Dept: MEDICAL GROUP | Facility: PHYSICIAN GROUP | Age: 73
End: 2023-05-22
Payer: MEDICARE

## 2023-05-22 ENCOUNTER — HOME CARE VISIT (OUTPATIENT)
Dept: HOME HEALTH SERVICES | Facility: HOME HEALTHCARE | Age: 73
End: 2023-05-22
Payer: MEDICARE

## 2023-05-22 NOTE — PROGRESS NOTES
Medication chart review for [facility] services    Received referral from [facility].   Medications reviewed  compared with discharge summary if available.  Discharge summary date, if applicable:   5/23- ED visit     Current medication list per [facility]     Medication list one, patient is currently taking    Current Outpatient Medications:     mirabegron ER, 25 mg, Oral, DAILY    acetaminophen, 500-1,000 mg, Oral, Q6HRS PRN    atorvastatin, 80 mg, Oral, Q EVENING    benazepril, 40 mg, Oral, DAILY    magnesium oxide, 400 mg, Oral, DAILY    metoprolol tartrate, 12.5 mg, Oral, BID    folic acid, 1 mg, Oral, DAILY    buPROPion SR, 150 mg, Oral, BID    DULoxetine, 60 mg, Oral, BID    omeprazole, 20 mg, Oral, BID    pioglitazone, 30 mg, Oral, DAILY    Jardiance, 25 mg, Oral, DAILY    aspirin, 81 mg, Oral, DAILY    metformin, 1,000 mg, Oral, BID WITH MEALS      Medication list two, drugs that the patient has been prescribed or recommended to take by their healthcare provider on discharge summary  N/a    No Known Allergies    Labs     Lab Results   Component Value Date/Time    SODIUM 135 05/11/2023 03:30 PM    POTASSIUM 4.3 05/11/2023 03:30 PM    CHLORIDE 100 05/11/2023 03:30 PM    CO2 22 05/11/2023 03:30 PM    GLUCOSE 349 (H) 05/11/2023 03:30 PM    BUN 21 05/11/2023 03:30 PM    CREATININE 1.04 05/11/2023 03:30 PM     Lab Results   Component Value Date/Time    ALKPHOSPHAT 95 05/11/2023 03:30 PM    ASTSGOT 14 05/11/2023 03:30 PM    ALTSGPT 16 05/11/2023 03:30 PM    TBILIRUBIN 0.3 05/11/2023 03:30 PM    INR 1.11 01/02/2019 07:04 PM    ALBUMIN 4.0 05/11/2023 03:30 PM        Assessment for clinically significant drug interactions, drug omissions/additions, duplicative therapies.            CC   [address] [id number]  Fax: [phone]    [facility]  Phone [phone] fax [phone]    This note was created using voice recognition software  (Darryl). The accuracy of the dictation is limited by the abilities of the software. I have reviewed the note prior to signing, however some errors in grammar and context are still possible. If you have any questions related to this note please do not hesitate to contact our office.

## 2023-05-23 ENCOUNTER — HOME CARE VISIT (OUTPATIENT)
Dept: HOME HEALTH SERVICES | Facility: HOME HEALTHCARE | Age: 73
End: 2023-05-23
Payer: MEDICARE

## 2023-05-23 VITALS
DIASTOLIC BLOOD PRESSURE: 64 MMHG | OXYGEN SATURATION: 95 % | TEMPERATURE: 98 F | SYSTOLIC BLOOD PRESSURE: 106 MMHG | HEART RATE: 95 BPM | RESPIRATION RATE: 18 BRPM

## 2023-05-23 VITALS
TEMPERATURE: 98 F | HEART RATE: 95 BPM | DIASTOLIC BLOOD PRESSURE: 64 MMHG | OXYGEN SATURATION: 97 % | RESPIRATION RATE: 18 BRPM | SYSTOLIC BLOOD PRESSURE: 106 MMHG

## 2023-05-23 VITALS
HEART RATE: 78 BPM | OXYGEN SATURATION: 97 % | TEMPERATURE: 98 F | DIASTOLIC BLOOD PRESSURE: 64 MMHG | RESPIRATION RATE: 18 BRPM | SYSTOLIC BLOOD PRESSURE: 106 MMHG

## 2023-05-23 PROCEDURE — G0151 HHCP-SERV OF PT,EA 15 MIN: HCPCS

## 2023-05-23 PROCEDURE — G0156 HHCP-SVS OF AIDE,EA 15 MIN: HCPCS

## 2023-05-23 PROCEDURE — G0495 RN CARE TRAIN/EDU IN HH: HCPCS

## 2023-05-23 PROCEDURE — G0152 HHCP-SERV OF OT,EA 15 MIN: HCPCS

## 2023-05-23 ASSESSMENT — ENCOUNTER SYMPTOMS
DIFFICULTY THINKING: 1
STOOL FREQUENCY: DAILY
PAIN LOCATION: RIGHT BUTTOCK
PERSON REPORTING PAIN: PATIENT
LOWEST PAIN SEVERITY IN PAST 24 HOURS: 0/10
POOR JUDGMENT: 1
DENIES PAIN: 1
HIGHEST PAIN SEVERITY IN PAST 24 HOURS: 3/10
PAIN LOCATION: LEFT BUTTOCK
BOWEL PATTERN NORMAL: 1
PAIN: 1
PAIN SEVERITY GOAL: 0/10
DIFFICULTY THINKING: 1
SUBJECTIVE PAIN PROGRESSION: WAXING AND WANING
DENIES PAIN: 1
MUSCLE WEAKNESS: 1
PERSON REPORTING PAIN: PATIENT
LAST BOWEL MOVEMENT: 66617

## 2023-05-23 ASSESSMENT — ACTIVITIES OF DAILY LIVING (ADL)
AMBULATION ASSISTANCE: STAND BY ASSIST
SHAVING_CURRENT_FUNCTION_DEPENDENT: 1
CURRENT_FUNCTION: STAND BY ASSIST
LAUNDRY: DEPENDENT
WASHING_LB_CURRENT_FUNCTION: ONE PERSON
LAUNDRY ASSESSED: 1
LIGHT HOUSEKEEPING: DEPENDENT
WASHING_LB_CURRENT_FUNCTION: MAXIMUM ASSIST
PREPARING MEALS: DEPENDENT
BATHING ASSESSED: 1
ORAL_CARE_STANDBY_ASSIST: 1
PHYSICAL TRANSFERS ASSESSED: 1
GROOMING ASSESSED: 1
AMBULATION ASSISTANCE: 1
HOUSEKEEPING ASSESSED: 1
WASHING_UPB_CURRENT_FUNCTION: ONE PERSON
BATHING_CURRENT_FUNCTION: MAXIMUM ASSIST
TOILETING: 1
AMBULATION ASSISTANCE: INDEPENDENT
TOILETING: INDEPENDENT
FEEDING_WITHIN_DEFINED_LIMITS: 1
CURRENT_FUNCTION: MODERATE ASSIST
FEEDING ASSESSED: 1
GROOMING EQUIPMENT USED: DEPENDS ON THE DAY
WASHING_UPB_CURRENT_FUNCTION: MAXIMUM ASSIST
FEEDING: INDEPENDENT
AMBULATION ASSISTANCE ON FLAT SURFACES: 1
SHAVING_ASSESSED: 1

## 2023-05-24 ENCOUNTER — TELEPHONE (OUTPATIENT)
Dept: PHYSICAL THERAPY | Facility: MEDICAL CENTER | Age: 73
End: 2023-05-24
Payer: MEDICARE

## 2023-05-24 ENCOUNTER — HOME CARE VISIT (OUTPATIENT)
Dept: HOME HEALTH SERVICES | Facility: HOME HEALTHCARE | Age: 73
End: 2023-05-24
Payer: MEDICARE

## 2023-05-24 ENCOUNTER — APPOINTMENT (OUTPATIENT)
Dept: PHYSICAL THERAPY | Facility: MEDICAL CENTER | Age: 73
End: 2023-05-24
Attending: INTERNAL MEDICINE
Payer: MEDICARE

## 2023-05-24 ENCOUNTER — PATIENT OUTREACH (OUTPATIENT)
Dept: HEALTH INFORMATION MANAGEMENT | Facility: OTHER | Age: 73
End: 2023-05-24
Payer: MEDICARE

## 2023-05-24 DIAGNOSIS — I10 ESSENTIAL HYPERTENSION: ICD-10-CM

## 2023-05-24 DIAGNOSIS — R26.89 KEEPS LOSING BALANCE: ICD-10-CM

## 2023-05-24 NOTE — PROGRESS NOTES
CHW mailed out resources for transportation,dementia support groups, caregiver support groups, mobile [employer], produce on wheels, and other food bank schedule.

## 2023-05-24 NOTE — OP THERAPY DISCHARGE SUMMARY
Outpatient Physical Therapy  DISCHARGE SUMMARY NOTE      [facility]  [address]  [place] [address]  Phone:  [phone]  Fax:  [phone]    Date of Visit: 05/24/2023    Patient: Prabhakar Sierra  YOB: 1950  MRN: [id number]     Referring Provider: No referring provider defined for this encounter.   Referring Diagnosis No admission diagnoses are documented for this encounter.         Functional Assessment Used        Your patient is being discharged from Physical Therapy with the following comments:   Patient has failed to schedule or reschedule follow-up visits    Comments:  Pt had fall at home, pt is not appropriate for PT d/t cognitive/memory deficits.     Limitations Remaining:  High fall risk    Recommendations:  Referral back to PCP    Dirk Valdez PT    Date: 5/24/2023

## 2023-05-24 NOTE — CASE COMMUNICATION
Quality Review for SOC OASIS by KAVYA Washburn RN on  May 24, 2023     Edits completed by KAVYA Washburn RN:  1.  and  diagnosis coding updated per chart review.  2. Per chart review,  is 3,  is 3 per assessment  3.  is 5/17/23 per [facility] order  4.  is NA, the 5/11 hospitalization was only an ED visit  5.  is less often than daily per neurology  6.  is 3 per respiratory assessment  7. Per narrativ e that patient needs moderate assistance and a walker for safety, the following changes were made:  and  are 2;  is 3  8. Safety measures checked ambulate only with assistance  9. Nutritional requirements, changed to diabetic diet  10.  is 13 per care plan therapy sets.

## 2023-05-24 NOTE — CASE COMMUNICATION
noted  ----- Message -----  From: Ashwini Bonilla OT  Sent: 5/23/2023   3:58 PM PDT  To: Lety Charlton R.N.; Tisha Alberto R.N.; *      OT completed initial evaluation 5/23/23.  Will see client 1W4 for transfers, UE HEP and safety.

## 2023-05-25 ENCOUNTER — HOME CARE VISIT (OUTPATIENT)
Dept: HOME HEALTH SERVICES | Facility: HOME HEALTHCARE | Age: 73
End: 2023-05-25
Payer: MEDICARE

## 2023-05-25 ENCOUNTER — APPOINTMENT (OUTPATIENT)
Dept: PHYSICAL THERAPY | Facility: MEDICAL CENTER | Age: 73
End: 2023-05-25
Attending: INTERNAL MEDICINE
Payer: MEDICARE

## 2023-05-25 PROCEDURE — G0156 HHCP-SVS OF AIDE,EA 15 MIN: HCPCS

## 2023-05-25 PROCEDURE — G0180 MD CERTIFICATION HHA PATIENT: HCPCS | Performed by: INTERNAL MEDICINE

## 2023-05-25 PROCEDURE — G0151 HHCP-SERV OF PT,EA 15 MIN: HCPCS

## 2023-05-25 ASSESSMENT — ENCOUNTER SYMPTOMS
DENIES PAIN: 1
PERSON REPORTING PAIN: PATIENT

## 2023-05-25 ASSESSMENT — ACTIVITIES OF DAILY LIVING (ADL): AMBULATION ASSISTANCE ON FLAT SURFACES: 1

## 2023-05-26 VITALS
DIASTOLIC BLOOD PRESSURE: 62 MMHG | OXYGEN SATURATION: 96 % | SYSTOLIC BLOOD PRESSURE: 115 MMHG | TEMPERATURE: 97.9 F | HEART RATE: 80 BPM | RESPIRATION RATE: 18 BRPM

## 2023-05-26 ASSESSMENT — ENCOUNTER SYMPTOMS
HIGHEST PAIN SEVERITY IN PAST 24 HOURS: 0/10
DENIES PAIN: 1
PERSON REPORTING PAIN: PATIENT
PAIN SEVERITY GOAL: 0/10

## 2023-05-29 ENCOUNTER — HOME CARE VISIT (OUTPATIENT)
Dept: HOME HEALTH SERVICES | Facility: HOME HEALTHCARE | Age: 73
End: 2023-05-29

## 2023-05-29 VITALS
OXYGEN SATURATION: 94 % | RESPIRATION RATE: 18 BRPM | SYSTOLIC BLOOD PRESSURE: 120 MMHG | HEART RATE: 81 BPM | TEMPERATURE: 97.3 F | DIASTOLIC BLOOD PRESSURE: 60 MMHG

## 2023-05-29 PROCEDURE — G0156 HHCP-SVS OF AIDE,EA 15 MIN: HCPCS

## 2023-05-30 ENCOUNTER — TELEPHONE (OUTPATIENT)
Dept: MEDICAL GROUP | Age: 73
End: 2023-05-30
Payer: MEDICARE

## 2023-05-30 ENCOUNTER — APPOINTMENT (OUTPATIENT)
Dept: PHYSICAL THERAPY | Facility: MEDICAL CENTER | Age: 73
End: 2023-05-30
Attending: INTERNAL MEDICINE
Payer: MEDICARE

## 2023-05-30 ENCOUNTER — HOME CARE VISIT (OUTPATIENT)
Dept: HOME HEALTH SERVICES | Facility: HOME HEALTHCARE | Age: 73
End: 2023-05-30
Payer: MEDICARE

## 2023-05-30 ENCOUNTER — HOME CARE VISIT (OUTPATIENT)
Dept: HOME HEALTH SERVICES | Facility: HOME HEALTHCARE | Age: 73
End: 2023-05-30

## 2023-05-30 VITALS
RESPIRATION RATE: 18 BRPM | OXYGEN SATURATION: 98 % | DIASTOLIC BLOOD PRESSURE: 70 MMHG | TEMPERATURE: 98.1 F | HEART RATE: 82 BPM | SYSTOLIC BLOOD PRESSURE: 120 MMHG

## 2023-05-30 PROCEDURE — G0151 HHCP-SERV OF PT,EA 15 MIN: HCPCS

## 2023-05-30 PROCEDURE — G0152 HHCP-SERV OF OT,EA 15 MIN: HCPCS

## 2023-05-30 PROCEDURE — G0493 RN CARE EA 15 MIN HH/HOSPICE: HCPCS

## 2023-05-30 ASSESSMENT — ENCOUNTER SYMPTOMS
PERSON REPORTING PAIN: PATIENT
DIFFICULTY THINKING: 1
DENIES PAIN: 1
PERSON REPORTING PAIN: PATIENT
DENIES PAIN: 1
DIFFICULTY THINKING: 1

## 2023-05-30 ASSESSMENT — ACTIVITIES OF DAILY LIVING (ADL): AMBULATION ASSISTANCE ON FLAT SURFACES: 1

## 2023-05-30 NOTE — TELEPHONE ENCOUNTER
Contacted Prabhakar and Heidy to follow up on referral and resources. She has received the packets Ame BURLESON mailed out. She has not been in contact with palliative. Sent [employer] message with the listed scheduling number. Also sent the link for the S[facility] provider directory and the number for his SCP .

## 2023-05-31 ENCOUNTER — HOME CARE VISIT (OUTPATIENT)
Dept: HOME HEALTH SERVICES | Facility: HOME HEALTHCARE | Age: 73
End: 2023-05-31
Payer: MEDICARE

## 2023-05-31 VITALS
TEMPERATURE: 98.1 F | DIASTOLIC BLOOD PRESSURE: 70 MMHG | OXYGEN SATURATION: 98 % | SYSTOLIC BLOOD PRESSURE: 120 MMHG | HEART RATE: 82 BPM | RESPIRATION RATE: 18 BRPM

## 2023-05-31 ASSESSMENT — ENCOUNTER SYMPTOMS
STOOL FREQUENCY: DAILY
DENIES PAIN: 1
PERSON REPORTING PAIN: PATIENT
VOMITING: DENIES
LAST BOWEL MOVEMENT: 66624
NAUSEA: DENIES
FATIGUES EASILY: 1
BOWEL PATTERN NORMAL: 1

## 2023-06-01 ENCOUNTER — HOME CARE VISIT (OUTPATIENT)
Dept: HOME HEALTH SERVICES | Facility: HOME HEALTHCARE | Age: 73
End: 2023-06-01
Payer: MEDICARE

## 2023-06-01 VITALS
TEMPERATURE: 97 F | RESPIRATION RATE: 18 BRPM | SYSTOLIC BLOOD PRESSURE: 114 MMHG | DIASTOLIC BLOOD PRESSURE: 60 MMHG | OXYGEN SATURATION: 95 % | HEART RATE: 80 BPM

## 2023-06-01 PROCEDURE — G0156 HHCP-SVS OF AIDE,EA 15 MIN: HCPCS

## 2023-06-01 PROCEDURE — G0151 HHCP-SERV OF PT,EA 15 MIN: HCPCS

## 2023-06-02 ASSESSMENT — ACTIVITIES OF DAILY LIVING (ADL): AMBULATION ASSISTANCE ON FLAT SURFACES: 1

## 2023-06-02 ASSESSMENT — ENCOUNTER SYMPTOMS
DIFFICULTY THINKING: 1
DENIES PAIN: 1
PERSON REPORTING PAIN: PATIENT

## 2023-06-05 ENCOUNTER — HOME CARE VISIT (OUTPATIENT)
Dept: HOME HEALTH SERVICES | Facility: HOME HEALTHCARE | Age: 73
End: 2023-06-05
Payer: MEDICARE

## 2023-06-05 ENCOUNTER — PATIENT OUTREACH (OUTPATIENT)
Dept: HEALTH INFORMATION MANAGEMENT | Facility: OTHER | Age: 73
End: 2023-06-05
Payer: MEDICARE

## 2023-06-05 VITALS
TEMPERATURE: 97.5 F | RESPIRATION RATE: 18 BRPM | SYSTOLIC BLOOD PRESSURE: 115 MMHG | HEART RATE: 82 BPM | OXYGEN SATURATION: 97 % | DIASTOLIC BLOOD PRESSURE: 70 MMHG

## 2023-06-05 DIAGNOSIS — I10 ESSENTIAL HYPERTENSION: ICD-10-CM

## 2023-06-05 PROCEDURE — G0151 HHCP-SERV OF PT,EA 15 MIN: HCPCS

## 2023-06-05 PROCEDURE — G0495 RN CARE TRAIN/EDU IN HH: HCPCS

## 2023-06-05 PROCEDURE — G0156 HHCP-SVS OF AIDE,EA 15 MIN: HCPCS

## 2023-06-05 ASSESSMENT — ENCOUNTER SYMPTOMS
DENIES PAIN: 1
PAIN SEVERITY GOAL: 0/10
HIGHEST PAIN SEVERITY IN PAST 24 HOURS: 0/10
DENIES PAIN: 1
PERSON REPORTING PAIN: PATIENT
POOR JUDGMENT: 1

## 2023-06-05 NOTE — PROGRESS NOTES
Contacted pt to let he and his spouse know, the food pantry deliveries will be begin again, as normal and they will receive their first delivery on 6/8/23.

## 2023-06-06 ENCOUNTER — TELEPHONE (OUTPATIENT)
Dept: MEDICAL GROUP | Age: 73
End: 2023-06-06
Payer: MEDICARE

## 2023-06-06 ENCOUNTER — HOME CARE VISIT (OUTPATIENT)
Dept: HOME HEALTH SERVICES | Facility: HOME HEALTHCARE | Age: 73
End: 2023-06-06
Payer: MEDICARE

## 2023-06-06 VITALS
OXYGEN SATURATION: 96 % | RESPIRATION RATE: 18 BRPM | TEMPERATURE: 97.5 F | DIASTOLIC BLOOD PRESSURE: 70 MMHG | SYSTOLIC BLOOD PRESSURE: 115 MMHG | HEART RATE: 82 BPM

## 2023-06-06 ASSESSMENT — ENCOUNTER SYMPTOMS
STOOL FREQUENCY: DAILY
BOWEL PATTERN NORMAL: 1
DIFFICULTY THINKING: 1
PERSON REPORTING PAIN: PATIENT
LAST BOWEL MOVEMENT: 66630
DENIES PAIN: 1
MUSCLE WEAKNESS: 1

## 2023-06-06 ASSESSMENT — ACTIVITIES OF DAILY LIVING (ADL)
AMBULATION ASSISTANCE ON FLAT SURFACES: 1
AMBULATION ASSISTANCE: STAND BY ASSIST
CURRENT_FUNCTION: STAND BY ASSIST

## 2023-06-06 NOTE — CASE COMMUNICATION
Falls Template         Date & Time of fall: 6/1           Cause of fall:  Mechanical           Location:  Other           Was the fall witnessed?                  If yes, by who? Yes, Details: wife            Actions taken by patient:  called [facility] to assist up            Any new injury?                   If yes, what kind?  No            Any recent medication changes?    No            Did patient have appropriate DME available?  Yes                  If no, please explain:              Actions Taken: Notified care team and Notified MD  .

## 2023-06-06 NOTE — TELEPHONE ENCOUNTER
Pt Message please advise  :  The medication for inconvenience the 25 mg has not shown any improvement. Should I give Don another  25 mg in addition.      Thank you     Heidy RIVERA

## 2023-06-07 ENCOUNTER — HOME CARE VISIT (OUTPATIENT)
Dept: HOME HEALTH SERVICES | Facility: HOME HEALTHCARE | Age: 73
End: 2023-06-07
Payer: MEDICARE

## 2023-06-07 VITALS
SYSTOLIC BLOOD PRESSURE: 110 MMHG | TEMPERATURE: 97.6 F | RESPIRATION RATE: 18 BRPM | OXYGEN SATURATION: 97 % | DIASTOLIC BLOOD PRESSURE: 70 MMHG | HEART RATE: 87 BPM

## 2023-06-07 PROCEDURE — G0270 MNT SUBS TX FOR CHANGE DX: HCPCS

## 2023-06-07 PROCEDURE — G0151 HHCP-SERV OF PT,EA 15 MIN: HCPCS

## 2023-06-07 ASSESSMENT — ENCOUNTER SYMPTOMS
POOR JUDGMENT: 1
DENIES PAIN: 1
PERSON REPORTING PAIN: PATIENT

## 2023-06-07 ASSESSMENT — ACTIVITIES OF DAILY LIVING (ADL): AMBULATION ASSISTANCE ON FLAT SURFACES: 1

## 2023-06-07 NOTE — Clinical Note
Hello team,  Patient seen for diabetes education. Most of visit was spent with wife as patient was sleping post PT visit.  I did physically assess him and he was able to tell me his name, , and pain level etc...  Interventions from visit are as follows:  INTERVENTION:  All printed materials were provided via the [facility].    1. I had a long discussion with wife about my concern that patient's BG levels are running quite high based on s/s she is reporting. Patient with increased thirst, dry mouth, frequent urination, increased hunger, frequent fatigue.  Patient has lost about 9 lbs. in the past 3 months.  Discussed with wife this could be due to ongoing elevated BG levels.  I discussed with her the long term effects of poorly managed diabetes. Discussed normal ranges for fasting BG levels and post-prandial BG levels in the event she forgets to check BG fasting in AM.  Discussed that she can get a meter that will track trends for her. I reviewed with her, in depth, the s/s of hypo and hyperglycemia and how to treat.   2. Discussed with wife that patient's diet is very high in CHOs. Discussed with her that CHOs are not to be eliminated but to be consumed in proper portions at meal and snack times. Reviewed sources of CHOs, label reading, and Plate Method. Discussed need to increase protein and fiber from non-starchy sources in diet. Discussed how to adjust portion sizes in meals patient currently is consuming. Discussed considering high protein/ low CHO protein shakes between meals. Reviewed homemade recipes and commercial options that are premade to consider. Will provide samples of Ensure Max Protein at follow up.   3. Reviewed pathophysiology of DM and the role CHOs play in elevating BG levels. Discussed avoiding all CHO meals. Discussed goals of 30-45g of CHO at meals, 15g or less at snack time. Plate Method= 1/4 plate CHOs. Discussed low CHO snacks. Also reviewed Free Foods.   4. Reviewed low CHO alternatives to  "foods/ beverages patient is currently consuming.  5. Encouraged wife to start monitoring BG levels. She is amenable to this.  6. Diabetes Booklet provided as well as handouts regarding Plate Method.  Provided sample menu, serving sizes, and how to \"build\" meals and snacks via Plate Method.   7. Consider 5000 IU vitamin D daily. Consider MVI with minerals. Last Vitamin D level was 23.   All questions answered to the best of my ability.  Education included but was not limited to the above numbering.   Wife request one more visit prior to discharge next week. I will oblige.  Yvrose "

## 2023-06-07 NOTE — CASE COMMUNICATION
noted  ----- Message -----  From: Ashanti Schwartz, CMatNMatAMat  Sent: 6/5/2023   6:23 PM PDT  To: BRIAN Truong    can you please add shave patient after shower .   thank you   Ashanti Schwartz Cna

## 2023-06-08 ENCOUNTER — TELEPHONE (OUTPATIENT)
Dept: MEDICAL GROUP | Age: 73
End: 2023-06-08
Payer: MEDICARE

## 2023-06-08 ENCOUNTER — HOME CARE VISIT (OUTPATIENT)
Dept: HOME HEALTH SERVICES | Facility: HOME HEALTHCARE | Age: 73
End: 2023-06-08
Payer: MEDICARE

## 2023-06-08 VITALS
SYSTOLIC BLOOD PRESSURE: 110 MMHG | OXYGEN SATURATION: 97 % | TEMPERATURE: 97.6 F | RESPIRATION RATE: 18 BRPM | DIASTOLIC BLOOD PRESSURE: 70 MMHG | HEART RATE: 87 BPM

## 2023-06-08 DIAGNOSIS — N32.81 OVERACTIVE BLADDER: ICD-10-CM

## 2023-06-08 PROCEDURE — G0156 HHCP-SVS OF AIDE,EA 15 MIN: HCPCS

## 2023-06-08 PROCEDURE — G0152 HHCP-SERV OF OT,EA 15 MIN: HCPCS

## 2023-06-08 SDOH — ECONOMIC STABILITY: HOUSING INSECURITY: HOME SAFETY: NO FALLS SINCE LAST CLINICIAN PRESENT.  PROVIDED PATIENT WITH UPDATED MEDICATION LIST.

## 2023-06-08 ASSESSMENT — ENCOUNTER SYMPTOMS
PERSON REPORTING PAIN: PATIENT
DENIES PAIN: 1

## 2023-06-11 NOTE — CASE COMMUNICATION
noted  ----- Message -----  From: Yvrose Doss R.D.  Sent: 2023   9:07 PM PDT  To: Lety Charlton R.N.; Tisha Alberto R.N.; *      Hello team,  Patient seen for diabetes education. Most of visit was spent with wife as patient was sleping post PT visit.  I did physically assess him and he was able to tell me his name, , and pain level etc...  Interventions from visit are as follows:  INTERVENTION:  All printed materials were  provided via the [facility].    1. I had a long discussion with wife about my concern that patient's BG levels are running quite high based on s/s she is reporting. Patient with increased thirst, dry mouth, frequent urination, increased hunger, frequent fatigue.  Patient has lost about 9 lbs. in the past 3 months.  Discussed with wife this could be due to ongoing elevated BG levels.  I discussed with her the long term effects of poorly managed  diabetes. Discussed normal ranges for fasting BG levels and post-prandial BG levels in the event she forgets to check BG fasting in AM.  Discussed that she can get a meter that will track trends for her. I reviewed with her, in depth, the s/s of hypo and hyperglycemia and how to treat.   2. Discussed with wife that patient's diet is very high in CHOs. Discussed with her that CHOs are not to be eliminated but to be consumed in proper po rtions at meal and snack times. Reviewed sources of CHOs, label reading, and Plate Method. Discussed need to increase protein and fiber from non-starchy sources in diet. Discussed how to adjust portion sizes in meals patient currently is consuming. Discussed considering high protein/ low CHO protein shakes between meals. Reviewed homemade recipes and commercial options that are premade to consider. Will provide samples of Ensure Max Pro tein at follow up.   3. Reviewed pathophysiology of DM and the role CHOs play in elevating BG levels. Discussed avoiding all CHO meals. Discussed goals of 30-45g of CHO at meals, 15g or  "less at snack time. Plate Method= 1/4 plate CHOs. Discussed low CHO snacks. Also reviewed Free Foods.   4. Reviewed low CHO alternatives to foods/ beverages patient is currently consuming.  5. Encouraged wife to start monitoring BG levels. She is amenabl e to this.  6. Diabetes Booklet provided as well as handouts regarding Plate Method.  Provided sample menu, serving sizes, and how to \"build\" meals and snacks via Plate Method.   7. Consider 5000 IU vitamin D daily. Consider MVI with minerals. Last Vitamin D level was 23.   All questions answered to the best of my ability.  Education included but was not limited to the above numbering.   Wife request one more visit prior to discharge ne xt week. I will oblige.  Yvrose "

## 2023-06-12 ENCOUNTER — HOME CARE VISIT (OUTPATIENT)
Dept: HOME HEALTH SERVICES | Facility: HOME HEALTHCARE | Age: 73
End: 2023-06-12
Payer: MEDICARE

## 2023-06-12 VITALS
TEMPERATURE: 98.5 F | SYSTOLIC BLOOD PRESSURE: 120 MMHG | RESPIRATION RATE: 18 BRPM | HEART RATE: 84 BPM | OXYGEN SATURATION: 95 % | DIASTOLIC BLOOD PRESSURE: 60 MMHG

## 2023-06-12 VITALS
OXYGEN SATURATION: 96 % | HEART RATE: 78 BPM | TEMPERATURE: 97.8 F | SYSTOLIC BLOOD PRESSURE: 118 MMHG | RESPIRATION RATE: 16 BRPM | DIASTOLIC BLOOD PRESSURE: 65 MMHG

## 2023-06-12 PROCEDURE — G0156 HHCP-SVS OF AIDE,EA 15 MIN: HCPCS

## 2023-06-12 PROCEDURE — G0151 HHCP-SERV OF PT,EA 15 MIN: HCPCS

## 2023-06-12 ASSESSMENT — ENCOUNTER SYMPTOMS
DENIES PAIN: 1
PAIN SEVERITY GOAL: 0/10
DENIES PAIN: 1
PERSON REPORTING PAIN: PATIENT
PERSON REPORTING PAIN: PATIENT
HIGHEST PAIN SEVERITY IN PAST 24 HOURS: 0/10

## 2023-06-13 ENCOUNTER — HOME CARE VISIT (OUTPATIENT)
Dept: HOME HEALTH SERVICES | Facility: HOME HEALTHCARE | Age: 73
End: 2023-06-13
Payer: MEDICARE

## 2023-06-13 VITALS
SYSTOLIC BLOOD PRESSURE: 120 MMHG | RESPIRATION RATE: 18 BRPM | TEMPERATURE: 97.8 F | OXYGEN SATURATION: 96 % | DIASTOLIC BLOOD PRESSURE: 70 MMHG | HEART RATE: 70 BPM

## 2023-06-13 PROCEDURE — G0270 MNT SUBS TX FOR CHANGE DX: HCPCS

## 2023-06-14 ENCOUNTER — HOME CARE VISIT (OUTPATIENT)
Dept: HOME HEALTH SERVICES | Facility: HOME HEALTHCARE | Age: 73
End: 2023-06-14
Payer: MEDICARE

## 2023-06-14 PROCEDURE — G0151 HHCP-SERV OF PT,EA 15 MIN: HCPCS

## 2023-06-15 ENCOUNTER — HOME CARE VISIT (OUTPATIENT)
Dept: HOME HEALTH SERVICES | Facility: HOME HEALTHCARE | Age: 73
End: 2023-06-15
Payer: MEDICARE

## 2023-06-15 VITALS
HEART RATE: 90 BPM | DIASTOLIC BLOOD PRESSURE: 65 MMHG | SYSTOLIC BLOOD PRESSURE: 105 MMHG | OXYGEN SATURATION: 100 % | TEMPERATURE: 98.4 F | RESPIRATION RATE: 7 BRPM

## 2023-06-15 VITALS
HEART RATE: 74 BPM | SYSTOLIC BLOOD PRESSURE: 125 MMHG | OXYGEN SATURATION: 96 % | DIASTOLIC BLOOD PRESSURE: 70 MMHG | RESPIRATION RATE: 16 BRPM | TEMPERATURE: 97.8 F

## 2023-06-15 VITALS
SYSTOLIC BLOOD PRESSURE: 90 MMHG | RESPIRATION RATE: 18 BRPM | OXYGEN SATURATION: 96 % | HEART RATE: 82 BPM | TEMPERATURE: 98.2 F | DIASTOLIC BLOOD PRESSURE: 70 MMHG

## 2023-06-15 VITALS
SYSTOLIC BLOOD PRESSURE: 132 MMHG | TEMPERATURE: 97.1 F | OXYGEN SATURATION: 96 % | HEART RATE: 80 BPM | DIASTOLIC BLOOD PRESSURE: 89 MMHG | RESPIRATION RATE: 21 BRPM

## 2023-06-15 PROCEDURE — G0156 HHCP-SVS OF AIDE,EA 15 MIN: HCPCS

## 2023-06-15 PROCEDURE — G0152 HHCP-SERV OF OT,EA 15 MIN: HCPCS

## 2023-06-15 SDOH — ECONOMIC STABILITY: HOUSING INSECURITY: HOME SAFETY: NO FALLS OR SAFETY CONCERNS.  REPORTS NO CHANGES TO MEDICATIONS. LIST IN BINDER IS UP TO DATE.

## 2023-06-15 ASSESSMENT — ENCOUNTER SYMPTOMS
PERSON REPORTING PAIN: PATIENT
DENIES PAIN: 1
PERSON REPORTING PAIN: PATIENT
DENIES PAIN: 1

## 2023-06-15 ASSESSMENT — ACTIVITIES OF DAILY LIVING (ADL): OASIS_M1830: 01

## 2023-06-15 NOTE — CASE COMMUNICATION
noted  ----- Message -----  From: Sandy Cruz, PT  Sent: 6/15/2023  12:27 AM PDT  To: Lety Charlton R.N.; Odalys Santos, KARRIE      Patient discharged from home health PT effective 06/14/2023 with all goals met

## 2023-06-21 ENCOUNTER — HOME CARE VISIT (OUTPATIENT)
Dept: HOME HEALTH SERVICES | Facility: HOME HEALTHCARE | Age: 73
End: 2023-06-21
Payer: MEDICARE

## 2023-06-21 ASSESSMENT — ACTIVITIES OF DAILY LIVING (ADL): HOME_HEALTH_OASIS: 00

## 2023-06-26 ENCOUNTER — PATIENT OUTREACH (OUTPATIENT)
Dept: HEALTH INFORMATION MANAGEMENT | Facility: OTHER | Age: 73
End: 2023-06-26
Payer: MEDICARE

## 2023-06-26 DIAGNOSIS — E11.65 UNCONTROLLED TYPE 2 DIABETES MELLITUS WITH HYPERGLYCEMIA (HCC): ICD-10-CM

## 2023-06-26 DIAGNOSIS — I10 ESSENTIAL HYPERTENSION: ICD-10-CM

## 2023-06-26 DIAGNOSIS — F03.918 NEURODEGENERATIVE DEMENTIA WITH BEHAVIORAL DISTURBANCE (HCC): ICD-10-CM

## 2023-06-26 NOTE — PROGRESS NOTES
CHW called Palliative care and notified them we have a mutual pt who has tried calling and left vm's with no call back. Palliative care took down the pt's information and stated they will call.

## 2023-06-26 NOTE — PROGRESS NOTES
Assessment    Monthly outreach completed with Prabhakar and Heidy. Prabhakar has been discharged from all  services. She, so far, has been able to help him with his ADLs and IADLs. He did have another fall. He continues to have difficulty maintaining his balance. Heidy has not heard back from any of the community or respite services we discussed during our 5/15 outreach. Ame CHW will call palliative today to see if they can get scheduled for a consultation. I will follow up with her next week to ensure she was able to talk to someone from their team.  Heidy will let us know if she wants/needs assistance connecting to the community resources     Education    Fall prevention, environmental safety/hazards    Plan of Care and Goals    Prevent falls  Loss of independence - Needs assistance with Activities of Daily Living (ADL) and/or Independent Activities of Daily Living (IADL)    Barriers:    Care coordination needs    Progress:    Not progressing     Next outreach: 1 week

## 2023-06-30 ENCOUNTER — TELEPHONE (OUTPATIENT)
Dept: HEALTH INFORMATION MANAGEMENT | Facility: OTHER | Age: 73
End: 2023-06-30
Payer: MEDICARE

## 2023-07-05 ENCOUNTER — PATIENT OUTREACH (OUTPATIENT)
Dept: HEALTH INFORMATION MANAGEMENT | Facility: OTHER | Age: 73
End: 2023-07-05
Payer: MEDICARE

## 2023-07-05 DIAGNOSIS — I10 ESSENTIAL HYPERTENSION: ICD-10-CM

## 2023-07-05 NOTE — PROGRESS NOTES
CHW received a call from pt's wife, Heidy, stating that she still has not heard anything from Palliative care and the referral was placed on 5/15/23. Heidy states, she called Palliative care and was told that her message will be passed on to April and they would receive a call back. CHW called Palliative again to see if there is a hold up or problem with pt's referral. Palliative states the referral was placed as Hospice and not OPP however the system is not working properly and that was the only way to place the referral. Palliative rep stated that she will see if April can still take the referral the way it is and will reach out to pt. CHW will continue to follow up with pt.

## 2023-07-07 ENCOUNTER — PATIENT OUTREACH (OUTPATIENT)
Dept: HEALTH INFORMATION MANAGEMENT | Facility: OTHER | Age: 73
End: 2023-07-07
Payer: MEDICARE

## 2023-07-07 DIAGNOSIS — E11.65 UNCONTROLLED TYPE 2 DIABETES MELLITUS WITH HYPERGLYCEMIA (HCC): ICD-10-CM

## 2023-07-07 DIAGNOSIS — I10 ESSENTIAL HYPERTENSION: ICD-10-CM

## 2023-07-07 DIAGNOSIS — F10.27 DEMENTIA ASSOCIATED WITH ALCOHOLISM WITH BEHAVIORAL DISTURBANCE (HCC): ICD-10-CM

## 2023-07-07 NOTE — PROGRESS NOTES
Spoke with Heidy about Prabhakar. Heidy has still not heard from any of the community resources she was provided by Ame BURLESON. Also has not heard from palliative. Ame BURLESON has made several calls to the palliative team. Will plan to reach out to their team and pend new referral if necessary. Prabhakar has had multiple falls since being discharged from . Pt is having difficulty completing home PT exercises by himself. They have also tried pool exercises, but are concerned about safety. Will discuss home PT referral w/ PCP. Heidy reports she is not able to leave Prabhakar by himself, and she has taken on all of the IADL and household management tasks. They rely on public transportation. She would like to find a long term care aid to help w/ bathing and grooming. Ame BURLESON notified. She is experiencing caregiver burnout. She struggles to find time to complete her own ADLs, and they do not have a local support system. She has been trying to find time to attend caregiver support meetings, but so far, has not been able to. Referral placed to social work.

## 2023-07-10 ENCOUNTER — PATIENT OUTREACH (OUTPATIENT)
Dept: HEALTH INFORMATION MANAGEMENT | Facility: OTHER | Age: 73
End: 2023-07-10
Payer: MEDICARE

## 2023-07-10 NOTE — PROGRESS NOTES
07/10/2023  [facility] JERO received a referral from [facility] RN to assist pt and his wife. Prabhakar was recently diagnosed with dementia, his wife is the only caregiver and she feels overwhelmed and burn out. Pt and his wife has no other support systems and would like to find some resources in the community. Pt and his wife doesn't drive and heavily rely on public transportation.

## 2023-07-10 NOTE — PROGRESS NOTES
07/10/2023  @1105 JERO called Heidy, pt's wife. [facility] social work assessment was conducted, area of need identified.  @1140 JERO submitted an on-line application for [facility][1] services.  @1145 JERO submitted application for  dementia care program.  @1310 JERO mailed Heidy information on Memory care facilities, CBC application, [facility] adult day care, support groups meeting in [place].    Social Work Assessment  Community Care Management    Synopsis: Pt has a diagnosis of dementia, his wife is a primary care givers that feels overwhelmed and is looking for some respite and support groups.    Living Situation/Home Environment: Pt lives with his wife in a town home that they are paying mortgage for. Their home has stairs and pt needs to be extra cautious to prevent falls. Heidy takes care of groceries, laundry, cooking. She also helps pt with ADLs as he needs help with bathing and grooming. Heidy would like to find some help in the house. Pt doesn't want to seek alternative placement. SW offered to provide a list of memory care facilities to consider for respite or potential placement in case they change their minds. JERO also informed Heidy of programs available in the community including respite care vouchers, private in-home service agencies. Heidy informed this SW that CHW already provided her with the list of in-home agencies, also she submitted application for respite care through [employer].  Financial Situation/Sources of Income: Heidy stated that they are doing ok financially, no further financial disclosure was provided.   Transportation: Heidy stated that she has a car and a DL, however, the car is broken and they are almost managed to fix it. Heidy is actively using transportation services though [facility].  Support System: Heidy stated that she is the only support for her , they do not have relatives or friends here in [place]. Heidy would like to receive information on support groups. JERO also  offered Heidy to check put Adult day care in [place], so she can have some relief.  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 Pt smokes and has a history of alcohol misuse, no elicit drugs.Pt currently not at risk of depression, scored 2 on PHQ-9.  Ability to Obtain Basic Resources: Pt relies on his wife for ADLs; groceries, transportation.  Physical Functioning: Pt has progressive issues with balance, is using cane and walking when moving around the house and going outside.  Patient's Perception of Needs: Pt didn't express any concerns. Pt's wife would like to get information on support services available in the community.  AD Discussion?:  Pt has AD.    Plan: Provide Heidy with information on [employer] Support groups, adult , help to submit application for [facility] and [facility] care. SW will also provided an application for [facility] for Heidy and Prabhakar to consider. SW explained that [facility] is income-based and has a long wait list. SW will provide al ist of memory care facilities to consider for a placement or short-term respite.    Goal:  Provide resources and connect to services to support Prabhakar and Heidy.        Social Work Care Plan        Prabhakar Sierra's Goal:  Get help at home.  Barriers:  Limited financial resources of the family, long wait lists for most of the community servicing agencies.  Interventions: Provide list of resources, submit applications for [facility], N[facility] care services.     Start Date: 07/10/2023  Anticipated Goal Achievement Date:  TBD        Next Scheduled patient outreach:  as needed.  Social Work Care Coordinator:  Clara Andre  Community Care Management:  [phone]

## 2023-07-12 ENCOUNTER — TELEPHONE (OUTPATIENT)
Dept: HEALTH INFORMATION MANAGEMENT | Facility: OTHER | Age: 73
End: 2023-07-12
Payer: MEDICARE

## 2023-07-12 NOTE — TELEPHONE ENCOUNTER
Talked to Doretha BECKFORD w/ [facility]. She has reached out to Heidy, and she briefly explained their services. Waiting for return call from Heidy and Prabhakar. This RN will plan to follow up janett Radford and Heidy to ask about their interest in scheduling.

## 2023-07-18 ENCOUNTER — TELEPHONE (OUTPATIENT)
Dept: HEALTH INFORMATION MANAGEMENT | Facility: OTHER | Age: 73
End: 2023-07-18
Payer: MEDICARE

## 2023-07-18 NOTE — TELEPHONE ENCOUNTER
Followed up w/ Heidy about palliative referral. They no longer wish to proceed, but Heidy will call me if they change their minds. They have been added to lists for caregiver services. Would be interested in looking into aquatic PT, but they would prefer to have home PT vs outpatient PT. Denies any other questions, concerns, or needs.

## 2023-07-20 ENCOUNTER — PATIENT OUTREACH (OUTPATIENT)
Dept: HEALTH INFORMATION MANAGEMENT | Facility: OTHER | Age: 73
End: 2023-07-20
Payer: MEDICARE

## 2023-07-20 NOTE — PROGRESS NOTES
07/20/2023  @1000 SW called Heidy. Heidy stated that they are doing ok, got connected to Palliative care. Prabhakar needs PT and some assistance at home with bathing. Heidy and Prabhakar will have an appointment with PCP in August and will discuss possibility of the referral to Home Health. Heidy also reached out to coordinating RN to assist with PT options. Heidy stated that she has a list of placement options but they are not considering placement at this time. She is still waiting for her respite voucher application through [employer] to be approved. JERO asked if Heidy needs any other assistance from  at this time. Heidy stated that they are good for now. SW informed Heidy that she will close current referral but she always can reach out if situation changes and she might need assistance from . Contact information was provided. SW will not follow up at this time.

## 2023-07-31 ENCOUNTER — TELEPHONE (OUTPATIENT)
Dept: HEALTH INFORMATION MANAGEMENT | Facility: OTHER | Age: 73
End: 2023-07-31
Payer: MEDICARE

## 2023-07-31 DIAGNOSIS — E11.65 UNCONTROLLED TYPE 2 DIABETES MELLITUS WITH HYPERGLYCEMIA (HCC): ICD-10-CM

## 2023-07-31 NOTE — TELEPHONE ENCOUNTER
Followed up on 7/31 [employer] elisabeth. Prabhakar has enough jardiance for the rest of the week. Scheduled diabetes RN visit on 8/7. Will also refer him to Cynthia Leslie for prescription assistance.

## 2023-08-07 ENCOUNTER — OFFICE VISIT (OUTPATIENT)
Dept: MEDICAL GROUP | Age: 73
End: 2023-08-07
Payer: MEDICARE

## 2023-08-07 VITALS
TEMPERATURE: 97.6 F | HEART RATE: 68 BPM | WEIGHT: 192 LBS | SYSTOLIC BLOOD PRESSURE: 122 MMHG | OXYGEN SATURATION: 97 % | DIASTOLIC BLOOD PRESSURE: 64 MMHG | HEIGHT: 70 IN | BODY MASS INDEX: 27.49 KG/M2

## 2023-08-07 DIAGNOSIS — E55.9 VITAMIN D DEFICIENCY, UNSPECIFIED: ICD-10-CM

## 2023-08-07 DIAGNOSIS — E11.65 UNCONTROLLED TYPE 2 DIABETES MELLITUS WITH HYPERGLYCEMIA (HCC): ICD-10-CM

## 2023-08-07 LAB
HBA1C MFR BLD: 10 % (ref ?–5.8)
POCT INT CON NEG: NEGATIVE
POCT INT CON POS: POSITIVE

## 2023-08-07 PROCEDURE — 83036 HEMOGLOBIN GLYCOSYLATED A1C: CPT | Performed by: INTERNAL MEDICINE

## 2023-08-07 PROCEDURE — 99211 OFF/OP EST MAY X REQ PHY/QHP: CPT | Performed by: INTERNAL MEDICINE

## 2023-08-07 PROCEDURE — 3078F DIAST BP <80 MM HG: CPT | Performed by: INTERNAL MEDICINE

## 2023-08-07 PROCEDURE — 3074F SYST BP LT 130 MM HG: CPT | Performed by: INTERNAL MEDICINE

## 2023-08-07 ASSESSMENT — FIBROSIS 4 INDEX: FIB4 SCORE: 1.28

## 2023-08-07 NOTE — LETTER
[facility]  Stanton Miller M.D.  [address]  [place] [address]  Fax: [phone]   Authorization for Release/Disclosure of   Protected Health Information   Name: ROLANDO SIERRA : 1950 SSN: [id number]   Address: [address]  [address]  [place] [address] Phone:    [phone] (home)    I authorize the entity listed below to release/disclose the PHI below to:   [facility]/Stanton Miller M.D. and [facility] RN   Provider or Entity Name:  Serge Carroll OD   Address   City, State, Zip   Phone:      Fax:     Reason for request: continuity of care   Information to be released:    Retinal exam    [  ] Check here and initial the line next to each item to release ALL health information INCLUDING  _____ Care and treatment for drug and / or alcohol abuse  _____ HIV testing, infection status, or AIDS  _____ Genetic Testing    DATES OF SERVICE OR TIME PERIOD TO BE DISCLOSED: _____________  I understand and acknowledge that:  * This Authorization may be revoked at any time by you in writing, except if your health information has already been used or disclosed.  * Your health information that will be used or disclosed as a result of you signing this authorization could be re-disclosed by the recipient. If this occurs, your re-disclosed health information may no longer be protected by State or Federal laws.  * You may refuse to sign this Authorization. Your refusal will not affect your ability to obtain treatment.  * This Authorization becomes effective upon signing and will  on (date) __________.      If no date is indicated, this Authorization will  one (1) year from the signature date.    Name: Rolando Sierra  Signature: continued medical care.  Date:   2023     PLEASE FAX REQUESTED RECORDS BACK TO: [phone]

## 2023-08-07 NOTE — PROGRESS NOTES
RN-CDE Note    Subjective:     HPI:  Prabhakar Sierra is a 73 y.o. old patient who is seen by the Diabetes Nurse Specialist today for review of his uncontrolled type 2 diabetes.  Can not afford his Jardiance at this time as he is in the gap for Medicare.  Ca Leslie from [employer] [facility] has sent them patient assistance forms to fill out.   Patient has alzheimer's and his wife states that he tends to eat all of the time.   Diabetes Medications:   Pioglitazone 30 mg daily  Metformin 1000 mg bid  Jardiance 25 mg  can't afford at this time.     Exercise: not exercising, using walker for ambulation  Diet: his wife tries to keep an eye on his carbohydrates but he tends to graze when she is not around.    Patient's body mass index is 27.55 kg/m². Exercise and nutrition counseling were performed at this visit.      Health Maintenance:   Health Maintenance Due   Topic Date Due    ABDOMINAL AORTIC ANEURYSM (AAA) SCREEN  Never done    COLORECTAL CANCER SCREENING  12/19/2019    COVID-19 Vaccine (3 - Moderna series) 04/15/2021    RETINAL SCREENING  09/28/2022         DM:   Last A1c:   Lab Results   Component Value Date/Time    HBA1C 10 (A) 08/07/2023 09:22 AM      Previous A1c was 8.1 on 3/28/23  A1C GOAL: < 7    Glucose monitoring frequency:       Hypoglycemic episodes: no    Last Retinal Exam:  request for records sent.        Exam:  Monofilament: current.     Lab Results   Component Value Date/Time    MALBCRT 108 (H) 03/28/2023 02:28 PM    MICROALBUR 3.7 03/28/2023 02:28 PM        ACR Albumin/Creatinine Ratio goal <30     HTN:   Blood pressure goal <130/<80 .   Currently Rx ACE/ARB: Yes     Dyslipidemia:    Lab Results   Component Value Date/Time    CHOLSTRLTOT 149 03/28/2023 03:19 PM    LDL 45 03/28/2023 03:19 PM    HDL 42 03/28/2023 03:19 PM    TRIGLYCERIDE 308 (H) 03/28/2023 03:19 PM         Currently Rx Statin: Yes     He  reports that he has been smoking cigarettes. He has a 7.50 pack-year smoking history. He  has never used smokeless tobacco.      Plan:     Discussed and educated on:   - All medications, side effects and compliance (discussed carefully)  - Annual eye examinations at Ophthalmology  - HbA1C: target  - Home glucose monitoring emphasized  - Weight control and daily exercise    Recommended medication changes: referral to [facility],  appointment made with Dr. Singh

## 2023-08-15 ENCOUNTER — HOSPITAL ENCOUNTER (OUTPATIENT)
Dept: LAB | Facility: MEDICAL CENTER | Age: 73
End: 2023-08-15
Attending: INTERNAL MEDICINE
Payer: MEDICARE

## 2023-08-15 ENCOUNTER — HOSPITAL ENCOUNTER (OUTPATIENT)
Dept: LAB | Facility: MEDICAL CENTER | Age: 73
End: 2023-08-15
Attending: PSYCHIATRY & NEUROLOGY
Payer: MEDICARE

## 2023-08-15 ENCOUNTER — PATIENT OUTREACH (OUTPATIENT)
Dept: HEALTH INFORMATION MANAGEMENT | Facility: OTHER | Age: 73
End: 2023-08-15
Payer: MEDICARE

## 2023-08-15 DIAGNOSIS — E11.65 UNCONTROLLED TYPE 2 DIABETES MELLITUS WITH HYPERGLYCEMIA (HCC): ICD-10-CM

## 2023-08-15 DIAGNOSIS — E55.9 VITAMIN D DEFICIENCY, UNSPECIFIED: ICD-10-CM

## 2023-08-15 DIAGNOSIS — E67.8 EXCESSIVE VITAMIN B6 INTAKE: ICD-10-CM

## 2023-08-15 LAB
25(OH)D3 SERPL-MCNC: 27 NG/ML (ref 30–100)
ALBUMIN SERPL BCP-MCNC: 4.5 G/DL (ref 3.2–4.9)
ALBUMIN/GLOB SERPL: 1.8 G/DL
ALP SERPL-CCNC: 59 U/L (ref 30–99)
ALT SERPL-CCNC: 15 U/L (ref 2–50)
ANION GAP SERPL CALC-SCNC: 13 MMOL/L (ref 7–16)
AST SERPL-CCNC: 15 U/L (ref 12–45)
BILIRUB SERPL-MCNC: 0.4 MG/DL (ref 0.1–1.5)
BUN SERPL-MCNC: 18 MG/DL (ref 8–22)
CALCIUM ALBUM COR SERPL-MCNC: 9.5 MG/DL (ref 8.5–10.5)
CALCIUM SERPL-MCNC: 9.9 MG/DL (ref 8.5–10.5)
CHLORIDE SERPL-SCNC: 100 MMOL/L (ref 96–112)
CHOLEST SERPL-MCNC: 159 MG/DL (ref 100–199)
CO2 SERPL-SCNC: 27 MMOL/L (ref 20–33)
CREAT SERPL-MCNC: 1.02 MG/DL (ref 0.5–1.4)
ERYTHROCYTE [DISTWIDTH] IN BLOOD BY AUTOMATED COUNT: 50.9 FL (ref 35.9–50)
FASTING STATUS PATIENT QL REPORTED: NORMAL
GFR SERPLBLD CREATININE-BSD FMLA CKD-EPI: 77 ML/MIN/1.73 M 2
GLOBULIN SER CALC-MCNC: 2.5 G/DL (ref 1.9–3.5)
GLUCOSE SERPL-MCNC: 309 MG/DL (ref 65–99)
HCT VFR BLD AUTO: 45 % (ref 42–52)
HDLC SERPL-MCNC: 47 MG/DL
HGB BLD-MCNC: 14.5 G/DL (ref 14–18)
LDLC SERPL CALC-MCNC: 70 MG/DL
MCH RBC QN AUTO: 29.3 PG (ref 27–33)
MCHC RBC AUTO-ENTMCNC: 32.2 G/DL (ref 32.3–36.5)
MCV RBC AUTO: 90.9 FL (ref 81.4–97.8)
PLATELET # BLD AUTO: 205 K/UL (ref 164–446)
PMV BLD AUTO: 11 FL (ref 9–12.9)
POTASSIUM SERPL-SCNC: 5.4 MMOL/L (ref 3.6–5.5)
PROT SERPL-MCNC: 7 G/DL (ref 6–8.2)
RBC # BLD AUTO: 4.95 M/UL (ref 4.7–6.1)
SODIUM SERPL-SCNC: 140 MMOL/L (ref 135–145)
TRIGL SERPL-MCNC: 211 MG/DL (ref 0–149)
WBC # BLD AUTO: 5.8 K/UL (ref 4.8–10.8)

## 2023-08-15 PROCEDURE — 82306 VITAMIN D 25 HYDROXY: CPT

## 2023-08-15 PROCEDURE — 80061 LIPID PANEL: CPT

## 2023-08-15 PROCEDURE — 84207 ASSAY OF VITAMIN B-6: CPT

## 2023-08-15 PROCEDURE — 36415 COLL VENOUS BLD VENIPUNCTURE: CPT

## 2023-08-15 PROCEDURE — 85027 COMPLETE CBC AUTOMATED: CPT

## 2023-08-15 PROCEDURE — 80053 COMPREHEN METABOLIC PANEL: CPT

## 2023-08-16 NOTE — PROGRESS NOTES
8/15    Received call from Heidy. She requested assistance scheduling endocrinology visit. She would like the appt to be before his 8/23 visit w/ his PCP. Contacted the endocrinology office. Visit scheduled for 8/17 @0920. Notified Heidy. They do not need transportation to this visit.

## 2023-08-17 ENCOUNTER — OFFICE VISIT (OUTPATIENT)
Dept: ENDOCRINOLOGY | Facility: MEDICAL CENTER | Age: 73
End: 2023-08-17
Attending: STUDENT IN AN ORGANIZED HEALTH CARE EDUCATION/TRAINING PROGRAM
Payer: MEDICARE

## 2023-08-17 ENCOUNTER — TELEPHONE (OUTPATIENT)
Dept: ENDOCRINOLOGY | Facility: MEDICAL CENTER | Age: 73
End: 2023-08-17
Payer: MEDICARE

## 2023-08-17 VITALS
OXYGEN SATURATION: 98 % | HEART RATE: 81 BPM | HEIGHT: 70 IN | WEIGHT: 196 LBS | DIASTOLIC BLOOD PRESSURE: 50 MMHG | SYSTOLIC BLOOD PRESSURE: 111 MMHG | BODY MASS INDEX: 28.06 KG/M2

## 2023-08-17 DIAGNOSIS — E11.65 TYPE 2 DIABETES MELLITUS WITH HYPERGLYCEMIA, WITHOUT LONG-TERM CURRENT USE OF INSULIN (HCC): ICD-10-CM

## 2023-08-17 PROCEDURE — 95249 CONT GLUC MNTR PT PROV EQP: CPT | Performed by: STUDENT IN AN ORGANIZED HEALTH CARE EDUCATION/TRAINING PROGRAM

## 2023-08-17 PROCEDURE — 3078F DIAST BP <80 MM HG: CPT | Performed by: STUDENT IN AN ORGANIZED HEALTH CARE EDUCATION/TRAINING PROGRAM

## 2023-08-17 PROCEDURE — 3074F SYST BP LT 130 MM HG: CPT | Performed by: STUDENT IN AN ORGANIZED HEALTH CARE EDUCATION/TRAINING PROGRAM

## 2023-08-17 PROCEDURE — 99205 OFFICE O/P NEW HI 60 MIN: CPT | Performed by: STUDENT IN AN ORGANIZED HEALTH CARE EDUCATION/TRAINING PROGRAM

## 2023-08-17 PROCEDURE — 99211 OFF/OP EST MAY X REQ PHY/QHP: CPT | Performed by: STUDENT IN AN ORGANIZED HEALTH CARE EDUCATION/TRAINING PROGRAM

## 2023-08-17 RX ORDER — BLOOD-GLUCOSE SENSOR
1 EACH MISCELLANEOUS
Qty: 2 EACH | Refills: 11 | Status: ON HOLD | OUTPATIENT
Start: 2023-08-17 | End: 2024-02-18

## 2023-08-17 RX ORDER — BLOOD-GLUCOSE SENSOR
1 EACH MISCELLANEOUS
Qty: 2 EACH | Refills: 11 | Status: SHIPPED | OUTPATIENT
Start: 2023-08-17 | End: 2023-08-17

## 2023-08-17 ASSESSMENT — FIBROSIS 4 INDEX: FIB4 SCORE: 1.4

## 2023-08-17 NOTE — PROGRESS NOTES
Chief Complaint:  Consult requested by Stanton Miller M.D. for initial evaluation of Type 2 Diabetes Mellitus    HPI:   Prabhakar Sierra is a 73 y.o. male was referred to endocrinology service by PCP to establish care and T2DM management. Here with his wife - Heidy    DM history:  - diagnosed 19+, on blood screen, weight at the time of the diagnosis - 260 lb (BMI = 37.3 kg/m2)   - hospitalizations for DKA/HHS/severe hyperglycemia/severe hypoglycemia in the past: none  - prior therapy:  Rybelsus, Jardiance - stopped due to being expensive, denies any side effects except unpleasant mouth taste with Rybelsus  - known DM complications: peripheral neuropathy  - latest HbA1C 10% in 8/2023    - pertinent PMHx:   -- obesity, dyslipidemia, prostate Cr, s/p multiple routes of treatment, GERD, HTN, depression, ED, alcohol dependence in the remission, Alzheimer's dementia,  NPH, thoracic compression fracture, PSVT  -- denies  CAD/PAD/CHF/ CVA    Hospital admission 12/28/22 - 1/5/23:  - presented with back pain, CT showed T11, L2, L3 compression fractures, he considered to be poor candidate for kyphoplasty -> prolonged rehab recovery  - reports weight loss which he associated with decreased appetite due to not tasty food in hospital and rehab    Current therapy:  Jardiance 25 mg - 1 week ago run out of medication, can not afford it  Metformin 1000 mg bid  Pioglitazone 30 mg     Tolerates medications: well  Compliant: yes    Prior used medications:   Rybelsus -> stopped too expensive    Other important medications:  ASA 81  Atorvastatin 80 mg  Benazepril 40 mg  Duloxetine 60 mg  Metoprolol tartrate 25 mg    BG control:  - does not measure his BGs     Hypoglycemic episodes:  Hypoglycemic symptoms: NA  Hypoglycemic unawareness: NA  Treatment: NA  Severe hypoglycemia: NA  Has medical bracelet/necklace:NA  Has glucagon emergency kit: NA    Diabetes education/classes:  none     Past Medical History:  Patient Active Problem  List    Diagnosis Date Noted    Neurodegenerative dementia with behavioral disturbance ([facility]) 05/17/2023    Uncontrolled type 2 diabetes mellitus with hyperglycemia ([facility]) 02/08/2023    PSVT (paroxysmal supraventricular tachycardia) ([facility]) 02/08/2023    Peripheral neuropathy due to disorder of metabolism ([facility]) 02/08/2023    Urinary retention 01/05/2023    Other insomnia 01/05/2023    Thoracic compression fracture, closed, initial encounter ([facility]) 12/28/2022    Impaired mobility and ADLs 12/28/2022    Compression fracture of body of thoracic vertebra ([facility]) 12/22/2022    Age-related physical debility 12/22/2022    Compression fracture of L2 lumbar vertebra, closed, initial encounter ([facility]) 12/22/2022    Compression fracture of L3 lumbar vertebra, closed, initial encounter ([facility]) 12/22/2022    Neuropathy 12/22/2022    Normal pressure hydrocephalus ([facility]) 11/07/2022    Tobacco dependency 08/22/2022    Moderate late onset Alzheimer's dementia without behavioral disturbance, psychotic disturbance, mood disturbance, or anxiety ([facility]) 07/26/2022    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 Moran 07/26/2022    Alcohol dependence in remission ([facility]) 01/03/2019    Other male erectile dysfunction 04/11/2018    Vitamin D deficiency 05/12/2017    Current moderate episode of major depressive disorder ([facility]) 11/11/2016    B12 deficiency 11/01/2016    Tremor, coarse 10/25/2016    GERD (gastroesophageal reflux disease) 09/27/2016    Essential hypertension 09/27/2016    Prostate CA ([facility])- feb 2022; RT tbd x 8 wks, mar- may 2022 09/27/2016    Mixed hyperlipidemia 09/27/2016     Past Surgical History:  No past surgical history on file.     Allergies:  Patient has no known allergies.     Current Medications:  Current Outpatient Medications:     vitamin D3 (CHOLECALCIFEROL) 1000 Unit (25 mcg) Tab, Take 1,000 Units by mouth every day., Disp: , Rfl:     mirabegron ER (MYRBETRIQ) 25 MG TABLET SR 24 HR, Take 1 Tablet by  mouth every day., Disp: 90 Tablet, Rfl: 3    acetaminophen (TYLENOL) 500 MG Tab, Take 500-1,000 mg by mouth every 6 hours as needed. Indications: Pain, Disp: , Rfl:     atorvastatin (LIPITOR) 80 MG tablet, Take 1 Tablet by mouth every evening. Indications: High Amount of Fats in the Blood, Disp: 90 Tablet, Rfl: 4    benazepril (LOTENSIN) 40 MG tablet, Take 40 mg by mouth every day. Indications: High Blood Pressure Disorder, Disp: , Rfl:     magnesium oxide 400 (240 Mg) MG Tab, Take 1 Tablet by mouth every day., Disp: 100 Tablet, Rfl: 3    metoprolol tartrate (LOPRESSOR) 25 MG Tab, Take 0.5 Tablets by mouth 2 times a day. Indications: High Blood Pressure Disorder, Disp: 90 Tablet, Rfl: 3    folic acid (FOLVITE) 1 MG Tab, Take 1 Tablet by mouth every day. Indications: ALCOHOL ABUSE, Disp: 90 Tablet, Rfl: 3    buPROPion SR (WELLBUTRIN-SR) 150 MG TABLET SR 12 HR sustained-release tablet, Take 1 Tablet by mouth 2 times a day. Indications: Major Depressive Disorder, Disp: 180 Tablet, Rfl: 3    DULoxetine (CYMBALTA) 60 MG Cap DR Particles delayed-release capsule, Take 1 Capsule by mouth 2 times a day. Indications: Major Depressive Disorder, Disp: 180 Capsule, Rfl: 3    omeprazole (PRILOSEC) 20 MG delayed-release capsule, Take 1 Capsule by mouth 2 times a day. Indications: Gastroesophageal Reflux Disease, Disp: 180 Capsule, Rfl: 4    pioglitazone (ACTOS) 30 MG Tab, Take 1 Tablet by mouth every day. Indications: Type 2 Diabetes, Disp: 90 Tablet, Rfl: 4    Empagliflozin (JARDIANCE) 25 MG Tab, Take 25 mg by mouth every day. Indications: Type 2 Diabetes, Disp: 90 Tablet, Rfl: 4    aspirin (ASA) 81 MG Chew Tab chewable tablet, Chew 1 Tablet every day., Disp: 100 Tablet, Rfl:     metformin (GLUCOPHAGE) 1000 MG tablet, Take 1 Tablet by mouth 2 times a day with meals., Disp: 180 Tablet, Rfl: 3    Social History:  Social History     Socioeconomic History    Marital status:      Spouse name: Not on file    Number of children:  Not on file    Years of education: Not on file    Highest education level: Not on file   Occupational History    Not on file   Tobacco Use    Smoking status: Some Days     Packs/day: 0.15     Years: 50.00     Additional pack years: 0.00     Total pack years: 7.50     Types: Cigarettes     Last attempt to quit: 1/10/2007     Years since quittin.6    Smokeless tobacco: Never   Vaping Use    Vaping Use: Never used   Substance and Sexual Activity    Alcohol use: Not Currently     Alcohol/week: 4.8 - 6.0 oz     Types: 8 - 10 Glasses of wine per week     Comment: 1 bottle of wine 2-3 days a week    Drug use: No    Sexual activity: Never     Partners: Female   Other Topics Concern    Not on file   Social History Narrative    Not on file     Social Determinants of Health     Financial Resource Strain: Low Risk  (2023)    Overall Financial Resource Strain (CARDIA)     Difficulty of Paying Living Expenses: Not very hard   Food Insecurity: Food Insecurity Present (2023)    Hunger Vital Sign     Worried About Running Out of Food in the Last Year: Often true     Ran Out of Food in the Last Year: Often true   Transportation Needs: No Transportation Needs (2023)    PRAPARE - Transportation     Lack of Transportation (Medical): No     Lack of Transportation (Non-Medical): No   Physical Activity: Inactive (2023)    Exercise Vital Sign     Days of Exercise per Week: 0 days     Minutes of Exercise per Session: 0 min   Stress: No Stress Concern Present (2023)    [employer] - Occupational Stress Questionnaire     Feeling of Stress : Only a little   Social Connections: Feeling Socially Integrated (6/15/2023)    OASIS : Social Isolation     Frequency of experiencing loneliness or isolation: Never   Intimate Partner Violence: Not on file   Housing Stability: Low Risk  (2023)    Housing Stability Vital Sign     Unable to Pay for Housing in the Last Year: No     Number of Places  "Lived in the Last Year: 1     Unstable Housing in the Last Year: No      Family History:   Family History   Problem Relation Age of Onset    Heart Disease Mother     Diabetes Father     Other Sister         Mental retardation    Heart Disease Brother     Cancer Brother         Prostate    No Known Problems Daughter     No Known Problems Daughter      PHYSICAL EXAM:   Vital signs: /50 (BP Location: Left arm, Patient Position: Sitting, BP Cuff Size: Adult)   Pulse 81   Ht 1.778 m (5' 10\")   Wt 88.9 kg (196 lb)   SpO2 98%   BMI 28.12 kg/m²   GENERAL: Well-developed, well-nourished  in no apparent distress.  Walks with walker  EYE: No ocular and eyelid asymmetry, Anicteric sclerae,  PERRL, No exophthalmos or lidlag  HENT: Hearing grossly intact, Normocephalic, atraumatic.  NECK: Supple. Trachea midline.   CARDIOVASCULAR: Regular rate and rhythm.   LUNGS: No dyspnea  ABDOMEN: Soft, nondistended  EXTREMITIES: No clubbing, cyanosis, or edema.   NEUROLOGICAL: Cranial nerves II-XII are grossly intact. Replies with simple answers yes and no, looking at his wife to answer more complex questions.   SKIN: No rashes, lesions. Turgor is normal.    Labs:  - reviewed  HbA1C/BG/Hb:  Lab Results   Component Value Date/Time    HBA1C 10 (A) 08/07/2023 0922    HBA1C 8.1 (H) 03/28/2023 1519    HBA1C 8.4 (H) 11/04/2022 0956    HBA1C 8.1 (H) 10/18/2022 1123    HBA1C 7.6 (A) 03/07/2022 1327    AVGLUC 186 03/28/2023 1519    AVGLUC 194 11/04/2022 0956    AVGLUC 186 10/18/2022 1123    AVGLUC 174 10/26/2021 1059    AVGLUC 200 07/26/2021 0832     Lab Results   Component Value Date/Time    HEMOGLOBIN 14.5 08/15/2023 08:46 AM    HEMOGLOBIN 13.5 (L) 05/11/2023 03:30 PM    HEMOGLOBIN 14.4 03/28/2023 03:19 PM     Lab Results   Component Value Date/Time    GLUCOSE 309 (H) 08/15/2023 08:46 AM    GLUCOSE 349 (H) 05/11/2023 03:30 PM    GLUCOSE 223 (H) 03/28/2023 03:19 PM    GLUCOSE 101 (H) 01/03/2023 05:43 AM    GLUCOSE 95 01/01/2023 05:33 AM "     Kidney function/MACR:  Lab Results   Component Value Date/Time    CREATININE 1.02 08/15/2023 08:46 AM    CREATININE 1.04 05/11/2023 03:30 PM     Lab Results   Component Value Date/Time    MALBCRT 108 (H) 03/28/2023 02:28 PM    MICROALBUR 3.7 03/28/2023 02:28 PM      LFTs:  Lab Results   Component Value Date/Time    ASTSGOT 15 08/15/2023 08:46 AM    ASTSGOT 14 05/11/2023 03:30 PM    ASTSGOT 11 (L) 03/28/2023 03:19 PM    ALTSGPT 15 08/15/2023 08:46 AM    ALTSGPT 16 05/11/2023 03:30 PM    ALTSGPT 16 03/28/2023 03:19 PM     Lipid panel:  Lab Results   Component Value Date/Time    TRIGLYCERIDE 211 (H) 08/15/2023 08:46 AM    TRIGLYCERIDE 308 (H) 03/28/2023 03:19 PM    TRIGLYCERIDE 252 (H) 11/04/2022 09:56 AM    HDL 47 08/15/2023 08:46 AM    HDL 42 03/28/2023 03:19 PM    HDL 44 11/04/2022 09:56 AM    LDL 70 08/15/2023 08:46 AM    LDL 45 03/28/2023 03:19 PM    LDL 87 11/04/2022 09:56 AM     Hypothyroidism screening:  Lab Results   Component Value Date/Time    TSHULTRASEN 0.930 03/28/2023 1519       ASSESSMENT/PLAN:   1. Type 2 diabetes mellitus with hyperglycemia, without long-term current use of insulin (HCC)  - duration x 20 + years  - on Metformin, Pioglitazone, recently off Rybelsus and Jardiance  - suboptimally controlled, last HbA1C 10%     Microvascular complications: nephropathy with microalbuminuria, denies peripheral neuropathy, retinopathy  Macrovascular complications: denies CAD, PAD, CVA  Associated comorbidities:      DM complication screening:  Labs reviewed:  MACR - 108 (+), last checked in 8/2023   Creat/GFR wnl, last checked in 8/2023  LDL - 70 - at target, last checked in 8/2023     Patient is taking statin: on Atorvastatin 80 mg  Patient is taking ASA: yes  Patient is taking ACE/ARB: on Benazepril 40 mg     Last eye exam:  Last foot exam:      Home medications:  Metformin 1000 mg bid  Pioglitazone 30 mg    Prior used medications:   Rybelsus -> stopped too expensive, poor mouth taste  Jardiance 25 mg  - stopped 1 week ago, as he run out off medication, can not afford it    Discussion:  - patient with multiple comorbidities and long-term uncontrolled DM  - poor glycemic control on most of noninsulin therapy option medications: Metformin, thiazolidindione, SGLT-2 inhibitor, GLP-1 agonist  - I would avoid MOREIRA in elderly patient as long as possible  - will start on injectable and more potent GLP-1 agonist  - provide diet education for patient and wife, but no plans to significantly restrict food for the patient    Plan:  Discussed with the patient goals for DM management:  Fasting BG 90 - 150  2 hrs postprandial  - 220  HbA1C < 7.5 - 8.0% - given current poor control, comorbidities, including dementia     Therapy adjustments:  -- Start Ozempic 0.25 mg with dose up titration to 1 mg/week      -- discussed possible side effects      -- educated patient how to do injection, provided sample - 1 pen 0.25/0.5 mg LOT [id number], EXP 12/31/2024  -- resume Jardiance 25 mg      -- given 2 samples - LOT [id number] EXP 9/2024      -- recommended aggressive hydration, report if any signs of UTI  -- continue Metformin 1000 mg bid  -- Pioglitazone 30 mg    -- signed patient for medication assistance program    3. Patient would benefit from CGM that is easier to monitor for his wife who is his caregiver as well, will order Elena 3, given sample in the office, educated patient and the wife how to place and use it    4. DM RN referral for diet education    2. Essential hypertension  - well controlled  - managed by PCP    3. Dyslipidemia  - LDL at target on high dose statin  - continue current management    Need to discuss on next visit: severe osteoporosis treatment with vertebral fractures.     RTC: 2 mo    Total time (face-to-face and non-face-to face time):  75 min - prolonged discussion with patient and his wife, troubleshooting which medication we can get with assistance program, medical charts, lab, review, documentation.     Plan  reviewed with the patient and agreed with plan.  All questions answered to patient's satisfaction.  Thank you kindly for allowing me to participate in the diabetes care plan for this patient.      PATIENT REFERRED FOR RN DIABETES EDUCATION to learn/check following:  Diet education - mostly educate wife, pt has dementia, I would like him to enjoy life and meals, have minimal reasonable diet restriction.   Reevaluate insulin injection technique, storage.  Education of CGM use.     Short summary: 72 yo pt with multiple comorbidities, including dementia, uncontrolled T2DM. Already on Metformin, Pioglitazone, Jardiance, will introduce Ozempic. Wife was told to give him meals with 18 g CHO/meal that frustrates both.       Katie Soler MD  08/09/23    CC:   Stanton Miller M.D.

## 2023-08-17 NOTE — PATIENT INSTRUCTIONS
Continue Metformin current dose  Continue Pioglitazone current dose.   Resume Jardiance 25 mg, make sure that Prabhakar is drinking a lot of water  Ozempic 0.25 mg/week (Thusday),  if well tolerated -> 0.5 mg/week x 2 weeks if no side effect -> 1 mg/week  - side effects: nausea, bloating, diarrhea/constipation  - if side effects -> stay on the same dose, do not increase it until get used to it, let me know  5. Call if BG persistently > 250 or any issues with getting medications or side effects

## 2023-08-17 NOTE — TELEPHONE ENCOUNTER
Patient received 2 samples in office today:  1 Ozempic 0.25 mg  Exp 12-  Lot#: [id number]    2 Jardiance 25 mg  Exp: 09/2024  Lot#: [id number]

## 2023-08-18 LAB — VIT B6 SERPL-MCNC: 119.1 NMOL/L (ref 20–125)

## 2023-08-18 RX ORDER — EMPAGLIFLOZIN 25 MG/1
1 TABLET, FILM COATED ORAL DAILY
Qty: 30 TABLET | Refills: 3 | COMMUNITY
Start: 2023-08-18 | End: 2023-11-25

## 2023-08-18 RX ORDER — SEMAGLUTIDE 1.34 MG/ML
1 INJECTION, SOLUTION SUBCUTANEOUS
Qty: 9 ML | Refills: 4 | Status: SHIPPED | OUTPATIENT
Start: 2023-08-18 | End: 2023-10-30

## 2023-08-23 ENCOUNTER — OFFICE VISIT (OUTPATIENT)
Dept: MEDICAL GROUP | Age: 73
End: 2023-08-23
Payer: MEDICARE

## 2023-08-23 ENCOUNTER — HOME HEALTH ADMISSION (OUTPATIENT)
Dept: HOME HEALTH SERVICES | Facility: HOME HEALTHCARE | Age: 73
End: 2023-08-23
Payer: MEDICARE

## 2023-08-23 VITALS
SYSTOLIC BLOOD PRESSURE: 122 MMHG | TEMPERATURE: 97 F | WEIGHT: 191 LBS | HEART RATE: 86 BPM | OXYGEN SATURATION: 95 % | DIASTOLIC BLOOD PRESSURE: 70 MMHG | BODY MASS INDEX: 27.35 KG/M2 | HEIGHT: 70 IN

## 2023-08-23 DIAGNOSIS — C61 PROSTATE CA (HCC): Chronic | ICD-10-CM

## 2023-08-23 DIAGNOSIS — Z12.12 SCREENING FOR COLORECTAL CANCER: ICD-10-CM

## 2023-08-23 DIAGNOSIS — I10 ESSENTIAL HYPERTENSION: ICD-10-CM

## 2023-08-23 DIAGNOSIS — E55.9 VITAMIN D DEFICIENCY: ICD-10-CM

## 2023-08-23 DIAGNOSIS — E11.65 UNCONTROLLED TYPE 2 DIABETES MELLITUS WITH HYPERGLYCEMIA (HCC): ICD-10-CM

## 2023-08-23 DIAGNOSIS — Z12.11 SCREENING FOR COLORECTAL CANCER: ICD-10-CM

## 2023-08-23 DIAGNOSIS — E78.2 MIXED HYPERLIPIDEMIA: ICD-10-CM

## 2023-08-23 PROCEDURE — 3074F SYST BP LT 130 MM HG: CPT | Performed by: INTERNAL MEDICINE

## 2023-08-23 PROCEDURE — 99214 OFFICE O/P EST MOD 30 MIN: CPT | Performed by: INTERNAL MEDICINE

## 2023-08-23 PROCEDURE — 3078F DIAST BP <80 MM HG: CPT | Performed by: INTERNAL MEDICINE

## 2023-08-23 RX ORDER — BENAZEPRIL HYDROCHLORIDE 40 MG/1
40 TABLET ORAL DAILY
Qty: 100 TABLET | Refills: 3 | Status: ON HOLD | OUTPATIENT
Start: 2023-08-23 | End: 2023-12-22

## 2023-08-23 ASSESSMENT — ENCOUNTER SYMPTOMS
PSYCHIATRIC NEGATIVE: 1
CONSTITUTIONAL NEGATIVE: 1
EYES NEGATIVE: 1
NEUROLOGICAL NEGATIVE: 1
CARDIOVASCULAR NEGATIVE: 1
MUSCULOSKELETAL NEGATIVE: 1
GASTROINTESTINAL NEGATIVE: 1
RESPIRATORY NEGATIVE: 1

## 2023-08-23 ASSESSMENT — FIBROSIS 4 INDEX: FIB4 SCORE: 1.4

## 2023-08-23 NOTE — PROGRESS NOTES
Subjective     Prabhakar Sierra is a 73 y.o. male who presents with Follow-Up (Lab review 2 month )  The patient is here for followup of chronic medical problems listed below. The patient is compliant with medications and having no side effects from them. Denies chest pain, abdominal pain, dyspnea, myalgias, or cough.   Patient Active Problem List   Diagnosis    GERD (gastroesophageal reflux disease)    Essential hypertension    Prostate CA ([facility])- feb 2022; RT tbd x 8 wks, mar- may 2022    Mixed hyperlipidemia    Tremor, coarse    B12 deficiency    Current moderate episode of major depressive disorder ([facility])    Vitamin D deficiency    Other male erectile dysfunction    Alcohol dependence in remission ([facility])    Moderate late onset Alzheimer's dementia without behavioral disturbance, psychotic disturbance, mood disturbance, or anxiety ([facility])    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 Moran    Tobacco dependency    Normal pressure hydrocephalus ([facility])    Compression fracture of body of thoracic vertebra ([facility])    Age-related physical debility    Compression fracture of L2 lumbar vertebra, closed, initial encounter ([facility])    Compression fracture of L3 lumbar vertebra, closed, initial encounter ([facility])    Neuropathy    Thoracic compression fracture, closed, initial encounter ([facility])    Impaired mobility and ADLs    Urinary retention    Other insomnia    Uncontrolled type 2 diabetes mellitus with hyperglycemia ([facility])    PSVT (paroxysmal supraventricular tachycardia) ([facility])    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 30 Tablet 3    Semaglutide, 1 MG/DOSE, (OZEMPIC, 1 MG/DOSE,) 4 MG/3ML Solution Pen-injector Inject 1 mg under the skin every 7 days. 9 mL 4    Continuous Blood Gluc  Sensor (FREESTYLE MARCEL 3 SENSOR) Misc 1 Each every 14 days. 2 Each 11    vitamin D3 (CHOLECALCIFEROL) 1000 Unit (25 mcg) Tab Take 1,000 Units by mouth every day.      acetaminophen (TYLENOL) 500 MG Tab Take 500-1,000 mg by mouth every 6 hours as needed. Indications: Pain      atorvastatin (LIPITOR) 80 MG tablet Take 1 Tablet by mouth every evening. Indications: High Amount of Fats in the Blood 90 Tablet 4    magnesium oxide 400 (240 Mg) MG Tab Take 1 Tablet by mouth every day. 100 Tablet 3    folic acid (FOLVITE) 1 MG Tab Take 1 Tablet by mouth every day. Indications: ALCOHOL ABUSE 90 Tablet 3    buPROPion SR (WELLBUTRIN-SR) 150 MG TABLET SR 12 HR sustained-release tablet Take 1 Tablet by mouth 2 times a day. Indications: Major Depressive Disorder 180 Tablet 3    DULoxetine (CYMBALTA) 60 MG Cap DR Particles delayed-release capsule Take 1 Capsule by mouth 2 times a day. Indications: Major Depressive Disorder 180 Capsule 3    omeprazole (PRILOSEC) 20 MG delayed-release capsule Take 1 Capsule by mouth 2 times a day. Indications: Gastroesophageal Reflux Disease 180 Capsule 4    pioglitazone (ACTOS) 30 MG Tab Take 1 Tablet by mouth every day. Indications: Type 2 Diabetes 90 Tablet 4    benazepril (LOTENSIN) 40 MG tablet Take 40 mg by mouth every day. Indications: High Blood Pressure Disorder      aspirin (ASA) 81 MG Chew Tab chewable tablet Chew 1 Tablet every day. 100 Tablet     metformin (GLUCOPHAGE) 1000 MG tablet Take 1 Tablet by mouth 2 times a day with meals. 180 Tablet 3    mirabegron ER (MYRBETRIQ) 25 MG TABLET SR 24 HR Take 1 Tablet by mouth every day. (Patient not taking: Reported on 8/17/2023) 90 Tablet 3    metoprolol tartrate (LOPRESSOR) 25 MG Tab Take 0.5 Tablets by mouth 2 times a day. Indications: High Blood Pressure Disorder 90 Tablet 3     No facility-administered medications prior to visit.               HPI    Review of Systems   Constitutional: Negative.    HENT: Negative.     Eyes: Negative.   "  Respiratory: Negative.     Cardiovascular: Negative.    Gastrointestinal: Negative.    Genitourinary: Negative.    Musculoskeletal: Negative.    Skin: Negative.    Neurological: Negative.    Endo/Heme/Allergies: Negative.    Psychiatric/Behavioral: Negative.                Objective     /70 (BP Location: Right arm, Patient Position: Sitting, BP Cuff Size: Adult)   Pulse 86   Temp 36.1 °C (97 °F) (Temporal)   Ht 1.778 m (5' 10\")   Wt 86.6 kg (191 lb)   SpO2 95%   BMI 27.41 kg/m²      Physical Exam  Constitutional:       General: He is not in acute distress.     Appearance: He is well-developed. He is not diaphoretic.   HENT:      Head: Normocephalic and atraumatic.      Right Ear: External ear normal.      Left Ear: External ear normal.      Nose: Nose normal.      Mouth/Throat:      Pharynx: No oropharyngeal exudate.   Eyes:      General: No scleral icterus.        Right eye: No discharge.         Left eye: No discharge.      Conjunctiva/sclera: Conjunctivae normal.      Pupils: Pupils are equal, round, and reactive to light.   Neck:      Thyroid: No thyromegaly.      Vascular: No JVD.      Trachea: No tracheal deviation.   Cardiovascular:      Rate and Rhythm: Normal rate and regular rhythm.      Heart sounds: Normal heart sounds. No murmur heard.     No friction rub. No gallop.   Pulmonary:      Effort: Pulmonary effort is normal. No respiratory distress.      Breath sounds: Normal breath sounds. No stridor. No wheezing or rales.   Chest:      Chest wall: No tenderness.   Abdominal:      General: Bowel sounds are normal. There is no distension.      Palpations: Abdomen is soft. There is no mass.      Tenderness: There is no abdominal tenderness. There is no guarding or rebound.   Musculoskeletal:         General: No tenderness. Normal range of motion.      Cervical back: Normal range of motion and neck supple.   Lymphadenopathy:      Cervical: No cervical adenopathy.   Skin:     General: Skin is warm " and dry.      Coloration: Skin is not pale.      Findings: No erythema or rash.   Neurological:      Mental Status: He is alert and oriented to person, place, and time.      Motor: No abnormal muscle tone.      Coordination: Coordination normal.      Deep Tendon Reflexes: Reflexes are normal and symmetric. Reflexes normal.   Psychiatric:         Behavior: Behavior normal.         Thought Content: Thought content normal.         Judgment: Judgment normal.                              Assessment & Plan        1. Uncontrolled type 2 diabetes mellitus with hyperglycemia (HCC)  Under good control. Continue same regimen. As meds above  '  - Comp Metabolic Panel; Future  - CBC WITH DIFFERENTIAL; Future  - TSH; Future  - Lipid Profile; Future  - HEMOGLOBIN A1C; Future  - MICROALBUMIN CREAT RATIO URINE; Future    2. Mixed hyperlipidemia   Under good control. Continue same regimen. As meds above  - Comp Metabolic Panel; Future  - CBC WITH DIFFERENTIAL; Future  - TSH; Future  - Lipid Profile; Future  - HEMOGLOBIN A1C; Future    3. Essential hypertension   Under good control. Continue same regimen. As meds above    4. Vitamin D deficiency   Under good control. Continue same regimen. As meds above  - VITAMIN D,25 HYDROXY (DEFICIENCY); Future    5. Prostate CA (HCC)- feb 2022; RT tbd x 8 wks, mar- may 2022     - PROSTATE SPECIFIC AG DIAGNOSTIC; Future    6. Screening for colorectal cancer      - Referral to Home Health

## 2023-08-28 ENCOUNTER — TELEPHONE (OUTPATIENT)
Dept: PHARMACY | Facility: MEDICAL CENTER | Age: 73
End: 2023-08-28
Payer: MEDICARE

## 2023-08-28 ENCOUNTER — NON-PROVIDER VISIT (OUTPATIENT)
Dept: ENDOCRINOLOGY | Facility: MEDICAL CENTER | Age: 73
End: 2023-08-28
Attending: INTERNAL MEDICINE
Payer: MEDICARE

## 2023-08-28 VITALS — BODY MASS INDEX: 26.34 KG/M2 | HEIGHT: 70 IN | WEIGHT: 184 LBS

## 2023-08-28 PROCEDURE — 99212 OFFICE O/P EST SF 10 MIN: CPT | Performed by: INTERNAL MEDICINE

## 2023-08-28 ASSESSMENT — FIBROSIS 4 INDEX: FIB4 SCORE: 1.4

## 2023-08-28 NOTE — PROGRESS NOTES
Prabhakar came with his wife for Diabetes education.  Patient has dementia and wife is his main caretaker.  She states she accidentally took the Elena sensor off after she removed the tape after his shower.  He has not had been able to check his blood sugars.  We put on a new Elena 3 sensor and she plans on getting the Elena adhesive to help keep it on.  He has lost 12 pounds since his last appointment since he is not as hungry after starting the Ozempic.  He is tolerating the Ozempic .5 mg weekly dose except if he over eats then he burps more.  She understands to have him eat slow and stop once he is full.  We reviewed the plate method of eating and keeping his carbohydrates to around 45 grams at meals.  His wife is doing a great job combining his carbohydrates with protein at his meals.  We reviewed examples of different meals.  He is not active but plans to walk more using his walker around the house.  They have a follow up appointment with Dr. Soler on 10/17/23.  He has not been able to get his patient assistance for his Ozempic and Jardiance due to needing more paperwork submitted.  He was sampled Jardiance 25 mg x4 ( [id number] expir. 9/25) and Ozempic .25-.5 mg pen x1 ( [id number] expir. 2/28/25).

## 2023-08-28 NOTE — TELEPHONE ENCOUNTER
Spoke with Rosangela asked her on the status of the patient FA she looked him up and stated they need a 1040 form the form we submitted 3104 was not acceptable,  I called the patient and Heidy answered (wife) she is listed under alternate  I asked her if she could provide the 1040 2022 tax returns she said she is here at the clinic with Kristen but does not have that paper work with her I ask if she can email it to me she said yes, I told her I will go in the room in a minute to provide her with my email, went in the room gave her my email told her ask soon as she emails me the form I well [facility].She said she will do it as soon as she get home.She asked if Prabhakar can have some samples I asked Kristen and she said yes not problem she will provide some for her      Dr. Soler asked me to go in the room to screen patient on his Ozempic and Jardiance 25Mg Tab I run test claim first Jardiance 25MG tab #30 30 day supply $142.81 copay and OZEMPIC, 1 MG/DOSE 4 MG/3ML Solution Pen-injector (ml 84 day supply $645.53 copay patient is in the [place] which patient was aware I went in and ask patient for his Consent to screen him for FA he gave it to meet he meet the income limits I had him sign application and he came in with Proof of income his wife which was in the room told me she had been taking to Cynthia Leslie from [facility] and she told her to bring in the information and talk to me I went ahead and made copies and faxed over the application. I told him I will call her when I get response from [employer] and [employer]. They were happy I was able to help and very Thankful

## 2023-08-30 DIAGNOSIS — F03.918 NEURODEGENERATIVE DEMENTIA WITH BEHAVIORAL DISTURBANCE (HCC): ICD-10-CM

## 2023-08-30 NOTE — PROGRESS NOTES
Renu from [facility] called and they need a new order for the patient for home health because [facility] couldn't take him

## 2023-08-31 ENCOUNTER — PATIENT OUTREACH (OUTPATIENT)
Dept: HEALTH INFORMATION MANAGEMENT | Facility: OTHER | Age: 73
End: 2023-08-31
Payer: MEDICARE

## 2023-08-31 DIAGNOSIS — E11.65 UNCONTROLLED TYPE 2 DIABETES MELLITUS WITH HYPERGLYCEMIA (HCC): ICD-10-CM

## 2023-08-31 NOTE — PROGRESS NOTES
Pt's spouse Heidy called asking why they havnet heard from HH. CHW let Heidy know that [facility] will not be able to take pt and [facility] will take over. CHW will call [facility] to get the status of the referral and call pt back.     CHW called [facility] and spoke to Ladi, she states that the order sent over tot hem is out of date and may need new order placed. CHW can see that PCP placed new order on 8/30/23. CHW is awaiting a call back.

## 2023-09-06 ENCOUNTER — PATIENT OUTREACH (OUTPATIENT)
Dept: HEALTH INFORMATION MANAGEMENT | Facility: OTHER | Age: 73
End: 2023-09-06

## 2023-09-06 DIAGNOSIS — I10 ESSENTIAL HYPERTENSION: ICD-10-CM

## 2023-09-06 NOTE — PROGRESS NOTES
CHW received a call from Heidy stating that [facility] came to their house and set up PT along with someone to come out and give him a shower.

## 2023-09-14 NOTE — TELEPHONE ENCOUNTER
Called patient at [phone], but there was no answer and no voice mail box. I dialed alternative phone number [phone], but there was no answer. I left a general voice message to call me back at [phone] regarding pending 1040 form needed for PAP.     Ashley Grimes  RX Coordinator/Liaison

## 2023-09-16 ENCOUNTER — PATIENT MESSAGE (OUTPATIENT)
Dept: ENDOCRINOLOGY | Facility: MEDICAL CENTER | Age: 73
End: 2023-09-16
Payer: MEDICARE

## 2023-09-20 ENCOUNTER — PATIENT MESSAGE (OUTPATIENT)
Dept: ENDOCRINOLOGY | Facility: MEDICAL CENTER | Age: 73
End: 2023-09-20
Payer: MEDICARE

## 2023-09-26 ENCOUNTER — PATIENT OUTREACH (OUTPATIENT)
Dept: HEALTH INFORMATION MANAGEMENT | Facility: OTHER | Age: 73
End: 2023-09-26
Payer: MEDICARE

## 2023-09-26 DIAGNOSIS — E11.65 UNCONTROLLED TYPE 2 DIABETES MELLITUS WITH HYPERGLYCEMIA (HCC): ICD-10-CM

## 2023-09-26 DIAGNOSIS — I10 ESSENTIAL HYPERTENSION: ICD-10-CM

## 2023-09-26 NOTE — PROGRESS NOTES
Assessment    Monthly outreach completed with Prabhakar and Heidy. Prabhakar is doing well. He is on services w/ [facility] . Also working w/ endocrinology to manage his diabetic medications. He will follow up w/ them 10/17. No present questions, concerns, or needs.    Education    N/A, pt is on     Plan of Care and Goals    No changes    Barriers:    Medications, fatigue    Progress:    Progressing     Next outreach: 1 month

## 2023-09-27 NOTE — PATIENT COMMUNICATION
Patients wife Heidy called in stating that she would like to follow up on with [facility] on the progress of the medication assistance for Jardiance.    Heidy also states that he out of the 25 mg Jardiance and would like to get a sample. Dr. Soler are you okay to give a sample?

## 2023-09-28 NOTE — PATIENT COMMUNICATION
Pt's wife came in to  4 boxes of jaridiance samples lot # [id number] exp: 01/25 so pt has enough before his next apt in oct

## 2023-10-03 DIAGNOSIS — E11.65 UNCONTROLLED TYPE 2 DIABETES MELLITUS WITH HYPERGLYCEMIA (HCC): ICD-10-CM

## 2023-10-12 ENCOUNTER — PATIENT OUTREACH (OUTPATIENT)
Dept: HEALTH INFORMATION MANAGEMENT | Facility: OTHER | Age: 73
End: 2023-10-12
Payer: MEDICARE

## 2023-10-12 ENCOUNTER — PATIENT MESSAGE (OUTPATIENT)
Dept: HEALTH INFORMATION MANAGEMENT | Facility: OTHER | Age: 73
End: 2023-10-12

## 2023-10-12 DIAGNOSIS — I10 ESSENTIAL HYPERTENSION: ICD-10-CM

## 2023-10-12 NOTE — PROGRESS NOTES
CHW called a few different places for Prosthodontics and did not have any luck with finding a place that takes pt's insurance. CHW called [facility] to see if they had a list of places and was able to get two different places that accept pt's insurance. One of the places, Heidy states is to [facility]. CHW will send a my chart message with the information. Heidy sates that she called [place] and they do not cover prosthodontics however they cover partial dental services. Heidy will continue to reach out as needed.

## 2023-10-12 NOTE — PROGRESS NOTES
Heidy called in asking for assistance with finding a prosthodontics that accept pt's insurance. Heidy states that pt has decay within his mouth and needs everything taken out. Heidy states that pt also needs an arch replacement along with other dental work. CHW will look into all options and speak with pt after 2pm today.

## 2023-10-15 NOTE — PROGRESS NOTES
Follow up Endocrinology Visit  Initial consult/last visit on: 8/17/23  Referred by: Stanton Miller M.D.    Chief complaint:  Prabhakar Sierra, 73 y.o., male, who is here for follow up for T2DM management. Here with his wife - Heidy Sanders history:   - overall doing well  - unfortunately, he couldn't tolerate Ozempic - lost his appetite, was very apathic as per patient's wife  - has poor dentition, needs dental work  - no side effects with Jardiance, still in process of getting assistance program for the medication    Wt Readings from Last 5 Encounters:   10/17/23 81.2 kg (179 lb)   08/28/23 83.5 kg (184 lb)   08/23/23 86.6 kg (191 lb)   08/17/23 88.9 kg (196 lb)   08/07/23 87.1 kg (192 lb)     Current therapy:  Jardiance 25 mg  Metformin 1000 mg bid  Pioglitazone 30 mg    Tolerates medications: well  Compliant: yes    Prior used medications:   Rybelsus -> stopped too expensive  Ozempic -> loss of appetite, apathy    Other important medications:  ASA 81  Atorvastatin 80 mg  Benazepril 40 mg  Duloxetine 60 mg  Metoprolol tartrate 25 mg    BG control:  CGM type -Elena 3  Period - 10/4/23 - 10/17/23  Data were reviewed and discussed with the patient  Active time - 96%  ABG - 185 mg/dl  GV - 19.2%  GMI - 7.7%  TIR - 44%  TAR - high - 52%, very high - 4%  TBR - 0%     Hypoglycemic episodes:  Hypoglycemic symptoms: NA  Hypoglycemic unawareness: NA  Treatment: NA  Severe hypoglycemia: NA  Has medical bracelet/necklace:NA  Has glucagon emergency kit: NA    DM history:  - diagnosed 19+, on blood screen, weight at the time of the diagnosis - 260 lb (BMI = 37.3 kg/m2)   - hospitalizations for DKA/HHS/severe hyperglycemia/severe hypoglycemia in the past: none  - prior therapy:  Rybelsus, Jardiance - stopped due to being expensive, denies any side effects except unpleasant mouth taste with Rybelsus  - known DM complications: peripheral neuropathy  - latest HbA1C 10% in 8/2023  - pertinent PMHx:   -- obesity,  dyslipidemia, prostate Cr, s/p multiple routes of treatment, currently in remission, GERD, HTN, depression, ED, alcohol dependence in the remission, Alzheimer's dementia,  NPH, thoracic compression fracture, PSVT  -- denies  CAD/PAD/CHF/ CVA  Hospital admission 12/28/22 - 1/5/23:  - presented with back pain, CT showed T11, L2, L3 compression fractures, he considered to be poor candidate for kyphoplasty -> prolonged rehab recovery  - reports weight loss which he associated with decreased appetite due to not tasty food in hospital and rehab    Current Medications:  Current Outpatient Medications:     metformin (GLUCOPHAGE) 1000 MG tablet, TAKE ONE TABLET BY MOUTH TWICE A DAY WITH MEALS, Disp: 200 Tablet, Rfl: 3    benazepril (LOTENSIN) 40 MG tablet, TAKE ONE TABLET BY MOUTH DAILY, Disp: 100 Tablet, Rfl: 3    JARDIANCE 25 MG Tab, Take 1 tablet  by mouth every day., Disp: 30 Tablet, Rfl: 3    Semaglutide, 1 MG/DOSE, (OZEMPIC, 1 MG/DOSE,) 4 MG/3ML Solution Pen-injector, Inject 1 mg under the skin every 7 days., Disp: 9 mL, Rfl: 4    Continuous Blood Gluc Sensor (FREESTYLE MARCEL 3 SENSOR) Misc, 1 Each every 14 days., Disp: 2 Each, Rfl: 11    vitamin D3 (CHOLECALCIFEROL) 1000 Unit (25 mcg) Tab, Take 1,000 Units by mouth every day., Disp: , Rfl:     acetaminophen (TYLENOL) 500 MG Tab, Take 500-1,000 mg by mouth every 6 hours as needed. Indications: Pain, Disp: , Rfl:     atorvastatin (LIPITOR) 80 MG tablet, Take 1 Tablet by mouth every evening. Indications: High Amount of Fats in the Blood, Disp: 90 Tablet, Rfl: 4    magnesium oxide 400 (240 Mg) MG Tab, Take 1 Tablet by mouth every day., Disp: 100 Tablet, Rfl: 3    folic acid (FOLVITE) 1 MG Tab, Take 1 Tablet by mouth every day. Indications: ALCOHOL ABUSE, Disp: 90 Tablet, Rfl: 3    buPROPion SR (WELLBUTRIN-SR) 150 MG TABLET SR 12 HR sustained-release tablet, Take 1 Tablet by mouth 2 times a day. Indications: Major Depressive Disorder, Disp: 180 Tablet, Rfl: 3    DULoxetine  "(CYMBALTA) 60 MG Cap DR Particles delayed-release capsule, Take 1 Capsule by mouth 2 times a day. Indications: Major Depressive Disorder, Disp: 180 Capsule, Rfl: 3    omeprazole (PRILOSEC) 20 MG delayed-release capsule, Take 1 Capsule by mouth 2 times a day. Indications: Gastroesophageal Reflux Disease, Disp: 180 Capsule, Rfl: 4    pioglitazone (ACTOS) 30 MG Tab, Take 1 Tablet by mouth every day. Indications: Type 2 Diabetes, Disp: 90 Tablet, Rfl: 4    aspirin (ASA) 81 MG Chew Tab chewable tablet, Chew 1 Tablet every day., Disp: 100 Tablet, Rfl:     PHYSICAL EXAM:   Vital signs: /64 (BP Location: Right arm, Patient Position: Sitting, BP Cuff Size: Adult)   Pulse 97   Ht 1.778 m (5' 10\")   Wt 81.2 kg (179 lb)   SpO2 96%   BMI 25.68 kg/m²   GENERAL: Well-developed, well-nourished  in no apparent distress.  Walks with walker. Appropriately answers the questions but most of the history is obtained from his wife.   CARDIOVASCULAR: Regular rate and rhythm.   LUNGS: No dyspnea  ABDOMEN: Soft, nondistended  EXTREMITIES: No clubbing, cyanosis, or edema.   NEUROLOGICAL: Cranial nerves II-XII are grossly intact. Replies with simple answers yes and no, looking at his wife to answer more complex questions.   SKIN: No rashes, lesions. Turgor is normal.  FOOT: Decreased sensation to monofilament testing on L foot, normal pulses, no ulcers, dry skin, severe callus with skin crack and bleeding, but no surrounding inflammation.      Labs:  - reviewed  HbA1C/BG/Hb:  Lab Results   Component Value Date/Time    HBA1C 10 (A) 08/07/2023 0922    HBA1C 8.1 (H) 03/28/2023 1519    HBA1C 8.4 (H) 11/04/2022 0956    HBA1C 8.1 (H) 10/18/2022 1123    HBA1C 7.6 (A) 03/07/2022 1327    AVGLUC 186 03/28/2023 1519    AVGLUC 194 11/04/2022 0956    AVGLUC 186 10/18/2022 1123    AVGLUC 174 10/26/2021 1059    AVGLUC 200 07/26/2021 0832     Lab Results   Component Value Date/Time    HEMOGLOBIN 14.5 08/15/2023 08:46 AM    HEMOGLOBIN 13.5 (L) " 05/11/2023 03:30 PM    HEMOGLOBIN 14.4 03/28/2023 03:19 PM     Lab Results   Component Value Date/Time    GLUCOSE 309 (H) 08/15/2023 08:46 AM    GLUCOSE 349 (H) 05/11/2023 03:30 PM    GLUCOSE 223 (H) 03/28/2023 03:19 PM    GLUCOSE 101 (H) 01/03/2023 05:43 AM    GLUCOSE 95 01/01/2023 05:33 AM     Kidney function/MACR:  Lab Results   Component Value Date/Time    CREATININE 1.02 08/15/2023 08:46 AM    CREATININE 1.04 05/11/2023 03:30 PM     Lab Results   Component Value Date/Time    MALBCRT 108 (H) 03/28/2023 02:28 PM    MICROALBUR 3.7 03/28/2023 02:28 PM      LFTs:  Lab Results   Component Value Date/Time    ASTSGOT 15 08/15/2023 08:46 AM    ASTSGOT 14 05/11/2023 03:30 PM    ASTSGOT 11 (L) 03/28/2023 03:19 PM    ALTSGPT 15 08/15/2023 08:46 AM    ALTSGPT 16 05/11/2023 03:30 PM    ALTSGPT 16 03/28/2023 03:19 PM     Lipid panel:  Lab Results   Component Value Date/Time    TRIGLYCERIDE 211 (H) 08/15/2023 08:46 AM    TRIGLYCERIDE 308 (H) 03/28/2023 03:19 PM    TRIGLYCERIDE 252 (H) 11/04/2022 09:56 AM    HDL 47 08/15/2023 08:46 AM    HDL 42 03/28/2023 03:19 PM    HDL 44 11/04/2022 09:56 AM    LDL 70 08/15/2023 08:46 AM    LDL 45 03/28/2023 03:19 PM    LDL 87 11/04/2022 09:56 AM     Hypothyroidism screening:  Lab Results   Component Value Date/Time    TSHULTRASEN 0.930 03/28/2023 1519       Osteoporosis work up:          Imaging:  - reviewed  DEXA scan on 3/29/23:  Date Machine L1- L4  BMD/T-score LFN  BMD/T-score FRAX Rx    3/29/23  GE Prodigy unit   1.443 g/cm2 / 1.9  1.079 g/cm2 / - 0.2  9.0% / 1.9%  none     ASSESSMENT/PLAN:   1. Type 2 diabetes mellitus with hyperglycemia, without long-term current use of insulin (HCC)  - duration x 20 + years  - on Metformin, Pioglitazone, Jardiance  - suboptimally controlled, last HbA1C 10%, however, latest GMI - 7.7%     Microvascular complications: nephropathy with microalbuminuria, denies peripheral neuropathy, retinopathy  Macrovascular complications: denies had 3 TIAs, denies  CAD, PAD, CVA  Associated comorbidities: obesity, dyslipidemia, prostate Cr, s/p multiple routes of treatment, currently in remission, GERD, HTN, depression, ED, alcohol dependence in the remission, Alzheimer's dementia,  NPH, T11, L2, L3 compression fractures, PSVT     DM complication screening:  Labs reviewed:  MACR - 108 (+), last checked in 8/2023   Creat/GFR wnl, last checked in 8/2023  LDL - 70 - at target, last checked in 8/2023     Patient is taking statin: on Atorvastatin 80 mg  Patient is taking ASA: yes  Patient is taking ACE/ARB: on Benazepril 40 mg     Last eye exam: 11/17/23, denies DR  Last foot exam: today by me, noted decreased sensation in his L foot, severe callus on R foot with skin crack and bleeding but no surrounding inflammation, dry skin, no wounds.      Home medications:  Metformin 1000 mg bid  Pioglitazone 30 mg  Jardiance 25 mg    Prior used medications:   Rybelsus -> stopped too expensive, poor mouth taste  Ozempic -> loss of appetite, apathy    Discussion:  - patient with multiple comorbidities, including dementia and long-term DM  - currently on max doses of Metformin, SGLT-2 inhibitor, and moderate dose of Pioglitazone, could not tolerate GLP-1 agonist  - glycemic control is reasonable given patient comorbidities and age: GMI 7.7%, ABG - 185 mg/dl, TIR - 44%, TBR - 0%  - ok to continue current regimen  - close follow up  -  I would avoid MOREIRA in elderly patient  - in case of worsening of BG control consider use of basal insulin vs adding MOREIRA    Plan:  Discussed with the patient goals for DM management:  Fasting BG 90 - 150  2 hrs postprandial  - 220  HbA1C < 7.5 - 8.0% - given current poor control, comorbidities, including dementia     Therapy adjustments:  - none      -- given 5 samples Jardiance (while patient is trying to get into assistance program for the medication) - [id number] EXP 9/2024    3.  Continue CGM use  4.  Schedule follow up with podiatrist ASAP to address skin  crack and callus.     2. Essential hypertension  - well controlled  - managed by PCP    3. Dyslipidemia  - LDL at target on high dose statin  - continue current management    4. History of T11, L2, L3 compression fractures      Male hypogonadism  - with GLF = fragility fracture = severe osteoporosis, even DEXA scan didn't show low T-score, which could be explained by compression fractures in lumbar spine  - at high risk for falls, walks with walker  - currently on vit D 4000 IU/day  - not on any pharmacologic treatment  - risk factors: advanced age, hypogonadism, secondary likely due to Rx of prostate cancer, multiple radiation treatment for the prostate cancer, unlikely bone mets of prostate cancer, as PSA is undetectable now, mild vit D deficiency, smoking history, Hx of alcohol abuse  - denies history of Ca, phosphorus, thyroid/parathryoid disorders, kidney stones, CKD, liver disease, chronic malabsorption/diarrhea, immobilization  -  Fhx of fractures/osteoporosis ?, Pioglitazone use  - bone forming agents would be contraindicated for the patient, given history of radiation exposure (contraindication for Tymlos, Forteo), Evenity is not FDA approved for osteoporosis in males in [place], also he had Hx of TIAs  - best treatment option IV Reclast, however, patient might require significant dental work in a near future, in which case it is better to finish dental work before starting IV bisphosphonate  - testosterone therapy is likely to be contraindicated in light of Hx of prostate cancer  Plan:  Continue vit D supplementation.   Repeat vit D level.   Fall precautions.   Dental evaluation.   Continue discussion about therapy Reclast on upcoming visit.     RTC: 3 mo    Total time (face-to-face and non-face-to face time): 40 min - prolonged discussion with patient and his wife, troubleshooting issues with  assistance program for Jardiance, lab review, documentation.     Plan reviewed with the patient and agreed with plan.   All questions answered to patient's satisfaction.  Thank you kindly for allowing me to participate in the diabetes care plan for this patient.    Katie Soler MD    CC:   Stanton Miller M.D.

## 2023-10-17 ENCOUNTER — TELEPHONE (OUTPATIENT)
Dept: MEDICAL GROUP | Facility: MEDICAL CENTER | Age: 73
End: 2023-10-17
Payer: MEDICARE

## 2023-10-17 ENCOUNTER — OFFICE VISIT (OUTPATIENT)
Dept: ENDOCRINOLOGY | Facility: MEDICAL CENTER | Age: 73
End: 2023-10-17
Attending: STUDENT IN AN ORGANIZED HEALTH CARE EDUCATION/TRAINING PROGRAM
Payer: MEDICARE

## 2023-10-17 VITALS
HEIGHT: 70 IN | HEART RATE: 97 BPM | WEIGHT: 179 LBS | SYSTOLIC BLOOD PRESSURE: 116 MMHG | DIASTOLIC BLOOD PRESSURE: 64 MMHG | BODY MASS INDEX: 25.62 KG/M2 | OXYGEN SATURATION: 96 %

## 2023-10-17 DIAGNOSIS — M80.00XA OSTEOPOROSIS WITH CURRENT PATHOLOGICAL FRACTURE, UNSPECIFIED OSTEOPOROSIS TYPE, INITIAL ENCOUNTER: ICD-10-CM

## 2023-10-17 DIAGNOSIS — E29.1 MALE HYPOGONADISM: ICD-10-CM

## 2023-10-17 DIAGNOSIS — M80.08XA FRACTURE OF VERTEBRA DUE TO OSTEOPOROSIS, INITIAL ENCOUNTER (HCC): ICD-10-CM

## 2023-10-17 DIAGNOSIS — E78.5 DYSLIPIDEMIA: ICD-10-CM

## 2023-10-17 DIAGNOSIS — I10 ESSENTIAL HYPERTENSION: ICD-10-CM

## 2023-10-17 DIAGNOSIS — E11.65 TYPE 2 DIABETES MELLITUS WITH HYPERGLYCEMIA, WITHOUT LONG-TERM CURRENT USE OF INSULIN (HCC): ICD-10-CM

## 2023-10-17 DIAGNOSIS — E55.9 VITAMIN D DEFICIENCY: ICD-10-CM

## 2023-10-17 PROCEDURE — 99215 OFFICE O/P EST HI 40 MIN: CPT | Performed by: STUDENT IN AN ORGANIZED HEALTH CARE EDUCATION/TRAINING PROGRAM

## 2023-10-17 PROCEDURE — 99212 OFFICE O/P EST SF 10 MIN: CPT | Performed by: STUDENT IN AN ORGANIZED HEALTH CARE EDUCATION/TRAINING PROGRAM

## 2023-10-17 PROCEDURE — 95251 CONT GLUC MNTR ANALYSIS I&R: CPT | Performed by: STUDENT IN AN ORGANIZED HEALTH CARE EDUCATION/TRAINING PROGRAM

## 2023-10-17 PROCEDURE — 3074F SYST BP LT 130 MM HG: CPT | Performed by: STUDENT IN AN ORGANIZED HEALTH CARE EDUCATION/TRAINING PROGRAM

## 2023-10-17 PROCEDURE — 3078F DIAST BP <80 MM HG: CPT | Performed by: STUDENT IN AN ORGANIZED HEALTH CARE EDUCATION/TRAINING PROGRAM

## 2023-10-17 ASSESSMENT — FIBROSIS 4 INDEX: FIB4 SCORE: 1.4

## 2023-10-17 NOTE — TELEPHONE ENCOUNTER
Caller Name: Dariela  Call Back Number: [phone]    How would the patient prefer to be contacted with a response: Phone call OK to leave a detailed message    Dariela said that he fell a couple times off the beds and that they have decided to discharge him due to him not being so compliant with anything due to his dementia. She did inform me that if Dr. Alegria or Dr. Miller have questions please feel free to contact her.

## 2023-10-17 NOTE — TELEPHONE ENCOUNTER
Phone Number Called: Dariela at [facility]    Call outcome: Left detailed message for patient. Informed to call back with any additional questions.    Message: lvm to call back to inform PCP about PT

## 2023-10-19 ENCOUNTER — PATIENT OUTREACH (OUTPATIENT)
Dept: HEALTH INFORMATION MANAGEMENT | Facility: OTHER | Age: 73
End: 2023-10-19
Payer: MEDICARE

## 2023-10-19 DIAGNOSIS — I10 ESSENTIAL HYPERTENSION: ICD-10-CM

## 2023-10-19 NOTE — PROGRESS NOTES
Community Health Worker Follow-Up    Reason for outreach: Follow up    CHW Interventions: Pt's wife, Heidy, states that [facility] will no longer be helping as pt no longer meets their criteria. CHW informed Heidy of a program through [employer]ging [employer] Disability that can help assist pt with his ADL's, Respite care, Response system, Chores, and other ADL's that pt needs help with. Heidy states that her and pt went to see the dentist and were fortunate enough to be able and get work done that he needs without OOP expenses. Heidy states that she bought a standard wheelchair for pt to use but it does get tiring and heavy. CHW discuss the possiblity of getting a sit down walker which CHW will look into. Pt states that they may need assistance with food but will continue to reach out to CHW as needed.     Specific Resources Provided:  Housing/Shelter: NA  Transportation: NA  Food: NA  Financial: NA  Social Supports: NA  Other: Aging and Disability Application     Plan: CHW mailed out application today and will follow up with pt next week. CHW will speak to CARLITOS Sandres about pt's needs for a sit down walker.

## 2023-10-19 NOTE — PROGRESS NOTES
Called Heidy to discuss options for in home care and respite care. Heidy will call me back later today.

## 2023-10-24 RX ORDER — CHOLECALCIFEROL (VITAMIN D3) 50 MCG
4 TABLET ORAL DAILY
Status: ON HOLD | COMMUNITY
End: 2024-02-18

## 2023-10-26 ENCOUNTER — PATIENT OUTREACH (OUTPATIENT)
Dept: HEALTH INFORMATION MANAGEMENT | Facility: OTHER | Age: 73
End: 2023-10-26
Payer: MEDICARE

## 2023-10-26 DIAGNOSIS — I10 ESSENTIAL HYPERTENSION: ICD-10-CM

## 2023-10-30 ENCOUNTER — OFFICE VISIT (OUTPATIENT)
Dept: INTERNAL MEDICINE | Facility: OTHER | Age: 73
End: 2023-10-30
Payer: MEDICARE

## 2023-10-30 VITALS
DIASTOLIC BLOOD PRESSURE: 78 MMHG | SYSTOLIC BLOOD PRESSURE: 126 MMHG | WEIGHT: 183.8 LBS | TEMPERATURE: 97.1 F | HEART RATE: 90 BPM | BODY MASS INDEX: 26.37 KG/M2 | OXYGEN SATURATION: 97 %

## 2023-10-30 DIAGNOSIS — G62.9 NEUROPATHY: ICD-10-CM

## 2023-10-30 DIAGNOSIS — C61 PROSTATE CA (HCC): Chronic | ICD-10-CM

## 2023-10-30 DIAGNOSIS — F10.21 ALCOHOL DEPENDENCE IN REMISSION (HCC): ICD-10-CM

## 2023-10-30 DIAGNOSIS — G25.2 TREMOR, COARSE: ICD-10-CM

## 2023-10-30 DIAGNOSIS — F03.918 NEURODEGENERATIVE DEMENTIA WITH BEHAVIORAL DISTURBANCE (HCC): ICD-10-CM

## 2023-10-30 DIAGNOSIS — I10 ESSENTIAL HYPERTENSION: ICD-10-CM

## 2023-10-30 DIAGNOSIS — E78.2 MIXED HYPERLIPIDEMIA: ICD-10-CM

## 2023-10-30 DIAGNOSIS — G30.1 MODERATE LATE ONSET ALZHEIMER'S DEMENTIA WITHOUT BEHAVIORAL DISTURBANCE, PSYCHOTIC DISTURBANCE, MOOD DISTURBANCE, OR ANXIETY (HCC): ICD-10-CM

## 2023-10-30 DIAGNOSIS — G91.2 NORMAL PRESSURE HYDROCEPHALUS (HCC): ICD-10-CM

## 2023-10-30 DIAGNOSIS — F02.B0 MODERATE LATE ONSET ALZHEIMER'S DEMENTIA WITHOUT BEHAVIORAL DISTURBANCE, PSYCHOTIC DISTURBANCE, MOOD DISTURBANCE, OR ANXIETY (HCC): ICD-10-CM

## 2023-10-30 DIAGNOSIS — K21.00 GASTROESOPHAGEAL REFLUX DISEASE WITH ESOPHAGITIS, UNSPECIFIED WHETHER HEMORRHAGE: ICD-10-CM

## 2023-10-30 DIAGNOSIS — I47.10 PSVT (PAROXYSMAL SUPRAVENTRICULAR TACHYCARDIA) (HCC): ICD-10-CM

## 2023-10-30 DIAGNOSIS — E11.65 UNCONTROLLED TYPE 2 DIABETES MELLITUS WITH HYPERGLYCEMIA (HCC): ICD-10-CM

## 2023-10-30 PROCEDURE — 3074F SYST BP LT 130 MM HG: CPT | Performed by: REGISTERED NURSE

## 2023-10-30 PROCEDURE — 99205 OFFICE O/P NEW HI 60 MIN: CPT | Performed by: REGISTERED NURSE

## 2023-10-30 PROCEDURE — 3078F DIAST BP <80 MM HG: CPT | Performed by: REGISTERED NURSE

## 2023-10-30 RX ORDER — EMPAGLIFLOZIN, METFORMIN HYDROCHLORIDE 25; 1000 MG/1; MG/1
1 TABLET, EXTENDED RELEASE ORAL DAILY
COMMUNITY

## 2023-10-30 SDOH — ECONOMIC STABILITY: FOOD INSECURITY: WITHIN THE PAST 12 MONTHS, YOU WORRIED THAT YOUR FOOD WOULD RUN OUT BEFORE YOU GOT MONEY TO BUY MORE.: NEVER TRUE

## 2023-10-30 SDOH — ECONOMIC STABILITY: INCOME INSECURITY: IN THE LAST 12 MONTHS, WAS THERE A TIME WHEN YOU WERE NOT ABLE TO PAY THE MORTGAGE OR RENT ON TIME?: NO

## 2023-10-30 SDOH — HEALTH STABILITY: PHYSICAL HEALTH: ON AVERAGE, HOW MANY DAYS PER WEEK DO YOU ENGAGE IN MODERATE TO STRENUOUS EXERCISE (LIKE A BRISK WALK)?: 0 DAYS

## 2023-10-30 SDOH — SOCIAL STABILITY: SOCIAL NETWORK: ARE YOU MARRIED, WIDOWED, DIVORCED, SEPARATED, NEVER MARRIED, OR LIVING WITH A PARTNER?: MARRIED

## 2023-10-30 SDOH — HEALTH STABILITY: MENTAL HEALTH: HOW OFTEN DO YOU HAVE A DRINK CONTAINING ALCOHOL?: NEVER

## 2023-10-30 SDOH — HEALTH STABILITY: MENTAL HEALTH
STRESS IS WHEN SOMEONE FEELS TENSE, NERVOUS, ANXIOUS, OR CAN'T SLEEP AT NIGHT BECAUSE THEIR MIND IS TROUBLED. HOW STRESSED ARE YOU?: NOT AT ALL

## 2023-10-30 SDOH — ECONOMIC STABILITY: HOUSING INSECURITY: IN THE LAST 12 MONTHS, HOW MANY PLACES HAVE YOU LIVED?: 1

## 2023-10-30 SDOH — ECONOMIC STABILITY: TRANSPORTATION INSECURITY
IN THE PAST 12 MONTHS, HAS THE LACK OF TRANSPORTATION KEPT YOU FROM MEDICAL APPOINTMENTS OR FROM GETTING MEDICATIONS?: YES

## 2023-10-30 SDOH — ECONOMIC STABILITY: FOOD INSECURITY: WITHIN THE PAST 12 MONTHS, THE FOOD YOU BOUGHT JUST DIDN'T LAST AND YOU DIDN'T HAVE MONEY TO GET MORE.: NEVER TRUE

## 2023-10-30 SDOH — ECONOMIC STABILITY: INCOME INSECURITY: IN THE PAST 12 MONTHS, HAS THE ELECTRIC, GAS, OIL, OR WATER COMPANY THREATENED TO SHUT OFF SERVICE IN YOUR HOME?: NO

## 2023-10-30 SDOH — HEALTH STABILITY: MENTAL HEALTH: HOW OFTEN DO YOU HAVE 6 OR MORE DRINKS ON ONE OCCASION?: NEVER

## 2023-10-30 SDOH — SOCIAL STABILITY: SOCIAL NETWORK: HOW OFTEN DO YOU GET TOGETHER WITH FRIENDS OR RELATIVES?: ONCE A WEEK

## 2023-10-30 SDOH — ECONOMIC STABILITY: TRANSPORTATION INSECURITY
IN THE PAST 12 MONTHS, HAS LACK OF TRANSPORTATION KEPT YOU FROM MEETINGS, WORK, OR FROM GETTING THINGS NEEDED FOR DAILY LIVING?: YES

## 2023-10-30 SDOH — ECONOMIC STABILITY: INCOME INSECURITY: HOW HARD IS IT FOR YOU TO PAY FOR THE VERY BASICS LIKE FOOD, HOUSING, MEDICAL CARE, AND HEATING?: HARD

## 2023-10-30 SDOH — SOCIAL STABILITY: SOCIAL NETWORK: HOW OFTEN DO YOU ATTEND CHURCH OR RELIGIOUS SERVICES?: 1 TO 4 TIMES PER YEAR

## 2023-10-30 SDOH — HEALTH STABILITY: PHYSICAL HEALTH: ON AVERAGE, HOW MANY MINUTES DO YOU ENGAGE IN EXERCISE AT THIS LEVEL?: 0 MIN

## 2023-10-30 SDOH — HEALTH STABILITY: MENTAL HEALTH: HOW MANY STANDARD DRINKS CONTAINING ALCOHOL DO YOU HAVE ON A TYPICAL DAY?: PATIENT DOES NOT DRINK

## 2023-10-30 SDOH — SOCIAL STABILITY: SOCIAL NETWORK: HOW OFTEN DO YOU ATTENT MEETINGS OF THE CLUB OR ORGANIZATION YOU BELONG TO?: NEVER

## 2023-10-30 SDOH — SOCIAL STABILITY: SOCIAL NETWORK: IN A TYPICAL WEEK, HOW MANY TIMES DO YOU TALK ON THE PHONE WITH FAMILY, FRIENDS, OR NEIGHBORS?: ONCE A WEEK

## 2023-10-30 ASSESSMENT — ROWLAND UNIVERSAL DEMENTIA ASSESSMENT SCALE (RUDAS)
RUDAS TOTAL SCORE: 18
WITH YOUR LEFT HAND TOUCH YOUR RIGHT EAR: YES
SHOW ME YOUR LEFT HAND: YES
MEMORY SCORE: 0
NUMBER OF NEW ANIMALS PERSON NAMED: 8
DRAWING SCORE: 0
HAS PERSON DRAWN A PICTURE BASED ON A SQUARE: NO
DID PERSON REMEMBER TEA: NO/PROMPTED
SHOW ME YOUR RIGHT FOOT: YES
DID PERSON INDICATE THAT THEY WOULD LOOK FOR TRAFFIC: YES
DO ALL INTERNAL LINES APPEAR IN PERSON'S DRAWING: NO
DID PERSON REMEMBER EGGS: NO
DO ALL EXTERNAL LINES APPEAR IN PERSON'S DRAWING: NO
PRAXIS (FIST / PALM): PARTIALLY ADEQUATE (NOTICEABLE ERRORS WITH SOME ATTEMPT TO SELF-CORRECT, SOME ATTEMPT AT MAINTENANCE, POOR SYNCHRONY)
PRAXIS SCORE: 1
DID PERSON MAKE ANY ADDITIONAL SAFETY PROPOSALS: YES
DID PERSON REMEMBER COOKING OIL: NO
ORIENTATION SCORE: 5
WITH YOUR RIGHT HAND TOUCH YOUR LEFT SHOULD: YES
JUDGEMENT SCORE: 4
DID PERSON REMEMBER SOAP: NO
WHICH IS (INDICATE/POINT TO) MY LEFT KNEE: YES
LANGUAGE SCORE: 8

## 2023-10-30 ASSESSMENT — ANXIETY QUESTIONNAIRES
5. BEING SO RESTLESS THAT IT IS HARD TO SIT STILL: SEVERAL DAYS
4. TROUBLE RELAXING: SEVERAL DAYS
3. WORRYING TOO MUCH ABOUT DIFFERENT THINGS: NOT AT ALL
GAD7 TOTAL SCORE: 4
1. FEELING NERVOUS, ANXIOUS, OR ON EDGE: NOT AT ALL
2. NOT BEING ABLE TO STOP OR CONTROL WORRYING: NOT AT ALL
6. BECOMING EASILY ANNOYED OR IRRITABLE: SEVERAL DAYS
IF YOU CHECKED OFF ANY PROBLEMS ON THIS QUESTIONNAIRE, HOW DIFFICULT HAVE THESE PROBLEMS MADE IT FOR YOU TO DO YOUR WORK, TAKE CARE OF THINGS AT HOME, OR GET ALONG WITH OTHER PEOPLE: SOMEWHAT DIFFICULT
7. FEELING AFRAID AS IF SOMETHING AWFUL MIGHT HAPPEN: SEVERAL DAYS

## 2023-10-30 ASSESSMENT — PATIENT HEALTH QUESTIONNAIRE - PHQ9
4. FEELING TIRED OR HAVING LITTLE ENERGY: 3
9. THOUGHTS THAT YOU WOULD BE BETTER OFF DEAD, OR OF HURTING YOURSELF: 0
8. MOVING OR SPEAKING SO SLOWLY THAT OTHER PEOPLE COULD HAVE NOTICED. OR THE OPPOSITE, BEING SO FIGETY OR RESTLESS THAT YOU HAVE BEEN MOVING AROUND A LOT MORE THAN USUAL: 0
2. FEELING DOWN, DEPRESSED, IRRITABLE, OR HOPELESS: 0
SUM OF ALL RESPONSES TO PHQ QUESTIONS 1-9: 8
SUM OF ALL RESPONSES TO PHQ9 QUESTIONS 1 AND 2: 2
3. TROUBLE FALLING OR STAYING ASLEEP OR SLEEPING TOO MUCH: 0
1. LITTLE INTEREST OR PLEASURE IN DOING THINGS: 2
7. TROUBLE CONCENTRATING ON THINGS, SUCH AS READING THE NEWSPAPER OR WATCHING TELEVISION: 0
6. FEELING BAD ABOUT YOURSELF - OR THAT YOU ARE A FAILURE OR HAVE LET YOURSELF OR YOUR FAMILY DOWN: 1
5. POOR APPETITE OR OVEREATING: 2

## 2023-10-30 ASSESSMENT — LIFESTYLE VARIABLES
AUDIT-C TOTAL SCORE: 0
SKIP TO QUESTIONS 9-10: 1

## 2023-10-30 ASSESSMENT — FIBROSIS 4 INDEX: FIB4 SCORE: 1.4

## 2023-10-30 NOTE — PROGRESS NOTES
Interdisciplinary Comprehensive Geriatric Assessment  [facility]      Our comprehensive geriatric assessment team is comprised of a medical assistant, medical provider, pharmacist, and , all of whom create a note during the assessment. We provide recommendations modeling that of an [facility] with the 4 Ms of Care (What Matters, Mind, Medications, and Mobility).    We encourage patients to follow-up with their primary care provider prior to implementing the recommendations (orders, referrals, med changes, etc) discussed during the visit today.    Patient Name: Prabhakar Sierra  YOB: 1950  Referring Provider: Not a valid item  Referred For:   PCP: Stanton Miller M.D.     Interdisciplinary Geriatric Consult Team:   Annabelle Giles, MSN, APRN, FNP-BC  Ольга Gray, MSN, APRN, FNP-BC  Raquel Edward, Pharm D, BCGP  Trish Arndt, MSW, LCSW  Kelly Aquino, MSW, LCSW    Chief Complaint   Patient presents with    [facility] Comprehensive Geriatric Assessment    Other     Loss of balance, LE weakness, polypharmacy, early onset Alzheimer's dementia.           What matters most to the patient: Balance and overall strengthening     Summary of Team Top Recommendations    Medical Provider  JEROME today is 18/30. This assessment is a six-item cognitive screening that evaluates the following domains: memory, praxis, language, judgement, drawing, and body orientation. The assessment is designed to minimize the effects of cultural learning and language diversity. This is consistent with your diagnosis of Alzheimer's.   For falls and gait status change, continue to monitor and discuss with Dr. Moran should you want to opt for ventriculostomy or want to revisit options.   Strongly recommend diabetic control including medication, diet control and exercise.     Pharmacist     1. I recommend obtaining a new magnesium level at the next visit to see if  supplementation is still necessary. Patient is experiencing loose stools and  counseled that this may be a side effect of the magnesium. Consider switching to a different magnesium salt if loose stools continue. Magnesium glycinate, magnesium lactate or magnesium chloride increase magnesium levels, are well absorbed, and less likely to cause diarrhea.   2. Re-evaluate continued need for omeprazole. Patient reports it was started in the hospital and denies GERD symptoms. Due to increased risk of C. Diff and decreased absorption of several vitamins, consider tapering PPI with eventual goal of discontinuing.   3. Re-evaluate risks/benefits for pioglitazone. The TZD class of drugs should be used with caution in people at risk for falls due to the negative impact on bone structure.           Encourage Heidy to follow-up with referral to [facility] in-home assistance programs. There is a 3-6 month waitlist. This requires her to complete an application and that may be difficult. I have suggested that she make an appointment and connect with a  at  [facility] for application assistance. [phone]. [email or web address]  Encourage attendance at an Adult Day Program. These services provide socialization, exercise and live entertainment. This would give Heidy an affordable respite break, $85/day with lunch included. There is only one option in the [place] area:  [facility] - [facility] [place] [address] ([phone]). They are open on Saturdays and can offer assistance with transportation.   Encourage participation in the [facility]’s Community Wellness Programs, particularly those for strength and fall prevention. Call [phone] to schedule.    Diagnoses Related to Current Encounter  1. Normal pressure hydrocephalus (HCC)        2. Neuropathy         3. Uncontrolled type 2 diabetes mellitus with hyperglycemia (HCC)        4. PSVT (paroxysmal supraventricular tachycardia)        5. Neurodegenerative dementia with behavioral disturbance (HCC)        6. Prostate CA (HCC)- feb 2022; RT tbd x 8 wks, mar- may 2022        7. Gastroesophageal reflux disease with esophagitis, unspecified whether hemorrhage        8. Essential hypertension        9. Moderate late onset Alzheimer's dementia without behavioral disturbance, psychotic disturbance, mood disturbance, or anxiety (HCC)        10. Alcohol dependence in remission (HCC)        11. Mixed hyperlipidemia        12. Tremor, coarse            History of Present Illness:  Prabhakar is here with his wife, Heidy, for a comprehensive geriatric assessment. He is diagnosed with Alzheimer's. He has had one fall a week over the past year. Injured his back during one of his falls. They were previously enrolled in Palliative Care but he is not currently being seen by palliative care services. His medical history is complex and he has been followed by Neurology for several years for diagnoses that include Alzheimer's, alcohol related dementia and hydrocephaly. Prostate CA treatments feb 2022 through May 2022 that reportedly complicated hydrocephaly.     He had a palliative care referral during his last hospital visit. Not had full follow up due to some mixup but now would like to revisit palliative care services.     He has had a fall this morning that required a 911 call. His biggest complaints are related to falls/balance and fatigue.     Mental disability diagnosis since age of 47-48 years old. Wife reports he was seen by a psychologist and diagnosed with unclear diagnosis that qualified him for disability (mental).     They have 2 daughters and 6 grandchildren. They live in a 2-story town home with no modifications such as grab bars. Heidy and Prabhakar have been  56 years.     History provided by: patient and significant  other  Patient is a:Poor historian  Hospitalized in the last three months? no    Relevant Past Medical History -Prostate CA with radiation/chemotherapy, GERD, HTN, T2DM, Major depressive disorder, Moderate late onset Alzheimer's dementia, tobacco use, alcoholism, hydrocephalus, PSVT, neurogenerative dementia with behavioral disturbance.     Review of Systems    Hearing - Good  Vision - Good, last exam 6 months ago  Appetite - Fluctuated due to recent medication changes.   Weight Loss - yes  Diet - Drinks sufficient water  Dysphagia - Denies  Dental Problems - Multiple recent dental extractions and pending partial dentures with extractions on Wednesday.   Sleep - Wakes up once a night for nocturia  Constipation - denies  Diarrhea - occasional  Urinary Incontinence - Denies  Cardiac - Denies chest pain or palpitations  Respiratory - Denies SOB, cough or wheezing  Tobacco - quit 2021, smoked since age of 12 with episodes of quitting and episodes smoking 1/2 ppd    Cognitive ROS    Advanced Directives -   Cognitive concern - MoCA 15/30 on May 16th, 2023 with Neurology  Behavior/Personality changes - Denies  Mood (see screenings/social work assessment) - Stable  SI/HI (see social work assessment): Denies  History of TBI - Denies   Hallucinations - Denies  Tremor - coarse, possibly due to alcoholism. Parkinson's not likely at this time.   Gait changes - yes, see HPI  Alcohol -  not currently drinking alcohol. Quit 2 years ago.   History of PVD/MI/Stroke - 3 TIA's  Family hx of dementia - Brother, sister and mother    Previous labs reviewed if available.   Previous head imaging reviewed if available  MRI 5/12/22  1.  There is moderate dilatation of the bilateral lateral ventricles. This is slightly prominent than the associated cortical atrophy. This is suspicious for normal pressure hydrocephalus. The size of the ventricles is increased since the previous MRI   dated 1/3/2019.  2.  Mild cerebral volume loss.  3.  Chronic  lacunar infarct in the left cerebellum.    Has moderate Hydrocephalus by MRI (Brain) with slight  increase in size slightly since 2017 and 2019 and last MRI done in 5/2022.    Vitals  /78   Pulse 90   Temp 36.2 °C (97.1 °F)   Wt 83.4 kg (183 lb 12.8 oz)   SpO2 97%   BMI 26.37 kg/m²     Physical Exam  Physical Exam  Vitals and nursing note reviewed.   Constitutional:       Appearance: He is obese.   Eyes:      Pupils: Pupils are equal, round, and reactive to light.   Pulmonary:      Effort: Pulmonary effort is normal.   Neurological:      Mental Status: Mental status is at baseline.   Psychiatric:         Attention and Perception: Attention normal. He is attentive.         Speech: Speech normal.         Behavior: Behavior is slowed. Behavior is cooperative.         Cognition and Memory: He exhibits impaired recent memory.         Judgment: Judgment is not impulsive or inappropriate.         Medications  Current Outpatient Medications   Medication Sig Refill Last Dispense    aspirin (ASA) 81 MG Chew Tab chewable tablet Chew 1 Tablet every day.  Unknown (no pharmacy)    atorvastatin (LIPITOR) 80 MG tablet Take 1 Tablet by mouth every evening. Indications: High Amount of Fats in the Blood 4 Unknown (outside pharmacy)    benazepril (LOTENSIN) 40 MG tablet TAKE ONE TABLET BY MOUTH DAILY 3 Unknown (outside pharmacy)    buPROPion SR (WELLBUTRIN-SR) 150 MG TABLET SR 12 HR sustained-release tablet Take 1 Tablet by mouth 2 times a day. Indications: Major Depressive Disorder 3 Unknown (outside pharmacy)    Cholecalciferol (VITAMIN D) 2000 UNIT Tab Take 4 Tablets by mouth every day.  Unknown (patient-reported)    Continuous Blood Gluc Sensor (FREESTYLE MARCEL 3 SENSOR) Misc 1 Each every 14 days. 11 Unknown (no pharmacy)    DULoxetine (CYMBALTA) 60 MG Cap DR Particles delayed-release capsule Take 1 Capsule by mouth 2 times a day. Indications: Major Depressive Disorder 3 Unknown (outside pharmacy)    folic acid (FOLVITE)  1 MG Tab Take 1 Tablet by mouth every day. Indications: ALCOHOL ABUSE 3 Unknown (outside pharmacy)    JARDIANCE 25 MG Tab Take 1 tablet  by mouth every day. 3 Unknown (no pharmacy)    magnesium oxide 400 (240 Mg) MG Tab Take 1 Tablet by mouth every day. 3 Unknown (outside pharmacy)    metformin (GLUCOPHAGE) 1000 MG tablet TAKE ONE TABLET BY MOUTH TWICE A DAY WITH MEALS 3 Unknown (outside pharmacy)    metoprolol tartrate (LOPRESSOR) 25 MG Tab 12.5 mg 2 times a day.  Unknown (patient-reported)    omeprazole (PRILOSEC) 20 MG delayed-release capsule Take 1 Capsule by mouth 2 times a day. Indications: Gastroesophageal Reflux Disease 4 Unknown (outside pharmacy)    pioglitazone (ACTOS) 30 MG Tab Take 1 Tablet by mouth every day. Indications: Type 2 Diabetes 4 Unknown (outside pharmacy)    Semaglutide, 1 MG/DOSE, (OZEMPIC, 1 MG/DOSE,) 4 MG/3ML Solution Pen-injector Inject 1 mg under the skin every 7 days. 4 Never dispensed     Social Determinants of Health     Alcohol Use: Not At Risk (10/30/2023)    AUDIT-C     Frequency of Alcohol Consumption: Never     Average Number of Drinks: Patient does not drink     Frequency of Binge Drinking: Never   Depression: Not at risk (10/30/2023)    PHQ-2     PHQ-2 Score: 2   Financial Resource Strain: High Risk (10/30/2023)    Overall Financial Resource Strain (CARDIA)     Difficulty of Paying Living Expenses: Hard   Food Insecurity: No Food Insecurity (10/30/2023)    Hunger Vital Sign     Worried About Running Out of Food in the Last Year: Never true     Ran Out of Food in the Last Year: Never true   Housing Stability: Low Risk  (10/30/2023)    Housing Stability Vital Sign     Unable to Pay for Housing in the Last Year: No     Number of Places Lived in the Last Year: 1     Unstable Housing in the Last Year: No   Intimate Partner Violence: Not on file   Physical Activity: Inactive (10/30/2023)    Exercise Vital Sign     Days of Exercise per Week: 0 days     Minutes of Exercise per  Session: 0 min   Social Connections: Moderately Isolated (10/30/2023)    Social Connection and Isolation Panel [NHANES]     Frequency of Communication with Friends and Family: Once a week     Frequency of Social Gatherings with Friends and Family: Once a week     Attends Alevism Services: 1 to 4 times per year     Active Member of Clubs or Organizations: No     Attends Club or Organization Meetings: Never     Marital Status:    Stress: No Stress Concern Present (10/30/2023)    [employer] [place] [employer] - Occupational Stress Questionnaire     Feeling of Stress : Not at all   Tobacco Use: Medium Risk (10/30/2023)    Patient History     Smoking Tobacco Use: Former     Smokeless Tobacco Use: Never     Passive Exposure: Not on file   Transportation Needs: Unmet Transportation Needs (10/30/2023)    PRAPARE - Transportation     Lack of Transportation (Medical): Yes     Lack of Transportation (Non-Medical): Yes   Utilities: Not At Risk (10/30/2023)    Utilities     Threatened with loss of utilities: No       Screenings     ADL  Needs assistance with prepare meals, med management, finances, run errands, use phone to call for emergency assistance    IADL  Needs assistance with all IADL's , is able to use the bathroom. Needs assistance with all other IADL's.     Fall Risk Assessment  At risk for falls: yes    How many times per week are you exercising? Greater than 50 falls in the past year.      What is your perception of your health? fair      Frail Scale  Score: Score: 3     Interpretation of Frail Scale Score  0 = robust   1-2 = pre-frail  3-5 = frail     RUDAS ([place] Dementia Assessment Scale)  A six-item cognitive screening that evaluates the following domains: memory, praxis, language, judgement, drawing, and body orientation. The assessment is designed to minimize the effects of cultural learning and language diversity.     Score: RUDAS Total Score: 18 / 30    Interpretation of CRISTIANAS  Score  Any score of 22 or less should be considered as possible cognitive impairment.     Dementia Severity Rating Scale  This is a multiple-choice informant reported questionnaire that assesses a variety of functional and cognitive abilities.  Score: 26 / 54    Interpretation of DSRS Score  0-18 = mild   19-36 = moderate  37-54 = severe    PHQ 9 Depression Screening  Score: Patient Health Questionnaire Score: 8 / 27    Interpretation of PHQ 9 Score  1-4 = No depression  5-9 = Mild depression  10-14 = Moderate depression  15-19 = Moderately severe depression  20-27 = Severe depression    Generalized Anxiety Screen (MARAH 7)  Score: MARAH-7 Total Score: 4 / 21    Interpretation of MARAH 7 Score  0-4 = no anxiety  5-9 = mild anxiety  10-14 = moderate anxiety  15-21 = severe anxiety      Team Recommendations    Medical Provider Recommendations    1. RUDAS today is 18/30. This assessment is a six-item cognitive screening that evaluates the following domains: memory, praxis, language, judgement, drawing, and body orientation. The assessment is designed to minimize the effects of cultural learning and language diversity. This is consistent with your diagnosis of Alzheimer's.   2. For falls and gait status change, continue to monitor and discuss with Dr. Moran should you want to opt for ventriculostomy or want to revisit options.   3. Strongly recommend diabetic control including medication, diet control and exercise.   4. Consider palliative care services for supportive care including home care services, spiritual services, specialized equipment and support classes.   5. Continue staying in touch with the Alzheimer's Association.   6. Care partner - recommend respite care and caregiving classes.   7. Mediterranean diets are known to support brain health. See handout.   8. Stay mentally engaged with friends and family and try brain games and puzzles such as Sudoku or crossword puzzles. Check out applications such as Luminosity.    9. Continue follow up with PCP and specialists.     Pharmacist Recommendations  Please refer to pharmacy assessment for complete recommendations.     Recommendations  Please refer to social work assessment for complete recommendations.       Chronic Care Management program offered at today’s visit: CCM not offered at today's visit    My total time spent caring for the patient on the day of the encounter was 90 minutes. This visit was done concurrently with a pharmacist and .

## 2023-10-30 NOTE — PROGRESS NOTES
Geriatric Comprehensive Medication Review    Prabhakar Sierra is a 73 y.o. year old male here for Comprehensive Medication Review, wife Heidy  Cognitive ability: impaired  RUDAS Total Score: 18 / 30  Informant: patient and significant other  Reliability of informant: reliable    Does patient have any allergies to medications? No   Patient has no known allergies.  Did the patient bring medications to appointment? Yes    - history of prostate cancer with radiation  Diet  How many daily servings of vegetables?  Caffeine use? yes - morning  How many ounces of water consumed daily? 100 ounces of water daily    Fall Risk   At risk for falls? yes - one fall per week (likely due to lower extremity weakness). Poor balance with walking - fell this morning and had to call 911 but no injuries  Hx of orthostatic hypotension? No   Hx of dizziness/vertigo? no  Dx of osteopenia/osteoporosis? yes - followed by endocrinology. Significant dental work needed then may look at Reclast  Adequate calcium intake? No   Adequate vitamin d intake? Yes  Medications increasing fall risk? yes - benazepril, bupropion, duloxetine, metoprolol    Medication Adherence  How are medications obtained from pharmacy? Picking up meds at [facility]  Who prepares/organizes medications? family member  Who administers medications? family member  What tools are used to manage medications? pill box  Do you sometimes forget to take your medications? No but would if Heidy didn't remind him.  How many doses missed in the last 30 days? none  Per assessment, does the patient have medication non-adherence? No, but he did stop taking all meds after prostate radiation.   In the past month, have you had trouble affording your medications? yes - patient assistance for Jardiance filled out but still waiting to hear outcome    Drug Therapy  Drug interactions reviewed: Yes  Are medications doses appropriately for renal and liver function? Yes  eGFR: 77    Cognitive  Impairment   Labs:   Lab Results   Component Value Date/Time    XVLQEHRG90 554 03/28/2023 1519      Medications: none   Comments:    Hypertension   Blood Pressure/Pulse: /78   Pulse 90   Temp 36.2 °C (97.1 °F)   Wt 83.4 kg (183 lb 12.8 oz)   SpO2 97%   BMI 26.37 kg/m²    Home monitor blood pressure? no   Medications: benazepril, metoprolol   Comments:    Hyperlipidemia   Labs:   Lab Results   Component Value Date/Time    CHOLSTRLTOT 159 08/15/2023 0846    TRIGLYCERIDE 211 (H) 08/15/2023 0846    HDL 47 08/15/2023 0846    LDL 70 08/15/2023 0846      Primary prevention: No   Medications: Atorvastatin       CAD   History of MI, stroke, PVD? yes - 3 TIA's   Is patient taking aspirin 81 mg? Yes   Comments: 3 TIA's    Osteoporosis   Medications: no meds at this time   Comments: DEXA normal however multiple back fractures, however marleny believes he has osteoporosis    Diabetes   Last A1c:   Lab Results   Component Value Date/Time    HBA1C 10 (A) 08/07/2023 0922    AVGLUC 186 03/28/2023 1519      Insulin: no   Low blood sugars: no   Glucagon: No    Medications: synjardy - samples (changed from jardiance due to donut hole), metformin - only taking one metformin daily since addition of Synjardy, pioglitazone   Comments: wears CGM - Heidy wants him to wear CGM so she can see the effects of food on BS. Tried Rybelsus (bad taste in mouth) and Ozempic (stopped it b/c lack of appetite)      Health Condition: Depression  PHQ 9: 8  MARAH 7: 4   Medications: duloxetine (been on quite a while), bupropion - unsure how long. Heidy reports not depressed    Health Condition: paroxysmal supraventricular tachycardia   Medications: metoprolol - Don/Heidy not sure why he is taking. Label says for BP. PCP notes indicate PSVT    Health Condition: GERD   Medications: omeprazole bid (started in hospital)- reports no heartburn symptoms    Misc. Medications:  - magnesium oxide (1.4 on 3/28/23): reports some diarrhea. Heidy reports he has  diarrhea more than he is admitting  - folic acid 1 mg: folate level >40 (3/29/23)    OTC Products  - none    Dietary Supplements  - vitamin d (27 on 8/15/23)    Provider Recommendations  1. I recommend obtaining a new magnesium level at the next visit to see if supplementation is still necessary. Patient is experiencing loose stools and  counseled that this may be a side effect of the magnesium. Consider switching to a different magnesium salt if loose stools continue. Magnesium glycinate, magnesium lactate or magnesium chloride increase magnesium levels, are well absorbed, and less likely to cause diarrhea.   2. Re-evaluate continued need for omeprazole. Patient reports it was started in the hospital and denies GERD symptoms. Due to increased risk of C. Diff and decreased absorption of several vitamins, consider tapering PPI with eventual goal of discontinuing.   3. Re-evaluate risks/benefits for pioglitazone. The TZD class of drugs should be used with caution in people at risk for falls due to the negative impact on bone structure.     Patient Recommendations  - For details, see mailed MTM   1. Taper PPI   2. Magnesium level and switch to different salt due to loose stools  3. Need for pioglitazone

## 2023-10-30 NOTE — PROGRESS NOTES
MA CGA Assessment    Patient Name: Prabhakar Sierra    Patient's Primary Language if not English:   Patient's Care Partner(s) Name and Relationship: Wife Heidy       Are you able to read/write? Yes   Any difficulty hearing? No   Wears hearing aids: No   Any difficulty with vision? No   Last eye exam: 6 months ago   Do you have dentures? No, partial   Dental issues: Having 4 teeth pulled and a partial made to replace bridge  Have you been admitted to the hospital int the past 3 months? No     IADL Screening:    Are you still driving? No                     Are you able to prepare your own meals and/or cook, need assistance or unable to complete?  Unable to complete             Are you able to do shopping and errands, need assistance or unable to complete? Unable to complete            Are you able to do housekeeping, chores, need assistance or unable to complete? Unable to complete           Are you able to do laundry, need assistance or unable to complete? Unable to complete       Are you able to manage your medications, need assistance or unable to complete? Unable to complete        Are you able to do your own money management, need assistance or unable to complete? Unable to complete             Are you able to use the phone, need assistance or unable to use the phone? Need Assistance             ADL Screening:    Are you independent, need assistance, or unable to bathe yourself? Need Assistance            Are you independent, need assistance, or unable to dress yourself? Need Assistance            Are you independent, need assistance, or unable to feed yourself? Need Assistance       Are you independent, need assistance, or unable to get up out of a chair or off a bed? Need Assistance             Are you independent, need assistance, or unable to use the toilet by yourself? Independent             Are you aware when you need to use the toilet? Yes     If no, please describe the problem you are  experiencing. Depends just in case     Fall Screening:     Any falls within the last year? 52, back fx     Do you worry about falling? Yes     Do you feel unsteady when standing or walking? Yes     You have symptoms of lightheadedness or dizziness from lying to standing? No     Have you lost feeling in the bottom of your feet? Yes, korin     Can you feel the floor beneath your feet? Yes      Any throw rugs on floor? Yes     Do you have stairs? Yes                     Do you have handrails on all stairs? Yes     Good lighting in all hallways. Yes     How many times per week are you exercising? 0     What is your perception of your health? fair      Frailty Screening:   FRAIL Assessment  Fatigue: How much time during the past 4 weeks did you feel tired:: All or most of the time  Resistance: By yourself and not using aids, do you have any difficulty walking up 10 steps without resting?: Yes  Ambulation: By yourself and not using aids, do you have any difficulty walking a couple of blocks?: Yes  How much did you weigh a year ago?: 83 kg (183 lb)  How much do you weigh today?: 83.4 kg (183 lb 12.8 oz)  % Weight Loss: -0.4 %  Add a point from the weight change row if the patient has lost 5% or more: 0  Score: 3     Scorin =  Robust Health  1-2=Pre-frail  3-5=Frail      RUDAS ([place] Dementia Assessment Scale):  Scores  Orientation Score: 5  Praxis Score: 1  Drawing Score: 0  Judgement Score: 4  Memory Score: 0  Language Score: 8  RUDAS Total Score: 18    Dementia Severity Rating Scale:  26/54                                                                                                                                                         PHQ-9 Screening:  Patient Health Questionaire    Little interest or pleasure in doing things?: 2   Feeling down, depressed, or hopeless?: 0  Trouble falling or staying asleep, or sleeping too much? : 0  Feeling tired or having little energy? : 3  Poor appetite or  overeating? : 2  Feeling bad about yourself - or that you are a failure or have let yourself or your family down? : 1  Trouble concentrating on things, such as reading the newspaper or watching television? : 0  Moving or speaking so slowly that other people could have noticed.  Or the opposite - being so fidgety or restless that you have been moving around alot more than usual. : 0  Thoughts that you would be better off dead, or of hurting yourself?: 0  Patient Health Questionnaire Score: 8    If depressive symptoms identified deferred to follow up visit unless specifically addressed in assesment and plan.    Interpretation of PHQ-9 Total Score   Score Severity   1-4 No Depression   5-9 Mild Depression   10-14 Moderate Depression   15-19 Moderately Severe Depression   20-27 Severe Depression        MARAH-7 Questionnaire    Feeling nervous, anxious, or on edge: Not at all  Not being able to sop or control worrying: Not at all  Worrying too much about different things: Not at all  Trouble relaxing: Several days  Being so restless that it's hard to sit still: Several days  Becoming easily annoyed or irritable: Several days  Feeling afraid as if something awful might happen: Several days  Total: 4    Interpretation of MARAH 7 Total Score   Score Severity :  0-4 No Anxiety   5-9 Mild Anxiety  10-14 Moderate Anxiety  15-21 Severe Anxiety

## 2023-10-30 NOTE — PATIENT INSTRUCTIONS
We thank you for taking the time to visit with our team of providers at the [facility]. During the medical visit we completed a Comprehensive Geriatric Assessment with a , pharmacist and medical provider, all trained in Geriatrics. Below are our Age Friendly recommendations using the 4 M's model of care: What Matters most to you, Mind, Medications and Mobility.     Medical Provider Recommendations:    When considering medical recommendations, we encourage you to work in partnership with your primary care provider to implement them.     RUDAS today is 18/30. This assessment is a six-item cognitive screening that evaluates the following domains: memory, praxis, language, judgement, drawing, and body orientation. The assessment is designed to minimize the effects of cultural learning and language diversity. This is consistent with your diagnosis of Alzheimer's.   For falls and gait status change, continue to monitor and discuss with Dr. Moran should you want to opt for ventriculostomy or want to revisit options.   Strongly recommend diabetic control including medication, diet control and exercise.   Consider palliative care services for supportive care including home care services, spiritual services, specialized equipment and support classes.   Continue staying in touch with the Alzheimer's Association.   Care partner - recommend respite care and caregiving classes.   Mediterranean diets are known to support brain health. See handout.   Stay mentally engaged with friends and family and try brain games and puzzles such as Sudoku or crossword puzzles. Check out applications such as Luminosity.   Continue follow up with PCP and specialists.     Pharmacist Recommendations:    During our assessment, we reviewed your medications to make sure they are supporting What Matters most to you. When adjusting medications, we generally like to only make one change at a time. Below are some recommendations  for you to consider.    1. Please review medication assessment you will receive in the mail.     Recommendations:    Thank you for sharing portions of your life with the social work team. It has been a pleasure to get to know you. We suggest the following to assist with What Matters to you:  1. Consider participating in the [facility]’s Community Wellness Programs, particularly those for strength and fall prevention. Call [phone] to inquire about programs to increase strength, balance and diabetes. Wellness flyer provided.  2. Consider attending an Adult Day Program. These services provide socialization, exercise and live entertainment. There is only one option in the [place] area:  [facility] - [facility] [place] [place] [address] ([phone]). They are open on Saturdays and can offer assistance with transportation.   3. Consider completing a driving evaluation with an occupational therapist. An option for this is [employer]riving [employer]. Cost is $90 for a 1 hour on the road assessment. They do not report to DMV. This may give you some peace of mind related to your driving skills. www. [employer]. [phone].     For Caregiver Support  4. We hear you Heidy that you need respite breaks and in-home caregiving support. We also hear that you need assistance completing applications to gain access to [facility] in-home services. You may also be eligible for energy assistance and other financial resources including medical transportation. Connect with [facility]’s local Senior Resource Center located inside Access to Healthcare. Their Resource Navigators can help fill out your applications, answer questions, and connect you with programs and resources to help you. Applications include: Medicaid, TANF, SNAP/Food [place], Energy Assistance, Rental Assistance/Housing and much more. [phone].  https://www.accesstohealthcare.org/services-for-individuals/resources-for-seniors-and-disabled/  5. We hear that you are connected with the Alzheimer’s Association. They also have a respite cipriano they can help you apply for to use for various services including in-home caregiving and respite.  Please reach out and ask for these funds. You are eligible. 1-555.829.1326 or via their website, https://www.alz.org/norkristine. They provide support groups, education and 24 hour support. Refer to provided Dementia Friends resources.  6. Dementia Engagement, Education, and Research (DEER) Program is accepting scholarship applications for CARES Dementia Basics training certifications in English and Yi for    family and professional caregivers. Contact Mark Mohr at [email or web address] or call [phone] for more information.   7. Transportation services in the [facility] include the following;  RTC ACCESS buses. Door to door rides. [phone].  N4 Connect. https://getFound.[employer].org/n4-transportation/n4-connect. [phone].  Access to Healthcare [phone]. https://www.accessto[facility]'s Hospitalcare.org/services-for-individuals/non-emergency-medical-transportation/  8. Should you consider placement in the future, we recommend that you ask about levels of care available in your area and explore options including senior group homes with dementia endorsement and HIRC homes (Homes for Individualized Residential Care) so you can explore additional placement options in your area. Reference the [facility] Levels of Care handout for additional information. dpbh.[place].gov/uploadedFiles/dpbhnvgov/content/Reg/HealthFacilities/HF_-_Non-Medical/Home_for_individual_residential_care_(Files)/2012_[facility]_FactSheet.pdf      Thank you again for participating in the Comprehensive Geriatric Assessment with our team of providers at the [facility]. Should you have any follow-up questions you can reach our medical assistant at  [phone], option 3.     We encourage you to consult with your primary care provider before implementing these changes and follow-up with them regarding any orders, referrals, med changes, etc. In the case of a medical or psychiatric emergency, call 911.

## 2023-10-31 NOTE — PROGRESS NOTES
[facility]ERIAT[facility]   PSYCHOSOCIAL ASSESSMENT    Name: Prabhakar Sierra  MRN: [id number]  : 1950  Age: 73 y.o.  Date of assessment: 2023  Persons in attendance: Patient and Spouse/Partner Heidy.   [place] status: No  Employment: , Retired [5]. 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  Insurance coverage: [facility] SENIOR CARE PLUS: [facility] PREFERRED     Heidy owned an interior design company.  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 Retired at 47 years old. From [place], SSD for mental impairment approx.. 30 years ago. Followed by a psychologist.     Treatments for prostate cancer caused significant decline in current functioning including lower extremity weakness.   Fell today before entering the building. Falls approximately 1 x week.     Patient Concerns:  Primary presenting issue includes   Chief Complaint   Patient presents with    [place] Comprehensive Geriatric Assessment    Other     Loss of balance, LE weakness, polypharmacy, early onset Alzheimer's dementia.       .   What are your most important health goals, that you would like to address today?  Balance with walking  2.  Being tired all the time.     Care Partner Concerns:   I would like Harpal to exercise more to strengthen the muscles in his legs.   He falls a lot and I try to pick him up and I knowck over.  My whole life has changed. This all changed after 48 treatments of radiation. This is new as of a year.      Patients wife is reporting treatments for prostate cancer caused significant decline in Don't current functioning including lower extremity weakness. Patient fell today before entering the building. Falls approximately 1 x week.     Social and Cultural Aspects of Care:  Martial Status from demographis:  [2]  Emergency Contacts:  Extended Emergency Contact Information  Primary Emergency Contact: Heidy Sierra  Address: [address]         "[place] [address] [place]  Home Phone: [phone]  Mobile Phone: [phone]  Relation: Spouse   needed? No   Social Connections: Moderately Isolated (10/30/2023)    Social Connection and Isolation Panel [NHANES]     Frequency of Communication with Friends and Family: Once a week     Frequency of Social Gatherings with Friends and Family: Once a week     Attends Scientology Services: 1 to 4 times per year     Active Member of Clubs or Organizations: No     Attends Club or Organization Meetings: Never     Marital Status:      Patient has 2 daughters, one in [facility] and the other in [place], [place]. 6 grandchildren including one with Down Syndrome so one daughter is overloaded with caregiving responsibilities.  53 years . Together with wife since age 15.     Spiritual and existential aspects of care:  Spiritual. “We’ve done everything. We check everybody out.”    Advance Directives:   Code Status:   Code Status: DNR  The patient has the following documents on file: Advance Directive and POLST. Code status listed as DNAR.  Agents for DPOAHC: Wife Heidy.      Current Living Environment  Type of Living Arrangement: Home with 24-hour care  Household members: spouse  Single story? No. Modified with bars in bathroom and a bidet.     Typical Day   Sleep No sleep problems  “Wakes about 5-6am. Turns on the TV. Eat dinner early about 6pm. Sometimes he goes to bed at 7pm. Sleeps well. Wakes x 1 to use the restroom. Struggles to get out of bed. Uses bed rail to get up. We have a good life, if I could just get a lot better and make a lot more money like I did in the past....\"    Recently discontinued from [facility] services.     What matters and what is important in your life?   (What bring aime? What makes you happy? What makes life worth living?)  “Heidy. Going out to eat. Going to the movies. Watching good TV.\"    Behavioral Health  Alcohol Use: Not At Risk (10/30/2023)    " AUDIT-C     Frequency of Alcohol Consumption: Never     Average Number of Drinks: Patient does not drink     Frequency of Binge Drinking: Never   No alcohol use at this time. Previous history of significant drinking.     Drug use:  reports no history of drug use.    Psychological and psychiatric aspects of care:  Counseling or Therapy: Yes, in the past. Not currently seeing a therapist.   Psychiatry: Yes, in the past and it was helpful. Not currently seeing a psychiatrist.    Anxiety: MARAH-7 Total Score: 4  Interpretation of MARAH 7 Total Score   Score Severity:  0-4 No Anxiety   5-9 Mild Anxiety  10-14 Moderate Anxiety  15-21 Severe Anxiety    Depression: Patient Health Questionnaire Score: 8   Interpretation of PHQ-9 Total Score   Score Severity:   0-4 Minimal Depression   5-9 Mild Depression (1 on 1 therapy recommended)   10-15 Moderate Depression (Intensive outpatient program recommended)   16-20 Moderate Severe Depression (Partial hospitalization recommended)   21+ Severe Depression (Inpatient hospitalization recommended)    What’s your current stress level (1-5)?: 0. No stress. Heidy stress level = 11  Per Heidy, “I meditate. I pray every night. I try to accept what is and stay peaceful. Go with the flow and go with what we’ve been dealt.”    Suicidal Ideation screening: (CSRS screening if yes)  Do you have any current thoughts of suicide?  “No.”  Do you have any current thoughts that you would be better off dead? “Maybe sometimes but nothing seriously.”  Do you have any thoughts of wanting to harm another person? “No.”  Do you have family history of suicide? “My nephew.”     Mental Status:  Mental Status Exam    Appearance: Appropriate dress and grooming  Sensorium: Alert and Oriented X 4  Behavior: Appropriate  Motor Activity: Slowed  Eye Contact: Adequate  Speech: Normal  Mood: Neutral  Affect: Flat  Thought Flow: Poverty of thought  Thought Content: Unremarkable  Suicidality: Denies  Hallucinations:  "Denies  Cognition: Impaired memory  Insight: Partial  Reliability: Questionable  Judgement: Impaired  Other Observations: Alzheimer's dementia diagnosis per neurologist       Safety Concerns:  Other: Fall risks,  driving.  Patient's wife reporting concern with his driving reaction time and has asked him not to drive. Has driven to the store recently.     Social Determinants of Health     Concerns Present   Financial Resource Strain: High Risk (10/30/2023)   Physical Activity: Inactive (10/30/2023)   Tobacco Use: Medium Risk (10/30/2023)   Transportation Needs: Unmet Transportation Needs (10/30/2023)     Unknown Concern   Intimate Partner Violence: Not on file     No Concerns Present   Alcohol Use: Not At Risk (10/30/2023)   Depression: Not at risk (10/30/2023)   Food Insecurity: No Food Insecurity (10/30/2023)   Housing Stability: Low Risk  (10/30/2023)   Stress: No Stress Concern Present (10/30/2023)   Utilities: Not At Risk (10/30/2023)     Income  Other: Finances are stressful.  Patient has been wealthy in his life and is now beginning to experience some impoverishment. Started receiving SSD income for \"mental impairment\" approximately 30 years ago. This disability forced his FPC from the [employer]. A psychologist in [place] deemed him disabled.     Level of Care  At this time your desire is to remain at home.  As of 11/8/2023 your level of care matches with this goal. This is based on your current needs and limitations. Please use this information to guide you as you make decisions about what living environment is best for you at this time. We recommend that you tour a variety of facilities to understand your options.     Social Work Care Plan  1. Consider participating in the [facility]’s Community Wellness Programs, particularly those for strength and fall prevention. Call [phone] to inquire about programs to increase strength, balance and diabetes. Wellness flyer " provided.  2. Consider attending an Adult Day Program. These services provide socialization, exercise and live entertainment. There is only one option in the [place] area:  [facility] - [address] [place] [address] ([phone]). They are open on Saturdays and can offer assistance with transportation.   3. Consider completing a driving evaluation with an occupational therapist. An option for this is [employer]. Cost is $90 for a 1 hour on the road assessment. They do not report to [facility]. This may give you some peace of mind related to your driving skills. www. [employer]. [phone].     For Caregiver Support  4. We hear you Heidy that you need respite breaks and in-home caregiving support. We also hear that you need assistance completing applications to gain access to [facility] in-home services. You may also be eligible for energy assistance and other financial resources including medical transportation. Connect with [facility]’s local Senior Resource Center located inside Access to [facility] Their Resource Navigators can help fill out your applications, answer questions, and connect you with programs and resources to help you. Applications include: Medicaid, TANF, SNAP/Food [place], Energy Assistance, Rental Assistance/Housing and much more. [phone]. https://www.accesstohealthcare.org/services-for-individuals/resources-for-seniors-and-disabled/  5. We hear that you are connected with the Alzheimer’s Association. They also have a respite cipriano they can help you apply for to use for various services including in-home caregiving and respite.  Please reach out and ask for these funds. You are eligible. 1-139.381.6778 or via their website, https://www.alz.org/norcal. They provide support groups, education and 24 hour support. Refer to provided Dementia Friends resources.  6. Dementia Engagement, Education, and Research (DEER) Program is  accepting scholarship applications for CARES Dementia Basics training certifications in English and Kosovan for    family and professional caregivers. Contact Mark Mohr at [email or web address] or call [phone] for more information.   7. Transportation services in the [facility] area include the following;  RTC ACCESS buses. Door to door rides. [phone].  N4 Connect. https://Outlisten.org/n4-transportation/n4-connect. [phone] [phone].  Access to Healthcare [phone]. https://www.accessMercy Health Urbana Hospitalcare.org/services-for-individuals/non-emergency-medical-transportation/  8. Should you consider placement in the future, we recommend that you ask about levels of care available in your area and explore options including senior group homes with dementia endorsement and HIRC homes (Homes for Individualized Residential Care) so you can explore additional placement options in your area. Reference the [facility] Levels of Care handout for additional information. [facility].nv.gov/uploadedFiles/dpnvgov/content/Reg/HealthFacilities/HF_-_Non-Medical/Home_for_individual_residential_care_(Files)/2012_[facility]_FactSheet.pdf

## 2023-11-01 NOTE — PROGRESS NOTES
Attempted to contact Prabhakar and Heidy for monthly outreach. Heidy said they are getting ready for his dentist appt. Will call back later this week.

## 2023-11-03 ENCOUNTER — PATIENT OUTREACH (OUTPATIENT)
Dept: HEALTH INFORMATION MANAGEMENT | Facility: OTHER | Age: 73
End: 2023-11-03
Payer: MEDICARE

## 2023-11-03 DIAGNOSIS — I10 ESSENTIAL HYPERTENSION: ICD-10-CM

## 2023-11-03 DIAGNOSIS — E11.65 UNCONTROLLED TYPE 2 DIABETES MELLITUS WITH HYPERGLYCEMIA (HCC): ICD-10-CM

## 2023-11-03 PROCEDURE — 99999 PR NO CHARGE: CPT | Performed by: INTERNAL MEDICINE

## 2023-11-03 NOTE — PROGRESS NOTES
Assessment    Monthly outreach completed with Prabhakar and Heidy. They have not had a chance to go through the COP[employer] application. We talked about care chest and a 4 wheel walker. Heidy said they have been told they don't qualify for Care Chest, so they have never applied. Encouraged her to complete the application. Explained the only other way to get a walker would be to have an office visit. Next office visit is an AWV 12/21. Informed them that they would be charged for an additional visit if any problems were discussed during that appt. Heidy would like to think about their options. Will follow up next week. They attended the [facility] fall prevention class. He also had several teeth removed by Dr. Arcos at [facility]. Heidy requested I follow up with them next week for appointment scheduling and community resource needs.     Education    Process for obtaining DME    Plan of Care and Goals    No changes     Barriers:    Frequent falls, scheduling    Progress:    Maintaining     Next outreach: 1 month

## 2023-11-07 ENCOUNTER — PATIENT MESSAGE (OUTPATIENT)
Dept: ENDOCRINOLOGY | Facility: MEDICAL CENTER | Age: 73
End: 2023-11-07
Payer: MEDICARE

## 2023-11-14 ENCOUNTER — PATIENT MESSAGE (OUTPATIENT)
Dept: ENDOCRINOLOGY | Facility: MEDICAL CENTER | Age: 73
End: 2023-11-14

## 2023-11-14 ENCOUNTER — TELEPHONE (OUTPATIENT)
Dept: PHARMACY | Facility: MEDICAL CENTER | Age: 73
End: 2023-11-14
Payer: MEDICARE

## 2023-11-14 DIAGNOSIS — E11.65 TYPE 2 DIABETES MELLITUS WITH HYPERGLYCEMIA, WITHOUT LONG-TERM CURRENT USE OF INSULIN (HCC): ICD-10-CM

## 2023-11-14 RX ORDER — EMPAGLIFLOZIN 25 MG/1
1 TABLET, FILM COATED ORAL DAILY
Qty: 14 TABLET | Refills: 1 | Status: SHIPPED | OUTPATIENT
Start: 2023-11-14 | End: 2023-11-14

## 2023-11-14 RX ORDER — EMPAGLIFLOZIN 25 MG/1
1 TABLET, FILM COATED ORAL DAILY
Qty: 28 TABLET | Refills: 1 | Status: ON HOLD | OUTPATIENT
Start: 2023-11-14 | End: 2023-12-20

## 2023-11-14 NOTE — TELEPHONE ENCOUNTER
Called JANINE laughlin at [phone] spoke with Vanita regarding the status on application for Jardiance 25 MG she stated they needed proof of Income I told her I had faxed it to [phone] on 10/18/23 and got confirmation she stated they were having issues with their fax machine that day. to go ahead and fax it to the emergency fax number [phone] and call for the status on 11/15/ or 11/26/23 they should have an answer by then. Call patient at [phone] spoke with patients wife Heidy gave her an update on the application also told her Dr. Soler has place an order for samples on the Jardiance 25 MG #28 day supply at [facility] pharmacy she can  at any time.    Acacia Grimes, Pharmacy Liaison/ RX Coordinator

## 2023-11-15 ENCOUNTER — TELEPHONE (OUTPATIENT)
Dept: PHARMACY | Facility: MEDICAL CENTER | Age: 73
End: 2023-11-15

## 2023-11-15 ENCOUNTER — NON-PROVIDER VISIT (OUTPATIENT)
Dept: MEDICAL GROUP | Age: 73
End: 2023-11-15
Payer: MEDICARE

## 2023-11-15 DIAGNOSIS — Z23 NEED FOR VACCINATION: ICD-10-CM

## 2023-11-15 DIAGNOSIS — E11.65 UNCONTROLLED TYPE 2 DIABETES MELLITUS WITH HYPERGLYCEMIA (HCC): ICD-10-CM

## 2023-11-15 PROCEDURE — G0008 ADMIN INFLUENZA VIRUS VAC: HCPCS | Performed by: INTERNAL MEDICINE

## 2023-11-15 PROCEDURE — 90662 IIV NO PRSV INCREASED AG IM: CPT | Performed by: INTERNAL MEDICINE

## 2023-11-15 NOTE — PROGRESS NOTES
"Prabhakar Sierra is a 73 y.o. male here for a non-provider visit for:   FLU    Reason for immunization: Annual Flu Vaccine  Immunization records indicate need for vaccine: Yes, confirmed with Epic  Minimum interval has been met for this vaccine: Yes  ABN completed: Not Indicated    VIS Dated  08/06/2021 was given to patient: Yes  All IAC Questionnaire questions were answered \"No.\"    Patient tolerated injection and no adverse effects were observed or reported: Yes    Pt scheduled for next dose in series: Not Indicated   "

## 2023-11-16 ENCOUNTER — PHARMACY VISIT (OUTPATIENT)
Dept: PHARMACY | Facility: MEDICAL CENTER | Age: 73
End: 2023-11-16
Payer: COMMERCIAL

## 2023-11-16 DIAGNOSIS — E11.65 TYPE 2 DIABETES MELLITUS WITH HYPERGLYCEMIA, WITHOUT LONG-TERM CURRENT USE OF INSULIN (HCC): ICD-10-CM

## 2023-11-16 PROCEDURE — RXMED WILLOW AMBULATORY MEDICATION CHARGE: Performed by: STUDENT IN AN ORGANIZED HEALTH CARE EDUCATION/TRAINING PROGRAM

## 2023-11-16 RX ORDER — SEMAGLUTIDE 0.68 MG/ML
0.5 INJECTION, SOLUTION SUBCUTANEOUS
Qty: 3 ML | Refills: 11 | Status: SHIPPED | OUTPATIENT
Start: 2023-11-16 | End: 2023-11-25

## 2023-11-16 NOTE — TELEPHONE ENCOUNTER
I called patient regarding Jardiance Samples, she asked me if ok to have  start the Ozempic again, I told her I would ask Dr. Soler and get back to her, I asked Dr. Soler and she said is ok for patient to start Ozempic (1MG/Dose) 4MG/3ML again per Dr. Soler, patient to start with 19 Clicks of the 1MG for 2 to 3 weeks if he tolerates go up to 37 clicks for 3 weeks if tolerated go to 56 clicks for 3 weeks if tolerated got 74 clicks (1MG every week.). Patients wife will come ion tomorrow to  her husbands PAP 2 Boxes of Ozempic 1MG/Dose     Acacia Grimes, Pharmacy Liaison/ RX Coordinator

## 2023-11-25 ENCOUNTER — APPOINTMENT (OUTPATIENT)
Dept: RADIOLOGY | Facility: MEDICAL CENTER | Age: 73
End: 2023-11-25
Attending: EMERGENCY MEDICINE
Payer: MEDICARE

## 2023-11-25 ENCOUNTER — HOSPITAL ENCOUNTER (EMERGENCY)
Facility: MEDICAL CENTER | Age: 73
End: 2023-11-27
Attending: EMERGENCY MEDICINE
Payer: MEDICARE

## 2023-11-25 DIAGNOSIS — G63 PERIPHERAL NEUROPATHY DUE TO DISORDER OF METABOLISM (HCC): ICD-10-CM

## 2023-11-25 DIAGNOSIS — Z78.9 IMPAIRED MOBILITY AND ADLS: ICD-10-CM

## 2023-11-25 DIAGNOSIS — G25.2 TREMOR, COARSE: ICD-10-CM

## 2023-11-25 DIAGNOSIS — Z74.09 IMPAIRED MOBILITY AND ADLS: ICD-10-CM

## 2023-11-25 DIAGNOSIS — E88.9 PERIPHERAL NEUROPATHY DUE TO DISORDER OF METABOLISM (HCC): ICD-10-CM

## 2023-11-25 PROBLEM — Z66 DNR (DO NOT RESUSCITATE): Status: ACTIVE | Noted: 2023-11-25

## 2023-11-25 PROBLEM — S32.010A CLOSED COMPRESSION FRACTURE OF BODY OF L1 VERTEBRA (HCC): Status: ACTIVE | Noted: 2023-11-25

## 2023-11-25 LAB
ALBUMIN SERPL BCP-MCNC: 4 G/DL (ref 3.2–4.9)
ALBUMIN/GLOB SERPL: 1.6 G/DL
ALP SERPL-CCNC: 66 U/L (ref 30–99)
ALT SERPL-CCNC: 11 U/L (ref 2–50)
ANION GAP SERPL CALC-SCNC: 13 MMOL/L (ref 7–16)
APPEARANCE UR: CLEAR
AST SERPL-CCNC: 10 U/L (ref 12–45)
BASOPHILS # BLD AUTO: 0.6 % (ref 0–1.8)
BASOPHILS # BLD: 0.04 K/UL (ref 0–0.12)
BILIRUB SERPL-MCNC: 0.3 MG/DL (ref 0.1–1.5)
BILIRUB UR QL STRIP.AUTO: NEGATIVE
BUN SERPL-MCNC: 30 MG/DL (ref 8–22)
CALCIUM ALBUM COR SERPL-MCNC: 8.9 MG/DL (ref 8.5–10.5)
CALCIUM SERPL-MCNC: 8.9 MG/DL (ref 8.4–10.2)
CHLORIDE SERPL-SCNC: 99 MMOL/L (ref 96–112)
CO2 SERPL-SCNC: 23 MMOL/L (ref 20–33)
COLOR UR: YELLOW
CREAT SERPL-MCNC: 0.83 MG/DL (ref 0.5–1.4)
EOSINOPHIL # BLD AUTO: 0.19 K/UL (ref 0–0.51)
EOSINOPHIL NFR BLD: 2.7 % (ref 0–6.9)
ERYTHROCYTE [DISTWIDTH] IN BLOOD BY AUTOMATED COUNT: 47.3 FL (ref 35.9–50)
GFR SERPLBLD CREATININE-BSD FMLA CKD-EPI: 92 ML/MIN/1.73 M 2
GLOBULIN SER CALC-MCNC: 2.5 G/DL (ref 1.9–3.5)
GLUCOSE BLD STRIP.AUTO-MCNC: 234 MG/DL (ref 65–99)
GLUCOSE SERPL-MCNC: 236 MG/DL (ref 65–99)
GLUCOSE UR STRIP.AUTO-MCNC: >=1000 MG/DL
HCT VFR BLD AUTO: 40.7 % (ref 42–52)
HGB BLD-MCNC: 13.2 G/DL (ref 14–18)
IMM GRANULOCYTES # BLD AUTO: 0.03 K/UL (ref 0–0.11)
IMM GRANULOCYTES NFR BLD AUTO: 0.4 % (ref 0–0.9)
KETONES UR STRIP.AUTO-MCNC: ABNORMAL MG/DL
LEUKOCYTE ESTERASE UR QL STRIP.AUTO: NEGATIVE
LYMPHOCYTES # BLD AUTO: 1.27 K/UL (ref 1–4.8)
LYMPHOCYTES NFR BLD: 18.3 % (ref 22–41)
MCH RBC QN AUTO: 28.8 PG (ref 27–33)
MCHC RBC AUTO-ENTMCNC: 32.4 G/DL (ref 32.3–36.5)
MCV RBC AUTO: 88.9 FL (ref 81.4–97.8)
MICRO URNS: ABNORMAL
MONOCYTES # BLD AUTO: 0.57 K/UL (ref 0–0.85)
MONOCYTES NFR BLD AUTO: 8.2 % (ref 0–13.4)
NEUTROPHILS # BLD AUTO: 4.83 K/UL (ref 1.82–7.42)
NEUTROPHILS NFR BLD: 69.8 % (ref 44–72)
NITRITE UR QL STRIP.AUTO: NEGATIVE
NRBC # BLD AUTO: 0 K/UL
NRBC BLD-RTO: 0 /100 WBC (ref 0–0.2)
PH UR STRIP.AUTO: 5.5 [PH] (ref 5–8)
PLATELET # BLD AUTO: 188 K/UL (ref 164–446)
PMV BLD AUTO: 10.4 FL (ref 9–12.9)
POTASSIUM SERPL-SCNC: 4.2 MMOL/L (ref 3.6–5.5)
PROT SERPL-MCNC: 6.5 G/DL (ref 6–8.2)
PROT UR QL STRIP: NEGATIVE MG/DL
RBC # BLD AUTO: 4.58 M/UL (ref 4.7–6.1)
RBC UR QL AUTO: NEGATIVE
SODIUM SERPL-SCNC: 135 MMOL/L (ref 135–145)
SP GR UR STRIP.AUTO: 1.01
WBC # BLD AUTO: 6.9 K/UL (ref 4.8–10.8)

## 2023-11-25 PROCEDURE — 85025 COMPLETE CBC W/AUTO DIFF WBC: CPT

## 2023-11-25 PROCEDURE — 81003 URINALYSIS AUTO W/O SCOPE: CPT

## 2023-11-25 PROCEDURE — 99285 EMERGENCY DEPT VISIT HI MDM: CPT

## 2023-11-25 PROCEDURE — 700102 HCHG RX REV CODE 250 W/ 637 OVERRIDE(OP): Performed by: HOSPITALIST

## 2023-11-25 PROCEDURE — 82962 GLUCOSE BLOOD TEST: CPT

## 2023-11-25 PROCEDURE — 80053 COMPREHEN METABOLIC PANEL: CPT

## 2023-11-25 PROCEDURE — 72128 CT CHEST SPINE W/O DYE: CPT

## 2023-11-25 PROCEDURE — 51798 US URINE CAPACITY MEASURE: CPT

## 2023-11-25 PROCEDURE — 96372 THER/PROPH/DIAG INJ SC/IM: CPT | Mod: XU

## 2023-11-25 PROCEDURE — A9270 NON-COVERED ITEM OR SERVICE: HCPCS | Performed by: HOSPITALIST

## 2023-11-25 PROCEDURE — 99285 EMERGENCY DEPT VISIT HI MDM: CPT | Performed by: HOSPITALIST

## 2023-11-25 PROCEDURE — 51701 INSERT BLADDER CATHETER: CPT

## 2023-11-25 PROCEDURE — 72131 CT LUMBAR SPINE W/O DYE: CPT

## 2023-11-25 PROCEDURE — 36415 COLL VENOUS BLD VENIPUNCTURE: CPT

## 2023-11-25 RX ORDER — HYDROCODONE BITARTRATE AND ACETAMINOPHEN 10; 325 MG/1; MG/1
1 TABLET ORAL EVERY 6 HOURS PRN
Status: DISCONTINUED | OUTPATIENT
Start: 2023-11-25 | End: 2023-11-27 | Stop reason: HOSPADM

## 2023-11-25 RX ORDER — DEXTROSE MONOHYDRATE 25 G/50ML
25 INJECTION, SOLUTION INTRAVENOUS
Status: DISCONTINUED | OUTPATIENT
Start: 2023-11-25 | End: 2023-11-27 | Stop reason: HOSPADM

## 2023-11-25 RX ORDER — OMEPRAZOLE 20 MG/1
20 CAPSULE, DELAYED RELEASE ORAL 2 TIMES DAILY
Status: DISCONTINUED | OUTPATIENT
Start: 2023-11-25 | End: 2023-11-27 | Stop reason: HOSPADM

## 2023-11-25 RX ORDER — ENOXAPARIN SODIUM 100 MG/ML
40 INJECTION SUBCUTANEOUS DAILY
Status: DISCONTINUED | OUTPATIENT
Start: 2023-11-26 | End: 2023-11-27 | Stop reason: HOSPADM

## 2023-11-25 RX ORDER — ONDANSETRON 4 MG/1
4 TABLET, ORALLY DISINTEGRATING ORAL EVERY 4 HOURS PRN
Status: DISCONTINUED | OUTPATIENT
Start: 2023-11-25 | End: 2023-11-27 | Stop reason: HOSPADM

## 2023-11-25 RX ORDER — ACETAMINOPHEN 325 MG/1
650 TABLET ORAL EVERY 6 HOURS PRN
Status: DISCONTINUED | OUTPATIENT
Start: 2023-11-25 | End: 2023-11-27 | Stop reason: HOSPADM

## 2023-11-25 RX ORDER — AMOXICILLIN 500 MG/1
500 CAPSULE ORAL 3 TIMES DAILY
Status: SHIPPED | COMMUNITY
Start: 2023-11-01 | End: 2023-12-19

## 2023-11-25 RX ORDER — ATORVASTATIN CALCIUM 40 MG/1
80 TABLET, FILM COATED ORAL EVERY EVENING
Status: DISCONTINUED | OUTPATIENT
Start: 2023-11-25 | End: 2023-11-27 | Stop reason: HOSPADM

## 2023-11-25 RX ORDER — BENAZEPRIL HYDROCHLORIDE 10 MG/1
40 TABLET ORAL DAILY
Status: DISCONTINUED | OUTPATIENT
Start: 2023-11-26 | End: 2023-11-27 | Stop reason: HOSPADM

## 2023-11-25 RX ORDER — ONDANSETRON 2 MG/ML
4 INJECTION INTRAMUSCULAR; INTRAVENOUS EVERY 4 HOURS PRN
Status: DISCONTINUED | OUTPATIENT
Start: 2023-11-25 | End: 2023-11-27 | Stop reason: HOSPADM

## 2023-11-25 RX ORDER — ASPIRIN 81 MG/1
81 TABLET, CHEWABLE ORAL DAILY
Status: DISCONTINUED | OUTPATIENT
Start: 2023-11-26 | End: 2023-11-27 | Stop reason: HOSPADM

## 2023-11-25 RX ORDER — BUPROPION HYDROCHLORIDE 150 MG/1
150 TABLET, EXTENDED RELEASE ORAL 2 TIMES DAILY
Status: DISCONTINUED | OUTPATIENT
Start: 2023-11-25 | End: 2023-11-27 | Stop reason: HOSPADM

## 2023-11-25 RX ORDER — DULOXETIN HYDROCHLORIDE 30 MG/1
60 CAPSULE, DELAYED RELEASE ORAL 2 TIMES DAILY
Status: DISCONTINUED | OUTPATIENT
Start: 2023-11-25 | End: 2023-11-27 | Stop reason: HOSPADM

## 2023-11-25 RX ORDER — AMOXICILLIN 250 MG
2 CAPSULE ORAL 2 TIMES DAILY
Status: DISCONTINUED | OUTPATIENT
Start: 2023-11-25 | End: 2023-11-27 | Stop reason: HOSPADM

## 2023-11-25 RX ORDER — LANOLIN ALCOHOL/MO/W.PET/CERES
400 CREAM (GRAM) TOPICAL DAILY
Status: DISCONTINUED | OUTPATIENT
Start: 2023-11-26 | End: 2023-11-27 | Stop reason: HOSPADM

## 2023-11-25 RX ORDER — HYDROCODONE BITARTRATE AND ACETAMINOPHEN 10; 325 MG/1; MG/1
1-2 TABLET ORAL EVERY 6 HOURS PRN
Status: SHIPPED | COMMUNITY
End: 2023-12-19

## 2023-11-25 RX ORDER — BISACODYL 10 MG
10 SUPPOSITORY, RECTAL RECTAL
Status: DISCONTINUED | OUTPATIENT
Start: 2023-11-25 | End: 2023-11-27 | Stop reason: HOSPADM

## 2023-11-25 RX ORDER — POLYETHYLENE GLYCOL 3350 17 G/17G
1 POWDER, FOR SOLUTION ORAL
Status: DISCONTINUED | OUTPATIENT
Start: 2023-11-25 | End: 2023-11-27 | Stop reason: HOSPADM

## 2023-11-25 RX ORDER — FOLIC ACID 1 MG/1
1 TABLET ORAL DAILY
Status: DISCONTINUED | OUTPATIENT
Start: 2023-11-26 | End: 2023-11-27 | Stop reason: HOSPADM

## 2023-11-25 RX ADMIN — ATORVASTATIN CALCIUM 80 MG: 40 TABLET, FILM COATED ORAL at 20:10

## 2023-11-25 RX ADMIN — BUPROPION HYDROCHLORIDE 150 MG: 150 TABLET, FILM COATED, EXTENDED RELEASE ORAL at 20:08

## 2023-11-25 RX ADMIN — OMEPRAZOLE 20 MG: 20 CAPSULE, DELAYED RELEASE ORAL at 20:08

## 2023-11-25 RX ADMIN — DULOXETINE HYDROCHLORIDE 60 MG: 30 CAPSULE, DELAYED RELEASE ORAL at 20:10

## 2023-11-25 RX ADMIN — HYDROCODONE BITARTRATE AND ACETAMINOPHEN 1 TABLET: 10; 325 TABLET ORAL at 22:11

## 2023-11-25 RX ADMIN — INSULIN HUMAN 2 UNITS: 100 INJECTION, SOLUTION PARENTERAL at 20:57

## 2023-11-25 ASSESSMENT — ENCOUNTER SYMPTOMS
FALLS: 1
FOCAL WEAKNESS: 0
EYE REDNESS: 0
FEVER: 0
MEMORY LOSS: 1
FLANK PAIN: 0
CHILLS: 0
STRIDOR: 0
BRUISES/BLEEDS EASILY: 1
EYE DISCHARGE: 0
COUGH: 0
NERVOUS/ANXIOUS: 0
VOMITING: 0
ABDOMINAL PAIN: 0
SHORTNESS OF BREATH: 0
MYALGIAS: 0

## 2023-11-25 ASSESSMENT — FIBROSIS 4 INDEX: FIB4 SCORE: 1.4

## 2023-11-25 NOTE — ED TRIAGE NOTES
"Chief Complaint   Patient presents with    Low Back Pain     Pt fell while outside hurting his lower back. Pt reports having surgery to his back due to a previous all leading to fractures. PT had 3/10 pain but was given IV tylenol and denies pain at this time  PT declines numbness, tingling or weakness to lower legs.      Ht 1.778 m (5' 10\")   Wt 83 kg (183 lb)   BMI 26.26 kg/m²     "

## 2023-11-26 LAB
GLUCOSE BLD STRIP.AUTO-MCNC: 121 MG/DL (ref 65–99)
GLUCOSE BLD STRIP.AUTO-MCNC: 209 MG/DL (ref 65–99)
GLUCOSE BLD STRIP.AUTO-MCNC: 243 MG/DL (ref 65–99)
GLUCOSE BLD STRIP.AUTO-MCNC: 272 MG/DL (ref 65–99)

## 2023-11-26 PROCEDURE — 700111 HCHG RX REV CODE 636 W/ 250 OVERRIDE (IP): Mod: JZ | Performed by: HOSPITALIST

## 2023-11-26 PROCEDURE — 96372 THER/PROPH/DIAG INJ SC/IM: CPT

## 2023-11-26 PROCEDURE — A9270 NON-COVERED ITEM OR SERVICE: HCPCS | Performed by: HOSPITALIST

## 2023-11-26 PROCEDURE — 700102 HCHG RX REV CODE 250 W/ 637 OVERRIDE(OP): Performed by: HOSPITALIST

## 2023-11-26 PROCEDURE — 82962 GLUCOSE BLOOD TEST: CPT | Mod: 91

## 2023-11-26 RX ADMIN — OMEPRAZOLE 20 MG: 20 CAPSULE, DELAYED RELEASE ORAL at 09:00

## 2023-11-26 RX ADMIN — ATORVASTATIN CALCIUM 80 MG: 40 TABLET, FILM COATED ORAL at 17:55

## 2023-11-26 RX ADMIN — DULOXETINE HYDROCHLORIDE 60 MG: 30 CAPSULE, DELAYED RELEASE ORAL at 09:01

## 2023-11-26 RX ADMIN — DOCUSATE SODIUM 50 MG AND SENNOSIDES 8.6 MG 2 TABLET: 8.6; 5 TABLET, FILM COATED ORAL at 05:46

## 2023-11-26 RX ADMIN — ASPIRIN 81 MG 81 MG: 81 TABLET ORAL at 05:46

## 2023-11-26 RX ADMIN — DULOXETINE HYDROCHLORIDE 60 MG: 30 CAPSULE, DELAYED RELEASE ORAL at 20:00

## 2023-11-26 RX ADMIN — ENOXAPARIN SODIUM 40 MG: 100 INJECTION SUBCUTANEOUS at 17:57

## 2023-11-26 RX ADMIN — BUPROPION HYDROCHLORIDE 150 MG: 150 TABLET, FILM COATED, EXTENDED RELEASE ORAL at 20:00

## 2023-11-26 RX ADMIN — BUPROPION HYDROCHLORIDE 150 MG: 150 TABLET, FILM COATED, EXTENDED RELEASE ORAL at 09:00

## 2023-11-26 RX ADMIN — INSULIN HUMAN 3 UNITS: 100 INJECTION, SOLUTION PARENTERAL at 21:14

## 2023-11-26 RX ADMIN — FOLIC ACID 1 MG: 1 TABLET ORAL at 05:46

## 2023-11-26 RX ADMIN — INSULIN HUMAN 2 UNITS: 100 INJECTION, SOLUTION PARENTERAL at 17:58

## 2023-11-26 RX ADMIN — OMEPRAZOLE 20 MG: 20 CAPSULE, DELAYED RELEASE ORAL at 20:00

## 2023-11-26 RX ADMIN — MAGNESIUM GLUCONATE 500 MG ORAL TABLET 400 MG: 500 TABLET ORAL at 05:46

## 2023-11-26 NOTE — ED NOTES
Pharmacy Medication Reconciliation    ~Med rec updated and complete per patient & patient spouse at bedside   ~Allergies have been verified   ~Pt home pharmacy: [facility]      ~Patient spouse reports that patient completed a 7 day course of Amoxil that was started on 11/01/2023      ~Patient spouse reports she gives patient 1 tablet of Metformin 1000 mg in the evening along with Synjardy XR  every morning.           ~Anticoagulants (rivaroxaban, apixaban, edoxaban, dabigatran, warfarin, enoxaparin) taken in the last 14 days? NO

## 2023-11-26 NOTE — ED NOTES
Patient's home medications have been reviewed by the pharmacy team.     Past Medical History:   Diagnosis Date    Alcohol use     BPH (benign prostatic hyperplasia)     Depression     Fall 12/22/2022    GERD (gastroesophageal reflux disease)     History of depression 12/28/2022    Hypertension     Hyponatremia 05/18/2017    Iron deficiency anemia secondary to inadequate dietary iron intake 05/26/2021    Mild cognitive impairment 07/26/2022    Neuropathic pain, leg     Orthostatic hypotension 1/3/2019    Right hip pain 11/14/2016    Thrombocytopenia, unspecified (HCC) 11/7/2022    Tobacco use     Type II or unspecified type diabetes mellitus without mention of complication, not stated as uncontrolled        Patient's Medications   New Prescriptions    No medications on file   Previous Medications    AMOXICILLIN (AMOXIL) 500 MG CAP    Take 500 mg by mouth 3 times a day. 7 days    ASPIRIN (ASA) 81 MG CHEW TAB CHEWABLE TABLET    Chew 1 Tablet every day.    ATORVASTATIN (LIPITOR) 80 MG TABLET    Take 1 Tablet by mouth every evening. Indications: High Amount of Fats in the Blood    BENAZEPRIL (LOTENSIN) 40 MG TABLET    TAKE ONE TABLET BY MOUTH DAILY    BUPROPION SR (WELLBUTRIN-SR) 150 MG TABLET SR 12 HR SUSTAINED-RELEASE TABLET    Take 1 Tablet by mouth 2 times a day. Indications: Major Depressive Disorder    CHOLECALCIFEROL (VITAMIN D) 2000 UNIT TAB    Take 4 Tablets by mouth every day.    CONTINUOUS BLOOD GLUC SENSOR (FREESTYLE MARCEL 3 SENSOR) MISC    1 Each every 14 days.    DULOXETINE (CYMBALTA) 60 MG CAP DR PARTICLES DELAYED-RELEASE CAPSULE    Take 1 Capsule by mouth 2 times a day. Indications: Major Depressive Disorder    EMPAGLIFLOZIN (JARDIANCE) 25 MG TAB    Take 1 tablet  by mouth every day.    EMPAGLIFLOZIN-METFORMIN HCL ER (SYNJARDY XR)  MG TABLET SR 24 HR    Take 1 Tablet by mouth every day. Replaces Jardiance    FOLIC ACID (FOLVITE) 1 MG TAB    Take 1 Tablet by mouth every day. Indications: ALCOHOL  ABUSE    HYDROCODONE/ACETAMINOPHEN (NORCO)  MG TAB    Take 1-2 Tablets by mouth every 6 hours as needed for Severe Pain.    MAGNESIUM OXIDE 400 (240 MG) MG TAB    Take 1 Tablet by mouth every day.    METFORMIN (GLUCOPHAGE) 1000 MG TABLET    TAKE ONE TABLET BY MOUTH TWICE A DAY WITH MEALS    METOPROLOL TARTRATE (LOPRESSOR) 25 MG TAB    12.5 mg 2 times a day.    OMEPRAZOLE (PRILOSEC) 20 MG DELAYED-RELEASE CAPSULE    Take 1 Capsule by mouth 2 times a day. Indications: Gastroesophageal Reflux Disease    PIOGLITAZONE (ACTOS) 30 MG TAB    Take 1 Tablet by mouth every day. Indications: Type 2 Diabetes   Modified Medications    No medications on file   Discontinued Medications    JARDIANCE 25 MG TAB    Take 1 tablet  by mouth every day.    SEMAGLUTIDE,0.25 OR 0.5MG/DOS, (OZEMPIC, 0.25 OR 0.5 MG/DOSE,) 2 MG/3ML SOLUTION PEN-INJECTOR    Inject 0.5 mg under the skin every 7 days.          A:  Medications do not appear to be contributing to current complaints.   Patient completed 7 days of amoxicillin on 11/1  Per med rec tech med history patient was transitioned from jardiance (empagliflozin) TO synjardy XR (empagliflozin-metformin) combination pill       P:    No recommendations at this time.   Home medications reordered by hospitalist provider   Patient transitioned to sliding scale during hospitalizations        Yeny Amato PharmD

## 2023-11-26 NOTE — ED NOTES
Patient on hospital bed, lights dimmed, equal chest rise and fall noted. No signs of distress. Call light within reach

## 2023-11-26 NOTE — ASSESSMENT & PLAN NOTE
Try to obtain a window bed.   Continue frequent re-orientation, encourage activity & try to maintain awake daytime state.   Try to avoid /minimize sedating medications as much as possible, reserve only for extreme agitation posing risk to harm self.

## 2023-11-26 NOTE — ED NOTES
Patient provided snacks, patient attached to monitors, resting on hospital bed. No signs of distress, equal chest rise and fall noted. Call light within reach.

## 2023-11-26 NOTE — ED NOTES
Report received from Candice URBINA, all care assumed at this time. Pt moved from 7B to 3 and placed on a hospital bed with a waffle mattress. All fall precautions are in place.

## 2023-11-26 NOTE — DISCHARGE PLANNING
Received a call from Dr. Buitrago who asked CM to assist with disposition.     Met with pt and wife. Wife said that pt has been falling at home  and has progressively needed assistance with ADLs and IADLs. Pt uses a walker and they live in a 2 level home. Their goal is to get pt stronger again to be able to return home. They are both agreeable with SNF placement.  Choice was signed by wife. CM faxed to [facility] and CM Sent referrals.     PCP is Dr. Stanton Miller.         Care Transition Team Assessment    Information Source  Orientation Level: Other (Comment), Oriented to person, Oriented to place, Oriented to time  Information Given By: Patient, Spouse  Informant's Name: Heidy  Who is responsible for making decisions for patient? : Patient    Readmission Evaluation  Is this a readmission?: No    Elopement Risk  Legal Hold: No  Ambulatory or Self Mobile in Wheelchair: Yes  Disoriented: No  Psychiatric Symptoms: None  History of Wandering: No  Elopement this Admit: No  Vocalizing Wanting to Leave: No  Displays Behaviors, Body Language Wanting to Leave: No-Not at Risk for Elopement    Interdisciplinary Discharge Planning  Does Admitting Nurse Feel This Could be a Complex Discharge?: No  Primary Care Physician: Dr Stanton Miller  Lives with - Patient's Self Care Capacity: Spouse  Support Systems: Spouse / Significant Other  Housing / Facility: 2 Story House  Do You Take your Prescribed Medications Regularly: Yes  Able to Return to Previous ADL's: Future Time w/Therapy  Mobility Issues: Yes  Prior Services: Intermittent Physical Support for ADL Per Family  Patient Prefers to be Discharged to:: SNF  Assistance Needed: Yes  Durable Medical Equipment: Walker    Discharge Preparedness  What is your plan after discharge?: Skilled nursing facility  What are your discharge supports?: Spouse  Prior Functional Level: Ambulatory, Needs Assist with Activities of Daily Living, Needs Assist with Medication Management, Uses  Walker  Difficulity with ADLs: Walking, Toileting, Bathing, Dressing  Difficulity with IADLs: Cooking, Driving, Keeping track of finances, Laundry, Managing medication, Shopping    Functional Assesment  Prior Functional Level: Ambulatory, Needs Assist with Activities of Daily Living, Needs Assist with Medication Management, Uses Walker    Finances  Financial Barriers to Discharge: No  Prescription Coverage: Yes    Vision / Hearing Impairment  Vision Impairment : Yes  Hearing Impairment : No    Values / Beliefs / Concerns  Values / Beliefs Concerns : No    Advance Directive  Advance Directive?: Living Will, POLST    Domestic Abuse  Have you ever been the victim of abuse or violence?: No         Discharge Risks or Barriers  Discharge risks or barriers?: Post-acute placement / services  Patient risk factors: Cognitive / sensory / physical deficit, Complex medical needs    Anticipated Discharge Information  Discharge Disposition: D/T to SNF with medicare cert w/planned hosp IP readmit (83)

## 2023-11-26 NOTE — PROGRESS NOTES
"ED Observation Progress Note    Date of Service: 11/26/23    Interval History and Interventions  Patient is holding in the emergency department pending placement for gait disturbance.  Has been having problems with balance that has been attributed to normal pressure hydrocephalus.  He had a fall yesterday and sustained an L1 compression fracture.  This really is not causing him any discomfort he just cannot walk with a steady gait.  He was seen by hospitalist and not felt to meet criteria for hospital admission.  Medications were ordered.  Patient does not have any complaints to me today.  He says that his wife makes all his decisions.  He denies back pain, weakness numbness.  No fevers.  He says he would like to get physical therapy.    An order for PT OT was placed.  Case management is not here today.  Patient has been receiving his medications as prescribed    Physical Exam  /58   Pulse 100   Temp 36.3 °C (97.3 °F) (Temporal)   Resp 18   Ht 1.778 m (5' 10\")   Wt 83 kg (183 lb)   SpO2 98%   BMI 26.26 kg/m² .    Constitutional: Awake and alert. Nontoxic  HENT:  Grossly normal  Eyes: Grossly normal  Neck: Normal range of motion  Cardiovascular: Normal heart rate   Thorax & Lungs: No respiratory distress  Abdomen: Nontender  Skin:  No pathologic rash.   Extremities: Well perfused  Psychiatric: Affect normal    Labs  Results for orders placed or performed during the hospital encounter of 11/25/23   URINALYSIS CULTURE, IF INDICATED    Specimen: Urine, Clean Catch   Result Value Ref Range    Color Yellow     Character Clear     Specific Gravity 1.010 <1.035    Ph 5.5 5.0 - 8.0    Glucose >=1000 (A) Negative mg/dL    Ketones Trace (A) Negative mg/dL    Protein Negative Negative mg/dL    Bilirubin Negative Negative    Nitrite Negative Negative    Leukocyte Esterase Negative Negative    Occult Blood Negative Negative    Micro Urine Req see below    CBC WITH DIFFERENTIAL   Result Value Ref Range    WBC 6.9 4.8 " - 10.8 K/uL    RBC 4.58 (L) 4.70 - 6.10 M/uL    Hemoglobin 13.2 (L) 14.0 - 18.0 g/dL    Hematocrit 40.7 (L) 42.0 - 52.0 %    MCV 88.9 81.4 - 97.8 fL    MCH 28.8 27.0 - 33.0 pg    MCHC 32.4 32.3 - 36.5 g/dL    RDW 47.3 35.9 - 50.0 fL    Platelet Count 188 164 - 446 K/uL    MPV 10.4 9.0 - 12.9 fL    Neutrophils-Polys 69.80 44.00 - 72.00 %    Lymphocytes 18.30 (L) 22.00 - 41.00 %    Monocytes 8.20 0.00 - 13.40 %    Eosinophils 2.70 0.00 - 6.90 %    Basophils 0.60 0.00 - 1.80 %    Immature Granulocytes 0.40 0.00 - 0.90 %    Nucleated RBC 0.00 0.00 - 0.20 /100 WBC    Neutrophils (Absolute) 4.83 1.82 - 7.42 K/uL    Lymphs (Absolute) 1.27 1.00 - 4.80 K/uL    Monos (Absolute) 0.57 0.00 - 0.85 K/uL    Eos (Absolute) 0.19 0.00 - 0.51 K/uL    Baso (Absolute) 0.04 0.00 - 0.12 K/uL    Immature Granulocytes (abs) 0.03 0.00 - 0.11 K/uL    NRBC (Absolute) 0.00 K/uL   COMP METABOLIC PANEL   Result Value Ref Range    Sodium 135 135 - 145 mmol/L    Potassium 4.2 3.6 - 5.5 mmol/L    Chloride 99 96 - 112 mmol/L    Co2 23 20 - 33 mmol/L    Anion Gap 13.0 7.0 - 16.0    Glucose 236 (H) 65 - 99 mg/dL    Bun 30 (H) 8 - 22 mg/dL    Creatinine 0.83 0.50 - 1.40 mg/dL    Calcium 8.9 8.4 - 10.2 mg/dL    Correct Calcium 8.9 8.5 - 10.5 mg/dL    AST(SGOT) 10 (L) 12 - 45 U/L    ALT(SGPT) 11 2 - 50 U/L    Alkaline Phosphatase 66 30 - 99 U/L    Total Bilirubin 0.3 0.1 - 1.5 mg/dL    Albumin 4.0 3.2 - 4.9 g/dL    Total Protein 6.5 6.0 - 8.2 g/dL    Globulin 2.5 1.9 - 3.5 g/dL    A-G Ratio 1.6 g/dL   ESTIMATED GFR   Result Value Ref Range    GFR (CKD-EPI) 92 >60 mL/min/1.73 m 2   POCT glucose device results   Result Value Ref Range    POC Glucose, Blood 234 (H) 65 - 99 mg/dL     *Note: Due to a large number of results and/or encounters for the requested time period, some results have not been displayed. A complete set of results can be found in Results Review.       Radiology  CT-LSPINE W/O PLUS RECONS   Final Result      1.  L1 anterior/superior  compression fracture with less than 20% height loss      2.  No other fracture      3.  Multilevel facet arthropathy      CT-TSPINE W/O PLUS RECONS   Final Result      1.  Negative for thoracic spine fracture      2.  L1 compression type fracture      3.  Multilevel degenerative change/spondylosis          Problem List  1.  Gait disturbance  2.  Normal pressure hydrocephalus  3.  L1 compression fracture  4.  Diabetes  5.  Alzheimer's dementia  6.  Hypertension  7.  GERD    Electronically signed by: Alex Bird M.D., 11/26/2023 12:17 PM

## 2023-11-26 NOTE — ASSESSMENT & PLAN NOTE
Power is 5/5 in both lower extremities  No saddle anesthesia.  No fecal or urinary incontinence  The patient does have known urinary retention  Multimodal pain control.  Patient recurrent falls are likely due to normal pressure hydrocephalus.  At this point, patient/patient wife, are interested in placement/rehab  I discussed with case management who will work on placement.

## 2023-11-26 NOTE — ASSESSMENT & PLAN NOTE
Normal pressure hydrocephalus likely leading to multiple falls  Shunt placement has been offered to the patient, however patient and family have been declining because they are unsure of the benefit of surgery.  I discussed mobility evaluation after high-volume lumbar puncture.  An objective test that may give an idea about potential benefit from surgery.  High-volume lumbar puncture involves a lumbar puncture with removal of, 30 to 50 mL of CSF with documentation of the patient's gait function before and within 30 to 60 minutes after the procedure. Common parameters measured before and after CSF removal include measures of gait speed, stride length, and number of steps it takes to turn 180 or 360 degrees.   I offered performing this test however patient/patient wife declining at this point.   Patient/patient wife are now interested in placement/rehab  I discussed with case management who will work on placement.

## 2023-11-26 NOTE — CONSULTS
Hospital Medicine Consultation    Date of Service  11/25/2023    Referring Physician  Venu Buitrago M.D.     Consulting Physician  Dalia Sy M.D.    Reason for Consultation  Help manage patient multiple medical problems awaiting discharge to skilled nursing facility.     History of Presenting Illness  73 y.o. male with a past medical history of diabetes, hypertension and abnormal MRI concerning for normal pressure hydrocephalus who presented 11/25/2023 with multiple falls and worsening back pain.  Most of the history was obtained from emergency department physician, the chart and patient wife present at bedside in the emergency room, as the patient has dementia and is not able to provide an accurate history.  Apparently the patient has been having progressively worsening generalized weakness with multiple falls to the point that the wife is not able to care for himself.  In the emergency room the patient denies having current back pain.  There is no reported fevers or chills.  There is no abnormal jerking movement, tongue biting, fecal or urinary incontinence.    Plan of care discussed in detail with the emergency department physician, Dr. Dobbins, the patient and patient wife present at bedside in the emergency room.    Review of Systems  Review of Systems   Constitutional:  Negative for chills and fever.   Eyes:  Negative for discharge and redness.   Respiratory:  Negative for cough, shortness of breath and stridor.    Cardiovascular:  Negative for chest pain and leg swelling.   Gastrointestinal:  Negative for abdominal pain and vomiting.   Genitourinary:  Negative for flank pain.   Musculoskeletal:  Positive for falls. Negative for myalgias.   Skin: Negative.    Neurological:  Negative for focal weakness.   Endo/Heme/Allergies:  Bruises/bleeds easily.   Psychiatric/Behavioral:  Positive for memory loss. The patient is not nervous/anxious.      Past Medical History   has a past medical history of Alcohol use,  BPH (benign prostatic hyperplasia), Depression, Fall (2022), GERD (gastroesophageal reflux disease), History of depression (2022), Hypertension, Hyponatremia (2017), Iron deficiency anemia secondary to inadequate dietary iron intake (2021), Mild cognitive impairment (2022), Neuropathic pain, leg, Orthostatic hypotension (1/3/2019), Right hip pain (2016), Thrombocytopenia, unspecified (HCC) (2022), Tobacco use, and Type II or unspecified type diabetes mellitus without mention of complication, not stated as uncontrolled.    He has no past medical history of Encounter for long-term (current) use of other medications.    Surgical History   has no past surgical history on file.    Family History  family history includes Cancer in his brother; Diabetes in his father; Heart Disease in his brother and mother; No Known Problems in his daughter and daughter; Other in his sister.    Social History   reports that he quit smoking about 22 months ago. His smoking use included cigarettes. He started smoking about 51 years ago. He has a 7.5 pack-year smoking history. He has never used smokeless tobacco. He reports current alcohol use of about 4.8 - 6.0 oz of alcohol per week. He reports that he does not use drugs.    Medications  Prior to Admission Medications   Prescriptions Last Dose Informant Patient Reported? Taking?   Cholecalciferol (VITAMIN D) 2000 UNIT Tab 2023 at AFTERNOON Significant Other Yes No   Sig: Take 4 Tablets by mouth every day.   Continuous Blood Gluc Sensor (FREESTYLE MARCEL 3 SENSOR) [facility] 2023 at UNK Significant Other No No   Si Each every 14 days.   DULoxetine (CYMBALTA) 60 MG Cap DR Particles delayed-release capsule 2023 at 0900 Significant Other No No   Sig: Take 1 Capsule by mouth 2 times a day. Indications: Major Depressive Disorder   Empagliflozin (JARDIANCE) 25 MG Tab 2023 at 0900 Significant Other No No   Sig: Take 1 tablet  by mouth  every day.   Empagliflozin-metFORMIN HCl ER (SYNJARDY XR)  MG TABLET SR 24 HR 2023 at 0900 Significant Other Yes No   Sig: Take 1 Tablet by mouth every day. Replaces Jardiance   HYDROcodone/acetaminophen (NORCO)  MG Tab 2023 at AM Significant Other Yes Yes   Sig: Take 1-2 Tablets by mouth every 6 hours as needed for Severe Pain.   amoxicillin (AMOXIL) 500 MG Cap COMPLETE at COMPLETE Significant Other Yes Yes   Sig: Take 500 mg by mouth 3 times a day. 7 days   aspirin (ASA) 81 MG Chew Tab chewable tablet 2023 at 0900 Significant Other No No   Sig: Chew 1 Tablet every day.   atorvastatin (LIPITOR) 80 MG tablet 2023 at PM Significant Other No No   Sig: Take 1 Tablet by mouth every evening. Indications: High Amount of Fats in the Blood   benazepril (LOTENSIN) 40 MG tablet 2023 at 0900 Significant Other No No   Sig: TAKE ONE TABLET BY MOUTH DAILY   buPROPion SR (WELLBUTRIN-SR) 150 MG TABLET SR 12 HR sustained-release tablet 2023 at 0900 Significant Other No No   Sig: Take 1 Tablet by mouth 2 times a day. Indications: Major Depressive Disorder   folic acid (FOLVITE) 1 MG Tab 2023 at 0900 Significant Other No No   Sig: Take 1 Tablet by mouth every day. Indications: ALCOHOL ABUSE   magnesium oxide 400 (240 Mg) MG Tab 2023 at 0900 Significant Other No No   Sig: Take 1 Tablet by mouth every day.   metformin (GLUCOPHAGE) 1000 MG tablet 2023 at PM Significant Other No No   Sig: TAKE ONE TABLET BY MOUTH TWICE A DAY WITH MEALS   Patient taking differently: Take 1,000 mg by mouth every evening.   metoprolol tartrate (LOPRESSOR) 25 MG Tab 2023 at 0900 Significant Other Yes No   Si.5 mg 2 times a day.   omeprazole (PRILOSEC) 20 MG delayed-release capsule 2023 at 0900 Significant Other No No   Sig: Take 1 Capsule by mouth 2 times a day. Indications: Gastroesophageal Reflux Disease   pioglitazone (ACTOS) 30 MG Tab 2023 at 0900 Significant Other  No No   Sig: Take 1 Tablet by mouth every day. Indications: Type 2 Diabetes      Facility-Administered Medications: None       Allergies  No Known Allergies    Physical Exam  Temp:  [36.3 °C (97.3 °F)] 36.3 °C (97.3 °F)  Pulse:  [81-86] 86  Resp:  [18] 18  BP: (114-139)/(60-76) 126/69  SpO2:  [92 %-95 %] 93 %    Physical Exam  Constitutional:       General: He is not in acute distress.     Appearance: He is not ill-appearing or diaphoretic.   HENT:      Head: Atraumatic.      Right Ear: External ear normal.      Left Ear: External ear normal.      Nose: No congestion or rhinorrhea.      Mouth/Throat:      Mouth: Mucous membranes are moist.   Eyes:      General: No scleral icterus.        Right eye: No discharge.         Left eye: No discharge.      Pupils: Pupils are equal, round, and reactive to light.   Cardiovascular:      Rate and Rhythm: Normal rate and regular rhythm.   Pulmonary:      Effort: Pulmonary effort is normal.      Comments: Saturating well on room air.  Is able to speak in full sentences.  Abdominal:      General: There is no distension.   Musculoskeletal:      Cervical back: Neck supple. No rigidity. No muscular tenderness.      Right lower leg: No edema.      Left lower leg: No edema.      Comments: Bruising   Skin:     Coloration: Skin is not jaundiced or pale.   Neurological:      Mental Status: He is alert. He is disoriented.      Coordination: Coordination normal.      Comments: Intermittently and variably oriented x1-2.  Power is 5/5 in both lower extremities.  Bruising on both upper and lower extremities.  There is spinal tenderness in the lumbar region  No paraspinal tenderness.   Psychiatric:         Mood and Affect: Mood normal.         Behavior: Behavior normal.       Laboratory  Recent Labs     11/25/23 1909   WBC 6.9   RBC 4.58*   HEMOGLOBIN 13.2*   HEMATOCRIT 40.7*   MCV 88.9   MCH 28.8   MCHC 32.4   RDW 47.3   PLATELETCT 188   MPV 10.4     Recent Labs     11/25/23 1909   SODIUM  135   POTASSIUM 4.2   CHLORIDE 99   CO2 23   GLUCOSE 236*   BUN 30*   CREATININE 0.83   CALCIUM 8.9     Imaging  CT-LSPINE W/O PLUS RECONS   Final Result      1.  L1 anterior/superior compression fracture with less than 20% height loss      2.  No other fracture      3.  Multilevel facet arthropathy      CT-TSPINE W/O PLUS RECONS   Final Result      1.  Negative for thoracic spine fracture      2.  L1 compression type fracture      3.  Multilevel degenerative change/spondylosis        Assessment/Plan  Normal pressure hydrocephalus (HCC)- (present on admission)  Assessment & Plan  Normal pressure hydrocephalus likely leading to multiple falls  Shunt placement has been offered to the patient, however patient and family have been declining because they are unsure of the benefit of surgery.  I discussed mobility evaluation after high-volume lumbar puncture.  An objective test that may give an idea about potential benefit from surgery.  High-volume lumbar puncture involves a lumbar puncture with removal of, 30 to 50 mL of CSF with documentation of the patient's gait function before and within 30 to 60 minutes after the procedure. Common parameters measured before and after CSF removal include measures of gait speed, stride length, and number of steps it takes to turn 180 or 360 degrees.   I offered performing this test however patient/patient wife declining at this point.   Patient/patient wife are now interested in placement/rehab  I discussed with case management who will work on placement.    DNR (do not resuscitate)- (present on admission)  Assessment & Plan  DNAR/DNI per [facility] 03/16/23      DNI (do not intubate)- (present on admission)  Assessment & Plan  DNAR/DNI per [facility] 03/16/23      Closed compression fracture of body of L1 vertebra (HCC)- (present on admission)  Assessment & Plan  Power is 5/5 in both lower extremities  No saddle anesthesia.  No fecal or urinary incontinence  The patient does have known  urinary retention  Multimodal pain control.  Patient recurrent falls are likely due to normal pressure hydrocephalus.  At this point, patient/patient wife, are interested in placement/rehab  I discussed with case management who will work on placement.    Uncontrolled type 2 diabetes mellitus with hyperglycemia (HCC)- (present on admission)  Assessment & Plan  With hyperglycemia  Last glycated hemoglobin was 10%  I will start short acting insulin for now  I will order Accu-Checks, hypoglycemia protocol  Adjust according to blood sugars trend     Urinary retention- (present on admission)  Assessment & Plan  Recommend checking bladder scans every 8 hours  Consider straight cath for a scan of more than 400 mL   Recommend follow-up with urology    Moderate late onset Alzheimer's dementia without behavioral disturbance, psychotic disturbance, mood disturbance, or anxiety (HCC)- (present on admission)  Assessment & Plan  Try to obtain a window bed.   Continue frequent re-orientation, encourage activity & try to maintain awake daytime state.   Try to avoid /minimize sedating medications as much as possible, reserve only for extreme agitation posing risk to harm self.       Essential hypertension- (present on admission)  Assessment & Plan  Resume metoprolol and BenzePrO with hold parameters     GERD (gastroesophageal reflux disease)- (present on admission)  Assessment & Plan  Resume omeprazole      SCDs, Lovenox prophylactic dose    Thank you for involving us in the care for this patient.  Currently the patient does not meet InterQual criteria for admission.   Hospitalist services will sign off.  Please do not hesitate to reconsult hospitalist services if patient condition changes or you have any other questions.

## 2023-11-26 NOTE — ED NOTES
Pt ambulated 20 feet prior to becoming to tired and requesting to sit down.   Case Management aware and is bedside at this time.

## 2023-11-26 NOTE — ED NOTES
2330- Pt resting on hospital bed, equal rise and fall of chest noted  0153-help pt use urinal at bedside  0240- pt resting on hospital bed equal rise and fall of the chest noted.

## 2023-11-26 NOTE — ASSESSMENT & PLAN NOTE
With hyperglycemia  Last glycated hemoglobin was 10%  I will start short acting insulin for now  I will order Accu-Checks, hypoglycemia protocol  Adjust according to blood sugars trend

## 2023-11-26 NOTE — DISCHARGE PLANNING
Call from BSN at [facility] for assistance with placement. Pt was seen by [facility] TCN earlier today with choice being obtained for SNF also blanket referral sent to [facility] by ER CM.  Await PT/OT evan (ordered). SNF referral placed by Dr Buitrago.

## 2023-11-26 NOTE — ED PROVIDER NOTES
ED Provider Note    CHIEF COMPLAINT  Chief Complaint   Patient presents with    Low Back Pain     Pt fell while outside hurting his lower back. Pt reports having surgery to his back due to a previous all leading to fractures. PT had 3/10 pain but was given IV tylenol and denies pain at this time  PT declines numbness, tingling or weakness to lower legs.        HPI/ROS  LIMITATION TO HISTORY   Select: The patient himself is quite a poor historian and his wife is here assisted with the history  OUTSIDE HISTORIAN(S):  Family the patient's wife is assisting with the history and tells me that the patient falls very frequently, he has a history of normal pressure hydrocephalus but has declined  shunt thus far.    Prabhakar Sierra is a 73 y.o. male who presents to the emergency department brought in by ambulance complaining of low back pain after a fall.  The patient himself is telling me that he fell and he thinks that he may have injured his back as he has pain in the mid back.  He is really unable to provide much additional detail but says he has not recently been sick.  The patient's wife has arrived in the emergency department and clarifies that the patient fell on Tuesday and complained of mid to lower back discomfort but he was kept at home and unfortunately he fell again today and complained of worsening low back pain and could not get up off of the ground so EMS was called and the patient was brought to the emergency department.  Apparently the patient falls quite frequently he has been hospitalized in the past after injury to himself and falls in has also gone to rehabilitation after a spine fracture.  The patient received intravenous Tylenol from the EMS crew prior to arrival as mentioned above he has a history of normal pressure hydrocephalus but  shunt has been declined thus far.  Review of systems: The patient has not recently been ill there is been no fever chills nausea vomiting chest pain  cough or difficulty breathing    PAST MEDICAL HISTORY   has a past medical history of Alcohol use, BPH (benign prostatic hyperplasia), Depression, Fall (2022), GERD (gastroesophageal reflux disease), History of depression (2022), Hypertension, Hyponatremia (2017), Iron deficiency anemia secondary to inadequate dietary iron intake (2021), Mild cognitive impairment (2022), Neuropathic pain, leg, Orthostatic hypotension (1/3/2019), Right hip pain (2016), Thrombocytopenia, unspecified (HCC) (2022), Tobacco use, and Type II or unspecified type diabetes mellitus without mention of complication, not stated as uncontrolled.    SURGICAL HISTORY  patient denies any surgical history    FAMILY HISTORY  Family History   Problem Relation Age of Onset    Heart Disease Mother     Diabetes Father     Other Sister         Mental retardation    Heart Disease Brother     Cancer Brother         Prostate    No Known Problems Daughter     No Known Problems Daughter        SOCIAL HISTORY  Social History     Tobacco Use    Smoking status: Former     Current packs/day: 0.00     Average packs/day: 0.2 packs/day for 50.0 years (7.5 ttl pk-yrs)     Types: Cigarettes     Start date: 1972     Quit date: 2022     Years since quittin.8    Smokeless tobacco: Never   Vaping Use    Vaping Use: Never used   Substance and Sexual Activity    Alcohol use: Yes     Alcohol/week: 4.8 - 6.0 oz     Types: 8 - 10 Glasses of wine per week     Comment: 1-2 glass of wine a week    Drug use: No    Sexual activity: Not on file       CURRENT MEDICATIONS  Home Medications       Reviewed by Octavio Navarro (Pharmacy Tech) on 23 at 1722  Med List Status: Complete     Medication Last Dose Status   amoxicillin (AMOXIL) 500 MG Cap COMPLETE Active   aspirin (ASA) 81 MG Chew Tab chewable tablet 2023 Active   atorvastatin (LIPITOR) 80 MG tablet 2023 Active   benazepril (LOTENSIN) 40 MG tablet 2023  "Active   buPROPion SR (WELLBUTRIN-SR) 150 MG TABLET SR 12 HR sustained-release tablet 11/25/2023 Active   Cholecalciferol (VITAMIN D) 2000 UNIT Tab 11/24/2023 Active   Continuous Blood Gluc Sensor (FREESTYLE MARCEL 3 SENSOR) Misc 11/16/2023 Active   DULoxetine (CYMBALTA) 60 MG Cap DR Particles delayed-release capsule 11/25/2023 Active   Empagliflozin (JARDIANCE) 25 MG Tab 11/25/2023 Active   Empagliflozin-metFORMIN HCl ER (SYNJARDY XR)  MG TABLET SR 24 HR 11/25/2023 Active   folic acid (FOLVITE) 1 MG Tab 11/25/2023 Active   HYDROcodone/acetaminophen (NORCO)  MG Tab 11/25/2023 Active   magnesium oxide 400 (240 Mg) MG Tab 11/25/2023 Active   metformin (GLUCOPHAGE) 1000 MG tablet 11/24/2023 Active   metoprolol tartrate (LOPRESSOR) 25 MG Tab 11/25/2023 Active   omeprazole (PRILOSEC) 20 MG delayed-release capsule 11/25/2023 Active   pioglitazone (ACTOS) 30 MG Tab 11/25/2023 Active                    ALLERGIES  No Known Allergies    PHYSICAL EXAM  VITAL SIGNS: /69   Pulse 86   Temp 36.3 °C (97.3 °F) (Temporal)   Resp 18   Ht 1.778 m (5' 10\")   Wt 83 kg (183 lb)   SpO2 95%   BMI 26.26 kg/m²    Constitutional: Awake verbal he does not appear distressed  HENT: No marks or signs of acute trauma to the head  Eyes: Extraocular motion present no erythema discharge or jaundice  Neck: C-spine nontender trachea midline no JVD  Cardiovascular: Regular rate and rhythm  Respiratory: Clear bilaterally with no apparent difficulty breathing  Abdomen: Soft nontender nondistended no rebound guarding or peritoneal findings bowel sounds are present  Back: The patient has mild tenderness to the thoracolumbar junction on palpation  Skin: Warm and dry  Musculoskeletal: No acute bony deformity  Neurologic: Awake verbal moving all extremities without difficulty      DIAGNOSTIC STUDIES / PROCEDURES    LABS  CBC shows normal white blood cell count of 6.9 hemoglobin is adequate at 13.2 basic metabolic panel shows slightly " elevated glucose of 236 LFTs are unremarkable urinalysis is negative for nitrite and leukocyte Estrace glucose was present and there were trace ketones.    RADIOLOGY  Radiologist interpretation:   CT-LSPINE W/O PLUS RECONS   Final Result      1.  L1 anterior/superior compression fracture with less than 20% height loss      2.  No other fracture      3.  Multilevel facet arthropathy      CT-TSPINE W/O PLUS RECONS   Final Result      1.  Negative for thoracic spine fracture      2.  L1 compression type fracture      3.  Multilevel degenerative change/spondylosis            COURSE & MEDICAL DECISION MAKING  In the emergency department the patient's EMS IV was maintained and he remains hemodynamically stable.  As mentioned above the patient received intravenous Tylenol prior to arrival and he said that that was very helpful at controlling his pain.  I have offered pain medication on a couple of occasion and thus far he has declined.  I have reviewed all the findings with the patient and his wife and the patient's wife feels that the patient is not safe to go home and he was not able to get himself up off of the floor and does fall frequently.  We did a trial of ambulation in the emergency department and with light assistance and his walker the patient was able to walk about 10 steps but could do no more than that because of low back discomfort.  I have reviewed the case with the hospitalist Dr. Sy who has seen the patient and spoken with the patient and his wife and has written a hospitalist consultation.  Apparently the wife is fearful about placement of a  shunt, the hospitalist has spoken to her about a possible large-volume lumbar puncture as a trial to see if this improves the patient's symptoms and I have spoken with the hospitalist about this apparently there is a protocol where physical therapy evaluates the patient he then has a 30 cc withdrawal of spinal fluid and then physical therapy reevaluate him  and if there is a significant difference it is felt that  shunt would be helpful.  The patient's wife wants to think about this for now but this would be an option in the future.  I have reviewed the case with our  and unfortunately the patient does not meet criteria for acute care hospitalization however case management will make efforts to find a nursing home or rehabilitation facility appropriate for his level of care.    The patient is placed in ER observation status at 7 PM on November 25, 2023    DISPOSITION AND DISCUSSIONS  I have discussed management of the patient with the following physicians and DEDE's: I reviewed this case with the hospitalist who has seen the patient and provided a consultation    Discussion of management with other [facility] or appropriate source(s): Case Management I have reviewed this case with case management they have seen the patient and will begin the process of finding placement for him      FINAL DIAGNOSIS  1. 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 Impaired mobility and ADLs    3. Tremor, coarse    4.  L1 compression fracture with 20% loss of height,  5.  Frequent falling  6.  Hyperglycemia       Electronically signed by: Venu Buitrago M.D., 11/25/2023 7:55 PM

## 2023-11-26 NOTE — ASSESSMENT & PLAN NOTE
Recommend checking bladder scans every 8 hours  Consider straight cath for a scan of more than 400 mL   Recommend follow-up with urology

## 2023-11-26 NOTE — ED NOTES
Patient resting on hospital bed, no signs of distress. Equal chest rise and fall noted. Call light within reach

## 2023-11-26 NOTE — DISCHARGE PLANNING
[facility] TCN chart review completed. Collaborated with DIP[facility] Simmons prior to meeting with the pt. The most current review of medical record, knowledge of pt's PLOF and social support, LACE+ score of 79 was considered.  No 6 clicks scores.    Pt seen at bedside. Introduced TCN program. Provided education regarding post acute levels of care. Discussed [facility] plan benefits (Meds to Beds, medical uber and [facility] transitional care). Pt verbalizes understanding.     Patient seen at bedside with spouse Heidy via mobile phone.  HIPAA verified.  Spouse Heidy states patient required intermittent assistance ADL's and mobility secondary to dementia but at baseline was able to ambulate with FWW at a supervised to CGA level with mobility at baseline including a flight of 18 stairs.   She states she completed all IADL's and is his primary caregiver.  Spouse states they take medical UBER to medical appointments otherwise she provides all transportation.  She states he has a FWW, shower chair, HH shower head, grab bars net to the toilets,  built in RTS with a bidet and a W/C transport chair.  She states that he is not at his baseline level of function and that it took multiple family members to assist him with transfers over Thanksgiving.  She also states he has had a few falls the last couple weeks.  Spouse is agreeable for SNF rehab and states her plan is for him to discharge back home with spouse.        Choice proactively obtained for SNF ([facility]. [facility] 2. [facility]), faxed to [facility] and left for [facility].  TCN will continue to follow and collaborate with discharge planning team as additional post acute needs arise. Thank you.     Completed today:  Awaiting PT/OT recommendations.    Choice obtained: SNF ([facility]. [facility]. [facility]) on 11/26/23.    SCP with [facility] PCP.

## 2023-11-27 ENCOUNTER — TELEPHONE (OUTPATIENT)
Dept: INTERNAL MEDICINE | Facility: OTHER | Age: 73
End: 2023-11-27
Payer: MEDICARE

## 2023-11-27 VITALS
DIASTOLIC BLOOD PRESSURE: 69 MMHG | HEART RATE: 97 BPM | RESPIRATION RATE: 18 BRPM | BODY MASS INDEX: 26.2 KG/M2 | WEIGHT: 183 LBS | OXYGEN SATURATION: 94 % | TEMPERATURE: 97.6 F | SYSTOLIC BLOOD PRESSURE: 135 MMHG | HEIGHT: 70 IN

## 2023-11-27 LAB
GLUCOSE BLD STRIP.AUTO-MCNC: 268 MG/DL (ref 65–99)
GLUCOSE BLD STRIP.AUTO-MCNC: 310 MG/DL (ref 65–99)

## 2023-11-27 PROCEDURE — 97165 OT EVAL LOW COMPLEX 30 MIN: CPT

## 2023-11-27 PROCEDURE — 96372 THER/PROPH/DIAG INJ SC/IM: CPT

## 2023-11-27 PROCEDURE — A9270 NON-COVERED ITEM OR SERVICE: HCPCS | Performed by: HOSPITALIST

## 2023-11-27 PROCEDURE — 97535 SELF CARE MNGMENT TRAINING: CPT

## 2023-11-27 PROCEDURE — 700102 HCHG RX REV CODE 250 W/ 637 OVERRIDE(OP): Performed by: HOSPITALIST

## 2023-11-27 PROCEDURE — 82962 GLUCOSE BLOOD TEST: CPT | Mod: 91

## 2023-11-27 PROCEDURE — 97162 PT EVAL MOD COMPLEX 30 MIN: CPT

## 2023-11-27 RX ADMIN — ASPIRIN 81 MG 81 MG: 81 TABLET ORAL at 06:00

## 2023-11-27 RX ADMIN — DOCUSATE SODIUM 50 MG AND SENNOSIDES 8.6 MG 2 TABLET: 8.6; 5 TABLET, FILM COATED ORAL at 06:00

## 2023-11-27 RX ADMIN — DULOXETINE HYDROCHLORIDE 60 MG: 30 CAPSULE, DELAYED RELEASE ORAL at 08:02

## 2023-11-27 RX ADMIN — FOLIC ACID 1 MG: 1 TABLET ORAL at 06:00

## 2023-11-27 RX ADMIN — MAGNESIUM GLUCONATE 500 MG ORAL TABLET 400 MG: 500 TABLET ORAL at 06:00

## 2023-11-27 RX ADMIN — BUPROPION HYDROCHLORIDE 150 MG: 150 TABLET, FILM COATED, EXTENDED RELEASE ORAL at 08:02

## 2023-11-27 RX ADMIN — METOPROLOL TARTRATE 12.5 MG: 25 TABLET, FILM COATED ORAL at 06:00

## 2023-11-27 RX ADMIN — BENAZEPRIL HYDROCHLORIDE 40 MG: 10 TABLET, FILM COATED ORAL at 06:00

## 2023-11-27 RX ADMIN — INSULIN HUMAN 4 UNITS: 100 INJECTION, SOLUTION PARENTERAL at 07:00

## 2023-11-27 RX ADMIN — INSULIN HUMAN 3 UNITS: 100 INJECTION, SOLUTION PARENTERAL at 11:44

## 2023-11-27 RX ADMIN — OMEPRAZOLE 20 MG: 20 CAPSULE, DELAYED RELEASE ORAL at 08:02

## 2023-11-27 ASSESSMENT — GAIT ASSESSMENTS
ASSISTIVE DEVICE: FRONT WHEEL WALKER
DEVIATION: ATAXIC;STEP TO;DECREASED BASE OF SUPPORT;BRADYKINETIC;SHUFFLED GAIT;DECREASED HEEL STRIKE;DECREASED TOE OFF
GAIT LEVEL OF ASSIST: MINIMAL ASSIST
DISTANCE (FEET): 80

## 2023-11-27 ASSESSMENT — COGNITIVE AND FUNCTIONAL STATUS - GENERAL
HELP NEEDED FOR BATHING: A LITTLE
DAILY ACTIVITIY SCORE: 17
MOBILITY SCORE: 11
STANDING UP FROM CHAIR USING ARMS: A LOT
TURNING FROM BACK TO SIDE WHILE IN FLAT BAD: A LITTLE
DRESSING REGULAR LOWER BODY CLOTHING: A LOT
DRESSING REGULAR UPPER BODY CLOTHING: A LITTLE
WALKING IN HOSPITAL ROOM: A LITTLE
PERSONAL GROOMING: A LITTLE
TOILETING: A LOT
SUGGESTED CMS G CODE MODIFIER DAILY ACTIVITY: CK
CLIMB 3 TO 5 STEPS WITH RAILING: TOTAL
MOVING FROM LYING ON BACK TO SITTING ON SIDE OF FLAT BED: UNABLE
SUGGESTED CMS G CODE MODIFIER MOBILITY: CL
MOVING TO AND FROM BED TO CHAIR: UNABLE

## 2023-11-27 ASSESSMENT — ACTIVITIES OF DAILY LIVING (ADL): TOILETING: REQUIRES ASSIST

## 2023-11-27 NOTE — DISCHARGE PLANNING
note:  PT/OT notes in.  CM notified [facility]. They will screen and call CM back.    Message sent to Azucena at [facility].

## 2023-11-27 NOTE — DISCHARGE PLANNING
HTH/SCP TCN chart review completed. Noted patient remains in ED level of care pending anticipated discharge to SNF level of cre. Appreciate PT and OT consult recommendations 11/27/2023 noting recommendations for post-acute placement.     Collaborated with DIPTI Beebe. Discussed current discharge considerations noting CM awaiting acceptance to patient choice of facilities ([facility]). Should [facility] and/or [facility] decline, noted patient acceptance to [facility].     TCN remains available to provide collaboration and support in patient discharge planning, noting anticipated discharge to SNF level of care, pending facility acceptance and patient choice. Thank you.     Completed  PT with recommendations for post-acute placement for additional physical therapy services prior to discharge home (may benefit from long term placement such as group home or [facility] due to frequent hospital admissions and SNF admissions)  on 11/27/2023  OT with recommendations noted to post-acute placement for additional occupational therapy services prior to discharge home on 11/27/2023  Choice obtained:  SNF (1. [facility] 2. [facility]) on 11/26/23.  CM awaiting facility acceptance to facility choice. If not, CM sent referrals to local [facility] SNFs with acceptance to [facility]  SCP with [facility] PCP

## 2023-11-27 NOTE — DISCHARGE PLANNING
Case Management Discharge Planning    Admission Date: 11/25/2023  GMLOS:    ALOS: 0    6-Clicks ADL Score: 17  6-Clicks Mobility Score: 11  PT and/or OT Eval ordered: Yes  Post-acute Referrals Ordered: Yes  Post-acute Choice Obtained: Yes  Has referral(s) been sent to post-acute provider:  Yes      Anticipated Discharge Dispo: Discharge Disposition: D/T to SNF with medicare cert w/planned hosp IP readmit (83)  [facility] HAS ACCEPTED    DME Needed: none    Action(s) Taken: Pt has been accepted by [facility] at [facility]. They want transportation assistance.     [facility] transport  Cost$ 123.09  Trip #[id number]  Pt has [employer] Insurance.    3-330pm.      Escalations Completed: n/a    Medically Clear: Yes    Next Steps: n/a    Barriers to Discharge: none    Is the patient up for discharge tomorrow: today

## 2023-11-27 NOTE — TELEPHONE ENCOUNTER
Avel f/u CGA call placed to [facility]. She reports that Don broke his back and is waiting to be placed in rehab. Hasn't had a chance to look at packet at all, will call me if she has any questions. Thought something else ws being mailed to her? Prn f/u.

## 2023-11-27 NOTE — DISCHARGE SUMMARY
"  ED Observation Discharge Summary    Patient:Prabhakar Sierra  Patient : 1950  Patient MRN: [id number]  Patient PCP: Stanton Miller M.D.    Admit Date: 2023  Discharge Date and Time: 23 9:21 AM  Discharge Diagnosis:   1. 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 Impaired mobility and ADLs    3. Tremor, coarse      Discharge Attending: Ivan Reddy M.D.  Discharge Service: ED Observation    ED Course  Prabhakar is a 73 y.o. male who was evaluated at [facility] with history of  shunt, and falls.  Patient was evaluated by partner, patient was medically cleared, patient had CT imaging which showed a L1 compression fracture not requiring any surgery.  Patient was placed in ED observation while awaiting placement in SNF given his increasing medical needs and falls.  Case management worked diligently and identified an appropriate placement for patient.     Discharge Exam:  /82   Pulse 84   Temp 36.3 °C (97.3 °F) (Temporal)   Resp 18   Ht 1.778 m (5' 10\")   Wt 83 kg (183 lb)   SpO2 92%   BMI 26.26 kg/m² .    Constitutional: Awake and alert. Nontoxic  HENT:  Grossly normal  Eyes: Grossly normal  Neck: Normal range of motion  Cardiovascular: Normal heart rate   Thorax & Lungs: No respiratory distress  Abdomen: Nontender  Skin:  No pathologic rash.   Extremities: Well perfused  Psychiatric: Affect normal    Labs  Results for orders placed or performed during the hospital encounter of 23   URINALYSIS CULTURE, IF INDICATED    Specimen: Urine, Clean Catch   Result Value Ref Range    Color Yellow     Character Clear     Specific Gravity 1.010 <1.035    Ph 5.5 5.0 - 8.0    Glucose >=1000 (A) Negative mg/dL    Ketones Trace (A) Negative mg/dL    Protein Negative Negative mg/dL    Bilirubin Negative Negative    Nitrite Negative Negative    Leukocyte Esterase Negative Negative    Occult Blood Negative Negative    Micro Urine Req see below    CBC WITH DIFFERENTIAL "   Result Value Ref Range    WBC 6.9 4.8 - 10.8 K/uL    RBC 4.58 (L) 4.70 - 6.10 M/uL    Hemoglobin 13.2 (L) 14.0 - 18.0 g/dL    Hematocrit 40.7 (L) 42.0 - 52.0 %    MCV 88.9 81.4 - 97.8 fL    MCH 28.8 27.0 - 33.0 pg    MCHC 32.4 32.3 - 36.5 g/dL    RDW 47.3 35.9 - 50.0 fL    Platelet Count 188 164 - 446 K/uL    MPV 10.4 9.0 - 12.9 fL    Neutrophils-Polys 69.80 44.00 - 72.00 %    Lymphocytes 18.30 (L) 22.00 - 41.00 %    Monocytes 8.20 0.00 - 13.40 %    Eosinophils 2.70 0.00 - 6.90 %    Basophils 0.60 0.00 - 1.80 %    Immature Granulocytes 0.40 0.00 - 0.90 %    Nucleated RBC 0.00 0.00 - 0.20 /100 WBC    Neutrophils (Absolute) 4.83 1.82 - 7.42 K/uL    Lymphs (Absolute) 1.27 1.00 - 4.80 K/uL    Monos (Absolute) 0.57 0.00 - 0.85 K/uL    Eos (Absolute) 0.19 0.00 - 0.51 K/uL    Baso (Absolute) 0.04 0.00 - 0.12 K/uL    Immature Granulocytes (abs) 0.03 0.00 - 0.11 K/uL    NRBC (Absolute) 0.00 K/uL   COMP METABOLIC PANEL   Result Value Ref Range    Sodium 135 135 - 145 mmol/L    Potassium 4.2 3.6 - 5.5 mmol/L    Chloride 99 96 - 112 mmol/L    Co2 23 20 - 33 mmol/L    Anion Gap 13.0 7.0 - 16.0    Glucose 236 (H) 65 - 99 mg/dL    Bun 30 (H) 8 - 22 mg/dL    Creatinine 0.83 0.50 - 1.40 mg/dL    Calcium 8.9 8.4 - 10.2 mg/dL    Correct Calcium 8.9 8.5 - 10.5 mg/dL    AST(SGOT) 10 (L) 12 - 45 U/L    ALT(SGPT) 11 2 - 50 U/L    Alkaline Phosphatase 66 30 - 99 U/L    Total Bilirubin 0.3 0.1 - 1.5 mg/dL    Albumin 4.0 3.2 - 4.9 g/dL    Total Protein 6.5 6.0 - 8.2 g/dL    Globulin 2.5 1.9 - 3.5 g/dL    A-G Ratio 1.6 g/dL   ESTIMATED GFR   Result Value Ref Range    GFR (CKD-EPI) 92 >60 mL/min/1.73 m 2   POCT glucose device results   Result Value Ref Range    POC Glucose, Blood 234 (H) 65 - 99 mg/dL   POCT glucose device results   Result Value Ref Range    POC Glucose, Blood 121 (H) 65 - 99 mg/dL   POCT glucose device results   Result Value Ref Range    POC Glucose, Blood 209 (H) 65 - 99 mg/dL   POCT glucose device results   Result Value  Ref Range    POC Glucose, Blood 272 (H) 65 - 99 mg/dL   POCT glucose device results   Result Value Ref Range    POC Glucose, Blood 243 (H) 65 - 99 mg/dL   POCT glucose device results   Result Value Ref Range    POC Glucose, Blood 310 (H) 65 - 99 mg/dL     *Note: Due to a large number of results and/or encounters for the requested time period, some results have not been displayed. A complete set of results can be found in Results Review.       Radiology  CT-LSPINE W/O PLUS RECONS   Final Result      1.  L1 anterior/superior compression fracture with less than 20% height loss      2.  No other fracture      3.  Multilevel facet arthropathy      CT-TSPINE W/O PLUS RECONS   Final Result      1.  Negative for thoracic spine fracture      2.  L1 compression type fracture      3.  Multilevel degenerative change/spondylosis          Medications:   New Prescriptions    No medications on file       My final assessment includes   1. 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 Impaired mobility and ADLs    3. Tremor, coarse        Upon Reevaluation, the patient's condition has: Not improved and therefore patient will be placed in skilled nursing facility    Patient to be discharged from ED Observation status at 3pm (Time) 11/27/23 (Date).     Total time spent on this ED Observation discharge encounter is > 30 Minutes    Electronically signed by: Ivan Reddy M.D., 11/27/2023 9:21 AM

## 2023-11-27 NOTE — DISCHARGE PLANNING
note:  Updates given to wife. Explained, CM is waiting for [facility] or [facility] to confirm acceptance then we will dc. Wife will be coming in before lunch.

## 2023-11-27 NOTE — DISCHARGE PLANNING
EDCM provided update that patient is an anticipated discharge to [facility] today. Per chart review, SCP auth complete for anticipated discharge. No further acute TCN needs anticipated in patient discharge planning at this time; although TCN remains available to further collaborate should needs arise in discharge planning necessitating TCN involvement prior to anticipated discharge. Thank you.     Completed  PT with recommendations for post-acute placement for additional physical therapy services prior to discharge home (may benefit from long term placement such as group home or skilled nursing due to frequent hospital admissions and SNF admissions)  on 11/27/2023  OT with recommendations noted to post-acute placement for additional occupational therapy services prior to discharge home on 11/27/2023  Choice obtained:  SNF ([facility]. [facility] 2[facility]). Noted patient is an anticipated discharge to SNF level of care at [facility] today  SCP with [facility] PCP

## 2023-11-27 NOTE — THERAPY
Physical Therapy   Initial Evaluation     Patient Name: Prabhakar Sierra  Age:  73 y.o., Sex:  male  Medical Record #: [id number]  Today's Date: 11/27/2023     Precautions  Precautions: (P) Fall Risk  Comments: (P) hx of dementia, NPH    Assessment  Patient is 73 y.o. male who was recently admitted for multiple falls, generalized weakness, and L1 compression fracture. 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 Pt also has a hx of dementia. Pt was agreeable to therapy evaluation and presented to PT with impaired balance, impaired gait, weakness, impaired coordination, poor safety awareness, and dec activity tolerance. These impairments are limiting his ability safely perform bed mobility, sit<>stands, transfers, ambulation, and stair navigation. Pt is currently at a high fall risk and requires Min A for all upright mobility at this time with frequent manual navigation and faciliation of FWW for appropriate mechanics in order to reduce risk of falls. With current functional mobility pt will benefit from skilled PT while in house with recommendation for post acute therapy prior to d/c home. Pt has been in/out of the hospital setting and into post acute therapy services a few times in the past and may require long term assistance after post acute therapy such as group home or [facility] if spouse assist and 2 story home are not able to accommodate for his needs.     Plan    Physical Therapy Initial Treatment Plan   Treatment Plan : (P) Bed Mobility, Equipment, Gait Training, Manual Therapy, Neuro Re-Education / Balance, Self Care / Home Evaluation, Stair Training, Therapeutic Activities, Therapeutic Exercise  Treatment Frequency: (P) 4 Times per Week  Duration: (P) Until Therapy Goals Met    DC Equipment Recommendations: (P) Unable to determine at this time  Discharge Recommendations: (P) Recommend post-acute placement for additional physical therapy services prior to discharge home (may benefit  from long term placement such as group home or prison due to frequent hospital admissions and SNF admissions)     Objective       11/27/23 0935   Initial Contact Note    Initial Contact Note Order Received and Verified, Physical Therapy Evaluation in Progress with Full Report to Follow.   Precautions   Precautions Fall Risk   Comments hx of dementia, NPH   Prior Living Situation   Prior Services Intermittent Physical Support for ADL Per Family   Housing / Facility 2 Story House   Steps Into Home 1   Steps In Home 14   Rail Both Rail (Steps in Home)   Equipment Owned Front-Wheel Walker   Lives with - Patient's Self Care Capacity Spouse   Comments pt states spouse is at home and can assist upon d/c to home, however, with current fall risk spouse is unable to assist   Prior Level of Functional Mobility   Bed Mobility Independent   Transfer Status Independent   Ambulation Independent   Ambulation Distance   (household distances)   Assistive Devices Used Front-Wheel Walker   Stairs Independent   Comments reports of an IPLOF   History of Falls   History of Falls Yes   Date of Last Fall   (states of several falls during the week, reason for admit)   Cognition    Cognition / Consciousness X   Orientation Level Not Oriented to Year;Not Oriented to Month;Not Oriented to Day   Attention Impaired   Sequencing Impaired   Comments pt has a hx of dementia, was able to provide accurate information about home set up, however, unable to state correct day or year   Passive ROM Lower Body   Passive ROM Lower Body WDL   Active ROM Lower Body    Active ROM Lower Body  WDL   Strength Lower Body   Lower Body Strength  X   Gross Strength Generalized Weakness, Equal Bilaterally   Comments presents with functional strength for ambulation and sit<>stands   Sensation Lower Body   Lower Extremity Sensation   WDL   Lower Body Muscle Tone   Lower Body Muscle Tone  WDL   Neurological Concerns   Neurological Concerns Yes   Comments hx of NPH    Coordination Upper Body   Coordination WDL   Coordination Lower Body    Coordination Lower Body  X   Comments poor SANDRA and foot placement during ambulation   Balance Assessment   Sitting Balance (Static) Fair   Sitting Balance (Dynamic) Fair -   Standing Balance (Static) Fair -   Standing Balance (Dynamic) Poor +   Weight Shift Sitting Fair   Weight Shift Standing Fair   Comments w/fww use, demonstrates with kyphotic posture, anterior lean, and poor positioning of FWW during ambulation   Bed Mobility    Supine to Sit Minimal Assist   Sit to Supine Minimal Assist   Scooting Moderate Assist   Comments HOB elevated and rails up, pt required most bk with bringing hips to EOB   Gait Analysis   Gait Level Of Assist Minimal Assist   Assistive Device Front Wheel Walker   Distance (Feet) 80   # of Times Distance was Traveled 1   Deviation Ataxic;Step To;Decreased Base Of Support;Bradykinetic;Shuffled Gait;Decreased Heel Strike;Decreased Toe Off   Weight Bearing Status fwb   Comments presents with poor gait mechanics increasing his risk for falls   Functional Mobility   Sit to Stand Contact Guard Assist   Bed, Chair, Wheelchair Transfer Minimal Assist   Toilet Transfers Contact Guard Assist   Transfer Method Stand Step   Mobility EOB, sit<>stand, ambulation, to toilet, back to bed supine   How much difficulty does the patient currently have...   Turning over in bed (including adjusting bedclothes, sheets and blankets)? 3   Sitting down on and standing up from a chair with arms (e.g., wheelchair, bedside commode, etc.) 1   Moving from lying on back to sitting on the side of the bed? 1   How much help from another person does the patient currently need...   Moving to and from a bed to a chair (including a wheelchair)? 2   Need to walk in a hospital room? 3   Climbing 3-5 steps with a railing? 1   6 clicks Mobility Score 11   Activity Tolerance   Sitting in Chair 1-2 mins on toilet   Sitting Edge of Bed 8 mins   Standing 6  mins   Comments limited by fatigue and risk for falls   Edema / Skin Assessment   Edema / Skin  Not Assessed   Patient / Family Goals    Patient / Family Goal #1 to go home   Short Term Goals    Short Term Goal # 1 pt will go supine<>sit w/hob flat and rails down w/spv in 6tx   Short Term Goal # 2 pt will go sit<>stand w/fww w/spv in 6tx   Short Term Goal # 3 pt will transfer bed<>chair w/fww w/spv in 6tx   Short Term Goal # 4 pt will ambulate 150ft w/fww w/spv in 6tx   Short Term Goal # 5 pt will go up/down 14 steps w/spv w/B railing use in 6tx   Education Group   Education Provided Role of Physical Therapist   Role of Physical Therapist Patient Response Patient;Acceptance;Explanation;Demonstration;Verbal Demonstration;Action Demonstration   Physical Therapy Initial Treatment Plan    Treatment Plan  Bed Mobility;Equipment;Gait Training;Manual Therapy;Neuro Re-Education / Balance;Self Care / Home Evaluation;Stair Training;Therapeutic Activities;Therapeutic Exercise   Treatment Frequency 4 Times per Week   Duration Until Therapy Goals Met   Problem List    Problems Impaired Bed Mobility;Impaired Transfers;Impaired Ambulation;Functional Strength Deficit;Impaired Balance;Impaired Coordination;Decreased Activity Tolerance;Safety Awareness Deficits / Cognition;Motor Planning / Sequencing   Anticipated Discharge Equipment and Recommendations   DC Equipment Recommendations Unable to determine at this time   Discharge Recommendations Recommend post-acute placement for additional physical therapy services prior to discharge home  (may benefit from long term placement such as group home or [facility] due to frequent hospital admissions and SNF admissions)   Interdisciplinary Plan of Care Collaboration   IDT Collaboration with  Nursing   Patient Position at End of Therapy In Bed;Call Light within Reach;Tray Table within Reach;Phone within Reach;Bed Alarm On   Collaboration Comments aware of visit and recs   Session Information   Date  / Session Number  11/27-1 (1/4, 12/3)

## 2023-11-27 NOTE — ED NOTES
Pt medicated per MAR, given breakfast tray, denied having any other needs at this time, no distress noted;

## 2023-11-27 NOTE — DISCHARGE PLANNING
note:  Talked to Azucena at [facility] who will check if they have beds today.     PT/OT at bedside.

## 2023-11-27 NOTE — DISCHARGE PLANNING
note:  Talked to wife, she will meet pt at [facility] at around 4pm.     Cobra and packet completed.   Delivered to nursing station.

## 2023-11-27 NOTE — THERAPY
Occupational Therapy   Initial Evaluation     Patient Name: Prabhakar Sierra  Age:  73 y.o., Sex:  male  Medical Record #: [id number]  Today's Date: 11/27/2023     Precautions  Precautions: Fall Risk  Comments: hx of dementia, NPH    Assessment  Patient is 73 y.o. male presented 11/25/23 after multiple falls at home and back pain.  PMH DM II, hypertension, abnormal pressure hydrocephalus.  Additional factors influencing patient status / progress: Impaired safety awareness, impaired balance, limited activity tolerance.   TC to wife - Pt and wife reside in a 2 story [facility] in [place], [place].  Living area is on the second floor. Wife is the patient's primary caregiver.  Wife is available to assist as needed.  PLOF Min assist to Supervision for clothing management, incontinent of bladder, Sup to Max assist for BM, Min assist shower, Mod I eating, Supervision G&H, Sup to Min assist transfers, Sup FWW.  Therapist reviewed environmental/home safety, fall precautions, AE/DME, ADL's and transfers.    Plan    Occupational Therapy Initial Treatment Plan   Treatment Interventions: (P) Self Care / Activities of Daily Living, Therapeutic Exercises, Therapeutic Activity, Adaptive Equipment  Treatment Frequency: (P) 3 Times per Week  Duration: (P) Until Therapy Goals Met    DC Equipment Recommendations: (P) Unable to determine at this time  Discharge Recommendations: (P) Recommend post-acute placement for additional occupational therapy services prior to discharge home     Subjective    Pt was alert and cooperative w/ tx.     Objective       11/27/23 0933    Services   Is patient using  services for this encounter? No   Initial Contact Note    Initial Contact Note Order Received and Verified, Occupational Therapy Evaluation in Progress with Full Report to Follow.   Prior Living Situation   Prior Services Intermittent Physical Support for ADL Per Family   Housing / Facility 2 Story House   Steps Into Home 1    Steps In Home 18   Rail Both Rail (Steps in Home)   Bathroom Set up Walk In Shower;Grab Bars;Shower Chair   Equipment Owned Front-Wheel Walker;Tub / Shower Seat;Grab Bar(s) In Tub / Shower;Grab Bar(s) By Toilet   Lives with - Patient's Self Care Capacity Spouse   Comments TC to wife - Pt and wife reside in a 2 story [facility] in [place], [place].  Living area is on the second floor. Wife is the patient's primary caregiver.  Wife is available to assist as needed.  PLOF Min assist to Supervision for clothing management, incontinent of bladder, Sup to Max assist for BM, Min assist shower, Mod I eating, Supervision G&H, Sup to Min assist transfers, Sup FWW.   Prior Level of ADL Function   Self Feeding Independent   Grooming / Hygiene Independent   Bathing Requires Assist   Dressing Requires Assist   Toileting Requires Assist  (Incont of bladder)   Prior Level of IADL Function   Medication Management Dependent   Laundry Dependent   Kitchen Mobility Other (Comments)  (Sup FWW)   Home Management Dependent   Prior Level Of Mobility Supervision With Device in Home   Driving / Transportation Relatives / Others Provide Transportation   Occupation (Pre-Hospital Vocational) Retired Due To Age   History of Falls   History of Falls Yes   Date of Last Fall 11/25/23  (Wife reported pt w/ 2-3 falls/week.)   Precautions   Precautions Fall Risk   Comments Hx dementia, L1 compression fx   Cognition    Cognition / Consciousness X   Safety Awareness Impaired   Attention Impaired   Sequencing Impaired   Passive ROM Upper Body   Passive ROM Upper Body WDL   Active ROM Upper Body   Active ROM Upper Body  WDL   Dominant Hand Right   Strength Upper Body   Upper Body Strength  WDL   Upper Body Muscle Tone   Upper Body Muscle Tone  WDL   Coordination Upper Body   Coordination WDL   Balance Assessment   Sitting Balance (Static) Fair   Sitting Balance (Dynamic) Fair -   Standing Balance (Static) Fair -   Standing Balance (Dynamic) Poor +   Weight Shift  Sitting Fair   Weight Shift Standing Fair   Comments OOB FWW   Bed Mobility    Supine to Sit Minimal Assist   Sit to Supine Minimal Assist   ADL Assessment   Upper Body Dressing Minimal Assist   Lower Body Dressing Maximal Assist   How much help from another person does the patient currently need...   Putting on and taking off regular lower body clothing? 2   Bathing (including washing, rinsing, and drying)? 3   Toileting, which includes using a toilet, bedpan, or urinal? 2   Putting on and taking off regular upper body clothing? 3   Taking care of personal grooming such as brushing teeth? 3   Eating meals? 4   6 Clicks Daily Activity Score 17   Functional Mobility   Sit to Stand Contact Guard Assist   Bed, Chair, Wheelchair Transfer Minimal Assist   Toilet Transfers Contact Guard Assist   Transfer Method Stand Step   Mobility CGA FWW   Edema / Skin Assessment   Edema / Skin  Not Assessed   Short Term Goals   Short Term Goal # 1 Min assist LB clothing management   Short Term Goal # 2 Mod I UB clothing management   Short Term Goal # 3 Sup toilet transfer via DME   Education Group   Education Provided Home Safety;Transfers;Role of Occupational Therapist;Activities of Daily Living;Use of Call Light   Role of Occupational Therapist Patient Response Patient;Acceptance;Explanation;Verbal Demonstration   Home Safety Patient Response Patient;Acceptance;Explanation;Demonstration;Verbal Demonstration   Transfers Patient Response Patient;Acceptance;Explanation;Demonstration;Teach Back;Verbal Demonstration;Action Demonstration;Reinforcement Needed   ADL Patient Response Patient;Acceptance;Explanation;Demonstration;Teach Back;Verbal Demonstration;Action Demonstration;Reinforcement Needed   Use of Call Light Patient Response Patient;Acceptance;Explanation;Demonstration;Verbal Demonstration   Occupational Therapy Initial Treatment Plan    Treatment Interventions Self Care / Activities of Daily Living;Therapeutic  Exercises;Therapeutic Activity;Adaptive Equipment   Treatment Frequency 3 Times per Week   Duration Until Therapy Goals Met   Problem List   Problem List Decreased Active Daily Living Skills;Decreased Functional Mobility;Decreased Activity Tolerance;Safety Awareness Deficits / Cognition;Impaired Postural Control / Balance   Anticipated Discharge Equipment and Recommendations   DC Equipment Recommendations Unable to determine at this time   Discharge Recommendations Recommend post-acute placement for additional occupational therapy services prior to discharge home

## 2023-12-01 ENCOUNTER — PATIENT OUTREACH (OUTPATIENT)
Dept: HEALTH INFORMATION MANAGEMENT | Facility: OTHER | Age: 73
End: 2023-12-01
Payer: MEDICARE

## 2023-12-01 DIAGNOSIS — I10 ESSENTIAL HYPERTENSION: ICD-10-CM

## 2023-12-04 RX ORDER — DIPHENHYDRAMINE HYDROCHLORIDE 50 MG/ML
50 INJECTION INTRAMUSCULAR; INTRAVENOUS PRN
OUTPATIENT
Start: 2023-12-11

## 2023-12-04 RX ORDER — 0.9 % SODIUM CHLORIDE 0.9 %
3 VIAL (ML) INJECTION PRN
OUTPATIENT
Start: 2023-12-11

## 2023-12-04 RX ORDER — 0.9 % SODIUM CHLORIDE 0.9 %
VIAL (ML) INJECTION PRN
OUTPATIENT
Start: 2023-12-11

## 2023-12-04 RX ORDER — EPINEPHRINE 1 MG/ML(1)
0.5 AMPUL (ML) INJECTION PRN
OUTPATIENT
Start: 2023-12-11

## 2023-12-04 RX ORDER — 0.9 % SODIUM CHLORIDE 0.9 %
10 VIAL (ML) INJECTION PRN
OUTPATIENT
Start: 2023-12-11

## 2023-12-04 RX ORDER — METHYLPREDNISOLONE SODIUM SUCCINATE 125 MG/2ML
125 INJECTION, POWDER, LYOPHILIZED, FOR SOLUTION INTRAMUSCULAR; INTRAVENOUS PRN
OUTPATIENT
Start: 2023-12-11

## 2023-12-04 RX ORDER — SODIUM CHLORIDE 9 MG/ML
INJECTION, SOLUTION INTRAVENOUS CONTINUOUS
OUTPATIENT
Start: 2023-12-11

## 2023-12-04 RX ORDER — ZOLEDRONIC ACID 5 MG/100ML
5 INJECTION, SOLUTION INTRAVENOUS ONCE
OUTPATIENT
Start: 2023-12-11 | End: 2023-12-11

## 2023-12-04 NOTE — TELEPHONE ENCOUNTER
Called and spoke with Heidy, I have uploaded his carlo and scanned in media, she will place a new sensor on patient tomorrow.

## 2023-12-06 NOTE — PROGRESS NOTES
CHW spoke with Heidy as pt is currently I a SNF due to falling and breaking his back. Heidy states that she is interested in Palliative care to assist her with caring for pt. Heidy also states that she would like more grab bars in the home, raised toilet seat, and an electrical wheel chair for pt. Pt has an appt with SATYA Matute on 12/21 and CHW encouraged pt to speak with her about putting in a referral for Palliative care and an electric wheel chair. CHW will give pt resources for grab bars and a raised toilet seat. CHW michael follow up with Heidy when the resources are gathered for her.

## 2023-12-11 ENCOUNTER — TELEPHONE (OUTPATIENT)
Dept: MEDICAL GROUP | Age: 73
End: 2023-12-11
Payer: MEDICARE

## 2023-12-11 DIAGNOSIS — E11.65 UNCONTROLLED TYPE 2 DIABETES MELLITUS WITH HYPERGLYCEMIA (HCC): ICD-10-CM

## 2023-12-11 DIAGNOSIS — R41.3 MEMORY LOSS: ICD-10-CM

## 2023-12-11 DIAGNOSIS — S22.000A THORACIC COMPRESSION FRACTURE, CLOSED, INITIAL ENCOUNTER (HCC): ICD-10-CM

## 2023-12-11 DIAGNOSIS — E55.9 VITAMIN D DEFICIENCY: ICD-10-CM

## 2023-12-11 DIAGNOSIS — E78.2 MIXED HYPERLIPIDEMIA: ICD-10-CM

## 2023-12-11 DIAGNOSIS — I10 ESSENTIAL HYPERTENSION: ICD-10-CM

## 2023-12-11 DIAGNOSIS — T14.8XXA BRUISING: ICD-10-CM

## 2023-12-11 RX ORDER — DONEPEZIL HYDROCHLORIDE 5 MG/1
5 TABLET, FILM COATED ORAL NIGHTLY
Qty: 90 TABLET | Refills: 3 | Status: SHIPPED | OUTPATIENT
Start: 2023-12-11 | End: 2024-01-26

## 2023-12-11 NOTE — TELEPHONE ENCOUNTER
Patient my chart message :  Jitendra Rowan in rehab broke his back again. We have an appt the 21 of Dec. Harpal seems to be bruising all over. Is there a blood test to check this out. If there is I will get his blood work done on the 18 th for this appt.     Thank you     Ps. I think I will be needing wheelchair toilet rails  Walker with seat. Can Medicare provide any of this thing. I will be bringing him home on the 18 th from [facility].      Heidy Sierra

## 2023-12-13 ENCOUNTER — HOME HEALTH ADMISSION (OUTPATIENT)
Dept: HOME HEALTH SERVICES | Facility: HOME HEALTHCARE | Age: 73
End: 2023-12-13
Payer: MEDICARE

## 2023-12-14 ENCOUNTER — PATIENT OUTREACH (OUTPATIENT)
Dept: HEALTH INFORMATION MANAGEMENT | Facility: OTHER | Age: 73
End: 2023-12-14
Payer: MEDICARE

## 2023-12-14 DIAGNOSIS — I10 ESSENTIAL HYPERTENSION: ICD-10-CM

## 2023-12-14 NOTE — PROGRESS NOTES
CHW called Heidy to follow up with her and check in on pt. Heidy did not answer and vm was left for her to return call.

## 2023-12-15 NOTE — PROGRESS NOTES
Heidy called CHW back. CHW informed pt that if she needs DME for pt like a wheelchair and bedside commode, she can ask the  At the SNF to place an order. W also informed pt that [employer] offers [facility] modifications and can help change there smoke detectors, install grab bars, and also wheelchair ramp. W gave Heidy the contact information to [employer]. Pt will call back after speaking to [employer]. Heidy states that pt will be released from the SNF on 12/18/23.

## 2023-12-19 ENCOUNTER — HOSPITAL ENCOUNTER (INPATIENT)
Facility: MEDICAL CENTER | Age: 73
LOS: 3 days | DRG: 069 | End: 2023-12-22
Attending: EMERGENCY MEDICINE | Admitting: INTERNAL MEDICINE
Payer: MEDICARE

## 2023-12-19 ENCOUNTER — APPOINTMENT (OUTPATIENT)
Dept: RADIOLOGY | Facility: MEDICAL CENTER | Age: 73
DRG: 069 | End: 2023-12-19
Attending: EMERGENCY MEDICINE
Payer: MEDICARE

## 2023-12-19 ENCOUNTER — APPOINTMENT (OUTPATIENT)
Dept: RADIOLOGY | Facility: MEDICAL CENTER | Age: 73
DRG: 069 | End: 2023-12-19
Attending: INTERNAL MEDICINE
Payer: MEDICARE

## 2023-12-19 ENCOUNTER — HOME CARE VISIT (OUTPATIENT)
Dept: HOME HEALTH SERVICES | Facility: HOME HEALTHCARE | Age: 73
End: 2023-12-19

## 2023-12-19 DIAGNOSIS — R54 FRAILTY SYNDROME IN GERIATRIC PATIENT: ICD-10-CM

## 2023-12-19 DIAGNOSIS — J18.9 PNEUMONIA OF RIGHT MIDDLE LOBE DUE TO INFECTIOUS ORGANISM: ICD-10-CM

## 2023-12-19 DIAGNOSIS — R65.20 SEPSIS WITH ENCEPHALOPATHY WITHOUT SEPTIC SHOCK, DUE TO UNSPECIFIED ORGANISM (HCC): ICD-10-CM

## 2023-12-19 DIAGNOSIS — Z86.73 HISTORY OF CVA (CEREBROVASCULAR ACCIDENT): ICD-10-CM

## 2023-12-19 DIAGNOSIS — R53.1 GENERALIZED WEAKNESS: ICD-10-CM

## 2023-12-19 DIAGNOSIS — G93.41 SEPSIS WITH ENCEPHALOPATHY WITHOUT SEPTIC SHOCK, DUE TO UNSPECIFIED ORGANISM (HCC): ICD-10-CM

## 2023-12-19 DIAGNOSIS — G45.9 TIA (TRANSIENT ISCHEMIC ATTACK): ICD-10-CM

## 2023-12-19 DIAGNOSIS — J18.9 PNEUMONIA OF RIGHT UPPER LOBE DUE TO INFECTIOUS ORGANISM: ICD-10-CM

## 2023-12-19 DIAGNOSIS — J96.01 ACUTE RESPIRATORY FAILURE WITH HYPOXIA (HCC): ICD-10-CM

## 2023-12-19 DIAGNOSIS — A41.9 SEPSIS WITH ENCEPHALOPATHY WITHOUT SEPTIC SHOCK, DUE TO UNSPECIFIED ORGANISM (HCC): ICD-10-CM

## 2023-12-19 DIAGNOSIS — F03.918 NEURODEGENERATIVE DEMENTIA WITH BEHAVIORAL DISTURBANCE (HCC): ICD-10-CM

## 2023-12-19 PROBLEM — D72.829 LEUKOCYTOSIS: Status: ACTIVE | Noted: 2023-12-19

## 2023-12-19 LAB
ALBUMIN SERPL BCP-MCNC: 3.6 G/DL (ref 3.2–4.9)
ALBUMIN/GLOB SERPL: 1 G/DL
ALP SERPL-CCNC: 58 U/L (ref 30–99)
ALT SERPL-CCNC: 13 U/L (ref 2–50)
ANION GAP SERPL CALC-SCNC: 18 MMOL/L (ref 7–16)
APPEARANCE UR: CLEAR
AST SERPL-CCNC: 26 U/L (ref 12–45)
BASOPHILS # BLD AUTO: 0.1 % (ref 0–1.8)
BASOPHILS # BLD: 0.01 K/UL (ref 0–0.12)
BILIRUB SERPL-MCNC: 0.8 MG/DL (ref 0.1–1.5)
BILIRUB UR QL STRIP.AUTO: NEGATIVE
BUN SERPL-MCNC: 22 MG/DL (ref 8–22)
CALCIUM ALBUM COR SERPL-MCNC: 9.3 MG/DL (ref 8.5–10.5)
CALCIUM SERPL-MCNC: 9 MG/DL (ref 8.5–10.5)
CHLORIDE SERPL-SCNC: 97 MMOL/L (ref 96–112)
CO2 SERPL-SCNC: 19 MMOL/L (ref 20–33)
COLOR UR: YELLOW
CREAT SERPL-MCNC: 0.88 MG/DL (ref 0.5–1.4)
EKG IMPRESSION: NORMAL
EKG IMPRESSION: NORMAL
EOSINOPHIL # BLD AUTO: 0 K/UL (ref 0–0.51)
EOSINOPHIL NFR BLD: 0 % (ref 0–6.9)
ERYTHROCYTE [DISTWIDTH] IN BLOOD BY AUTOMATED COUNT: 49.1 FL (ref 35.9–50)
GFR SERPLBLD CREATININE-BSD FMLA CKD-EPI: 90 ML/MIN/1.73 M 2
GLOBULIN SER CALC-MCNC: 3.6 G/DL (ref 1.9–3.5)
GLUCOSE BLD STRIP.AUTO-MCNC: 105 MG/DL (ref 65–99)
GLUCOSE BLD STRIP.AUTO-MCNC: 114 MG/DL (ref 65–99)
GLUCOSE SERPL-MCNC: 102 MG/DL (ref 65–99)
GLUCOSE UR STRIP.AUTO-MCNC: >=1000 MG/DL
HCT VFR BLD AUTO: 41.2 % (ref 42–52)
HGB BLD-MCNC: 13.7 G/DL (ref 14–18)
IMM GRANULOCYTES # BLD AUTO: 0.05 K/UL (ref 0–0.11)
IMM GRANULOCYTES NFR BLD AUTO: 0.5 % (ref 0–0.9)
KETONES UR STRIP.AUTO-MCNC: 15 MG/DL
LACTATE SERPL-SCNC: 1.2 MMOL/L (ref 0.5–2)
LEUKOCYTE ESTERASE UR QL STRIP.AUTO: NEGATIVE
LYMPHOCYTES # BLD AUTO: 1.04 K/UL (ref 1–4.8)
LYMPHOCYTES NFR BLD: 9.5 % (ref 22–41)
MCH RBC QN AUTO: 29 PG (ref 27–33)
MCHC RBC AUTO-ENTMCNC: 33.3 G/DL (ref 32.3–36.5)
MCV RBC AUTO: 87.3 FL (ref 81.4–97.8)
MICRO URNS: ABNORMAL
MONOCYTES # BLD AUTO: 0.99 K/UL (ref 0–0.85)
MONOCYTES NFR BLD AUTO: 9 % (ref 0–13.4)
NEUTROPHILS # BLD AUTO: 8.9 K/UL (ref 1.82–7.42)
NEUTROPHILS NFR BLD: 80.9 % (ref 44–72)
NITRITE UR QL STRIP.AUTO: NEGATIVE
NRBC # BLD AUTO: 0 K/UL
NRBC BLD-RTO: 0 /100 WBC (ref 0–0.2)
PH UR STRIP.AUTO: 5 [PH] (ref 5–8)
PLATELET # BLD AUTO: 183 K/UL (ref 164–446)
PMV BLD AUTO: 10.9 FL (ref 9–12.9)
POTASSIUM SERPL-SCNC: 3.7 MMOL/L (ref 3.6–5.5)
PROT SERPL-MCNC: 7.2 G/DL (ref 6–8.2)
PROT UR QL STRIP: NEGATIVE MG/DL
RBC # BLD AUTO: 4.72 M/UL (ref 4.7–6.1)
RBC UR QL AUTO: NEGATIVE
SARS-COV-2 RNA RESP QL NAA+PROBE: NOTDETECTED
SODIUM SERPL-SCNC: 134 MMOL/L (ref 135–145)
SP GR UR STRIP.AUTO: >=1.045
SPECIMEN SOURCE: NORMAL
TROPONIN T SERPL-MCNC: 21 NG/L (ref 6–19)
UROBILINOGEN UR STRIP.AUTO-MCNC: 1 MG/DL
WBC # BLD AUTO: 11 K/UL (ref 4.8–10.8)

## 2023-12-19 PROCEDURE — 700102 HCHG RX REV CODE 250 W/ 637 OVERRIDE(OP): Performed by: INTERNAL MEDICINE

## 2023-12-19 PROCEDURE — 700117 HCHG RX CONTRAST REV CODE 255: Performed by: EMERGENCY MEDICINE

## 2023-12-19 PROCEDURE — 70496 CT ANGIOGRAPHY HEAD: CPT

## 2023-12-19 PROCEDURE — 700105 HCHG RX REV CODE 258: Performed by: EMERGENCY MEDICINE

## 2023-12-19 PROCEDURE — 700102 HCHG RX REV CODE 250 W/ 637 OVERRIDE(OP): Performed by: EMERGENCY MEDICINE

## 2023-12-19 PROCEDURE — 770020 HCHG ROOM/CARE - TELE (206)

## 2023-12-19 PROCEDURE — 700111 HCHG RX REV CODE 636 W/ 250 OVERRIDE (IP): Performed by: EMERGENCY MEDICINE

## 2023-12-19 PROCEDURE — 85025 COMPLETE CBC W/AUTO DIFF WBC: CPT

## 2023-12-19 PROCEDURE — 81003 URINALYSIS AUTO W/O SCOPE: CPT

## 2023-12-19 PROCEDURE — 96365 THER/PROPH/DIAG IV INF INIT: CPT

## 2023-12-19 PROCEDURE — 99223 1ST HOSP IP/OBS HIGH 75: CPT | Mod: AI | Performed by: INTERNAL MEDICINE

## 2023-12-19 PROCEDURE — 93005 ELECTROCARDIOGRAM TRACING: CPT | Performed by: EMERGENCY MEDICINE

## 2023-12-19 PROCEDURE — 36415 COLL VENOUS BLD VENIPUNCTURE: CPT

## 2023-12-19 PROCEDURE — 84484 ASSAY OF TROPONIN QUANT: CPT

## 2023-12-19 PROCEDURE — 96372 THER/PROPH/DIAG INJ SC/IM: CPT

## 2023-12-19 PROCEDURE — 70498 CT ANGIOGRAPHY NECK: CPT

## 2023-12-19 PROCEDURE — 99285 EMERGENCY DEPT VISIT HI MDM: CPT

## 2023-12-19 PROCEDURE — 82962 GLUCOSE BLOOD TEST: CPT

## 2023-12-19 PROCEDURE — 80053 COMPREHEN METABOLIC PANEL: CPT

## 2023-12-19 PROCEDURE — 99497 ADVNCD CARE PLAN 30 MIN: CPT | Mod: 25 | Performed by: INTERNAL MEDICINE

## 2023-12-19 PROCEDURE — 700101 HCHG RX REV CODE 250: Performed by: EMERGENCY MEDICINE

## 2023-12-19 PROCEDURE — 70551 MRI BRAIN STEM W/O DYE: CPT

## 2023-12-19 PROCEDURE — A9270 NON-COVERED ITEM OR SERVICE: HCPCS | Performed by: INTERNAL MEDICINE

## 2023-12-19 PROCEDURE — 87086 URINE CULTURE/COLONY COUNT: CPT

## 2023-12-19 PROCEDURE — 71045 X-RAY EXAM CHEST 1 VIEW: CPT

## 2023-12-19 PROCEDURE — 700111 HCHG RX REV CODE 636 W/ 250 OVERRIDE (IP): Mod: JZ | Performed by: INTERNAL MEDICINE

## 2023-12-19 PROCEDURE — 93005 ELECTROCARDIOGRAM TRACING: CPT

## 2023-12-19 PROCEDURE — A9270 NON-COVERED ITEM OR SERVICE: HCPCS | Performed by: EMERGENCY MEDICINE

## 2023-12-19 PROCEDURE — 87635 SARS-COV-2 COVID-19 AMP PRB: CPT

## 2023-12-19 PROCEDURE — 83605 ASSAY OF LACTIC ACID: CPT

## 2023-12-19 PROCEDURE — 87040 BLOOD CULTURE FOR BACTERIA: CPT | Mod: 91

## 2023-12-19 RX ORDER — AZITHROMYCIN 250 MG/1
500 TABLET, FILM COATED ORAL ONCE
Status: DISCONTINUED | OUTPATIENT
Start: 2023-12-19 | End: 2023-12-19

## 2023-12-19 RX ORDER — ASPIRIN 81 MG/1
81 TABLET, CHEWABLE ORAL DAILY
Status: DISCONTINUED | OUTPATIENT
Start: 2023-12-19 | End: 2023-12-22 | Stop reason: HOSPADM

## 2023-12-19 RX ORDER — LABETALOL HYDROCHLORIDE 5 MG/ML
10 INJECTION, SOLUTION INTRAVENOUS EVERY 4 HOURS PRN
Status: DISCONTINUED | OUTPATIENT
Start: 2023-12-19 | End: 2023-12-22 | Stop reason: HOSPADM

## 2023-12-19 RX ORDER — DULOXETIN HYDROCHLORIDE 60 MG/1
60 CAPSULE, DELAYED RELEASE ORAL EVERY 12 HOURS
Status: DISCONTINUED | OUTPATIENT
Start: 2023-12-19 | End: 2023-12-22 | Stop reason: HOSPADM

## 2023-12-19 RX ORDER — ENOXAPARIN SODIUM 100 MG/ML
40 INJECTION SUBCUTANEOUS DAILY
Status: DISCONTINUED | OUTPATIENT
Start: 2023-12-19 | End: 2023-12-22 | Stop reason: HOSPADM

## 2023-12-19 RX ORDER — AMOXICILLIN 250 MG
2 CAPSULE ORAL 2 TIMES DAILY
Status: DISCONTINUED | OUTPATIENT
Start: 2023-12-19 | End: 2023-12-22 | Stop reason: HOSPADM

## 2023-12-19 RX ORDER — BUPROPION HYDROCHLORIDE 150 MG/1
150 TABLET, EXTENDED RELEASE ORAL EVERY 12 HOURS
Status: DISCONTINUED | OUTPATIENT
Start: 2023-12-19 | End: 2023-12-22 | Stop reason: HOSPADM

## 2023-12-19 RX ORDER — OMEPRAZOLE 20 MG/1
20 CAPSULE, DELAYED RELEASE ORAL EVERY 12 HOURS
Status: DISCONTINUED | OUTPATIENT
Start: 2023-12-19 | End: 2023-12-22 | Stop reason: HOSPADM

## 2023-12-19 RX ORDER — ATORVASTATIN CALCIUM 80 MG/1
80 TABLET, FILM COATED ORAL EVERY EVENING
Status: DISCONTINUED | OUTPATIENT
Start: 2023-12-19 | End: 2023-12-22 | Stop reason: HOSPADM

## 2023-12-19 RX ORDER — BISACODYL 10 MG
10 SUPPOSITORY, RECTAL RECTAL
Status: DISCONTINUED | OUTPATIENT
Start: 2023-12-19 | End: 2023-12-22 | Stop reason: HOSPADM

## 2023-12-19 RX ORDER — BENAZEPRIL HYDROCHLORIDE 20 MG/1
40 TABLET ORAL DAILY
Status: DISCONTINUED | OUTPATIENT
Start: 2023-12-19 | End: 2023-12-22 | Stop reason: HOSPADM

## 2023-12-19 RX ORDER — ACETAMINOPHEN 325 MG/1
650 TABLET ORAL EVERY 6 HOURS PRN
Status: DISCONTINUED | OUTPATIENT
Start: 2023-12-19 | End: 2023-12-22 | Stop reason: HOSPADM

## 2023-12-19 RX ORDER — METOPROLOL TARTRATE 1 MG/ML
5 INJECTION, SOLUTION INTRAVENOUS
Status: DISCONTINUED | OUTPATIENT
Start: 2023-12-19 | End: 2023-12-20

## 2023-12-19 RX ORDER — SODIUM CHLORIDE, SODIUM LACTATE, POTASSIUM CHLORIDE, AND CALCIUM CHLORIDE .6; .31; .03; .02 G/100ML; G/100ML; G/100ML; G/100ML
30 INJECTION, SOLUTION INTRAVENOUS ONCE
Status: COMPLETED | OUTPATIENT
Start: 2023-12-19 | End: 2023-12-19

## 2023-12-19 RX ORDER — ONDANSETRON 2 MG/ML
4 INJECTION INTRAMUSCULAR; INTRAVENOUS EVERY 4 HOURS PRN
Status: DISCONTINUED | OUTPATIENT
Start: 2023-12-19 | End: 2023-12-22 | Stop reason: HOSPADM

## 2023-12-19 RX ORDER — DOXYCYCLINE 100 MG/1
100 TABLET ORAL ONCE
Status: COMPLETED | OUTPATIENT
Start: 2023-12-19 | End: 2023-12-19

## 2023-12-19 RX ORDER — ONDANSETRON 4 MG/1
4 TABLET, ORALLY DISINTEGRATING ORAL EVERY 4 HOURS PRN
Status: DISCONTINUED | OUTPATIENT
Start: 2023-12-19 | End: 2023-12-22 | Stop reason: HOSPADM

## 2023-12-19 RX ORDER — HYDROCODONE BITARTRATE AND ACETAMINOPHEN 5; 325 MG/1; MG/1
1 TABLET ORAL EVERY 6 HOURS PRN
Status: DISCONTINUED | OUTPATIENT
Start: 2023-12-19 | End: 2023-12-22 | Stop reason: HOSPADM

## 2023-12-19 RX ORDER — ASPIRIN 300 MG/1
300 SUPPOSITORY RECTAL DAILY
Status: DISCONTINUED | OUTPATIENT
Start: 2023-12-19 | End: 2023-12-22 | Stop reason: HOSPADM

## 2023-12-19 RX ORDER — DONEPEZIL HYDROCHLORIDE 5 MG/1
5 TABLET, FILM COATED ORAL NIGHTLY
Status: DISCONTINUED | OUTPATIENT
Start: 2023-12-19 | End: 2023-12-22 | Stop reason: HOSPADM

## 2023-12-19 RX ORDER — POLYETHYLENE GLYCOL 3350 17 G/17G
1 POWDER, FOR SOLUTION ORAL
Status: DISCONTINUED | OUTPATIENT
Start: 2023-12-19 | End: 2023-12-22 | Stop reason: HOSPADM

## 2023-12-19 RX ADMIN — IOHEXOL 100 ML: 350 INJECTION, SOLUTION INTRAVENOUS at 14:15

## 2023-12-19 RX ADMIN — ASPIRIN 81 MG: 81 TABLET, CHEWABLE ORAL at 17:59

## 2023-12-19 RX ADMIN — ENOXAPARIN SODIUM 40 MG: 100 INJECTION SUBCUTANEOUS at 18:00

## 2023-12-19 RX ADMIN — OMEPRAZOLE 20 MG: 20 CAPSULE, DELAYED RELEASE ORAL at 18:00

## 2023-12-19 RX ADMIN — DOXYCYCLINE 100 MG: 100 TABLET, FILM COATED ORAL at 13:43

## 2023-12-19 RX ADMIN — ATORVASTATIN CALCIUM 80 MG: 80 TABLET, FILM COATED ORAL at 18:00

## 2023-12-19 RX ADMIN — DULOXETINE HYDROCHLORIDE 60 MG: 60 CAPSULE, DELAYED RELEASE ORAL at 18:00

## 2023-12-19 RX ADMIN — METOPROLOL TARTRATE 12.5 MG: 25 TABLET, FILM COATED ORAL at 18:00

## 2023-12-19 RX ADMIN — DONEPEZIL HYDROCHLORIDE 5 MG: 5 TABLET, FILM COATED ORAL at 21:08

## 2023-12-19 RX ADMIN — METOPROLOL TARTRATE 5 MG: 5 INJECTION INTRAVENOUS at 13:18

## 2023-12-19 RX ADMIN — AMPICILLIN SODIUM, SULBACTAM SODIUM 3 G: 2; 1 INJECTION, POWDER, FOR SOLUTION INTRAMUSCULAR; INTRAVENOUS at 13:43

## 2023-12-19 RX ADMIN — SODIUM CHLORIDE, POTASSIUM CHLORIDE, SODIUM LACTATE AND CALCIUM CHLORIDE 2190 ML: 600; 310; 30; 20 INJECTION, SOLUTION INTRAVENOUS at 14:14

## 2023-12-19 RX ADMIN — BUPROPION HYDROCHLORIDE 150 MG: 150 TABLET, EXTENDED RELEASE ORAL at 18:00

## 2023-12-19 ASSESSMENT — COGNITIVE AND FUNCTIONAL STATUS - GENERAL
WALKING IN HOSPITAL ROOM: A LOT
SUGGESTED CMS G CODE MODIFIER MOBILITY: CL
DAILY ACTIVITIY SCORE: 16
TOILETING: A LOT
MOVING TO AND FROM BED TO CHAIR: A LOT
EATING MEALS: A LITTLE
DRESSING REGULAR UPPER BODY CLOTHING: A LITTLE
MOBILITY SCORE: 13
SUGGESTED CMS G CODE MODIFIER DAILY ACTIVITY: CK
STANDING UP FROM CHAIR USING ARMS: A LOT
MOVING FROM LYING ON BACK TO SITTING ON SIDE OF FLAT BED: A LOT
PERSONAL GROOMING: A LITTLE
CLIMB 3 TO 5 STEPS WITH RAILING: A LOT
TURNING FROM BACK TO SIDE WHILE IN FLAT BAD: A LITTLE
HELP NEEDED FOR BATHING: A LITTLE
DRESSING REGULAR LOWER BODY CLOTHING: A LOT

## 2023-12-19 ASSESSMENT — LIFESTYLE VARIABLES
CONSUMPTION TOTAL: NEGATIVE
AVERAGE NUMBER OF DAYS PER WEEK YOU HAVE A DRINK CONTAINING ALCOHOL: 1
TOTAL SCORE: 0
TOTAL SCORE: 0
HAVE PEOPLE ANNOYED YOU BY CRITICIZING YOUR DRINKING: NO
ALCOHOL_USE: YES
ON A TYPICAL DAY WHEN YOU DRINK ALCOHOL HOW MANY DRINKS DO YOU HAVE: 1
EVER HAD A DRINK FIRST THING IN THE MORNING TO STEADY YOUR NERVES TO GET RID OF A HANGOVER: NO
HOW MANY TIMES IN THE PAST YEAR HAVE YOU HAD 5 OR MORE DRINKS IN A DAY: 0
EVER FELT BAD OR GUILTY ABOUT YOUR DRINKING: NO
DOES PATIENT WANT TO STOP DRINKING: NO
HAVE YOU EVER FELT YOU SHOULD CUT DOWN ON YOUR DRINKING: NO
TOTAL SCORE: 0

## 2023-12-19 ASSESSMENT — PAIN DESCRIPTION - PAIN TYPE: TYPE: ACUTE PAIN

## 2023-12-19 ASSESSMENT — FIBROSIS 4 INDEX
FIB4 SCORE: 1.19
FIB4 SCORE: 2.92

## 2023-12-19 ASSESSMENT — ENCOUNTER SYMPTOMS
SHORTNESS OF BREATH: 1
COUGH: 1
WEAKNESS: 1

## 2023-12-19 NOTE — ASSESSMENT & PLAN NOTE
I discussed advance care planning for at least 17 minutes with the patient and wife, including diagnosis of dementia, prostate cancer, acute respiratory failure with hypoxia, pneumonia, prognosis, plan of care, risks and benefits of any therapies that could be offered, as well as alternatives including palliation. The patient and family have opted for full medical therapy and full code at this time.

## 2023-12-19 NOTE — CASE COMMUNICATION
"Arrived at the SOC. Pt's wife says that pt had been eating well until he got home and pt has had a poor appetite. Pt's wife says that pt fell twice. Pt's wife says that he once fell transferring to the toilet and once pt rolled out of bed. Pt's wife says that last night pt had a hard time finding his words. Pt's wife says that pt's speech was slurred as well. Pt says that when he fell out of bed, pt's wife asked him where he was and pt  thought he was still in bed. Pt's wife also says that she was unable to get him up and had to ask for help since pt didn't want to call [facility] since pt got home. Pt's wife says that he felt like he had a temperature as well (pt's wife didn't check him due to not sure where the thermometer was). Pt says \"I don't like your decision to call [facility], but I definitely agree to go. I feel horrible\". SN checked pt's vitals and his vitals were 128 /64, HR 62, RR 24, Temp 98.4 and O2 sat 78%. SN listen to pt's lungs and could hear Rhonchi throughout his lungs. When [facility] arrived, pt says that he didn't want to go to the ED. Pt's wife wanted to get [facility] to transfer pt to the other room where the bed was lower. Pt was unable to stand without being weak and needing to lay down. Pt then agreed to go to the ED. "

## 2023-12-19 NOTE — ASSESSMENT & PLAN NOTE
Patient had transient episode of aphasia which is now resolved  Patient will be placed on continuous cardiac monitoring to evaluate for any arrhythmias  CTA head and neck with IV contrast is negative for large vessel occlusion  Q4 hours neuro checks  I will order MRI brain  Check 2-D echo  Patient will be continued on aspirin and high intensity statin  Check TSH, lipid panel and hemoglobin A1c  PTOT and ST evaluation

## 2023-12-19 NOTE — ED NOTES
Med Rec partial complete per spouse at bedside and SNF   Allergies reviewed  Antibiotics in the past 30 days:unk  Anticoagulant in past 14 days:no  Pharmacy patient utilizes:[facility] on [address]    Per spouse pt was discharged from [facility] SNF. Spouse able to go through meds she gave pt yesterday (nothing today), spouse unable to state what time of day she gave all meds yesterday but she stated she gave him all his morning, afternoon, and evening doses yesterday    Contacted [facility] and per SNF pt was at their facility from 11/27-12/17, per staff will email me MAR.

## 2023-12-19 NOTE — ED PROVIDER NOTES
ED Provider Note  ED Provider    Means of arrival: Paramedics  History obtained from: Wife at bedside  History limited by: Patient with dementia    CHIEF COMPLAINT  Chief Complaint   Patient presents with    Weakness     Pt BIB EMS from home c/o weakness ongoing for several days with 2x associated ground level falls. -head strike, -injuries secondary to the falls. Pt arrives in [facility] and reports no hx of. BGL  mg/dL. Pt arrives AAOx4 with a GCS of 15.        HPI  Prabhakar Sierra is a 73 y.o. male who presents with complaints of episodes of fall, generalized weakness.  The patient recently had a fractured lumbar, he went through rehab, he was discharged on Sunday and since that time he has been having intermittent episodes of leg weakness and falling even with a walker.    The patient has a history of dementia, and is unable to answer questions very well.  The wife reports few other things, last night he had episodes of feeling warm and was shaking, he also had a period of expressive aphasia at the scene seem to have resolved.  But she had had a visiting nurse come today to evaluate the patient and this nurse does not believe he is safe to be at home in the condition he is and she was sent in for further evaluation.    Wife also reports a cough over the last 2 days    REVIEW OF SYSTEMS  See HPI for further details. All other systems are negative.     PAST MEDICAL HISTORY   has a past medical history of Alcohol use, BPH (benign prostatic hyperplasia), Depression, Fall (12/22/2022), GERD (gastroesophageal reflux disease), History of depression (12/28/2022), Hypertension, Hyponatremia (05/18/2017), Iron deficiency anemia secondary to inadequate dietary iron intake (05/26/2021), Mild cognitive impairment (07/26/2022), Neuropathic pain, leg, Orthostatic hypotension (1/3/2019), Right hip pain (11/14/2016), Thrombocytopenia, unspecified (HCC) (11/7/2022), Tobacco use, and Type II or unspecified type diabetes  mellitus without mention of complication, not stated as uncontrolled.    SOCIAL HISTORY  Social History     Tobacco Use    Smoking status: Former     Current packs/day: 0.00     Average packs/day: 0.2 packs/day for 50.0 years (7.5 ttl pk-yrs)     Types: Cigarettes     Start date: 1972     Quit date: 2022     Years since quittin.9    Smokeless tobacco: Never   Vaping Use    Vaping Use: Never used   Substance and Sexual Activity    Alcohol use: Yes     Alcohol/week: 4.8 - 6.0 oz     Types: 8 - 10 Glasses of wine per week     Comment: 1-2 glass of wine a week    Drug use: No    Sexual activity: Not on file       SURGICAL HISTORY  patient denies any surgical history    CURRENT MEDICATIONS  Home Medications       Reviewed by Octavio Jay (Pharmacy Tech) on 23 at 1538  Med List Status: Partial     Medication Last Dose Status   aspirin (ASA) 81 MG Chew Tab chewable tablet 2023 Active   atorvastatin (LIPITOR) 80 MG tablet 2023 Active   benazepril (LOTENSIN) 40 MG tablet 2023 Active   buPROPion SR (WELLBUTRIN-SR) 150 MG TABLET SR 12 HR sustained-release tablet 2023 Active   Cholecalciferol (VITAMIN D) 2000 UNIT Tab 2023 Active   Continuous Blood Gluc Sensor (FREESTYLE MARCEL 3 SENSOR) Misc OFF Active   donepezil (ARICEPT) 5 MG Tab NEW RX Active   DULoxetine (CYMBALTA) 60 MG Cap DR Particles delayed-release capsule 2023 Active   Empagliflozin (JARDIANCE) 25 MG Tab  Active   Empagliflozin-metFORMIN HCl ER (SYNJARDY XR)  MG TABLET SR 24 HR 2023 Active   folic acid (FOLVITE) 1 MG Tab 2023 Active   magnesium oxide 400 (240 Mg) MG Tab 2023 Active   metformin (GLUCOPHAGE) 1000 MG tablet 2023 Active   metoprolol tartrate (LOPRESSOR) 25 MG Tab 2023 Active   Misc. Devices Misc SUPPLY Active   Misc. Devices Misc SUPPLY Active   omeprazole (PRILOSEC) 20 MG delayed-release capsule 2023 Active   pioglitazone (ACTOS) 30 MG Tab 2023  "Active                    ALLERGIES  No Known Allergies    PHYSICAL EXAM  VITAL SIGNS: /65   Pulse (!) 119   Temp 35.8 °C (96.5 °F) (Temporal)   Resp 16   Ht 1.778 m (5' 10\")   Wt 99.8 kg (220 lb)   SpO2 95%   BMI 31.57 kg/m²   Constitutional: Alert in no apparent distress.  HENT: No signs of trauma, Mucous membranes are moist   Eyes:  Conjunctiva normal, Non-icteric.   Neck: Normal range of motion, No tenderness, Supple,  Lymphatic: No lymphadenopathy noted.   Cardiovascular: Tachycardia no murmurs.   Thorax & Lungs: Normal breath sounds, No respiratory distress, No wheezing, No chest tenderness.   Abdomen: Bowel sounds normal, Soft, No tenderness, No masses, No pulsatile masses. No peritoneal signs.  Skin: Warm, Dry,Normal color  Back: No bony tenderness, No CVA tenderness.   Extremities:No edema, No tenderness, No cyanosis,    Musculoskeletal: Good range of motion in all major joints. No tenderness to palpation or major deformities noted.   Neurologic: Alert , oriented to name only, follows commands without difficulty no lower extremity weakness at this time, Normal motor function, Normal sensory function, No focal deficits noted.   Psychiatric: Affect normal, Judgment normal, Mood normal.       MEDICAL DECISION MAKING  This is a 73 y.o. male who presents patient presents with chills, hot, generalized weakness there is concerns for sepsis he has had a cough pneumonia, COVID, RSV are all considered.  Currently he is tachycardic his EKG on presentation looks like a sinus rhythm with sinus arrhythmia.  It is tachycardic.    It was reported to the wife that his oxygen level was low, he does not use oxygen at home.  He was on 4 L on my evaluation I turned the oxygen off currently is at 91% at rest.    There is concerns for sepsis, UTI, COVID, with expressive aphasia there is concerns for TIA he does have a history of TIAs in the past.    DIAGNOSTIC STUDIES / PROCEDURES    EKG      LABS  Results for orders " placed or performed during the hospital encounter of 12/19/23   LACTIC ACID   Result Value Ref Range    Lactic Acid 1.2 0.5 - 2.0 mmol/L   CBC WITH DIFFERENTIAL   Result Value Ref Range    WBC 11.0 (H) 4.8 - 10.8 K/uL    RBC 4.72 4.70 - 6.10 M/uL    Hemoglobin 13.7 (L) 14.0 - 18.0 g/dL    Hematocrit 41.2 (L) 42.0 - 52.0 %    MCV 87.3 81.4 - 97.8 fL    MCH 29.0 27.0 - 33.0 pg    MCHC 33.3 32.3 - 36.5 g/dL    RDW 49.1 35.9 - 50.0 fL    Platelet Count 183 164 - 446 K/uL    MPV 10.9 9.0 - 12.9 fL    Neutrophils-Polys 80.90 (H) 44.00 - 72.00 %    Lymphocytes 9.50 (L) 22.00 - 41.00 %    Monocytes 9.00 0.00 - 13.40 %    Eosinophils 0.00 0.00 - 6.90 %    Basophils 0.10 0.00 - 1.80 %    Immature Granulocytes 0.50 0.00 - 0.90 %    Nucleated RBC 0.00 0.00 - 0.20 /100 WBC    Neutrophils (Absolute) 8.90 (H) 1.82 - 7.42 K/uL    Lymphs (Absolute) 1.04 1.00 - 4.80 K/uL    Monos (Absolute) 0.99 (H) 0.00 - 0.85 K/uL    Eos (Absolute) 0.00 0.00 - 0.51 K/uL    Baso (Absolute) 0.01 0.00 - 0.12 K/uL    Immature Granulocytes (abs) 0.05 0.00 - 0.11 K/uL    NRBC (Absolute) 0.00 K/uL   COMP METABOLIC PANEL   Result Value Ref Range    Sodium 134 (L) 135 - 145 mmol/L    Potassium 3.7 3.6 - 5.5 mmol/L    Chloride 97 96 - 112 mmol/L    Co2 19 (L) 20 - 33 mmol/L    Anion Gap 18.0 (H) 7.0 - 16.0    Glucose 102 (H) 65 - 99 mg/dL    Bun 22 8 - 22 mg/dL    Creatinine 0.88 0.50 - 1.40 mg/dL    Calcium 9.0 8.5 - 10.5 mg/dL    Correct Calcium 9.3 8.5 - 10.5 mg/dL    AST(SGOT) 26 12 - 45 U/L    ALT(SGPT) 13 2 - 50 U/L    Alkaline Phosphatase 58 30 - 99 U/L    Total Bilirubin 0.8 0.1 - 1.5 mg/dL    Albumin 3.6 3.2 - 4.9 g/dL    Total Protein 7.2 6.0 - 8.2 g/dL    Globulin 3.6 (H) 1.9 - 3.5 g/dL    A-G Ratio 1.0 g/dL   URINALYSIS    Specimen: Urine   Result Value Ref Range    Color Yellow     Character Clear     Specific Gravity >=1.045 (A) <1.035    Ph 5.0 5.0 - 8.0    Glucose >=1000 (A) Negative mg/dL    Ketones 15 (A) Negative mg/dL    Protein Negative  Negative mg/dL    Bilirubin Negative Negative    Urobilinogen, Urine 1.0 Negative    Nitrite Negative Negative    Leukocyte Esterase Negative Negative    Occult Blood Negative Negative    Micro Urine Req see below    TROPONIN   Result Value Ref Range    Troponin T 21 (H) 6 - 19 ng/L   SARS-CoV-2, PCR (24 Hour In-House) Collect NP swab in VTM    Specimen: Respirate   Result Value Ref Range    SARS-CoV-2 Source NP Swab     SARS-CoV-2 by PCR NotDetected    ESTIMATED GFR   Result Value Ref Range    GFR (CKD-EPI) 90 >60 mL/min/1.73 m 2   EKG   Result Value Ref Range    Report       [facility] Emergency Dept.    Test Date:  2023  Pt Name:    ROLANDO [facility]          Department: ER  MRN:        [id number]                      Room:       [facility]  Gender:     Male                         Technician: [id number]  :        1950                   Requested By:ER TRIAGE PROTOCOL  Order #:    [id number]                    Reading MD:    Measurements  Intervals                                Axis  Rate:       132                          P:          0  OK:         0                            QRS:        20  QRSD:       123                          T:          43  QT:         352  QTc:        522    Interpretive Statements  Atrial fibrillation  Ventricular bigeminy  Nonspecific intraventricular conduction delay  Compared to ECG 2023 15:20:37  Ventricular premature complex(es) now present  Intraventricular conduction delay now present  Sinus rhythm no longer present  Atrial premature complex(es) no longer present     EKG   Result Value Ref Range    Report       [facility] Emergency Dept.    Test Date:  2023  [facility]          Department: ER  MRN:        [id number]                      Room:       [facility]  Gender:     Male                         Technician: [id number]  :        1950                   Requested By:NALLELY SOLITARIO  Order #:     [id number]                    Reading MD:    Measurements  Intervals                                Axis  Rate:       114                          P:          52  DE:         157                          QRS:        57  QRSD:       93                           T:          21  QT:         370  QTc:        510    Interpretive Statements  Sinus tachycardia  Supraventricular bigeminy  Low voltage, precordial leads  Compared to ECG 12/19/2023 11:54:29  Atrial premature complex(es) now present  Low QRS voltage now present  Atrial fibrillation no longer present  Ventricular premature complex(es) no longer present  Intraventricular conduction delay no longer presen t     POCT glucose device results   Result Value Ref Range    POC Glucose, Blood 114 (H) 65 - 99 mg/dL     *Note: Due to a large number of results and/or encounters for the requested time period, some results have not been displayed. A complete set of results can be found in Results Review.         RADIOLOGY  CT-CTA NECK WITH & W/O-POST PROCESSING   Final Result      CT angiogram of the neck within normal limits.   Incompletely imaged RIGHT upper lobe airspace disease, likely pneumonia      CT-CTA HEAD WITH & W/O-POST PROCESS   Final Result      CT angiogram of the [place] within normal limits.         DX-CHEST-PORTABLE (1 VIEW)   Final Result      Findings suspicious for early RIGHT pneumonia      MR-BRAIN-W/O    (Results Pending)   Chest x-ray visualized by me shows a questionable right pneumonia    COURSE  Pertinent Labs & Imaging studies reviewed. (See chart for details)    Patient presents with generalized weakness and falls, he had recent rehab discharge, has been having a cough he had some shaking chills.  His chest x-ray does show developing pneumonia.  He is got a anion gap acidosis but lactic acid level is normal.  IV fluids and antibiotics have been initiated.  He is mildly hypoxic, supplemental oxygen has improved his pulse oximetry  readings.    1400 discussed with the wife the findings and plan.  The patient has mild acidosis, right lower lobe pneumonia.  Lactic acid level is negative.  He was tachycardic, was medicated Lopressor this has improved the heart rate.  His EKG appears to be sinus rhythm with PACs.  There is no signs of atrial fibrillation at this time.    IV antibiotics were initiated.  I spoke with Dr. Ojeda the hospitalist for admission the patient will be admitted for further evaluation and treatment    Admitted in guarded condition    Critical care time 35 minutes    FINAL IMPRESSION  1. Generalized weakness    2. Sepsis with encephalopathy without septic shock, due to unspecified organism (HCC)    3. Pneumonia of right middle lobe due to infectious organism    4. Acute respiratory failure with hypoxia (HCC)    5. TIA (transient ischemic attack)        The note accurately reflects work and decisions made by me.  Marco Moser D.O.  12/19/2023  7:13 PM

## 2023-12-19 NOTE — H&P
Hospital Medicine History & Physical Note    Date of Service  12/19/2023    Primary Care Physician  Stanton Miller M.D.    Consultants  None    Specialist Names:    Code Status  Full Code    Chief Complaint  Chief Complaint   Patient presents with    Weakness     Pt BIB EMS from home c/o weakness ongoing for several days with 2x associated ground level falls. -head strike, -injuries secondary to the falls. Pt arrives in [facility] and reports no hx of. BGL  mg/dL. Pt arrives AAOx4 with a GCS of 15.        History of Presenting Illness  Prabhakar Sierra is a 73 y.o. male with a past medical history of prostate cancer status post radiation therapy, hypertension, diabetes mellitus, GERD, dementia who presented 12/19/2023 with generalized weakness, cough and shortness of breath.  Patient was at a skilled nursing facility undergoing physical therapy for L1 compression fracture and was discharged home yesterday.  At home he was noticed by family to be weaker than usual and also had a cough with some shortness of breath.  Family also noticed that he started having bilateral tremors and had an episode of aphasia that lasted for some time.  At this time his tremors and aphasia have resolved.  He denies any fevers, chest pain, focal weakness or numbness.  He denies any headache.    In the ER he was noted to be hypoxic at 88% on room air and was placed on 2 L of oxygen by nasal cannula.    Chest x-ray interpreted by me reveals early developing right upper lobe infiltrate  EKG interpreted by me reveals sinus tachycardia with supraventricular bigeminy    I discussed the plan of care with patient, family, bedside RN, and ERP .    Review of Systems  Review of Systems   Constitutional:  Positive for malaise/fatigue.   Respiratory:  Positive for cough and shortness of breath.    Neurological:  Positive for weakness.       Past Medical History   has a past medical history of Alcohol use, BPH (benign prostatic  hyperplasia), Depression, Fall (2022), GERD (gastroesophageal reflux disease), History of depression (2022), Hypertension, Hyponatremia (2017), Iron deficiency anemia secondary to inadequate dietary iron intake (2021), Mild cognitive impairment (2022), Neuropathic pain, leg, Orthostatic hypotension (1/3/2019), Right hip pain (2016), Thrombocytopenia, unspecified (HCC) (2022), Tobacco use, and Type II or unspecified type diabetes mellitus without mention of complication, not stated as uncontrolled.    Surgical History   has no past surgical history on file.     Family History  family history includes Cancer in his brother; Diabetes in his father; Heart Disease in his brother and mother; No Known Problems in his daughter and daughter; Other in his sister.   Family history reviewed with patient. There is no family history that is pertinent to the chief complaint.     Social History   reports that he quit smoking about 1 years ago. His smoking use included cigarettes. He started smoking about 52 years ago. He has a 7.5 pack-year smoking history. He has never used smokeless tobacco. He reports current alcohol use of about 4.8 - 6.0 oz of alcohol per week. He reports that he does not use drugs.    Allergies  No Known Allergies    Medications  Prior to Admission Medications   Prescriptions Last Dose Informant Patient Reported? Taking?   Cholecalciferol (VITAMIN D) 2000 UNIT Tab  Significant Other Yes No   Sig: Take 4 Tablets by mouth every day.   Continuous Blood Gluc Sensor (FREESTYLE MARCEL 3 SENSOR) Misc  Significant Other No No   Si Each every 14 days.   DULoxetine (CYMBALTA) 60 MG Cap DR Particles delayed-release capsule  Significant Other No No   Sig: Take 1 Capsule by mouth 2 times a day. Indications: Major Depressive Disorder   Empagliflozin (JARDIANCE) 25 MG Tab  Significant Other No No   Sig: Take 1 tablet  by mouth every day.   Empagliflozin-metFORMIN HCl ER (SYNJARDY  XR)  MG TABLET SR 24 HR  Significant Other Yes No   Sig: Take 1 Tablet by mouth every day. Replaces Jardiance   HYDROcodone/acetaminophen (NORCO)  MG Tab  Significant Other Yes No   Sig: Take 1-2 Tablets by mouth every 6 hours as needed for Severe Pain.   Misc. Devices Misc   No No   Sig: Walker with seat   Misc. Devices Misc   No No   Sig: Wheelchair toilet rails   amoxicillin (AMOXIL) 500 MG Cap  Significant Other Yes No   Sig: Take 500 mg by mouth 3 times a day. 7 days   aspirin (ASA) 81 MG Chew Tab chewable tablet  Significant Other No No   Sig: Chew 1 Tablet every day.   atorvastatin (LIPITOR) 80 MG tablet  Significant Other No No   Sig: Take 1 Tablet by mouth every evening. Indications: High Amount of Fats in the Blood   benazepril (LOTENSIN) 40 MG tablet  Significant Other No No   Sig: TAKE ONE TABLET BY MOUTH DAILY   buPROPion SR (WELLBUTRIN-SR) 150 MG TABLET SR 12 HR sustained-release tablet  Significant Other No No   Sig: Take 1 Tablet by mouth 2 times a day. Indications: Major Depressive Disorder   donepezil (ARICEPT) 5 MG Tab   No No   Sig: Take 1 Tablet by mouth every evening.   folic acid (FOLVITE) 1 MG Tab  Significant Other No No   Sig: Take 1 Tablet by mouth every day. Indications: ALCOHOL ABUSE   magnesium oxide 400 (240 Mg) MG Tab  Significant Other No No   Sig: Take 1 Tablet by mouth every day.   metformin (GLUCOPHAGE) 1000 MG tablet  Significant Other No No   Sig: TAKE ONE TABLET BY MOUTH TWICE A DAY WITH MEALS   Patient taking differently: Take 1,000 mg by mouth every evening.   metoprolol tartrate (LOPRESSOR) 25 MG Tab  Significant Other Yes No   Si.5 mg 2 times a day.   omeprazole (PRILOSEC) 20 MG delayed-release capsule  Significant Other No No   Sig: Take 1 Capsule by mouth 2 times a day. Indications: Gastroesophageal Reflux Disease   pioglitazone (ACTOS) 30 MG Tab  Significant Other No No   Sig: Take 1 Tablet by mouth every day. Indications: Type 2 Diabetes     "  Facility-Administered Medications: None       Physical Exam  Temp:  [35.9 °C (96.7 °F)] 35.9 °C (96.7 °F)  Pulse:  [] 86  Resp:  [18] 18  BP: (117-147)/(56-67) 117/56  SpO2:  [88 %-97 %] 97 %  Blood Pressure : 117/56   Temperature: 35.9 °C (96.7 °F)   Pulse: 86   Respiration: 18   Pulse Oximetry: 97 %       Physical Exam  Vitals and nursing note reviewed.   Constitutional:       General: He is not in acute distress.  HENT:      Head: Normocephalic.      Mouth/Throat:      Mouth: Mucous membranes are moist.   Eyes:      Pupils: Pupils are equal, round, and reactive to light.   Cardiovascular:      Rate and Rhythm: Normal rate and regular rhythm.      Pulses: Normal pulses.      Heart sounds: Normal heart sounds.   Pulmonary:      Effort: Pulmonary effort is normal.      Breath sounds: Normal breath sounds.   Abdominal:      Palpations: Abdomen is soft.      Tenderness: There is no abdominal tenderness.   Musculoskeletal:         General: No swelling.      Cervical back: Neck supple.   Skin:     General: Skin is warm.      Coloration: Skin is not jaundiced.   Neurological:      General: No focal deficit present.      Mental Status: He is alert and oriented to person, place, and time.   Psychiatric:         Mood and Affect: Mood normal.         Behavior: Behavior normal.         Laboratory:  Recent Labs     12/19/23  1155   WBC 11.0*   RBC 4.72   HEMOGLOBIN 13.7*   HEMATOCRIT 41.2*   MCV 87.3   MCH 29.0   MCHC 33.3   RDW 49.1   PLATELETCT 183   MPV 10.9     Recent Labs     12/19/23  1155   SODIUM 134*   POTASSIUM 3.7   CHLORIDE 97   CO2 19*   GLUCOSE 102*   BUN 22   CREATININE 0.88   CALCIUM 9.0     Recent Labs     12/19/23  1155   ALTSGPT 13   ASTSGOT 26   ALKPHOSPHAT 58   TBILIRUBIN 0.8   GLUCOSE 102*         No results for input(s): \"NTPROBNP\" in the last 72 hours.      Recent Labs     12/19/23  1155   TROPONINT 21*       Imaging:  CT-CTA NECK WITH & W/O-POST PROCESSING   Final Result      CT angiogram of " the neck within normal limits.   Incompletely imaged RIGHT upper lobe airspace disease, likely pneumonia      CT-CTA HEAD WITH & W/O-POST PROCESS   Final Result      CT angiogram of the [place] within normal limits.         DX-CHEST-PORTABLE (1 VIEW)   Final Result      Findings suspicious for early RIGHT pneumonia      MR-BRAIN-W/O    (Results Pending)         Assessment/Plan:  Justification for Admission Status  I anticipate this patient will require at least two midnights for appropriate medical management, necessitating inpatient admission because acute respiratory failure with hypoxia secondary to pneumonia and TIA    Patient will need a Telemetry bed on MEDICAL service .  The need is secondary to TIA.    * Acute respiratory failure with hypoxia (HCC)- (present on admission)  Assessment & Plan  Secondary to pneumonia  Currently requiring 2 L of oxygen via nasal cannula  RT protocol    Pneumonia of right upper lobe due to infectious organism- (present on admission)  Assessment & Plan  Patient has been started on IV Unasyn and doxycycline  Check pro calcitonin  RT protocol  Continue supplemental oxygen, wean as tolerated  Follow blood cultures      Leukocytosis- (present on admission)  Assessment & Plan  Secondary to pneumonia  Monitor CBC and vitals    TIA (transient ischemic attack)- (present on admission)  Assessment & Plan  Patient had transient episode of aphasia which is now resolved  Patient will be placed on continuous cardiac monitoring to evaluate for any arrhythmias  CTA head and neck with IV contrast is negative for large vessel occlusion  Q4 hours neuro checks  I will order MRI brain  Check 2-D echo  Patient will be continued on aspirin and high intensity statin  Check TSH, lipid panel and hemoglobin A1c  PTOT and ST evaluation      Advance care planning- (present on admission)  Assessment & Plan  I discussed advance care planning for at least 17 minutes with the patient and wife, including  diagnosis of dementia, prostate cancer, acute respiratory failure with hypoxia, pneumonia, prognosis, plan of care, risks and benefits of any therapies that could be offered, as well as alternatives including palliation. The patient and family have opted for full medical therapy and full code at this time.     Mixed hyperlipidemia- (present on admission)  Assessment & Plan  Continue atorvastatin    Prostate CA (HCC)- feb 2022; RT tbd x 8 wks, mar- may 2022- (present on admission)  Assessment & Plan  History of  Status post radiation therapy      Essential hypertension- (present on admission)  Assessment & Plan  Continue metoprolol    GERD (gastroesophageal reflux disease)- (present on admission)  Assessment & Plan  Continue omeprazole        VTE prophylaxis: enoxaparin ppx

## 2023-12-19 NOTE — ED TRIAGE NOTES
"Chief Complaint   Patient presents with    Weakness     Pt BIB EMS from home c/o weakness ongoing for several days with 2x associated ground level falls. -head strike, -injuries secondary to the falls. Pt arrives in [facility] and reports no hx of. BGL  mg/dL. Pt arrives AAOx4 with a GCS of 15.      BP (!) 145/67   Pulse (!) 137   Temp 35.9 °C (96.7 °F) (Temporal)   Resp 18   Ht 1.778 m (5' 10\")   Wt 99.8 kg (220 lb)   SpO2 93%   BMI 31.57 kg/m²     Pt given 150 mL of NS en route by EMS.   "

## 2023-12-20 ENCOUNTER — APPOINTMENT (OUTPATIENT)
Dept: CARDIOLOGY | Facility: MEDICAL CENTER | Age: 73
DRG: 069 | End: 2023-12-20
Attending: FAMILY MEDICINE
Payer: MEDICARE

## 2023-12-20 PROBLEM — Z86.73 HISTORY OF CVA (CEREBROVASCULAR ACCIDENT): Status: ACTIVE | Noted: 2023-12-20

## 2023-12-20 PROBLEM — I48.0 PAROXYSMAL ATRIAL FIBRILLATION (HCC): Status: ACTIVE | Noted: 2023-12-20

## 2023-12-20 PROBLEM — R54 FRAILTY SYNDROME IN GERIATRIC PATIENT: Status: ACTIVE | Noted: 2023-12-20

## 2023-12-20 PROBLEM — I48.91 ATRIAL FIBRILLATION (HCC): Status: ACTIVE | Noted: 2023-12-20

## 2023-12-20 PROBLEM — R29.6 FREQUENT FALLS: Status: ACTIVE | Noted: 2023-12-20

## 2023-12-20 LAB
ANION GAP SERPL CALC-SCNC: 13 MMOL/L (ref 7–16)
BUN SERPL-MCNC: 21 MG/DL (ref 8–22)
CALCIUM SERPL-MCNC: 8.6 MG/DL (ref 8.5–10.5)
CHLORIDE SERPL-SCNC: 99 MMOL/L (ref 96–112)
CHOLEST SERPL-MCNC: 96 MG/DL (ref 100–199)
CO2 SERPL-SCNC: 21 MMOL/L (ref 20–33)
CREAT SERPL-MCNC: 0.84 MG/DL (ref 0.5–1.4)
EKG IMPRESSION: NORMAL
ERYTHROCYTE [DISTWIDTH] IN BLOOD BY AUTOMATED COUNT: 48.2 FL (ref 35.9–50)
EST. AVERAGE GLUCOSE BLD GHB EST-MCNC: 197 MG/DL
GFR SERPLBLD CREATININE-BSD FMLA CKD-EPI: 91 ML/MIN/1.73 M 2
GLUCOSE BLD STRIP.AUTO-MCNC: 155 MG/DL (ref 65–99)
GLUCOSE BLD STRIP.AUTO-MCNC: 156 MG/DL (ref 65–99)
GLUCOSE BLD STRIP.AUTO-MCNC: 185 MG/DL (ref 65–99)
GLUCOSE BLD STRIP.AUTO-MCNC: 195 MG/DL (ref 65–99)
GLUCOSE SERPL-MCNC: 167 MG/DL (ref 65–99)
HBA1C MFR BLD: 8.5 % (ref 4–5.6)
HCT VFR BLD AUTO: 37.5 % (ref 42–52)
HDLC SERPL-MCNC: 35 MG/DL
HGB BLD-MCNC: 12.4 G/DL (ref 14–18)
LDLC SERPL CALC-MCNC: 31 MG/DL
LV EJECT FRACT MOD 2C 99903: 73.06
LV EJECT FRACT MOD 4C 99902: 62.65
LV EJECT FRACT MOD BP 99901: 67.85
MAGNESIUM SERPL-MCNC: 1.6 MG/DL (ref 1.5–2.5)
MCH RBC QN AUTO: 28.7 PG (ref 27–33)
MCHC RBC AUTO-ENTMCNC: 33.1 G/DL (ref 32.3–36.5)
MCV RBC AUTO: 86.8 FL (ref 81.4–97.8)
PHOSPHATE SERPL-MCNC: 4.1 MG/DL (ref 2.5–4.5)
PLATELET # BLD AUTO: 142 K/UL (ref 164–446)
PMV BLD AUTO: 10.7 FL (ref 9–12.9)
POTASSIUM SERPL-SCNC: 3.6 MMOL/L (ref 3.6–5.5)
RBC # BLD AUTO: 4.32 M/UL (ref 4.7–6.1)
SODIUM SERPL-SCNC: 133 MMOL/L (ref 135–145)
TRIGL SERPL-MCNC: 148 MG/DL (ref 0–149)
TSH SERPL DL<=0.005 MIU/L-ACNC: 0.67 UIU/ML (ref 0.38–5.33)
WBC # BLD AUTO: 7.5 K/UL (ref 4.8–10.8)

## 2023-12-20 PROCEDURE — 93306 TTE W/DOPPLER COMPLETE: CPT | Mod: 26 | Performed by: INTERNAL MEDICINE

## 2023-12-20 PROCEDURE — 84443 ASSAY THYROID STIM HORMONE: CPT

## 2023-12-20 PROCEDURE — 700102 HCHG RX REV CODE 250 W/ 637 OVERRIDE(OP): Performed by: INTERNAL MEDICINE

## 2023-12-20 PROCEDURE — 80061 LIPID PANEL: CPT

## 2023-12-20 PROCEDURE — 94669 MECHANICAL CHEST WALL OSCILL: CPT

## 2023-12-20 PROCEDURE — 36415 COLL VENOUS BLD VENIPUNCTURE: CPT

## 2023-12-20 PROCEDURE — 770020 HCHG ROOM/CARE - TELE (206)

## 2023-12-20 PROCEDURE — A9270 NON-COVERED ITEM OR SERVICE: HCPCS | Performed by: FAMILY MEDICINE

## 2023-12-20 PROCEDURE — 93005 ELECTROCARDIOGRAM TRACING: CPT | Performed by: FAMILY MEDICINE

## 2023-12-20 PROCEDURE — 700105 HCHG RX REV CODE 258: Performed by: FAMILY MEDICINE

## 2023-12-20 PROCEDURE — 700111 HCHG RX REV CODE 636 W/ 250 OVERRIDE (IP): Mod: JZ | Performed by: INTERNAL MEDICINE

## 2023-12-20 PROCEDURE — 700102 HCHG RX REV CODE 250 W/ 637 OVERRIDE(OP): Performed by: FAMILY MEDICINE

## 2023-12-20 PROCEDURE — 93010 ELECTROCARDIOGRAM REPORT: CPT | Performed by: INTERNAL MEDICINE

## 2023-12-20 PROCEDURE — 82962 GLUCOSE BLOOD TEST: CPT | Mod: 91

## 2023-12-20 PROCEDURE — 83735 ASSAY OF MAGNESIUM: CPT

## 2023-12-20 PROCEDURE — 700111 HCHG RX REV CODE 636 W/ 250 OVERRIDE (IP): Mod: JZ | Performed by: FAMILY MEDICINE

## 2023-12-20 PROCEDURE — 99232 SBSQ HOSP IP/OBS MODERATE 35: CPT | Performed by: FAMILY MEDICINE

## 2023-12-20 PROCEDURE — 97162 PT EVAL MOD COMPLEX 30 MIN: CPT

## 2023-12-20 PROCEDURE — A9270 NON-COVERED ITEM OR SERVICE: HCPCS | Performed by: INTERNAL MEDICINE

## 2023-12-20 PROCEDURE — 83036 HEMOGLOBIN GLYCOSYLATED A1C: CPT

## 2023-12-20 PROCEDURE — 97535 SELF CARE MNGMENT TRAINING: CPT

## 2023-12-20 PROCEDURE — 97166 OT EVAL MOD COMPLEX 45 MIN: CPT

## 2023-12-20 PROCEDURE — 97163 PT EVAL HIGH COMPLEX 45 MIN: CPT

## 2023-12-20 PROCEDURE — 85027 COMPLETE CBC AUTOMATED: CPT

## 2023-12-20 PROCEDURE — 92610 EVALUATE SWALLOWING FUNCTION: CPT

## 2023-12-20 PROCEDURE — 84100 ASSAY OF PHOSPHORUS: CPT

## 2023-12-20 PROCEDURE — 93306 TTE W/DOPPLER COMPLETE: CPT

## 2023-12-20 PROCEDURE — 80048 BASIC METABOLIC PNL TOTAL CA: CPT

## 2023-12-20 RX ORDER — INSULIN LISPRO 100 [IU]/ML
2-10 INJECTION, SOLUTION INTRAVENOUS; SUBCUTANEOUS
COMMUNITY
End: 2024-01-17 | Stop reason: ALTCHOICE

## 2023-12-20 RX ORDER — SODIUM CHLORIDE 9 MG/ML
INJECTION, SOLUTION INTRAVENOUS CONTINUOUS
Status: DISCONTINUED | OUTPATIENT
Start: 2023-12-20 | End: 2023-12-22 | Stop reason: HOSPADM

## 2023-12-20 RX ADMIN — OMEPRAZOLE 20 MG: 20 CAPSULE, DELAYED RELEASE ORAL at 17:55

## 2023-12-20 RX ADMIN — OMEPRAZOLE 20 MG: 20 CAPSULE, DELAYED RELEASE ORAL at 05:34

## 2023-12-20 RX ADMIN — INSULIN HUMAN 1 UNITS: 100 INJECTION, SOLUTION PARENTERAL at 11:50

## 2023-12-20 RX ADMIN — SODIUM CHLORIDE: 9 INJECTION, SOLUTION INTRAVENOUS at 20:08

## 2023-12-20 RX ADMIN — BENAZEPRIL HYDROCHLORIDE 40 MG: 20 TABLET ORAL at 05:34

## 2023-12-20 RX ADMIN — DOCUSATE SODIUM 50 MG AND SENNOSIDES 8.6 MG 2 TABLET: 8.6; 5 TABLET, FILM COATED ORAL at 05:34

## 2023-12-20 RX ADMIN — ATORVASTATIN CALCIUM 80 MG: 80 TABLET, FILM COATED ORAL at 17:44

## 2023-12-20 RX ADMIN — DULOXETINE HYDROCHLORIDE 60 MG: 60 CAPSULE, DELAYED RELEASE ORAL at 17:55

## 2023-12-20 RX ADMIN — BUPROPION HYDROCHLORIDE 150 MG: 150 TABLET, EXTENDED RELEASE ORAL at 17:44

## 2023-12-20 RX ADMIN — INSULIN HUMAN 1 UNITS: 100 INJECTION, SOLUTION PARENTERAL at 08:20

## 2023-12-20 RX ADMIN — ASPIRIN 81 MG: 81 TABLET, CHEWABLE ORAL at 05:34

## 2023-12-20 RX ADMIN — ENOXAPARIN SODIUM 40 MG: 100 INJECTION SUBCUTANEOUS at 17:44

## 2023-12-20 RX ADMIN — METOPROLOL TARTRATE 12.5 MG: 25 TABLET, FILM COATED ORAL at 17:43

## 2023-12-20 RX ADMIN — AMPICILLIN AND SULBACTAM 3 G: 1; 2 INJECTION, POWDER, FOR SOLUTION INTRAMUSCULAR; INTRAVENOUS at 17:43

## 2023-12-20 RX ADMIN — DULOXETINE HYDROCHLORIDE 60 MG: 60 CAPSULE, DELAYED RELEASE ORAL at 05:34

## 2023-12-20 RX ADMIN — INSULIN HUMAN 1 UNITS: 100 INJECTION, SOLUTION PARENTERAL at 19:57

## 2023-12-20 RX ADMIN — AMPICILLIN AND SULBACTAM 3 G: 1; 2 INJECTION, POWDER, FOR SOLUTION INTRAMUSCULAR; INTRAVENOUS at 13:00

## 2023-12-20 RX ADMIN — DONEPEZIL HYDROCHLORIDE 5 MG: 5 TABLET, FILM COATED ORAL at 19:55

## 2023-12-20 RX ADMIN — BUPROPION HYDROCHLORIDE 150 MG: 150 TABLET, EXTENDED RELEASE ORAL at 05:34

## 2023-12-20 RX ADMIN — INSULIN HUMAN 1 UNITS: 100 INJECTION, SOLUTION PARENTERAL at 17:51

## 2023-12-20 RX ADMIN — SODIUM CHLORIDE: 9 INJECTION, SOLUTION INTRAVENOUS at 08:14

## 2023-12-20 ASSESSMENT — ENCOUNTER SYMPTOMS
HEARTBURN: 0
PALPITATIONS: 0
SPEECH CHANGE: 0
BLURRED VISION: 0
COUGH: 1
SHORTNESS OF BREATH: 0
SENSORY CHANGE: 0
FEVER: 0
FOCAL WEAKNESS: 0
BACK PAIN: 0
VOMITING: 0
TREMORS: 1
NECK PAIN: 0
DIAPHORESIS: 0
WEAKNESS: 1
CHILLS: 0
DIARRHEA: 0
HEADACHES: 0
NAUSEA: 0
NERVOUS/ANXIOUS: 0
MYALGIAS: 0
FLANK PAIN: 0
SORE THROAT: 0
WHEEZING: 0
DIZZINESS: 0
ABDOMINAL PAIN: 0

## 2023-12-20 ASSESSMENT — FIBROSIS 4 INDEX: FIB4 SCORE: 3.76

## 2023-12-20 ASSESSMENT — COGNITIVE AND FUNCTIONAL STATUS - GENERAL
DRESSING REGULAR LOWER BODY CLOTHING: TOTAL
DRESSING REGULAR UPPER BODY CLOTHING: A LOT
MOVING TO AND FROM BED TO CHAIR: UNABLE
TURNING FROM BACK TO SIDE WHILE IN FLAT BAD: UNABLE
MOBILITY SCORE: 9
HELP NEEDED FOR BATHING: TOTAL
CLIMB 3 TO 5 STEPS WITH RAILING: A LOT
WALKING IN HOSPITAL ROOM: A LOT
MOVING FROM LYING ON BACK TO SITTING ON SIDE OF FLAT BED: UNABLE
SUGGESTED CMS G CODE MODIFIER MOBILITY: CM
STANDING UP FROM CHAIR USING ARMS: A LOT
EATING MEALS: A LITTLE
TOILETING: TOTAL
PERSONAL GROOMING: A LITTLE
SUGGESTED CMS G CODE MODIFIER DAILY ACTIVITY: CL
DAILY ACTIVITIY SCORE: 11

## 2023-12-20 ASSESSMENT — PAIN DESCRIPTION - PAIN TYPE
TYPE: ACUTE PAIN
TYPE: ACUTE PAIN

## 2023-12-20 ASSESSMENT — ACTIVITIES OF DAILY LIVING (ADL): TOILETING: INDEPENDENT

## 2023-12-20 ASSESSMENT — GAIT ASSESSMENTS: GAIT LEVEL OF ASSIST: UNABLE TO PARTICIPATE

## 2023-12-20 NOTE — DISCHARGE PLANNING
HTH/SCP TCN chart review completed. Collaborated with CM  prior to meeting with the pt. The most current review of medical record, knowledge of pt's PLOF and social support, LACE+ score of 78, 6 clicks scores of 16 ADL's and 13 mobility were considered.  Per chart review, patient is a Re-admit and was at [facility] from 11/27/23 to 12/17/23.  He was discharged home last Sunday and has had 2 ground level falls at home while ambulating with the use of a FWW.      Pt seen at bedside. Introduced TCN program. Provided education regarding post-acute levels of care. Discussed HTH/SCP plan benefits. Pt verbalizes understanding.     Patient seen at bedside and states he lives with his spouse Heidy in a town home with 18 stairs.  He required supervision to moderate assistance with ADL's at baseline and ambulated with a FWW however has had 2 ground level falls since discharging from [facility] on Sunday 12/17/23.    Spouse completed IADL's and provides transportation as needed.  He is on RA at baseline and is currently on 1 L/min O2.  He will likely wean off O2 prior to discharge but TCN will monitor for O2 needs.  He has a SPC, FWW, W/C, transport W/C, shower chair, grab bars and a  shower head.      [facility] referral sent out by CM per CM protocol.  Per chart review, [facility] and [facility] SNF's have accepted and [facility] has declined.      Requested provider consults for: Palliative consult  from Dr. Gutierrez via voalte.  Choice proactively obtained for  1. [facility], 2. [facility], 3. [facility], faxed to [facility] and given to CM.  TCN will continue to follow and collaborate with discharge planning team as additional post acute needs arise. Thank you.     Completed today:  Voalte sent to Chad Gutierrez MD requesting Palliative consult.  Awaiting PT/OT recommendations.    ST Anticipate that the patient will have no further speech therapy needs on 12/20/23 after discharge from the hospital   Choice obtained:  1. [facility], 2.  [facility], 3. [facility] on 12/20/23.    SCP with [facility] PCP.     Addendum:  1147- Per chart review, OT is  recommending post-acute placement for additional occupational therapy services prior to discharge home.    Per chart review, [facility], [facility], [facility], and [facility] have accepted.  [facility] has declined and [facility] and [facility] are pending.

## 2023-12-20 NOTE — THERAPY
"Occupational Therapy   Initial Evaluation     Patient Name: Prabhakar Sierra  Age:  73 y.o., Sex:  male  Medical Record #: [id number]  Today's Date: 12/20/2023     Precautions  Precautions: (P) Fall Risk    Assessment  Patient is 73 y.o. male who presents to acute from home after 2 GLFs, pt had recently been DC'd from SNF. PMH includes dementia. Pt had been at SNF recovering from L1 compression fx. Pt reports he receives assist w/ bathing from spouse, per previous EMR he also receives assist w/ dressing and toileting. Today pt required total A for LB dressing and toilet hygiene, CGA for seated grooming, and performed stand pivot w/ max A (x2 people for safety). Pt demo'd impaired balance, functional mobility, and activity tolerance. Will continue to follow while in house.    Plan    Occupational Therapy Initial Treatment Plan   Treatment Interventions: (P) Self Care / Activities of Daily Living, Neuro Re-Education / Balance, Therapeutic Exercises, Therapeutic Activity, Adaptive Equipment  Treatment Frequency: (P) 3 Times per Week  Duration: (P) Until Therapy Goals Met    DC Equipment Recommendations: (P) Unable to determine at this time  Discharge Recommendations: (P) Recommend post-acute placement for additional occupational therapy services prior to discharge home     Subjective    \"I am sorry, I am so tired\"     Objective       12/20/23 1106   Charge Group   OT Evaluation OT Evaluation Mod   Total Time Spent   OT Evaluation (Minutes) 22   OT Total Time Spent (Calculated) 22   Initial Contact Note    Initial Contact Note Order Received and Verified, Occupational Therapy Evaluation in Progress with Full Report to Follow.   Prior Living Situation   Prior Services None   Housing / Facility 2 Story Apartment / Condo   Bathroom Set up Walk In Shower;Shower Chair;Grab Bars   Equipment Owned Tub / Shower Seat;Front-Wheel Walker   Lives with - Patient's Self Care Capacity Spouse   Comments Main living area is on the " 2nd floor, reports spouse assists w/ showers at baseline   Prior Level of ADL Function   Self Feeding Independent   Grooming / Hygiene Independent   Bathing Requires Assist   Dressing Requires Assist   Toileting Independent   Comments pt reports he is independent w/ toilet hygiene, previous eval indicates he requires max A   Precautions   Precautions Fall Risk   Vitals   O2 (LPM) 1   O2 Delivery Device Nasal Cannula   Pain 0 - 10 Group   Therapist Pain Assessment Post Activity Pain Same as Prior to Activity;Nurse Notified  (no c/o pain during session)   Cognition    Cognition / Consciousness X   Level of Consciousness Alert   Safety Awareness Impaired   New Learning Impaired   Comments pt pleasent and cooperative, very fearful of falling   Active ROM Upper Body   Comments WFL   Strength Upper Body   Comments generalized weakness, equal bilaterally   Coordination Upper Body   Coordination WDL   Balance Assessment   Sitting Balance (Static) Fair -   Sitting Balance (Dynamic) Poor +   Standing Balance (Static) Trace +   Standing Balance (Dynamic) Trace   Weight Shift Sitting Fair   Weight Shift Standing Poor   Comments w/ B UE support   Bed Mobility    Supine to Sit Moderate Assist   Sit to Supine Maximal Assist   Scooting Minimal Assist   ADL Assessment   Grooming Contact Guard Assist;Seated   Lower Body Dressing Total Assist   Toileting Total Assist  (attempted to have BM in BSC)   How much help from another person does the patient currently need...   Putting on and taking off regular lower body clothing? 1   Bathing (including washing, rinsing, and drying)? 1   Toileting, which includes using a toilet, bedpan, or urinal? 1   Putting on and taking off regular upper body clothing? 2   Taking care of personal grooming such as brushing teeth? 3   Eating meals? 3   6 Clicks Daily Activity Score 11   Functional Mobility   Sit to Stand Moderate Assist   Bed, Chair, Wheelchair Transfer Maximal Assist   Transfer Method Stand  Pivot   Mobility from EOB <> BSC w/ B UE support   Activity Tolerance   Sitting in Chair 2 min on BSC   Sitting Edge of Bed 5-6 min   Standing 1-2 min total   Patient / Family Goals   Patient / Family Goal #1 To go home   Short Term Goals   Short Term Goal # 1 Pt will demo ADL txf w/ min A   Short Term Goal # 2 Pt will demo standing grooming w/ min A   Education Group   Role of Occupational Therapist Patient Response Patient;Acceptance;Explanation;Demonstration;Verbal Demonstration;Action Demonstration   Occupational Therapy Initial Treatment Plan    Treatment Interventions Self Care / Activities of Daily Living;Neuro Re-Education / Balance;Therapeutic Exercises;Therapeutic Activity;Adaptive Equipment   Treatment Frequency 3 Times per Week   Duration Until Therapy Goals Met   Problem List   Problem List Decreased Active Daily Living Skills;Decreased Homemaking Skills;Decreased Functional Mobility;Decreased Activity Tolerance;Safety Awareness Deficits / Cognition;Impaired Postural Control / Balance   Anticipated Discharge Equipment and Recommendations   DC Equipment Recommendations Unable to determine at this time   Discharge Recommendations Recommend post-acute placement for additional occupational therapy services prior to discharge home   Interdisciplinary Plan of Care Collaboration   IDT Collaboration with  Nursing   Patient Position at End of Therapy In Bed;Call Light within Reach;Tray Table within Reach;Phone within Reach;Bed Alarm On   Collaboration Comments report given   Session Information   Date / Session Number  12/20, 1 (1/3, 12/26)

## 2023-12-20 NOTE — PROGRESS NOTES
Pt transported to MRI via transporter. Pt removed from telemetry, monitor room notified, confirmed order okay to remove telemetry for activities and procedures.     2347 pt returned from MRI- placed back on telemetry. Monitor room notified and confirmed can see pt again.

## 2023-12-20 NOTE — THERAPY
"Speech Language Pathology   Clinical Swallow Evaluation     Patient Name: Prabhakar Sierra  AGE:  73 y.o., SEX:  male  Medical Record #: [id number]  Date of Service: 12/20/2023      History of Present Illness  72 y/o male presented 12/19 with generalized weakness, cough, and SOB. Recent admit to SNF for L1 compression fx; after discharge home had episode of aphasia and tremors which have since resolved. Noted to be hypoxic on admission.    [facility] in [facility].    CMHx: Acute respiratory failure with hypoxia, GERD, HTN, HLD, DM2, PNA of R lobe, TIA, leukocytosis  PMHx: Prostate cancer, MDD, dementia, compression fractures    CXR 12/19:  \"Findings suspicious for early RIGHT pneumonia\"    MRI:  No acute processes.  Age-related volume loss.   Mild ventriculomegaly that is slightly disproportionate to the degree of cerebral volume loss. In the appropriate clinical setting, consider normal pressure hydrocephalus. However, ventricular size is stable from previous study from 5/12/2022.      General Information:  Vitals  O2 (LPM): 1  O2 Delivery Device: Nasal Cannula  Level of Consciousness: Alert, Awake  Orientation: Self, General place  Follows Directives: Yes      Prior Living Situation & Level of Function:  Housing / Facility: 1 Story Apartment / Condo  Lives with - Patient's Self Care Capacity: Significant Other  Communication: WFL  Swallowing: WFL       Oral Mechanism Evaluation:  Dentition: Fair (Missing upper front teeth)   Facial Symmetry: Equal  Labial Observations: WFL   Lingual Observations: Midline  Motor Speech: WFL            Laryngeal Function:  Secretion Management: Adequate  Voice Quality: WFL  Cough: Perceptually WNL         Subjective  RN cleared patient for clinical swallow evaluation. Pt received awake, alert, sitting upright in bed. Pt denied any history of difficulty swallowing. However reliability is in question as pt also denied ever previously seeing an SLP for " therapy, despite receiving cognitive treatment in 01/2023. Pt agreeable to PO trials.       Assessment  Current Method of Nutrition: Oral diet (Regular solids/thin liquids)  Positioning: Lynn's (60-90 degrees)  Bolus Administration: Patient  O2 (LPM): 1 O2 Delivery Device: Nasal Cannula  Factor(s) Affecting Performance: None  Tracheostomy : No       Swallowing Trials:  Swallowing Trials  Thin Liquid (TN0): WFL  Liquidised (LQ3): WFL  Pureed (PU4): WFL  Regular (RG7): WFL      Comments: Pt able to self-feed with appropriate rate and volume of intake. Demo'd adequate oral bolus acceptance, bolus stripping, and oral bolus containment. Presumed complete AP transfer with no oral bolus residue appreciated across all consistencies. Functional mastication of solids. No overt s/sx of airway invasion across all trials. Vocal quality remained clear throughout oral intake. Single swallow completed per bolus. Pt denied any globus sensation. Provided education regarding general aspiration and reflux precautions as well as signs of aspiration, pt stated understanding.       Clinical Impressions  Patient presents without overt s/sx concerning for airway invasion with oral intake. Recommend continuation of oral diet. Given acute respiratory failure and PNA, service to follow to ensure tolerance to diet and monitor for changes. Please hold PO with any worsening respiratory status with oral intake.       Recommendations  Diet Consistency: Regular solids/thin liquids  Instrumentation: None indicated at this time  Medication: As tolerated  Positioning: Fully upright and midline during oral intake, Remain upright for 30-45 minutes after oral intake  Risk Management : Slow rate of intake  Oral Care: BID      SLP Treatment Plan  Treatment Plan: Dysphagia Treatment  SLP Frequency: 3x Per Week  Estimated Duration: Until Therapy Goals Met      Anticipated Discharge Needs  Discharge Recommendations: Anticipate that the patient will have no  "further speech therapy needs after discharge from the hospital   Therapy Recommendations Upon DC: Patient / Family / Caregiver Education        Patient / Family Goals  Patient / Family Goal #1: \"Can I have some oatmeal?\"  Short Term Goals  Short Term Goal # 1: Pt will consume a diet of regular solids/thin liquids without overt s/sx of aspiration or decline in respiratory status      Vinita Monk, SLP   "

## 2023-12-20 NOTE — CARE PLAN
The patient is Watcher - Medium risk of patient condition declining or worsening    Shift Goals  Clinical Goals: q4h neuro, qshift NIHSS, monitor VS and labs, wean oxgen as appropriate  Patient Goals: eat food    Progress made toward(s) clinical / shift goals:    Problem: Knowledge Deficit - Stroke Education  Goal: Patient's knowledge of stroke and risk factors will improve  Outcome: Progressing  Note: Pt educated on POC     Problem: Neuro Status  Goal: Neuro status will remain stable or improve  Outcome: Progressing  Flowsheets (Taken 12/19/2023 2256)  Level of Consciousness: Alert  Note: Pt oriented waxes and weans-per pt wife he has memory impairment issues      Problem: Dysphagia  Goal: Dysphagia will improve  Outcome: Met  Flowsheets (Taken 12/19/2023 2256)  Head of Bed Elevated: Greater or equal to 30 degrees  Note: Pt passed swallow eval       Patient is not progressing towards the following goals:

## 2023-12-20 NOTE — PROGRESS NOTES
Med rec complete per MAR from [facility]- humalog is the only insulin pt was receiving. No more jardiance on record only synjardy

## 2023-12-20 NOTE — CASE COMMUNICATION
"noted  ----- Message -----  From: Sonali Stevens R.N.  Sent: 12/19/2023  11:35 AM PST  To: Lety Charlton R.N.; Doretha Lewis, LSW, MSW; *      Arrived at the SOC. Pt's wife says that pt had been eating well until he got home and pt has had a poor appetite. Pt's wife says that pt fell twice. Pt's wife says that he once fell transferring to the toilet and once pt rolled out of bed. Pt's wife says that last night pt had a hard time fin ding his words. Pt's wife says that pt's speech was slurred as well. Pt says that when he fell out of bed, pt's wife asked him where he was and pt thought he was still in bed. Pt's wife also says that she was unable to get him up and had to ask for help since pt didn't want to call [facility] since pt got home. Pt's wife says that he felt like he had a temperature as well (pt's wife didn't check him due to not sure where the thermometer was) . Pt says \"I don't like your decision to call [facility], but I definitely agree to go. I feel horrible\". SN checked pt's vitals and his vitals were 128/64, HR 62, RR 24, Temp 98.4 and O2 sat 78%. SN listen to pt's lungs and could hear Rhonchi throughout his lungs. When [facility] arrived, pt says that he didn't want to go to the ED. Pt's wife wanted to get [facility] to transfer pt to the other room where the bed was lower. Pt was unable to stand wit hout being weak and needing to lay down. Pt then agreed to go to the ED. "

## 2023-12-20 NOTE — ASSESSMENT & PLAN NOTE
Aricept  Avoid Benzodiazepines and Anticholinergics  Frequent orientation  Open window blinds during the day  Avoid naps during the day  Family at bedside whenever possible  Ensure adequate sleep at night - use Melatonin preferably  Avoid early morning labs  Avoid vital signs during sleep  Ambulate if possible  Provide adequate pain control  Monitor for urinary retention  Prevent and manage constipation  Discontinue IVs, Catheters, Tubes, Lines, Cardiac monitors, SCDs if possible

## 2023-12-20 NOTE — PROGRESS NOTES
4 Eyes Skin Assessment Completed by Sue MANRIQUEZ RN and ANAYELI Negron.    Head WDL  Ears Redness and Blanching  Nose WDL  Mouth WDL  Neck WDL  Breast/Chest WDL  Shoulder Blades WDL  Spine WDL  (R) Arm/Elbow/Hand Bruising  (L) Arm/Elbow/Hand Bruising  Abdomen WDL  Groin Excoriation  Scrotum/Coccyx/Buttocks Redness, Blanching, and Excoriation  (R) Leg Redness and Blanching  (L) Leg Redness and Blanching  (R) Heel/Foot/Toe Redness, Blanching, Boggy, and Edema  (L) Heel/Foot/Toe Redness, Blanching, Boggy, and Edema          Devices In Places Tele Box, Blood Pressure Cuff, Pulse Ox, Condom Cath, and Nasal Cannula      Interventions In Place NC W/Ear Foams, Heel Mepilex, Waffle Overlay, TAP System, Pillows, Elbow Mepilex, Q2 Turns, Barrier Cream, and Heels Loaded W/Pillows    Possible Skin Injury Yes    Pictures Uploaded Into Epic Yes  Wound Consult Placed Yes  RN Wound Prevention Protocol Ordered Yes

## 2023-12-20 NOTE — PROGRESS NOTES
Assumed care of pt. Telephone report received from ED RN. Pt was updated on plan of care and transferred to [facility] via ACLS RN on mobile oxygen tank and zoll monitor. All belongings with patient. Aox2-3, he is oriented to name and , his orientation to location changes and his orientation to situation changes, pt stays disoriented to time despite education. Pt pain level 0/10. Call light, phone and personal belongings in reach. Bed strip alarm on and working properly, bed in lowest position, and locked. Pt placed on telemetry and monitor room notified. PT wife called and spoke with RN, will be by in AM.

## 2023-12-20 NOTE — PROGRESS NOTES
Bedside report received from night shift RN. Assumed care. Pt is A&O x 3, Pt is in bed resting. Pt denies pain at this time. Pt was updated on plan of care. Pt has call light, personal belongings, and bedside table within reach. Bed is in the lowest position and bed alarm is on. Will continue to monitor.

## 2023-12-20 NOTE — THERAPY
Physical Therapy   Initial Evaluation     Patient Name: Prabhakar Sierra  Age:  73 y.o., Sex:  male  Medical Record #: [id number]  Today's Date: 12/20/2023     Precautions  Precautions: Fall Risk  Comments: hx of dementia    Assessment  Patient is 73 y.o. male admitted with weakness, cough, SOB, aphasia and tremors (since resolved), and GLF x2 1 day after d/c home from SNF. Found PNA and TIA. PMHx of L1 compression fx (no brace orders in chart), dementia, prostate CA s/p radiation, HTN, DM, GERD. Pt mobilized as detailed below. Requiring 2 person for transfer <> [facility] due to fear of falling, weakness, impaired balance, sequencing, cognition. Recommend placement at this time. Will continue to follow.     Plan    Physical Therapy Initial Treatment Plan   Treatment Plan : Bed Mobility, Equipment, Gait Training, Neuro Re-Education / Balance, Self Care / Home Evaluation, Stair Training, Therapeutic Activities, Therapeutic Exercise  Treatment Frequency: 3 Times per Week  Duration: Until Therapy Goals Met    DC Equipment Recommendations: Unable to determine at this time  Discharge Recommendations: Recommend post-acute placement for additional physical therapy services prior to discharge home       Subjective    Pt requesting to go to the bathroom. Pt requesting to return to supine immediately once seated on bed side commode, c/o lightheadedness that subsided once returned to bed.      Objective     12/20/23 1103   Vitals   O2 (LPM) 1   O2 Delivery Device Nasal Cannula   Vitals Comments new O2 needs, on RA at baseline   Pain 0 - 10 Group   Therapist Pain Assessment Nurse Notified;Post Activity Pain Same as Prior to Activity;0   Prior Living Situation   Prior Services None   Housing / Facility 2 [facility]  ([facility])   Steps Into Home 14   Steps In Home   (1 FOS)   Bathroom Set up Walk In Shower;Shower Chair;Grab Bars   Equipment Owned Tub / Shower Seat;Front-Wheel Walker;Single Point Cane   Lives with - Patient's Self  Care Capacity Spouse   Comments Reports unable to stay on first floor. Wife able to assist as needed. Pt was home from SNF 1 day before returning to hospital   Prior Level of Functional Mobility   Bed Mobility Independent   Transfer Status Independent   Ambulation Independent   Ambulation Distance 100   Assistive Devices Used None   Stairs Independent   History of Falls   History of Falls Yes   Date of Last Fall   (reason for admit)   Cognition    Cognition / Consciousness X   Level of Consciousness Alert   Safety Awareness Impaired   New Learning Impaired   Comments Pleasant and cooperative. Impaired memory due to dementia, fearful of falling   Active ROM Upper Body   Active ROM Upper Body  WDL   Strength Upper Body   Comments BLE grossly 4/5   Sensation Upper Body   Comments denies N/T   Active ROM Lower Body    Active ROM Lower Body  WDL   Strength Lower Body   Comments BLE grossly 3+/5, tremulous at times. Unclear if just due to fear or true   Sensation Lower Body   Comments denies N/T   Coordination Lower Body    Coordination Lower Body  Not Tested   Balance Assessment   Sitting Balance (Static) Fair -   Sitting Balance (Dynamic) Poor +   Standing Balance (Static) Trace +   Standing Balance (Dynamic) Trace   Weight Shift Sitting Fair   Weight Shift Standing Poor   Comments w/ BUE support   Bed Mobility    Supine to Sit Moderate Assist   Sit to Supine Maximal Assist   Scooting Moderate Assist   Rolling Moderate Assist to Lt.   Comments use of bed features, required increased time/effort   Gait Analysis   Gait Level Of Assist Unable to Participate   Comments few shuffled steps to chair only   Functional Mobility   Sit to Stand Moderate Assist   Bed, Chair, Wheelchair Transfer Maximal Assist  (2 person for safety, posterior lean and very fearful of falling, attempting to sit prior to completion of transfer both times (to BSC and back to bed))   Transfer Method Stand Step   [place] [facility]   How much difficulty  does the patient currently have...   Turning over in bed (including adjusting bedclothes, sheets and blankets)? 1   Sitting down on and standing up from a chair with arms (e.g., wheelchair, bedside commode, etc.) 1   Moving from lying on back to sitting on the side of the bed? 1   How much help from another person does the patient currently need...   Moving to and from a bed to a chair (including a wheelchair)? 2   Need to walk in a hospital room? 2   Climbing 3-5 steps with a railing? 2   6 clicks Mobility Score 9   Activity Tolerance   Comments limited 2/2 c/o lightheadedness on BSC, fatigue with transfer   Patient / Family Goals    Patient / Family Goal #1 to go to the bathroom   Short Term Goals    Short Term Goal # 1 Pt will perform supine <> sit with SPV In 6 visits to get in/out of bed   Short Term Goal # 2 Pt will perform STS transfers with FWW and Da in 6 visits to get in/out of chair   Short Term Goal # 3 Pt will ambulate 50ft with FWW and Da in 6 visits to initiate ambulation   Short Term Goal # 4 Pt will negotiate 3 steps with Da in 6 visits to initiate stair training   Education Group   Education Provided Role of Physical Therapist   Role of Physical Therapist Patient Response Patient;Acceptance;Explanation;Verbal Demonstration   Additional Comments Receptive to education, suspect poor carryover given hx of dementia. Pt required verbal cues, compensatory strategies and self management for mobility during bed mobility, STS, transfers.   Physical Therapy Initial Treatment Plan    Treatment Plan  Bed Mobility;Equipment;Gait Training;Neuro Re-Education / Balance;Self Care / Home Evaluation;Stair Training;Therapeutic Activities;Therapeutic Exercise   Treatment Frequency 3 Times per Week   Duration Until Therapy Goals Met   Problem List    Problems Impaired Bed Mobility;Impaired Transfers;Impaired Ambulation;Functional Strength Deficit;Impaired Balance;Safety Awareness Deficits / Cognition;Decreased  Activity Tolerance;Motor Planning / Sequencing   Anticipated Discharge Equipment and Recommendations   DC Equipment Recommendations Unable to determine at this time   Discharge Recommendations Recommend post-acute placement for additional physical therapy services prior to discharge home   Interdisciplinary Plan of Care Collaboration   IDT Collaboration with  Nursing;Occupational Therapist   Patient Position at End of Therapy In Bed;Bed Alarm On;Call Light within Reach;Tray Table within Reach;Phone within Reach   Collaboration Comments RN updated   Session Information   Date / Session Number  12/20 - 1 (1/3, 12/26)

## 2023-12-20 NOTE — PROGRESS NOTES
Hospital Medicine Daily Progress Note    Date of Service  12/20/2023    Chief Complaint  Prabhakar Sierra is a 73 y.o. male admitted 12/19/2023 with Pneumonia    Hospital Course  Admitted with pneumonia, respiratory failure and hypoxia, started empiric coverage with IV Unasyn.  He was also noted to have atrial fibrillation which apparently converted spontaneously to sinus tachycardia.    Interval Problem Update  Pneumonia -feels better  Respiratory failure - O2 1 lpm  A-fib -currently sinus  Hypertension - sbp   Diabetes - -185    I have discussed this patient's plan of care and discharge plan at IDT rounds today with Case Management, Nursing, Nursing leadership, and other members of the IDT team.    Consultants/Specialty  None    Code Status  Full Code    Disposition  The patient is not medically cleared for discharge to home or a post-acute facility.  Anticipate discharge to: skilled nursing facility    I have placed the appropriate orders for post-discharge needs.    Review of Systems  Review of Systems   Constitutional:  Positive for malaise/fatigue. Negative for chills, diaphoresis and fever.   HENT:  Negative for congestion, hearing loss and sore throat.    Eyes:  Negative for blurred vision.   Respiratory:  Positive for cough. Negative for shortness of breath and wheezing.    Cardiovascular:  Negative for chest pain, palpitations and leg swelling.   Gastrointestinal:  Negative for abdominal pain, diarrhea, heartburn, nausea and vomiting.   Genitourinary:  Negative for dysuria, flank pain and hematuria.   Musculoskeletal:  Negative for back pain, joint pain, myalgias and neck pain.   Skin:  Negative for rash.   Neurological:  Positive for tremors and weakness. Negative for dizziness, sensory change, speech change, focal weakness and headaches.   Psychiatric/Behavioral:  The patient is not nervous/anxious.         Physical Exam  Temp:  [35.8 °C (96.5 °F)-36.5 °C (97.7 °F)] 36.4 °C (97.5  °F)  Pulse:  [] 83  Resp:  [14-20] 16  BP: ()/(48-72) 121/65  SpO2:  [88 %-97 %] 95 %    Physical Exam  Vitals and nursing note reviewed.   HENT:      Head: Normocephalic and atraumatic.      Nose: No congestion.      Mouth/Throat:      Mouth: Mucous membranes are moist.   Eyes:      Extraocular Movements: Extraocular movements intact.      Conjunctiva/sclera: Conjunctivae normal.   Cardiovascular:      Rate and Rhythm: Normal rate and regular rhythm.   Pulmonary:      Effort: Pulmonary effort is normal.      Breath sounds: Rales present.   Abdominal:      General: There is no distension.      Tenderness: There is no abdominal tenderness. There is no guarding or rebound.   Musculoskeletal:      Cervical back: No tenderness.      Right lower leg: No edema.      Left lower leg: No edema.   Skin:     General: Skin is warm and dry.   Neurological:      General: No focal deficit present.      Mental Status: He is alert and oriented to person, place, and time.      Cranial Nerves: No cranial nerve deficit.   Psychiatric:         Speech: Speech is delayed.         Cognition and Memory: He exhibits impaired recent memory.         Fluids    Intake/Output Summary (Last 24 hours) at 12/20/2023 1244  Last data filed at 12/20/2023 1000  Gross per 24 hour   Intake 400 ml   Output 500 ml   Net -100 ml       Laboratory  Recent Labs     12/19/23  1155 12/20/23  0332   WBC 11.0* 7.5   RBC 4.72 4.32*   HEMOGLOBIN 13.7* 12.4*   HEMATOCRIT 41.2* 37.5*   MCV 87.3 86.8   MCH 29.0 28.7   MCHC 33.3 33.1   RDW 49.1 48.2   PLATELETCT 183 142*   MPV 10.9 10.7     Recent Labs     12/19/23  1155 12/20/23  0332   SODIUM 134* 133*   POTASSIUM 3.7 3.6   CHLORIDE 97 99   CO2 19* 21   GLUCOSE 102* 167*   BUN 22 21   CREATININE 0.88 0.84   CALCIUM 9.0 8.6             Recent Labs     12/20/23  0332   TRIGLYCERIDE 148   HDL 35*   LDL 31       Imaging  MR-BRAIN-W/O   Final Result         No acute processes.      Age-related volume loss.       Mild ventriculomegaly that is slightly disproportionate to the degree of cerebral volume loss. In the appropriate clinical setting, consider normal pressure hydrocephalus. However, ventricular size is stable from previous study from 5/12/2022.      CT-CTA NECK WITH & W/O-POST PROCESSING   Final Result      CT angiogram of the neck within normal limits.   Incompletely imaged RIGHT upper lobe airspace disease, likely pneumonia      CT-CTA HEAD WITH & W/O-POST PROCESS   Final Result      CT angiogram of the [place] within normal limits.         DX-CHEST-PORTABLE (1 VIEW)   Final Result      Findings suspicious for early RIGHT pneumonia      EC-ECHOCARDIOGRAM COMPLETE W/O CONT    (Results Pending)        Assessment/Plan  * Acute respiratory failure with hypoxia (HCC)- (present on admission)  Assessment & Plan  RT protocol    Paroxysmal atrial fibrillation (HCC)- (present on admission)  Assessment & Plan  Metoprolol  [id number] - 5  Check Echocardiogram  Check TSH, magnesium, phosphorus    Frailty syndrome in geriatric patient- (present on admission)  Assessment & Plan  Discuss goals of care with family    Frequent falls- (present on admission)  Assessment & Plan  PT and OT   Check Orthostatics    Pneumonia of right upper lobe due to infectious organism- (present on admission)  Assessment & Plan  Start IV Unasyn      History of CVA (cerebrovascular accident)- (present on admission)  Assessment & Plan  ASA, Lipitor    Neurodegenerative dementia with behavioral disturbance (HCC)- (present on admission)  Assessment & Plan  Aricept  Avoid Benzodiazepines and Anticholinergics  Frequent orientation  Open window blinds during the day  Avoid naps during the day  Family at bedside whenever possible  Ensure adequate sleep at night - use Melatonin preferably  Avoid early morning labs  Avoid vital signs during sleep  Ambulate if possible  Provide adequate pain control  Monitor for urinary retention  Prevent and manage  constipation  Discontinue IVs, Catheters, Tubes, Lines, Cardiac monitors, SCDs if possible     Type 2 diabetes mellitus with hyperglycemia, without long-term current use of insulin (HCC)- (present on admission)  Assessment & Plan  SSI    Prostate CA (HCC)- (present on admission)  Assessment & Plan  History of radiation therapy      Essential hypertension- (present on admission)  Assessment & Plan  Metoprolol  Hold Benazepril    Mixed hyperlipidemia- (present on admission)  Assessment & Plan  Lipitor    GERD (gastroesophageal reflux disease)- (present on admission)  Assessment & Plan  Omeprazole         VTE prophylaxis:    enoxaparin ppx      I have performed a physical exam and reviewed and updated ROS and Plan today (12/20/2023). In review of yesterday's note (12/19/2023), there are no changes except as documented above.

## 2023-12-20 NOTE — ED NOTES
Bedside report received from Nano URBINA. Pt on cardiac monitor, pulse ox, and automatic BP. Fall precautions in place. Call light within reach. Pt connected to 2L O2 via NC. Pt awaiting  from floor RN

## 2023-12-20 NOTE — CARE PLAN
Problem: Knowledge Deficit - Standard  Goal: Patient and family/care givers will demonstrate understanding of plan of care, disease process/condition, diagnostic tests and medications  Description: Target End Date:  1-3 days or as soon as patient condition allows    Document in Patient Education    1.  Patient and family/caregiver oriented to unit, equipment, visitation policy and means for communicating concern  2.  Complete/review Learning Assessment  3.  Assess knowledge level of disease process/condition, treatment plan, diagnostic tests and medications  4.  Explain disease process/condition, treatment plan, diagnostic tests and medications  Outcome: Progressing     Problem: Skin Integrity  Goal: Skin integrity is maintained or improved  Description: Target End Date:  Prior to discharge or change in level of care    Document interventions on Skin Risk/Zana flowsheet groups and corresponding LDA    1.  Assess and monitor skin integrity, appearance and/or temperature  2.  Assess risk factors for impaired skin integrity and/or pressures ulcers  3.  Implement precautions to protect skin integrity in collaboration with interdisciplinary team  4.  Implement pressure ulcer prevention protocol if at risk for skin breakdown  5.  Confirm wound care consult if at risk for skin breakdown  6.  Ensure patient use of pressure relieving devices  (Low air loss bed, waffle overlay, heel protectors, ROHO cushion, etc)  Outcome: Progressing   The patient is Watcher - Medium risk of patient condition declining or worsening    Shift Goals  Clinical Goals: q4h neuro, qshift NIHSS, monitor VS and labs, wean oxgen as appropriate  Patient Goals: eat food    Progress made toward(s) clinical / shift goals:  Pt updated on POC    Patient is not progressing towards the following goals:

## 2023-12-20 NOTE — WOUND TEAM
Attempted to see patient for wound consult, however patient currently undergoing ultrasound at bedside. Will come back at a later time.

## 2023-12-20 NOTE — DISCHARGE PLANNING
Visited with SVEN, [facility].  There are 18 stairs.  He uses a single point cane, he has a FWW and has a regular w/c as well as a transport w/c.    6 clicks are low and with the frequency of falls CM placed a SNF blanket and as of now there are 4 accepting facilities.

## 2023-12-21 PROBLEM — L89.159 PRESSURE INJURY OF SKIN OF SACRAL REGION: Status: ACTIVE | Noted: 2023-12-21

## 2023-12-21 LAB
ANION GAP SERPL CALC-SCNC: 13 MMOL/L (ref 7–16)
BACTERIA UR CULT: NORMAL
BUN SERPL-MCNC: 12 MG/DL (ref 8–22)
CALCIUM SERPL-MCNC: 8.1 MG/DL (ref 8.5–10.5)
CHLORIDE SERPL-SCNC: 100 MMOL/L (ref 96–112)
CO2 SERPL-SCNC: 21 MMOL/L (ref 20–33)
CREAT SERPL-MCNC: 0.55 MG/DL (ref 0.5–1.4)
ERYTHROCYTE [DISTWIDTH] IN BLOOD BY AUTOMATED COUNT: 47.6 FL (ref 35.9–50)
GFR SERPLBLD CREATININE-BSD FMLA CKD-EPI: 104 ML/MIN/1.73 M 2
GLUCOSE BLD STRIP.AUTO-MCNC: 147 MG/DL (ref 65–99)
GLUCOSE BLD STRIP.AUTO-MCNC: 148 MG/DL (ref 65–99)
GLUCOSE BLD STRIP.AUTO-MCNC: 183 MG/DL (ref 65–99)
GLUCOSE BLD STRIP.AUTO-MCNC: 216 MG/DL (ref 65–99)
GLUCOSE SERPL-MCNC: 142 MG/DL (ref 65–99)
HCT VFR BLD AUTO: 36.5 % (ref 42–52)
HGB BLD-MCNC: 12.4 G/DL (ref 14–18)
MCH RBC QN AUTO: 29.2 PG (ref 27–33)
MCHC RBC AUTO-ENTMCNC: 34 G/DL (ref 32.3–36.5)
MCV RBC AUTO: 86.1 FL (ref 81.4–97.8)
PLATELET # BLD AUTO: 152 K/UL (ref 164–446)
PMV BLD AUTO: 10.3 FL (ref 9–12.9)
POTASSIUM SERPL-SCNC: 3.9 MMOL/L (ref 3.6–5.5)
RBC # BLD AUTO: 4.24 M/UL (ref 4.7–6.1)
SIGNIFICANT IND 70042: NORMAL
SITE SITE: NORMAL
SODIUM SERPL-SCNC: 134 MMOL/L (ref 135–145)
SOURCE SOURCE: NORMAL
WBC # BLD AUTO: 5.6 K/UL (ref 4.8–10.8)

## 2023-12-21 PROCEDURE — 80048 BASIC METABOLIC PNL TOTAL CA: CPT

## 2023-12-21 PROCEDURE — 94669 MECHANICAL CHEST WALL OSCILL: CPT

## 2023-12-21 PROCEDURE — 92526 ORAL FUNCTION THERAPY: CPT

## 2023-12-21 PROCEDURE — 82962 GLUCOSE BLOOD TEST: CPT | Mod: 91

## 2023-12-21 PROCEDURE — 99232 SBSQ HOSP IP/OBS MODERATE 35: CPT | Performed by: FAMILY MEDICINE

## 2023-12-21 PROCEDURE — 700105 HCHG RX REV CODE 258: Performed by: FAMILY MEDICINE

## 2023-12-21 PROCEDURE — 700111 HCHG RX REV CODE 636 W/ 250 OVERRIDE (IP): Mod: JZ | Performed by: FAMILY MEDICINE

## 2023-12-21 PROCEDURE — 700111 HCHG RX REV CODE 636 W/ 250 OVERRIDE (IP): Mod: JZ | Performed by: INTERNAL MEDICINE

## 2023-12-21 PROCEDURE — 700102 HCHG RX REV CODE 250 W/ 637 OVERRIDE(OP): Performed by: INTERNAL MEDICINE

## 2023-12-21 PROCEDURE — 770020 HCHG ROOM/CARE - TELE (206)

## 2023-12-21 PROCEDURE — A9270 NON-COVERED ITEM OR SERVICE: HCPCS | Performed by: FAMILY MEDICINE

## 2023-12-21 PROCEDURE — 36415 COLL VENOUS BLD VENIPUNCTURE: CPT

## 2023-12-21 PROCEDURE — 85027 COMPLETE CBC AUTOMATED: CPT

## 2023-12-21 PROCEDURE — 700102 HCHG RX REV CODE 250 W/ 637 OVERRIDE(OP): Performed by: FAMILY MEDICINE

## 2023-12-21 PROCEDURE — 92523 SPEECH SOUND LANG COMPREHEN: CPT

## 2023-12-21 PROCEDURE — A9270 NON-COVERED ITEM OR SERVICE: HCPCS | Performed by: INTERNAL MEDICINE

## 2023-12-21 RX ORDER — MAGNESIUM SULFATE HEPTAHYDRATE 40 MG/ML
2 INJECTION, SOLUTION INTRAVENOUS ONCE
Status: COMPLETED | OUTPATIENT
Start: 2023-12-21 | End: 2023-12-21

## 2023-12-21 RX ADMIN — METOPROLOL TARTRATE 12.5 MG: 25 TABLET, FILM COATED ORAL at 05:37

## 2023-12-21 RX ADMIN — DOCUSATE SODIUM 50 MG AND SENNOSIDES 8.6 MG 2 TABLET: 8.6; 5 TABLET, FILM COATED ORAL at 17:48

## 2023-12-21 RX ADMIN — METOPROLOL TARTRATE 12.5 MG: 25 TABLET, FILM COATED ORAL at 17:47

## 2023-12-21 RX ADMIN — DULOXETINE HYDROCHLORIDE 60 MG: 60 CAPSULE, DELAYED RELEASE ORAL at 17:47

## 2023-12-21 RX ADMIN — AMPICILLIN AND SULBACTAM 3 G: 1; 2 INJECTION, POWDER, FOR SOLUTION INTRAMUSCULAR; INTRAVENOUS at 17:55

## 2023-12-21 RX ADMIN — ASPIRIN 81 MG: 81 TABLET, CHEWABLE ORAL at 05:38

## 2023-12-21 RX ADMIN — AMPICILLIN AND SULBACTAM 3 G: 1; 2 INJECTION, POWDER, FOR SOLUTION INTRAMUSCULAR; INTRAVENOUS at 01:05

## 2023-12-21 RX ADMIN — MAGNESIUM SULFATE HEPTAHYDRATE 2 G: 2 INJECTION, SOLUTION INTRAVENOUS at 08:30

## 2023-12-21 RX ADMIN — DOCUSATE SODIUM 50 MG AND SENNOSIDES 8.6 MG 2 TABLET: 8.6; 5 TABLET, FILM COATED ORAL at 05:37

## 2023-12-21 RX ADMIN — INSULIN HUMAN 2 UNITS: 100 INJECTION, SOLUTION PARENTERAL at 11:55

## 2023-12-21 RX ADMIN — DULOXETINE HYDROCHLORIDE 60 MG: 60 CAPSULE, DELAYED RELEASE ORAL at 05:37

## 2023-12-21 RX ADMIN — INSULIN HUMAN 1 UNITS: 100 INJECTION, SOLUTION PARENTERAL at 20:13

## 2023-12-21 RX ADMIN — OMEPRAZOLE 20 MG: 20 CAPSULE, DELAYED RELEASE ORAL at 17:48

## 2023-12-21 RX ADMIN — DONEPEZIL HYDROCHLORIDE 5 MG: 5 TABLET, FILM COATED ORAL at 20:10

## 2023-12-21 RX ADMIN — BUPROPION HYDROCHLORIDE 150 MG: 150 TABLET, EXTENDED RELEASE ORAL at 05:37

## 2023-12-21 RX ADMIN — ENOXAPARIN SODIUM 40 MG: 100 INJECTION SUBCUTANEOUS at 17:48

## 2023-12-21 RX ADMIN — AMPICILLIN AND SULBACTAM 3 G: 1; 2 INJECTION, POWDER, FOR SOLUTION INTRAMUSCULAR; INTRAVENOUS at 12:03

## 2023-12-21 RX ADMIN — BUPROPION HYDROCHLORIDE 150 MG: 150 TABLET, EXTENDED RELEASE ORAL at 17:47

## 2023-12-21 RX ADMIN — AMPICILLIN AND SULBACTAM 3 G: 1; 2 INJECTION, POWDER, FOR SOLUTION INTRAMUSCULAR; INTRAVENOUS at 05:40

## 2023-12-21 RX ADMIN — ATORVASTATIN CALCIUM 80 MG: 80 TABLET, FILM COATED ORAL at 17:47

## 2023-12-21 RX ADMIN — SODIUM CHLORIDE: 9 INJECTION, SOLUTION INTRAVENOUS at 08:27

## 2023-12-21 RX ADMIN — OMEPRAZOLE 20 MG: 20 CAPSULE, DELAYED RELEASE ORAL at 05:37

## 2023-12-21 ASSESSMENT — ENCOUNTER SYMPTOMS
BLURRED VISION: 0
FOCAL WEAKNESS: 0
DIZZINESS: 0
WEAKNESS: 1
NECK PAIN: 0
FEVER: 0
PALPITATIONS: 0
COUGH: 1
TREMORS: 1
DIARRHEA: 0
CHILLS: 0
FLANK PAIN: 0
WHEEZING: 0
HEARTBURN: 0
VOMITING: 0
DIAPHORESIS: 0
SORE THROAT: 0
ABDOMINAL PAIN: 0
NAUSEA: 0
SENSORY CHANGE: 0
MYALGIAS: 0
SHORTNESS OF BREATH: 0
NERVOUS/ANXIOUS: 0
BACK PAIN: 0
SPEECH CHANGE: 0
HEADACHES: 0

## 2023-12-21 ASSESSMENT — PAIN DESCRIPTION - PAIN TYPE: TYPE: ACUTE PAIN

## 2023-12-21 ASSESSMENT — FIBROSIS 4 INDEX: FIB4 SCORE: 3.76

## 2023-12-21 NOTE — THERAPY
"Speech Language Pathology   Cognitive Evaluation     Patient Name: Prabhakar Sierra  AGE:  73 y.o., SEX:  male  Medical Record #: [id number]  Date of Service: 2023      History of Present Illness  72 y/o male presented  with generalized weakness, cough, and SOB. Recent admit to SNF for L1 compression fx; after discharge home had episode of aphasia and tremors which have since resolved. Noted to be hypoxic on admission.    Seen for cognition at [facility] in 2023.    CMHx: Acute respiratory failure with hypoxia, GERD, HTN, HLD, DM2, PNA of R lobe, TIA, leukocytosis  PMHx: Prostate cancer, MDD, dementia, compression fractures    CXR :  \"Findings suspicious for early RIGHT pneumonia\"    MRI:  No acute processes.  Age-related volume loss.   Mild ventriculomegaly that is slightly disproportionate to the degree of cerebral volume loss. In the appropriate clinical setting, consider normal pressure hydrocephalus. However, ventricular size is stable from previous study from 2022.      General Information  Vitals  O2 (LPM): 1  O2 Delivery Device: None - Room Air  Level of Consciousness: Alert, Awake  Orientation: Self, General place, , Current month, Current year  Follows Directives: Yes      Prior Living Situation & Level of Function  Prior Services: None  Housing / Facility: 2 Story Apartment / Condo  Lives with - Patient's Self Care Capacity: Spouse  Communication: WFL  Swallowing: WFL       Oral Mechanism Evaluation  Dentition: Fair (Missing upper front teeth)  Facial Symmetry: Equal  Labial Observations: WFL  Lingual Observations: Midline  Motor Speech: WFL      Laryngeal Function Exam  Secretion Management: Adequate  Voice Quality: WFL  Cough: Perceptually WNL      Subjective  Patient received awake, alert, sitting upright in bed. Denied any changes in cognition. Repeatedly stating that he wants to go home. Agreeable to participate in SLP tasks.          Assessment  The patient was seen " this date for a cognitive-linguistic evaluation. Portions of the COGNISTAT (The Neurobehavioral Cognitive Status Examination), as well as non-standardized assessments were utilized. Results are as follows:    Cognistat  Orientation: Average  Attention: Average  Repetition: Average  Naming: Average  Memory: Severe   - Pt independently recalled 0/4 words after provided delay. Accuracy increased to 2/4 with list prompt.   Calculations: Average  Similarities: Average  Judgement: Average      Medication Management  Medication Management: Endorsed taking medications daily with assistance from wife for medication management. Answered questions regarding medication management with 100% accuracy. Provided functional memory-enhancing strategy.      Informal Assessment  Command Following (1-step, simple, 2-step, moderate, 3-step, complex): 9/9  Yes/No Questions (simple, moderate, complex): 9/9      Observations:  Pt attended to conversation and structured tasks without redirection required. Intact repetition and working memory appreciated. No anomia, perseveration, or paraphasias appreciated. Functional pragmatics as evidenced by appropriate turn-taking and participation in informal conversation.          Clinical Impressions  Patient presents without acute cognitive changes. Formal assessment revealed severe memory deficits; however suspect consistent with baseline given dementia diagnosis and per previous cognition treatment at [facility]. Pt endorsed that his wife provides assistance with ADLs. Pt would benefit from cognitive outpatient therapy or at next level of care, targeting memory. No further acute speech therapy needs indicated at this juncture for cognition; please re-consult with any concerns or change in status.       NOTE: It is not within the scope of practice of Speech-Language Pathologists to determine patient capacity. Please defer to the physician or psych to complete this assessment.  "      Recommendations  Supervision Needs Upons Discharge: Intermittent supervision throughout the day and direct assistance with IADLs (see below)  IADLs: Medication management, Financial management, Appointment management, Household chores, Cooking         SLP Treatment Plan  Treatment Plan: Dysphagia Treatment  SLP Frequency: 3x Per Week  Estimated Duration: Until Therapy Goals Met      Anticipated Discharge Needs  Discharge Recommendations: Recommend outpatient speech therapy services  Therapy Recommendations Upon DC: Cognitive-Linguistic Training, Patient / Family / Caregiver Education      Patient / Family Goals  Patient / Family Goal #1: \"Can I have some oatmeal?\"  Short Term Goal # 1: Pt will consume a diet of regular solids/thin liquids without overt s/sx of aspiration or decline in respiratory status      Vinita Monk, SLP  "

## 2023-12-21 NOTE — PROGRESS NOTES
Hospital Medicine Daily Progress Note    Date of Service  12/21/2023    Chief Complaint  Prabhakar Sierra is a 73 y.o. male admitted 12/19/2023 with Pneumonia    Hospital Course  Admitted with pneumonia, respiratory failure and hypoxia, started on empiric coverage with IV Unasyn.  He was also noted to have atrial fibrillation which apparently converted spontaneously to sinus tachycardia. He has known history of probable dementia, frequent falls, possible NPH and has been following with neurology as outpatient.     Interval Problem Update  Pneumonia - feels better  Respiratory failure - off O2  A-fib -currently sinus  Hypertension - sbp 120-138  Diabetes - -216  Low magnesium    Updates given and plan of care discussed with patient's spouse.  Lengthy discussion, patient is very frail with frequent falls, multiple hospitalizations, spouse is considering possible hospice.    I have discussed this patient's plan of care and discharge plan at IDT rounds today with Case Management, Nursing, Nursing leadership, and other members of the IDT team.    Consultants/Specialty  None    Code Status  Full Code    Disposition  The patient is not medically cleared for discharge to home or a post-acute facility.  Anticipate discharge to: home with organized home healthcare and close outpatient follow-up    I have placed the appropriate orders for post-discharge needs.    Review of Systems  Review of Systems   Constitutional:  Positive for malaise/fatigue. Negative for chills, diaphoresis and fever.   HENT:  Negative for congestion, hearing loss and sore throat.    Eyes:  Negative for blurred vision.   Respiratory:  Positive for cough. Negative for shortness of breath and wheezing.    Cardiovascular:  Negative for chest pain, palpitations and leg swelling.   Gastrointestinal:  Negative for abdominal pain, diarrhea, heartburn, nausea and vomiting.   Genitourinary:  Negative for dysuria, flank pain and hematuria.    Musculoskeletal:  Negative for back pain, joint pain, myalgias and neck pain.   Skin:  Negative for rash.   Neurological:  Positive for tremors and weakness. Negative for dizziness, sensory change, speech change, focal weakness and headaches.   Psychiatric/Behavioral:  The patient is not nervous/anxious.         Physical Exam  Temp:  [36.3 °C (97.3 °F)-36.6 °C (97.9 °F)] 36.3 °C (97.3 °F)  Pulse:  [76-94] 76  Resp:  [16-18] 18  BP: (120-138)/(64-75) 138/70  SpO2:  [90 %-93 %] 93 %    Physical Exam  Vitals and nursing note reviewed.   HENT:      Head: Normocephalic and atraumatic.      Nose: No congestion.      Mouth/Throat:      Mouth: Mucous membranes are moist.   Eyes:      Extraocular Movements: Extraocular movements intact.      Conjunctiva/sclera: Conjunctivae normal.   Cardiovascular:      Rate and Rhythm: Normal rate and regular rhythm.   Pulmonary:      Effort: Pulmonary effort is normal.      Breath sounds: Rales present.   Abdominal:      General: There is no distension.      Tenderness: There is no abdominal tenderness. There is no guarding or rebound.   Musculoskeletal:      Cervical back: No tenderness.      Right lower leg: No edema.      Left lower leg: No edema.   Skin:     General: Skin is warm and dry.   Neurological:      General: No focal deficit present.      Mental Status: He is alert and oriented to person, place, and time.      Cranial Nerves: No cranial nerve deficit.   Psychiatric:         Speech: Speech is delayed.         Cognition and Memory: He exhibits impaired recent memory.         Fluids    Intake/Output Summary (Last 24 hours) at 12/21/2023 1229  Last data filed at 12/21/2023 1000  Gross per 24 hour   Intake 240 ml   Output 700 ml   Net -460 ml       Laboratory  Recent Labs     12/19/23  1155 12/20/23  0332 12/21/23  0810   WBC 11.0* 7.5 5.6   RBC 4.72 4.32* 4.24*   HEMOGLOBIN 13.7* 12.4* 12.4*   HEMATOCRIT 41.2* 37.5* 36.5*   MCV 87.3 86.8 86.1   MCH 29.0 28.7 29.2   MCHC 33.3  33.1 34.0   RDW 49.1 48.2 47.6   PLATELETCT 183 142* 152*   MPV 10.9 10.7 10.3     Recent Labs     12/19/23  1155 12/20/23  0332 12/21/23  0810   SODIUM 134* 133* 134*   POTASSIUM 3.7 3.6 3.9   CHLORIDE 97 99 100   CO2 19* 21 21   GLUCOSE 102* 167* 142*   BUN 22 21 12   CREATININE 0.88 0.84 0.55   CALCIUM 9.0 8.6 8.1*             Recent Labs     12/20/23  0332   TRIGLYCERIDE 148   HDL 35*   LDL 31       Imaging  EC-ECHOCARDIOGRAM COMPLETE W/O CONT   Final Result      MR-BRAIN-W/O   Final Result         No acute processes.      Age-related volume loss.      Mild ventriculomegaly that is slightly disproportionate to the degree of cerebral volume loss. In the appropriate clinical setting, consider normal pressure hydrocephalus. However, ventricular size is stable from previous study from 5/12/2022.      CT-CTA NECK WITH & W/O-POST PROCESSING   Final Result      CT angiogram of the neck within normal limits.   Incompletely imaged RIGHT upper lobe airspace disease, likely pneumonia      CT-CTA HEAD WITH & W/O-POST PROCESS   Final Result      CT angiogram of the [place] within normal limits.         DX-CHEST-PORTABLE (1 VIEW)   Final Result      Findings suspicious for early RIGHT pneumonia           Assessment/Plan  * Acute respiratory failure with hypoxia (HCC)- (present on admission)  Assessment & Plan  RT protocol    Paroxysmal atrial fibrillation (HCC)- (present on admission)  Assessment & Plan  Metoprolol  [id number] - 5  Poor anticoagulation candidate with frequent falls    Frailty syndrome in geriatric patient- (present on admission)  Assessment & Plan  Consult Hospice    Frequent falls- (present on admission)  Assessment & Plan  PT and OT   Check Orthostatics    Pneumonia of right upper lobe due to infectious organism- (present on admission)  Assessment & Plan  IV Unasyn      Pressure injury of skin of sacral region- (present on admission)  Assessment & Plan  Wound care    History of CVA (cerebrovascular  accident)- (present on admission)  Assessment & Plan  ASA, Lipitor    Neurodegenerative dementia with behavioral disturbance (HCC)- (present on admission)  Assessment & Plan  Aricept  Avoid Benzodiazepines and Anticholinergics  Frequent orientation  Open window blinds during the day  Avoid naps during the day  Family at bedside whenever possible  Ensure adequate sleep at night - use Melatonin preferably  Avoid early morning labs  Avoid vital signs during sleep  Ambulate if possible  Provide adequate pain control  Monitor for urinary retention  Prevent and manage constipation  Discontinue IVs, Catheters, Tubes, Lines, Cardiac monitors, SCDs if possible     Type 2 diabetes mellitus with hyperglycemia, without long-term current use of insulin (HCC)- (present on admission)  Assessment & Plan  SSI    Hypomagnesemia- (present on admission)  Assessment & Plan  IV Mg 2 g  Follow level    Normal pressure hydrocephalus (HCC)- (present on admission)  Assessment & Plan  Follow up with Neurology as outpatient    Prostate CA (HCC)- (present on admission)  Assessment & Plan  History of radiation therapy      Essential hypertension- (present on admission)  Assessment & Plan  Metoprolol  Hold Benazepril    Mixed hyperlipidemia- (present on admission)  Assessment & Plan  Lipitor    GERD (gastroesophageal reflux disease)- (present on admission)  Assessment & Plan  Omeprazole         VTE prophylaxis:    enoxaparin ppx      I have performed a physical exam and reviewed and updated ROS and Plan today (12/21/2023). In review of yesterday's note (12/20/2023), there are no changes except as documented above.

## 2023-12-21 NOTE — CARE PLAN
The patient is Stable - Low risk of patient condition declining or worsening    Shift Goals  Clinical Goals: monitor labs, q2h turns, monitor mentation status  Patient Goals: sleep    Progress made toward(s) clinical / shift goals:    Problem: Neuro Status  Goal: Neuro status will remain stable or improve  Outcome: Progressing  Flowsheets (Taken 12/19/2023 4588)  Level of Consciousness: Alert  Note: Maintaining A&O status of 2-3     Problem: Urinary Elimination  Goal: Establish and maintain regular urinary output  Outcome: Progressing  Note: Condom cath on to prevent excoriation        Patient is not progressing towards the following goals:

## 2023-12-21 NOTE — PROGRESS NOTES
Report received from night RN at 0700. PT seen at bedside and pt care assumed. Pt is A&Ox4, on RA, and denies pain. PIV flushed and intact. PT is SR on telemetry monitor. PT is up x2 assist.     Plan of care reviewed with pt, call light, phone, and personal belongings within reach. Bed alarm on, and bed in low locked position. All pt's needs met at this time.

## 2023-12-21 NOTE — CARE PLAN
Problem: Knowledge Deficit - Standard  Goal: Patient and family/care givers will demonstrate understanding of plan of care, disease process/condition, diagnostic tests and medications  Outcome: Progressing     Problem: Skin Integrity  Goal: Skin integrity is maintained or improved  Outcome: Progressing     Problem: Fall Risk  Goal: Patient will remain free from falls  Outcome: Progressing   The patient is Stable - Low risk of patient condition declining or worsening    Shift Goals  Clinical Goals: Remain fall free, no skin breakdown  Patient Goals: Rest  Family Goals: DANIELA    Progress made toward(s) clinical / shift goals:      Pt educated on plan of care, pt verbalizes understanding, reinforcement needed. Pt educated on pain/anxiety scales, alleviating factors, pain/anxiety medications, and side effects, and need for pain/anxiety reassessment, pt pain/anxiety controlled at this time, reinforcement needed. Pt educated on the need to reposition frequently and remain dry to avoid skin breakdown, reinforcement needed, no further skin breakdown during shift. Fall risk education provided to pt, pt educated about the need to call for assistance while attempting to ambulate, reinforcement needed, pt remains free of falls this shift.

## 2023-12-21 NOTE — THERAPY
"Speech Language Pathology   Daily Treatment     Patient Name: Prabhakar Sierra  AGE:  73 y.o., SEX:  male  Medical Record #: [id number]  Date of Service: 12/21/2023      Precautions:  Precautions: Fall Risk         Subjective  RN cleared pt for session. Pt denied any difficulties consuming meal trays. Agreeable to PO.       Assessment  PO of thin liquids and regular solids presented. Pt able to self feed without difficulty. Functional oral bolus containment. Timely mastication of solids. Mild oral bolus residue which cleared with cued liquid wash. No overt s/sx of airway invasion across all trials. Vocal quality and vitals remained stable throughout oral intake. No signs of esophageal dysfunction.      Clinical Impressions  Patient presents with a functional oropharyngeal swallow. No overt s/sx of aspiration appreciated. Service to follow likely x1 to monitor for changes. Please hold PO with any concerns.       Recommendations  Treatment Completed: Dysphagia Treatment    Dysphagia Treatment  Diet Consistency: Regular solids/thin liquids  Instrumentation: None indicated at this time  Medication: As tolerated  Positioning: Fully upright and midline during oral intake, Remain upright for 30-45 minutes after oral intake  Risk Management : Slow rate of intake  Oral Care: BID      SLP Treatment Plan  Treatment Plan: Dysphagia Treatment  SLP Frequency: 3x Per Week  Estimated Duration: Until Therapy Goals Met      Anticipated Discharge Needs  Discharge Recommendations: Recommend outpatient speech therapy services  Therapy Recommendations Upon DC: Cognitive-Linguistic Training, Patient / Family / Caregiver Education      Patient / Family Goals  Patient / Family Goal #1: \"Can I have some oatmeal?\"  Goal #1 Outcome: Progressing as expected  Short Term Goals  Short Term Goal # 1: Pt will consume a diet of regular solids/thin liquids without overt s/sx of aspiration or decline in respiratory status  Goal Outcome # 1: " Progressing as expected      Vinita Monk, SLP

## 2023-12-21 NOTE — PROGRESS NOTES
Assumed care of pt. Bedside report received from RN. Pt was updated on plan of care. AOx3. Pt pain level 0/10. PT is on telemetry, SR 82. Call light, phone and personal belongings in reach. Bed strip alarm on and working properly, bed in lowest position, and locked.

## 2023-12-21 NOTE — DISCHARGE PLANNING
"HTH/SCP TCN chart review completed. Current discharge considerations are [facility] once medically cleared, as this is patient's highest accepting choice at this time. TCN will continue to follow and collaborate with discharge planning team as additional post acute needs arise. Thank you.    Completed:  PT/OT recommending post acute placement - 12/20  ST recommending OP SLP for swallow, and for cogntion recommending, \"Intermittent supervision throughout the day and direct assistance with IADLs\"  Choice obtained:    1. [facility]  2. [facility] (accepted)  3. [facility] (declined)  SCP with [facility] PCP.     "

## 2023-12-22 VITALS
OXYGEN SATURATION: 94 % | WEIGHT: 195.77 LBS | DIASTOLIC BLOOD PRESSURE: 84 MMHG | TEMPERATURE: 97.7 F | HEART RATE: 82 BPM | BODY MASS INDEX: 28.03 KG/M2 | HEIGHT: 70 IN | SYSTOLIC BLOOD PRESSURE: 143 MMHG | RESPIRATION RATE: 16 BRPM

## 2023-12-22 LAB
GLUCOSE BLD STRIP.AUTO-MCNC: 149 MG/DL (ref 65–99)
GLUCOSE BLD STRIP.AUTO-MCNC: 177 MG/DL (ref 65–99)

## 2023-12-22 PROCEDURE — A9270 NON-COVERED ITEM OR SERVICE: HCPCS | Performed by: FAMILY MEDICINE

## 2023-12-22 PROCEDURE — 82962 GLUCOSE BLOOD TEST: CPT

## 2023-12-22 PROCEDURE — 94669 MECHANICAL CHEST WALL OSCILL: CPT

## 2023-12-22 PROCEDURE — 700102 HCHG RX REV CODE 250 W/ 637 OVERRIDE(OP): Performed by: FAMILY MEDICINE

## 2023-12-22 PROCEDURE — 700105 HCHG RX REV CODE 258: Performed by: FAMILY MEDICINE

## 2023-12-22 PROCEDURE — 99239 HOSP IP/OBS DSCHRG MGMT >30: CPT | Performed by: FAMILY MEDICINE

## 2023-12-22 PROCEDURE — 700102 HCHG RX REV CODE 250 W/ 637 OVERRIDE(OP): Performed by: INTERNAL MEDICINE

## 2023-12-22 PROCEDURE — 700111 HCHG RX REV CODE 636 W/ 250 OVERRIDE (IP): Mod: JZ | Performed by: FAMILY MEDICINE

## 2023-12-22 PROCEDURE — A9270 NON-COVERED ITEM OR SERVICE: HCPCS | Performed by: INTERNAL MEDICINE

## 2023-12-22 RX ORDER — AMOXICILLIN AND CLAVULANATE POTASSIUM 875; 125 MG/1; MG/1
1 TABLET, FILM COATED ORAL 2 TIMES DAILY
Refills: 0 | Status: ACTIVE | DISCHARGE
Start: 2023-12-22 | End: 2023-12-27

## 2023-12-22 RX ADMIN — OMEPRAZOLE 20 MG: 20 CAPSULE, DELAYED RELEASE ORAL at 05:08

## 2023-12-22 RX ADMIN — ASPIRIN 81 MG: 81 TABLET, CHEWABLE ORAL at 05:08

## 2023-12-22 RX ADMIN — DULOXETINE HYDROCHLORIDE 60 MG: 60 CAPSULE, DELAYED RELEASE ORAL at 05:08

## 2023-12-22 RX ADMIN — AMPICILLIN AND SULBACTAM 3 G: 1; 2 INJECTION, POWDER, FOR SOLUTION INTRAMUSCULAR; INTRAVENOUS at 11:45

## 2023-12-22 RX ADMIN — AMPICILLIN AND SULBACTAM 3 G: 1; 2 INJECTION, POWDER, FOR SOLUTION INTRAMUSCULAR; INTRAVENOUS at 00:00

## 2023-12-22 RX ADMIN — BUPROPION HYDROCHLORIDE 150 MG: 150 TABLET, EXTENDED RELEASE ORAL at 05:08

## 2023-12-22 RX ADMIN — INSULIN HUMAN 1 UNITS: 100 INJECTION, SOLUTION PARENTERAL at 11:45

## 2023-12-22 RX ADMIN — AMPICILLIN AND SULBACTAM 3 G: 1; 2 INJECTION, POWDER, FOR SOLUTION INTRAMUSCULAR; INTRAVENOUS at 05:12

## 2023-12-22 RX ADMIN — METOPROLOL TARTRATE 12.5 MG: 25 TABLET, FILM COATED ORAL at 05:08

## 2023-12-22 ASSESSMENT — FIBROSIS 4 INDEX: FIB4 SCORE: 3.51

## 2023-12-22 ASSESSMENT — PATIENT HEALTH QUESTIONNAIRE - PHQ9
1. LITTLE INTEREST OR PLEASURE IN DOING THINGS: NOT AT ALL
SUM OF ALL RESPONSES TO PHQ9 QUESTIONS 1 AND 2: 0

## 2023-12-22 ASSESSMENT — PAIN DESCRIPTION - PAIN TYPE: TYPE: ACUTE PAIN

## 2023-12-22 NOTE — DISCHARGE SUMMARY
Discharge Summary    CHIEF COMPLAINT ON ADMISSION  Chief Complaint   Patient presents with    Weakness     Pt BIB EMS from home c/o weakness ongoing for several days with 2x associated ground level falls. -head strike, -injuries secondary to the falls. Pt arrives in Afib and reports no hx of. BGL  mg/dL. Pt arrives AAOx4 with a GCS of 15.        Reason for Admission  ems    Admission Date  12/19/2023     CODE STATUS  Full Code    HPI & HOSPITAL COURSE  This is a 73 y.o. male here with   pneumonia, respiratory failure and hypoxia, started on empiric coverage with IV Unasyn.  He was also noted to have atrial fibrillation which apparently converted spontaneously to sinus tachycardia. He has known history of probable dementia, frequent falls, possible NPH and has been following with neurology as outpatient.  Was recently discharged from the skilled nursing facility for an L1 compression fracture that did not require intervention.  He responded well to the medical treatment, he is still requiring some oxygen supplementation.  There was concern for possible dysphagia due to the location of the pneumonia, he was able to pass the SLP evaluation and no restrictions were given.  For the atrial fibrillation he is a poor candidate for anticoagulation due to frequent falls.      Therefore, he is discharged in fair and stable condition to skilled nursing facility.    The patient met 2-midnight criteria for an inpatient stay at the time of discharge.      FOLLOW UP ITEMS POST DISCHARGE  Follow-up as below    DISCHARGE DIAGNOSES  Principal Problem:    Acute respiratory failure with hypoxia (HCC) (POA: Yes)  Active Problems:    Pneumonia of right upper lobe due to infectious organism (POA: Yes)    Frequent falls (POA: Yes)    Frailty syndrome in geriatric patient (POA: Yes)    Paroxysmal atrial fibrillation (HCC) (POA: Yes)    Essential hypertension (POA: Yes)    Prostate CA (HCC) (Chronic) (POA: Yes)    Normal pressure  hydrocephalus (HCC) (POA: Yes)    Hypomagnesemia (POA: Yes)    Type 2 diabetes mellitus with hyperglycemia, without long-term current use of insulin (HCC) (POA: Yes)    Neurodegenerative dementia with behavioral disturbance (HCC) (POA: Yes)    History of CVA (cerebrovascular accident) (POA: Yes)    Pressure injury of skin of sacral region (POA: Yes)    GERD (gastroesophageal reflux disease) (POA: Yes)    Mixed hyperlipidemia (POA: Yes)  Resolved Problems:    * No resolved hospital problems. *      FOLLOW UP  Future Appointments   Date Time Provider Department Center   1/6/2024 10:45 [facility]   1/17/2024 10:00 AM KATIA Russo M.D.  [address]  [place] [id number] [phone]    Follow up      Parminder Moran M.D.  [address] [facility] [address]  [place] [id number] [phone]    Follow up        MEDICATIONS ON DISCHARGE     Medication List        START taking these medications        Instructions   amoxicillin-clavulanate 875-125 MG Tabs  Commonly known as: Augmentin   Take 1 Tablet by mouth 2 times a day for 5 days.  Dose: 1 Tablet            CONTINUE taking these medications        Instructions   aspirin 81 MG Chew chewable tablet  Commonly known as: Asa   Chew 1 Tablet every day.  Dose: 81 mg     atorvastatin 80 MG tablet  Commonly known as: Lipitor   Doctor's comments: Replaces 40 mg dose  Take 1 Tablet by mouth every evening. Indications: High Amount of Fats in the Blood  Dose: 80 mg     buPROPion  MG Tb12 sustained-release tablet  Commonly known as: Wellbutrin-SR   Take 1 Tablet by mouth 2 times a day. Indications: Major Depressive Disorder  Dose: 150 mg     donepezil 5 MG Tabs  Commonly known as: Aricept   Take 1 Tablet by mouth every evening.  Dose: 5 mg     DULoxetine 60 MG Cpep delayed-release capsule  Commonly known as: Cymbalta   Take 1 Capsule by mouth 2 times a day. Indications: Major Depressive Disorder  Dose: 60 mg      folic acid 1 MG Tabs  Commonly known as: Folvite   Take 1 Tablet by mouth every day. Indications: ALCOHOL ABUSE  Dose: 1 mg     FreeStyle Elena 3 Sensor Misc   1 Each every 14 days.  Dose: 1 Each     HumaLOG 100 UNIT/ML  Generic drug: insulin lispro   Inject 2-10 Units under the skin 4 Times a Day,Before Meals and at Bedtime. 2 units for every 50 > 151  Dose: 2-10 Units     magnesium oxide 400 (240 Mg) MG Tabs   Take 1 Tablet by mouth every day.  Dose: 400 mg     metoprolol tartrate 25 MG Tabs  Commonly known as: Lopressor   Take 12.5 mg by mouth 2 times a day. 12.5 mg = 1/2 tab  Dose: 12.5 mg     * Misc. Devices Misc   Walker with seat     * Misc. Devices Misc   Wheelchair toilet rails     omeprazole 20 MG delayed-release capsule  Commonly known as: PriLOSEC   Take 1 Capsule by mouth 2 times a day. Indications: Gastroesophageal Reflux Disease  Dose: 20 mg     pioglitazone 30 MG Tabs  Commonly known as: Actos   Take 1 Tablet by mouth every day. Indications: Type 2 Diabetes  Dose: 30 mg     Synjardy XR  MG Tb24  Generic drug: Empagliflozin-metFORMIN HCl ER   Take 1 Tablet by mouth every day. Replaces Jardiance  Dose: 1 Tablet     vitamin D 2000 UNIT Tabs   Take 4 Tablets by mouth every day.  Dose: 4 Tablet           * This list has 2 medication(s) that are the same as other medications prescribed for you. Read the directions carefully, and ask your doctor or other care provider to review them with you.                STOP taking these medications      benazepril 40 MG tablet  Commonly known as: Lotensin     metformin 1000 MG tablet  Commonly known as: Glucophage              Allergies  No Known Allergies    DIET  Orders Placed This Encounter   Procedures    Diet Order Diet: Consistent CHO (Diabetic)     Standing Status:   Standing     Number of Occurrences:   1     Order Specific Question:   Diet:     Answer:   Consistent CHO (Diabetic) [4]       ACTIVITY  As tolerated and directed by skilled  nursing.  Weight bearing as tolerated    LINES, DRAINS, AND WOUNDS  This is an automated list. Peripheral IVs will be removed prior to discharge.  Peripheral IV 12/19/23 20 G Right Forearm (Active)   Site Assessment Clean;Dry;Intact 12/22/23 0800   Dressing Type Transparent 12/22/23 0800   Line Status Saline locked;Scrubbed the hub prior to access;Flushed 12/22/23 0800   Dressing Status Clean;Dry;Intact 12/22/23 0800   Dressing Intervention N/A 12/22/23 0800   Infiltration Grading ([facility], CV) 0 12/22/23 0800   Phlebitis Scale ([facility] Only) 0 12/22/23 0800       Peripheral IV 12/21/23 20 G Anterior;Left Forearm (Active)   Site Assessment Clean;Dry;Intact 12/22/23 0800   Dressing Type Transparent 12/22/23 0800   Line Status Scrubbed the hub prior to access;Flushed 12/22/23 0800   Dressing Status Clean;Dry;Intact 12/22/23 0800   Dressing Intervention N/A 12/22/23 0800   Infiltration Grading ([facility], CV) 0 12/22/23 0800   Phlebitis Scale ([facility] Only) 0 12/22/23 0800     External Urinary Catheter ([facility]) (Active)   Collection Container Standard drainage bag 12/20/23 1000   Output (mL) 700 mL 12/20/23 1743      Wound 12/20/23 Buttocks Redness (Active)   Wound Image   12/20/23 0200   Site Assessment Red;Smock 12/22/23 0800   Periwound Assessment Clean;Dry;Intact 12/22/23 0800   Drainage Amount None 12/22/23 0800   Treatments Cleansed;Offloading 12/22/23 0800   Offloading/DME Other (comment) 12/21/23 0003   Wound Cleansing Foam Cleanser/Washcloth 12/22/23 0800   Periwound Protectant Barrier Paste 12/22/23 0800       Wound 12/20/23 Abrasion Elbow Posterior Right (Active)   Wound Image   12/20/23 0200   Site Assessment Red;Brown;Intact 12/22/23 0800   Periwound Assessment Smock 12/22/23 0800   Margins Attached edges;Defined edges 12/22/23 0800   Closure Open to air 12/22/23 0800   Drainage Amount None 12/22/23 0800   Treatments Offloading 12/22/23 0800   Offloading/DME Other (comment) 12/20/23 0200   Wound Cleansing Foam  Cleanser/Washcloth 12/20/23 0200   Dressing Status Clean;Dry;Intact 12/22/23 0800   Dressing Changed Observed 12/22/23 0800   Dressing Options Offloading Dressing - Heel 12/22/23 0800       Wound 12/20/23 Ankle Anterior;Medial Right Non-blanching area (Active)   Wound Image   12/20/23 0200   Site Assessment Intact 12/22/23 0800   Periwound Assessment Red 12/22/23 0800   Margins Attached edges;Defined edges 12/22/23 0800   Closure Open to air 12/22/23 0800   Drainage Amount None 12/22/23 0800       Wound 12/20/23 Toe, Hallux Right Callus (Active)   Wound Image   12/20/23 0200   Site Assessment Callus;Intact 12/22/23 0800   Periwound Assessment Clean;Dry;Intact 12/22/23 0800   Margins Attached edges;Defined edges 12/22/23 0800   Closure Open to air 12/22/23 0800   Drainage Amount None 12/22/23 0800       Peripheral IV 12/19/23 20 G Right Forearm (Active)   Site Assessment Clean;Dry;Intact 12/22/23 0800   Dressing Type Transparent 12/22/23 0800   Line Status Saline locked;Scrubbed the hub prior to access;Flushed 12/22/23 0800   Dressing Status Clean;Dry;Intact 12/22/23 0800   Dressing Intervention N/A 12/22/23 0800   Infiltration Grading ([facility], CV) 0 12/22/23 0800   Phlebitis Scale ([facility] Only) 0 12/22/23 0800       Peripheral IV 12/21/23 20 G Anterior;Left Forearm (Active)   Site Assessment Clean;Dry;Intact 12/22/23 0800   Dressing Type Transparent 12/22/23 0800   Line Status Scrubbed the hub prior to access;Flushed 12/22/23 0800   Dressing Status Clean;Dry;Intact 12/22/23 0800   Dressing Intervention N/A 12/22/23 0800   Infiltration Grading ([facility], CV) 0 12/22/23 0800   Phlebitis Scale ([facility] Only) 0 12/22/23 0800               MENTAL STATUS ON TRANSFER  Level of Consciousness: Alert and oriented x 3          CONSULTATIONS  None    PROCEDURES  None    LABORATORY  Lab Results   Component Value Date    SODIUM 134 (L) 12/21/2023    POTASSIUM 3.9 12/21/2023    CHLORIDE 100 12/21/2023    CO2 21 12/21/2023     GLUCOSE 142 (H) 12/21/2023    BUN 12 12/21/2023    CREATININE 0.55 12/21/2023        Lab Results   Component Value Date    WBC 5.6 12/21/2023    HEMOGLOBIN 12.4 (L) 12/21/2023    HEMATOCRIT 36.5 (L) 12/21/2023    PLATELETCT 152 (L) 12/21/2023        Total time of the discharge process exceeds 35 minutes.

## 2023-12-22 NOTE — PROGRESS NOTES
12 hour chart check    Rhythm: SR  Rate: 72-82  Ectopy: None noted  Measurements: .16/.08/.34

## 2023-12-22 NOTE — CARE PLAN
Problem: Neuro Status  Goal: Neuro status will remain stable or improve  Outcome: Progressing     Problem: Hemodynamic Monitoring  Goal: Patient's hemodynamics, fluid balance and neurologic status will be stable or improve  Outcome: Progressing     Problem: Knowledge Deficit - Standard  Goal: Patient and family/care givers will demonstrate understanding of plan of care, disease process/condition, diagnostic tests and medications  Outcome: Progressing     Problem: Fall Risk  Goal: Patient will remain free from falls  Outcome: Progressing   The patient is Stable - Low risk of patient condition declining or worsening    Shift Goals  Clinical Goals: safety,  Patient Goals: Rest  Family Goals: DANIELA    Progress made toward(s) clinical / shift goals:  progressing    Patient is not progressing towards the following goals:

## 2023-12-22 NOTE — CARE PLAN
Problem: Urinary Elimination  Goal: Establish and maintain regular urinary output  Outcome: Progressing     Problem: Bowel Elimination  Goal: Establish and maintain regular bowel function  Outcome: Progressing     Problem: Fall Risk  Goal: Patient will remain free from falls  Outcome: Progressing   The patient is Stable - Low risk of patient condition declining or worsening    Shift Goals  Clinical Goals: safety  Patient Goals: to go home  Family Goals: DANIELA    Progress made toward(s) clinical / shift goals:  Plan of care discussed with patient. Patient discharging to [facility].     Patient is not progressing towards the following goals:

## 2023-12-22 NOTE — DISCHARGE PLANNING
DC Transport Scheduled    Received request at: 12/22/2023 at 1151    Transport Company Scheduled:  [facility]  Spoke with Sarah at [facility] to schedule transport.    Scheduled Date: 12/22/2023  Scheduled Time: 1600    Destination: [facility] at [address] [place] [address] [place]     Notified care team of scheduled transport via [employer].     If there are any changes needed to the DC transportation scheduled, please contact [facility] at ext. [phone] between the hours of [phone] Mon-Fri. If outside those hours, contact the ED Case Manager at ext. [phone].

## 2023-12-22 NOTE — DISCHARGE PLANNING
1338  Agency/Facility Name: [facility] [place]  Spoke To: Malena  Outcome: DPA informed Malena that Pt will be transported via [facility] today 12/22/23 @ 1600 pm.

## 2023-12-24 LAB
BACTERIA BLD CULT: NORMAL
BACTERIA BLD CULT: NORMAL
SIGNIFICANT IND 70042: NORMAL
SIGNIFICANT IND 70042: NORMAL
SITE SITE: NORMAL
SITE SITE: NORMAL
SOURCE SOURCE: NORMAL
SOURCE SOURCE: NORMAL

## 2023-12-27 ENCOUNTER — PATIENT OUTREACH (OUTPATIENT)
Dept: HEALTH INFORMATION MANAGEMENT | Facility: OTHER | Age: 73
End: 2023-12-27
Payer: MEDICARE

## 2023-12-27 DIAGNOSIS — E11.65 UNCONTROLLED TYPE 2 DIABETES MELLITUS WITH HYPERGLYCEMIA (HCC): ICD-10-CM

## 2023-12-27 DIAGNOSIS — I10 ESSENTIAL HYPERTENSION: ICD-10-CM

## 2023-12-27 NOTE — PROGRESS NOTES
Pt was readmitted to [facility] and discharged back to a SNF. Will complete outreach next month.

## 2024-01-01 ENCOUNTER — HOME CARE VISIT (OUTPATIENT)
Dept: HOSPICE | Facility: HOSPICE | Age: 74
End: 2024-01-01
Payer: MEDICARE

## 2024-01-01 ENCOUNTER — DOCUMENTATION (OUTPATIENT)
Dept: HEALTH INFORMATION MANAGEMENT | Facility: OTHER | Age: 74
End: 2024-01-01
Payer: MEDICARE

## 2024-01-01 VITALS — DIASTOLIC BLOOD PRESSURE: 64 MMHG | SYSTOLIC BLOOD PRESSURE: 144 MMHG | RESPIRATION RATE: 20 BRPM | HEART RATE: 84 BPM

## 2024-01-01 VITALS — RESPIRATION RATE: 44 BRPM | HEART RATE: 92 BPM

## 2024-01-01 VITALS — HEART RATE: 88 BPM | RESPIRATION RATE: 20 BRPM

## 2024-01-01 DIAGNOSIS — G91.2 NORMAL PRESSURE HYDROCEPHALUS (HCC): ICD-10-CM

## 2024-01-01 DIAGNOSIS — G91.2 NORMAL PRESSURE HYDROCEPHALUS (HCC): Primary | ICD-10-CM

## 2024-01-01 PROCEDURE — G0156 HHCP-SVS OF AIDE,EA 15 MIN: HCPCS

## 2024-01-01 PROCEDURE — G0299 HHS/HOSPICE OF RN EA 15 MIN: HCPCS

## 2024-01-01 RX ORDER — MORPHINE SULFATE 100 MG/5ML
SOLUTION ORAL
Qty: 240 ML | Refills: 0 | Status: SHIPPED | OUTPATIENT
Start: 2024-01-01 | End: 2024-04-08

## 2024-01-01 RX ORDER — MORPHINE SULFATE 100 MG/5ML
SOLUTION ORAL
Qty: 240 ML | Refills: 0 | Status: SHIPPED | OUTPATIENT
Start: 2024-01-01 | End: 2024-01-01 | Stop reason: SDUPTHER

## 2024-01-01 RX ORDER — LORAZEPAM 2 MG/ML
2 CONCENTRATE ORAL EVERY 4 HOURS
Qty: 360 ML | Refills: 0 | Status: SHIPPED | OUTPATIENT
Start: 2024-01-01 | End: 2024-04-08

## 2024-01-01 RX ORDER — MORPHINE SULFATE 100 MG/5ML
10 SOLUTION ORAL EVERY 4 HOURS
Qty: 240 ML | Refills: 0 | Status: SHIPPED | OUTPATIENT
Start: 2024-01-01 | End: 2024-04-08

## 2024-01-01 RX ORDER — LORAZEPAM 2 MG/ML
2 CONCENTRATE ORAL
Qty: 360 ML | Refills: 0 | Status: SHIPPED | OUTPATIENT
Start: 2024-01-01 | End: 2024-04-08

## 2024-01-01 ASSESSMENT — ENCOUNTER SYMPTOMS
INCREASED SLEEPING: 1
INCREASED SLEEPING: 1
STOOL FREQUENCY: DAILY
BOWEL PATTERN NORMAL: 1
INCREASED SLEEPING: 1
LAST BOWEL MOVEMENT: 66933
STOOL FREQUENCY: LESS THAN DAILY
DRY SKIN: 1
INCREASED FATIGUE: 1
DENIES PAIN: 1
MUSCLE WEAKNESS: 1
DRY SKIN: 1
DENIES PAIN: 1
DECREASED TO NO URINARY OUTPUT: 1
PERSON REPORTING PAIN: DIRECT OBSERVATION
DECREASED ORAL INTAKE: 1
PAIN: 1
DECREASED TO NO URINARY OUTPUT: 1
LAST BOWEL MOVEMENT: 66933
INCREASED FATIGUE: 1
STOOL FREQUENCY: LESS THAN DAILY
FATIGUE: 1
MUSCLE WEAKNESS: 1
BOWEL PATTERN NORMAL: 1
LAST BOWEL MOVEMENT: 66933
DRY SKIN: 1
DECREASED ORAL INTAKE: 1
INCREASED FATIGUE: 1
FATIGUES EASILY: 1
PERSON REPORTING PAIN: PATIENT
DECREASED ORAL INTAKE: 1
FATIGUES EASILY: 1
PERSON REPORTING PAIN: DIRECT OBSERVATION

## 2024-01-01 ASSESSMENT — SOCIAL DETERMINANTS OF HEALTH (SDOH): ACTIVE STRESSOR - HEALTH CHANGES: 1

## 2024-01-01 ASSESSMENT — ACTIVITIES OF DAILY LIVING (ADL)
AMBULATION ASSISTANCE: NON-AMBULATORY

## 2024-01-03 ENCOUNTER — TELEPHONE (OUTPATIENT)
Dept: HEALTH INFORMATION MANAGEMENT | Facility: OTHER | Age: 74
End: 2024-01-03
Payer: MEDICARE

## 2024-01-05 DIAGNOSIS — E78.5 DYSLIPIDEMIA: ICD-10-CM

## 2024-01-05 DIAGNOSIS — E53.8 VITAMIN B 12 DEFICIENCY: ICD-10-CM

## 2024-01-05 DIAGNOSIS — E83.42 HYPOMAGNESEMIA: ICD-10-CM

## 2024-01-05 DIAGNOSIS — E11.65 TYPE 2 DIABETES MELLITUS WITH HYPERGLYCEMIA, WITHOUT LONG-TERM CURRENT USE OF INSULIN (HCC): ICD-10-CM

## 2024-01-05 DIAGNOSIS — E78.2 MIXED HYPERLIPIDEMIA: ICD-10-CM

## 2024-01-05 DIAGNOSIS — R23.3 EASY BRUISING: ICD-10-CM

## 2024-01-05 DIAGNOSIS — E55.9 VITAMIN D DEFICIENCY: ICD-10-CM

## 2024-01-06 ENCOUNTER — APPOINTMENT (OUTPATIENT)
Dept: ONCOLOGY | Facility: MEDICAL CENTER | Age: 74
End: 2024-01-06
Attending: STUDENT IN AN ORGANIZED HEALTH CARE EDUCATION/TRAINING PROGRAM
Payer: MEDICARE

## 2024-01-11 ENCOUNTER — HOME HEALTH ADMISSION (OUTPATIENT)
Dept: HOME HEALTH SERVICES | Facility: HOME HEALTHCARE | Age: 74
End: 2024-01-11
Payer: MEDICARE

## 2024-01-15 NOTE — PROGRESS NOTES
Follow up Endocrinology Visit  Initial consult on: 8/17/23  Last visit on: 10/17/23  Referred by: Stanton Miller M.D.    Patient was presented for a telehealth consultation via secure and encrypted videoconferencing technology.    Location of Provider - office  Location of Patient - home  Patient Consent - yes  Platform used - Zoom  Total time - 30 min    Chief complaint:  Prabhakar Sierra, 73 y.o., male, who is here for follow up for T2DM management. Here with his wife - Heidy    Interval history:   - had hospitalization in 12/2023 due to weakness, leading to fall, complicated by L1 compression fracture  - today is first day home  - Reclast infusion was delayed due to hospitalization  - in the hospital was given short acting insulin prn, but lately he didn't require one  - concerns of decrease BMD with pitoglitazone  - reviewed most recent labs    Prior notes:  - overall doing well  - unfortunately, he couldn't tolerate Ozempic - lost his appetite, was very apathic as per patient's wife  - has poor dentition, needs dental work  - no side effects with Jardiance, still in process of getting assistance program for the medication    Current therapy:  Synjardy 25/1000 daily  Pioglitazone 30 mg    Tolerates medications: well  Compliant: yes    Prior used medications:   Rybelsus -> stopped too expensive  Ozempic -> loss of appetite, apathy    Other important medications:  ASA 81  Atorvastatin 80 mg  Benazepril 40 mg  Duloxetine 60 mg  Metoprolol tartrate 25 mg    BG control:  SBGM x 3  FBGs - 183 - 300  Bgs during the day - 73 - 373, majority at mid 100s    Prior:  CGM type - Elena 3  Period - 10/4/23 - 10/17/23   Data were reviewed and discussed with the patient  Active time - 96%  ABG - 185 mg/dl  GV - 19.2%  GMI - 7.7%  TIR - 44%  TAR - high - 52%, very high - 4%  TBR - 0%     Hypoglycemic episodes:  Hypoglycemic symptoms: NA  Hypoglycemic unawareness: NA  Treatment: NA  Severe hypoglycemia: NA  Has medical  bracelet/necklace:NA  Has glucagon emergency kit: NA    DM history:  - diagnosed 20+, on blood screen, weight at the time of the diagnosis - 260 lb (BMI = 37.3 kg/m2)   - hospitalizations for DKA/HHS/severe hyperglycemia/severe hypoglycemia in the past: none  - prior therapy:  Rybelsus, Jardiance - stopped due to being expensive, denies any side effects except unpleasant mouth taste with Rybelsus  - known DM complications: peripheral neuropathy  - latest HbA1C 10% in 8/2023  - pertinent PMHx:   -- obesity, dyslipidemia, prostate Cr, s/p multiple routes of treatment, currently in remission, GERD, HTN, depression, ED, alcohol dependence in the remission, Alzheimer's dementia,  NPH, thoracic compression fracture, PSVT  -- denies  CAD/PAD/CHF/ CVA  Hospital admission 12/28/22 - 1/5/23:  - presented with back pain, CT showed T11, L2, L3 compression fractures, he considered to be poor candidate for kyphoplasty -> prolonged rehab recovery  - reports weight loss which he associated with decreased appetite due to not tasty food in hospital and rehab    Current Medications:  Current Outpatient Medications:     insulin lispro (HUMALOG) 100 UNIT/ML INJ, Inject 2-10 Units under the skin 4 Times a Day,Before Meals and at Bedtime. 2 units for every 50 > 151, Disp: , Rfl:     donepezil (ARICEPT) 5 MG Tab, Take 1 Tablet by mouth every evening., Disp: 90 Tablet, Rfl: 3    Misc. Devices Misc, Walker with seat, Disp: , Rfl:     Misc. Devices Misc, Wheelchair toilet rails, Disp: 1 Each, Rfl: 1    Empagliflozin-metFORMIN HCl ER (SYNJARDY XR)  MG TABLET SR 24 HR, Take 1 Tablet by mouth every day. Replaces Jardiance, Disp: , Rfl:     metoprolol tartrate (LOPRESSOR) 25 MG Tab, Take 12.5 mg by mouth 2 times a day. 12.5 mg = 1/2 tab, Disp: , Rfl:     Cholecalciferol (VITAMIN D) 2000 UNIT Tab, Take 4 Tablets by mouth every day., Disp: , Rfl:     Continuous Blood Gluc Sensor (FREESTYLE MARCEL 3 SENSOR) Misc, 1 Each every 14 days., Disp: 2  "Each, Rfl: 11    atorvastatin (LIPITOR) 80 MG tablet, Take 1 Tablet by mouth every evening. Indications: High Amount of Fats in the Blood, Disp: 90 Tablet, Rfl: 4    magnesium oxide 400 (240 Mg) MG Tab, Take 1 Tablet by mouth every day., Disp: 100 Tablet, Rfl: 3    folic acid (FOLVITE) 1 MG Tab, Take 1 Tablet by mouth every day. Indications: ALCOHOL ABUSE, Disp: 90 Tablet, Rfl: 3    buPROPion SR (WELLBUTRIN-SR) 150 MG TABLET SR 12 HR sustained-release tablet, Take 1 Tablet by mouth 2 times a day. Indications: Major Depressive Disorder, Disp: 180 Tablet, Rfl: 3    DULoxetine (CYMBALTA) 60 MG Cap DR Particles delayed-release capsule, Take 1 Capsule by mouth 2 times a day. Indications: Major Depressive Disorder, Disp: 180 Capsule, Rfl: 3    omeprazole (PRILOSEC) 20 MG delayed-release capsule, Take 1 Capsule by mouth 2 times a day. Indications: Gastroesophageal Reflux Disease, Disp: 180 Capsule, Rfl: 4    pioglitazone (ACTOS) 30 MG Tab, Take 1 Tablet by mouth every day. Indications: Type 2 Diabetes, Disp: 90 Tablet, Rfl: 4    aspirin (ASA) 81 MG Chew Tab chewable tablet, Chew 1 Tablet every day., Disp: 100 Tablet, Rfl:     PHYSICAL EXAM:   Vital signs: Ht 1.778 m (5' 10\")   Wt 88.5 kg (195 lb)   BMI 27.98 kg/m²   GENERAL: Well-developed, well-nourished  in no apparent distress.  Walks with walker. Appropriately answers the questions but most of the history is obtained from his wife.   CARDIOVASCULAR: Regular rate and rhythm.   LUNGS: No dyspnea  ABDOMEN: Soft, nondistended  EXTREMITIES: No clubbing, cyanosis, or edema.   NEUROLOGICAL: Cranial nerves II-XII are grossly intact. Replies with simple answers yes and no, looking at his wife to answer more complex questions.   SKIN: No rashes, lesions. Turgor is normal.  FOOT: Decreased sensation to monofilament testing on L foot, normal pulses, no ulcers, dry skin, severe callus with skin crack and bleeding, but no surrounding inflammation.      Labs:  MOST RECENT LABS:  - " reviewed            PRIOR PERTINENT LABS:  - reviewed  HbA1C/BG/Hb:  Lab Results   Component Value Date/Time    HBA1C 10 (A) 08/07/2023 0922    HBA1C 8.1 (H) 03/28/2023 1519    HBA1C 8.4 (H) 11/04/2022 0956    HBA1C 8.1 (H) 10/18/2022 1123    HBA1C 7.6 (A) 03/07/2022 1327    AVGLUC 186 03/28/2023 1519    AVGLUC 194 11/04/2022 0956    AVGLUC 186 10/18/2022 1123    AVGLUC 174 10/26/2021 1059    AVGLUC 200 07/26/2021 0832     Lab Results   Component Value Date/Time    HEMOGLOBIN 12.4 (L) 12/21/2023 08:10 AM    HEMOGLOBIN 12.4 (L) 12/20/2023 03:32 AM    HEMOGLOBIN 13.7 (L) 12/19/2023 11:55 AM     Lab Results   Component Value Date/Time    GLUCOSE 142 (H) 12/21/2023 08:10 AM    GLUCOSE 167 (H) 12/20/2023 03:32 AM    GLUCOSE 102 (H) 12/19/2023 11:55 AM    GLUCOSE 236 (H) 11/25/2023 07:09 PM    GLUCOSE 309 (H) 08/15/2023 08:46 AM     Kidney function/MACR:  Lab Results   Component Value Date/Time    CREATININE 0.55 12/21/2023 08:10 AM    CREATININE 0.84 12/20/2023 03:32 AM     Lab Results   Component Value Date/Time    MALBCRT 108 (H) 03/28/2023 02:28 PM    MICROALBUR 3.7 03/28/2023 02:28 PM      LFTs:  Lab Results   Component Value Date/Time    ASTSGOT 26 12/19/2023 11:55 AM    ASTSGOT 10 (L) 11/25/2023 07:09 PM    ASTSGOT 15 08/15/2023 08:46 AM    ALTSGPT 13 12/19/2023 11:55 AM    ALTSGPT 11 11/25/2023 07:09 PM    ALTSGPT 15 08/15/2023 08:46 AM     Lipid panel:  Lab Results   Component Value Date/Time    TRIGLYCERIDE 148 12/20/2023 03:32 AM    TRIGLYCERIDE 211 (H) 08/15/2023 08:46 AM    TRIGLYCERIDE 308 (H) 03/28/2023 03:19 PM    HDL 35 (A) 12/20/2023 03:32 AM    HDL 47 08/15/2023 08:46 AM    HDL 42 03/28/2023 03:19 PM    LDL 31 12/20/2023 03:32 AM    LDL 70 08/15/2023 08:46 AM    LDL 45 03/28/2023 03:19 PM     Hypothyroidism screening:  Lab Results   Component Value Date/Time    TSHULTRASEN 0.930 03/28/2023 1519     Osteoporosis work up:          Imaging:  - reviewed  DEXA scan on 3/29/23:  Date Machine L1-  L4  BMD/T-score LFN  BMD/T-score FRAX Rx    3/29/23  GE Prodigy unit   1.443 g/cm2 / 1.9  1.079 g/cm2 / - 0.2  9.0% / 1.9%  none     ASSESSMENT/PLAN:   1. Type 2 diabetes mellitus with hyperglycemia, without long-term current use of insulin (HCC)  - duration x 20 + years  - on Metformin, Pioglitazone, Jardiance  - suboptimally controlled, HbA1C 10% (8/2023) -> 8.5% (12/2023)     Microvascular complications: nephropathy with microalbuminuria, denies peripheral neuropathy, retinopathy  Macrovascular complications: denies had 3 TIAs, denies CAD, PAD, CVA  Associated comorbidities: obesity, dyslipidemia, prostate Cr, s/p multiple routes of treatment, currently in remission, GERD, HTN, depression, ED, alcohol dependence in the remission, Alzheimer's dementia,  NPH, T11, L2, L3 compression fractures, PSVT     DM complication screening:  Labs reviewed:  MACR - 108 (+), last checked in 8/2023   Creat/GFR wnl, last checked in 8/2023  LDL - 70 - at target, last checked in 8/2023     Patient is taking statin: on Atorvastatin 80 mg  Patient is taking ASA: yes  Patient is taking ACE/ARB: on Benazepril 40 mg     Last eye exam: 11/17/23, daina ESQUIVEL  Last foot exam: in 10/2023 by me, noted decreased sensation in his L foot, severe callus on R foot with skin crack and bleeding but no surrounding inflammation, dry skin, no wounds.      Home medications:  Metformin 1000 mg bid  Pioglitazone 30 mg  Jardiance 25 mg    Prior used medications:   Rybelsus -> stopped too expensive, poor mouth taste  Ozempic -> loss of appetite, apathy    Discussion:  - patient with multiple comorbidities, including dementia and long-term DM  - currently on max dose of SGLT-2 inhibitor, and moderate dose of Metformin and Pioglitazone, could not tolerate GLP-1 agonist  - will increase dose of Metformin in Synjardy, start DPP-4 inhibitor (working on getting assistance program for that), stop Pioglitazone due to concerns of BMD loss  -  I would avoid MOREIRA in  elderly patient  - in case of worsening of BG control consider use of basal insulin vs adding MOREIRA    Plan:  Discussed with the patient goals for DM management:  Fasting BG 90 - 150  2 hrs postprandial  - 220  HbA1C < 7.5 - 8.0% - given current poor control, comorbidities, including dementia     Therapy adjustments:  - change dose of Synjardy 25/1000 daily -> 12.5/1000 bid.   - start on Tradjenta 5 mg daily  - stop Pioglitazone  - make sure patient is well hydrated    3.  Resume CGM use    2. Essential hypertension  - well controlled  - managed by PCP    3. Dyslipidemia  - LDL at target on high dose statin  - continue current management    4. History of T11, L2, L3 compression fractures, new L1 compression fracture      Male hypogonadism  - new compression L1 fracture with GLF  - vit D wnl  - seen dentist in 11/2023, had few of his teeth pooled out, no more plans for extensive dental work, only bridge placement  Prior notes:  - with GLF = fragility fracture = severe osteoporosis, even DEXA scan didn't show low T-score, which could be explained by compression fractures in lumbar spine  - at high risk for falls, walks with walker  - currently on vit D 4000 IU/day  - not on any pharmacologic treatment  - risk factors: advanced age, hypogonadism, secondary likely due to Rx of prostate cancer, multiple radiation treatment for the prostate cancer, unlikely bone mets of prostate cancer, as PSA is undetectable now, mild vit D deficiency, smoking history, Hx of alcohol abuse  - denies history of Ca, phosphorus, thyroid/parathryoid disorders, kidney stones, CKD, liver disease, chronic malabsorption/diarrhea, immobilization  -  Fhx of fractures/osteoporosis ?, Pioglitazone use  - bone forming agents would be contraindicated for the patient, given history of radiation exposure (contraindication for Tymlos, Forteo), Evenity is not FDA approved for osteoporosis in males in [place], also he had Hx of TIAs  - best treatment option  IV Reclast, however, patient might require significant dental work in a near future, in which case it is better to finish dental work before starting IV bisphosphonate  - testosterone therapy is likely to be contraindicated in light of Hx of prostate cancer  Plan:  Continue vit D supplementation.   Fall precautions.   Proceed with Reclast infusion.   Start calcium supplementation.     RTC: 1 mo    Total time (face-to-face and non-face-to face time): 30     Plan reviewed with the patient and agreed with plan.  All questions answered to patient's satisfaction.  Thank you kindly for allowing me to participate in the diabetes care plan for this patient.  Katie Soler MD    CC:   Stanton Miller M.D.

## 2024-01-16 ENCOUNTER — PATIENT OUTREACH (OUTPATIENT)
Dept: HEALTH INFORMATION MANAGEMENT | Facility: OTHER | Age: 74
End: 2024-01-16
Payer: MEDICARE

## 2024-01-16 ENCOUNTER — HOSPITAL ENCOUNTER (OUTPATIENT)
Dept: LAB | Facility: MEDICAL CENTER | Age: 74
End: 2024-01-16
Attending: STUDENT IN AN ORGANIZED HEALTH CARE EDUCATION/TRAINING PROGRAM
Payer: MEDICARE

## 2024-01-16 DIAGNOSIS — E11.65 UNCONTROLLED TYPE 2 DIABETES MELLITUS WITH HYPERGLYCEMIA (HCC): ICD-10-CM

## 2024-01-16 DIAGNOSIS — E29.1 MALE HYPOGONADISM: ICD-10-CM

## 2024-01-16 DIAGNOSIS — E55.9 VITAMIN D DEFICIENCY: ICD-10-CM

## 2024-01-16 DIAGNOSIS — I10 ESSENTIAL HYPERTENSION: ICD-10-CM

## 2024-01-16 LAB
FSH SERPL-ACNC: 49.4 MIU/ML (ref 1.5–12.4)
LH SERPL-ACNC: 29.6 IU/L (ref 1.7–8.6)
PROLACTIN SERPL-MCNC: 12.8 NG/ML (ref 2.1–17.7)

## 2024-01-16 PROCEDURE — 82306 VITAMIN D 25 HYDROXY: CPT

## 2024-01-16 PROCEDURE — 84146 ASSAY OF PROLACTIN: CPT

## 2024-01-16 PROCEDURE — 36415 COLL VENOUS BLD VENIPUNCTURE: CPT

## 2024-01-16 PROCEDURE — 83001 ASSAY OF GONADOTROPIN (FSH): CPT

## 2024-01-16 PROCEDURE — 83002 ASSAY OF GONADOTROPIN (LH): CPT

## 2024-01-16 NOTE — PROGRESS NOTES
Assessment    Monthly outreach completed with Heidy and Prabhakar. She took him to get his labs and he will see endocrinology tomorrow. He is still admitted to [facility]. He is supposed to be discharged later today. She hired a transfer company to assist with getting him home. Talked about fall prevention. He is set up w/ [facility]. They will see him 1/18. A referral was also placed to HH. Contacted HH to verify he was on the list to call. Pt is on the list and should expect a call in the next 48 hours. Will relay this information to Heidy. She asked about home labs in case he needs future labs. Discussed options. She will also talk about it w/ HH.     Education    N/A    Plan of Care and Goals    No changes     Barriers:    Fall risk, dementia    Progress:    Reassess at next outreach    Next outreach: [facility]

## 2024-01-17 ENCOUNTER — TELEMEDICINE (OUTPATIENT)
Dept: ENDOCRINOLOGY | Facility: MEDICAL CENTER | Age: 74
End: 2024-01-17
Attending: STUDENT IN AN ORGANIZED HEALTH CARE EDUCATION/TRAINING PROGRAM
Payer: MEDICARE

## 2024-01-17 ENCOUNTER — PATIENT OUTREACH (OUTPATIENT)
Dept: MEDICAL GROUP | Age: 74
End: 2024-01-17

## 2024-01-17 ENCOUNTER — HOME CARE VISIT (OUTPATIENT)
Dept: HOME HEALTH SERVICES | Facility: HOME HEALTHCARE | Age: 74
End: 2024-01-17
Payer: MEDICARE

## 2024-01-17 VITALS — BODY MASS INDEX: 27.92 KG/M2 | WEIGHT: 195 LBS | HEIGHT: 70 IN

## 2024-01-17 DIAGNOSIS — M80.00XA OSTEOPOROSIS WITH CURRENT PATHOLOGICAL FRACTURE, UNSPECIFIED OSTEOPOROSIS TYPE, INITIAL ENCOUNTER: ICD-10-CM

## 2024-01-17 DIAGNOSIS — E11.65 TYPE 2 DIABETES MELLITUS WITH HYPERGLYCEMIA, WITHOUT LONG-TERM CURRENT USE OF INSULIN (HCC): ICD-10-CM

## 2024-01-17 DIAGNOSIS — E55.9 VITAMIN D DEFICIENCY: ICD-10-CM

## 2024-01-17 DIAGNOSIS — E29.1 MALE HYPOGONADISM: ICD-10-CM

## 2024-01-17 DIAGNOSIS — E78.5 DYSLIPIDEMIA: ICD-10-CM

## 2024-01-17 LAB — 25(OH)D3 SERPL-MCNC: 41 NG/ML (ref 30–100)

## 2024-01-17 PROCEDURE — 665005 NO-PAY RAP - HOME HEALTH

## 2024-01-17 PROCEDURE — 665998 HH PPS REVENUE CREDIT

## 2024-01-17 PROCEDURE — RXMED WILLOW AMBULATORY MEDICATION CHARGE: Performed by: STUDENT IN AN ORGANIZED HEALTH CARE EDUCATION/TRAINING PROGRAM

## 2024-01-17 PROCEDURE — 665001 SOC-HOME HEALTH

## 2024-01-17 PROCEDURE — G0493 RN CARE EA 15 MIN HH/HOSPICE: HCPCS

## 2024-01-17 PROCEDURE — 99214 OFFICE O/P EST MOD 30 MIN: CPT | Mod: 95 | Performed by: STUDENT IN AN ORGANIZED HEALTH CARE EDUCATION/TRAINING PROGRAM

## 2024-01-17 PROCEDURE — 665999 HH PPS REVENUE DEBIT

## 2024-01-17 ASSESSMENT — FIBROSIS 4 INDEX: FIB4 SCORE: 3.51

## 2024-01-18 ENCOUNTER — HOME CARE VISIT (OUTPATIENT)
Dept: HOME HEALTH SERVICES | Facility: HOME HEALTHCARE | Age: 74
End: 2024-01-18
Payer: MEDICARE

## 2024-01-18 ENCOUNTER — DOCUMENTATION (OUTPATIENT)
Dept: MEDICAL GROUP | Facility: PHYSICIAN GROUP | Age: 74
End: 2024-01-18
Payer: MEDICARE

## 2024-01-18 ENCOUNTER — PHARMACY VISIT (OUTPATIENT)
Dept: PHARMACY | Facility: MEDICAL CENTER | Age: 74
End: 2024-01-18
Payer: COMMERCIAL

## 2024-01-18 VITALS
HEIGHT: 70 IN | SYSTOLIC BLOOD PRESSURE: 128 MMHG | RESPIRATION RATE: 16 BRPM | BODY MASS INDEX: 27.98 KG/M2 | OXYGEN SATURATION: 94 % | TEMPERATURE: 98.6 F | DIASTOLIC BLOOD PRESSURE: 68 MMHG | HEART RATE: 80 BPM

## 2024-01-18 PROBLEM — R33.9 URINARY RETENTION: Status: RESOLVED | Noted: 2023-01-05 | Resolved: 2024-01-01

## 2024-01-18 PROBLEM — K92.0 COFFEE GROUND EMESIS: Status: ACTIVE | Noted: 2024-01-18

## 2024-01-18 PROBLEM — F03.918: Status: RESOLVED | Noted: 2023-05-17 | Resolved: 2024-01-18

## 2024-01-18 PROBLEM — J96.01 ACUTE RESPIRATORY FAILURE WITH HYPOXIA (HCC): Status: RESOLVED | Noted: 2023-12-19 | Resolved: 2024-01-18

## 2024-01-18 PROCEDURE — 665998 HH PPS REVENUE CREDIT

## 2024-01-18 PROCEDURE — 665999 HH PPS REVENUE DEBIT

## 2024-01-18 SDOH — ECONOMIC STABILITY: HOUSING INSECURITY

## 2024-01-18 ASSESSMENT — ENCOUNTER SYMPTOMS
HIGHEST PAIN SEVERITY IN PAST 24 HOURS: 0/10
DENIES PAIN: 1
LOWEST PAIN SEVERITY IN PAST 24 HOURS: 0/10
PAIN SEVERITY GOAL: 0/10
DYSPNEA ON EXERTION: 1
SUBJECTIVE PAIN PROGRESSION: UNCHANGED
BOWEL PATTERN NORMAL: 1
VOMITING: DENIES
LAST BOWEL MOVEMENT: 66855
PERSON REPORTING PAIN: PATIENT
RECTAL BLEEDING: 1
NAUSEA: DENIES
FATIGUES EASILY: 1
SHORTNESS OF BREATH: 1

## 2024-01-18 NOTE — PROGRESS NOTES
Pt was seen by [facility] for a TCM visit today and has established w/ [facility]. Will close TCM encounter.

## 2024-01-18 NOTE — PROGRESS NOTES
Medication chart review for [facility] services    Received referral from [facility].   Medications reviewed  compared with discharge summary if available.  Discharge summary date, if applicable:   12/22/23    Current medication list per [facility]     Medication list one, patient is currently taking    Current Outpatient Medications:     linagliptin, 5 mg, Oral, DAILY    Empagliflozin-metFORMIN HCl ER, 1 Tablet, Oral, BID    cyclobenzaprine, 10 mg, Oral, TID PRN    donepezil, 5 mg, Oral, Nightly    Misc. Devices, Walker with seat    Misc. Devices, Wheelchair toilet rails    metoprolol tartrate, 12.5 mg, Oral, BID    vitamin D, 4 Tablet, Oral, DAILY    FreeStyle Elena 3 Sensor, 1 Each, Does not apply, Q14 DAYS    atorvastatin, 80 mg, Oral, Q EVENING    magnesium oxide, 400 mg, Oral, DAILY    folic acid, 1 mg, Oral, DAILY    buPROPion SR, 150 mg, Oral, BID    DULoxetine, 60 mg, Oral, BID    omeprazole, 20 mg, Oral, BID    pioglitazone, 30 mg, Oral, DAILY    aspirin, 81 mg, Oral, DAILY      Medication list two, drugs that the patient has been prescribed or recommended to take by their healthcare provider on discharge summary  CONTINUE taking these medications         Instructions   aspirin 81 MG Chew chewable tablet  Commonly known as: Asa    Chew 1 Tablet every day.  Dose: 81 mg      atorvastatin 80 MG tablet  Commonly known as: Lipitor    Doctor's comments: Replaces 40 mg dose  Take 1 Tablet by mouth every evening. Indications: High Amount of Fats in the Blood  Dose: 80 mg      buPROPion  MG Tb12 sustained-release tablet  Commonly known as: Wellbutrin-SR    Take 1 Tablet by mouth 2 times a day. Indications: Major Depressive Disorder  Dose: 150 mg      donepezil 5 MG Tabs  Commonly known as: Aricept    Take 1 Tablet by mouth every evening.  Dose: 5 mg      DULoxetine 60 MG Cpep delayed-release capsule  Commonly known as: Cymbalta    Take 1 Capsule by mouth 2 times a day. Indications: Major Depressive  Disorder  Dose: 60 mg      folic acid 1 MG Tabs  Commonly known as: Folvite    Take 1 Tablet by mouth every day. Indications: ALCOHOL ABUSE  Dose: 1 mg      FreeStyle Elena 3 Sensor Misc    1 Each every 14 days.  Dose: 1 Each      HumaLOG 100 UNIT/ML  Generic drug: insulin lispro    Inject 2-10 Units under the skin 4 Times a Day,Before Meals and at Bedtime. 2 units for every 50 > 151  Dose: 2-10 Units      magnesium oxide 400 (240 Mg) MG Tabs    Take 1 Tablet by mouth every day.  Dose: 400 mg      metoprolol tartrate 25 MG Tabs  Commonly known as: Lopressor    Take 12.5 mg by mouth 2 times a day. 12.5 mg = 1/2 tab  Dose: 12.5 mg      * Misc. Devices Misc    Walker with seat      * Misc. Devices Misc    Wheelchair toilet rails      omeprazole 20 MG delayed-release capsule  Commonly known as: PriLOSEC    Take 1 Capsule by mouth 2 times a day. Indications: Gastroesophageal Reflux Disease  Dose: 20 mg      pioglitazone 30 MG Tabs  Commonly known as: Actos    Take 1 Tablet by mouth every day. Indications: Type 2 Diabetes  Dose: 30 mg      Synjardy XR  MG Tb24  Generic drug: Empagliflozin-metFORMIN HCl ER    Take 1 Tablet by mouth every day. Replaces Jardiance  Dose: 1 Tablet      vitamin D 2000 UNIT Tabs    Take 4 Tablets by mouth every day.  Dose: 4 Tablet              * This list has 2 medication(s) that are the same as other medications prescribed for you. Read the directions carefully, and ask your doctor or other care provider to review them with you.                    STOP taking these medications       benazepril 40 MG tablet  Commonly known as: Lotensin      metformin 1000 MG tablet  Commonly known as: Glucophage          No Known Allergies    Labs     Lab Results   Component Value Date/Time    SODIUM 134 (L) 12/21/2023 08:10 AM    POTASSIUM 3.9 12/21/2023 08:10 AM    CHLORIDE 100 12/21/2023 08:10 AM    CO2 21 12/21/2023 08:10 AM    GLUCOSE 142 (H) 12/21/2023 08:10 AM    BUN 12 12/21/2023 08:10 AM     CREATININE 0.55 12/21/2023 08:10 AM     Lab Results   Component Value Date/Time    ALKPHOSPHAT 58 12/19/2023 11:55 AM    ASTSGOT 26 12/19/2023 11:55 AM    ALTSGPT 13 12/19/2023 11:55 AM    TBILIRUBIN 0.8 12/19/2023 11:55 AM    INR 1.11 01/02/2019 07:04 PM    ALBUMIN 3.6 12/19/2023 11:55 AM        Assessment for clinically significant drug interactions, drug omissions/additions, duplicative therapies.            CC   Stanton Miller M.D.  [address] [place] [address]  Fax: [phone]    [facility]  Phone [phone] fax [phone]    This note was created using voice recognition software (Dragon). The accuracy of the dictation is limited by the abilities of the software. I have reviewed the note prior to signing, however some errors in grammar and context are still possible. If you have any questions related to this note please do not hesitate to contact our office.

## 2024-01-18 NOTE — CASE COMMUNICATION
noted  ----- Message -----  From: Brittney Carlson R.N.  Sent: 1/18/2024   8:33 AM PST  To: Lety Charlton R.N.; KAYKAY Faustin, MSW; *      Primary DX/skilled needs: Respiratory failure with Hypoxia, SOB, Type 2 DM, Pneumonia,Muscle weakness, Depression. SN to observe,assess,teach,train and monitor medications and disease conditions  SN Frequencies:1w1, 2w4, 3 PRN  Zip Code:[address]  Disciplines ordered:PT,OT,MSW,SN,HHA,SLP  [employer] Authorization:SCP  Certification Period:1/17/24-3/16/24  Special Considerations: SN to visit this week for medication reconciliation

## 2024-01-18 NOTE — Clinical Note
Primary DX/skilled needs: Respiratory failure with Hypoxia, SOB, Type 2 DM, Pneumonia,Muscle weakness, Depression. SN to observe,assess,teach,train and monitor medications and disease conditions  SN Frequencies:1w1, 2w4, 3 PRN  Zip Code:[address]  Disciplines ordered:PT,OT,MSW,SN,HHA,SLP  Insurance Authorization:[facility]  Certification Period:1/17/24-3/16/24  Special Considerations: SN to visit this week for medication reconciliation

## 2024-01-19 ENCOUNTER — HOME CARE VISIT (OUTPATIENT)
Dept: HOME HEALTH SERVICES | Facility: HOME HEALTHCARE | Age: 74
End: 2024-01-19
Payer: MEDICARE

## 2024-01-19 ENCOUNTER — HOSPITAL ENCOUNTER (OUTPATIENT)
Facility: MEDICAL CENTER | Age: 74
End: 2024-01-19
Attending: PHYSICIAN ASSISTANT
Payer: MEDICARE

## 2024-01-19 LAB
ANION GAP SERPL CALC-SCNC: 15 MMOL/L (ref 7–16)
BASOPHILS # BLD AUTO: 0.4 % (ref 0–1.8)
BASOPHILS # BLD: 0.03 K/UL (ref 0–0.12)
BUN SERPL-MCNC: 20 MG/DL (ref 8–22)
CALCIUM SERPL-MCNC: 9.5 MG/DL (ref 8.4–10.2)
CHLORIDE SERPL-SCNC: 98 MMOL/L (ref 96–112)
CO2 SERPL-SCNC: 22 MMOL/L (ref 20–33)
CREAT SERPL-MCNC: 0.68 MG/DL (ref 0.5–1.4)
EOSINOPHIL # BLD AUTO: 0.19 K/UL (ref 0–0.51)
EOSINOPHIL NFR BLD: 2.8 % (ref 0–6.9)
ERYTHROCYTE [DISTWIDTH] IN BLOOD BY AUTOMATED COUNT: 51.3 FL (ref 35.9–50)
GFR SERPLBLD CREATININE-BSD FMLA CKD-EPI: 97 ML/MIN/1.73 M 2
GLUCOSE SERPL-MCNC: 169 MG/DL (ref 65–99)
HCT VFR BLD AUTO: 42.1 % (ref 42–52)
HGB BLD-MCNC: 13.5 G/DL (ref 14–18)
IMM GRANULOCYTES # BLD AUTO: 0.03 K/UL (ref 0–0.11)
IMM GRANULOCYTES NFR BLD AUTO: 0.4 % (ref 0–0.9)
LYMPHOCYTES # BLD AUTO: 1.38 K/UL (ref 1–4.8)
LYMPHOCYTES NFR BLD: 20.6 % (ref 22–41)
MCH RBC QN AUTO: 28.6 PG (ref 27–33)
MCHC RBC AUTO-ENTMCNC: 32.1 G/DL (ref 32.3–36.5)
MCV RBC AUTO: 89.2 FL (ref 81.4–97.8)
MONOCYTES # BLD AUTO: 0.68 K/UL (ref 0–0.85)
MONOCYTES NFR BLD AUTO: 10.1 % (ref 0–13.4)
NEUTROPHILS # BLD AUTO: 4.39 K/UL (ref 1.82–7.42)
NEUTROPHILS NFR BLD: 65.7 % (ref 44–72)
NRBC # BLD AUTO: 0 K/UL
NRBC BLD-RTO: 0 /100 WBC (ref 0–0.2)
PLATELET # BLD AUTO: 229 K/UL (ref 164–446)
PMV BLD AUTO: 10.4 FL (ref 9–12.9)
POTASSIUM SERPL-SCNC: 4.5 MMOL/L (ref 3.6–5.5)
RBC # BLD AUTO: 4.72 M/UL (ref 4.7–6.1)
SODIUM SERPL-SCNC: 135 MMOL/L (ref 135–145)
WBC # BLD AUTO: 6.7 K/UL (ref 4.8–10.8)

## 2024-01-19 PROCEDURE — 665999 HH PPS REVENUE DEBIT

## 2024-01-19 PROCEDURE — G0180 MD CERTIFICATION HHA PATIENT: HCPCS | Performed by: INTERNAL MEDICINE

## 2024-01-19 PROCEDURE — G0155 HHCP-SVS OF CSW,EA 15 MIN: HCPCS

## 2024-01-19 PROCEDURE — G0299 HHS/HOSPICE OF RN EA 15 MIN: HCPCS

## 2024-01-19 PROCEDURE — 665998 HH PPS REVENUE CREDIT

## 2024-01-19 PROCEDURE — 80048 BASIC METABOLIC PNL TOTAL CA: CPT

## 2024-01-19 PROCEDURE — 85025 COMPLETE CBC W/AUTO DIFF WBC: CPT

## 2024-01-19 ASSESSMENT — ENCOUNTER SYMPTOMS
PERSON REPORTING PAIN: PATIENT
DENIES PAIN: 1
DRY SKIN: 1
LAST BOWEL MOVEMENT: 66858
FATIGUES EASILY: 1
BOWEL PATTERN NORMAL: 1
MUSCLE WEAKNESS: 1
LIMITED RANGE OF MOTION: 1

## 2024-01-19 ASSESSMENT — ACTIVITIES OF DAILY LIVING (ADL)
GROOMING_REQUIRES_ASSISTANCE: 1
DRESSING_REQUIRES_ASSISTANCE: 1
AMBULATION_REQUIRES_ASSISTANCE: 1
BATHING_REQUIRES_ASSISTANCE: 1
LAUNDRY_REQUIRES_ASSISTANCE: 1
SHOPPING_REQUIRES_ASSISTANCE: 1

## 2024-01-20 ENCOUNTER — HOME CARE VISIT (OUTPATIENT)
Dept: HOME HEALTH SERVICES | Facility: HOME HEALTHCARE | Age: 74
End: 2024-01-20
Payer: MEDICARE

## 2024-01-20 ENCOUNTER — HOSPITAL ENCOUNTER (OUTPATIENT)
Facility: MEDICAL CENTER | Age: 74
End: 2024-01-20
Attending: PHYSICIAN ASSISTANT
Payer: MEDICARE

## 2024-01-20 VITALS
DIASTOLIC BLOOD PRESSURE: 70 MMHG | TEMPERATURE: 98.7 F | HEART RATE: 87 BPM | SYSTOLIC BLOOD PRESSURE: 122 MMHG | OXYGEN SATURATION: 93 % | RESPIRATION RATE: 16 BRPM

## 2024-01-20 PROCEDURE — 665998 HH PPS REVENUE CREDIT

## 2024-01-20 PROCEDURE — 665999 HH PPS REVENUE DEBIT

## 2024-01-20 PROCEDURE — 82274 ASSAY TEST FOR BLOOD FECAL: CPT

## 2024-01-20 ASSESSMENT — PAIN SCALES - PAIN ASSESSMENT IN ADVANCED DEMENTIA (PAINAD)
FACIALEXPRESSION: 0 - SMILING OR INEXPRESSIVE.
TOTALSCORE: 0
BODYLANGUAGE: 0 - RELAXED.
NEGVOCALIZATION: 0 - NONE.
CONSOLABILITY: 0 - NO NEED TO CONSOLE.

## 2024-01-20 NOTE — Clinical Note
Pt wife Heidy called  on-call RN and stated she had stool sample she had collected for pt and needed . Spoke with supervisor and then went to pt home and took sample to lab at [facility] for drop off. No

## 2024-01-21 PROCEDURE — 665998 HH PPS REVENUE CREDIT

## 2024-01-21 PROCEDURE — 665999 HH PPS REVENUE DEBIT

## 2024-01-22 ENCOUNTER — HOME CARE VISIT (OUTPATIENT)
Dept: HOME HEALTH SERVICES | Facility: HOME HEALTHCARE | Age: 74
End: 2024-01-22
Payer: MEDICARE

## 2024-01-22 VITALS
OXYGEN SATURATION: 94 % | HEART RATE: 53 BPM | RESPIRATION RATE: 18 BRPM | DIASTOLIC BLOOD PRESSURE: 56 MMHG | TEMPERATURE: 97.6 F | SYSTOLIC BLOOD PRESSURE: 130 MMHG

## 2024-01-22 PROCEDURE — 665998 HH PPS REVENUE CREDIT

## 2024-01-22 PROCEDURE — G0495 RN CARE TRAIN/EDU IN HH: HCPCS

## 2024-01-22 PROCEDURE — 665999 HH PPS REVENUE DEBIT

## 2024-01-22 ASSESSMENT — ENCOUNTER SYMPTOMS: DENIES PAIN: 1

## 2024-01-22 NOTE — CASE COMMUNICATION
noted  ----- Message -----  From: Shira Alba R.N.  Sent: 1/20/2024   6:25 PM PST  To: Lety Charlton R.N.; Tisha Alberto R.N.      Pt wife Heidy called HH on-call RN and stated she had stool sample she had collected for pt and needed . Spoke with supervisor and then went to pt home and took sample to lab at [facility] for drop off.

## 2024-01-23 ENCOUNTER — HOME CARE VISIT (OUTPATIENT)
Dept: HOME HEALTH SERVICES | Facility: HOME HEALTHCARE | Age: 74
End: 2024-01-23
Payer: MEDICARE

## 2024-01-23 VITALS
TEMPERATURE: 97.3 F | HEART RATE: 72 BPM | RESPIRATION RATE: 17 BRPM | SYSTOLIC BLOOD PRESSURE: 120 MMHG | DIASTOLIC BLOOD PRESSURE: 80 MMHG | OXYGEN SATURATION: 92 %

## 2024-01-23 LAB — AMBIGUOUS SPECIMEN AMBIS: NORMAL

## 2024-01-23 PROCEDURE — 665998 HH PPS REVENUE CREDIT

## 2024-01-23 PROCEDURE — 665999 HH PPS REVENUE DEBIT

## 2024-01-23 PROCEDURE — G0156 HHCP-SVS OF AIDE,EA 15 MIN: HCPCS

## 2024-01-23 ASSESSMENT — ENCOUNTER SYMPTOMS
HIGHEST PAIN SEVERITY IN PAST 24 HOURS: 0/10
PAIN SEVERITY GOAL: 0/10
DENIES PAIN: 1
PERSON REPORTING PAIN: PATIENT

## 2024-01-24 ENCOUNTER — HOME CARE VISIT (OUTPATIENT)
Dept: HOME HEALTH SERVICES | Facility: HOME HEALTHCARE | Age: 74
End: 2024-01-24
Payer: MEDICARE

## 2024-01-24 VITALS
TEMPERATURE: 97.8 F | DIASTOLIC BLOOD PRESSURE: 70 MMHG | RESPIRATION RATE: 16 BRPM | OXYGEN SATURATION: 94 % | HEART RATE: 88 BPM | SYSTOLIC BLOOD PRESSURE: 130 MMHG

## 2024-01-24 VITALS
DIASTOLIC BLOOD PRESSURE: 70 MMHG | SYSTOLIC BLOOD PRESSURE: 130 MMHG | OXYGEN SATURATION: 94 % | TEMPERATURE: 97.8 F | HEART RATE: 88 BPM | RESPIRATION RATE: 16 BRPM

## 2024-01-24 PROCEDURE — 665998 HH PPS REVENUE CREDIT

## 2024-01-24 PROCEDURE — G0495 RN CARE TRAIN/EDU IN HH: HCPCS

## 2024-01-24 PROCEDURE — G0151 HHCP-SERV OF PT,EA 15 MIN: HCPCS

## 2024-01-24 PROCEDURE — 665999 HH PPS REVENUE DEBIT

## 2024-01-24 PROCEDURE — G0152 HHCP-SERV OF OT,EA 15 MIN: HCPCS

## 2024-01-24 ASSESSMENT — ENCOUNTER SYMPTOMS
POOR JUDGMENT: 1
MUSCLE WEAKNESS: 1
LIMITED RANGE OF MOTION: 1
DENIES PAIN: 1
POOR JUDGMENT: 1
DESCRIPTION OF MEMORY LOSS: LONG TERM
STOOL FREQUENCY: DAILY
DESCRIPTION OF MEMORY LOSS: SHORT TERM
BOWEL PATTERN NORMAL: 1
LAST BOWEL MOVEMENT: 66861
ARTHRALGIAS: 1
DIFFICULTY THINKING: 1
PERSON REPORTING PAIN: PATIENT
MUSCLE WEAKNESS: 1

## 2024-01-24 ASSESSMENT — ACTIVITIES OF DAILY LIVING (ADL)
CURRENT_FUNCTION: ONE PERSON
AMBULATION ASSISTANCE ON FLAT SURFACES: 1
AMBULATION ASSISTANCE: ONE PERSON
AMBULATION ASSISTANCE: NON-AMBULATORY

## 2024-01-24 NOTE — CASE COMMUNICATION
noted  ----- Message -----  From: GARY LingNVILMA.  Sent: 1/23/2024   4:18 PM PST  To: Lety Charlton R.N.; Tisha Alberto R.N.; *      FYI    patient temperature was out of this agency Care plan 97.3 F today .

## 2024-01-24 NOTE — Clinical Note
PT evaluation completed, requesting follow up visits with frequency of 1w1,2w3 effective 01/24/2024.

## 2024-01-25 ENCOUNTER — PATIENT OUTREACH (OUTPATIENT)
Dept: HEALTH INFORMATION MANAGEMENT | Facility: OTHER | Age: 74
End: 2024-01-25
Payer: MEDICARE

## 2024-01-25 VITALS
HEART RATE: 88 BPM | DIASTOLIC BLOOD PRESSURE: 70 MMHG | SYSTOLIC BLOOD PRESSURE: 130 MMHG | OXYGEN SATURATION: 94 % | TEMPERATURE: 97.8 F | RESPIRATION RATE: 16 BRPM

## 2024-01-25 DIAGNOSIS — F10.27 DEMENTIA ASSOCIATED WITH ALCOHOLISM WITH BEHAVIORAL DISTURBANCE (HCC): ICD-10-CM

## 2024-01-25 PROBLEM — R15.9 FULL INCONTINENCE OF FECES: Status: ACTIVE | Noted: 2024-01-25

## 2024-01-25 PROBLEM — R00.1 BRADYCARDIA: Status: ACTIVE | Noted: 2024-01-25

## 2024-01-25 PROCEDURE — 665998 HH PPS REVENUE CREDIT

## 2024-01-25 PROCEDURE — 665999 HH PPS REVENUE DEBIT

## 2024-01-25 ASSESSMENT — ENCOUNTER SYMPTOMS
PERSON REPORTING PAIN: PATIENT
STOOL FREQUENCY: DAILY
FATIGUES EASILY: 1
MUSCLE WEAKNESS: 1
LAST BOWEL MOVEMENT: 66863
DENIES PAIN: 1
DRY SKIN: 1
BOWEL INCONTINENCE: 1
LIMITED RANGE OF MOTION: 1

## 2024-01-25 ASSESSMENT — PAIN SCALES - PAIN ASSESSMENT IN ADVANCED DEMENTIA (PAINAD)
BODYLANGUAGE: 0 - RELAXED.
NEGVOCALIZATION: 0 - NONE.
CONSOLABILITY: 0 - NO NEED TO CONSOLE.
FACIALEXPRESSION: 0 - SMILING OR INEXPRESSIVE.
TOTALSCORE: 0

## 2024-01-25 ASSESSMENT — ACTIVITIES OF DAILY LIVING (ADL)
CURRENT_FUNCTION: STAND BY ASSIST
AMBULATION ASSISTANCE: STAND BY ASSIST

## 2024-01-26 LAB — IMM ASSAY OCC BLD FITOB: NEGATIVE

## 2024-01-26 PROCEDURE — 665998 HH PPS REVENUE CREDIT

## 2024-01-26 PROCEDURE — 665999 HH PPS REVENUE DEBIT

## 2024-01-26 ASSESSMENT — ACTIVITIES OF DAILY LIVING (ADL)
TOILETING: 1
BATHING ASSESSED: 1
TOILETING: MINIMUM ASSIST
CURRENT_FUNCTION: MINIMUM ASSIST
AMBULATION ASSISTANCE: MINIMUM ASSIST
BATHING_CURRENT_FUNCTION: MODERATE ASSIST
PHYSICAL TRANSFERS ASSESSED: 1
AMBULATION ASSISTANCE: 1

## 2024-01-26 ASSESSMENT — ENCOUNTER SYMPTOMS
PERSON REPORTING PAIN: PATIENT
DENIES PAIN: 1

## 2024-01-26 NOTE — PROGRESS NOTES
Received call from pt's wife. She said Harpal was seen by [facility] again today. He has been incontinent and she is having a hard time managing his condition. She is experiencing caregiver burnout. Harpal has a  care aid for a few hours a week. He also has someone from a community agency that comes for two hours a week. A referral has been placed to the  MSW for additional resources. She would like for Harpal to stay at home, she just wants to see if she can get more help. Also would like to revisit a hospice referral. Previously had been referred to palliative. Palliative had offered assistance with care planning. These are not the services she is looking for. Provided education on hospice. Pt's wife would like a consultation.

## 2024-01-27 PROCEDURE — 665998 HH PPS REVENUE CREDIT

## 2024-01-27 PROCEDURE — 665999 HH PPS REVENUE DEBIT

## 2024-01-27 NOTE — CASE COMMUNICATION
noted  ----- Message -----  From: Emiyl Martinez, OT  Sent: 1/26/2024   7:37 AM PST  To: Lety Charlton R.N.; Tisha Alberto R.N.; *      OT dimple completed, requesting auth for 1w1, 2w4, effective 1/24/24.

## 2024-01-27 NOTE — CASE COMMUNICATION
noted  ----- Message -----  From: Sandy Cruz, PT  Sent: 1/24/2024  11:07 PM PST  To: Lety Charlton R.N.; Emily Martinez, OT; *      PT evaluation completed, requesting follow up visits with frequency of 1w1,2w3 effective 01/24/2024.

## 2024-01-28 PROCEDURE — 665998 HH PPS REVENUE CREDIT

## 2024-01-28 PROCEDURE — 665999 HH PPS REVENUE DEBIT

## 2024-01-29 ENCOUNTER — HOME CARE VISIT (OUTPATIENT)
Dept: HOME HEALTH SERVICES | Facility: HOME HEALTHCARE | Age: 74
End: 2024-01-29
Payer: MEDICARE

## 2024-01-29 ENCOUNTER — HOSPICE ADMISSION (OUTPATIENT)
Dept: HOSPICE | Facility: HOSPICE | Age: 74
End: 2024-01-29
Payer: MEDICARE

## 2024-01-29 VITALS
TEMPERATURE: 98.4 F | DIASTOLIC BLOOD PRESSURE: 60 MMHG | SYSTOLIC BLOOD PRESSURE: 110 MMHG | RESPIRATION RATE: 17 BRPM | HEART RATE: 65 BPM | OXYGEN SATURATION: 95 %

## 2024-01-29 PROCEDURE — 665999 HH PPS REVENUE DEBIT

## 2024-01-29 PROCEDURE — G0152 HHCP-SERV OF OT,EA 15 MIN: HCPCS

## 2024-01-29 PROCEDURE — 665998 HH PPS REVENUE CREDIT

## 2024-01-29 ASSESSMENT — ACTIVITIES OF DAILY LIVING (ADL): OASIS_M1830: 05

## 2024-01-29 NOTE — Clinical Note
Date & Time of fall: 1/29/24, around 5am           Cause of fall:  Physiological           Location:  Bedroom; around 5am, pt reportedly tried getting up out of bed to use the bathroom, however, did not want to wake his wife up. Subsequently, pt's legs reportedly gave out and pt ended up sliding off the bed. Wife woke up afterward, and found pt on the floor.            Was the fall witnessed?                  If yes, by who? No            Actions taken by patient: wife assisted pt off the floor using a transfer sling            Any new injury?                   If yes, what kind?  No            Any recent medication changes?    No            Did patient have appropriate DME available?  Yes, however, did not wake wife up. Wife usually assisted or supervised pt during txfs.             Actions Taken: Notified care team and Notified MD   I note pt will do the fe/tibc ferritin levels this wk. I added Vit B12 and folate levels since it will be a new draw.

## 2024-01-29 NOTE — CASE COMMUNICATION
"I agree with these changes  ----- Message -----  From: Ernestina Barton R.N.  Sent: 1/29/2024   8:17 AM PST  To: Brittney Carlson R.N.         Edits completed by GARY Barton RN:     1.  and  diagnosis coding updated per chart review  2. Per GSC wife reports Short term memory loss, changed  to 1 and  to daily  3.  changed to 2 per documentation SOB transferring from bed to chair  4.  changed to 1 due to GG0 130A is set up assist  5. Bathing area lacks grab bars,  is 5.  6.  changed to 5 per MSW narrative \"Patient and spouse reported that it took 4 people with [place] to get patient to bedroom \" and ND2130 R performance is #3. Per SN visit on 1/22, patient is confined to bed/chair, Per SN 1/19 visit can sit up and transfer to chair  7.  changed to 1/22 for collaboration  8. Per EMR records, patient has declined since radiatio n therapy for prostate CA. [id number] A, B, C, D changed to #2  9.  is taking antiplatelet ASA with indication noted  10.  changed to 3 per guidelines when ambulation is supervised  11. Checked exercises prescribed to Activities Permitted on 485 form.    12.  changed to 16 per therapy  13.  changed to 1/16 per SOC clinician in  DC from SNF on 1/16.      "

## 2024-01-29 NOTE — Clinical Note
CAN     Spoke with patient's wife Heidy per concerns of respite and incontinence supplies. Heidy addressed that we had discussed respite prior and she has already received respite cipriano through [employer] and can't apply again until June. MSW addressed state services and Heidy feels they are over asset. MSW addressed incontinence supplies from [employer]. Heidy reported that they are over asset to utilize [employer]. Heidy addressed questions about [facility]. MSW addressed questions about Hospice and re-addressed for patient/Heidy to reach out if needs arise. Heidy addressed that she will speak with provider about hospice tomorrow. Patient/spouse declined need for follow up visit by MSW.

## 2024-01-29 NOTE — CASE COMMUNICATION
"   Edits completed by GARY Barton RN:     1.  and  diagnosis coding updated per chart review  2. Per GSC wife reports Short term memory loss, changed  to 1 and  to daily  3.  changed to 2 per documentation SOB transferring from bed to chair  4.  changed to 1 due to WD5131G is set up assist  5. Bathing area lacks grab bars,  is 5.  6.  changed to 5 per MSW narrative \"Patient and spouse reported that  it took 4 people with [place] to get patient to bedroom \" and BY7321 R performance is #3. Per SN visit on 1/22, patient is confined to bed/chair, Per SN 1/19 visit can sit up and transfer to chair  7.  changed to 1/22 for collaboration  8. Per EMR records, patient has declined since radiation therapy for prostate CA. [id number] A, B, C, D changed to #2  9.  is taking antiplatelet ASA with indication noted  10.  changed to 3  per guidelines when ambulation is supervised  11. Checked exercises prescribed to Activities Permitted on 485 form.    12.  changed to 16 per therapy  13.  changed to 1/16 per SOC clinician in  DC from SNF on 1/16.      "

## 2024-01-30 ENCOUNTER — HOME CARE VISIT (OUTPATIENT)
Dept: HOME HEALTH SERVICES | Facility: HOME HEALTHCARE | Age: 74
End: 2024-01-30
Payer: MEDICARE

## 2024-01-30 ENCOUNTER — TELEPHONE (OUTPATIENT)
Dept: HEALTH INFORMATION MANAGEMENT | Facility: OTHER | Age: 74
End: 2024-01-30
Payer: MEDICARE

## 2024-01-30 VITALS
RESPIRATION RATE: 17 BRPM | SYSTOLIC BLOOD PRESSURE: 118 MMHG | DIASTOLIC BLOOD PRESSURE: 70 MMHG | OXYGEN SATURATION: 95 % | TEMPERATURE: 97.8 F | HEART RATE: 66 BPM

## 2024-01-30 PROCEDURE — 665999 HH PPS REVENUE DEBIT

## 2024-01-30 PROCEDURE — G0299 HHS/HOSPICE OF RN EA 15 MIN: HCPCS

## 2024-01-30 PROCEDURE — 665998 HH PPS REVENUE CREDIT

## 2024-01-30 ASSESSMENT — ENCOUNTER SYMPTOMS
LAST BOWEL MOVEMENT: 66869
BOWEL INCONTINENCE: 1
DRY SKIN: 1
STOOL FREQUENCY: DAILY
POOR JUDGMENT: 1
FATIGUES EASILY: 1
LIMITED RANGE OF MOTION: 1
DENIES PAIN: 1
PERSON REPORTING PAIN: PATIENT
DESCRIPTION OF MEMORY LOSS: SHORT TERM
FORGETFULNESS: 1
DENIES PAIN: 1
MUSCLE WEAKNESS: 1
PERSON REPORTING PAIN: PATIENT

## 2024-01-30 ASSESSMENT — ACTIVITIES OF DAILY LIVING (ADL)
CURRENT_FUNCTION: STAND BY ASSIST
AMBULATION ASSISTANCE: STAND BY ASSIST

## 2024-01-30 ASSESSMENT — PAIN SCALES - PAIN ASSESSMENT IN ADVANCED DEMENTIA (PAINAD)
BODYLANGUAGE: 0 - RELAXED.
TOTALSCORE: 0
CONSOLABILITY: 0 - NO NEED TO CONSOLE.
FACIALEXPRESSION: 0 - SMILING OR INEXPRESSIVE.
NEGVOCALIZATION: 0 - NONE.

## 2024-01-30 NOTE — TELEPHONE ENCOUNTER
Contacted pt and his wife to discuss hospice referral. Provided them with the scheduling number and encouraged them to call if they don't hear from anyone by next week. Rescheduled PCP appt. They will reach out for any further questions, concerns, or needs.

## 2024-01-30 NOTE — CASE COMMUNICATION
NOTED  ----- Message -----  From: KAYKAY Faustin, MSW  Sent: 1/29/2024   2:55 PM PST  To: Lety Charlton R.N.; Tisha Alberto R.N.; *      CAN     Spoke with patient's wife Heidy per concerns of respite and incontinence supplies. Heidy addressed that we had discussed respite prior and she has already received respite cipriano through [employer] and can't apply again until June. MSW addressed state services and Heidy fe els they are over asset. MSW addressed incontinence supplies from [employer]. Heidy reported that they are over asset to utilize [employer]. Heidy addressed questions about [facility]. MSW addressed questions about Hospice and re-addressed for patient/Heidy to reach out if needs arise. Heidy addressed that she will speak with provider about hospice tomorrow. Patient/spouse declined need for follow up visit by MSW.

## 2024-01-31 ENCOUNTER — HOME CARE VISIT (OUTPATIENT)
Dept: HOME HEALTH SERVICES | Facility: HOME HEALTHCARE | Age: 74
End: 2024-01-31
Payer: MEDICARE

## 2024-01-31 VITALS
DIASTOLIC BLOOD PRESSURE: 64 MMHG | TEMPERATURE: 98.8 F | OXYGEN SATURATION: 98 % | HEART RATE: 66 BPM | RESPIRATION RATE: 17 BRPM | SYSTOLIC BLOOD PRESSURE: 124 MMHG

## 2024-01-31 DIAGNOSIS — I47.10 PSVT (PAROXYSMAL SUPRAVENTRICULAR TACHYCARDIA) (HCC): ICD-10-CM

## 2024-01-31 DIAGNOSIS — F10.11 HISTORY OF ALCOHOL ABUSE: ICD-10-CM

## 2024-01-31 PROCEDURE — 665999 HH PPS REVENUE DEBIT

## 2024-01-31 PROCEDURE — G0156 HHCP-SVS OF AIDE,EA 15 MIN: HCPCS

## 2024-01-31 PROCEDURE — 665998 HH PPS REVENUE CREDIT

## 2024-01-31 RX ORDER — FOLIC ACID 1 MG/1
TABLET ORAL
Qty: 90 TABLET | Refills: 3 | Status: SHIPPED | OUTPATIENT
Start: 2024-01-31 | End: 2024-02-05

## 2024-01-31 NOTE — CASE COMMUNICATION
IDT NOTE  Patient discussed today in interdisciplinary team meeting. Concerns noted of FREQEUENT FALLS and DECLINE IN CONDITION. Plan to address issue is HOSPICE EVALUATION.

## 2024-02-01 ENCOUNTER — HOME CARE VISIT (OUTPATIENT)
Dept: HOSPICE | Facility: HOSPICE | Age: 74
End: 2024-02-01
Payer: MEDICARE

## 2024-02-01 ENCOUNTER — HOME CARE VISIT (OUTPATIENT)
Dept: HOME HEALTH SERVICES | Facility: HOME HEALTHCARE | Age: 74
End: 2024-02-01
Payer: MEDICARE

## 2024-02-01 ENCOUNTER — HOSPITAL ENCOUNTER (OUTPATIENT)
Facility: MEDICAL CENTER | Age: 74
End: 2024-02-01
Attending: INTERNAL MEDICINE
Payer: MEDICARE

## 2024-02-01 VITALS
OXYGEN SATURATION: 95 % | DIASTOLIC BLOOD PRESSURE: 62 MMHG | SYSTOLIC BLOOD PRESSURE: 114 MMHG | RESPIRATION RATE: 17 BRPM | HEART RATE: 62 BPM | TEMPERATURE: 98 F

## 2024-02-01 VITALS
DIASTOLIC BLOOD PRESSURE: 62 MMHG | TEMPERATURE: 98 F | HEART RATE: 62 BPM | RESPIRATION RATE: 17 BRPM | OXYGEN SATURATION: 95 % | SYSTOLIC BLOOD PRESSURE: 114 MMHG

## 2024-02-01 DIAGNOSIS — E78.2 MIXED HYPERLIPIDEMIA: ICD-10-CM

## 2024-02-01 DIAGNOSIS — R23.3 EASY BRUISING: ICD-10-CM

## 2024-02-01 DIAGNOSIS — E83.42 HYPOMAGNESEMIA: ICD-10-CM

## 2024-02-01 DIAGNOSIS — E55.9 VITAMIN D DEFICIENCY: ICD-10-CM

## 2024-02-01 DIAGNOSIS — E11.65 TYPE 2 DIABETES MELLITUS WITH HYPERGLYCEMIA, WITHOUT LONG-TERM CURRENT USE OF INSULIN (HCC): ICD-10-CM

## 2024-02-01 LAB
25(OH)D3 SERPL-MCNC: 33 NG/ML (ref 30–100)
ALBUMIN SERPL BCP-MCNC: 3.7 G/DL (ref 3.2–4.9)
ALBUMIN/GLOB SERPL: 1.1 G/DL
ALP SERPL-CCNC: 78 U/L (ref 30–99)
ALT SERPL-CCNC: 13 U/L (ref 2–50)
ANION GAP SERPL CALC-SCNC: 15 MMOL/L (ref 7–16)
AST SERPL-CCNC: 17 U/L (ref 12–45)
BASOPHILS # BLD AUTO: 0.5 % (ref 0–1.8)
BASOPHILS # BLD: 0.04 K/UL (ref 0–0.12)
BILIRUB SERPL-MCNC: 0.4 MG/DL (ref 0.1–1.5)
BUN SERPL-MCNC: 17 MG/DL (ref 8–22)
CALCIUM ALBUM COR SERPL-MCNC: 9.3 MG/DL (ref 8.5–10.5)
CALCIUM SERPL-MCNC: 9.1 MG/DL (ref 8.5–10.5)
CHLORIDE SERPL-SCNC: 96 MMOL/L (ref 96–112)
CHOLEST SERPL-MCNC: 119 MG/DL (ref 100–199)
CO2 SERPL-SCNC: 19 MMOL/L (ref 20–33)
CREAT SERPL-MCNC: 0.53 MG/DL (ref 0.5–1.4)
EOSINOPHIL # BLD AUTO: 0.25 K/UL (ref 0–0.51)
EOSINOPHIL NFR BLD: 3.1 % (ref 0–6.9)
ERYTHROCYTE [DISTWIDTH] IN BLOOD BY AUTOMATED COUNT: 45.4 FL (ref 35.9–50)
ERYTHROCYTE [SEDIMENTATION RATE] IN BLOOD BY WESTERGREN METHOD: 17 MM/HOUR (ref 0–20)
EST. AVERAGE GLUCOSE BLD GHB EST-MCNC: 183 MG/DL
GFR SERPLBLD CREATININE-BSD FMLA CKD-EPI: 105 ML/MIN/1.73 M 2
GLOBULIN SER CALC-MCNC: 3.4 G/DL (ref 1.9–3.5)
GLUCOSE SERPL-MCNC: 188 MG/DL (ref 65–99)
HBA1C MFR BLD: 8 % (ref 4–5.6)
HCT VFR BLD AUTO: 41.5 % (ref 42–52)
HDLC SERPL-MCNC: 35 MG/DL
HGB BLD-MCNC: 14 G/DL (ref 14–18)
IMM GRANULOCYTES # BLD AUTO: 0.03 K/UL (ref 0–0.11)
IMM GRANULOCYTES NFR BLD AUTO: 0.4 % (ref 0–0.9)
LDLC SERPL CALC-MCNC: 43 MG/DL
LYMPHOCYTES # BLD AUTO: 0.99 K/UL (ref 1–4.8)
LYMPHOCYTES NFR BLD: 12.3 % (ref 22–41)
MAGNESIUM SERPL-MCNC: 1.3 MG/DL (ref 1.5–2.5)
MCH RBC QN AUTO: 28.6 PG (ref 27–33)
MCHC RBC AUTO-ENTMCNC: 33.7 G/DL (ref 32.3–36.5)
MCV RBC AUTO: 84.7 FL (ref 81.4–97.8)
MONOCYTES # BLD AUTO: 0.73 K/UL (ref 0–0.85)
MONOCYTES NFR BLD AUTO: 9.1 % (ref 0–13.4)
NEUTROPHILS # BLD AUTO: 6 K/UL (ref 1.82–7.42)
NEUTROPHILS NFR BLD: 74.6 % (ref 44–72)
NRBC # BLD AUTO: 0 K/UL
NRBC BLD-RTO: 0 /100 WBC (ref 0–0.2)
PLATELET # BLD AUTO: 240 K/UL (ref 164–446)
PMV BLD AUTO: 11.2 FL (ref 9–12.9)
POTASSIUM SERPL-SCNC: 4.5 MMOL/L (ref 3.6–5.5)
PROT SERPL-MCNC: 7.1 G/DL (ref 6–8.2)
RBC # BLD AUTO: 4.9 M/UL (ref 4.7–6.1)
SODIUM SERPL-SCNC: 130 MMOL/L (ref 135–145)
TRIGL SERPL-MCNC: 205 MG/DL (ref 0–149)
TSH SERPL DL<=0.005 MIU/L-ACNC: 0.78 UIU/ML (ref 0.38–5.33)
WBC # BLD AUTO: 8 K/UL (ref 4.8–10.8)

## 2024-02-01 PROCEDURE — 83735 ASSAY OF MAGNESIUM: CPT

## 2024-02-01 PROCEDURE — 85652 RBC SED RATE AUTOMATED: CPT

## 2024-02-01 PROCEDURE — 665999 HH PPS REVENUE DEBIT

## 2024-02-01 PROCEDURE — G0152 HHCP-SERV OF OT,EA 15 MIN: HCPCS

## 2024-02-01 PROCEDURE — G0299 HHS/HOSPICE OF RN EA 15 MIN: HCPCS

## 2024-02-01 PROCEDURE — 80061 LIPID PANEL: CPT

## 2024-02-01 PROCEDURE — 85025 COMPLETE CBC W/AUTO DIFF WBC: CPT

## 2024-02-01 PROCEDURE — 83036 HEMOGLOBIN GLYCOSYLATED A1C: CPT

## 2024-02-01 PROCEDURE — 82306 VITAMIN D 25 HYDROXY: CPT

## 2024-02-01 PROCEDURE — 665998 HH PPS REVENUE CREDIT

## 2024-02-01 PROCEDURE — 84443 ASSAY THYROID STIM HORMONE: CPT

## 2024-02-01 PROCEDURE — 80053 COMPREHEN METABOLIC PANEL: CPT

## 2024-02-01 ASSESSMENT — ACTIVITIES OF DAILY LIVING (ADL)
CURRENT_FUNCTION: ONE PERSON
AMBULATION ASSISTANCE: STAND BY ASSIST

## 2024-02-01 ASSESSMENT — ENCOUNTER SYMPTOMS
FATIGUES EASILY: 1
DENIES PAIN: 1
ARTHRALGIAS: 1
DRY SKIN: 1
POOR JUDGMENT: 1
MUSCLE WEAKNESS: 1
LIMITED RANGE OF MOTION: 1
DESCRIPTION OF MEMORY LOSS: SHORT TERM
BOWEL INCONTINENCE: 1
LAST BOWEL MOVEMENT: 66871
STOOL FREQUENCY: DAILY
FORGETFULNESS: 1
PERSON REPORTING PAIN: PATIENT

## 2024-02-01 ASSESSMENT — PAIN SCALES - PAIN ASSESSMENT IN ADVANCED DEMENTIA (PAINAD)
FACIALEXPRESSION: 0 - SMILING OR INEXPRESSIVE.
BODYLANGUAGE: 0 - RELAXED.
CONSOLABILITY: 0 - NO NEED TO CONSOLE.
NEGVOCALIZATION: 1 - OCCASIONAL MOAN OR GROAN. LOW-LEVEL SPEECH WITH A NEGATIVE OR DISAPPROVING QUALITY.
TOTALSCORE: 1

## 2024-02-02 ENCOUNTER — HOME CARE VISIT (OUTPATIENT)
Dept: HOME HEALTH SERVICES | Facility: HOME HEALTHCARE | Age: 74
End: 2024-02-02
Payer: MEDICARE

## 2024-02-02 ENCOUNTER — HOME CARE VISIT (OUTPATIENT)
Dept: HOSPICE | Facility: HOSPICE | Age: 74
End: 2024-02-02
Payer: MEDICARE

## 2024-02-02 PROCEDURE — 665999 HH PPS REVENUE DEBIT

## 2024-02-02 PROCEDURE — 665998 HH PPS REVENUE CREDIT

## 2024-02-02 PROCEDURE — G0151 HHCP-SERV OF PT,EA 15 MIN: HCPCS

## 2024-02-02 ASSESSMENT — ACTIVITIES OF DAILY LIVING (ADL)
CONTINENCE_REQUIRES_ASSISTANCE: 1
AMBULATION_REQUIRES_ASSISTANCE: 1
BATHING_REQUIRES_ASSISTANCE: 1
DRESSING_REQUIRES_ASSISTANCE: 1
PHYSICAL_TRANSFER_REQUIRES_ASSISTANCE: 1

## 2024-02-03 PROCEDURE — 665998 HH PPS REVENUE CREDIT

## 2024-02-03 PROCEDURE — 665999 HH PPS REVENUE DEBIT

## 2024-02-04 PROCEDURE — 665998 HH PPS REVENUE CREDIT

## 2024-02-04 PROCEDURE — 665999 HH PPS REVENUE DEBIT

## 2024-02-04 ASSESSMENT — ACTIVITIES OF DAILY LIVING (ADL): AMBULATION ASSISTANCE ON FLAT SURFACES: 1

## 2024-02-04 ASSESSMENT — ENCOUNTER SYMPTOMS
PERSON REPORTING PAIN: PATIENT
DENIES PAIN: 1
POOR JUDGMENT: 1
DENIES PAIN: 1
PERSON REPORTING PAIN: PATIENT
DIFFICULTY THINKING: 1

## 2024-02-05 ENCOUNTER — HOME CARE VISIT (OUTPATIENT)
Dept: HOSPICE | Facility: HOSPICE | Age: 74
End: 2024-02-05
Payer: MEDICARE

## 2024-02-05 ENCOUNTER — HOME CARE VISIT (OUTPATIENT)
Dept: HOME HEALTH SERVICES | Facility: HOME HEALTHCARE | Age: 74
End: 2024-02-05
Payer: MEDICARE

## 2024-02-05 ENCOUNTER — PHARMACY VISIT (OUTPATIENT)
Dept: PHARMACY | Facility: MEDICAL CENTER | Age: 74
End: 2024-02-05
Payer: COMMERCIAL

## 2024-02-05 VITALS
TEMPERATURE: 98.1 F | SYSTOLIC BLOOD PRESSURE: 120 MMHG | RESPIRATION RATE: 16 BRPM | DIASTOLIC BLOOD PRESSURE: 70 MMHG | HEART RATE: 62 BPM | OXYGEN SATURATION: 95 %

## 2024-02-05 VITALS — RESPIRATION RATE: 16 BRPM

## 2024-02-05 DIAGNOSIS — G63 PERIPHERAL NEUROPATHY DUE TO METABOLIC DISORDER (HCC): ICD-10-CM

## 2024-02-05 DIAGNOSIS — G91.2 NPH (NORMAL PRESSURE HYDROCEPHALUS) (HCC): Primary | ICD-10-CM

## 2024-02-05 DIAGNOSIS — I47.10 PSVT (PAROXYSMAL SUPRAVENTRICULAR TACHYCARDIA) (HCC): ICD-10-CM

## 2024-02-05 DIAGNOSIS — Z51.5 HOSPICE CARE: ICD-10-CM

## 2024-02-05 DIAGNOSIS — E88.9 PERIPHERAL NEUROPATHY DUE TO METABOLIC DISORDER (HCC): ICD-10-CM

## 2024-02-05 DIAGNOSIS — R62.7 FAILURE TO THRIVE IN ADULT: ICD-10-CM

## 2024-02-05 DIAGNOSIS — K21.9 GASTROESOPHAGEAL REFLUX DISEASE WITHOUT ESOPHAGITIS: ICD-10-CM

## 2024-02-05 DIAGNOSIS — F32.1 CURRENT MODERATE EPISODE OF MAJOR DEPRESSIVE DISORDER WITHOUT PRIOR EPISODE (HCC): ICD-10-CM

## 2024-02-05 PROCEDURE — 665998 HH PPS REVENUE CREDIT

## 2024-02-05 PROCEDURE — 665036 HSPC NOTICE OF ELECTION NOE

## 2024-02-05 PROCEDURE — G0299 HHS/HOSPICE OF RN EA 15 MIN: HCPCS

## 2024-02-05 PROCEDURE — RXMED WILLOW AMBULATORY MEDICATION CHARGE: Performed by: STUDENT IN AN ORGANIZED HEALTH CARE EDUCATION/TRAINING PROGRAM

## 2024-02-05 PROCEDURE — S9126 HOSPICE CARE, IN THE HOME, P: HCPCS

## 2024-02-05 PROCEDURE — 665999 HH PPS REVENUE DEBIT

## 2024-02-05 RX ORDER — BISACODYL 10 MG
10 SUPPOSITORY, RECTAL RECTAL PRN
Qty: 5 SUPPOSITORY | Refills: 10 | Status: ON HOLD | OUTPATIENT
Start: 2024-02-05 | End: 2024-02-18

## 2024-02-05 RX ORDER — SENNA AND DOCUSATE SODIUM 50; 8.6 MG/1; MG/1
2 TABLET, FILM COATED ORAL 2 TIMES DAILY PRN
Qty: 28 TABLET | Refills: 10 | Status: ON HOLD | OUTPATIENT
Start: 2024-02-05 | End: 2024-02-18

## 2024-02-05 RX ORDER — OMEPRAZOLE 20 MG/1
20 CAPSULE, DELAYED RELEASE ORAL 2 TIMES DAILY
Qty: 60 CAPSULE | Refills: 11 | Status: SHIPPED | OUTPATIENT
Start: 2024-02-05 | End: 2025-02-04

## 2024-02-05 RX ORDER — ACETAMINOPHEN 500 MG
1000 TABLET ORAL 3 TIMES DAILY
Qty: 180 TABLET | Refills: 11 | Status: SHIPPED | OUTPATIENT
Start: 2024-02-05 | End: 2024-02-07 | Stop reason: SDUPTHER

## 2024-02-05 RX ORDER — DULOXETIN HYDROCHLORIDE 60 MG/1
60 CAPSULE, DELAYED RELEASE ORAL 2 TIMES DAILY
Qty: 60 CAPSULE | Refills: 11 | Status: SHIPPED | OUTPATIENT
Start: 2024-02-05 | End: 2025-02-04

## 2024-02-05 RX ORDER — ACETAMINOPHEN 650 MG/1
650 SUPPOSITORY RECTAL EVERY 6 HOURS PRN
Qty: 5 SUPPOSITORY | Refills: 10 | Status: ON HOLD | OUTPATIENT
Start: 2024-02-05 | End: 2024-02-18

## 2024-02-05 RX ORDER — MORPHINE SULFATE 100 MG/5ML
5-10 SOLUTION ORAL
Qty: 240 ML | Refills: 0 | Status: ON HOLD | OUTPATIENT
Start: 2024-02-05 | End: 2024-02-18

## 2024-02-05 RX ORDER — BUPROPION HYDROCHLORIDE 150 MG/1
150 TABLET, EXTENDED RELEASE ORAL 2 TIMES DAILY
Qty: 60 TABLET | Refills: 11 | Status: SHIPPED | OUTPATIENT
Start: 2024-02-05 | End: 2025-02-04

## 2024-02-05 RX ORDER — LORAZEPAM 2 MG/ML
.5-1 CONCENTRATE ORAL
Qty: 360 ML | Refills: 0 | Status: ON HOLD | OUTPATIENT
Start: 2024-02-05 | End: 2024-02-18

## 2024-02-05 RX ORDER — ONDANSETRON 4 MG/1
4 TABLET, ORALLY DISINTEGRATING ORAL EVERY 6 HOURS PRN
Qty: 15 TABLET | Refills: 10 | Status: ON HOLD | OUTPATIENT
Start: 2024-02-05 | End: 2024-02-18

## 2024-02-05 SDOH — ECONOMIC STABILITY: HOUSING INSECURITY

## 2024-02-05 ASSESSMENT — ENCOUNTER SYMPTOMS
PAIN LOCATION - PAIN SEVERITY: 2/10
FORGETFULNESS: 1
STOOL FREQUENCY: DAILY
DESCRIPTION OF MEMORY LOSS: IMMEDIATE
MUSCLE WEAKNESS: 1
BOWEL INCONTINENCE: 1
LAST BOWEL MOVEMENT: 66875
HIGHEST PAIN SEVERITY IN PAST 24 HOURS: 4/10
DENIES PAIN: 1
LOWEST PAIN SEVERITY IN PAST 24 HOURS: 2/10
POOR JUDGMENT: 1
PAIN SEVERITY GOAL: 2/10
DESCRIPTION OF MEMORY LOSS: SHORT TERM
SUBJECTIVE PAIN PROGRESSION: UNCHANGED
PAIN: 1
PAIN LOCATION: BACK
PERSON REPORTING PAIN: DIRECT OBSERVATION
PERSON REPORTING PAIN: PATIENT

## 2024-02-05 ASSESSMENT — PAIN SCALES - PAIN ASSESSMENT IN ADVANCED DEMENTIA (PAINAD)
TOTALSCORE: 2
CONSOLABILITY: 0 - NO NEED TO CONSOLE.
FACIALEXPRESSION: 2 - FACIAL GRIMACING.
BODYLANGUAGE: 0 - RELAXED.
NEGVOCALIZATION: 0 - NONE.

## 2024-02-05 ASSESSMENT — PATIENT HEALTH QUESTIONNAIRE - PHQ9
CLINICAL INTERPRETATION OF PHQ2 SCORE: 0
CLINICAL INTERPRETATION OF PHQ2 SCORE: 0

## 2024-02-05 ASSESSMENT — ACTIVITIES OF DAILY LIVING (ADL)
OASIS_M1830: 03
AMBULATION_REQUIRES_ASSISTANCE: 1
BATHING_REQUIRES_ASSISTANCE: 1
DRESSING_REQUIRES_ASSISTANCE: 1
AMBULATION ASSISTANCE: ONE PERSON
PHYSICAL_TRANSFER_REQUIRES_ASSISTANCE: 1
CONTINENCE_REQUIRES_ASSISTANCE: 1
MONEY MANAGEMENT (EXPENSES/BILLS): TOTALLY DEPENDENT
CURRENT_FUNCTION: ONE PERSON
HOME_HEALTH_OASIS: 01

## 2024-02-05 NOTE — Clinical Note
Patient discharged from home health agency effective 02/05/2024, patient admitted to [facility].

## 2024-02-06 ENCOUNTER — HOME CARE VISIT (OUTPATIENT)
Dept: HOSPICE | Facility: HOSPICE | Age: 74
End: 2024-02-06
Payer: MEDICARE

## 2024-02-06 ENCOUNTER — TELEPHONE (OUTPATIENT)
Dept: ENDOCRINOLOGY | Facility: MEDICAL CENTER | Age: 74
End: 2024-02-06
Payer: MEDICARE

## 2024-02-06 VITALS — HEART RATE: 80 BPM | SYSTOLIC BLOOD PRESSURE: 130 MMHG | DIASTOLIC BLOOD PRESSURE: 56 MMHG | RESPIRATION RATE: 16 BRPM

## 2024-02-06 PROCEDURE — 665999 HH PPS REVENUE DEBIT

## 2024-02-06 PROCEDURE — S9126 HOSPICE CARE, IN THE HOME, P: HCPCS

## 2024-02-06 PROCEDURE — 665998 HH PPS REVENUE CREDIT

## 2024-02-06 PROCEDURE — G0299 HHS/HOSPICE OF RN EA 15 MIN: HCPCS

## 2024-02-06 PROCEDURE — G0156 HHCP-SVS OF AIDE,EA 15 MIN: HCPCS

## 2024-02-06 ASSESSMENT — ENCOUNTER SYMPTOMS
FATIGUE: 1
MUSCLE WEAKNESS: 1
SLEEP QUALITY: ADEQUATE
BOWEL INCONTINENCE: 1
FORGETFULNESS: 1
DYSPNEA ON EXERTION: 1
DENIES PAIN: 1
PERSON REPORTING PAIN: PATIENT
DESCRIPTION OF MEMORY LOSS: SHORT TERM
DESCRIPTION OF MEMORY LOSS: IMMEDIATE
STOOL FREQUENCY: DAILY
LAST BOWEL MOVEMENT: 66876
FATIGUES EASILY: 1

## 2024-02-06 ASSESSMENT — SOCIAL DETERMINANTS OF HEALTH (SDOH)
ACTIVE STRESSOR - LOSS OF CONTROL: 1
ACTIVE STRESSOR - HEALTH CHANGES: 1

## 2024-02-06 ASSESSMENT — ACTIVITIES OF DAILY LIVING (ADL): CURRENT_FUNCTION: ONE PERSON

## 2024-02-06 NOTE — TELEPHONE ENCOUNTER
VOICEMAIL  1. Caller Name: Heidy Wife                      Call Back Number: [phone] (home)     2. Message: patient going in to hospice. They would like for him to stay under Dr. KALPESH burgos. There is one week left of medication Tradjenta they would like a refill and to update you on his hospice situation

## 2024-02-07 PROCEDURE — 665999 HH PPS REVENUE DEBIT

## 2024-02-07 PROCEDURE — S9126 HOSPICE CARE, IN THE HOME, P: HCPCS

## 2024-02-07 PROCEDURE — 665998 HH PPS REVENUE CREDIT

## 2024-02-07 RX ORDER — ACETAMINOPHEN 500 MG
1000 TABLET ORAL EVERY 6 HOURS PRN
Qty: 180 TABLET | Refills: 11 | Status: SHIPPED | OUTPATIENT
Start: 2024-02-07 | End: 2024-02-23

## 2024-02-08 ENCOUNTER — HOME CARE VISIT (OUTPATIENT)
Dept: HOSPICE | Facility: HOSPICE | Age: 74
End: 2024-02-08
Payer: MEDICARE

## 2024-02-08 PROCEDURE — S9126 HOSPICE CARE, IN THE HOME, P: HCPCS

## 2024-02-08 PROCEDURE — 665998 HH PPS REVENUE CREDIT

## 2024-02-08 PROCEDURE — 665999 HH PPS REVENUE DEBIT

## 2024-02-08 PROCEDURE — G0155 HHCP-SVS OF CSW,EA 15 MIN: HCPCS

## 2024-02-08 ASSESSMENT — ACTIVITIES OF DAILY LIVING (ADL)
SHOPPING_REQUIRES_ASSISTANCE: 1
BATHING_REQUIRES_ASSISTANCE: 1
LAUNDRY_REQUIRES_ASSISTANCE: 1
TOILETING_REQUIRES_ASSISTANCE: 1
AMBULATION_REQUIRES_ASSISTANCE: 1
DRESSING_REQUIRES_ASSISTANCE: 1

## 2024-02-08 ASSESSMENT — ENCOUNTER SYMPTOMS: SLEEP QUALITY: FAIR

## 2024-02-08 ASSESSMENT — SOCIAL DETERMINANTS OF HEALTH (SDOH)
ACTIVE STRESSOR - LOSS OF CONTROL: 1
ACTIVE STRESSOR - HEALTH CHANGES: 1

## 2024-02-09 ENCOUNTER — HOME CARE VISIT (OUTPATIENT)
Dept: HOSPICE | Facility: HOSPICE | Age: 74
End: 2024-02-09
Payer: MEDICARE

## 2024-02-09 ENCOUNTER — HOME CARE VISIT (OUTPATIENT)
Dept: HOME HEALTH SERVICES | Facility: HOME HEALTHCARE | Age: 74
End: 2024-02-09
Payer: MEDICARE

## 2024-02-09 PROCEDURE — 665998 HH PPS REVENUE CREDIT

## 2024-02-09 PROCEDURE — 665999 HH PPS REVENUE DEBIT

## 2024-02-09 PROCEDURE — S9126 HOSPICE CARE, IN THE HOME, P: HCPCS

## 2024-02-09 ASSESSMENT — PATIENT HEALTH QUESTIONNAIRE - PHQ9: PATIENT UNABLE TO COMPLETE PHQ: OFFICE DC

## 2024-02-09 NOTE — CASE COMMUNICATION
Quality Review for 2.5.24 DC OASIS performed on by JIMBO Ludwig RN on 2.9.2024:    Edits completed by JIMBO Ludwig RN:  1. Changed  to 8, - to 8,  to not assesses,  to 8 as this was an office dc  2. Changed  to 3, pt went to hospice, then changed  to 1 and  to A  3. Changed  E to yes per POC

## 2024-02-10 PROCEDURE — S9126 HOSPICE CARE, IN THE HOME, P: HCPCS

## 2024-02-10 PROCEDURE — 665999 HH PPS REVENUE DEBIT

## 2024-02-10 PROCEDURE — 665998 HH PPS REVENUE CREDIT

## 2024-02-11 PROCEDURE — 665999 HH PPS REVENUE DEBIT

## 2024-02-11 PROCEDURE — 665998 HH PPS REVENUE CREDIT

## 2024-02-11 PROCEDURE — S9126 HOSPICE CARE, IN THE HOME, P: HCPCS

## 2024-02-12 ENCOUNTER — HOME CARE VISIT (OUTPATIENT)
Dept: HOSPICE | Facility: HOSPICE | Age: 74
End: 2024-02-12
Payer: MEDICARE

## 2024-02-12 VITALS — HEART RATE: 72 BPM | SYSTOLIC BLOOD PRESSURE: 140 MMHG | RESPIRATION RATE: 12 BRPM | DIASTOLIC BLOOD PRESSURE: 60 MMHG

## 2024-02-12 PROCEDURE — S9126 HOSPICE CARE, IN THE HOME, P: HCPCS

## 2024-02-12 PROCEDURE — 665999 HH PPS REVENUE DEBIT

## 2024-02-12 PROCEDURE — 665998 HH PPS REVENUE CREDIT

## 2024-02-12 PROCEDURE — G0299 HHS/HOSPICE OF RN EA 15 MIN: HCPCS

## 2024-02-12 ASSESSMENT — ENCOUNTER SYMPTOMS
DENIES PAIN: 1
FORGETFULNESS: 1
LOWER EXTREMITY EDEMA: 1
MUSCLE WEAKNESS: 1
FATIGUES EASILY: 1
PERSON REPORTING PAIN: PATIENT
SLEEP QUALITY: ADEQUATE
FATIGUE: 1
LIMITED RANGE OF MOTION: 1

## 2024-02-12 ASSESSMENT — ACTIVITIES OF DAILY LIVING (ADL): CURRENT_FUNCTION: ONE PERSON

## 2024-02-12 ASSESSMENT — SOCIAL DETERMINANTS OF HEALTH (SDOH): ACTIVE STRESSOR - HEALTH CHANGES: 1

## 2024-02-12 NOTE — TELEPHONE ENCOUNTER
Needing a prescription or sample for Empagliflozin-metFORMIN HCl ER 12.5-1000 MG TABLET SR 24 HR. You previously sent a sample for this at [facility] Emily Moody would like to know if you would like him to continue on Tradjenta, if he does she will pay out of pocket. She also wanted to inform you she has a lot of metformin left for patient.

## 2024-02-12 NOTE — CASE COMMUNICATION
I agree with the changes, thanks  ----- Message -----  From: Carolin Ludwig R.N.  Sent: 2/9/2024   6:37 AM PST  To: Sandy Cruz PT      Quality Review for 2.5.24 DC OASIS performed on by JIMBO Ludwgi RN on 2.9.2024:    Edits completed by JIMBO Ludwig RN:  1. Changed  to 8, - to 8,  to not assesses,  to 8 as this was an office dc  2. Changed  to 3, pt went to hospice, then changed  to 1 and  to A   3. Changed  E to yes per POC

## 2024-02-13 ENCOUNTER — HOME CARE VISIT (OUTPATIENT)
Dept: HOSPICE | Facility: HOSPICE | Age: 74
End: 2024-02-13
Payer: MEDICARE

## 2024-02-13 PROCEDURE — S9126 HOSPICE CARE, IN THE HOME, P: HCPCS

## 2024-02-13 PROCEDURE — 665999 HH PPS REVENUE DEBIT

## 2024-02-13 PROCEDURE — G0156 HHCP-SVS OF AIDE,EA 15 MIN: HCPCS

## 2024-02-13 PROCEDURE — 665998 HH PPS REVENUE CREDIT

## 2024-02-14 ENCOUNTER — HOME CARE VISIT (OUTPATIENT)
Dept: HOSPICE | Facility: HOSPICE | Age: 74
End: 2024-02-14
Payer: MEDICARE

## 2024-02-14 PROCEDURE — 665999 HH PPS REVENUE DEBIT

## 2024-02-14 PROCEDURE — 665998 HH PPS REVENUE CREDIT

## 2024-02-14 PROCEDURE — S9126 HOSPICE CARE, IN THE HOME, P: HCPCS

## 2024-02-14 NOTE — CASE COMMUNICATION
Quality Review for DC OASIS by KAVYA Washburn, RN on  June 21, 2023     Edits completed by KAVYA Washburn, RN:  1.  C and E are yes per the plan of care  2.  checked therapeutic diet in last 7 days and at discharge (diabetic)  3. [id number] E is 6, independent per PT assessment completed on 6/14/23, per the collaboration convention  4. Per narrative, patient is independent in ADLs, therefore BP5322 E, F, G, and H are 6, independen t and  and  are 0  5. Types and Sources of Assistance () changed A, D, and F to 0 per documentation  6. , added EHR as all Home Health patients have access to [email or web address]    [email or web address]

## 2024-02-15 ENCOUNTER — HOME CARE VISIT (OUTPATIENT)
Dept: HOSPICE | Facility: HOSPICE | Age: 74
End: 2024-02-15
Payer: MEDICARE

## 2024-02-15 DIAGNOSIS — E11.65 TYPE 2 DIABETES MELLITUS WITH HYPERGLYCEMIA, WITHOUT LONG-TERM CURRENT USE OF INSULIN (HCC): ICD-10-CM

## 2024-02-15 PROCEDURE — 665998 HH PPS REVENUE CREDIT

## 2024-02-15 PROCEDURE — G0156 HHCP-SVS OF AIDE,EA 15 MIN: HCPCS

## 2024-02-15 PROCEDURE — 665999 HH PPS REVENUE DEBIT

## 2024-02-15 PROCEDURE — S9126 HOSPICE CARE, IN THE HOME, P: HCPCS

## 2024-02-15 RX ORDER — EMPAGLIFLOZIN, METFORMIN HYDROCHLORIDE 12.5; 1 MG/1; MG/1
1 TABLET, EXTENDED RELEASE ORAL 2 TIMES DAILY
Qty: 60 TABLET | Refills: 0 | Status: ON HOLD | OUTPATIENT
Start: 2024-02-15 | End: 2024-02-19 | Stop reason: SDUPTHER

## 2024-02-16 ENCOUNTER — HOME CARE VISIT (OUTPATIENT)
Dept: HOSPICE | Facility: HOSPICE | Age: 74
End: 2024-02-16
Payer: MEDICARE

## 2024-02-16 ENCOUNTER — PHARMACY VISIT (OUTPATIENT)
Dept: PHARMACY | Facility: MEDICAL CENTER | Age: 74
End: 2024-02-16
Payer: COMMERCIAL

## 2024-02-16 VITALS — HEART RATE: 82 BPM | SYSTOLIC BLOOD PRESSURE: 142 MMHG | RESPIRATION RATE: 20 BRPM | DIASTOLIC BLOOD PRESSURE: 72 MMHG

## 2024-02-16 PROCEDURE — G0299 HHS/HOSPICE OF RN EA 15 MIN: HCPCS

## 2024-02-16 PROCEDURE — RXMED WILLOW AMBULATORY MEDICATION CHARGE: Performed by: STUDENT IN AN ORGANIZED HEALTH CARE EDUCATION/TRAINING PROGRAM

## 2024-02-16 PROCEDURE — S9126 HOSPICE CARE, IN THE HOME, P: HCPCS

## 2024-02-16 ASSESSMENT — ENCOUNTER SYMPTOMS
PERSON REPORTING PAIN: PATIENT
BOWEL INCONTINENCE: 1
MUSCLE WEAKNESS: 1
LAST BOWEL MOVEMENT: 66886
FATIGUES EASILY: 1
SLEEP QUALITY: ADEQUATE
LOWER EXTREMITY EDEMA: 1
DENIES PAIN: 1

## 2024-02-16 ASSESSMENT — ACTIVITIES OF DAILY LIVING (ADL): AMBULATION ASSISTANCE: STAND BY ASSIST

## 2024-02-17 ENCOUNTER — HOME CARE VISIT (OUTPATIENT)
Dept: HOSPICE | Facility: HOSPICE | Age: 74
End: 2024-02-17
Payer: MEDICARE

## 2024-02-17 PROCEDURE — S9126 HOSPICE CARE, IN THE HOME, P: HCPCS

## 2024-02-18 ENCOUNTER — HOSPITAL ENCOUNTER (INPATIENT)
Facility: MEDICAL CENTER | Age: 74
LOS: 5 days | DRG: 057 | End: 2024-02-23
Attending: STUDENT IN AN ORGANIZED HEALTH CARE EDUCATION/TRAINING PROGRAM | Admitting: STUDENT IN AN ORGANIZED HEALTH CARE EDUCATION/TRAINING PROGRAM
Payer: COMMERCIAL

## 2024-02-18 ENCOUNTER — HOME CARE VISIT (OUTPATIENT)
Dept: HOSPICE | Facility: HOSPICE | Age: 74
End: 2024-02-18
Payer: MEDICARE

## 2024-02-18 PROBLEM — Z51.5 HOSPICE CARE: Status: ACTIVE | Noted: 2024-02-18

## 2024-02-18 PROCEDURE — 770004 HCHG ROOM/CARE - ONCOLOGY PRIVATE *

## 2024-02-18 PROCEDURE — T2044 HOSPICE RESPITE CARE: HCPCS

## 2024-02-18 PROCEDURE — G0299 HHS/HOSPICE OF RN EA 15 MIN: HCPCS

## 2024-02-18 PROCEDURE — S9126 HOSPICE CARE, IN THE HOME, P: HCPCS

## 2024-02-18 PROCEDURE — 700102 HCHG RX REV CODE 250 W/ 637 OVERRIDE(OP): Performed by: NURSE PRACTITIONER

## 2024-02-18 PROCEDURE — A9270 NON-COVERED ITEM OR SERVICE: HCPCS | Performed by: NURSE PRACTITIONER

## 2024-02-18 RX ORDER — ONDANSETRON 4 MG/1
4 TABLET, ORALLY DISINTEGRATING ORAL EVERY 6 HOURS PRN
Status: DISCONTINUED | OUTPATIENT
Start: 2024-02-18 | End: 2024-02-23 | Stop reason: HOSPADM

## 2024-02-18 RX ORDER — AMOXICILLIN 250 MG
2 CAPSULE ORAL 2 TIMES DAILY
Status: DISCONTINUED | OUTPATIENT
Start: 2024-02-18 | End: 2024-02-23 | Stop reason: HOSPADM

## 2024-02-18 RX ORDER — LORAZEPAM 2 MG/ML
.25-.5 CONCENTRATE ORAL
Status: DISCONTINUED | OUTPATIENT
Start: 2024-02-18 | End: 2024-02-23 | Stop reason: HOSPADM

## 2024-02-18 RX ORDER — ENEMA 19; 7 G/133ML; G/133ML
1 ENEMA RECTAL
Status: DISCONTINUED | OUTPATIENT
Start: 2024-02-18 | End: 2024-02-23 | Stop reason: HOSPADM

## 2024-02-18 RX ORDER — BUPROPION HYDROCHLORIDE 75 MG/1
150 TABLET ORAL 2 TIMES DAILY
Status: DISCONTINUED | OUTPATIENT
Start: 2024-02-18 | End: 2024-02-23 | Stop reason: HOSPADM

## 2024-02-18 RX ORDER — OMEPRAZOLE 20 MG/1
20 CAPSULE, DELAYED RELEASE ORAL 2 TIMES DAILY
Status: DISCONTINUED | OUTPATIENT
Start: 2024-02-18 | End: 2024-02-23 | Stop reason: HOSPADM

## 2024-02-18 RX ORDER — DULOXETIN HYDROCHLORIDE 20 MG/1
60 CAPSULE, DELAYED RELEASE ORAL 2 TIMES DAILY
Status: DISCONTINUED | OUTPATIENT
Start: 2024-02-18 | End: 2024-02-23 | Stop reason: HOSPADM

## 2024-02-18 RX ORDER — LACTULOSE 20 G/30ML
30 SOLUTION ORAL
Status: DISCONTINUED | OUTPATIENT
Start: 2024-02-18 | End: 2024-02-23 | Stop reason: HOSPADM

## 2024-02-18 RX ORDER — ACETAMINOPHEN 325 MG/1
650 TABLET ORAL EVERY 4 HOURS PRN
Status: DISCONTINUED | OUTPATIENT
Start: 2024-02-18 | End: 2024-02-23 | Stop reason: HOSPADM

## 2024-02-18 RX ORDER — ACETAMINOPHEN 650 MG/1
650 SUPPOSITORY RECTAL EVERY 4 HOURS PRN
Status: DISCONTINUED | OUTPATIENT
Start: 2024-02-18 | End: 2024-02-23 | Stop reason: HOSPADM

## 2024-02-18 RX ORDER — MORPHINE SULFATE 100 MG/5ML
5-10 SOLUTION ORAL
Status: DISCONTINUED | OUTPATIENT
Start: 2024-02-18 | End: 2024-02-18

## 2024-02-18 RX ORDER — ASPIRIN 81 MG/1
81 TABLET, CHEWABLE ORAL DAILY
COMMUNITY
End: 2024-03-13

## 2024-02-18 RX ORDER — BENAZEPRIL HYDROCHLORIDE 40 MG/1
40 TABLET ORAL DAILY
COMMUNITY
End: 2024-03-13

## 2024-02-18 RX ORDER — BISACODYL 10 MG
10 SUPPOSITORY, RECTAL RECTAL
Status: DISCONTINUED | OUTPATIENT
Start: 2024-02-18 | End: 2024-02-23 | Stop reason: HOSPADM

## 2024-02-18 RX ORDER — LIDOCAINE 4 G/G
1 PATCH TOPICAL EVERY 24 HOURS
Status: DISCONTINUED | OUTPATIENT
Start: 2024-02-18 | End: 2024-02-23 | Stop reason: HOSPADM

## 2024-02-18 RX ORDER — HYDROCODONE BITARTRATE AND ACETAMINOPHEN 5; 325 MG/1; MG/1
1 TABLET ORAL EVERY 4 HOURS PRN
Status: DISCONTINUED | OUTPATIENT
Start: 2024-02-18 | End: 2024-02-23 | Stop reason: HOSPADM

## 2024-02-18 RX ADMIN — OMEPRAZOLE 20 MG: 20 CAPSULE, DELAYED RELEASE ORAL at 20:29

## 2024-02-18 RX ADMIN — DULOXETINE HYDROCHLORIDE 60 MG: 20 CAPSULE, DELAYED RELEASE ORAL at 20:28

## 2024-02-18 RX ADMIN — METFORMIN HYDROCHLORIDE 1000 MG: 500 TABLET ORAL at 17:51

## 2024-02-18 RX ADMIN — BUPROPION HYDROCHLORIDE 150 MG: 75 TABLET, FILM COATED ORAL at 12:26

## 2024-02-18 RX ADMIN — DULOXETINE HYDROCHLORIDE 60 MG: 20 CAPSULE, DELAYED RELEASE ORAL at 12:28

## 2024-02-18 RX ADMIN — METOPROLOL TARTRATE 12.5 MG: 25 TABLET, FILM COATED ORAL at 20:29

## 2024-02-18 RX ADMIN — OMEPRAZOLE 20 MG: 20 CAPSULE, DELAYED RELEASE ORAL at 12:25

## 2024-02-18 RX ADMIN — SITAGLIPTIN 100 MG: 100 TABLET, FILM COATED ORAL at 14:19

## 2024-02-18 RX ADMIN — BUPROPION HYDROCHLORIDE 150 MG: 75 TABLET, FILM COATED ORAL at 20:30

## 2024-02-18 ASSESSMENT — FIBROSIS 4 INDEX: FIB4 SCORE: 1.45

## 2024-02-18 ASSESSMENT — LIFESTYLE VARIABLES
DOES PATIENT WANT TO STOP DRINKING: NO
HOW MANY TIMES IN THE PAST YEAR HAVE YOU HAD 5 OR MORE DRINKS IN A DAY: 0
EVER HAD A DRINK FIRST THING IN THE MORNING TO STEADY YOUR NERVES TO GET RID OF A HANGOVER: NO
ALCOHOL_USE: YES
AVERAGE NUMBER OF DAYS PER WEEK YOU HAVE A DRINK CONTAINING ALCOHOL: 1
TOTAL SCORE: 0
ON A TYPICAL DAY WHEN YOU DRINK ALCOHOL HOW MANY DRINKS DO YOU HAVE: 1
TOTAL SCORE: 0
EVER FELT BAD OR GUILTY ABOUT YOUR DRINKING: NO
HAVE YOU EVER FELT YOU SHOULD CUT DOWN ON YOUR DRINKING: NO
TOTAL SCORE: 0
HAVE PEOPLE ANNOYED YOU BY CRITICIZING YOUR DRINKING: NO
CONSUMPTION TOTAL: NEGATIVE

## 2024-02-18 ASSESSMENT — COGNITIVE AND FUNCTIONAL STATUS - GENERAL
STANDING UP FROM CHAIR USING ARMS: A LOT
TOILETING: A LOT
DAILY ACTIVITIY SCORE: 16
CLIMB 3 TO 5 STEPS WITH RAILING: TOTAL
HELP NEEDED FOR BATHING: A LOT
SUGGESTED CMS G CODE MODIFIER DAILY ACTIVITY: CK
WALKING IN HOSPITAL ROOM: A LOT
PERSONAL GROOMING: A LITTLE
MOVING FROM LYING ON BACK TO SITTING ON SIDE OF FLAT BED: A LOT
SUGGESTED CMS G CODE MODIFIER MOBILITY: CL
TURNING FROM BACK TO SIDE WHILE IN FLAT BAD: A LITTLE
DRESSING REGULAR LOWER BODY CLOTHING: A LOT
MOBILITY SCORE: 13
MOVING TO AND FROM BED TO CHAIR: A LITTLE
DRESSING REGULAR UPPER BODY CLOTHING: A LITTLE

## 2024-02-18 ASSESSMENT — ENCOUNTER SYMPTOMS
FATIGUE: 1
FORGETFULNESS: 1
DESCRIPTION OF MEMORY LOSS: SHORT TERM

## 2024-02-18 ASSESSMENT — PAIN DESCRIPTION - PAIN TYPE: TYPE: ACUTE PAIN

## 2024-02-18 ASSESSMENT — PAIN SCALES - PAIN ASSESSMENT IN ADVANCED DEMENTIA (PAINAD)
NEGVOCALIZATION: 0 - NONE.
TOTALSCORE: 0
FACIALEXPRESSION: 0 - SMILING OR INEXPRESSIVE.
CONSOLABILITY: 0 - NO NEED TO CONSOLE.
BODYLANGUAGE: 0 - RELAXED.

## 2024-02-18 ASSESSMENT — PATIENT HEALTH QUESTIONNAIRE - PHQ9
1. LITTLE INTEREST OR PLEASURE IN DOING THINGS: NOT AT ALL
SUM OF ALL RESPONSES TO PHQ9 QUESTIONS 1 AND 2: 0
2. FEELING DOWN, DEPRESSED, IRRITABLE, OR HOPELESS: NOT AT ALL

## 2024-02-18 NOTE — PROGRESS NOTES
"     HOSPICE RESPITE CARE H&P  Admitting physician:  Parminder Palumbo D.O.      Reason for Admission: 5-day hospice respite      Date & Time note created:    2/18/2024  History of Present Illness:    From previous H&P per Dr. Palumbo:  \"Prabhakar Sierra is a 73 y.o. patient with a terminal diagnosis of end-stage normal pressure hydrocephalus, a form of dementia. Initial neurology consultation in our records was 7/2022. Co-morbid conditions include adult failure to thrive, prostate cancer s/p chemotherapy, frequent falls leading to multiple compression fracture and pain, DM2. The patient has suffered significant cognitive and functional decline over the last year. After consultation with neurology and concern for NPH as major  of symptoms, patient and spouse declined any aggressive intervention, such as  shunt or large volume CSF aspiration, to focus on comfort at home via hospice benefit\".    \"The patient meets hospice criteria as evidenced by severe symptoms of NPH with associated adult failure to thrive, unexplained weight loss, frequent falls, as well as increased dependence for ADLs with a PPS of 40%, increasing fatigue, and need for hospice-level medication management for anxiety and pain. Family no longer wants to continue hospital or specialty care and states they want comfort measures only\".      2/18 - Patient approved for 5 day respite stay from 2/18-2/23 for caregiver fatigue. Continue all normal home medications.             Past Medical History:   Past Medical History        Past Medical History:   Diagnosis Date    Acute respiratory failure with hypoxia (HCC) 12/19/2023    Alcohol use      BPH (benign prostatic hyperplasia)      Depression      Fall 12/22/2022    GERD (gastroesophageal reflux disease)      History of depression 12/28/2022    Hypertension      Hyponatremia 05/18/2017    Iron deficiency anemia secondary to inadequate dietary iron intake 05/26/2021    Mild cognitive " impairment 2022    Neurodegenerative dementia with behavioral disturbance ([facility]) 2023    Neuropathic pain, leg      Orthostatic hypotension 2019    Right hip pain 2016    Thrombocytopenia, unspecified ([facility]) 2022    Tobacco use      Type II or unspecified type diabetes mellitus without mention of complication, not stated as uncontrolled      Urinary retention 2023            Past Surgical History:  Past Surgical History   No past surgical history on file.        Current Outpatient Medications:  Home Medications    **Home medications have not yet been reviewed for this encounter**            Medication Allergy/Sensitivities:  No Known Allergies     Family History:  Family History         Family History   Problem Relation Age of Onset    Heart Disease Mother      Diabetes Father      Other Sister           Mental retardation    Heart Disease Brother      Cancer Brother           Prostate    No Known Problems Daughter      No Known Problems Daughter              Social History:  Social History            Tobacco Use    Smoking status: Former       Current packs/day: 0.00       Average packs/day: 0.2 packs/day for 50.0 years (7.5 ttl pk-yrs)       Types: Cigarettes       Start date: 1972       Quit date: 2022       Years since quittin.1    Smokeless tobacco: Never   Vaping Use    Vaping Use: Never used   Substance Use Topics    Alcohol use: Yes       Alcohol/week: 4.8 - 6.0 oz       Types: 8 - 10 Glasses of wine per week       Comment: 1-2 glass of wine a week    Drug use: No            Vitals:  Weight/BMI: There is no height or weight on file to calculate BMI.  There were no vitals taken for this visit.  Vitals   There were no vitals filed for this visit.     Oxygen Therapy:           Lab Data Review:  Recent Results   No results found for this or any previous visit (from the past 24 hour(s)).        Imaging/Procedures Review:    No orders to display                        Problem List:  Normal pressure hydrocephalus (a type of dementia) (terminal condition)  Adult failure to thrive     Code Status: DNR/DNI        Discussion: This patient is being admitted to [facility] for 5-day respite stay. Continue current Hospice POC including home medications.     Caregivers need relief because beneficiary requires continuous 24/7 care; transferring to [facility] for respite care. Respite care will begin on 2/18 and end of 2/23/24.        Rin Franklin, MSN, APRN, ACNPC-AG

## 2024-02-19 ENCOUNTER — HOME CARE VISIT (OUTPATIENT)
Dept: HOSPICE | Facility: HOSPICE | Age: 74
End: 2024-02-19
Payer: MEDICARE

## 2024-02-19 DIAGNOSIS — E11.65 TYPE 2 DIABETES MELLITUS WITH HYPERGLYCEMIA, WITHOUT LONG-TERM CURRENT USE OF INSULIN (HCC): ICD-10-CM

## 2024-02-19 PROCEDURE — 700102 HCHG RX REV CODE 250 W/ 637 OVERRIDE(OP): Performed by: NURSE PRACTITIONER

## 2024-02-19 PROCEDURE — 770004 HCHG ROOM/CARE - ONCOLOGY PRIVATE *

## 2024-02-19 PROCEDURE — A9270 NON-COVERED ITEM OR SERVICE: HCPCS | Performed by: NURSE PRACTITIONER

## 2024-02-19 PROCEDURE — T2044 HOSPICE RESPITE CARE: HCPCS

## 2024-02-19 RX ORDER — EMPAGLIFLOZIN 25 MG/1
1 TABLET, FILM COATED ORAL DAILY
Qty: 30 TABLET | Refills: 3 | Status: SHIPPED | OUTPATIENT
Start: 2024-02-19 | End: 2024-03-05

## 2024-02-19 RX ADMIN — DULOXETINE HYDROCHLORIDE 60 MG: 20 CAPSULE, DELAYED RELEASE ORAL at 04:47

## 2024-02-19 RX ADMIN — BUPROPION HYDROCHLORIDE 150 MG: 75 TABLET, FILM COATED ORAL at 04:47

## 2024-02-19 RX ADMIN — METFORMIN HYDROCHLORIDE 1000 MG: 500 TABLET ORAL at 09:03

## 2024-02-19 RX ADMIN — BUPROPION HYDROCHLORIDE 150 MG: 75 TABLET, FILM COATED ORAL at 17:32

## 2024-02-19 RX ADMIN — DULOXETINE HYDROCHLORIDE 60 MG: 20 CAPSULE, DELAYED RELEASE ORAL at 17:35

## 2024-02-19 RX ADMIN — OMEPRAZOLE 20 MG: 20 CAPSULE, DELAYED RELEASE ORAL at 04:47

## 2024-02-19 RX ADMIN — OMEPRAZOLE 20 MG: 20 CAPSULE, DELAYED RELEASE ORAL at 17:36

## 2024-02-19 RX ADMIN — METFORMIN HYDROCHLORIDE 1000 MG: 500 TABLET ORAL at 17:36

## 2024-02-19 RX ADMIN — METOPROLOL TARTRATE 12.5 MG: 25 TABLET, FILM COATED ORAL at 17:32

## 2024-02-19 RX ADMIN — SITAGLIPTIN 100 MG: 100 TABLET, FILM COATED ORAL at 04:47

## 2024-02-19 ASSESSMENT — PAIN DESCRIPTION - PAIN TYPE
TYPE: ACUTE PAIN
TYPE: ACUTE PAIN

## 2024-02-19 NOTE — CARE PLAN
The patient is Stable - Low risk of patient condition declining or worsening    Shift Goals  Clinical Goals: safety, pain control  Patient Goals: rest    Progress made toward(s) clinical / shift goals:    Problem: Knowledge Deficit - Standard  Goal: Patient and family/care givers will demonstrate understanding of plan of care, disease process/condition, diagnostic tests and medications  Outcome: Progressing  Note: Pt educated on plan of care, pt verbalizes understanding.      Problem: Fall Risk  Goal: Patient will remain free from falls  Outcome: Progressing  Note: Bed is locked and in lowest position, call light and personal belongings within reach.        Patient is not progressing towards the following goals:

## 2024-02-19 NOTE — CARE PLAN
The patient is Watcher - Medium risk of patient condition declining or worsening    Shift Goals  Clinical Goals: Pt will have adequate pain control, remain free from falls  Patient Goals: Rest, eat good food    Patient has been resting in bed, condom catheter in place for urinary incontinence. Pt has been eating all of his meals, he has a great appetite.    Progress made toward(s) clinical / shift goals:      Problem: Fall Risk  Goal: Patient will remain free from falls  Outcome: Progressing  Note: Bed alarm in place  Call light within reach  Hourly rounding in place  Condom catheter in place for toileting  Pt has remained free from falls       Patient is not progressing towards the following goals:

## 2024-02-19 NOTE — CARE PLAN
The patient is Watcher - Medium risk of patient condition declining or worsening    Shift Goals  Clinical Goals: Pt will remain free from falls  Patient Goals: Get settled in    Progress made toward(s) clinical / shift goals:        Problem: Fall Risk  Goal: Patient will remain free from falls  Outcome: Progressing  Note: Pt educated how to call for help.  Call light within reach.  Hourly rounding in place.  Bed alarm is on.       Patient is not progressing towards the following goals:

## 2024-02-19 NOTE — PROGRESS NOTES
4 Eyes Skin Assessment Completed by CARLITOS Reeves and CARLITOS Mattson.    Head WDL  Ears WDL  Nose WDL  Mouth WDL  Neck WDL  Breast/Chest WDL  Shoulder Blades WDL  Spine WDL  (R) Arm/Elbow/Hand Redness and Blanching  (L) Arm/Elbow/Hand WDL  Abdomen WDL  Groin WDL  Scrotum/Coccyx/Buttocks Redness, Blanching, and Excoriation  (R) Leg WDL  (L) Leg WDL  (R) Heel/Foot/Toe Redness and Blanching  (L) Heel/Foot/Toe Redness and Blanching          Devices In Places: hansa hose      Interventions In Place Waffle Overlay and Pressure Redistribution Mattress    Possible Skin Injury No    Pictures Uploaded Into Epic N/A  Wound Consult Placed N/A  RN Wound Prevention Protocol Ordered No

## 2024-02-19 NOTE — PROGRESS NOTES
Medication history reviewed with patients wife via phone (Heidy [phone])  Med [facility] is complete  Allergies reviewed.   Patient has not had any outpatient antibiotics in the last 30 days.   Anticoagulants: No  Patients wife states she gave him 1 xanax tablet this morning 2/18/24 along with his tylenol this morning, wife states that patient does not have an rx for this but she gave him one of her tablets from a previous rx. She was unable to verify the strength of xanax.    Temo Delgado, PhT

## 2024-02-20 ENCOUNTER — HOME CARE VISIT (OUTPATIENT)
Dept: HOSPICE | Facility: HOSPICE | Age: 74
End: 2024-02-20
Payer: MEDICARE

## 2024-02-20 ENCOUNTER — PATIENT OUTREACH (OUTPATIENT)
Dept: HEALTH INFORMATION MANAGEMENT | Facility: OTHER | Age: 74
End: 2024-02-20
Payer: MEDICARE

## 2024-02-20 DIAGNOSIS — I10 ESSENTIAL HYPERTENSION: ICD-10-CM

## 2024-02-20 PROCEDURE — 770004 HCHG ROOM/CARE - ONCOLOGY PRIVATE *

## 2024-02-20 PROCEDURE — A9270 NON-COVERED ITEM OR SERVICE: HCPCS | Performed by: NURSE PRACTITIONER

## 2024-02-20 PROCEDURE — T2044 HOSPICE RESPITE CARE: HCPCS

## 2024-02-20 PROCEDURE — 700102 HCHG RX REV CODE 250 W/ 637 OVERRIDE(OP): Performed by: NURSE PRACTITIONER

## 2024-02-20 RX ADMIN — METOPROLOL TARTRATE 12.5 MG: 25 TABLET, FILM COATED ORAL at 17:13

## 2024-02-20 RX ADMIN — SITAGLIPTIN 100 MG: 100 TABLET, FILM COATED ORAL at 05:06

## 2024-02-20 RX ADMIN — METOPROLOL TARTRATE 12.5 MG: 25 TABLET, FILM COATED ORAL at 05:06

## 2024-02-20 RX ADMIN — OMEPRAZOLE 20 MG: 20 CAPSULE, DELAYED RELEASE ORAL at 17:13

## 2024-02-20 RX ADMIN — OMEPRAZOLE 20 MG: 20 CAPSULE, DELAYED RELEASE ORAL at 05:05

## 2024-02-20 RX ADMIN — METFORMIN HYDROCHLORIDE 1000 MG: 500 TABLET ORAL at 17:13

## 2024-02-20 RX ADMIN — METFORMIN HYDROCHLORIDE 1000 MG: 500 TABLET ORAL at 09:01

## 2024-02-20 RX ADMIN — DULOXETINE HYDROCHLORIDE 60 MG: 20 CAPSULE, DELAYED RELEASE ORAL at 17:13

## 2024-02-20 RX ADMIN — BUPROPION HYDROCHLORIDE 150 MG: 75 TABLET, FILM COATED ORAL at 17:13

## 2024-02-20 RX ADMIN — DULOXETINE HYDROCHLORIDE 60 MG: 20 CAPSULE, DELAYED RELEASE ORAL at 05:05

## 2024-02-20 RX ADMIN — DOCUSATE SODIUM 50 MG AND SENNOSIDES 8.6 MG 2 TABLET: 8.6; 5 TABLET, FILM COATED ORAL at 05:05

## 2024-02-20 RX ADMIN — BUPROPION HYDROCHLORIDE 150 MG: 75 TABLET, FILM COATED ORAL at 05:06

## 2024-02-20 ASSESSMENT — PAIN DESCRIPTION - PAIN TYPE: TYPE: ACUTE PAIN

## 2024-02-20 NOTE — CASE COMMUNICATION
noted  ----- Message -----  From: Emily Martinez OT  Sent: 1/30/2024   9:33 PM PST  To: Lety Charlton R.N.; Tisha Alberto R.N.; *             Date & Time of fall: 1/29/24, around 5am           Cause of fall:  Physiological           Location:  Bedroom; around 5am, pt reportedly tried getting up out of bed to use the bathroom, however, did not want to wake his wife up. Subsequently, pt's legs reportedly gave out and pt ended up s liding off the bed. Wife woke up afterward, and found pt on the floor.            Was the fall witnessed?                  If yes, by who? No            Actions taken by patient: wife assisted pt off the floor using a transfer sling            Any new injury?                   If yes, what kind?  No            Any recent medication changes?    No            Did patient have appropriate DME available?  Yes, however, did not wake wife up.  Wife usually assisted or supervised pt during txfs.             Actions Taken: Notified care team and Notified MD

## 2024-02-20 NOTE — CARE PLAN
"The patient is Stable - Low risk of patient condition declining or worsening    Shift Goals  Clinical Goals: saftey  Patient Goals: eat \"inn n out\", and sleep    Progress made toward(s) clinical / shift goals:    Problem: Knowledge Deficit - Standard  Goal: Patient and family/care givers will demonstrate understanding of plan of care, disease process/condition, diagnostic tests and medications  Outcome: Progressing     Problem: Fall Risk  Goal: Patient will remain free from falls  2/20/2024 0355 by Diane Chery R.N.  Outcome: Progressing  Note: Pt's bed locked and at the lowest position. Call light is within reach, personal belongings within reach. Bed alarm on. Pt round on hourly.  2/19/2024 2314 by Diane Chery R.N.  Outcome: Progressing       Patient is not progressing towards the following goals:      "

## 2024-02-20 NOTE — CASE COMMUNICATION
noted  ----- Message -----  From: Sandy Cruz, PT  Sent: 2/5/2024   9:29 PM PST  To: Lety Charlton R.N.; Tisha Alberto R.N.; *      Patient discharged from home health agency effective 02/05/2024, [facility].

## 2024-02-20 NOTE — HOSPICE
Patient seen for respite visit. Patient asleep and does appear comfortable.   No family bedside.   Spoke to Lala RN she states he has had no needs.   Neighbor brought in-and-out for dinner last night and donuts this am. This made him very happy.       Patient to [place] Friday 2/24 with GMT between 11-11:30    Please call [facility]   with any questions.

## 2024-02-20 NOTE — PROGRESS NOTES
Community Health Worker Follow-Up    Reason for outreach: Check in     CHW Interventions: CHW seen that pt was admitted tot he hospital and wanted to check in with pt's wife     Specific Resources Provided:  Housing/Shelter: NA  Transportation: NA  Food: NA  Financial: NA  Social Supports: NA  Other: NA    Plan: CHW left a vm for pt to return call.

## 2024-02-20 NOTE — CASE COMMUNICATION
noted  ----- Message -----  From: Sandy Cruz, PT  Sent: 2/5/2024   9:32 PM PST  To: Lety Charlton R.N.      Missed PT visit 01/31/2024, PT was out sick.

## 2024-02-20 NOTE — CASE COMMUNICATION
noted  ----- Message -----  From: Tisha Alberto R.N.  Sent: 1/31/2024   8:46 AM PST  To: Lety Charlton R.N.; Emily Martinez, OT; *      IDT NOTE  Patient discussed today in interdisciplinary team meeting. Concerns noted of FREQEUENT FALLS and DECLINE IN CONDITION. Plan to address issue is HOSPICE EVALUATION.

## 2024-02-20 NOTE — CASE COMMUNICATION
noted  ----- Message -----  From: GARY LingNaMtA.  Sent: 1/31/2024   4:23 PM PST  To: Lety Charlton R.N.; BRIAN Vines    please put shave patient on patients care plan.

## 2024-02-20 NOTE — PROGRESS NOTES
CHW received a call back from pt's wife and she stated that pt is admitted into respite care while she is out of town. CHW will call back next week to check in.

## 2024-02-20 NOTE — CASE COMMUNICATION
noted  ----- Message -----  From: Tisha Alberto R.N.  Sent: 1/31/2024   4:47 PM PST  To: Lety Charlton R.N.; *      done  ----- Message -----  From: Ashanti Schwartz C.N.A.  Sent: 1/31/2024   4:23 PM PST  To: Lety Charlton R.N.; BRIAN Vines    please put shave patient on patients care plan.

## 2024-02-21 ENCOUNTER — HOME CARE VISIT (OUTPATIENT)
Dept: HOSPICE | Facility: HOSPICE | Age: 74
End: 2024-02-21
Payer: MEDICARE

## 2024-02-21 ENCOUNTER — APPOINTMENT (OUTPATIENT)
Dept: ENDOCRINOLOGY | Facility: MEDICAL CENTER | Age: 74
DRG: 057 | End: 2024-02-21
Attending: STUDENT IN AN ORGANIZED HEALTH CARE EDUCATION/TRAINING PROGRAM
Payer: COMMERCIAL

## 2024-02-21 VITALS — RESPIRATION RATE: 18 BRPM

## 2024-02-21 PROCEDURE — G0299 HHS/HOSPICE OF RN EA 15 MIN: HCPCS

## 2024-02-21 PROCEDURE — T2044 HOSPICE RESPITE CARE: HCPCS

## 2024-02-21 PROCEDURE — 700102 HCHG RX REV CODE 250 W/ 637 OVERRIDE(OP): Performed by: NURSE PRACTITIONER

## 2024-02-21 PROCEDURE — A9270 NON-COVERED ITEM OR SERVICE: HCPCS | Performed by: NURSE PRACTITIONER

## 2024-02-21 PROCEDURE — 770004 HCHG ROOM/CARE - ONCOLOGY PRIVATE *

## 2024-02-21 RX ADMIN — METFORMIN HYDROCHLORIDE 1000 MG: 500 TABLET ORAL at 18:00

## 2024-02-21 RX ADMIN — METOPROLOL TARTRATE 12.5 MG: 25 TABLET, FILM COATED ORAL at 18:01

## 2024-02-21 RX ADMIN — DOCUSATE SODIUM 50 MG AND SENNOSIDES 8.6 MG 2 TABLET: 8.6; 5 TABLET, FILM COATED ORAL at 04:46

## 2024-02-21 RX ADMIN — BUPROPION HYDROCHLORIDE 150 MG: 75 TABLET, FILM COATED ORAL at 04:46

## 2024-02-21 RX ADMIN — BUPROPION HYDROCHLORIDE 150 MG: 75 TABLET, FILM COATED ORAL at 17:59

## 2024-02-21 RX ADMIN — DULOXETINE HYDROCHLORIDE 60 MG: 20 CAPSULE, DELAYED RELEASE ORAL at 17:59

## 2024-02-21 RX ADMIN — OMEPRAZOLE 20 MG: 20 CAPSULE, DELAYED RELEASE ORAL at 04:46

## 2024-02-21 RX ADMIN — DULOXETINE HYDROCHLORIDE 60 MG: 20 CAPSULE, DELAYED RELEASE ORAL at 04:46

## 2024-02-21 RX ADMIN — METOPROLOL TARTRATE 12.5 MG: 25 TABLET, FILM COATED ORAL at 04:45

## 2024-02-21 RX ADMIN — SITAGLIPTIN 100 MG: 100 TABLET, FILM COATED ORAL at 04:46

## 2024-02-21 RX ADMIN — OMEPRAZOLE 20 MG: 20 CAPSULE, DELAYED RELEASE ORAL at 18:02

## 2024-02-21 RX ADMIN — METFORMIN HYDROCHLORIDE 1000 MG: 500 TABLET ORAL at 07:30

## 2024-02-21 ASSESSMENT — ENCOUNTER SYMPTOMS
LAST BOWEL MOVEMENT: 66891
SUBJECTIVE PAIN PROGRESSION: UNCHANGED
LOWEST PAIN SEVERITY IN PAST 24 HOURS: 0/10
PERSON REPORTING PAIN: PATIENT
DENIES PAIN: 1
STOOL FREQUENCY: DAILY
HIGHEST PAIN SEVERITY IN PAST 24 HOURS: 0/10
PAIN SEVERITY GOAL: 0/10
BOWEL INCONTINENCE: 1

## 2024-02-21 ASSESSMENT — PAIN SCALES - PAIN ASSESSMENT IN ADVANCED DEMENTIA (PAINAD)
TOTALSCORE: 0
FACIALEXPRESSION: 0 - SMILING OR INEXPRESSIVE.
BODYLANGUAGE: 0 - RELAXED.
CONSOLABILITY: 0 - NO NEED TO CONSOLE.
NEGVOCALIZATION: 0 - NONE.

## 2024-02-21 ASSESSMENT — PAIN DESCRIPTION - PAIN TYPE: TYPE: ACUTE PAIN

## 2024-02-21 NOTE — CARE PLAN
The patient is Watcher - Medium risk of patient condition declining or worsening    Shift Goals  Clinical Goals: Pt will remain comfortable, remain free from falls  Patient Goals: Rest    Pt has remained comfortable throughout the shift. He was eating donuts this morning. He has a good appetite. Incontinent of urine and stool.     Progress made toward(s) clinical / shift goals:        Problem: Fall Risk  Goal: Patient will remain free from falls  Outcome: Progressing  Note: Pt has remained free from falls  Bed alarm on  Hourly rounding in place  Call light within reach         Patient is not progressing towards the following goals:

## 2024-02-21 NOTE — HOSPICE
Patient seen for respite care.   Patient in bed watching TV. He was very happy he had doughnuts for breakfast.   No complaints at this time.       Patient to [place] Friday 2/24 between 11-11:30  Please contact [facility] with any questions or concerns.

## 2024-02-21 NOTE — CARE PLAN
The patient is Watcher - Medium risk of patient condition declining or worsening    Shift Goals  Clinical Goals: comfort  Patient Goals: rest    Progress made toward(s) clinical / shift goals:    Problem: Knowledge Deficit - Standard  Goal: Patient and family/care givers will demonstrate understanding of plan of care, disease process/condition, diagnostic tests and medications  Outcome: Progressing     Problem: Fall Risk  Goal: Patient will remain free from falls  Outcome: Progressing  Note: Pt's bed locked and at the lowest position. Call light is within reach, personal belongings within reach.       Patient is not progressing towards the following goals:

## 2024-02-21 NOTE — CARE PLAN
The patient is Watcher - Medium risk of patient condition declining or worsening    Shift Goals  Clinical Goals: Pt will remain comfortable, remain free from falls  Patient Goals: Rest    Pt has remained comfortable throughout the shift. He was eating donuts this morning. He has a good appetite. Incontinent of urine and stool.     Progress made toward(s) clinical / shift goals:        Problem: Fall Risk  Goal: Patient will remain free from falls  Outcome: Progressing  Note: Pt has remained free from falls  Bed alarm on  Hourly rounding in place  Call light within reach         Patient is not progressing towards the following goals:    The patient is Watcher - Medium risk of patient condition declining or worsening    Shift Goals  Clinical Goals: comfort  Patient Goals: rest    Progress made toward(s) clinical / shift goals:      Patient is not progressing towards the following goals:

## 2024-02-22 PROCEDURE — 770004 HCHG ROOM/CARE - ONCOLOGY PRIVATE *

## 2024-02-22 PROCEDURE — T2044 HOSPICE RESPITE CARE: HCPCS

## 2024-02-22 PROCEDURE — 700102 HCHG RX REV CODE 250 W/ 637 OVERRIDE(OP): Performed by: NURSE PRACTITIONER

## 2024-02-22 PROCEDURE — A9270 NON-COVERED ITEM OR SERVICE: HCPCS | Performed by: NURSE PRACTITIONER

## 2024-02-22 RX ADMIN — BUPROPION HYDROCHLORIDE 150 MG: 75 TABLET, FILM COATED ORAL at 04:16

## 2024-02-22 RX ADMIN — METFORMIN HYDROCHLORIDE 1000 MG: 500 TABLET ORAL at 09:21

## 2024-02-22 RX ADMIN — OMEPRAZOLE 20 MG: 20 CAPSULE, DELAYED RELEASE ORAL at 17:26

## 2024-02-22 RX ADMIN — METOPROLOL TARTRATE 12.5 MG: 25 TABLET, FILM COATED ORAL at 17:25

## 2024-02-22 RX ADMIN — DULOXETINE HYDROCHLORIDE 60 MG: 20 CAPSULE, DELAYED RELEASE ORAL at 04:16

## 2024-02-22 RX ADMIN — BUPROPION HYDROCHLORIDE 150 MG: 75 TABLET, FILM COATED ORAL at 17:23

## 2024-02-22 RX ADMIN — DULOXETINE HYDROCHLORIDE 60 MG: 20 CAPSULE, DELAYED RELEASE ORAL at 17:23

## 2024-02-22 RX ADMIN — SITAGLIPTIN 100 MG: 100 TABLET, FILM COATED ORAL at 04:16

## 2024-02-22 RX ADMIN — OMEPRAZOLE 20 MG: 20 CAPSULE, DELAYED RELEASE ORAL at 04:16

## 2024-02-22 RX ADMIN — METOPROLOL TARTRATE 12.5 MG: 25 TABLET, FILM COATED ORAL at 04:16

## 2024-02-22 RX ADMIN — METFORMIN HYDROCHLORIDE 1000 MG: 500 TABLET ORAL at 17:24

## 2024-02-22 ASSESSMENT — PAIN DESCRIPTION - PAIN TYPE: TYPE: ACUTE PAIN

## 2024-02-22 NOTE — CARE PLAN
The patient is Watcher - Medium risk of patient condition declining or worsening    Shift Goals  Clinical Goals: Pt will remain comfortable, remain free from falls  Patient Goals: Rest    Pt has remained comfortable throughout the shift. He was eating donuts this morning. He has a good appetite. Incontinent of urine and stool.     Progress made toward(s) clinical / shift goals:        Problem: Fall Risk  Goal: Patient will remain free from falls  Outcome: Progressing  Note: Pt has remained free from falls  Bed alarm on  Hourly rounding in place  Call light within reach         Patient is not progressing towards the following goals:    The patient is Watcher - Medium risk of patient condition declining or worsening    Shift Goals  Clinical Goals: comfort  Patient Goals: rest    Progress made toward(s) clinical / shift goals:      Patient is not progressing towards the following goals:    The patient is Stable - Low risk of patient condition declining or worsening    Shift Goals  Clinical Goals: comfort  Patient Goals: rest    Progress made toward(s) clinical / shift goals:      Patient is not progressing towards the following goals:

## 2024-02-22 NOTE — CARE PLAN
The patient is Stable - Low risk of patient condition declining or worsening    Shift Goals  Clinical Goals: comfort  Patient Goals: rest    Progress made toward(s) clinical / shift goals:    Problem: Knowledge Deficit - Standard  Goal: Patient and family/care givers will demonstrate understanding of plan of care, disease process/condition, diagnostic tests and medications  Outcome: Progressing     Problem: Fall Risk  Goal: Patient will remain free from falls  Outcome: Progressing  Note: Pt's bed locked and at the lowest position. Call light is within reach, personal belongings within reach.       Patient is not progressing towards the following goals:

## 2024-02-23 ENCOUNTER — HOME CARE VISIT (OUTPATIENT)
Dept: HOSPICE | Facility: HOSPICE | Age: 74
End: 2024-02-23
Payer: MEDICARE

## 2024-02-23 ENCOUNTER — PHARMACY VISIT (OUTPATIENT)
Dept: PHARMACY | Facility: MEDICAL CENTER | Age: 74
End: 2024-02-23
Payer: COMMERCIAL

## 2024-02-23 VITALS
SYSTOLIC BLOOD PRESSURE: 139 MMHG | RESPIRATION RATE: 18 BRPM | OXYGEN SATURATION: 90 % | BODY MASS INDEX: 27.3 KG/M2 | HEIGHT: 71 IN | TEMPERATURE: 97.8 F | WEIGHT: 195 LBS | HEART RATE: 57 BPM | DIASTOLIC BLOOD PRESSURE: 80 MMHG

## 2024-02-23 VITALS — RESPIRATION RATE: 16 BRPM | HEART RATE: 60 BPM

## 2024-02-23 DIAGNOSIS — G91.2 NORMAL PRESSURE HYDROCEPHALUS (HCC): ICD-10-CM

## 2024-02-23 PROCEDURE — S9126 HOSPICE CARE, IN THE HOME, P: HCPCS

## 2024-02-23 PROCEDURE — RXMED WILLOW AMBULATORY MEDICATION CHARGE: Performed by: REHABILITATION PRACTITIONER

## 2024-02-23 PROCEDURE — A9270 NON-COVERED ITEM OR SERVICE: HCPCS | Performed by: NURSE PRACTITIONER

## 2024-02-23 PROCEDURE — G0299 HHS/HOSPICE OF RN EA 15 MIN: HCPCS

## 2024-02-23 PROCEDURE — 700102 HCHG RX REV CODE 250 W/ 637 OVERRIDE(OP): Performed by: NURSE PRACTITIONER

## 2024-02-23 RX ORDER — BISACODYL 10 MG
10 SUPPOSITORY, RECTAL RECTAL PRN
Qty: 5 SUPPOSITORY | Refills: 10 | Status: SHIPPED | OUTPATIENT
Start: 2024-02-23 | End: 2025-02-22

## 2024-02-23 RX ORDER — ACETAMINOPHEN 500 MG
1000 TABLET ORAL EVERY 6 HOURS PRN
Qty: 30 TABLET | Refills: 10 | Status: SHIPPED | OUTPATIENT
Start: 2024-02-23 | End: 2025-02-22

## 2024-02-23 RX ORDER — LORAZEPAM 2 MG/ML
1-2 CONCENTRATE ORAL
Qty: 360 ML | Refills: 0 | Status: SHIPPED | OUTPATIENT
Start: 2024-02-23 | End: 2025-02-22

## 2024-02-23 RX ORDER — MORPHINE SULFATE 100 MG/5ML
10-20 SOLUTION ORAL
Qty: 240 ML | Refills: 0 | Status: SHIPPED | OUTPATIENT
Start: 2024-02-23 | End: 2025-02-22

## 2024-02-23 RX ORDER — SENNA AND DOCUSATE SODIUM 50; 8.6 MG/1; MG/1
2 TABLET, FILM COATED ORAL 2 TIMES DAILY PRN
Qty: 28 TABLET | Refills: 10 | Status: SHIPPED | OUTPATIENT
Start: 2024-02-23 | End: 2025-02-22

## 2024-02-23 RX ORDER — ACETAMINOPHEN 650 MG/1
650 SUPPOSITORY RECTAL EVERY 6 HOURS PRN
Qty: 5 SUPPOSITORY | Refills: 10 | Status: SHIPPED | OUTPATIENT
Start: 2024-02-23 | End: 2025-02-22

## 2024-02-23 RX ORDER — ONDANSETRON 4 MG/1
4 TABLET, ORALLY DISINTEGRATING ORAL EVERY 6 HOURS PRN
Qty: 15 TABLET | Refills: 10 | Status: SHIPPED | OUTPATIENT
Start: 2024-02-23 | End: 2025-02-22

## 2024-02-23 RX ADMIN — OMEPRAZOLE 20 MG: 20 CAPSULE, DELAYED RELEASE ORAL at 05:35

## 2024-02-23 RX ADMIN — METFORMIN HYDROCHLORIDE 1000 MG: 500 TABLET ORAL at 08:46

## 2024-02-23 RX ADMIN — SITAGLIPTIN 100 MG: 100 TABLET, FILM COATED ORAL at 05:38

## 2024-02-23 RX ADMIN — DULOXETINE HYDROCHLORIDE 60 MG: 20 CAPSULE, DELAYED RELEASE ORAL at 05:35

## 2024-02-23 RX ADMIN — METOPROLOL TARTRATE 12.5 MG: 25 TABLET, FILM COATED ORAL at 05:35

## 2024-02-23 RX ADMIN — BUPROPION HYDROCHLORIDE 150 MG: 75 TABLET, FILM COATED ORAL at 05:37

## 2024-02-23 ASSESSMENT — PAIN SCALES - PAIN ASSESSMENT IN ADVANCED DEMENTIA (PAINAD)
FACIALEXPRESSION: 0 - SMILING OR INEXPRESSIVE.
BODYLANGUAGE: 0 - RELAXED.
TOTALSCORE: 0
NEGVOCALIZATION: 0 - NONE.
CONSOLABILITY: 0 - NO NEED TO CONSOLE.

## 2024-02-23 ASSESSMENT — ENCOUNTER SYMPTOMS
FATIGUE: 1
DESCRIPTION OF MEMORY LOSS: SHORT TERM
DYSPNEA ON EXERTION: 1
FATIGUES EASILY: 1
LAST BOWEL MOVEMENT: 66893
SLEEP QUALITY: FAIR
FORGETFULNESS: 1
STOOL FREQUENCY: LESS THAN DAILY
BOWEL INCONTINENCE: 1
LOWER EXTREMITY EDEMA: 1

## 2024-02-23 NOTE — CARE PLAN
The patient is Stable - Low risk of patient condition declining or worsening    Shift Goals  Clinical Goals: comfort, saftey  Patient Goals: go home tomorrow    Progress made toward(s) clinical / shift goals:    Problem: Knowledge Deficit - Standard  Goal: Patient and family/care givers will demonstrate understanding of plan of care, disease process/condition, diagnostic tests and medications  Outcome: Progressing     Problem: Fall Risk  Goal: Patient will remain free from falls  Outcome: Progressing    Patient is alert and oriented, very pleasant and eager to go home tomorrow. Denies any pain Bed is locked in the lowest position with call light within reach.     Patient is not progressing towards the following goals:

## 2024-02-23 NOTE — PROGRESS NOTES
Patient discussed with Shauna Mejia. Patient is home from respite and requires comfort pack. Meds ordered at this time for delivery today.

## 2024-02-23 NOTE — PROGRESS NOTES
Assumed care of pt this morning. Pt is alert and oriented. On room air. Condom catheter in place. Tolerated breakfast. No complaints of pain. Call light in reach, bed alarm on. Pt to dc today back to home to resume hospice. Wife called to check in and requested shower and some supplies be sent. Will complete dc around 1130 per transport kimi time.

## 2024-02-24 PROCEDURE — S9126 HOSPICE CARE, IN THE HOME, P: HCPCS

## 2024-02-25 PROCEDURE — S9126 HOSPICE CARE, IN THE HOME, P: HCPCS

## 2024-02-26 PROCEDURE — S9126 HOSPICE CARE, IN THE HOME, P: HCPCS

## 2024-02-27 ENCOUNTER — HOME CARE VISIT (OUTPATIENT)
Dept: HOSPICE | Facility: HOSPICE | Age: 74
End: 2024-02-27
Payer: MEDICARE

## 2024-02-27 ENCOUNTER — PATIENT OUTREACH (OUTPATIENT)
Dept: HEALTH INFORMATION MANAGEMENT | Facility: OTHER | Age: 74
End: 2024-02-27
Payer: MEDICARE

## 2024-02-27 DIAGNOSIS — I10 ESSENTIAL HYPERTENSION: ICD-10-CM

## 2024-02-27 PROCEDURE — S9126 HOSPICE CARE, IN THE HOME, P: HCPCS

## 2024-02-27 PROCEDURE — G0156 HHCP-SVS OF AIDE,EA 15 MIN: HCPCS

## 2024-02-27 NOTE — PROGRESS NOTES
Spoke w/ pt's wife about discharge from the PCM program now that the pt has established with hospice. RN will not continue to follow. Encouraged them to reach out if anything changes.

## 2024-02-28 ENCOUNTER — HOME CARE VISIT (OUTPATIENT)
Dept: HOSPICE | Facility: HOSPICE | Age: 74
End: 2024-02-28
Payer: MEDICARE

## 2024-02-28 VITALS — SYSTOLIC BLOOD PRESSURE: 120 MMHG | HEART RATE: 80 BPM | RESPIRATION RATE: 16 BRPM | DIASTOLIC BLOOD PRESSURE: 60 MMHG

## 2024-02-28 PROCEDURE — S9126 HOSPICE CARE, IN THE HOME, P: HCPCS

## 2024-02-28 PROCEDURE — G0299 HHS/HOSPICE OF RN EA 15 MIN: HCPCS

## 2024-02-28 ASSESSMENT — ENCOUNTER SYMPTOMS
SLEEP QUALITY: ADEQUATE
DESCRIPTION OF MEMORY LOSS: SHORT TERM
FATIGUE: 1
PERSON REPORTING PAIN: PATIENT
DENIES PAIN: 1
FATIGUES EASILY: 1
DESCRIPTION OF MEMORY LOSS: IMMEDIATE
BOWEL PATTERN NORMAL: 1
STOOL FREQUENCY: LESS THAN DAILY
LOWER EXTREMITY EDEMA: 1
LAST BOWEL MOVEMENT: 66897
FORGETFULNESS: 1
MUSCLE WEAKNESS: 1

## 2024-02-28 ASSESSMENT — SOCIAL DETERMINANTS OF HEALTH (SDOH): ACTIVE STRESSOR - HEALTH CHANGES: 1

## 2024-02-28 ASSESSMENT — ACTIVITIES OF DAILY LIVING (ADL)
AMBULATION ASSISTANCE: NON-AMBULATORY
CURRENT_FUNCTION: ONE PERSON

## 2024-02-29 ENCOUNTER — HOME CARE VISIT (OUTPATIENT)
Dept: HOSPICE | Facility: HOSPICE | Age: 74
End: 2024-02-29
Payer: MEDICARE

## 2024-02-29 PROCEDURE — G0156 HHCP-SVS OF AIDE,EA 15 MIN: HCPCS

## 2024-02-29 PROCEDURE — S9126 HOSPICE CARE, IN THE HOME, P: HCPCS

## 2024-03-01 PROCEDURE — S9126 HOSPICE CARE, IN THE HOME, P: HCPCS

## 2024-03-02 PROCEDURE — S9126 HOSPICE CARE, IN THE HOME, P: HCPCS

## 2024-03-03 PROCEDURE — S9126 HOSPICE CARE, IN THE HOME, P: HCPCS

## 2024-03-04 PROCEDURE — S9126 HOSPICE CARE, IN THE HOME, P: HCPCS

## 2024-03-04 NOTE — PROGRESS NOTES
Follow up Endocrinology Visit  Initial consult on: 8/17/23  Last visit on: 1/17/23  Referred by: Stanton Miller M.D.    Patient was presented for a telehealth consultation via secure and encrypted videoconferencing technology.    Location of Provider - office  Location of Patient - home  Patient Consent - yes  Platform used - Zoom  Total time -  30 min    Chief complaint:  Prabhakar Sierra, 73 y.o., male, who is here for follow up for T2DM management. Here with his wife - Heidy Sanders history:   - all information was obtained from patient's wife  - patient is now under Hospice care, going downhill  - appetite is declined, food intake under full control of his wife  - his wife is very busy taking care of him  - lately were not using CGM  - he got Tranjenta for $50/mo,which still seems to be expensive given all the bills for Prabhakar's care  - Metformin is covered by patient's insurance  - Prabhakar is currently bed bound and less likely to fall, wife gave up on giving him Reclast   Prior notes:  - overall doing well  - unfortunately, he couldn't tolerate Ozempic - lost his appetite, was very apathic as per patient's wife  - has poor dentition, needs dental work  - no side effects with Jardiance, still in process of getting assistance program for the medication  1/17/23  - had hospitalization in 12/2023 due to weakness, leading to fall, complicated by L1 compression fracture  - today is first day home  - Reclast infusion was delayed due to hospitalization  - in the hospital was given short acting insulin prn, but lately he didn't require one  - concerns of decrease BMD with pitoglitazone  - reviewed most recent labs    Current therapy:  Synjardy 12.5/1000 bid  Tradjenta 5 mg daily    Tolerates medications: well  Compliant: yes    Prior used medications:   Rybelsus -> stopped too expensive  Ozempic -> loss of appetite, apathy    Other important medications:  ASA 81  Atorvastatin 80 mg  Benazepril 40  mg  Duloxetine 60 mg  Metoprolol tartrate 25 mg    BG control:  CGM type - Elena 3  Period - 1/18/24 - 1/31/24  Data were reviewed and discussed with the patient  Active time - 97%  ABG - 184 mg/dl  GV - 18.5%  GMI - 7.7 %  TIR - 51%  TAR - high - 46%, very high - 3%  TBR - 0%  1/18/24 - 1/31/24      Prior:  SBGM x 3  FBGs - 183 - 300  Bgs during the day - 73 - 373, majority at mid 100s    Prior:  CGM type - Elena 3  Period - 10/4/23 - 10/17/23   Data were reviewed and discussed with the patient  Active time - 96%  ABG - 185 mg/dl  GV - 19.2%  GMI - 7.7%  TIR - 44%  TAR - high - 52%, very high - 4%  TBR - 0%     Hypoglycemic episodes:  Hypoglycemic symptoms: NA  Hypoglycemic unawareness: NA  Treatment: NA  Severe hypoglycemia: NA  Has medical bracelet/necklace:NA  Has glucagon emergency kit: NA    DM history:  - diagnosed 19+, on blood screen, weight at the time of the diagnosis - 260 lb (BMI = 37.3 kg/m2)   - hospitalizations for DKA/HHS/severe hyperglycemia/severe hypoglycemia in the past: none  - prior therapy:  Rybelsus, Jardiance - stopped due to being expensive, denies any side effects except unpleasant mouth taste with Rybelsus  - known DM complications: peripheral neuropathy  - latest HbA1C 10% in 8/2023  - pertinent PMHx:   -- obesity, dyslipidemia, prostate Cr, s/p multiple routes of treatment, currently in remission, GERD, HTN, depression, ED, alcohol dependence in the remission, Alzheimer's dementia,  NPH, thoracic compression fracture, PSVT  -- denies  CAD/PAD/CHF/ CVA  Hospital admission 12/28/22 - 1/5/23:  - presented with back pain, CT showed T11, L2, L3 compression fractures, he considered to be poor candidate for kyphoplasty -> prolonged rehab recovery  - reports weight loss which he associated with decreased appetite due to not tasty food in hospital and rehab    Current Medications:  Current Outpatient Medications:     acetaminophen (TYLENOL) 500 MG Tab, Take 2 Tablets by mouth every 6 hours as  needed for Mild Pain or Fever. Indications: Pain, Disp: 30 Tablet, Rfl: 10    acetaminophen (TYLENOL) 650 MG Suppos, Insert 1 Suppository into the rectum every 6 hours as needed for Mild Pain or Fever (If unable to swallow tablets). Indications: Fever, Pain, Disp: 5 Suppository, Rfl: 10    bisacodyl (DULCOLAX) 10 MG Suppos, Insert 1 Suppository into the rectum as needed (No BM (bowel movement) in 3 days.). Indications: Constipation, Disp: 5 Suppository, Rfl: 10    LORazepam (ATIVAN) 2 MG/ML Conc, Take 0.5-1 mL by mouth every 2 hours as needed (anxiety or shortness of breath despite morphine). Indications: Feeling Anxious, Disp: 360 mL, Rfl: 0    morphine (ROXANOL) 20 MG/ML Solution, Take 0.5-1 mL by mouth every 1 hour as needed (moderate to severe pain or shortness of breath). Indications: Difficulty Breathing, Pain, Disp: 240 mL, Rfl: 0    ondansetron (ZOFRAN ODT) 4 MG TABLET DISPERSIBLE, Take 1 Tablet by mouth every 6 hours as needed for Nausea/Vomiting (nausea and/or vomiting). Indications: Nausea and Vomiting, Disp: 15 Tablet, Rfl: 10    sennosides-docusate sodium (SENOKOT-S) 8.6-50 MG tablet, Take 2 Tablets by mouth 2 times a day as needed (constipation. Hold for loose stool.). Indications: Constipation, Disp: 28 Tablet, Rfl: 10    Empagliflozin (JARDIANCE) 25 MG Tab, Take 1 tablet  by mouth every day., Disp: 30 Tablet, Rfl: 3    Empagliflozin-metFORMIN HCl ER 12.5-1000 MG TABLET SR 24 HR, Take 1 Tablet by mouth 2 times a day., Disp: 60 Tablet, Rfl: 11    benazepril (LOTENSIN) 40 MG tablet, Take 40 mg by mouth every day., Disp: , Rfl:     ALPRAZOLAM PO, Take 1 mg by mouth 1 time a day as needed for Anxiety., Disp: , Rfl:     aspirin (ASPIRIN 81) 81 MG Chew Tab chewable tablet, Chew 81 mg every day., Disp: , Rfl:     VITAMIN D PO, Take 2 Tablets by mouth every day., Disp: , Rfl:     buPROPion SR (WELLBUTRIN-SR) 150 MG TABLET SR 12 HR sustained-release tablet, Take 1 Tablet by mouth 2 times a day. Indications:  Major Depressive Disorder, Disp: 60 Tablet, Rfl: 11    DULoxetine (CYMBALTA) 60 MG Cap DR Particles delayed-release capsule, Take 1 Capsule by mouth 2 times a day. Indications: Major Depressive Disorder, Disp: 60 Capsule, Rfl: 11    metoprolol tartrate (LOPRESSOR) 25 MG Tab, TAKE ONE-HALF TABLETS BY MOUTH TWO TIMES A DAY. INDICATIONS: HIGH BLOOD PRESSURE DISORDER. (Patient taking differently: Take 12.5 mg by mouth 2 times a day. 1/2 tablet= 12.5mg  Indications: Atrial Fibrillation), Disp: 30 Tablet, Rfl: 11    omeprazole (PRILOSEC) 20 MG delayed-release capsule, Take 1 Capsule by mouth 2 times a day. Indications: Gastroesophageal Reflux Disease, Disp: 60 Capsule, Rfl: 11    ferrous sulfate 325 (65 Fe) MG tablet, Take 325 mg by mouth every evening. Indications: Anemia From Inadequate Iron in the Body, Disp: , Rfl:     linagliptin (TRADJENTA) 5 MG Tab tablet, Take 1 Tablet by mouth every day. Indications: Type 2 Diabetes, Disp: 90 Tablet, Rfl: 3    PHYSICAL EXAM:   Vital signs: There were no vitals taken for this visit.  GENERAL: Well-developed, well-nourished  in no apparent distress.  Walks with walker. Appropriately answers the questions but most of the history is obtained from his wife.   CARDIOVASCULAR: Regular rate and rhythm.   LUNGS: No dyspnea  ABDOMEN: Soft, nondistended  EXTREMITIES: No clubbing, cyanosis, or edema.   NEUROLOGICAL: Cranial nerves II-XII are grossly intact. Replies with simple answers yes and no, looking at his wife to answer more complex questions.   SKIN: No rashes, lesions. Turgor is normal.  FOOT: Decreased sensation to monofilament testing on L foot, normal pulses, no ulcers, dry skin, severe callus with skin crack and bleeding, but no surrounding inflammation.      Labs:  MOST RECENT LABS:  - reviewed      PRIOR PERTINENT LABS:  - reviewed            HbA1C/BG/Hb:  Lab Results   Component Value Date/Time    HBA1C 10 (A) 08/07/2023 0922    HBA1C 8.1 (H) 03/28/2023 1519    HBA1C 8.4 (H)  11/04/2022 0956    HBA1C 8.1 (H) 10/18/2022 1123    HBA1C 7.6 (A) 03/07/2022 1327    AVGLUC 186 03/28/2023 1519    AVGLUC 194 11/04/2022 0956    AVGLUC 186 10/18/2022 1123    AVGLUC 174 10/26/2021 1059    AVGLUC 200 07/26/2021 0832     Lab Results   Component Value Date/Time    HEMOGLOBIN 14.0 02/01/2024 10:05 AM    HEMOGLOBIN 13.5 (L) 01/19/2024 11:35 AM    HEMOGLOBIN 12.4 (L) 12/21/2023 08:10 AM     Lab Results   Component Value Date/Time    GLUCOSE 188 (H) 02/01/2024 10:05 AM    GLUCOSE 169 (H) 01/19/2024 11:35 AM    GLUCOSE 142 (H) 12/21/2023 08:10 AM    GLUCOSE 167 (H) 12/20/2023 03:32 AM    GLUCOSE 102 (H) 12/19/2023 11:55 AM     Kidney function/MACR:  Lab Results   Component Value Date/Time    CREATININE 0.53 02/01/2024 10:05 AM    CREATININE 0.68 01/19/2024 11:35 AM     Lab Results   Component Value Date/Time    MALBCRT 108 (H) 03/28/2023 02:28 PM    MICROALBUR 3.7 03/28/2023 02:28 PM      LFTs:  Lab Results   Component Value Date/Time    ASTSGOT 17 02/01/2024 10:05 AM    ASTSGOT 26 12/19/2023 11:55 AM    ASTSGOT 10 (L) 11/25/2023 07:09 PM    ALTSGPT 13 02/01/2024 10:05 AM    ALTSGPT 13 12/19/2023 11:55 AM    ALTSGPT 11 11/25/2023 07:09 PM     Lipid panel:  Lab Results   Component Value Date/Time    TRIGLYCERIDE 205 (H) 02/01/2024 10:05 AM    TRIGLYCERIDE 148 12/20/2023 03:32 AM    TRIGLYCERIDE 211 (H) 08/15/2023 08:46 AM    HDL 35 (A) 02/01/2024 10:05 AM    HDL 35 (A) 12/20/2023 03:32 AM    HDL 47 08/15/2023 08:46 AM    LDL 43 02/01/2024 10:05 AM    LDL 31 12/20/2023 03:32 AM    LDL 70 08/15/2023 08:46 AM     Hypothyroidism screening:  Lab Results   Component Value Date/Time    TSHULTRASEN 0.930 03/28/2023 1519     Osteoporosis work up:          Imaging:  - reviewed  DEXA scan on 3/29/23:  Date Machine L1- L4  BMD/T-score LFN  BMD/T-score FRAX Rx    3/29/23  GE Prodigy unit   1.443 g/cm2 / 1.9  1.079 g/cm2 / - 0.2  9.0% / 1.9%  none     ASSESSMENT/PLAN:   1. Type 2 diabetes mellitus with hyperglycemia,  without long-term current use of insulin (HCC)  - duration x 20 + years  - on Metformin, Pioglitazone, Jardiance  - suboptimally controlled, HbA1C 10% (8/2023) -> 8.5% (12/2023), GMI as per CGM in 1/2024 - 7.7%     Microvascular complications: nephropathy with microalbuminuria, denies peripheral neuropathy, retinopathy  Macrovascular complications: denies had 3 TIAs, denies CAD, PAD, CVA  Associated comorbidities: obesity, dyslipidemia, prostate Cr, s/p multiple routes of treatment, currently in remission, GERD, HTN, depression, ED, alcohol dependence in the remission, Alzheimer's dementia,  NPH, T11, L2, L3 compression fractures, PSVT     DM complication screening:  Labs reviewed:  MACR - 108 (+), last checked in 8/2023   Creat/GFR wnl, last checked in 2/2024  LDL - 70 - at target, last checked in 8/2023     Patient is taking statin: on Atorvastatin 80 mg  Patient is taking ASA: yes  Patient is taking ACE/ARB: on Benazepril 40 mg     Last eye exam: 11/17/23, denies DR  Last foot exam: in 10/2023 by me, noted decreased sensation in his L foot, severe callus on R foot with skin crack and bleeding but no surrounding inflammation, dry skin, no wounds.      Home medications:  Metformin 1000 mg bid  Pioglitazone 30 mg  Jardiance 25 mg    Prior used medications:   Rybelsus -> stopped too expensive, poor mouth taste  Ozempic -> loss of appetite, apathy    Discussion:  - last GMI is very reasonable  - given worsening of the patient condition, Hospice care, our main goal to avoid very symptomatic hyperglycemia  - ok to stop more expensive medications such Tradjenta and Synjardy, and continue only on covered by insurance Metformin (has preserved kidney function)  - main goal is to keep BG < 250s  - will use CGM x 2 weeks, if Bgs are acceptable -> continue new regimen with BG checks 1-2/week  Prior notes:  - patient with multiple comorbidities, including dementia and long-term DM  - currently on max dose of SGLT-2 inhibitor,  and moderate dose of Metformin and Pioglitazone, could not tolerate GLP-1 agonist  - will increase dose of Metformin in Synjardy, start DPP-4 inhibitor (working on getting assistance program for that), stop Pioglitazone due to concerns of BMD loss  -  I would avoid MOREIRA in elderly patient  - in case of worsening of BG control consider use of basal insulin vs adding MOREIRA    Plan:  Discussed with the patient goals for DM management:  Fasting BG 90 - 150  2 hrs postprandial  - 220  HbA1C < 7.5 - 9.0% - given current poor control, comorbidities, including dementia; hospice care     Therapy adjustments:  - stop Synjardy   - stop Tradjenta   - resume Metformin 1000 mg bid  - make sure patient is well hydrated    3.  Resume CGM use for at least 2 weeks.     2. Essential hypertension  - well controlled  - managed by PCP    3. Dyslipidemia  - LDL at target on high dose statin  - continue current management    4. History of T11, L2, L3 compression fractures, new L1 compression fracture      Male hypogonadism  - currently bed bound that makes patient less likely to fall  - under hospice care  - ok to stop any therapy  Prior notes:  - with GLF = fragility fracture = severe osteoporosis, even DEXA scan didn't show low T-score, which could be explained by compression fractures in lumbar spine  - at high risk for falls, walks with walker  - currently on vit D 4000 IU/day  - not on any pharmacologic treatment  - risk factors: advanced age, hypogonadism, secondary likely due to Rx of prostate cancer, multiple radiation treatment for the prostate cancer, unlikely bone mets of prostate cancer, as PSA is undetectable now, mild vit D deficiency, smoking history, Hx of alcohol abuse  - denies history of Ca, phosphorus, thyroid/parathryoid disorders, kidney stones, CKD, liver disease, chronic malabsorption/diarrhea, immobilization  -  Fhx of fractures/osteoporosis ?, Pioglitazone use  - bone forming agents would be contraindicated for  the patient, given history of radiation exposure (contraindication for Tymlos, Forteo), Evenity is not FDA approved for osteoporosis in males in [place], also he had Hx of TIAs  - best treatment option IV Reclast, however, patient might require significant dental work in a near future, in which case it is better to finish dental work before starting IV bisphosphonate  - testosterone therapy is likely to be contraindicated in light of Hx of prostate cancer  1/17/23  - new compression L1 fracture with GLF  - vit D wnl  - seen dentist in 11/2023, had few of his teeth pooled out, no more plans for extensive dental work, only bridge placement  Plan:  Fall precautions.   Ok not to treat.     RTC: 3 mo    Total time (face-to-face and non-face-to face time): 30 min     Plan reviewed with the patient and agreed with plan.  All questions answered to patient's satisfaction.  Thank you kindly for allowing me to participate in the diabetes care plan for this patient.  Katie Soler MD    CC:   Stanton Miller M.D.

## 2024-03-05 ENCOUNTER — HOME CARE VISIT (OUTPATIENT)
Dept: HOSPICE | Facility: HOSPICE | Age: 74
End: 2024-03-05
Payer: MEDICARE

## 2024-03-05 PROCEDURE — S9126 HOSPICE CARE, IN THE HOME, P: HCPCS

## 2024-03-05 PROCEDURE — G0156 HHCP-SVS OF AIDE,EA 15 MIN: HCPCS

## 2024-03-06 ENCOUNTER — TELEMEDICINE (OUTPATIENT)
Dept: ENDOCRINOLOGY | Facility: MEDICAL CENTER | Age: 74
End: 2024-03-06
Attending: STUDENT IN AN ORGANIZED HEALTH CARE EDUCATION/TRAINING PROGRAM
Payer: MEDICARE

## 2024-03-06 ENCOUNTER — HOME CARE VISIT (OUTPATIENT)
Dept: HOSPICE | Facility: HOSPICE | Age: 74
End: 2024-03-06
Payer: MEDICARE

## 2024-03-06 VITALS — SYSTOLIC BLOOD PRESSURE: 128 MMHG | HEART RATE: 74 BPM | RESPIRATION RATE: 12 BRPM | DIASTOLIC BLOOD PRESSURE: 68 MMHG

## 2024-03-06 DIAGNOSIS — M80.00XA OSTEOPOROSIS WITH CURRENT PATHOLOGICAL FRACTURE, UNSPECIFIED OSTEOPOROSIS TYPE, INITIAL ENCOUNTER: ICD-10-CM

## 2024-03-06 DIAGNOSIS — M80.08XA FRACTURE OF VERTEBRA DUE TO OSTEOPOROSIS, INITIAL ENCOUNTER (HCC): ICD-10-CM

## 2024-03-06 DIAGNOSIS — E11.65 TYPE 2 DIABETES MELLITUS WITH HYPERGLYCEMIA, WITHOUT LONG-TERM CURRENT USE OF INSULIN (HCC): ICD-10-CM

## 2024-03-06 DIAGNOSIS — E78.5 DYSLIPIDEMIA: ICD-10-CM

## 2024-03-06 DIAGNOSIS — I10 ESSENTIAL HYPERTENSION: ICD-10-CM

## 2024-03-06 DIAGNOSIS — E55.9 VITAMIN D DEFICIENCY: ICD-10-CM

## 2024-03-06 DIAGNOSIS — E29.1 MALE HYPOGONADISM: ICD-10-CM

## 2024-03-06 PROCEDURE — S9126 HOSPICE CARE, IN THE HOME, P: HCPCS

## 2024-03-06 PROCEDURE — G0299 HHS/HOSPICE OF RN EA 15 MIN: HCPCS

## 2024-03-06 ASSESSMENT — ENCOUNTER SYMPTOMS
FATIGUES EASILY: 1
LIMITED RANGE OF MOTION: 1
SLEEP QUALITY: ADEQUATE
FORGETFULNESS: 1
MUSCLE WEAKNESS: 1
DENIES PAIN: 1
PERSON REPORTING PAIN: PATIENT
STOOL FREQUENCY: DAILY
BOWEL PATTERN NORMAL: 1
LAST BOWEL MOVEMENT: 66905
FATIGUE: 1
DESCRIPTION OF MEMORY LOSS: SHORT TERM

## 2024-03-06 ASSESSMENT — SOCIAL DETERMINANTS OF HEALTH (SDOH): ACTIVE STRESSOR - NO STRESS FACTORS: 1

## 2024-03-06 ASSESSMENT — ACTIVITIES OF DAILY LIVING (ADL)
AMBULATION ASSISTANCE: NON-AMBULATORY
CURRENT_FUNCTION: ONE PERSON

## 2024-03-07 ENCOUNTER — HOME CARE VISIT (OUTPATIENT)
Dept: HOSPICE | Facility: HOSPICE | Age: 74
End: 2024-03-07
Payer: MEDICARE

## 2024-03-07 PROCEDURE — G0156 HHCP-SVS OF AIDE,EA 15 MIN: HCPCS

## 2024-03-07 PROCEDURE — S9126 HOSPICE CARE, IN THE HOME, P: HCPCS

## 2024-03-08 PROCEDURE — S9126 HOSPICE CARE, IN THE HOME, P: HCPCS

## 2024-03-09 PROCEDURE — S9126 HOSPICE CARE, IN THE HOME, P: HCPCS

## 2024-03-10 PROCEDURE — S9126 HOSPICE CARE, IN THE HOME, P: HCPCS

## 2024-03-11 ENCOUNTER — HOME CARE VISIT (OUTPATIENT)
Dept: HOSPICE | Facility: HOSPICE | Age: 74
End: 2024-03-11
Payer: MEDICARE

## 2024-03-11 PROCEDURE — S9126 HOSPICE CARE, IN THE HOME, P: HCPCS

## 2024-03-12 ENCOUNTER — HOME CARE VISIT (OUTPATIENT)
Dept: HOSPICE | Facility: HOSPICE | Age: 74
End: 2024-03-12
Payer: MEDICARE

## 2024-03-12 ENCOUNTER — PATIENT OUTREACH (OUTPATIENT)
Dept: HEALTH INFORMATION MANAGEMENT | Facility: OTHER | Age: 74
End: 2024-03-12
Payer: MEDICARE

## 2024-03-12 PROCEDURE — S9126 HOSPICE CARE, IN THE HOME, P: HCPCS

## 2024-03-13 ENCOUNTER — HOME CARE VISIT (OUTPATIENT)
Dept: HOSPICE | Facility: HOSPICE | Age: 74
End: 2024-03-13
Payer: MEDICARE

## 2024-03-13 VITALS — HEART RATE: 84 BPM | DIASTOLIC BLOOD PRESSURE: 74 MMHG | SYSTOLIC BLOOD PRESSURE: 110 MMHG | RESPIRATION RATE: 20 BRPM

## 2024-03-13 PROCEDURE — S9126 HOSPICE CARE, IN THE HOME, P: HCPCS

## 2024-03-13 PROCEDURE — G0299 HHS/HOSPICE OF RN EA 15 MIN: HCPCS

## 2024-03-13 PROCEDURE — G0156 HHCP-SVS OF AIDE,EA 15 MIN: HCPCS

## 2024-03-13 ASSESSMENT — ENCOUNTER SYMPTOMS
TREMORS: 1
PERSON REPORTING PAIN: PATIENT
LIMITED RANGE OF MOTION: 1
FATIGUE: 1
BOWEL PATTERN NORMAL: 1
MUSCLE WEAKNESS: 1
STOOL FREQUENCY: DAILY
FORGETFULNESS: 1
FATIGUES EASILY: 1
DENIES PAIN: 1
DESCRIPTION OF MEMORY LOSS: SHORT TERM
LAST BOWEL MOVEMENT: 66912
SLEEP QUALITY: ADEQUATE

## 2024-03-13 ASSESSMENT — SOCIAL DETERMINANTS OF HEALTH (SDOH): ACTIVE STRESSOR - HEALTH CHANGES: 1

## 2024-03-13 ASSESSMENT — ACTIVITIES OF DAILY LIVING (ADL): AMBULATION ASSISTANCE: NON-AMBULATORY

## 2024-03-13 NOTE — PROGRESS NOTES
CHW attempted to call Heidy and check on pt. Heidy did not answer and a vm was left for her to return call.

## 2024-03-14 ENCOUNTER — HOME CARE VISIT (OUTPATIENT)
Dept: HOSPICE | Facility: HOSPICE | Age: 74
End: 2024-03-14
Payer: MEDICARE

## 2024-03-14 PROCEDURE — G0299 HHS/HOSPICE OF RN EA 15 MIN: HCPCS

## 2024-03-14 PROCEDURE — S9126 HOSPICE CARE, IN THE HOME, P: HCPCS

## 2024-03-14 ASSESSMENT — ENCOUNTER SYMPTOMS: DENIES PAIN: 1

## 2024-03-14 ASSESSMENT — SOCIAL DETERMINANTS OF HEALTH (SDOH): ACTIVE STRESSOR - NO STRESS FACTORS: 1

## 2024-03-15 PROCEDURE — S9126 HOSPICE CARE, IN THE HOME, P: HCPCS

## 2024-03-16 PROCEDURE — S9126 HOSPICE CARE, IN THE HOME, P: HCPCS

## 2024-03-17 PROCEDURE — S9126 HOSPICE CARE, IN THE HOME, P: HCPCS

## 2024-03-18 PROCEDURE — S9126 HOSPICE CARE, IN THE HOME, P: HCPCS

## 2024-03-19 ENCOUNTER — HOME CARE VISIT (OUTPATIENT)
Dept: HOSPICE | Facility: HOSPICE | Age: 74
End: 2024-03-19
Payer: MEDICARE

## 2024-03-19 PROCEDURE — S9126 HOSPICE CARE, IN THE HOME, P: HCPCS

## 2024-03-19 PROCEDURE — G0156 HHCP-SVS OF AIDE,EA 15 MIN: HCPCS

## 2024-03-20 ENCOUNTER — HOME CARE VISIT (OUTPATIENT)
Dept: HOSPICE | Facility: HOSPICE | Age: 74
End: 2024-03-20
Payer: MEDICARE

## 2024-03-20 VITALS — HEART RATE: 88 BPM | SYSTOLIC BLOOD PRESSURE: 130 MMHG | DIASTOLIC BLOOD PRESSURE: 80 MMHG | RESPIRATION RATE: 16 BRPM

## 2024-03-20 PROCEDURE — G0299 HHS/HOSPICE OF RN EA 15 MIN: HCPCS

## 2024-03-20 PROCEDURE — S9126 HOSPICE CARE, IN THE HOME, P: HCPCS

## 2024-03-20 ASSESSMENT — SOCIAL DETERMINANTS OF HEALTH (SDOH): ACTIVE STRESSOR - HEALTH CHANGES: 1

## 2024-03-20 ASSESSMENT — ENCOUNTER SYMPTOMS
STOOL FREQUENCY: DAILY
SLEEP QUALITY: ADEQUATE
DENIES PAIN: 1
MUSCLE WEAKNESS: 1
FATIGUES EASILY: 1
BOWEL PATTERN NORMAL: 1
FATIGUE: 1
LAST BOWEL MOVEMENT: 66919
FORGETFULNESS: 1
PERSON REPORTING PAIN: PATIENT
DESCRIPTION OF MEMORY LOSS: SHORT TERM

## 2024-03-20 ASSESSMENT — ACTIVITIES OF DAILY LIVING (ADL): AMBULATION ASSISTANCE: NON-AMBULATORY

## 2024-03-21 ENCOUNTER — HOME CARE VISIT (OUTPATIENT)
Dept: HOSPICE | Facility: HOSPICE | Age: 74
End: 2024-03-21
Payer: MEDICARE

## 2024-03-21 PROCEDURE — G0156 HHCP-SVS OF AIDE,EA 15 MIN: HCPCS

## 2024-03-21 PROCEDURE — S9126 HOSPICE CARE, IN THE HOME, P: HCPCS

## 2024-03-22 PROCEDURE — S9126 HOSPICE CARE, IN THE HOME, P: HCPCS

## 2024-03-23 PROCEDURE — S9126 HOSPICE CARE, IN THE HOME, P: HCPCS

## 2024-03-24 PROCEDURE — S9126 HOSPICE CARE, IN THE HOME, P: HCPCS

## 2024-03-25 PROCEDURE — S9126 HOSPICE CARE, IN THE HOME, P: HCPCS

## 2024-03-26 ENCOUNTER — HOME CARE VISIT (OUTPATIENT)
Dept: HOSPICE | Facility: HOSPICE | Age: 74
End: 2024-03-26
Payer: MEDICARE

## 2024-03-26 PROCEDURE — G0156 HHCP-SVS OF AIDE,EA 15 MIN: HCPCS

## 2024-03-26 PROCEDURE — S9126 HOSPICE CARE, IN THE HOME, P: HCPCS

## 2024-03-27 ENCOUNTER — HOME CARE VISIT (OUTPATIENT)
Dept: HOSPICE | Facility: HOSPICE | Age: 74
End: 2024-03-27
Payer: MEDICARE

## 2024-03-27 VITALS — RESPIRATION RATE: 20 BRPM | SYSTOLIC BLOOD PRESSURE: 130 MMHG | HEART RATE: 84 BPM | DIASTOLIC BLOOD PRESSURE: 76 MMHG

## 2024-03-27 PROCEDURE — G0299 HHS/HOSPICE OF RN EA 15 MIN: HCPCS

## 2024-03-27 PROCEDURE — S9126 HOSPICE CARE, IN THE HOME, P: HCPCS

## 2024-03-27 ASSESSMENT — ENCOUNTER SYMPTOMS
DESCRIPTION OF MEMORY LOSS: SHORT TERM
LAST BOWEL MOVEMENT: 66926
LIMITED RANGE OF MOTION: 1
DENIES PAIN: 1
PERSON REPORTING PAIN: PATIENT
FATIGUES EASILY: 1
STOOL FREQUENCY: LESS THAN DAILY
FORGETFULNESS: 1
SLEEP QUALITY: ADEQUATE
MUSCLE WEAKNESS: 1
BOWEL PATTERN NORMAL: 1
FATIGUE: 1

## 2024-03-27 ASSESSMENT — SOCIAL DETERMINANTS OF HEALTH (SDOH): ACTIVE STRESSOR - HEALTH CHANGES: 1

## 2024-03-27 ASSESSMENT — ACTIVITIES OF DAILY LIVING (ADL): AMBULATION ASSISTANCE: NON-AMBULATORY

## 2024-03-28 ENCOUNTER — HOME CARE VISIT (OUTPATIENT)
Dept: HOSPICE | Facility: HOSPICE | Age: 74
End: 2024-03-28
Payer: MEDICARE

## 2024-03-28 ENCOUNTER — PHARMACY VISIT (OUTPATIENT)
Dept: PHARMACY | Facility: MEDICAL CENTER | Age: 74
End: 2024-03-28
Payer: COMMERCIAL

## 2024-03-28 DIAGNOSIS — G91.2 NORMAL PRESSURE HYDROCEPHALUS (HCC): Primary | ICD-10-CM

## 2024-03-28 PROCEDURE — G0156 HHCP-SVS OF AIDE,EA 15 MIN: HCPCS

## 2024-03-28 PROCEDURE — RXMED WILLOW AMBULATORY MEDICATION CHARGE: Performed by: REHABILITATION PRACTITIONER

## 2024-03-28 PROCEDURE — S9126 HOSPICE CARE, IN THE HOME, P: HCPCS

## 2024-03-28 RX ORDER — LEVETIRACETAM 500 MG/1
500 TABLET ORAL 2 TIMES DAILY
Qty: 60 TABLET | Refills: 11 | Status: SHIPPED | OUTPATIENT
Start: 2024-03-28 | End: 2025-03-28

## 2024-03-28 RX ORDER — LORAZEPAM 2 MG/ML
2 CONCENTRATE ORAL PRN
Qty: 360 ML | Refills: 0 | Status: SHIPPED | OUTPATIENT
Start: 2024-03-28 | End: 2025-03-28

## 2024-03-29 PROCEDURE — S9126 HOSPICE CARE, IN THE HOME, P: HCPCS

## 2024-03-30 PROCEDURE — S9126 HOSPICE CARE, IN THE HOME, P: HCPCS

## 2024-03-31 ENCOUNTER — HOME CARE VISIT (OUTPATIENT)
Dept: HOSPICE | Facility: HOSPICE | Age: 74
End: 2024-03-31
Payer: MEDICARE

## 2024-03-31 PROCEDURE — S9126 HOSPICE CARE, IN THE HOME, P: HCPCS

## 2024-04-03 NOTE — PROGRESS NOTES
Patient discussed with DANNY Glass. Patient has had no food/fluid intake X 24 hours. Patient likely transitioning. He has been unable to swallow his pills including his Keppra, and today patient has had off and on tremors/seizure like activity. New order for scheduled MSIR and Lorazepam for pain, anxiety, and seizure prophylaxis.

## 2024-04-05 NOTE — PROGRESS NOTES
Patient discussed with DANYN Glass. Patient actively transitioning. Tachypneic, moaning, despite routine MSIR and Lorazepam. Dosing increased. Discontinued all tablets at this time.

## 2024-04-13 DIAGNOSIS — G63 PERIPHERAL NEUROPATHY DUE TO METABOLIC DISORDER (HCC): ICD-10-CM

## 2024-04-13 DIAGNOSIS — E88.9 PERIPHERAL NEUROPATHY DUE TO METABOLIC DISORDER (HCC): ICD-10-CM

## 2024-04-13 RX ORDER — DULOXETIN HYDROCHLORIDE 60 MG/1
CAPSULE, DELAYED RELEASE ORAL
Qty: 180 CAPSULE | Refills: 3 | OUTPATIENT
Start: 2024-04-13

## 2024-05-09 ENCOUNTER — APPOINTMENT (OUTPATIENT)
Dept: MEDICAL GROUP | Age: 74
End: 2024-05-09
Payer: MEDICARE

## 2024-11-01 NOTE — TELEPHONE ENCOUNTER
Patient notified.    
Patient stated that he has been feeling tired and was advised by his pharmacist to change his Rx duloxetine (CYMBALTA) 30 MG Cap DR Particles to 20mg but Don is requesting for you to prescribe the lowest does of Cymbalta. I let him know that you are out of the office today and someone will follow up with him when you return regarding the Rx change. Thank you!      
Please let Don know that I have sent in a prescription for cymbalta 20mg daily. That is the lowest dose of the medication.     Thanks!  Kelly Rivers M.D.    
Yes...

## 2025-02-19 NOTE — PROGRESS NOTES
Cardiology Progress Note   Hammad Montesinos 84 y.o. male MRN: [id number]    Unit/Bed#: -01 Encounter: [id number]      Assessment & Plan  Sepsis ([facility])  Patient with reports of a 3-day onset of shortness of breath, PND.    Patient states that he went to cardiologist appointment when his daughter picked him up to be increasingly tachypnec prompting a visit to the emergency room.  Influenza A  Positive on PCR   management per primary team  Acute on chronic systolic congestive heart failure (HCC)  Wt Readings from Last 3 Encounters:   02/19/25 101 kg (222 lb 9.6 oz)   02/07/25 100 kg (220 lb 6.4 oz)   02/07/25 100 kg (221 lb)   TTE 1/28/2025: EF 35%, moderate global hypokinesis with regional variation, abnormal diastolic inflow pattern question due to atrial fibs, reduced RV function, biatrial dilatation with severe left atrial dilatation, likely moderate to severe MR, mild TR  GDMT: Toprol XL 25 mg daily,  torsemide 40 mg twice daily.    Patient reports home dry weight is 211 to 214 pounds.  Patient reports a 10 pound weight gain in the last week, within increased abdominal girth.  Chest x-ray on arrival: Groundglass opacity in the right lower lobe  Telemetry: V paced rhythm  Started on IV Bumex with relatively good response overnight and stable creatinine  Continue current regiment and reassess diuretic requirements in the morning  Ejection fraction has dropped from 50% down to 35%, and he will eventually require an ischemic evaluation, however would not proceed with this with active flu infection in the hospital, especially since he has no chest pain at the current time  Elevated troponin  Initial Troponin 123  Patient denies chest pain  Has cardiac CTA ordered for Monday, 2/17/2025  Paroxysmal A-fib ([facility])  Rate control: Toprol XL 25 mg daily   Anticoagulation: Xarelto 20 mg daily -reports that he is compliant.  Implanted Biotronik permanent pacemaker secondary to sick sinus syndrome.  Last EP device  Subjective     Prabhakar Sierra is a 72 y.o. male who presents with Follow-Up and Diabetes Mellitus  The patient is here for followup of chronic medical problems listed below. The patient is compliant with medications and having no side effects from them. Denies chest pain, abdominal pain, dyspnea, myalgias, or cough.   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 Moran    Tobacco dependency     No Known Allergies  Outpatient Medications Prior to Visit   Medication Sig Dispense Refill    pioglitazone (ACTOS) 30 MG Tab Take 1 Tablet by mouth every day. 100 Tablet 4    buPROPion SR (WELLBUTRIN-SR) 150 MG TABLET SR 12 HR sustained-release tablet Take 1 Tablet by mouth 2 times a day. 60 Tablet 3    benazepril (LOTENSIN) 40 MG tablet Take 1 Tablet by mouth every day. 100 Tablet 4    metformin (GLUCOPHAGE) 1000 MG tablet Take 1 Tablet by mouth 2 times a day with meals. 180 Tablet 3    glipiZIDE SR (GLIPIZIDE XL) 10 MG TABLET SR 24 HR Take 1 Tablet by mouth every day. 90 Tablet 4    terazosin (HYTRIN) 5 MG Cap Take 1 Capsule by mouth every day. 90 Capsule 4    sildenafil citrate (VIAGRA) 100 MG tablet       albuterol 108 (90 Base) MCG/ACT Aero Soln inhalation aerosol Inhale 2 Puffs every 6 hours as needed for Shortness of Breath. 8.5 g 3    atorvastatin (LIPITOR) 80 MG tablet Take 1 Tablet by mouth every evening. 90 Tablet 4    DULoxetine (CYMBALTA) 60 MG Cap DR Particles delayed-release capsule Take 1  "interrogation showed 100% A-fib burden  Mixed hyperlipidemia  Controlled with atorvastatin 20 mg daily  Type 2 diabetes mellitus with hyperglycemia, with long-term current use of insulin (HCC)  Per primary team(P) [phone]    Plan:  On IV Bumex 3mg and received dose this AM. Only -875mL output/ 24 hours.   Volume status appears to be at baseline. Has pedal edema which may be chronic.   Ordered knee-high compression stockings  Switch to PO Bumex 2mg BID from tonight + Aldactone 25mg QD  BMP shows stable renal function and serum lytes  Refer to EP outpatient to discuss possible BiV upgrade   Will allow for complete recovery from Flu A prior to additional CV workup (repeat limited echo to assess EF and Pharm MPI)    No further CV recommendations. Signing off. Please call/re-consult as needed.    Subjective:   Patient seen and examined. Coughing up mucus on occasion. No trouble breathing. LE edema improved.     Objective:     Vitals: Blood pressure 121/70, pulse 66, temperature 97.9 °F (36.6 °C), temperature source Oral, resp. rate 18, height 5' 10\" (1.778 m), weight 101 kg (222 lb 9.6 oz), SpO2 100%., Body mass index is 31.94 kg/m².,   Orthostatic Blood Pressures      Flowsheet Row Most Recent Value   Blood Pressure 121/70 filed at 02/19/2025 0773   Patient Position - Orthostatic VS Sitting filed at 02/19/2025 0799              Intake/Output Summary (Last 24 hours) at 2/19/2025 1207  Last data filed at 2/19/2025 1113  Gross per 24 hour   Intake 1150 ml   Output 2300 ml   Net -1150 ml         Physical Exam:    GEN: Hammad Montesinos appears well, alert and oriented x 3, pleasant and cooperative   HEENT: Sclera anicteric, conjunctivae pink, mucous membranes moist. Oropharynx clear.   NECK: supple, no significant JVD, Trachea midline, no thyromegaly.   HEART: regular rhythm, normal S1 and S2, no murmurs, clicks, gallops or rubs   LUNGS: clear to auscultation bilaterally; no wheezes, rales, or rhonchi. No increased work of " Capsule by mouth 2 times a day. 180 Capsule 3    Empagliflozin (JARDIANCE) 25 MG Tab Take 1 Each by mouth every day. 100 Tablet 1    gabapentin (NEURONTIN) 300 MG Cap Take 1 Capsule by mouth every day. 90 Capsule 4    omeprazole (PRILOSEC) 20 MG delayed-release capsule TAKE ONE CAPSULE BY MOUTH DAILY (PRILOSEC) 100 Capsule 4    non-formulary med isagenix      ferrous sulfate 325 (65 Fe) MG EC tablet Take 1 Tab by mouth 3 times a day, with meals. 270 Tab 1    Acetaminophen 500 MG Cap Take  by mouth.      vitamin D (CHOLECALCIFEROL) 1000 UNIT Tab Take 1 Tablet by mouth every day.      Multiple Vitamins-Minerals (HM COMPLETE 50+ MENS ULTIMATE PO) Take 1 Tablet by mouth every day.      aspirin (ASA) 81 MG Chew Tab chewable tablet Chew 1 Tablet every day.      naltrexone (DEPADE) 50 MG Tab Take 1 Tablet by mouth every day. 90 Tablet 3    Blood Glucose Monitoring Suppl Device Freestyle carlo glucose monitoring device with all necessary accessories including patch and transmitter  for type II noninsulin treated diabetes.  To check blood sugar once or twice daily and per any symptoms of high or low blood sugar. 1 Each 1    Blood Glucose Monitoring Suppl Device Meter: Dispense Device of Insurance Preference. Sig. Use as directed for blood sugar monitoring. #1. NR. 1 Each 0    Blood Glucose Test Strips Test strips for meter dispensed, testing one time per day E11.65 100 Strip 2    Lancets Lancets for meter dispensed, to test one time per day E11.65 100 Each 2     No facility-administered medications prior to visit.     .          HPI    Review of Systems   Constitutional: Negative.    HENT: Negative.     Eyes: Negative.    Respiratory: Negative.     Cardiovascular: Negative.    Gastrointestinal: Negative.    Genitourinary: Negative.    Musculoskeletal: Negative.    Skin: Negative.    Neurological: Negative.    Endo/Heme/Allergies: Negative.    Psychiatric/Behavioral: Negative.              Objective     /82    "Ht 1.753 m (5' 9\")   Wt 97.5 kg (215 lb)   BMI 31.75 kg/m²      Physical Exam  Constitutional:       General: He is not in acute distress.     Appearance: He is well-developed. He is not diaphoretic.   HENT:      Head: Normocephalic and atraumatic.      Right Ear: External ear normal.      Left Ear: External ear normal.      Nose: Nose normal.      Mouth/Throat:      Pharynx: No oropharyngeal exudate.   Eyes:      General: No scleral icterus.        Right eye: No discharge.         Left eye: No discharge.      Conjunctiva/sclera: Conjunctivae normal.      Pupils: Pupils are equal, round, and reactive to light.   Neck:      Thyroid: No thyromegaly.      Vascular: No JVD.      Trachea: No tracheal deviation.   Cardiovascular:      Rate and Rhythm: Normal rate and regular rhythm.      Heart sounds: Normal heart sounds. No murmur heard.    No friction rub. No gallop.   Pulmonary:      Effort: Pulmonary effort is normal. No respiratory distress.      Breath sounds: Normal breath sounds. No stridor. No wheezing or rales.   Chest:      Chest wall: No tenderness.   Abdominal:      General: Bowel sounds are normal. There is no distension.      Palpations: Abdomen is soft. There is no mass.      Tenderness: There is no abdominal tenderness. There is no guarding or rebound.   Musculoskeletal:         General: No tenderness. Normal range of motion.      Cervical back: Normal range of motion and neck supple.   Lymphadenopathy:      Cervical: No cervical adenopathy.   Skin:     General: Skin is warm and dry.      Coloration: Skin is not pale.      Findings: No erythema or rash.   Neurological:      Mental Status: He is alert and oriented to person, place, and time.      Motor: No abnormal muscle tone.      Coordination: Coordination normal (.goo).      Deep Tendon Reflexes: Reflexes are normal and symmetric. Reflexes normal.   Psychiatric:         Behavior: Behavior normal.         Thought Content: Thought content normal.       " breathing or signs of respiratory distress.   ABDOMEN: Soft, nontender, nondistended  EXTREMITIES: Skin warm and well perfused, no clubbing, cyanosis, or edema.  NEURO: No focal findings. Normal speech. Mood and affect normal.   SKIN: Normal without suspicious lesions on exposed skin.      Medications:      Current Facility-Administered Medications:     acetaminophen (TYLENOL) tablet 650 mg, 650 mg, Oral, Q6H PRN, Jonny DORIAN Pinedarty, DO    albuterol inhalation solution 2.5 mg, 2.5 mg, Nebulization, Q4H PRN, Chan Kong MD, 2.5 mg at 02/15/25 0317    allopurinol (ZYLOPRIM) tablet 300 mg, 300 mg, Oral, Q24H, Jonny Pinedarty, DO, 300 mg at 02/18/25 1024    atorvastatin (LIPITOR) tablet 20 mg, 20 mg, Oral, Daily, Jonny Pinedarty, DO, 20 mg at 02/19/25 0833    budesonide (PULMICORT) inhalation solution 0.5 mg, 0.5 mg, Nebulization, BID, Chan Kong MD, 0.5 mg at 02/19/25 0736    bumetanide (BUMEX) tablet 3 mg, 3 mg, Oral, BID, Jayy Babcock PA-C    cefTRIAXone (ROCEPHIN) IVPB (premix in dextrose) 2,000 mg 50 mL, 2,000 mg, Intravenous, Q24H, Jonny Pinedartmp, DO, Last Rate: 100 mL/hr at 02/19/25 0917, 2,000 mg at 02/19/25 0917    fluticasone (FLONASE) 50 mcg/act nasal spray 1 spray, 1 spray, Each Nare, Daily, Ebony Evans PA-C, 1 spray at 02/19/25 0835    gabapentin (NEURONTIN) capsule 200 mg, 200 mg, Oral, Daily, Jonny ALARCON Yorty, DO, 200 mg at 02/19/25 0833    guaiFENesin (MUCINEX) 12 hr tablet 600 mg, 600 mg, Oral, Q12H TOVA, Jonny ALARCON Yorty, DO, 600 mg at 02/19/25 0833    Hydrocod Israel-Chlorphe Israel ER (TUSSIONEX) ER suspension 5 mL, 5 mL, Oral, Q12H PRN, Jonny Soto DO    insulin glargine (LANTUS) subcutaneous injection 10 Units 0.1 mL, 10 Units, Subcutaneous, Q12H TOVA, Rafael Guzman PA-C, 10 Units at 02/19/25 0831    insulin lispro (HumALOG/ADMELOG) 100 units/mL subcutaneous injection 1-6 Units, 1-6 Units, Subcutaneous, HS, Jonny Soto DO, 2 Units at 02/18/25 2118    insulin lispro    Judgment: Judgment normal.     Hospital Outpatient Visit on 11/04/2022   Component Date Value    TSH 11/04/2022 1.080     Sodium 11/04/2022 139     Potassium 11/04/2022 4.8     Chloride 11/04/2022 100     Co2 11/04/2022 24     Anion Gap 11/04/2022 15.0     Glucose 11/04/2022 227 (H)     Bun 11/04/2022 17     Creatinine 11/04/2022 0.88     Calcium 11/04/2022 10.2     AST(SGOT) 11/04/2022 15     ALT(SGPT) 11/04/2022 19     Alkaline Phosphatase 11/04/2022 56     Total Bilirubin 11/04/2022 0.4     Albumin 11/04/2022 4.7     Total Protein 11/04/2022 8.1     Globulin 11/04/2022 3.4     A-G Ratio 11/04/2022 1.4     Cholesterol,Tot 11/04/2022 181     Triglycerides 11/04/2022 252 (H)     HDL 11/04/2022 44     LDL 11/04/2022 87     WBC 11/04/2022 6.6     RBC 11/04/2022 5.34     Hemoglobin 11/04/2022 15.9     Hematocrit 11/04/2022 48.2     MCV 11/04/2022 90.3     MCH 11/04/2022 29.8     MCHC 11/04/2022 33.0 (L)     RDW 11/04/2022 45.7     Platelet Count 11/04/2022 206     MPV 11/04/2022 10.5     Neutrophils-Polys 11/04/2022 75.10 (H)     Lymphocytes 11/04/2022 14.00 (L)     Monocytes 11/04/2022 7.10     Eosinophils 11/04/2022 2.40     Basophils 11/04/2022 0.80     Immature Granulocytes 11/04/2022 0.60     Nucleated RBC 11/04/2022 0.00     Neutrophils (Absolute) 11/04/2022 4.99     Lymphs (Absolute) 11/04/2022 0.93 (L)     Monos (Absolute) 11/04/2022 0.47     Eos (Absolute) 11/04/2022 0.16     Baso (Absolute) 11/04/2022 0.05     Immature Granulocytes (a* 11/04/2022 0.04     NRBC (Absolute) 11/04/2022 0.00     Glycohemoglobin 11/04/2022 8.4 (H)     Est Avg Glucose 11/04/2022 194     Creatinine, Urine 11/04/2022 64.84     Microalbumin, Urine Rand* 11/04/2022 2.0     Micro Alb Creat Ratio 11/04/2022 31 (H)     Fasting Status 11/04/2022 Fasting     GFR (CKD-EPI) 11/04/2022 91    Hospital Outpatient Visit on 10/27/2022   Component Date Value    Testosterone,Total 10/27/2022 <12 (L)    Hospital Outpatient Visit on 10/18/2022  (HumALOG/ADMELOG) 100 units/mL subcutaneous injection 1-6 Units, 1-6 Units, Subcutaneous, TID AC, 1 Units at 02/18/25 1141 **AND** Fingerstick Glucose (POCT), , , 4x Daily AC and at bedtime, Chan Kong MD    insulin lispro (HumALOG/ADMELOG) 100 units/mL subcutaneous injection 10 Units, 10 Units, Subcutaneous, TID With Meals, Rafael Guzman PA-C, 10 Units at 02/19/25 0830    levalbuterol (XOPENEX) inhalation solution 1.25 mg, 1.25 mg, Nebulization, TID, Chan Kong MD, 1.25 mg at 02/19/25 0736    metoprolol succinate (TOPROL-XL) 24 hr tablet 25 mg, 25 mg, Oral, Daily, Jonny Soto DO, 25 mg at 02/19/25 0833    potassium chloride (Klor-Con M20) CR tablet 20 mEq, 20 mEq, Oral, Daily, Jayy Babcock PA-C, 20 mEq at 02/19/25 0833    rivaroxaban (XARELTO) tablet 15 mg, 15 mg, Oral, Daily With Breakfast, Jonny Soto DO, 15 mg at 02/19/25 0833    senna (SENOKOT) tablet 17.2 mg, 17.2 mg, Oral, HS PRN, Jonny Soto DO, 17.2 mg at 02/17/25 2129    sodium chloride (OCEAN) 0.65 % nasal spray 1 spray, 1 spray, Each Nare, Q1H PRN, Ernesto Davies PA-C, 1 spray at 02/18/25 2215    spironolactone (ALDACTONE) tablet 25 mg, 25 mg, Oral, Daily, Jayy Babcock PA-C, 25 mg at 02/19/25 0833    trimethobenzamide (TIGAN) IM injection 200 mg, 200 mg, Intramuscular, Q12H PRN, Jonny Soto DO     Labs & Results:        Results from last 7 days   Lab Units 02/19/25  0437 02/18/25  0359 02/17/25  0338   WBC Thousand/uL 6.11 5.68 4.91   HEMOGLOBIN g/dL 11.3* 11.1* 11.0*   HEMATOCRIT % 36.2* 35.7* 35.2*   PLATELETS Thousands/uL 164 145* 131*         Results from last 7 days   Lab Units 02/19/25  0437 02/18/25  0359 02/17/25  0338 02/15/25  0224 02/14/25  0905   POTASSIUM mmol/L 3.6 3.0* 3.4*   < > 4.7   CHLORIDE mmol/L 99 94* 97   < > 95*   CO2 mmol/L 30 31 30   < > 28   BUN mg/dL 30* 33* 35*   < > 38*   CREATININE mg/dL 1.04 1.15 1.18   < > 1.39*   CALCIUM mg/dL 8.9 8.6 8.5   < > 9.0   ALK PHOS U/L  --   --   --     Component Date Value    TSH 10/18/2022 0.740     Sodium 10/18/2022 133 (L)     Potassium 10/18/2022 5.2     Chloride 10/18/2022 96     Co2 10/18/2022 23     Anion Gap 10/18/2022 14.0     Glucose 10/18/2022 253 (H)     Bun 10/18/2022 14     Creatinine 10/18/2022 0.85     Calcium 10/18/2022 9.5     AST(SGOT) 10/18/2022 12     ALT(SGPT) 10/18/2022 16     Alkaline Phosphatase 10/18/2022 59     Total Bilirubin 10/18/2022 0.6     Albumin 10/18/2022 4.6     Total Protein 10/18/2022 7.5     Globulin 10/18/2022 2.9     A-G Ratio 10/18/2022 1.6     Cholesterol,Tot 10/18/2022 135     Triglycerides 10/18/2022 214 (H)     HDL 10/18/2022 44     LDL 10/18/2022 48     WBC 10/18/2022 6.9     RBC 10/18/2022 4.83     Hemoglobin 10/18/2022 14.8     Hematocrit 10/18/2022 43.9     MCV 10/18/2022 90.9     MCH 10/18/2022 30.6     MCHC 10/18/2022 33.7     RDW 10/18/2022 46.0     Platelet Count 10/18/2022 175     MPV 10/18/2022 10.9     Neutrophils-Polys 10/18/2022 77.40 (H)     Lymphocytes 10/18/2022 13.80 (L)     Monocytes 10/18/2022 7.50     Eosinophils 10/18/2022 0.60     Basophils 10/18/2022 0.30     Immature Granulocytes 10/18/2022 0.40     Nucleated RBC 10/18/2022 0.00     Neutrophils (Absolute) 10/18/2022 5.37     Lymphs (Absolute) 10/18/2022 0.96 (L)     Monos (Absolute) 10/18/2022 0.52     Eos (Absolute) 10/18/2022 0.04     Baso (Absolute) 10/18/2022 0.02     Immature Granulocytes (a* 10/18/2022 0.03     NRBC (Absolute) 10/18/2022 0.00     Glycohemoglobin 10/18/2022 8.1 (H)     Est Avg Glucose 10/18/2022 186     25-Hydroxy   Vitamin D 25 10/18/2022 23 (L)     Vitamin B12 -True Cobala* 10/18/2022 917 (H)     Fasting Status 10/18/2022 Fasting     GFR (CKD-EPI) 10/18/2022 92       Lab Results   Component Value Date/Time    HBA1C 8.4 (H) 11/04/2022 09:56 AM    HBA1C 8.1 (H) 10/18/2022 11:23 AM     Lab Results   Component Value Date/Time    SODIUM 139 11/04/2022 09:56 AM    POTASSIUM 4.8 11/04/2022 09:56 AM    CHLORIDE 100 11/04/2022   --  138*   ALT U/L  --   --   --   --  17   AST U/L  --   --   --   --  30    < > = values in this interval not displayed.     Results from last 7 days   Lab Units 02/14/25  0905   INR  2.99*   PTT seconds 49*     Results from last 7 days   Lab Units 02/15/25  0224   MAGNESIUM mg/dL 2.0       09:56 AM    CO2 24 11/04/2022 09:56 AM    GLUCOSE 227 (H) 11/04/2022 09:56 AM    BUN 17 11/04/2022 09:56 AM    CREATININE 0.88 11/04/2022 09:56 AM    ALKPHOSPHAT 56 11/04/2022 09:56 AM    ASTSGOT 15 11/04/2022 09:56 AM    ALTSGPT 19 11/04/2022 09:56 AM    TBILIRUBIN 0.4 11/04/2022 09:56 AM     Lab Results   Component Value Date/Time    INR 1.11 01/02/2019 07:04 PM    INR 0.95 01/18/2016 12:00 AM     Lab Results   Component Value Date/Time    CHOLSTRLTOT 181 11/04/2022 09:56 AM    LDL 87 11/04/2022 09:56 AM    HDL 44 11/04/2022 09:56 AM    TRIGLYCERIDE 252 (H) 11/04/2022 09:56 AM       Lab Results   Component Value Date/Time    TESTOSTERONE <12 (L) 10/27/2022 03:39 PM     No results found for: TSH  No results found for: FREET4  No results found for: URICACID  No components found for: VITB12  Lab Results   Component Value Date/Time    25HYDROXY 23 (L) 10/18/2022 11:23 AM    25HYDROXY 30 05/10/2022 09:17 AM                            Assessment & Plan        1. Need for vaccination        - Influenza Vaccine, High Dose (65+ Only)     1. Need for vaccination     - Influenza Vaccine, High Dose (65+ Only)    2. Prostate CA (HCC)- feb 2022; RT tbd x 8 wks, mar- may 2022   .[email or web address]  Under good control. Continue same regimen.'    3. Uncontrolled type 2 diabetes mellitus with hyperglycemia (HCC)  This is not at goal.  Patient will unwilling to change medication at this time or go on insulin.  But will consider in 3 months if no better.  And if his A1c is not less than 8 at that time.  With DM RN at that time.    4. Gastroesophageal reflux disease, unspecified whether esophagitis present   Under good control. Continue same regimen.'   continue PPI  5. Essential hypertension  Good control continue current regimen    6. Mixed hyperlipidemia   Good control continue current regimen    7. B12 deficiency     Good control continue current regimen  8. Current moderate episode of major depressive disorder without prior episode (HCC)    Good control continue current regimen    9. Vitamin D deficiency   Good control continue current regimen    10. Alcohol use disorder, moderate, dependence (HCC)   Good control continue current regimen    11. Obesity (BMI 30.0-34.9)    diet/exercise/lose 15 lbs.; patient counseled      12. 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 Moran       13. Tobacco dependency     Patient counseled. Encouraged to quit tobacco products. NicoDerm prescribed.    14. Tremor, coarse   Ref neuro    15. Other male erectile dysfunction   Good control continue current regimen    16. Normal pressure hydrocephalus (HCC)- ref neuro; unsteady gait, dementia, urinary incontinence        MRI May 2022:  IMPRESSION:     1.  There is moderate dilatation of the bilateral lateral ventricles. This is slightly prominent than the associated cortical atrophy. This is suspicious for normal pressure hydrocephalus. The size of the ventricles is increased since the previous MRI   dated 1/3/2019.  2.  Mild cerebral volume loss.  3.  Chronic lacunar infarct in the left cerebellum.           Exam Ended: 05/12/22  9:04 AM Last Resulted: 05/12/22  3:36 PM